# Patient Record
Sex: FEMALE | Race: BLACK OR AFRICAN AMERICAN | NOT HISPANIC OR LATINO | Employment: OTHER | URBAN - METROPOLITAN AREA
[De-identification: names, ages, dates, MRNs, and addresses within clinical notes are randomized per-mention and may not be internally consistent; named-entity substitution may affect disease eponyms.]

---

## 2017-01-12 ENCOUNTER — ALLSCRIPTS OFFICE VISIT (OUTPATIENT)
Dept: OTHER | Facility: OTHER | Age: 67
End: 2017-01-12

## 2017-01-27 ENCOUNTER — GENERIC CONVERSION - ENCOUNTER (OUTPATIENT)
Dept: OTHER | Facility: OTHER | Age: 67
End: 2017-01-27

## 2017-01-31 ENCOUNTER — ALLSCRIPTS OFFICE VISIT (OUTPATIENT)
Dept: RADIOLOGY | Facility: CLINIC | Age: 67
End: 2017-01-31
Payer: MEDICARE

## 2017-02-02 ENCOUNTER — ALLSCRIPTS OFFICE VISIT (OUTPATIENT)
Dept: OTHER | Facility: OTHER | Age: 67
End: 2017-02-02

## 2017-02-02 DIAGNOSIS — E11.9 TYPE 2 DIABETES MELLITUS WITHOUT COMPLICATIONS (HCC): ICD-10-CM

## 2017-02-02 DIAGNOSIS — K74.69 OTHER CIRRHOSIS OF LIVER (HCC): ICD-10-CM

## 2017-02-02 DIAGNOSIS — C22.0 LIVER CELL CARCINOMA (HCC): ICD-10-CM

## 2017-02-02 DIAGNOSIS — B18.2 CHRONIC VIRAL HEPATITIS C (HCC): ICD-10-CM

## 2017-02-07 ENCOUNTER — GENERIC CONVERSION - ENCOUNTER (OUTPATIENT)
Dept: OTHER | Facility: OTHER | Age: 67
End: 2017-02-07

## 2017-02-08 ENCOUNTER — GENERIC CONVERSION - ENCOUNTER (OUTPATIENT)
Dept: OTHER | Facility: OTHER | Age: 67
End: 2017-02-08

## 2017-02-21 ENCOUNTER — APPOINTMENT (OUTPATIENT)
Dept: LAB | Facility: HOSPITAL | Age: 67
End: 2017-02-21
Attending: INTERNAL MEDICINE
Payer: MEDICARE

## 2017-02-21 ENCOUNTER — TRANSCRIBE ORDERS (OUTPATIENT)
Dept: LAB | Facility: HOSPITAL | Age: 67
End: 2017-02-21

## 2017-02-21 DIAGNOSIS — C22.0 LIVER CELL CARCINOMA (HCC): ICD-10-CM

## 2017-02-21 DIAGNOSIS — B18.2 CHRONIC VIRAL HEPATITIS C (HCC): ICD-10-CM

## 2017-02-21 DIAGNOSIS — K74.69 OTHER CIRRHOSIS OF LIVER (HCC): ICD-10-CM

## 2017-02-21 LAB
ALBUMIN SERPL BCP-MCNC: 4.4 G/DL (ref 3.5–5)
ALP SERPL-CCNC: 83 U/L (ref 46–116)
ALT SERPL W P-5'-P-CCNC: 18 U/L (ref 12–78)
ANION GAP SERPL CALCULATED.3IONS-SCNC: 8 MMOL/L (ref 4–13)
AST SERPL W P-5'-P-CCNC: 9 U/L (ref 5–45)
BASOPHILS # BLD AUTO: 0.02 THOUSANDS/ΜL (ref 0–0.1)
BASOPHILS NFR BLD AUTO: 0 % (ref 0–1)
BILIRUB DIRECT SERPL-MCNC: 0.14 MG/DL (ref 0–0.2)
BILIRUB SERPL-MCNC: 0.34 MG/DL (ref 0.2–1)
BUN SERPL-MCNC: 19 MG/DL (ref 5–25)
CALCIUM SERPL-MCNC: 9.2 MG/DL (ref 8.3–10.1)
CHLORIDE SERPL-SCNC: 96 MMOL/L (ref 100–108)
CO2 SERPL-SCNC: 28 MMOL/L (ref 21–32)
CREAT SERPL-MCNC: 1 MG/DL (ref 0.6–1.3)
EOSINOPHIL # BLD AUTO: 0.1 THOUSAND/ΜL (ref 0–0.61)
EOSINOPHIL NFR BLD AUTO: 1 % (ref 0–6)
ERYTHROCYTE [DISTWIDTH] IN BLOOD BY AUTOMATED COUNT: 13.8 % (ref 11.6–15.1)
GFR SERPL CREATININE-BSD FRML MDRD: >60 ML/MIN/1.73SQ M
GLUCOSE SERPL-MCNC: 98 MG/DL (ref 65–140)
HCT VFR BLD AUTO: 34.8 % (ref 34.8–46.1)
HGB BLD-MCNC: 11.8 G/DL (ref 11.5–15.4)
INR PPP: 1 (ref 0.86–1.16)
LYMPHOCYTES # BLD AUTO: 3.18 THOUSANDS/ΜL (ref 0.6–4.47)
LYMPHOCYTES NFR BLD AUTO: 33 % (ref 14–44)
MCH RBC QN AUTO: 28.8 PG (ref 26.8–34.3)
MCHC RBC AUTO-ENTMCNC: 33.9 G/DL (ref 31.4–37.4)
MCV RBC AUTO: 85 FL (ref 82–98)
MONOCYTES # BLD AUTO: 0.78 THOUSAND/ΜL (ref 0.17–1.22)
MONOCYTES NFR BLD AUTO: 8 % (ref 4–12)
NEUTROPHILS # BLD AUTO: 5.43 THOUSANDS/ΜL (ref 1.85–7.62)
NEUTS SEG NFR BLD AUTO: 58 % (ref 43–75)
NRBC BLD AUTO-RTO: 0 /100 WBCS
PLATELET # BLD AUTO: 242 THOUSANDS/UL (ref 149–390)
PMV BLD AUTO: 9.9 FL (ref 8.9–12.7)
POTASSIUM SERPL-SCNC: 4 MMOL/L (ref 3.5–5.3)
PROT SERPL-MCNC: 9.3 G/DL (ref 6.4–8.2)
PROTHROMBIN TIME: 13.3 SECONDS (ref 12–14.3)
RBC # BLD AUTO: 4.1 MILLION/UL (ref 3.81–5.12)
SODIUM SERPL-SCNC: 132 MMOL/L (ref 136–145)
WBC # BLD AUTO: 9.52 THOUSAND/UL (ref 4.31–10.16)

## 2017-02-21 PROCEDURE — 36415 COLL VENOUS BLD VENIPUNCTURE: CPT

## 2017-02-21 PROCEDURE — 82248 BILIRUBIN DIRECT: CPT

## 2017-02-21 PROCEDURE — 85025 COMPLETE CBC W/AUTO DIFF WBC: CPT

## 2017-02-21 PROCEDURE — 85610 PROTHROMBIN TIME: CPT

## 2017-02-21 PROCEDURE — 82105 ALPHA-FETOPROTEIN SERUM: CPT

## 2017-02-21 PROCEDURE — 80053 COMPREHEN METABOLIC PANEL: CPT

## 2017-02-21 PROCEDURE — 87522 HEPATITIS C REVRS TRNSCRPJ: CPT

## 2017-02-22 ENCOUNTER — GENERIC CONVERSION - ENCOUNTER (OUTPATIENT)
Dept: OTHER | Facility: OTHER | Age: 67
End: 2017-02-22

## 2017-02-22 LAB — AFP-TM SERPL-MCNC: 8.5 NG/ML (ref 0–8.3)

## 2017-02-23 ENCOUNTER — ALLSCRIPTS OFFICE VISIT (OUTPATIENT)
Dept: OTHER | Facility: OTHER | Age: 67
End: 2017-02-23

## 2017-02-23 LAB
HCV RNA SERPL NAA+PROBE-ACNC: NORMAL IU/ML
TEST INFORMATION: NORMAL

## 2017-02-27 ENCOUNTER — HOSPITAL ENCOUNTER (OUTPATIENT)
Dept: MRI IMAGING | Facility: HOSPITAL | Age: 67
Discharge: HOME | End: 2017-02-27
Attending: SURGERY
Payer: MEDICARE

## 2017-02-27 DIAGNOSIS — C22.0 LIVER CELL CARCINOMA (HCC): ICD-10-CM

## 2017-02-27 PROCEDURE — A9581 GADOXETATE DISODIUM INJ: HCPCS | Performed by: SURGERY

## 2017-02-27 PROCEDURE — 74183 MRI ABD W/O CNTR FLWD CNTR: CPT

## 2017-02-27 RX ADMIN — GADOXETATE DISODIUM 10 ML: 181.43 INJECTION, SOLUTION INTRAVENOUS at 15:29

## 2017-03-02 ENCOUNTER — GENERIC CONVERSION - ENCOUNTER (OUTPATIENT)
Dept: OTHER | Facility: OTHER | Age: 67
End: 2017-03-02

## 2017-03-07 ENCOUNTER — TRANSCRIBE ORDERS (OUTPATIENT)
Dept: ADMINISTRATIVE | Facility: HOSPITAL | Age: 67
End: 2017-03-07

## 2017-03-07 ENCOUNTER — ALLSCRIPTS OFFICE VISIT (OUTPATIENT)
Dept: OTHER | Facility: OTHER | Age: 67
End: 2017-03-07

## 2017-03-07 DIAGNOSIS — D75.1 ERYTHROCYTOSIS DUE TO HEPATOMA (HCC): Primary | ICD-10-CM

## 2017-03-07 DIAGNOSIS — C22.0 ERYTHROCYTOSIS DUE TO HEPATOMA (HCC): Primary | ICD-10-CM

## 2017-03-09 ENCOUNTER — ALLSCRIPTS OFFICE VISIT (OUTPATIENT)
Dept: OTHER | Facility: OTHER | Age: 67
End: 2017-03-09

## 2017-05-04 ENCOUNTER — ALLSCRIPTS OFFICE VISIT (OUTPATIENT)
Dept: OTHER | Facility: OTHER | Age: 67
End: 2017-05-04

## 2017-06-29 ENCOUNTER — ALLSCRIPTS OFFICE VISIT (OUTPATIENT)
Dept: OTHER | Facility: OTHER | Age: 67
End: 2017-06-29

## 2017-07-20 ENCOUNTER — ALLSCRIPTS OFFICE VISIT (OUTPATIENT)
Dept: OTHER | Facility: OTHER | Age: 67
End: 2017-07-20

## 2017-08-07 ENCOUNTER — ALLSCRIPTS OFFICE VISIT (OUTPATIENT)
Dept: OTHER | Facility: OTHER | Age: 67
End: 2017-08-07

## 2017-08-18 ENCOUNTER — GENERIC CONVERSION - ENCOUNTER (OUTPATIENT)
Dept: OTHER | Facility: OTHER | Age: 67
End: 2017-08-18

## 2017-08-22 ENCOUNTER — GENERIC CONVERSION - ENCOUNTER (OUTPATIENT)
Dept: OTHER | Facility: OTHER | Age: 67
End: 2017-08-22

## 2017-09-07 DIAGNOSIS — C22.0 LIVER CELL CARCINOMA (HCC): ICD-10-CM

## 2017-09-08 ENCOUNTER — APPOINTMENT (OUTPATIENT)
Dept: LAB | Facility: HOSPITAL | Age: 67
End: 2017-09-08
Payer: COMMERCIAL

## 2017-09-08 ENCOUNTER — TRANSCRIBE ORDERS (OUTPATIENT)
Dept: LAB | Facility: HOSPITAL | Age: 67
End: 2017-09-08

## 2017-09-08 DIAGNOSIS — C22.0 LIVER CELL CARCINOMA (HCC): ICD-10-CM

## 2017-09-08 DIAGNOSIS — E11.9 TYPE 2 DIABETES MELLITUS WITHOUT COMPLICATIONS (HCC): ICD-10-CM

## 2017-09-08 LAB
BUN SERPL-MCNC: 16 MG/DL (ref 5–25)
CREAT SERPL-MCNC: 1.27 MG/DL (ref 0.6–1.3)
EST. AVERAGE GLUCOSE BLD GHB EST-MCNC: 120 MG/DL
GFR SERPL CREATININE-BSD FRML MDRD: 51 ML/MIN/1.73SQ M
HBA1C MFR BLD: 5.8 % (ref 4.2–6.3)

## 2017-09-08 PROCEDURE — 83036 HEMOGLOBIN GLYCOSYLATED A1C: CPT

## 2017-09-08 PROCEDURE — 36415 COLL VENOUS BLD VENIPUNCTURE: CPT

## 2017-09-08 PROCEDURE — 82565 ASSAY OF CREATININE: CPT

## 2017-09-08 PROCEDURE — 84520 ASSAY OF UREA NITROGEN: CPT

## 2017-09-08 PROCEDURE — 82105 ALPHA-FETOPROTEIN SERUM: CPT

## 2017-09-09 LAB — AFP-TM SERPL-MCNC: 12.3 NG/ML (ref 0–8.3)

## 2017-09-13 ENCOUNTER — HOSPITAL ENCOUNTER (OUTPATIENT)
Dept: MRI IMAGING | Facility: HOSPITAL | Age: 67
Discharge: HOME | End: 2017-09-13
Payer: COMMERCIAL

## 2017-09-13 DIAGNOSIS — C22.0 LIVER CELL CARCINOMA (HCC): ICD-10-CM

## 2017-09-13 PROCEDURE — A9581 GADOXETATE DISODIUM INJ: HCPCS | Performed by: RADIOLOGY

## 2017-09-13 PROCEDURE — 74183 MRI ABD W/O CNTR FLWD CNTR: CPT

## 2017-09-13 RX ADMIN — GADOXETATE DISODIUM 10 ML: 181.43 INJECTION, SOLUTION INTRAVENOUS at 13:48

## 2017-09-25 ENCOUNTER — GENERIC CONVERSION - ENCOUNTER (OUTPATIENT)
Dept: OTHER | Facility: OTHER | Age: 67
End: 2017-09-25

## 2017-09-25 LAB
LEFT EYE DIABETIC RETINOPATHY: NORMAL
RIGHT EYE DIABETIC RETINOPATHY: NORMAL

## 2017-09-26 ENCOUNTER — TRANSCRIBE ORDERS (OUTPATIENT)
Dept: ADMINISTRATIVE | Facility: HOSPITAL | Age: 67
End: 2017-09-26

## 2017-09-26 ENCOUNTER — GENERIC CONVERSION - ENCOUNTER (OUTPATIENT)
Dept: OTHER | Facility: OTHER | Age: 67
End: 2017-09-26

## 2017-09-26 DIAGNOSIS — C22.0 CARCINOMA OF LIVER (HCC): Primary | ICD-10-CM

## 2017-10-19 ENCOUNTER — ALLSCRIPTS OFFICE VISIT (OUTPATIENT)
Dept: OTHER | Facility: OTHER | Age: 67
End: 2017-10-19

## 2017-10-19 DIAGNOSIS — F11.20 UNCOMPLICATED OPIOID DEPENDENCE (HCC): ICD-10-CM

## 2017-10-19 DIAGNOSIS — G89.4 CHRONIC PAIN SYNDROME: ICD-10-CM

## 2017-10-19 DIAGNOSIS — C22.0 LIVER CELL CARCINOMA (HCC): ICD-10-CM

## 2017-10-19 DIAGNOSIS — Z79.891 LONG TERM CURRENT USE OF OPIATE ANALGESIC: ICD-10-CM

## 2017-10-20 NOTE — PROGRESS NOTES
Assessment  1  Herniated lumbar disc without myelopathy (722 10) (M51 26)  2  Pain syndrome, chronic (338 4) (G89 4)  3  Myofascial pain (729 1) (M79 1)    Plan  Chronic lumbar radiculopathy    · Renew: Gabapentin 300 MG Oral Capsule; Take 3 caps at bedtime  Rx By: Che Pastor; Dispense: 30 Days ; #:90 Capsule; Refill: 1;For: Chronic lumbar   radiculopathy; MARIANO = N; Verified Transmission to [address] [place] [address]; Last Updated By: System, SureScripts; 10/19/2017 1:38:16 PM   · Renew: Oxycodone-Acetaminophen  MG Oral Tablet; Take 1 tablet every 6 hours  as needed for pain  Rx By: Che Pastor; Dispense: 30 Days ; #:120 Tablet; Refill: 0;For: Chronic lumbar   radiculopathy; MARIANO = N; Print Rx  Hepatocellular carcinoma    · *1 - [facility] RADIATION ONC (RADIATION ONCOLOGY ) Co-Management  Consult for  possible Sirspheres or ablation  Status: Active  Requested for: 33GWX0475 01:30PM  Ordered; For: Hepatocellular carcinoma; Ordered By: Shamir Trinh  Performed:   Due:   42EYC6675; Last Updated By: Marvin Darling; 10/16/2017 9:56:17 AM  () Care Summary provided  : Yes  Myofascial pain    · Start: Methocarbamol 500 MG Oral Tablet; TAKE 1 TABLET Bedtime for muscle spasm  Rx By: Che Pastor; Dispense: 30 Days ; #:30 Tablet; Refill: 1;For: Myofascial pain;   MARIANO = N; Verified Transmission to [address] [place] [address]; Last Updated   By: System, SureScriSocialSafe; 10/19/2017 1:43:29 PM  Pain syndrome, chronic    · Follow-up visit in 2 months Evaluation and Treatment  Follow-up  Status: Hold For -  Scheduling  Requested for: 95GTV9493  Ordered; For: Pain syndrome, chronic;  Ordered By: Che Pastor  Performed:   Order   Comments: nm 8 weeks  Due: 45OUW5383  Sacroiliitis    · Renew: Diclofenac Sodium 75 MG Oral Tablet Delayed Release; TAKE 1 TABLET 2  TIMES DAILY AFTER MEALS  Rx By: Che Pastor; Dispense: 30 Days ; #:60 Tablet Delayed Release;  Refill: 5;For:   Sacroiliitis; MARIANO = N; Verified Transmission to [address]; Last   Updated By: System, OQOriHoopla; 10/19/2017 1:38:17 PM  Trochanteric bursitis    · Renew: Flector 1 3 % Transdermal Patch; APPLY 1 PATCH TO LEFT HIP Q 12 HOURS  Rx By: Griffin Willard; Dispense: 30 Days ; #:60 Patch; Refill: 2;For: Trochanteric   bursitis; MARIANO = N; Verified Transmission to [address] [place] [address]; Last   Updated By: System, SureScripts; 10/19/2017 1:38:17 PM    Discussion/Summary    Patient is a 77year old female with a history of lumbar disc herniation and radiculopathy, who presents today for a follow up appointment  The patient continues with low back pain, with occasional right hamstring pain  The hamstring pain has improved since completing a Medrol Dosepak  She will continue on the Percocet as prescribed, and was given a prescription for this month with a do not fill date until October 23, 2017, Gabriel Channel next with the do not filled until November 21, 2017  also has muscle spasms noted, so I will order a muscle relaxant methocarbamol for her to take as needed  She was instructed to take the medication at bedtime as it can cause drowsiness and dizziness  prescription monitoring drug program report was reviewed and was appropriate  urine drug screen was performed in the office today, as part of the medication management protocol  Drug screens are performed to evaluate for the presence or absence of described, non prescribed, and/or illicit substances  The point of care results were appropriate for what is being prescribed  The specimen will be sent to the lab for confirmatory testing  The drug screen is medically necessary, because the patient is dependent upon, or being considered for opiate medication  The patient has the current Goals: Decreased pain and improved quality of life  The patent has the current Barriers: None  Patient is able to Self-Care       There are risks associated with opiod medications, including dependence, addiction and tolerance  The patient understands and agrees to use these medications only as prescribed  Potential side effects of the medications include, but are not limited to, constipation, drowsiness, addiction, impaired judgment and risk of fatal overdose if not taken as prescribed  Sharing medications is a felony  At this point and time, the patient is showing no signs of addiction, abuse, diversion or suicidal ideation  Chief Complaint  1  Back Pain    History of Present Illness  Patient is a 59-year-old female with a history of lumbar disc herniation and radiculopathy  She was last seen in the office on August 7, 2017, in which she was continued on Percocet 10/325 mg  She presents today for a followup appointment  At this time, the patient continues to experience low back pain  She also has been having occasional pain in the right hamstring  She states this occurred 4 months ago, and lasted for 3 days  She denies trauma or precipitating event which caused the pain to occur  She described it as sharp  She contacted the office and was prescribed a Medrol Dosepak which she states has been helpful  She has not felt the pain since  She also continues to take Percocet 10/325 milligrams 4 times a day, Flector patch, gabapentin 300 milligrams 3 caps at nighttime, and diclofenac 50 milligrams twice a day  She states initially that diclofenac caused her to have a stomach ache, but she did not take the medication with food  Overall the medication provided 80 percent pain relief, she denies side effects or bowel or bladder issues  Charles Guajardo presents with complaints of gradual onset of constant episodes of severe bilateral lower back pain, described as dull and aching, radiating to the bilateral buttock and bilateral thigh  On a scale of 1 to 10, the patient rates the pain as 8  Symptoms are unchanged  Review of Systems    Constitutional: no fever,-- no recent weight gain-- and-- no recent weight loss     Eyes: no double vision-- and-- no blurry vision  Cardiovascular: no chest pain,-- no palpitations-- and-- no lower extremity edema  Respiratory: no complaints of shortness of breath-- and-- no wheezing  Musculoskeletal: difficulty walking,-- muscle weakness,-- joint stiffness,-- pain in extremity left thigh-- and-- decreased range of motion, but-- no joint swelling-- and-- no limb swelling  Neurological: no dizziness,-- no difficulty swallowing,-- no memory loss,-- no loss of consciousness-- and-- no seizures  Gastrointestinal: no nausea,-- no vomiting,-- no constipation-- and-- no diarrhea  Genitourinary: no difficulty initiating urine stream,-- no genital pain-- and-- no frequent urination  Integumentary: no complaints of skin rash  Psychiatric: no depression  Endocrine: no excessive thirst,-- no adrenal disease,-- no hypothyroidism-- and-- no hyperthyroidism  Hematologic/Lymphatic: no tendency for easy bruising-- and-- no tendency for easy bleeding  Active Problems  1  Acute upper respiratory infection (465 9) (J06 9)  2  Analgesic use (V58 69) (Z79 899)  3  Anxiety (300 00) (F41 9)  4  Asthenia (780 79) (R53 1)  5  Chronic lumbar radiculopathy (724 4) (M54 16)  6  Claustrophobia (300 29) (F40 240)  7  Constipation (564 00) (K59 00)  8  Diabetes mellitus, type II (250 00) (E11 9)  9  Diabetic neuropathy (250 60,357 2) (E11 40)  10  Encounter for screening mammogram for breast cancer (V76 12) (Z12 31)  11  Hepatitis C, chronic (070 54) (B18 2)  12  Hepatocellular carcinoma (155 0) (C22 0)  13  Herniated lumbar disc without myelopathy (722 10) (M51 26)  14  Hypertension (401 9) (I10)  15  Insomnia (780 52) (G47 00)  16  Left foot pain (729 5) (M79 672)  17  Left-sided chest wall pain (786 52) (R07 89)  18  Malignant neoplasm of liver (155 2) (C22 9)  19  Nicotine dependence (305 1) (F17 200)  20  Opioid dependence (304 00) (F11 20)  21  Osteoporosis (733 00) (M81 0)  22   Other cirrhosis of liver (571  5) (K74 69)  23  Pain syndrome, chronic (338 4) (G89 4)  24  Sacroiliitis (720 2) (M46 1)  25  Screening for breast cancer (V76 10) (Z12 31)  26  Seasonal allergies (477 9) (J30 2)  27  Trichomonal vulvovaginitis (131 01) (A59 01)  28  Trochanteric bursitis (726 5) (M70 60)    Past Medical History  1  History of Age At First Period 15 Years Old (Menarche)  2  History of Age At First Pregnancy 16 Years Old  3  History of Dental abscess (522 5) (K04 7)  4  History of Drug dependence (304 90) (F19 20)  5  History Of 4  Previous Pregnancies (V61 5)  6  History of acute bronchitis (V12 69) (Z87 09)  7  History of alopecia (V13 89) (Z87 898)  8  History of hypertension (V12 59) (Z86 79)  9  History of hypokalemia (V12 29) (Z86 39)  10  History of shortness of breath (V13 89) (Z87 898)  11  History of Iron overload (275 09) (E83 19)  12  History of Iron overload (275 09) (E83 19)  13  History of Limb pain (729 5) (M79 609)  14  History of Menopause (V49 81)  15  History of Need for pneumococcal vaccination (V03 82) (Z23)  16  History of Other screening mammogram (V76 12) (Z12 31)  17  History of Pre-operative cardiovascular examination (V72 81) (Z01 810)  18  History of Previous Pregnancies Resulted In 4  Live Birth(S)  19  History of Tuberculin skin test encounter (V74 1) (Z11 1)  20  History of Vulvovaginitis candida albicans (112 1) (B37 3)    The active problems and past medical history were reviewed and updated today  Surgical History  1  History of Dental Surgery  2  History of Hysterectomy  3  History of Tonsillectomy    The surgical history was reviewed and updated today  Family History  Mother   1  No pertinent family history  Father   2  No pertinent family history  Maternal Aunt   3  Family history of Cancer  Family History   4  Family history of Cancer  5  Family history of Denial Of Any Significant Medical History  6  Family history of Diabetes Mellitus (V18 0)  7   Family history of Father  At Age ___  6  Family history of Hypertension (V17 49)    The family history was reviewed and updated today  Social History   · Being A Social Drinker   · Current Every Day Smoker (305 1)   · Marital History - Single   · Denied: History of No drug use  The social history was reviewed and updated today  The social history was reviewed and is unchanged  Current Meds  1  Kim Contour Test In Vitro Strip; TEST ONCE DAILY; Therapy: 77Lzl9617 to (Evaluate:2017)  Requested for: 85Tgx1719; Last   Rx:41Zpc9095 Ordered  2  Diclofenac Sodium 75 MG Oral Tablet Delayed Release; TAKE 1 TABLET 2 TIMES DAILY   AFTER MEALS; Therapy: 14Nah2098 to (Evaluate:92Rzs0361)  Requested for: 20Fqm8650; Last   Rx:39Npl1718; Status: ACTIVE - Renewal Denied Ordered  3  Flector 1 3 % Transdermal Patch; APPLY 1 PATCH TO LEFT HIP Q 12 HOURS; Therapy: 68MSC9074 to (Evaluate:78Vqi6655)  Requested for: 74JXB4108; Last   Rx:2017 Ordered  4  Furosemide 40 MG Oral Tablet; take 1 tablet by mouth every day; Therapy: 19WKB6207 to (Evaluate:50Drq9152)  Requested for: 53Fam1144; Last   Rx:39Vtm4681 Ordered  5  Gabapentin 300 MG Oral Capsule; Take 3 caps at bedtime; Therapy: 82PCP6209 to (Nicolas Tamayo)  Requested for: 35KWZ4353; Last   Rx:64Gsm0968; Status: ACTIVE - Renewal Denied Ordered  6  HydroCHLOROthiazide 25 MG Oral Tablet; TAKE 1 TABLET DAILY; Therapy: 21KIQ4972 to (Evaluate:29Jew9308)  Requested for: 2017; Last   Rx:2017 Ordered  7  LORazepam 1 MG Oral Tablet; TAKE 1 TABLET 1 HOUR BEFORE MRI  MAY REPEAT   ONCE 15 MINUTES BEFORE MRI; Therapy: 83ROJ3926 to (Evaluate:27Duv8716); Last Rx:61Nan3874 Ordered  8  Losartan Potassium 100 MG Oral Tablet; take one tablet by mouth daily; Therapy: 30BHT3308 to (Evaluate:22Ctd2902)  Requested for: 33Abs9569; Last   Rx:65Uer1750 Ordered  9  MetFORMIN HCl - 500 MG Oral Tablet; take 1 tablet by mouth twice daily;    Therapy: 59AKH9105 to (Evaluate:28Gwd7289) Requested for: 42Jii5398; Last   Rx:17Hul4346 Ordered  10  Oxycodone-Acetaminophen  MG Oral Tablet; Take 1 tablet every 6 hours as    needed for pain; Therapy: 17QPJ3979 to (Evaluate:48Suz9255); Last Rx:88Cxu2531 Ordered  11  Senna Lax 8 6 MG TABS; Take 1-2 tablets every day PRN; Therapy: 03MXK1111 to (21 )  Requested for: 18VFS9906; Last    Rx:58Wzl8379 Ordered  12  Spironolactone 100 MG Oral Tablet; take 1 tablet by mouth daily; Therapy: 51IND5566 to (Evaluate:20Ozm1218)  Requested for: 56RCK4310; Last    Rx:06Wem2377 Ordered    The medication list was reviewed and updated today  Allergies  1  No Known Drug Allergies  2  No Known Environmental Allergies  3  No Known Food Allergies  Denied   4  ACE Inhibitors    Vitals  Vital Signs    Recorded: 92QID0142 01:15PM   Temperature 97 9 F   Heart Rate 76   Respiration 16   Systolic 741   Diastolic 72   Height 5 ft 3 in   Weight 174 lb    BMI Calculated 30 82   BSA Calculated 1 82   O2 Saturation 97   Pain Scale 8     Physical Exam    Constitutional   General appearance: Well developed, well nourished, alert, in no distress, non-toxic and no overt pain behavior  Eyes   Sclera: anicteric   HEENT   Hearing grossly intact  Neck   Neck: Supple, symmetric, trachea midline, no masses  Pulmonary   Respiratory effort: Even and unlabored  Cardiovascular   Examination of extremities: No edema or pitting edema present  Skin   Skin and subcutaneous tissue: Normal without rashes or lesions, well hydrated  Psychiatric   Mood and affect: Mood and affect appropriate  Musculoskeletal   Gait and station: Normal     Lumbar/Sacral Spine examination demonstrates Lumbosacral Spine:   Appearance: Normal  Spinal alignment exhibits normal lordosis  Tenderness: the left sacroiliac joint  No tenderness over right hamstring  Flexion was not restricted-- and-- was painless  Extension was restricted-- and-- was painful     Foot and ankle strength was normal bilaterally  Knee strength was normal bilaterally  Hip strength was normal bilaterally  Results/Data  Results Free Text Form Pain Mng[facility]:   Results    I personally reviewed the films/images in the office today  Radiology:  4 IMAGES  SPINE dated 5/14/14  Back pain  MRI 11/19/2013  4 including flexion extension  No instability is seen on flexion or extension  lumbar vertebrae demonstrate normal height  is no fracture or pathologic bone lesions  L5-S1 degenerative disc disease is present  Moderate facetchanges also present at this level  pedicles are intact  pars defects  No spondylolisthesis  L5-S1 degenerative changes  MRI:  mri -lumbar spine on 11/19/13L1-L2- Minor bilateral facet arthrosisL2-L3- Circumferential bulge, slight reduction disc height, marginalosteophytes, facet arthrosis, no critical stenosis  L3-L4- Minor bilateral facet arthrosis, slight bulgeL4-L5- Moderate facet arthrosis, minor anterolisthesis  L5-S1- Central protrusion has increased in volume since prior study  Disc extends above the disc space in a right paramedian position  Nodefinite root compression  There's also extension of disc into bothforamen, asymmetric to the left, no definite root compression  Moderate bilateral facet arthrosis  Multiple sacral Tarlov cysts  IMPRESSION-1  Progression of degenerative changes particularly at the L5-S1 level  No definite nerve root compression  Marrow signal is diffusely heterogeneous and low on T1 sequences, isthere evidence for lymphoproliferative disease?     Other  pt took oxycodone today 10/19/17  Attending Note  Collaborating Physician:1  I discussed the case with the Advanced Practitioner and reviewed the note1 -- and-- I agree with the Advanced Practitioner note1         1 Amended By: Carroll Kearney; Oct 19 2017 4:40 PM EST    Future Appointments    Date/Time Provider Specialty Site   01/02/2018 01:15 PM ARPITA Morales   [facility]     Signatures   Electronically signed by : Alvarez sAhby, [address] [facility] [address] [facility] [address]; Oct 19 2017  1:50PM EST                       (Author)    Electronically signed by : Ashley Ro MD; Oct 19 2017  1:51PM EST                       (Author)    Electronically signed by : Franny Garcia; Oct 19 2017  4:38PM EST                       (Author)    Electronically signed by : Franny Garcia; Oct 19 2017  4:38PM EST                       (Author)    Electronically signed by : Ashley Ro MD; Oct 19 2017  4:41PM EST                       (Author)

## 2017-11-03 ENCOUNTER — GENERIC CONVERSION - ENCOUNTER (OUTPATIENT)
Dept: OTHER | Facility: OTHER | Age: 67
End: 2017-11-03

## 2017-11-03 ENCOUNTER — APPOINTMENT (OUTPATIENT)
Dept: RADIATION ONCOLOGY | Facility: HOSPITAL | Age: 67
End: 2017-11-03
Attending: RADIOLOGY
Payer: COMMERCIAL

## 2017-11-03 PROCEDURE — 99214 OFFICE O/P EST MOD 30 MIN: CPT | Performed by: RADIOLOGY

## 2017-11-21 ENCOUNTER — OFFICE VISIT (OUTPATIENT)
Dept: URGENT CARE | Age: 67
End: 2017-11-21
Payer: COMMERCIAL

## 2017-11-21 PROCEDURE — 99203 OFFICE O/P NEW LOW 30 MIN: CPT | Performed by: FAMILY MEDICINE

## 2017-11-21 PROCEDURE — S9088 SERVICES PROVIDED IN URGENT: HCPCS | Performed by: FAMILY MEDICINE

## 2017-11-22 NOTE — PROGRESS NOTES
Assessment    1  Acute upper respiratory infection (465 9) (J06 9)    Plan  Acute upper respiratory infection    · Benzonatate 200 MG Oral Capsule; TAKE 1 CAPSULE 2-3 TIMES DAILY (every 8-12hours as needed)   · LevoFLOXacin 500 MG Oral Tablet (Levaquin); TAKE 1 TABLET DAILY UNTILFINISHED    Discussion/Summary  Discussion Summary:   Levaquin once daily until finished ( please take probiotics)  Perles 2 to 3 times a day (every 8-12 hours) as needed for cough  follow-up with family physician  Medication Side Effects Reviewed: Possible side effects of new medications were reviewed with the patient/guardian today  Understands and agrees with treatment plan: The treatment plan was reviewed with the patient/guardian  The patient/guardian understands and agrees with the treatment plan   Counseling Documentation With Imm: The patient was counseled regarding  Chief Complaint    1  Cough  Chief Complaint Free Text Note Form: c/o congested, spasmatic cough for > one month - states sl  improving  Also notes nasal congestion with PND and rhinorrhea and sl  sore throat when cough  Denies fever, Dyspnea or chest tightness  No OTC meds      History of Present Illness  HPI: Congestion, cough   Hospital Based Practices Required Assessment:  Pain Assessment  the patient states they have pain  The pain is located in the cough  (on a scale of 0 to 10, the patient rates the pain at 8 )  Abuse And Domestic Violence Screen   Yes, the patient is safe at home  -- The patient states no one is hurting them  Depression And Suicide Screen  No, the patient has not had thoughts of hurting themself  No, the patient has not felt depressed in the past 7 days  Prefered Language is  [place]  Primary Language is  English  Review of Systems  Focused-Female:  Constitutional: as noted in HPI   ENT: nasal discharge, but-- as noted in HPI    Cardiovascular: no complaints of slow or fast heart rate, no chest pain, no palpitations, no leg claudication or lower extremity edema  Respiratory: cough, but-- as noted in HPI  Breasts: no complaints of breast pain, breast lump or nipple discharge  Gastrointestinal: no complaints of abdominal pain, no constipation, no nausea or diarrhea, no vomiting, no bloody stools  Genitourinary: no complaints of dysuria, no incontinence, no pelvic pain, no dysmenorrhea, no vaginal discharge or abnormal vaginal bleeding  Musculoskeletal: no complaints of arthralgia, no myalgia, no joint swelling or stiffness, no limb pain or swelling  Integumentary: no complaints of skin rash or lesion, no itching or dry skin, no skin wounds  Neurological: no complaints of headache, no confusion, no numbness or tingling, no dizziness or fainting  ROS Reviewed:   ROS reviewed  Active Problems  1  Acute upper respiratory infection (465 9) (J06 9)   2  Analgesic use (V58 69) (Z79 899)   3  Anxiety (300 00) (F41 9)   4  Asthenia (780 79) (R53 1)   5  Chronic lumbar radiculopathy (724 4) (M54 16)   6  Claustrophobia (300 29) (F40 240)   7  Constipation (564 00) (K59 00)   8  Diabetes mellitus, type II (250 00) (E11 9)   9  Diabetic neuropathy (250 60,357 2) (E11 40)   10  Encounter for long-term use of opiate analgesic (V58 69) (Z79 891)   11  Encounter for screening mammogram for breast cancer (V76 12) (Z12 31)   12  Hepatitis C, chronic (070 54) (B18 2)   13  Hepatocellular carcinoma (155 0) (C22 0)   14  Herniated lumbar disc without myelopathy (722 10) (M51 26)   15  Hypertension (401 9) (I10)   16  Insomnia (780 52) (G47 00)   17  Left foot pain (729 5) (M79 672)   18  Left-sided chest wall pain (786 52) (R07 89)   19  Malignant neoplasm of liver (155 2) (C22 9)   20  Myofascial pain (729 1) (M79 1)   21  Nicotine dependence (305 1) (F17 200)   22  Opioid dependence (304 00) (F11 20)   23  Osteoporosis (733 00) (M81 0)   24  Other cirrhosis of liver (571 5) (K74 69)   25  Pain syndrome, chronic (338 4) (G80 8)   26  Sacroiliitis (720 2) (M46 1)   27  Screening for breast cancer (V76 10) (Z12 31)   28  Seasonal allergies (477 9) (J30 2)   29  Trichomonal vulvovaginitis (131 01) (A59 01)   30  Trochanteric bursitis (726 5) (M70 60)    Past Medical History  1  History of Age At First Period 15 Years Old (Menarche)   2  History of Age At First Pregnancy 16 Years Old   3  History of Dental abscess (522 5) (K04 7)   4  History of Drug dependence (304 90) (F19 20)   5  History Of 4  Previous Pregnancies (V61 5)   6  History of acute bronchitis (V12 69) (Z87 09)   7  History of alopecia (V13 89) (Z87 898)   8  History of hypertension (V12 59) (Z86 79)   9  History of hypokalemia (V12 29) (Z86 39)   10  History of shortness of breath (V13 89) (Z87 898)   11  History of Iron overload (275 09) (E83 19)   12  History of Iron overload (275 09) (E83 19)   13  History of Limb pain (729 5) (M79 609)   14  History of Menopause (V49 81)   15  History of Need for pneumococcal vaccination (V03 82) (Z23)   16  History of Other screening mammogram (V76 12) (Z12 31)   17  History of Pre-operative cardiovascular examination (V72 81) (Z01 810)   18  History of Previous Pregnancies Resulted In 4  Live Birth(S)   19  History of Tuberculin skin test encounter (V74 1) (Z11 1)   20  History of Vulvovaginitis candida albicans (112 1) (B37 3)  Active Problems And Past Medical History Reviewed: The active problems and past medical history were reviewed and updated today  Family History  Mother    1  Patient's mother is  (V24 11) (Z80 80)  Father    2  Family history of diabetes mellitus (V18 0) (Z83 3)   3  Family history of malignant neoplasm of prostate (V16 42) (Z80 42)  Maternal Aunt    4  Family history of Cancer  Family History    5  Family history of Cancer   6  Family history of Denial Of Any Significant Medical History   7  Family history of Diabetes Mellitus (V18 0)   8  Family history of Father  At Age ___   5   Family history of Hypertension (V17 49)  Family History Reviewed: The family history was reviewed and updated today  Social History   · Current Every Day Smoker (305 1)   · Four children   · Living alone (V60 3) (Z60 2)   · Marital History - Single   · No alcohol use   · Denied: History of No drug use   · Part-time employment  Social History Reviewed: The social history was reviewed and updated today  The social history was reviewed and is unchanged  Surgical History    1  History of Dental Surgery   2  History of Hysterectomy   3  History of Liver Surgery   4  History of Tonsillectomy  Surgical History Reviewed: The surgical history was reviewed and updated today  Current Meds   1  Kim Contour Test In Vitro Strip; TEST ONCE DAILY; Therapy: 48Znx5281 to (Evaluate:14Ojq0313)  Requested for: 76Stu8462; Last Rx:17Lem0780 Ordered   2  Diclofenac Sodium 75 MG Oral Tablet Delayed Release; TAKE 1 TABLET 2 TIMES DAILY AFTER MEALS; Therapy: 15Oro1641 to (Evaluate:33Xlo2003)  Requested for: 24QRT3482; Last Rx:19Oct2017 Ordered   3  Flector 1 3 % Transdermal Patch; APPLY 1 PATCH TO LEFT HIP Q 12 HOURS; Therapy: 75LEV4321 to (Evaluate:17Jan2018)  Requested for: 86UBS8690; Last Rx:19Oct2017 Ordered   4  Furosemide 40 MG Oral Tablet; take 1 tablet by mouth every day; Therapy: 26ZVJ4884 to (Evaluate:55Ggp2391)  Requested for: 98Tve0842; Last Rx:32Nur9483 Ordered   5  Gabapentin 300 MG Oral Capsule; Take 3 caps at bedtime; Therapy: 50FJZ1722 to (Evaluate:03Ywh2969)  Requested for: 44YQH9501; Last Rx:19Oct2017; Status: ACTIVE - Renewal Denied Ordered   6  HydroCHLOROthiazide 25 MG Oral Tablet; TAKE 1 TABLET DAILY; Therapy: 55VHD0888 to (Evaluate:90Mfz7756)  Requested for: 14GSN2332; Last Rx:04Nov2017 Ordered   7  LORazepam 1 MG Oral Tablet; TAKE 1 TABLET 1 HOUR BEFORE MRI  MAY REPEAT ONCE 15 MINUTES BEFORE MRI; Therapy: 35ZMI4326 to (Evaluate:93Fnr9194); Last Rx:18Oxk7711 Ordered   8   Losartan Potassium 100 MG Oral Tablet; take one tablet by mouth daily; Therapy: 48RQI7694 to (Evaluate:49Lov9373)  Requested for: 98Lyd3560; Last Rx:76Gzm4061 Ordered   9  MetFORMIN HCl - 500 MG Oral Tablet; take 1 tablet by mouth twice daily; Therapy: 42ARR5861 to (Evaluate:68Vbe1203)  Requested for: 85Equ5657; Last Rx:94Hjg2353 Ordered   10  Methocarbamol 500 MG Oral Tablet; TAKE 1 TABLET Bedtime for muscle spasm; Therapy: 91JCC6470 to (Evaluate:07Vnv9742)  Requested for: 11RGH3726; Last  Rx:71Pst9729 Ordered   11  Oxycodone-Acetaminophen  MG Oral Tablet; Take 1 tablet every 6 hours as  needed for pain; Therapy: 39ZBG1871 to (Evaluate:18Nov2017); Last Rx:32Osf4944 Ordered   12  Senna Lax 8 6 MG TABS; Take 1-2 tablets every day PRN; Therapy: 27COL5857 to (03 17 74 30 53)  Requested for: 61ICU3618; Last  Rx:71Zxg7590 Ordered   13  Spironolactone 100 MG Oral Tablet; take 1 tablet by mouth daily; Therapy: 24EUZ0323 to (Evaluate:29Odn0256)  Requested for: 36ZTV0924; Last  Rx:34Ilo0419 Ordered  Medication List Reviewed: The medication list was reviewed and updated today  Allergies  1  ACE Inhibitors    2  No Known Environmental Allergies   3  No Known Food Allergies    Vitals  Signs   Recorded: 21Nov2017 03:53PM   Temperature: 98 5 F, Oral  Heart Rate: 72  Pulse Quality: Regular  Respiration: 20  Systolic: 137, RUE, Sitting  Diastolic: 62, RUE, Sitting  Height: 5 ft 3 in  Weight: 172 lb 9 6 oz  BMI Calculated: 30 57  BSA Calculated: 1 82  O2 Saturation: 95  Pain Scale: 8    Physical Exam   Constitutional  General appearance: No acute distress, well appearing and well nourished  Ears, Nose, Mouth, and Throat  External inspection of ears and nose: Normal    Otoscopic examination: Tympanic membranes translucent with normal light reflex  Canals patent without erythema  -- Nasal congestion  -- injection of the oropharynx  Pulmonary  Respiratory effort: No increased work of breathing or signs of respiratory distress   -- coarse breath sounds with scattered rhonchi  Cardiovascular  Auscultation of heart: Normal rate and rhythm, normal S1 and S2, without murmurs  Lymphatic  Palpation of lymph nodes in neck: No lymphadenopathy  Skin good color and turgor  Neurologic grossly intact, no nuchal rigidity  Psychiatric  Orientation to person, place, and time: Normal    Mood and affect: Normal        Future Appointments    Date/Time Provider Specialty Site   01/02/2018 01:15 PM ARPITA Ya   [facility]   12/18/2017 01:15 PM BRYANNA [facility] [address]       Signatures   Electronically signed by : Manuela Olmstead DO; Nov 21 2017  4:11PM EST                       (Author)

## 2017-12-01 ENCOUNTER — TRANSCRIBE ORDERS (OUTPATIENT)
Dept: LAB | Facility: HOSPITAL | Age: 67
End: 2017-12-01

## 2017-12-01 ENCOUNTER — APPOINTMENT (OUTPATIENT)
Dept: LAB | Facility: HOSPITAL | Age: 67
End: 2017-12-01
Attending: SURGERY
Payer: COMMERCIAL

## 2017-12-01 DIAGNOSIS — C22.0 LIVER CELL CARCINOMA (HCC): ICD-10-CM

## 2017-12-01 LAB
AFP-TM SERPL-MCNC: 29.1 NG/ML (ref 0.5–8)
ALBUMIN SERPL BCP-MCNC: 3.9 G/DL (ref 3.5–5)
ALP SERPL-CCNC: 108 U/L (ref 46–116)
ALT SERPL W P-5'-P-CCNC: 34 U/L (ref 12–78)
ANION GAP SERPL CALCULATED.3IONS-SCNC: 8 MMOL/L (ref 4–13)
APTT PPP: 36 SECONDS (ref 23–35)
AST SERPL W P-5'-P-CCNC: 26 U/L (ref 5–45)
BILIRUB SERPL-MCNC: 0.33 MG/DL (ref 0.2–1)
BUN SERPL-MCNC: 26 MG/DL (ref 5–25)
CALCIUM SERPL-MCNC: 9.4 MG/DL (ref 8.3–10.1)
CHLORIDE SERPL-SCNC: 98 MMOL/L (ref 100–108)
CO2 SERPL-SCNC: 26 MMOL/L (ref 21–32)
CREAT SERPL-MCNC: 1.23 MG/DL (ref 0.6–1.3)
GFR SERPL CREATININE-BSD FRML MDRD: 52 ML/MIN/1.73SQ M
GLUCOSE SERPL-MCNC: 101 MG/DL (ref 65–140)
INR PPP: 1.06 (ref 0.86–1.16)
POTASSIUM SERPL-SCNC: 4.3 MMOL/L (ref 3.5–5.3)
PROT SERPL-MCNC: 8.5 G/DL (ref 6.4–8.2)
PROTHROMBIN TIME: 13.8 SECONDS (ref 12.1–14.4)
SODIUM SERPL-SCNC: 132 MMOL/L (ref 136–145)

## 2017-12-01 PROCEDURE — 85730 THROMBOPLASTIN TIME PARTIAL: CPT

## 2017-12-01 PROCEDURE — 85610 PROTHROMBIN TIME: CPT

## 2017-12-01 PROCEDURE — 80053 COMPREHEN METABOLIC PANEL: CPT

## 2017-12-01 PROCEDURE — 82105 ALPHA-FETOPROTEIN SERUM: CPT

## 2017-12-01 PROCEDURE — 36415 COLL VENOUS BLD VENIPUNCTURE: CPT

## 2017-12-05 ENCOUNTER — TELEPHONE (OUTPATIENT)
Dept: RADIOLOGY | Facility: HOSPITAL | Age: 67
End: 2017-12-05

## 2017-12-05 RX ORDER — SODIUM CHLORIDE 9 MG/ML
75 INJECTION, SOLUTION INTRAVENOUS CONTINUOUS
Status: CANCELLED | OUTPATIENT
Start: 2017-12-05

## 2017-12-07 ENCOUNTER — TELEPHONE (OUTPATIENT)
Dept: INPATIENT UNIT | Facility: HOSPITAL | Age: 67
End: 2017-12-07

## 2017-12-08 ENCOUNTER — GENERIC CONVERSION - ENCOUNTER (OUTPATIENT)
Dept: OTHER | Facility: OTHER | Age: 67
End: 2017-12-08

## 2017-12-08 ENCOUNTER — APPOINTMENT (OUTPATIENT)
Dept: RADIOLOGY | Facility: HOSPITAL | Age: 67
End: 2017-12-08
Attending: RADIOLOGY
Payer: COMMERCIAL

## 2017-12-08 ENCOUNTER — HOSPITAL ENCOUNTER (OUTPATIENT)
Dept: RADIOLOGY | Facility: HOSPITAL | Age: 67
Discharge: HOME | End: 2017-12-08
Attending: RADIOLOGY
Payer: COMMERCIAL

## 2017-12-08 ENCOUNTER — HOSPITAL ENCOUNTER (OUTPATIENT)
Dept: RADIOLOGY | Facility: HOSPITAL | Age: 67
Discharge: HOME | End: 2017-12-08
Attending: RADIOLOGY | Admitting: RADIOLOGY
Payer: COMMERCIAL

## 2017-12-08 VITALS
WEIGHT: 173 LBS | OXYGEN SATURATION: 100 % | TEMPERATURE: 98.1 F | HEIGHT: 63 IN | DIASTOLIC BLOOD PRESSURE: 63 MMHG | SYSTOLIC BLOOD PRESSURE: 120 MMHG | BODY MASS INDEX: 30.65 KG/M2 | RESPIRATION RATE: 16 BRPM | HEART RATE: 46 BPM

## 2017-12-08 DIAGNOSIS — C22.0 HEPATOCELLULAR CARCINOMA (HCC): ICD-10-CM

## 2017-12-08 LAB
ERYTHROCYTE [DISTWIDTH] IN BLOOD BY AUTOMATED COUNT: 15 % (ref 11.6–15.1)
HCT VFR BLD AUTO: 32.4 % (ref 34.8–46.1)
HGB BLD-MCNC: 10.7 G/DL (ref 11.5–15.4)
MCH RBC QN AUTO: 27.4 PG (ref 26.8–34.3)
MCHC RBC AUTO-ENTMCNC: 33 G/DL (ref 31.4–37.4)
MCV RBC AUTO: 83 FL (ref 82–98)
PLATELET # BLD AUTO: 212 THOUSANDS/UL (ref 149–390)
PMV BLD AUTO: 9.6 FL (ref 8.9–12.7)
RBC # BLD AUTO: 3.9 MILLION/UL (ref 3.81–5.12)
WBC # BLD AUTO: 6.95 THOUSAND/UL (ref 4.31–10.16)

## 2017-12-08 PROCEDURE — 85027 COMPLETE CBC AUTOMATED: CPT | Performed by: RADIOLOGY

## 2017-12-08 PROCEDURE — 99153 MOD SED SAME PHYS/QHP EA: CPT

## 2017-12-08 PROCEDURE — C1887 CATHETER, GUIDING: HCPCS

## 2017-12-08 PROCEDURE — 76937 US GUIDE VASCULAR ACCESS: CPT

## 2017-12-08 PROCEDURE — 78201 LIVER IMAGING STATIC ONLY: CPT

## 2017-12-08 PROCEDURE — C1760 CLOSURE DEV, VASC: HCPCS

## 2017-12-08 PROCEDURE — C1769 GUIDE WIRE: HCPCS

## 2017-12-08 PROCEDURE — A9540 TC99M MAA: HCPCS

## 2017-12-08 PROCEDURE — 36247 INS CATH ABD/L-EXT ART 3RD: CPT

## 2017-12-08 PROCEDURE — C1894 INTRO/SHEATH, NON-LASER: HCPCS

## 2017-12-08 PROCEDURE — 74170 CT ABD WO CNTRST FLWD CNTRST: CPT

## 2017-12-08 PROCEDURE — 99152 MOD SED SAME PHYS/QHP 5/>YRS: CPT

## 2017-12-08 PROCEDURE — 75726 ARTERY X-RAYS ABDOMEN: CPT

## 2017-12-08 RX ORDER — DICLOFENAC SODIUM 75 MG/1
50 TABLET, DELAYED RELEASE ORAL 2 TIMES DAILY
COMMUNITY
End: 2018-02-12 | Stop reason: SDUPTHER

## 2017-12-08 RX ORDER — FUROSEMIDE 20 MG/1
40 TABLET ORAL DAILY
COMMUNITY
End: 2018-09-19 | Stop reason: HOSPADM

## 2017-12-08 RX ORDER — MIDAZOLAM HYDROCHLORIDE 1 MG/ML
INJECTION INTRAMUSCULAR; INTRAVENOUS CODE/TRAUMA/SEDATION MEDICATION
Status: COMPLETED | OUTPATIENT
Start: 2017-12-08 | End: 2017-12-08

## 2017-12-08 RX ORDER — FENTANYL CITRATE 50 UG/ML
INJECTION, SOLUTION INTRAMUSCULAR; INTRAVENOUS CODE/TRAUMA/SEDATION MEDICATION
Status: COMPLETED | OUTPATIENT
Start: 2017-12-08 | End: 2017-12-08

## 2017-12-08 RX ORDER — SODIUM CHLORIDE 9 MG/ML
75 INJECTION, SOLUTION INTRAVENOUS CONTINUOUS
Status: DISCONTINUED | OUTPATIENT
Start: 2017-12-08 | End: 2017-12-08 | Stop reason: HOSPADM

## 2017-12-08 RX ORDER — DIPHENHYDRAMINE HYDROCHLORIDE 50 MG/ML
INJECTION INTRAMUSCULAR; INTRAVENOUS CODE/TRAUMA/SEDATION MEDICATION
Status: COMPLETED | OUTPATIENT
Start: 2017-12-08 | End: 2017-12-08

## 2017-12-08 RX ORDER — GABAPENTIN 400 MG/1
100 CAPSULE ORAL 2 TIMES DAILY
COMMUNITY
End: 2018-02-12 | Stop reason: SDUPTHER

## 2017-12-08 RX ORDER — LOSARTAN POTASSIUM 25 MG/1
100 TABLET ORAL DAILY
COMMUNITY
End: 2018-02-22 | Stop reason: SDUPTHER

## 2017-12-08 RX ORDER — HYDROCHLOROTHIAZIDE 25 MG/1
25 TABLET ORAL DAILY
COMMUNITY
End: 2018-02-20 | Stop reason: SDUPTHER

## 2017-12-08 RX ORDER — HYDROCODONE BITARTRATE AND ACETAMINOPHEN 5; 325 MG/1; MG/1
1 TABLET ORAL EVERY 6 HOURS PRN
Status: DISCONTINUED | OUTPATIENT
Start: 2017-12-08 | End: 2017-12-08 | Stop reason: HOSPADM

## 2017-12-08 RX ORDER — ONDANSETRON 2 MG/ML
4 INJECTION INTRAMUSCULAR; INTRAVENOUS EVERY 6 HOURS PRN
Status: DISCONTINUED | OUTPATIENT
Start: 2017-12-08 | End: 2017-12-08 | Stop reason: HOSPADM

## 2017-12-08 RX ADMIN — MIDAZOLAM 1 MG: 1 INJECTION INTRAMUSCULAR; INTRAVENOUS at 10:44

## 2017-12-08 RX ADMIN — IOHEXOL 15 ML: 350 INJECTION, SOLUTION INTRAVENOUS at 12:10

## 2017-12-08 RX ADMIN — FENTANYL CITRATE 50 MCG: 50 INJECTION, SOLUTION INTRAMUSCULAR; INTRAVENOUS at 10:44

## 2017-12-08 RX ADMIN — DIPHENHYDRAMINE HYDROCHLORIDE 50 MG: 50 INJECTION, SOLUTION INTRAMUSCULAR; INTRAVENOUS at 11:01

## 2017-12-08 RX ADMIN — IODIXANOL 25 ML: 320 INJECTION, SOLUTION INTRAVASCULAR at 13:19

## 2017-12-08 RX ADMIN — MIDAZOLAM 1 MG: 1 INJECTION INTRAMUSCULAR; INTRAVENOUS at 11:01

## 2017-12-08 RX ADMIN — FENTANYL CITRATE 50 MCG: 50 INJECTION, SOLUTION INTRAMUSCULAR; INTRAVENOUS at 12:05

## 2017-12-08 RX ADMIN — FENTANYL CITRATE 50 MCG: 50 INJECTION, SOLUTION INTRAMUSCULAR; INTRAVENOUS at 11:01

## 2017-12-08 RX ADMIN — SODIUM CHLORIDE 75 ML/HR: 0.9 INJECTION, SOLUTION INTRAVENOUS at 08:58

## 2017-12-08 RX ADMIN — FENTANYL CITRATE 50 MCG: 50 INJECTION, SOLUTION INTRAMUSCULAR; INTRAVENOUS at 11:50

## 2017-12-08 NOTE — DISCHARGE INSTRUCTIONS
Discharge Instructions for SIRS Mapping Procedure                                    A Sirs mapping procedure is an arteriogram  done to prepare your                                    liver for a SIRS Implantation  During the mapping your doctor will                                      embolize (block) some of your blood vessels to keep the SIRS                                           Spheres from traveling to areas outside your liver        AFTER YOU LEAVE:     Self-care:   · Limit activity: Rest for the remainder of the day of your procedure  Have some one with you until the next morning  Keep your arm or leg straight as much as possible   Rest as much as possible, sitting lying or reclining  Walk only to go to the bathroom, to bed or to eat  If the angiogram catheter was put in your leg, use the stairs as little as possible  No driving  · Keep your wound clean and dry  You may shower 24 hours after your procedure  The bandage you have on should fall off in 2-3 days  If there is any drainage from the puncture site, you should put on a clean bandage  · Watch for bleeding and bruising: It is normal to have a bruise and soreness where the angiogram catheter went in  · Diet:   · You may resume your regular diet  Small sips of flat soda will help with mild nausea  · Drink more liquids than usual for the next 24 hours                      Medications              Resume your normal medications              Start taking Prilosec 20 mg daily for the next 30 days               Please get the Medrol prescription filled prior to your implantation (you                 will start taking it after the implantation)             WHAT [address]    After your second procedure, the SIRS implant  For  72 hours  NO pregnant visitors or family  No physical contact with others for more than two hours  Sleep alone in bed   No children or pets sitting on your lap  Keep a distance of three feet between yourself and others  · IMMEDIATELY Contact Interventional Radiology at [phone] Nadja PATIENTS: Contact Interventional Radiology at [phone]) Angela Perish PATIENTS: Contact Interventional Radiology at [phone]) if any of the following occur:  · If your bruise gets larger or if you notice any active bleeding  APPLY DIRECT PRESSURE TO THE BLEEDING SITE  · If you notice increased swelling or have increased pain at the puncture site   · If you have any numbness or pain in the extremity of the puncture site   · If that extremity seems cold or pale      · You have fever greater than 101  · Persistent nausea or vomiting    Follow up with your primary healthcare provider  as directed: Write down your questions so you remember to ask them during your visits

## 2017-12-08 NOTE — BRIEF OP NOTE (RAD/CATH)
IR SIR SPHERE MAPPING  Procedure Note    PATIENT NAME: July Ortiz  : 1950  MRN: [id number]     Pre-op Diagnosis:   1  Hepatocellular carcinoma (HCC)      Post-op Diagnosis:   1   Hepatocellular carcinoma ([facility] [address] )        Surgeon:   Christopher Childress MD  Assistants:     No qualified resident was available, Resident is only observing    Estimated Blood Loss: minimal     Findings:     Right hepatic artery injected with Tc 99m MAA- will embolize gall bladder branch on day of procedure likely with gel foam       Specimens: none    Complications:  none    Anesthesia: Conscious sedation and Nick Jiménez MD     Date: 2017  Time: 12:16 PM

## 2017-12-18 ENCOUNTER — ALLSCRIPTS OFFICE VISIT (OUTPATIENT)
Dept: OTHER | Facility: OTHER | Age: 67
End: 2017-12-18

## 2017-12-18 ENCOUNTER — TELEPHONE (OUTPATIENT)
Dept: RADIOLOGY | Facility: HOSPITAL | Age: 67
End: 2017-12-18

## 2017-12-18 ENCOUNTER — GENERIC CONVERSION - ENCOUNTER (OUTPATIENT)
Dept: OTHER | Facility: OTHER | Age: 67
End: 2017-12-18

## 2017-12-18 DIAGNOSIS — C22.0 LIVER CELL CARCINOMA (HCC): ICD-10-CM

## 2017-12-18 RX ORDER — SODIUM CHLORIDE 9 MG/ML
50 INJECTION, SOLUTION INTRAVENOUS CONTINUOUS
Status: CANCELLED | OUTPATIENT
Start: 2017-12-18

## 2017-12-19 NOTE — PROGRESS NOTES
Assessment  1  Pain syndrome, chronic (338 4) (G89 4)   2  Herniated lumbar disc without myelopathy (722 10) (M51 26)   3  Chronic lumbar radiculopathy (724 4) (M54 16)    Plan  Chronic lumbar radiculopathy    · Gabapentin 300 MG Oral Capsule; Take 3 caps at bedtime   Rx By: Eduardo Ramos; Dispense: 30 Days ; #:90 Capsule; Refill: 1;For: Chronic lumbar radiculopathy; MARIANO = N; Verified Transmission to [address] [place] [address]; Last Updated By: System, SureScripts; 12/18/2017 1:06:05 PM   · Oxycodone-Acetaminophen  MG Oral Tablet; Take 1 tablet every 6 hoursas needed for pain   Rx By: Eduardo Ramos; Dispense: 30 Days ; #:120 Tablet; Refill: 0;For: Chronic lumbar radiculopathy; MARIANO = N; Print Rx  Myofascial pain    · Methocarbamol 500 MG Oral Tablet; TAKE 1 TABLET Bedtime for muscle spasm   Rx By: Eduardo Ramos; Dispense: 30 Days ; #:30 Tablet; Refill: 1;For: Myofascial pain; MARIANO = N; Verified Transmission to [address] [place] [address]; Last Updated By: System, SureScripts; 12/18/2017 1:06:06 PM  Pain syndrome, chronic    · Follow-up visit in 2 months Evaluation and Treatment  Follow-up  Status: Complete Done: [id number]   Ordered; For: Pain syndrome, chronic; Ordered By: Eduardo Ramos Performed:  Order Comments: with nm 8 weeks Due: 77GLK3739; Last Updated By: Hipolito Ratliff; 12/18/2017 1:18:31 PM  Sacroiliitis    · Diclofenac Sodium 75 MG Oral Tablet Delayed Release; TAKE 1 TABLET 2TIMES DAILY AFTER MEALS   Rx By: Eduardo Ramos; Dispense: 30 Days ; #:60 Tablet Delayed Release; Refill: 5;For: Sacroiliitis; MARIANO = N; Verified Transmission to [address] [place] [address]; Last Updated By: System, SureScripts; 12/18/2017 1:06:05 PM    Discussion/Summary    Patient is a 79year old female with a history of lumbar disc herniation and radiculopathy, who presents today for a follow up appointment  The patient's pain has been well controlled since the last office visit   She continues to take gabapentin, methocarbamol, and Percocet 10/325 mg, which provides 80% pain relief  Therefore, she will be continued on the medication as prescribed  She was given 2 months of prescriptions for Percocet 10/325 mg, with 1 stating do not fill until December 19, 2017, and the other stating do not fill until January 16, 2018 [place] prescription monitoring drug program report was reviewed and was appropriate  a pill count was performed per office policy  The patient's Percocet was filled on November 21, 2017 and had 3 tablets remaining  This is inappropriate, and she assured by 4-5 tablets  This was discussed with the patient, and she denies taking more than prescribed  She also denies anyone in her home that would take the medication from her  She was made aware she is responsible for this medication, and if her next pill count is short, we cannot continue to prescribe  Patient verbalized understanding  Her past urine drug screen was also reviewed with the patient, which is positive for alcohol  She denies alcohol use, but states she did take off medicine for a cold she had then  She was reminded not to use alcohol products with her opioid pain medication, as this can cause respiratory depression and possible death  The patient has the current Goals: Decreased pain and improved quality of life  The patent has the current Barriers: None  Patient is able to Self-Care  There are risks associated with opiod medications, including dependence, addiction and tolerance  The patient understands and agrees to use these medications only as prescribed  Potential side effects of the medications include, but are not limited to, constipation, drowsiness, addiction, impaired judgment and risk of fatal overdose if not taken as prescribed  Sharing medications is a felony  At this point and time, the patient is showing no signs of addiction, abuse, diversion or suicidal ideation  Chief Complaint  1   Pain    History of Present Illness  Patient is a 45-year-old female with a history of lumbar disc herniation and radiculopathy  She was last seen in the office on October 19, 2017, in which she was continued on Percocet 10/325 mg  She presents today for a followup appointment  At this time, the patient continues to experience low back pain  The pain radiates across the low back and down the lateral aspect of the right thigh  It is intermittent and described as dull-aching and pressure-like  She is rating her pain a 7/10 on the numeric rating scale  She continues to take Percocet 10/325 mg 4 times a day, gabapentin 300 mg 3 times at night, diclofenac 75 mg twice a day and methocarbamol 750 mg at bedtime,which is providing 80% pain relief  she denies side effects or bowel or bladder issues  Jet Forrest presents with complaints of intermittent episodes of moderate bilateral lower back, bilateral buttock, right hip and right thigh pain, described as dull and aching, radiating to the lower back, right buttock and right lower extremity  Symptoms are unchanged  Review of Systems   Constitutional: no fever,-- no recent weight gain-- and-- no recent weight loss  Eyes: no double vision-- and-- no blurry vision  Cardiovascular: no chest pain,-- no palpitations-- and-- no lower extremity edema  Respiratory: no complaints of shortness of breath-- and-- no wheezing  Musculoskeletal: difficulty walking,-- muscle weakness,-- joint stiffness-- and-- pain in extremity , but-- no joint swelling,-- no limb swelling-- and-- no decreased range of motion  Neurological: no dizziness,-- no difficulty swallowing,-- no memory loss,-- no loss of consciousness-- and-- no seizures  Gastrointestinal: no nausea,-- no vomiting,-- no constipation-- and-- no diarrhea  Genitourinary: no difficulty initiating urine stream,-- no genital pain-- and-- no frequent urination  Integumentary: no complaints of skin rash  Psychiatric: no depression    Endocrine: no excessive thirst,-- no adrenal disease,-- no hypothyroidism-- and-- no hyperthyroidism  Hematologic/Lymphatic: no tendency for easy bruising-- and-- no tendency for easy bleeding  Active Problems  1  Acute upper respiratory infection (465 9) (J06 9)   2  Analgesic use (V58 69) (Z79 899)   3  Anxiety (300 00) (F41 9)   4  Asthenia (780 79) (R53 1)   5  Chronic lumbar radiculopathy (724 4) (M54 16)   6  Claustrophobia (300 29) (F40 240)   7  Constipation (564 00) (K59 00)   8  Diabetes mellitus, type II (250 00) (E11 9)   9  Diabetic neuropathy (250 60,357 2) (E11 40)   10  Encounter for long-term use of opiate analgesic (V58 69) (Z79 891)   11  Encounter for screening mammogram for breast cancer (V76 12) (Z12 31)   12  Hepatitis C, chronic (070 54) (B18 2)   13  Hepatocellular carcinoma (155 0) (C22 0)   14  Herniated lumbar disc without myelopathy (722 10) (M51 26)   15  Hypertension (401 9) (I10)   16  Insomnia (780 52) (G47 00)   17  Left foot pain (729 5) (M79 672)   18  Left-sided chest wall pain (786 52) (R07 89)   19  Malignant neoplasm of liver (155 2) (C22 9)   20  Myofascial pain (729 1) (M79 1)   21  Nicotine dependence (305 1) (F17 200)   22  Opioid dependence (304 00) (F11 20)   23  Osteoporosis (733 00) (M81 0)   24  Other cirrhosis of liver (571 5) (K74 69)   25  Pain syndrome, chronic (338 4) (G89 4)   26  Sacroiliitis (720 2) (M46 1)   27  Screening for breast cancer (V76 10) (Z12 31)   28  Seasonal allergies (477 9) (J30 2)   29  Trichomonal vulvovaginitis (131 01) (A59 01)   30  Trochanteric bursitis (726 5) (M70 60)    Past Medical History  1  History of Age At First Period 15 Years Old (Menarche)   2  History of Age At First Pregnancy 16 Years Old   3  History of Dental abscess (522 5) (K04 7)   4  History of Drug dependence (304 90) (F19 20)   5  History Of 4  Previous Pregnancies (V61 5)   6  History of acute bronchitis (V12 69) (Z87 09)   7  History of alopecia (V13 89) (Z87 898)   8  History of hypertension (V12 59) (Z86 79)   9  History of hypokalemia (V12 29) (Z86 39)   10  History of shortness of breath (V13 89) (Z87 898)   11  History of Iron overload (275 09) (E83 19)   12  History of Iron overload (275 09) (E83 19)   13  History of Limb pain (729 5) (M79 609)   14  History of Menopause (V49 81)   15  History of Need for pneumococcal vaccination (V03 82) (Z23)   16  History of Other screening mammogram (V76 12) (Z12 31)   17  History of Pre-operative cardiovascular examination (V72 81) (Z01 810)   18  History of Previous Pregnancies Resulted In 4  Live Birth(S)   19  History of Tuberculin skin test encounter (V74 1) (Z11 1)   20  History of Vulvovaginitis candida albicans (112 1) (B37 3)    The active problems and past medical history were reviewed and updated today  Surgical History  1  History of Dental Surgery   2  History of Hysterectomy   3  History of Liver Surgery   4  History of Tonsillectomy    The surgical history was reviewed and updated today  Family History  Mother    1  Patient's mother is  (V24 11) (Z80 80)  Father    2  Family history of diabetes mellitus (V18 0) (Z83 3)   3  Family history of malignant neoplasm of prostate (V16 42) (Z80 42)  Maternal Aunt    4  Family history of Cancer  Family History    5  Family history of Cancer   6  Family history of Denial Of Any Significant Medical History   7  Family history of Diabetes Mellitus (V18 0)   8  Family history of Father  At Age ___   5  Family history of Hypertension (V17 49)    The family history was reviewed and updated today  Social History   · Current Every Day Smoker (305 1)   · Four children   · Living alone (V60 3) (Z60 2)   · Marital History - Single   · No alcohol use   · Denied: History of No drug use   · Part-time employment  The social history was reviewed and updated today  The social history was reviewed and is unchanged  Current Meds   1   [facility]; TEST ONCE DAILY; Therapy: 65Wwn7961 to (Evaluate:16Oct2017)  Requested for: 54Qsh1679; Last Rx:96Tww1599 Ordered   2  Diclofenac Sodium 75 MG Oral Tablet Delayed Release; TAKE 1 TABLET 2 TIMES DAILY AFTER MEALS; Therapy: 00Lbc2437 to (Evaluate:71Xxy1235)  Requested for: 15OAJ8413; Last Rx:19Oct2017 Ordered   3  Flector 1 3 % Transdermal Patch; APPLY 1 PATCH TO LEFT HIP Q 12 HOURS; Therapy: 55XXD5862 to (Evaluate:17Jan2018)  Requested for: 51IDV5070; Last Rx:19Oct2017 Ordered   4  Furosemide 40 MG Oral Tablet; take 1 tablet by mouth every day; Therapy: 85DOQ6117 to (Evaluate:49Vzv9016)  Requested for: 89Iot4512; Last Rx:66Swb2988 Ordered   5  Gabapentin 300 MG Oral Capsule; Take 3 caps at bedtime; Therapy: 56HLJ2139 to (Evaluate:94Trj4458)  Requested for: 97DCP3433; Last Rx:19Oct2017; Status: ACTIVE - Renewal Denied Ordered   6  HydroCHLOROthiazide 25 MG Oral Tablet; TAKE 1 TABLET DAILY; Therapy: 21MZK0524 to (Evaluate:51Ygu3769)  Requested for: 28OHH0926; Last Rx:04Nov2017 Ordered   7  Losartan Potassium 100 MG Oral Tablet; take one tablet by mouth daily; Therapy: 07BBR8176 to (Evaluate:17Ulh7022)  Requested for: 09Kbb9442; Last Rx:92Uux9061 Ordered   8  MetFORMIN HCl - 500 MG Oral Tablet; take 1 tablet by mouth twice daily; Therapy: 48UKC8276 to (Evaluate:58Nkw8794)  Requested for: 83Nst2783; Last Rx:68Hym8549 Ordered   9  Methocarbamol 500 MG Oral Tablet; TAKE 1 TABLET Bedtime for muscle spasm; Therapy: 71KOO9415 to (Evaluate:70Egj4498)  Requested for: 90VLS9990; Last Rx:19Oct2017 Ordered   10  Oxycodone-Acetaminophen  MG Oral Tablet; Take 1 tablet every 6 hours as  needed for pain; Therapy: 26TTK0868 to (Evaluate:18Nov2017); Last Rx:19Oct2017 Ordered   11  Senna Lax 8 6 MG TABS; Take 1-2 tablets every day PRN; Therapy: 88YDK8991 to (21 [phone])  Requested for: 42YLX2743; Last  Rx:29Jun2017 Ordered   12  Spironolactone 100 MG Oral Tablet; take 1 tablet by mouth daily;   Therapy: 10TKS3614 to (Evaluate:85Ogc1145)  Requested for: 21VQV3405; Last  Rx:08Liy8882 Ordered    The medication list was reviewed and updated today  Allergies  1  ACE Inhibitors  2  No Known Environmental Allergies   3  No Known Food Allergies    Vitals  Vital Signs    Recorded: 16CTX8875 12:55PM   Temperature 98 2 F   Heart Rate 68   Systolic 514   Diastolic 68   Height 5 ft 3 in   Weight 174 lb    BMI Calculated 30 82   BSA Calculated 1 82   Pain Scale 7     Physical Exam   Constitutional  General appearance: Well developed, well nourished, alert, in no distress, non-toxic and no overt pain behavior  Eyes  Sclera: anicteric  HEENT  Hearing grossly intact  Neck  Neck: Supple, symmetric, trachea midline, no masses  Pulmonary  Respiratory effort: Even and unlabored  Cardiovascular  Examination of extremities: No edema or pitting edema present  Skin  Skin and subcutaneous tissue: Normal without rashes or lesions, well hydrated  Psychiatric  Mood and affect: Mood and affect appropriate  Musculoskeletal  Gait and station: Normal    Lumbar/Sacral Spine examination demonstrates Lumbosacral Spine:  Appearance: Normal  Spinal alignment exhibits normal lordosis  Tenderness: the left sacroiliac joint  No tenderness over right hamstring  Flexion was not restricted-- and-- was painless  Extension was restricted-- and-- was painful  Foot and ankle strength was normal bilaterally  Knee strength was normal bilaterally  Hip strength was normal bilaterally  Results/Data  Results Free Text Form Pain [facility]:   Results    I personally reviewed the films/images in the office today  Radiology:  [address] [facility],[address] dated 5/14/14INDICATION- Back pain  COMPARISON- MRI 11/19/2013VIEWS- 4 including flexion extension  FINDINGS- No instability is seen on flexion or extension  The lumbar vertebrae demonstrate normal height  There is no fracture or pathologic bone lesions  Moderate L5-S1 degenerative disc disease is present  Moderate facetdegenerative changes also present at this level  The pedicles are intact  No pars defects  No spondylolisthesis  IMPRESSION-Moderate L5-S1 degenerative changes  MRI:  mri -lumbar spine on 11/19/13L1-L2- Minor bilateral facet arthrosisL2-L3- Circumferential bulge, slight reduction disc height, marginalosteophytes, facet arthrosis, no critical stenosis  L3-L4- Minor bilateral facet arthrosis, slight bulgeL4-L5- Moderate facet arthrosis, minor anterolisthesis  L5-S1- Central protrusion has increased in volume since prior study  Disc extends above the disc space in a right paramedian position  Nodefinite root compression  There's also extension of disc into bothforamen, asymmetric to the left, no definite root compression  Moderate bilateral facet arthrosis  Multiple sacral Tarlov cysts  IMPRESSION-1  Progression of degenerative changes particularly at the L5-S1 level  No definite nerve root compression  Marrow signal is diffusely heterogeneous and low on T1 sequences, isthere evidence for lymphoproliferative disease? Therese Sanchez Attending Note  Collaborating Physician: I discussed the case with the Advanced Practitioner and reviewed the note-- and-- I agree with the Advanced Practitioner note  Future Appointments    Date/Time Provider Specialty Site   02/06/2018 01:30 PM ARPITA Kapoor   Surgical Oncology [facility] SURGICAL ONCOLOGY   02/12/2018 03:00 PM [facility] [address]     Signatures   Electronically signed by : Charisma Oleary; Dec 18 2017  2:19PM EST                       (Author)    Electronically signed by : Reji Barraza MD; Dec 18 2017  2:45PM EST                       (Author)

## 2017-12-20 ENCOUNTER — TELEPHONE (OUTPATIENT)
Dept: INPATIENT UNIT | Facility: HOSPITAL | Age: 67
End: 2017-12-20

## 2017-12-21 ENCOUNTER — APPOINTMENT (OUTPATIENT)
Dept: RADIOLOGY | Facility: HOSPITAL | Age: 67
End: 2017-12-21
Attending: RADIOLOGY
Payer: COMMERCIAL

## 2017-12-21 ENCOUNTER — HOSPITAL ENCOUNTER (OUTPATIENT)
Dept: RADIOLOGY | Facility: HOSPITAL | Age: 67
Discharge: HOME | End: 2017-12-21
Attending: RADIOLOGY | Admitting: RADIOLOGY
Payer: COMMERCIAL

## 2017-12-21 ENCOUNTER — HOSPITAL ENCOUNTER (OUTPATIENT)
Dept: RADIOLOGY | Facility: HOSPITAL | Age: 67
Discharge: HOME | End: 2017-12-21
Attending: RADIOLOGY
Payer: COMMERCIAL

## 2017-12-21 ENCOUNTER — GENERIC CONVERSION - ENCOUNTER (OUTPATIENT)
Dept: OTHER | Facility: OTHER | Age: 67
End: 2017-12-21

## 2017-12-21 ENCOUNTER — APPOINTMENT (OUTPATIENT)
Dept: RADIATION ONCOLOGY | Facility: HOSPITAL | Age: 67
End: 2017-12-21
Attending: RADIOLOGY
Payer: COMMERCIAL

## 2017-12-21 VITALS
WEIGHT: 173 LBS | HEIGHT: 63 IN | RESPIRATION RATE: 16 BRPM | HEART RATE: 48 BPM | SYSTOLIC BLOOD PRESSURE: 131 MMHG | DIASTOLIC BLOOD PRESSURE: 78 MMHG | OXYGEN SATURATION: 96 % | BODY MASS INDEX: 30.65 KG/M2 | TEMPERATURE: 97.5 F

## 2017-12-21 VITALS
RESPIRATION RATE: 18 BRPM | DIASTOLIC BLOOD PRESSURE: 66 MMHG | OXYGEN SATURATION: 97 % | SYSTOLIC BLOOD PRESSURE: 113 MMHG | HEART RATE: 50 BPM

## 2017-12-21 DIAGNOSIS — C22.0 HEPATOCELLULAR CARCINOMA (HCC): ICD-10-CM

## 2017-12-21 PROCEDURE — C1769 GUIDE WIRE: HCPCS

## 2017-12-21 PROCEDURE — 37243 VASC EMBOLIZE/OCCLUDE ORGAN: CPT

## 2017-12-21 PROCEDURE — 77300 RADIATION THERAPY DOSE PLAN: CPT | Performed by: RADIOLOGY

## 2017-12-21 PROCEDURE — C1894 INTRO/SHEATH, NON-LASER: HCPCS

## 2017-12-21 PROCEDURE — 77790 RADIATION HANDLING: CPT | Performed by: RADIOLOGY

## 2017-12-21 PROCEDURE — 77470 SPECIAL RADIATION TREATMENT: CPT | Performed by: RADIOLOGY

## 2017-12-21 PROCEDURE — 78205 HB LIVER IMAGING (3D): CPT

## 2017-12-21 PROCEDURE — 99153 MOD SED SAME PHYS/QHP EA: CPT

## 2017-12-21 PROCEDURE — C2616 BRACHYTX, NON-STR,YTTRIUM-90: HCPCS | Performed by: RADIOLOGY

## 2017-12-21 PROCEDURE — 79445 NUCLEAR RX INTRA-ARTERIAL: CPT | Performed by: RADIOLOGY

## 2017-12-21 PROCEDURE — 99152 MOD SED SAME PHYS/QHP 5/>YRS: CPT

## 2017-12-21 PROCEDURE — 77370 RADIATION PHYSICS CONSULT: CPT | Performed by: RADIOLOGY

## 2017-12-21 RX ORDER — FENTANYL CITRATE 50 UG/ML
INJECTION, SOLUTION INTRAMUSCULAR; INTRAVENOUS CODE/TRAUMA/SEDATION MEDICATION
Status: COMPLETED | OUTPATIENT
Start: 2017-12-21 | End: 2017-12-21

## 2017-12-21 RX ORDER — HYDROCODONE BITARTRATE AND ACETAMINOPHEN 5; 325 MG/1; MG/1
1 TABLET ORAL EVERY 6 HOURS PRN
Status: CANCELLED | OUTPATIENT
Start: 2017-12-21

## 2017-12-21 RX ORDER — ONDANSETRON 2 MG/ML
4 INJECTION INTRAMUSCULAR; INTRAVENOUS EVERY 6 HOURS PRN
Status: CANCELLED | OUTPATIENT
Start: 2017-12-21

## 2017-12-21 RX ORDER — MIDAZOLAM HYDROCHLORIDE 1 MG/ML
INJECTION INTRAMUSCULAR; INTRAVENOUS CODE/TRAUMA/SEDATION MEDICATION
Status: COMPLETED | OUTPATIENT
Start: 2017-12-21 | End: 2017-12-21

## 2017-12-21 RX ORDER — SODIUM CHLORIDE 9 MG/ML
50 INJECTION, SOLUTION INTRAVENOUS CONTINUOUS
Status: DISCONTINUED | OUTPATIENT
Start: 2017-12-21 | End: 2017-12-21 | Stop reason: HOSPADM

## 2017-12-21 RX ORDER — HYDROMORPHONE HYDROCHLORIDE 4 MG/ML
INJECTION, SOLUTION INTRAMUSCULAR; INTRAVENOUS; SUBCUTANEOUS CODE/TRAUMA/SEDATION MEDICATION
Status: COMPLETED | OUTPATIENT
Start: 2017-12-21 | End: 2017-12-21

## 2017-12-21 RX ADMIN — HYDROMORPHONE HYDROCHLORIDE 1 MG: 4 INJECTION, SOLUTION INTRAMUSCULAR; INTRAVENOUS; SUBCUTANEOUS at 14:42

## 2017-12-21 RX ADMIN — FENTANYL CITRATE 50 MCG: 50 INJECTION, SOLUTION INTRAMUSCULAR; INTRAVENOUS at 16:16

## 2017-12-21 RX ADMIN — MIDAZOLAM 1 MG: 1 INJECTION INTRAMUSCULAR; INTRAVENOUS at 14:42

## 2017-12-21 RX ADMIN — SODIUM CHLORIDE 50 ML/HR: 0.9 INJECTION, SOLUTION INTRAVENOUS at 10:29

## 2017-12-21 RX ADMIN — MIDAZOLAM 1 MG: 1 INJECTION INTRAMUSCULAR; INTRAVENOUS at 14:47

## 2017-12-21 RX ADMIN — FENTANYL CITRATE 50 MCG: 50 INJECTION, SOLUTION INTRAMUSCULAR; INTRAVENOUS at 15:45

## 2017-12-21 RX ADMIN — IODIXANOL 40 ML: 320 INJECTION, SOLUTION INTRAVASCULAR at 16:53

## 2017-12-21 RX ADMIN — HYDROMORPHONE HYDROCHLORIDE 0.5 MG: 4 INJECTION, SOLUTION INTRAMUSCULAR; INTRAVENOUS; SUBCUTANEOUS at 15:13

## 2017-12-21 RX ADMIN — MIDAZOLAM 0.5 MG: 1 INJECTION INTRAMUSCULAR; INTRAVENOUS at 15:08

## 2017-12-21 RX ADMIN — MIDAZOLAM 0.5 MG: 1 INJECTION INTRAMUSCULAR; INTRAVENOUS at 15:45

## 2017-12-21 RX ADMIN — MIDAZOLAM 0.5 MG: 1 INJECTION INTRAMUSCULAR; INTRAVENOUS at 15:29

## 2017-12-21 NOTE — DISCHARGE INSTRUCTIONS
SIRS IMPLANT DISCHARGE INSTRUCTIONS    WHAT YOU SHOULD KNOW:  For the next 72 hours after your SIRS implant NO pregnant visitors or family  No physical contact with others for more than two hours  Sleep alone in bed  No children or pets sitting on your lap  Keep a distance of three feet between yourself and others  AFTER YOU LEAVE:     Self-care:   · Limit activity: Rest for the remainder of the day of your procedure  Have some one with you until the next morning  Keep your arm or leg straight as much as possible  Rest as much as possible, sitting lying or reclining  Walk only to go to the bathroom, to bed or to eat  If the angiogram catheter was put in your leg, use the stairs as little as possible  No driving  · Keep your wound clean and dry  You may shower 24 hours after your procedure  The bandage you have on should fall off in 2-3 days  If there is any drainage from the puncture site, you should put on a clean bandage  · Watch for bleeding and bruising: It is normal to have a bruise and soreness where the angiogram catheter went in  · Medication Continue  to take Prilosec 20 mg daily for a total of  30 days after the initial mapping procedure  Start taking the  Medrol as directed  · Diet: You may resume your regular diet  Small sips of flat soda will help with mild nausea  Drink more liquids than usual for the next 24 hours      · IMMEDIATELY Contact Interventional Radiology at [phone] Nadja PATIENTS: Contact Interventional Radiology at [phone]) Jena Wilder PATIENTS: Contact Interventional Radiology at [phone]) if any of the following occur:  · If your bruise gets larger or if you notice any active bleeding  APPLY DIRECT PRESSURE TO THE BLEEDING SITE  · If you notice increased swelling or have increased pain at the puncture site   · If you have any numbness or pain in the extremity of the puncture site   · If that extremity seems cold or pale      · You have fever greater than 101  · Persistent nausea or vomiting      Follow up with your primary healthcare provider  as directed: Write down your questions so you remember to ask them during your visits

## 2017-12-21 NOTE — BRIEF OP NOTE (RAD/CATH)
IR SIR SPHERE IMPLANT  Procedure Note    PATIENT NAME: Lc Avelar  : 1950  MRN: [id number]     Pre-op Diagnosis:   1  Hepatocellular carcinoma (HCC)      Post-op Diagnosis:   1  Hepatocellular carcinoma ([facility] [address] )        Surgeon:   Elsa Clemens MD  Assistants:     No qualified resident was available, Resident is only observing    Estimated Blood Loss: minimal      Findings:  0 78 gbq administered to the right hepatic lobe  Surefire catheter was placed, distal to the cystic artery take off  Groin puncture site closure with mynx device         Specimens: none    Complications:  none    Anesthesia: Conscious sedation and Vishnu Medina MD     Date: 2017  Time: 4:37 PM

## 2018-01-11 NOTE — RESULT NOTES
Message   All results normal     Verified Results  (1) HEPATIC FUNCTION PANEL 07Lbp1997 01:09PM Evone Bremerton Order Number: [id number]     Test Name Result Flag Reference   BILI, DIRECT 0 14 mg/dL  0 00-0 20   - Patient Instructions: This is a non fasting blood test  Please drink two glasses of water the morning of test     - Patient Instructions: This is a non fasting blood test  Please drink two glasses of water the morning of test      (1) AFP, SERUM 21Feb2017 01:09PM Colleen Sullivan   TW Order Number: [id number]     Test Name Result Flag Reference   AFP 8 5 ng/mL H 0 0 - 8 3   Normal values apply only to males and to nonpregnant females  These results are not interpretable for pregnant females  Roche ECLIA methodology  Values obtained with different assay methods or kits cannot be  used interchangeably  Results cannot be interpreted as absolute  evidence of the presence or absence of malignant disease  Performed at:  [address] [place] [address]  [id number]  : Dottie Daley MD, Phone:  [phone]     (1) CBC/PLT/DIFF 33FZJ9774 01:09PM Evone Bremerton Order Number: [id number]     Test Name Result Flag Reference   WBC COUNT 9 52 Thousand/uL  4 31-10 16   RBC COUNT 4 10 Million/uL  3 81-5 12   HEMOGLOBIN 11 8 g/dL  11 5-15 4   HEMATOCRIT 34 8 %  34 8-46  1   MCV 85 fL  82-98   MCH 28 8 pg  26 8-34 3   MCHC 33 9 g/dL  31 4-37 4   RDW 13 8 %  11 6-15 1   MPV 9 9 fL  8 9-12 7   PLATELET COUNT 007 Thousands/uL  149-390   nRBC AUTOMATED 0 /100 WBCs     NEUTROPHILS RELATIVE PERCENT 58 %  43-75   LYMPHOCYTES RELATIVE PERCENT 33 %  14-44   MONOCYTES RELATIVE PERCENT 8 %  4-12   EOSINOPHILS RELATIVE PERCENT 1 %  0-6   BASOPHILS RELATIVE PERCENT 0 %  0-1   NEUTROPHILS ABSOLUTE COUNT 5 43 Thousands/? ??L  1 85-7 62   LYMPHOCYTES ABSOLUTE COUNT 3 18 Thousands/? ??L  0 60-4 47   MONOCYTES ABSOLUTE COUNT 0 78 Thousand/? ??L  0 17-1 22   EOSINOPHILS ABSOLUTE COUNT 0 10 Thousand/? ??L  0 00-0 61   BASOPHILS ABSOLUTE COUNT 0 02 Thousands/? ??L  0 00-0 10   - Patient Instructions: This bloodwork is non-fasting  Please drink two glasses of water morning of bloodwork  - Patient Instructions: This bloodwork is non-fasting  Please drink two glasses of water morning of bloodwork  (1) COMPREHENSIVE METABOLIC PANEL 76KRR3448 49:30JB Franck Doshi    Order Number: [id number]     Test Name Result Flag Reference   GLUCOSE,RANDM 98 mg/dL     If the patient is fasting, the ADA then defines impaired fasting glucose as > 100 mg/dL and diabetes as > or equal to 123 mg/dL  SODIUM 132 mmol/L L 136-145   POTASSIUM 4 0 mmol/L  3 5-5 3   CHLORIDE 96 mmol/L L 100-108   CARBON DIOXIDE 28 mmol/L  21-32   ANION GAP (CALC) 8 mmol/L  4-13   BLOOD UREA NITROGEN 19 mg/dL  5-25   CREATININE 1 00 mg/dL  0 60-1 30   Standardized to IDMS reference method   CALCIUM 9 2 mg/dL  8 3-10 1   BILI, TOTAL 0 34 mg/dL  0 20-1 00   ALK PHOSPHATAS 83 U/L     ALT (SGPT) 18 U/L  12-78   AST(SGOT) 9 U/L  5-45   ALBUMIN 4 4 g/dL  3 5-5 0   TOTAL PROTEIN 9 3 g/dL H 6 4-8 2   eGFR Non-African American      >60 0 ml/min/1 73sq m   - Patient Instructions: This is a non fasting blood test  Please drink two glasses of water the morning of test   National Kidney Disease Education Program recommendations are as follows:  GFR calculation is accurate only with a steady state creatinine  Chronic Kidney disease less than 60 ml/min/1 73 sq  meters  Kidney failure less than 15 ml/min/1 73 sq  meters       (1) PT WITH INR 56Cup3821 01:09PM Franck Doshi    Order Number: [id number]     Test Name Result Flag Reference   INR 1 00  0 86-1 16   PT 13 3 seconds  12 0-14 3       Discussion/Summary   All results normal

## 2018-01-11 NOTE — MISCELLANEOUS
Message   Recorded as Task   Date: 08/15/2017 11:22 AM, Created By: Byron Aburto   Task Name: Miscellaneous   Assigned To: Ekaterina Perry clinical,Team   Regarding Patient: Ranjana Cantu, Status: In Progress   Comment:    AlbertnagaMarietta osborn - 15 Aug 2017 11:22 AM     TASK CREATED  Pt called stating the last time she saw Olamide Ann on 8/7, Olamide Ann said if the pain gets too bad she can call in some pain medication for her  Pt uses [facility] (on file in [facility])  Pt can be reached at [phone]  Amrita Melvin - 15 Aug 2017 11:53 AM     TASK IN PROGRESS   Amrita Melvin - 15 Aug 2017 12:07 PM     TASK EDITED  I asked pt what type of medication did NM say she would call in for her? Pt said it was something she would just take for 5 days  I mentioned steroid dose pack and pt said that might be it  Pt said her pain is in her post Rt leg just below the buttocks  I told pt that Olamide Ann is out of office until Thurs but i would d/w FQ  I told pt that if he was going to prescribe it then she will need to stop the diclofenac while on the steroid pills and then restart it once steroid pills completed  Pt said she took diclofenac twice the first day but then stopped it b/c she didn't like the way it made her feel, it upset her stomach  I advised it needs to be taken after a meal, she only took it with a glass of milk the first day  She said she took one this morning after breakfast b/c her leg hurt and it didn't bother her stomach  Pt uses [facility] on file     Rose Ferrera - 15 Aug 2017 2:27 PM     TASK REPLIED TO: Previously Assigned To Rose Ferrera  e-rx sent   [address] - 15 Aug 2017 4:03 PM     TASK IN PROGRESS   Amrita Melvin - 15 Aug 2017 4:07 PM     TASK EDITED  Left detailed vm for pt on number provided that Dr Becka Bobo did send the Rx to her [facility] on [address] [facility] for the 6 day steroid dose pack, take as directed on package insert, stop diclofenac while taking 6 day steroid pack, if diabetic the steroid will increase her blood sugars  The office is now closed if she has any questions regarding these instrs she will need to c/b on Wed after 8am, office hours provided  Maxwell Bishop - 18 Aug 2017 2:24 PM     TASK EDITED  I called and spoke with patient, she picked up her prescription for steroid pack  She was pleasant on the phone  Active Problems    1  Acute upper respiratory infection (465 9) (J06 9)   2  Analgesic use (V58 69) (Z79 899)   3  Anxiety (300 00) (F41 9)   4  Asthenia (780 79) (R53 1)   5  Chronic lumbar radiculopathy (724 4) (M54 16)   6  Claustrophobia (300 29) (F40 240)   7  Constipation (564 00) (K59 00)   8  Diabetes mellitus, type II (250 00) (E11 9)   9  Diabetic neuropathy (250 60,357 2) (E11 40)   10  Encounter for screening mammogram for breast cancer (V76 12) (Z12 31)   11  Hepatitis C, chronic (070 54) (B18 2)   12  Hepatocellular carcinoma (155 0) (C22 0)   13  Herniated lumbar disc without myelopathy (722 10) (M51 26)   14  Hypertension (401 9) (I10)   15  Insomnia (780 52) (G47 00)   16  Left foot pain (729 5) (M79 672)   17  Left-sided chest wall pain (786 52) (R07 89)   18  Malignant neoplasm of liver (155 2) (C22 9)   19  Nicotine dependence (305 1) (F17 200)   20  Opioid dependence (304 00) (F11 20)   21  Osteoporosis (733 00) (M81 0)   22  Other cirrhosis of liver (571 5) (K74 69)   23  Pain syndrome, chronic (338 4) (G89 4)   24  Sacroiliitis (720 2) (M46 1)   25  Screening for breast cancer (V76 10) (Z12 31)   26  Seasonal allergies (477 9) (J30 2)   27  Trichomonal vulvovaginitis (131 01) (A59 01)   28  Trochanteric bursitis (726 5) (M70 60)    Current Meds   1  Kim Contour Test In Vitro Strip; TEST ONCE DAILY; Therapy: 33Ewf0930 to (Evaluate:13Fqd0222)  Requested for: 12Fnq1724; Last   Rx:98Akj4927 Ordered   2  Diclofenac Sodium 75 MG Oral Tablet Delayed Release; TAKE 1 TABLET 2 TIMES DAILY   AFTER MEALS;    Therapy: 26Nrr1105 to (Evaluate:68Xnr9977)  Requested for: 07Aug2017; Last   Rx:07Aug2017; Status: ACTIVE - Renewal Denied Ordered   3  Flector 1 3 % Transdermal Patch; APPLY 1 PATCH TO LEFT HIP Q 12 HOURS; Therapy: 63DJK6924 to (Evaluate:27Sep2017)  Requested for: 39QYU6773; Last   Rx:29Jun2017 Ordered   4  Furosemide 40 MG Oral Tablet; take 1 tablet by mouth every day; Therapy: 55JGR0094 to (Evaluate:04Aug2017)  Requested for: 06Mgi4043; Last   Rx:09Aug2016 Ordered   5  Gabapentin 300 MG Oral Capsule; Take 3 caps at bedtime; Therapy: 70BBQ7927 to (21 )  Requested for: 07Aug2017; Last   Rx:07Aug2017 Ordered   6  HydroCHLOROthiazide 25 MG Oral Tablet; TAKE 1 TABLET DAILY; Therapy: 82GQU0350 to (Evaluate:24Sep2017)  Requested for: 28Mar2017; Last   Rx:28Mar2017 Ordered   7  Losartan Potassium 100 MG Oral Tablet; TAKE 1 TABLET DAILY; Therapy: 49UOZ4871 to (Evaluate:86Rut1773)  Requested for: 61QJD6940; Last   Rx:23Feb2017 Ordered   8  Medrol 4 MG Oral Tablet Therapy Pack (MethylPREDNISolone); TAKE AS PRESRIBED; Therapy: 56DVJ7548 to (Evaluate:21Aug2017)  Requested for: 61VPF2724; Last   Rx:15Aug2017 Ordered   9  MetFORMIN HCl - 500 MG Oral Tablet; TAKE ONE TABLET BY MOUTH TWICE DAILY; Therapy: 86BVE9545 to (Evaluate:24Apr2017)  Requested for: 37NLC3421; Last   Rx:23Feb2017 Ordered   10  Oxycodone-Acetaminophen  MG Oral Tablet; Take 1 tablet every 6 hours as    needed for pain; Therapy: 86NUE8869 to (Evaluate:06Sep2017); Last Rx:07Aug2017 Ordered   11  Senna Lax 8 6 MG Oral Tablet; Take 1-2 tablets every day PRN; Therapy: 56PGN5032 to (21 )  Requested for: 44QDZ4477; Last    Rx:29Jun2017 Ordered   12  Spironolactone 100 MG Oral Tablet; take 1 tablet by mouth daily; Therapy: 67RLF4973 to (Evaluate:27Fpk2577)  Requested for: 20DKM5106; Last    Rx:43Kab4868 Ordered    Allergies    1  No Known Drug Allergies    2  No Known Environmental Allergies   3   No Known Food Allergies  Denied 4  ACE Inhibitors    Signatures   Electronically signed by : Stuart Phelps, ; Aug 18 2017  2:25PM EST                       (Author)

## 2018-01-12 VITALS
WEIGHT: 176 LBS | OXYGEN SATURATION: 96 % | HEIGHT: 63 IN | RESPIRATION RATE: 16 BRPM | HEART RATE: 78 BPM | TEMPERATURE: 98.2 F | DIASTOLIC BLOOD PRESSURE: 74 MMHG | BODY MASS INDEX: 31.18 KG/M2 | SYSTOLIC BLOOD PRESSURE: 126 MMHG

## 2018-01-12 VITALS
SYSTOLIC BLOOD PRESSURE: 118 MMHG | TEMPERATURE: 97.9 F | WEIGHT: 174 LBS | HEART RATE: 76 BPM | RESPIRATION RATE: 16 BRPM | BODY MASS INDEX: 30.83 KG/M2 | HEIGHT: 63 IN | OXYGEN SATURATION: 97 % | DIASTOLIC BLOOD PRESSURE: 72 MMHG

## 2018-01-12 VITALS
TEMPERATURE: 98.2 F | HEIGHT: 63 IN | BODY MASS INDEX: 30.78 KG/M2 | SYSTOLIC BLOOD PRESSURE: 126 MMHG | HEART RATE: 68 BPM | DIASTOLIC BLOOD PRESSURE: 84 MMHG | WEIGHT: 173.72 LBS

## 2018-01-12 VITALS
TEMPERATURE: 98.5 F | BODY MASS INDEX: 30.94 KG/M2 | SYSTOLIC BLOOD PRESSURE: 100 MMHG | HEART RATE: 60 BPM | DIASTOLIC BLOOD PRESSURE: 70 MMHG | HEIGHT: 63 IN | WEIGHT: 174.6 LBS

## 2018-01-12 NOTE — MISCELLANEOUS
Message   Recorded as Task   Date: 02/07/2017 09:09 AM, Created By: Valli Hammans   Task Name: Follow Up   Assigned To: Maria Luz Farfan   Regarding Patient: Fernie Matamoros, Status: Active   CommentEiantwanen Siena - 07 Feb 2017 9:09 AM     TASK CREATED  pt is S/P LEFT TROCH BURSA INJ USGI 01/31/2017 by Dr Julia Rondon   pain diary scanned   f/u 03/09/2017   Valli Hammans - 07 Feb 2017 1:22 PM     TASK EDITED  spoke to pt she got 60% relief from inj her pain is a 2 no redness or swelling   Carloz Cheema - 07 Feb 2017 1:31 PM     TASK REPLIED TO: Previously Assigned To Carloz Cheema md aware   f/u @ [facility] - 07 Feb 2017 3:38 PM     TASK REASSIGNED: Previously Assigned To HERACLIO Love - 07 Feb 2017 3:39 PM     TASK REASSIGNED: Previously Assigned To [facility] clerical,Team        Active Problems    1  Acute upper respiratory infection (465 9) (J06 9)   2  Analgesic use (V58 69) (Z79 899)   3  Anxiety (300 00) (F41 9)   4  Asthenia (780 79) (R53 1)   5  Chronic lumbar radiculopathy (724 4) (M54 16)   6  Constipation (564 00) (K59 00)   7  Diabetes mellitus, type II (250 00) (E11 9)   8  Diabetic neuropathy (250 60,357 2) (E11 40)   9  Encounter for screening mammogram for breast cancer (V76 12) (Z12 31)   10  Hepatitis C, chronic (070 54) (B18 2)   11  Hepatocellular carcinoma (155 0) (C22 0)   12  Herniated lumbar disc without myelopathy (722 10) (M51 26)   13  Hypertension (401 9) (I10)   14  Insomnia (780 52) (G47 00)   15  Left foot pain (729 5) (M79 672)   16  Left-sided chest wall pain (786 52) (R07 89)   17  Malignant neoplasm of liver (155 2) (C22 9)   18  Nicotine dependence (305 1) (F17 200)   19  Opioid dependence (304 00) (F11 20)   20  Osteoporosis (733 00) (M81 0)   21  Other cirrhosis of liver (571 5) (K74 69)   22  Pain syndrome, chronic (338 4) (G89 4)   23  Screening for breast cancer (V76 10) (Z12 39)   24   Seasonal allergies (477 9) (J30 2)   25  Trichomonal vulvovaginitis (131 01) (A59 01)   26  Trochanteric bursitis (726 5) (M65 60)    Current Meds   1  Kim Contour Test In Vitro Strip; TEST ONCE DAILY; Therapy: 39Ohh4557 to (Evaluate:16Oct2017)  Requested for: 48Hcu6164; Last   Rx:18Pcz5452 Ordered   2  Benzonatate 100 MG Oral Capsule; TAKE 1 CAPSULE 2-3 TIMES DAILY AS DIRECTED; Therapy: 59Zos1061 to (Evaluate:91Hok9809)  Requested for: 22Wig8094; Last   Rx:32Xsl7565 Ordered   3  Furosemide 40 MG Oral Tablet; take 1 tablet by mouth every day; Therapy: 64XWF0269 to (Evaluate:04Aug2017)  Requested for: 05Msc3359; Last   Rx:21Bha8124 Ordered   4  Gabapentin 300 MG Oral Capsule; TAKE 3 CAPSULE Bedtime; Therapy: 55FNR4378 to [phone])  Requested for: 03OMQ8487; Last   Rx:12Jan2017; Status: ACTIVE - Renewal Denied Ordered   5  HydroCHLOROthiazide 25 MG Oral Tablet; TAKE 1 TABLET DAILY; Therapy: 91SMW2539 to (Evaluate:18Apr2017)  Requested for: 48DAU1365; Last   Rx:18Jan2017; Status: ACTIVE - Retrospective Authorization Ordered   6  Losartan Potassium 100 MG Oral Tablet; TAKE 1 TABLET DAILY; Therapy: 39NSP6663 to [phone])  Requested for: 62FFZ6259; Last   Rx:18Jan2017; Status: ACTIVE - Retrospective Authorization Ordered   7  MetFORMIN HCl - 500 MG Oral Tablet; TAKE ONE TABLET BY MOUTH TWICE DAILY; Therapy: 19YMA8472 to (Evaluate:31Oct2016)  Requested for: 35Wmf7907; Last   Rx:02Aug2016 Ordered   8  Oxycodone-Acetaminophen  MG Oral Tablet; Take 1 tablet every 6 hours as   needed for pain; Therapy: 29WFW7033 to (Evaluate:74Xcj1825); Last Rx:12Jan2017 Ordered   9  Senna Lax 8 6 MG Oral Tablet; Take 1-2 tablets every day PRN; Therapy: 30KPJ9796 to (Last Rx:02Aug2016)  Requested for: 81Dok7149 Ordered    Allergies    1  No Known Drug Allergies    2  No Known Environmental Allergies   3  No Known Food Allergies  Denied    4   ACE Inhibitors    Signatures   Electronically signed by : Nahun Perrin Sheldon Pac, ; Feb 7 2017  3:39PM EST                       (Author)

## 2018-01-12 NOTE — PROGRESS NOTES
Assessment    1  Pain syndrome, chronic (338 4) (G89 4)   2  Herniated lumbar disc without myelopathy (722 10) (M51 26)   3  Chronic lumbar radiculopathy (724 4) (M54 16)   4  Trochanteric bursitis (726 5) (M70 60)    Plan  Chronic lumbar radiculopathy    · Gabapentin 300 MG Oral Capsule; Take 3 caps at bedtime   Rx By: Brittani Neely; Dispense: 30 Days ; #:90 Capsule; Refill: 1; For: Chronic lumbar radiculopathy; MARIANO = N; Verified Transmission to [address] [place] [address]; Last Updated By: System[employer]; 3/9/2017 1:04:07 PM   · Oxycodone-Acetaminophen  MG Oral Tablet; Take 1 tablet every 6 hours  as needed for pain   Rx By: Brittani Neely; Dispense: 30 Days ; #:120 Tablet; Refill: 0; For: Chronic lumbar radiculopathy; MARIANO = N; Print Rx  Pain syndrome, chronic    · Follow-up visit in 2 months Evaluation and Treatment  Follow-up  Status: Hold For -  Scheduling  Requested for: 85SKK1275   Ordered; For: Pain syndrome, chronic; Ordered By: Brittani Neely Performed:  Order Comments: with nm 8 weeks Due: 06PCS6694    Discussion/Summary    Patient is a 77year old female with a history of lumbar disc herniation and radiculopathy, who presents today for a follow up appointment  Patient is to experience constant low back pain along with bilateral hip pain  Unfortunately, she obtained no relief from the trochanteric bursa injections  To help with a hip pain, I will give her a sample of Flector patch, topical anti-inflammatory patch, which she can apply to the hips and leave on 12 hours today  If the medication is helpful, she will call for a prescription  She will continue on Percocet 10/325 mg as prescribed  She was given a prescription for this month, and one for next month stating do not fill until April 7, 2017  The patient brought her prescription Percocet bottle in today, which was filled on February 11, 2017  Patient did not have any pills in the bottle, but states 3 tablets at home   A she was again instructed to bring the pills along with her prescription, as this as needed  Pounds  [place] prescription monitoring drug program report was reviewed and was appropriate  There are risks associated with opiod medications, including dependence, addiction and tolerance  The patient understands and agrees to use these medications only as prescribed  Potential side effects of the medications include, but are not limited to, constipation, drowsiness, addiction, impaired judgment and risk of fatal overdose if not taken as prescribed  Sharing medications is a felony  At this point and time, the patient is showing no signs of addiction, abuse, diversion or suicidal ideation  Chief Complaint    1  Pain    History of Present Illness  Patient is a 20-year-old female with a history of lumbar disc herniation and radiculopathy  She was last seen in the office on January 12 , 2017, in which she was continued on Percocet 10/325 mg  She presents today for a followup appointment  At this time, the patient continues to experience Cuts and low back pain which radiates into the hips  The pain occurs mostly in the morning and at night  She describes as dull aching  The hip pain occurs mostly when lying on her sides at night  She is currently rating her pain 8/10 on the numeric rating scale  She is taking Percocet 10/325 mg which she takes 4 times a day area the medication provides moderate pain relief, without side effects  She underwent a bilateral trochanteric bursa injection on January 31, which she states provided no pain relief  Artemio Else presents with complaints of gradual onset of constant episodes of moderate bilateral lower back pain, described as dull and aching, non-radiating  On a scale of 1 to 10, the patient rates the pain as 8  Symptoms are made worse by walking  Symptoms are unchanged  Review of Systems    Constitutional: no fever, no recent weight gain and no recent weight loss  Eyes: no double vision and no blurry vision  Cardiovascular: no chest pain, no palpitations and no lower extremity edema  Respiratory: no complaints of shortness of breath and no wheezing  Musculoskeletal: difficulty walking, muscle weakness, joint stiffness and decreased range of motion, but no joint swelling, no limb swelling and no pain in extremity  Neurological: no dizziness, no difficulty swallowing, no memory loss, no loss of consciousness and no seizures  Gastrointestinal: no nausea, no vomiting, no constipation and no diarrhea  Genitourinary: no difficulty initiating urine stream, no genital pain and no frequent urination  Integumentary: no complaints of skin rash  Psychiatric: no depression  Endocrine: no excessive thirst, no adrenal disease, no hypothyroidism and no hyperthyroidism  Hematologic/Lymphatic: no tendency for easy bruising and no tendency for easy bleeding  Active Problems    1  Acute upper respiratory infection (465 9) (J06 9)   2  Analgesic use (V58 69) (Z79 899)   3  Anxiety (300 00) (F41 9)   4  Asthenia (780 79) (R53 1)   5  Chronic lumbar radiculopathy (724 4) (M54 16)   6  Claustrophobia (300 29) (F40 240)   7  Constipation (564 00) (K59 00)   8  Diabetes mellitus, type II (250 00) (E11 9)   9  Diabetic neuropathy (250 60,357 2) (E11 40)   10  Encounter for screening mammogram for breast cancer (V76 12) (Z12 31)   11  Hepatitis C, chronic (070 54) (B18 2)   12  Hepatocellular carcinoma (155 0) (C22 0)   13  Herniated lumbar disc without myelopathy (722 10) (M51 26)   14  Hypertension (401 9) (I10)   15  Insomnia (780 52) (G47 00)   16  Left foot pain (729 5) (M79 672)   17  Left-sided chest wall pain (786 52) (R07 89)   18  Malignant neoplasm of liver (155 2) (C22 9)   19  Nicotine dependence (305 1) (F17 200)   20  Opioid dependence (304 00) (F11 20)   21  Osteoporosis (733 00) (M81 0)   22  Other cirrhosis of liver (571 5) (K74 69)   23   Pain syndrome, chronic (338 4) (G89 4)   24  Screening for breast cancer (V76 10) (Z12 39)   25  Seasonal allergies (477 9) (J30 2)   26  Trichomonal vulvovaginitis (131 01) (A59 01)   27  Trochanteric bursitis (726 5) (M70 60)    Past Medical History    1  History of Age At First Period 15 Years Old (Menarche)   2  History of Age At First Pregnancy 16 Years Old   3  History of Dental abscess (522 5) (K04 7)   4  History of Drug dependence (304 90) (F19 20)   5  History Of 4  Previous Pregnancies (V61 5)   6  History of acute bronchitis (V12 69) (Z87 09)   7  History of alopecia (V13 89) (Z87 898)   8  History of hypertension (V12 59) (Z86 79)   9  History of hypokalemia (V12 29) (Z86 39)   10  History of shortness of breath (V13 89) (Z87 898)   11  History of Iron overload (275 09) (E83 19)   12  History of Iron overload (275 09) (E83 19)   13  History of Limb pain (729 5) (M79 609)   14  History of Menopause (V49 81)   15  History of Need for pneumococcal vaccination (V03 82) (Z23)   16  History of Other screening mammogram (V76 12) (Z12 31)   17  History of Pre-operative cardiovascular examination (V72 81) (Z01 810)   18  History of Previous Pregnancies Resulted In 4  Live Birth(S)   19  History of Tuberculin skin test encounter (V74 1) (Z11 1)   20  History of Vulvovaginitis candida albicans (112 1) (B37 3)    The active problems and past medical history were reviewed and updated today  Surgical History    1  History of Dental Surgery   2  History of Hysterectomy   3  History of Tonsillectomy    The surgical history was reviewed and updated today  Family History  Mother    1  No pertinent family history  Father    2  No pertinent family history  Maternal Aunt    3  Family history of Cancer  Family History    4  Family history of Cancer   5  Family history of Denial Of Any Significant Medical History   6  Family history of Diabetes Mellitus (V18 0)   7  Family history of Father  At Age ___   6   Family history of Hypertension (V17 49)    The family history was reviewed and updated today  Social History    · Being A Social Drinker   · Current Every Day Smoker (305 1)   · Marital History - Single   · Denied: History of No drug use  The social history was reviewed and updated today  The social history was reviewed and is unchanged  Current Meds   1  Kim Contour Test In Vitro Strip; TEST ONCE DAILY; Therapy: 04Sxm4538 to (Evaluate:16Oct2017)  Requested for: 03Gui7481; Last   Rx:58Qpr8834 Ordered   2  Furosemide 40 MG Oral Tablet; take 1 tablet by mouth every day; Therapy: 62WYY4892 to (Evaluate:04Aug2017)  Requested for: 64Ubj3370; Last   Rx:09Aug2016 Ordered   3  Gabapentin 300 MG Oral Capsule; TAKE 3 CAPSULE Bedtime; Therapy: 55PHT7904 to 96 203872)  Requested for: 28UVH3687; Last   Rx:12Jan2017; Status: ACTIVE - Renewal Denied Ordered   4  HydroCHLOROthiazide 25 MG Oral Tablet; TAKE 1 TABLET DAILY; Therapy: 26UZP7552 to (Evaluate:18Apr2017)  Requested for: 46RUB7972; Last   Rx:18Jan2017; Status: ACTIVE - Retrospective Authorization Ordered   5  Losartan Potassium 100 MG Oral Tablet; TAKE 1 TABLET DAILY; Therapy: 39BVM1364 to (Evaluate:22Aug2017)  Requested for: 82LJJ8382; Last   Rx:23Feb2017 Ordered   6  MetFORMIN HCl - 500 MG Oral Tablet; TAKE ONE TABLET BY MOUTH TWICE DAILY; Therapy: 30AHH8292 to (Evaluate:24Apr2017)  Requested for: 38HDY2541; Last   Rx:23Feb2017 Ordered   7  Oxycodone-Acetaminophen  MG Oral Tablet; Take 1 tablet every 6 hours as   needed for pain; Therapy: 64YQZ3591 to (Evaluate:18Fgp1576); Last Rx:12Jan2017 Ordered   8  Senna Lax 8 6 MG Oral Tablet; Take 1-2 tablets every day PRN; Therapy: 50TNG7135 to (Last Rx:68Vaj6513)  Requested for: 56Uit2032 Ordered   9  Spironolactone 100 MG Oral Tablet; take 1 tablet by mouth daily;    Therapy: 64LYR0767 to (Evaluate:30Tiv8515)  Requested for: 74WZV3624; Last   Rx:23Yny7539 Ordered    The medication list was reviewed and updated today  Allergies    1  No Known Drug Allergies    2  No Known Environmental Allergies   3  No Known Food Allergies  Denied    4  ACE Inhibitors    Vitals  Vital Signs    Recorded: 34TKB9474 12:55PM   Temperature 98 5 F   Heart Rate 82   Respiration 16   Systolic 466   Diastolic 78   Height 5 ft 3 in   Weight 178 lb    BMI Calculated 31 53   BSA Calculated 1 84   O2 Saturation 95   Pain Scale 8     Physical Exam    Constitutional   General appearance: Well developed, well nourished, alert, in no distress, non-toxic and no overt pain behavior  Eyes   Sclera: anicteric   HEENT   Hearing grossly intact  Neck   Neck: Supple, symmetric, trachea midline, no masses  Pulmonary   Respiratory effort: Even and unlabored  Cardiovascular   Examination of extremities: No edema or pitting edema present  Skin   Skin and subcutaneous tissue: Normal without rashes or lesions, well hydrated  Psychiatric   Mood and affect: Mood and affect appropriate  Neurologic   Cranial nerves: Cranial nerves II-XII grossly intact  Musculoskeletal   Gait and station: Normal     Lumbar/Sacral Spine examination demonstrates Lumbosacral Spine:   Appearance: Normal  Spinal alignment exhibits normal lordosis  Tenderness: right paraspinal and left paraspinal  Flexion was not restricted and was painless  Extension was restricted and was painful  Foot and ankle strength was normal bilaterally  Knee strength was normal bilaterally  Hip strength was normal bilaterally  Results/Data  Results Free Text Form [facility]:   Results    I personally reviewed the films/images in the office today  MRI:  mri -lumbar spine on 11/19/13 L1-L2- Minor bilateral facet arthrosis  L2-L3- Circumferential bulge, slight reduction disc height, marginal osteophytes, facet arthrosis, no critical stenosis    L3-L4- Minor bilateral facet arthrosis, slight bulge  L4-L5- Moderate facet arthrosis, minor anterolisthesis  L5-S1- Central protrusion has increased in volume since prior study  Disc extends above the disc space in a right paramedian position  No definite root compression  There's also extension of disc into both foramen, asymmetric to the left, no definite root compression  Moderate bilateral facet arthrosis  Multiple sacral Tarlov cysts  IMPRESSION-  1  Progression of degenerative changes particularly at the L5-S1 level  No definite nerve root compression  Marrow signal is diffusely heterogeneous and low on T1 sequences, is there evidence for lymphoproliferative disease?     Other  Oxycodone was taken 03/09/2017  Attending Note  Collaborating Physician: I discussed the case with the Advanced Practitioner and reviewed the note and I agree with the Advanced Practitioner note  Future Appointments    Date/Time Provider Specialty Site   09/19/2017 01:15 PM ARPITA Powell   Surgical Oncology [facility] SURGICAL ONCOLOGY   05/04/2017 01:00 PM BRYANNA Crawford Pain [facility] SPINE   03/21/2017 10:30 AM Flores Alatorre MD Gastroenterology [facility] [place] OUTPATIENT   07/20/2017 01:20 PM Specialty Clinic, Gastroenterology  [address]     Signatures   Electronically signed by : BRAYNNA Ochoa; Mar  9 2017  1:15PM EST                       (Author)    Electronically signed by : Lucy Nicolas MD; Mar  9 2017  1:23PM EST                       (Author)

## 2018-01-13 VITALS
BODY MASS INDEX: 31.54 KG/M2 | TEMPERATURE: 98.5 F | HEART RATE: 82 BPM | OXYGEN SATURATION: 95 % | WEIGHT: 178 LBS | HEIGHT: 63 IN | DIASTOLIC BLOOD PRESSURE: 78 MMHG | SYSTOLIC BLOOD PRESSURE: 132 MMHG | RESPIRATION RATE: 16 BRPM

## 2018-01-13 VITALS
BODY MASS INDEX: 31.71 KG/M2 | DIASTOLIC BLOOD PRESSURE: 70 MMHG | SYSTOLIC BLOOD PRESSURE: 128 MMHG | HEIGHT: 63 IN | WEIGHT: 179 LBS | HEART RATE: 76 BPM

## 2018-01-14 VITALS
DIASTOLIC BLOOD PRESSURE: 72 MMHG | TEMPERATURE: 97.5 F | BODY MASS INDEX: 23.98 KG/M2 | WEIGHT: 177 LBS | RESPIRATION RATE: 16 BRPM | HEART RATE: 64 BPM | HEIGHT: 72 IN | SYSTOLIC BLOOD PRESSURE: 114 MMHG | OXYGEN SATURATION: 97 %

## 2018-01-14 VITALS — DIASTOLIC BLOOD PRESSURE: 66 MMHG | HEART RATE: 68 BPM | TEMPERATURE: 97.9 F | SYSTOLIC BLOOD PRESSURE: 100 MMHG

## 2018-01-14 VITALS
HEIGHT: 63 IN | BODY MASS INDEX: 31.59 KG/M2 | OXYGEN SATURATION: 95 % | TEMPERATURE: 98.8 F | WEIGHT: 178.26 LBS | RESPIRATION RATE: 16 BRPM | DIASTOLIC BLOOD PRESSURE: 68 MMHG | HEART RATE: 80 BPM | SYSTOLIC BLOOD PRESSURE: 106 MMHG

## 2018-01-14 VITALS
WEIGHT: 180 LBS | RESPIRATION RATE: 18 BRPM | HEIGHT: 63 IN | TEMPERATURE: 98.1 F | DIASTOLIC BLOOD PRESSURE: 68 MMHG | HEART RATE: 62 BPM | BODY MASS INDEX: 31.89 KG/M2 | SYSTOLIC BLOOD PRESSURE: 120 MMHG

## 2018-01-14 VITALS
OXYGEN SATURATION: 98 % | RESPIRATION RATE: 16 BRPM | BODY MASS INDEX: 30.83 KG/M2 | DIASTOLIC BLOOD PRESSURE: 78 MMHG | WEIGHT: 174 LBS | SYSTOLIC BLOOD PRESSURE: 118 MMHG | HEART RATE: 64 BPM | TEMPERATURE: 98.1 F | HEIGHT: 63 IN

## 2018-01-15 NOTE — RESULT NOTES
Message   No evidence of hepatitis C     Verified Results  (1) HEP C RNA PCR, QUANTITATIVE 02Esa8142 01:09PM Daniela Singer Order Number: [id number]     Test Name Result Flag Reference   HCV PCR QUANTITATIVE      HCV Not Detected IU/mL   TEST INFORMATION Comment     The quantitative range of this assay is 15 IU/mL to 100 million IU/mL      Performed at:  [facility] [address]  [id number]  : Fareed Doss MD, Phone:  [phone]

## 2018-01-15 NOTE — MISCELLANEOUS
Message   Recorded as Task   Date: 02/07/2017 09:09 AM, Created By: Miguelangel Cooney   Task Name: Follow Up   Assigned To: Maria Luz Farfan   Regarding Patient: Elliot Hutchinson, Status: Active   CommentEwing Maritza - 07 Feb 2017 9:09 AM     TASK CREATED  pt is S/P LEFT TROCH BURSA INJ USGI 01/31/2017 by Dr Anabelle Sy   pain diary scanned   f/u 03/09/2017   Miguelangel Cooney - 07 Feb 2017 1:22 PM     TASK EDITED  spoke to pt she got 60% relief from inj her pain is a 2 no redness or swelling   Villa Cowden - 07 Feb 2017 1:31 PM     TASK REPLIED TO: Previously Assigned To Villa Cowden md aware   f/u @ [facility] - 07 Feb 2017 3:38 PM     TASK REASSIGNED: Previously Assigned To HERACLIO Chávez - 07 Feb 2017 3:39 PM     TASK REASSIGNED: Previously Assigned To [facility] clerical,Team        Active Problems    1  Acute upper respiratory infection (465 9) (J06 9)   2  Analgesic use (V58 69) (Z79 899)   3  Anxiety (300 00) (F41 9)   4  Asthenia (780 79) (R53 1)   5  Chronic lumbar radiculopathy (724 4) (M54 16)   6  Constipation (564 00) (K59 00)   7  Diabetes mellitus, type II (250 00) (E11 9)   8  Diabetic neuropathy (250 60,357 2) (E11 40)   9  Encounter for screening mammogram for breast cancer (V76 12) (Z12 31)   10  Hepatitis C, chronic (070 54) (B18 2)   11  Hepatocellular carcinoma (155 0) (C22 0)   12  Herniated lumbar disc without myelopathy (722 10) (M51 26)   13  Hypertension (401 9) (I10)   14  Insomnia (780 52) (G47 00)   15  Left foot pain (729 5) (M79 672)   16  Left-sided chest wall pain (786 52) (R07 89)   17  Malignant neoplasm of liver (155 2) (C22 9)   18  Nicotine dependence (305 1) (F17 200)   19  Opioid dependence (304 00) (F11 20)   20  Osteoporosis (733 00) (M81 0)   21  Other cirrhosis of liver (571 5) (K74 69)   22  Pain syndrome, chronic (338 4) (G89 4)   23  Screening for breast cancer (V76 10) (Z12 39)   24   Seasonal allergies (477 9) (J30 2)   25  Trichomonal vulvovaginitis (131 01) (A59 01)   26  Trochanteric bursitis (726 5) (M70 60)    Current Meds   1  Kim Contour Test In Vitro Strip; TEST ONCE DAILY; Therapy: 45Mai0818 to (Evaluate:16Oct2017)  Requested for: 20Cty3549; Last   Rx:42Kiq2384 Ordered   2  Benzonatate 100 MG Oral Capsule; TAKE 1 CAPSULE 2-3 TIMES DAILY AS DIRECTED; Therapy: 32Cqc8711 to (Evaluate:27Okz9479)  Requested for: 23Mjn9945; Last   Rx:44Jmb6423 Ordered   3  Furosemide 40 MG Oral Tablet; take 1 tablet by mouth every day; Therapy: 69PRS1859 to (Evaluate:04Aug2017)  Requested for: 49Voa9985; Last   Rx:84Gya6965 Ordered   4  Gabapentin 300 MG Oral Capsule; TAKE 3 CAPSULE Bedtime; Therapy: 04OIE6952 to 66 65 76)  Requested for: 40IUP1555; Last   Rx:12Jan2017; Status: ACTIVE - Renewal Denied Ordered   5  HydroCHLOROthiazide 25 MG Oral Tablet; TAKE 1 TABLET DAILY; Therapy: 69RSM6136 to (Evaluate:18Apr2017)  Requested for: 16WUU4182; Last   Rx:18Jan2017; Status: ACTIVE - Retrospective Authorization Ordered   6  Losartan Potassium 100 MG Oral Tablet; TAKE 1 TABLET DAILY; Therapy: 73MKR1608 to ([phone])  Requested for: 51MIL7453; Last   Rx:18Jan2017; Status: ACTIVE - Retrospective Authorization Ordered   7  MetFORMIN HCl - 500 MG Oral Tablet; TAKE ONE TABLET BY MOUTH TWICE DAILY; Therapy: 01QDP7941 to (Evaluate:31Oct2016)  Requested for: 17Fls5926; Last   Rx:02Aug2016 Ordered   8  Oxycodone-Acetaminophen  MG Oral Tablet; Take 1 tablet every 6 hours as   needed for pain; Therapy: 21PDT4841 to (Evaluate:43Nkx2679); Last Rx:12Jan2017 Ordered   9  Senna Lax 8 6 MG Oral Tablet; Take 1-2 tablets every day PRN; Therapy: 22BTZ5803 to (Last Rx:02Aug2016)  Requested for: 85Oro2289 Ordered    Allergies    1  No Known Drug Allergies    2  No Known Environmental Allergies   3  No Known Food Allergies  Denied    4   ACE Inhibitors    Signatures   Electronically signed by : Phu Tran Adarsh Lugo, ; Feb 8 2017  8:18AM EST                       (Author)

## 2018-01-16 NOTE — MISCELLANEOUS
Message  Message Free Text Note Form: Pt requesting a note stating that she be prohibited from exertional activities, such as shoveling snow, so that she may obtain assistance through the [facility]  A not was written on a prescription and will be scanned stating that she may not participate in physically demanding activities and heavy lifting due to her Lumbar radiculopathy  Plan    1  Zolpidem Tartrate 5 MG Oral Tablet; TAKE 1 TABLET Bedtime PRN    Signatures   Electronically signed by :  Maggie Puckett MD; Jan 21 2016  2:45PM EST                       (Author)    Electronically signed by : Marni Lesch, M D ; Jan 21 2016  3:13PM EST                       (Acknowledgement)

## 2018-01-17 NOTE — MISCELLANEOUS
Message   Recorded as Task   Date: 01/27/2017 11:00 AM, Created By: Ricki Diaz   Task Name: Med Renewal Request   Assigned To: Amrita Melvin   Regarding Patient: Wayne Conley, Status: Active   Comment:    Amrita Melvin - 27 Jan 2017 11:00 AM     TASK CREATED  Caller: Self; Renew Medication; [phone] (Home)  Pt left vm that she needs RF for her gabapentin 300mg, #90  She just noticed her bottle has no refills left  Send to [facility] [email or web address], [facility] as she needs to p/u today b/c she's going out of town tomorrow  I confirmed w/ [facility] they have the order on file from Cm Nazario that was e-rx on 1/12 w/ 1 RF  Pt called and informed of the same  Active Problems    1  Acute upper respiratory infection (465 9) (J06 9)   2  Analgesic use (V58 69) (Z79 899)   3  Anxiety (300 00) (F41 9)   4  Asthenia (780 79) (R53 1)   5  Chronic lumbar radiculopathy (724 4) (M54 16)   6  Diabetes mellitus, type II (250 00) (E11 9)   7  Diabetic neuropathy (250 60,357 2) (E11 40)   8  Encounter for screening mammogram for breast cancer (V76 12) (Z12 31)   9  Hepatitis C, chronic (070 54) (B18 2)   10  Hepatocellular carcinoma (155 0) (C22 0)   11  Herniated lumbar disc without myelopathy (722 10) (M51 26)   12  Hypertension (401 9) (I10)   13  Insomnia (780 52) (G47 00)   14  Left foot pain (729 5) (M79 672)   15  Left-sided chest wall pain (786 52) (R07 89)   16  Malignant neoplasm of liver (155 2) (C22 9)   17  Nicotine dependence (305 1) (F17 200)   18  Opioid dependence (304 00) (F11 20)   19  Osteoporosis (733 00) (M81 0)   20  Other cirrhosis of liver (571 5) (K74 69)   21  Pain syndrome, chronic (338 4) (G89 4)   22  Screening for breast cancer (V76 10) (Z12 39)   23  Seasonal allergies (477 9) (J30 2)   24  Trichomonal vulvovaginitis (131 01) (A59 01)   25  Trochanteric bursitis (726 5) (M43 60)    Current Meds   1  Kim Contour Test In Vitro Strip; TEST ONCE DAILY;    Therapy: 16Kxo8741 to (Evaluate:16Oct2017)  Requested for: 63Ynz0448; Last   Rx:48Wrc7195 Ordered   2  Benzonatate 100 MG Oral Capsule; TAKE 1 CAPSULE 2-3 TIMES DAILY AS DIRECTED; Therapy: 74Hjg9973 to (Evaluate:53Stv3686)  Requested for: 07Umn3337; Last   Rx:51Deu5288 Ordered   3  Furosemide 40 MG Oral Tablet; take 1 tablet by mouth every day; Therapy: 94QHQ3967 to (Evaluate:32Qbn8262)  Requested for: 61Mrc0920; Last   Rx:17Tdz8898 Ordered   4  Gabapentin 300 MG Oral Capsule; TAKE 3 CAPSULE Bedtime; Therapy: 00OUZ3576 to (Evaluate:13Mar2017)  Requested for: 19MBL7889; Last   Rx:12Jan2017 Ordered   5  HydroCHLOROthiazide 25 MG Oral Tablet; TAKE 1 TABLET DAILY; Therapy: 97XYI8089 to (Evaluate:18Apr2017)  Requested for: 92TLM4810; Last   Rx:18Jan2017; Status: ACTIVE - Retrospective Authorization Ordered   6  Losartan Potassium 100 MG Oral Tablet; TAKE 1 TABLET DAILY; Therapy: 51BUF4752 to (Vini Salter)  Requested for: 54WOQ1234; Last   Rx:18Jan2017; Status: ACTIVE - Retrospective Authorization Ordered   7  MetFORMIN HCl - 500 MG Oral Tablet; TAKE ONE TABLET BY MOUTH TWICE DAILY; Therapy: 22XMS0934 to (Evaluate:31Oct2016)  Requested for: 12Jfe1178; Last   Rx:09Hch7762 Ordered   8  Oxycodone-Acetaminophen  MG Oral Tablet; Take 1 tablet every 6 hours as   needed for pain; Therapy: 97SXN8595 to (Evaluate:49Ydb1788); Last Rx:12Jan2017 Ordered   9  Senna Lax 8 6 MG Oral Tablet; Take 1-2 tablets every day PRN; Therapy: 70TJV5694 to (Last Rx:23Ixj4274)  Requested for: 79Ent1310 Ordered    Allergies    1  No Known Drug Allergies    2  No Known Environmental Allergies   3  No Known Food Allergies  Denied    4   ACE Inhibitors    Signatures   Electronically signed by : Chan Malave, ; Jan 27 2017 11:01AM EST                       (Author)

## 2018-01-17 NOTE — PROGRESS NOTES
Patient Health Assessment    Date:            03/17/2016  Blood Pressure:  111/64  Pulse:           62  Age:             72  Weight:          0 lbs  Height/Length:   0' 0"  Body Mass Index: 0 0  Provider:        [email or web address]  Clinic:          [facility]: Diabetes    Tobacco User    Hepatitis  Medications: Amoxicillin 500mg    Tylenol #3    Amoxicillin 500mg    Vicodin 5mg/500mg    Alendronate Sodium    Furosemide    Losartan Potassium    Metformin HCl    Spironlactone    Hydrochlorothiazide    Oxycodone/APAP    Gabapentin  Allergies:  Since Last Visit: Medical Alert: No Change    Medications: No Change    Allergies:        No Change  Pain Scale Type: Numeric Pain ScalePain Level: 0  Description:        Resin #2-MO    Pt presents for restorative #2    [address] [facility],  Patient denies any changes    Administered 1 CARPULE of 2% Lidocaine with 1:100k epi via infiltration  Caries excavated  Etched and rinsed; bond placed and light cured  Restored  with Bulk Alpha II A-2 composite  Occlusion verified, and restoration polished   with finishing burs  Pt left in good health  NV: extractions os day    PADMINI Salinas/DR Sera Solis    ----- Signed on Thursday, March 17, 2016 at 4:16:33 PM  -----  ----- Provider: [email or web address] - Resident Two, Dentist -- Clinic: [facility] -----

## 2018-01-18 NOTE — MISCELLANEOUS
Message   Recorded as Task   Date: 07/28/2016 12:11 PM, Created By: [facility]   Task Name: Follow Up   Assigned To: [facility] arin clinical,Team   Regarding Patient: Mindy Cooks, Status: Active   Comment:    Genesis Shelley - 28 Jul 2016 12:11 PM     TASK CREATED  please contact patient and tell her uds from last office visit was positive for morphine  we are not prescribing  is she taking/took and who is prescribing ? Alana Dean - 28 Jul 2016 2:12 PM     TASK EDITED           Spoke to pt, states that she took a morphine 15mg that she had left over that was ordered by Tamika Villalpando in december 2015  She reports only taking it the one time and has not taken it since  Pt states she took it when the month had 31 days in it and she only had enough medication for 30 days  Genesis Shelley - 01 Aug 2016 7:51 AM     TASK REPLIED TO: Previously Assigned To Genesis Shelley  thanks aware  please advise patient not to take medication that is no longer prescribed, as this can cause resp depression/death   Amrita Melvin - 01 Aug 2016 8:27 AM     TASK EDITED  Pt advised of the same, pt said she wouldn't want to see something like that happen so she won't do that again  Active Problems    1  Analgesic use (V58 69) (Z79 899)   2  Anxiety (300 00) (F41 9)   3  Asthenia (780 79) (R53 1)   4  Chronic lumbar radiculopathy (724 4) (M54 16)   5  Diabetes mellitus, type II (250 00) (E11 9)   6  Diabetic neuropathy (250 60,357 2) (E11 40)   7  Encounter for screening mammogram for breast cancer (V76 12) (Z12 31)   8  Hepatitis C, chronic (070 54) (B18 2)   9  Hepatocellular carcinoma (155 0) (C22 0)   10  Herniated lumbar disc without myelopathy (722 10) (M51 26)   11  Hypertension (401 9) (I10)   12  Insomnia (780 52) (G47 00)   13  Left foot pain (729 5) (M79 672)   14  Malignant neoplasm of liver (155 2) (C22 9)   15  Nicotine dependence (305 1) (F17 200)   16  Opioid dependence (304 00) (F11 20)   17  Osteoporosis (733 00) (M81 0)   18  Other cirrhosis of liver (571 5) (K74 69)   19  Pain syndrome, chronic (338 4) (G89 4)   20  Screening for breast cancer (V76 10) (Z12 39)   21  Seasonal allergies (477 9) (J30 2)   22  Trichomonal vulvovaginitis (131 01) (A59 01)   23  Trochanteric bursitis (726 5) (M70 60)    Current Meds   1  Furosemide 40 MG Oral Tablet; take one tablet by mouth every day; Therapy: 59SEC2626 to (Evaluate:27Jan2017)  Requested for: 16CZD8139; Last   Rx:37Rex5755 Ordered   2  Lidocaine HCl - 2 % External Gel; APPLY TO LEFT FOOT  APPLY NO MORE THAN 3   TIMES PER DAY; Therapy: 82ROF1533 to (Evaluate:33Yki0414)  Requested for: 25RCM2795; Last   Rx:31Mar2016 Ordered   3  Senna Lax 8 6 MG Oral Tablet; Take 1-2 tablets every day PRN; Therapy: 49GGT0884 to (Last UA:91LQZ0120)  Requested for: 50UGW7699 Ordered    Allergies    1  No Known Drug Allergies    2  No Known Environmental Allergies   3  No Known Food Allergies  Denied    4   ACE Inhibitors    Signatures   Electronically signed by : Varghese Us, ; Aug  1 2016  8:28AM EST                       (Author)

## 2018-01-22 VITALS
WEIGHT: 174 LBS | DIASTOLIC BLOOD PRESSURE: 70 MMHG | OXYGEN SATURATION: 98 % | HEART RATE: 74 BPM | SYSTOLIC BLOOD PRESSURE: 112 MMHG | TEMPERATURE: 96.7 F | BODY MASS INDEX: 30.83 KG/M2 | HEIGHT: 63 IN | RESPIRATION RATE: 16 BRPM

## 2018-01-22 VITALS
HEIGHT: 63 IN | WEIGHT: 174 LBS | TEMPERATURE: 98.2 F | HEART RATE: 68 BPM | DIASTOLIC BLOOD PRESSURE: 68 MMHG | SYSTOLIC BLOOD PRESSURE: 110 MMHG | BODY MASS INDEX: 30.83 KG/M2

## 2018-01-23 NOTE — MISCELLANEOUS
Message     Recorded as Task   Date: 12/18/2017 01:15 PM, Created By: Mai Denton   Task Name: Miscellaneous   Assigned To: Spencer Ramires clinical,Team   Regarding Patient: Alethea Claudio, Status: Active   CommentFuentes Charmaine - 18 Dec 3551 5:01 PM     TASK CREATED  caller: Connor Rodriguez  call back number: [phone]    patient is already taking a Medrol dose pack  she can only get a non steroidal anti inflammatory due to this  the script they received is a steroid so they can not fill it   Amrita Melvin - 18 Dec 2017 2:06 PM     TASK EDITED  S/W Riley Brannon at [facility] who stated Phillip Bellamy was prescribed medrol dose pack today that is suppose to be started on 12/21/17 by Dr Manuela Colbert from  # [phone]  He said our office ordered voltaren 75mg and she can only get a NSAID b/c otherwise it could cause stomach bleeding  Pls advise  Genesis Shelley - 18 Dec 2017 4:25 PM     TASK REPLIED TO: Previously Assigned To Genesis Shelley                      aware  patient has been on Diclofenac since august so I was refilling  Patient did not say she was on a medrol dose pack  Can he fill it once the steroid pack is complete   Amrita Melvin - 18 Dec 2017 4:32 PM     TASK EDITED  S/W pharmacist Alexander Lopez at [facility] and made her aware of genesis's reply in her task  Alexander Lopez agreed with plan  Active Problems    1  Acute upper respiratory infection (465 9) (J06 9)   2  Analgesic use (V58 69) (Z79 899)   3  Anxiety (300 00) (F41 9)   4  Asthenia (780 79) (R53 1)   5  Chronic lumbar radiculopathy (724 4) (M54 16)   6  Claustrophobia (300 29) (F40 240)   7  Constipation (564 00) (K59 00)   8  Diabetes mellitus, type II (250 00) (E11 9)   9  Diabetic neuropathy (250 60,357 2) (E11 40)   10  Encounter for long-term use of opiate analgesic (V58 69) (Z79 891)   11  Encounter for screening mammogram for breast cancer (V76 12) (Z12 31)   12  Hepatitis C, chronic (070 54) (B18 2)   13   Hepatocellular carcinoma (155 0) (C22 0)   14  Herniated lumbar disc without myelopathy (722 10) (M51 26)   15  Hypertension (401 9) (I10)   16  Insomnia (780 52) (G47 00)   17  Left foot pain (729 5) (M79 672)   18  Left-sided chest wall pain (786 52) (R07 89)   19  Malignant neoplasm of liver (155 2) (C22 9)   20  Myofascial pain (729 1) (M79 1)   21  Nicotine dependence (305 1) (F17 200)   22  Opioid dependence (304 00) (F11 20)   23  Osteoporosis (733 00) (M81 0)   24  Other cirrhosis of liver (571 5) (K74 69)   25  Pain syndrome, chronic (338 4) (G89 4)   26  Sacroiliitis (720 2) (M46 1)   27  Screening for breast cancer (V76 10) (Z12 31)   28  Seasonal allergies (477 9) (J30 2)   29  Trichomonal vulvovaginitis (131 01) (A59 01)   30  Trochanteric bursitis (726 5) (M70 60)    Current Meds   1  Kim Contour Test In Vitro Strip; TEST ONCE DAILY; Therapy: 40Xix7567 to (Evaluate:16Oct2017)  Requested for: 89Toh0049; Last   Rx:31Siu3507 Ordered   2  Diclofenac Sodium 75 MG Oral Tablet Delayed Release; TAKE 1 TABLET 2 TIMES DAILY   AFTER MEALS; Therapy: 64Zrt8294 to (Evaluate:16Jun2018)  Requested for: 26YCQ4493; Last   Rx:74Pxi0719 Ordered   3  Flector 1 3 % Transdermal Patch; APPLY 1 PATCH TO LEFT HIP Q 12 HOURS; Therapy: 19HWR1032 to (Evaluate:17Jan2018)  Requested for: 80RNX5065; Last   Rx:19Oct2017 Ordered   4  Furosemide 40 MG Oral Tablet; take 1 tablet by mouth every day; Therapy: 55ZTO4898 to (Evaluate:99Bnn3599)  Requested for: 55Wpt6270; Last   Rx:82Cmg6995 Ordered   5  Gabapentin 300 MG Oral Capsule; Take 3 caps at bedtime; Therapy: 47TGT3925 to (Evaluate:75Adq0220)  Requested for: 72JYL2746; Last   Rx:03Pif6201 Ordered   6  HydroCHLOROthiazide 25 MG Oral Tablet; TAKE 1 TABLET DAILY; Therapy: 03OTO7262 to (Evaluate:89Trd6824)  Requested for: 77FIV3608; Last   Rx:04Nov2017 Ordered   7  Losartan Potassium 100 MG Oral Tablet; take one tablet by mouth daily;    Therapy: 86RYF3337 to (Evaluate:08Obp6299)  Requested for: 06ZZN3302; Last   Rx:21Ksl9285 Ordered   8  MetFORMIN HCl - 500 MG Oral Tablet; take 1 tablet by mouth twice daily; Therapy: 57CBJ6728 to (Evaluate:97Slm3609)  Requested for: 58Zcm5252; Last   Rx:56Wfh2469 Ordered   9  Methocarbamol 500 MG Oral Tablet; TAKE 1 TABLET Bedtime for muscle spasm; Therapy: 64BDQ6219 to (Surekha Jorgensen)  Requested for: 82JQE4911; Last   Rx:83Dpw6531 Ordered   10  Oxycodone-Acetaminophen  MG Oral Tablet; Take 1 tablet every 6 hours as    needed for pain; Therapy: 30CSH6398 to (Evaluate:17Jan2018); Last Rx:15Yxn6975 Ordered   11  Senna Lax 8 6 MG TABS; Take 1-2 tablets every day PRN; Therapy: 48QEA0638 to (05 12 73 93 30)  Requested for: 50JVK1270; Last    Rx:05Gos4188 Ordered   12  Spironolactone 100 MG Oral Tablet; take 1 tablet by mouth daily; Therapy: 49MCN6670 to (Evaluate:48Kpj2660)  Requested for: 10WBZ8907; Last    Rx:26Coi4616 Ordered    Allergies    1  ACE Inhibitors    2  No Known Environmental Allergies   3   No Known Food Allergies    Signatures   Electronically signed by : Kaleb Arciniega, ; Dec 18 2017  4:32PM EST                       (Author)

## 2018-01-24 ENCOUNTER — TELEPHONE (OUTPATIENT)
Dept: PAIN MEDICINE | Facility: MEDICAL CENTER | Age: 68
End: 2018-01-24

## 2018-01-24 NOTE — TELEPHONE ENCOUNTER
Left vm for pt that it looks as if her MRI for Monday was ordered by Dr Tamar Talbot so she will need to contact his office with this question  Any further assistance pls call our office  C/B # and OH provided

## 2018-01-24 NOTE — TELEPHONE ENCOUNTER
HAVING MRI DONE ON MONDAY NEED TO KNOW IF BLOOD WORK IS            NEED PLS RETURN THE CALL  [phone]

## 2018-01-26 DIAGNOSIS — C22.0 HEPATOCELLULAR CARCINOMA (HCC): Primary | ICD-10-CM

## 2018-01-27 ENCOUNTER — APPOINTMENT (OUTPATIENT)
Dept: LAB | Facility: HOSPITAL | Age: 68
End: 2018-01-27
Attending: SURGERY
Payer: COMMERCIAL

## 2018-01-27 ENCOUNTER — TRANSCRIBE ORDERS (OUTPATIENT)
Dept: LAB | Facility: HOSPITAL | Age: 68
End: 2018-01-27

## 2018-01-27 DIAGNOSIS — C22.0 HEPATOCELLULAR CARCINOMA (HCC): ICD-10-CM

## 2018-01-27 LAB
AFP-TM SERPL-MCNC: 23.2 NG/ML (ref 0.5–8)
BUN SERPL-MCNC: 27 MG/DL (ref 5–25)
CREAT SERPL-MCNC: 1.38 MG/DL (ref 0.6–1.3)
GFR SERPL CREATININE-BSD FRML MDRD: 46 ML/MIN/1.73SQ M

## 2018-01-27 PROCEDURE — 84520 ASSAY OF UREA NITROGEN: CPT

## 2018-01-27 PROCEDURE — 82565 ASSAY OF CREATININE: CPT

## 2018-01-27 PROCEDURE — 36415 COLL VENOUS BLD VENIPUNCTURE: CPT

## 2018-01-27 PROCEDURE — 82105 ALPHA-FETOPROTEIN SERUM: CPT

## 2018-01-29 ENCOUNTER — HOSPITAL ENCOUNTER (OUTPATIENT)
Dept: MRI IMAGING | Facility: HOSPITAL | Age: 68
Discharge: HOME | End: 2018-01-29
Attending: SURGERY
Payer: COMMERCIAL

## 2018-01-29 DIAGNOSIS — C22.0 LIVER CELL CARCINOMA (HCC): ICD-10-CM

## 2018-01-29 PROCEDURE — 74183 MRI ABD W/O CNTR FLWD CNTR: CPT

## 2018-01-29 PROCEDURE — A9581 GADOXETATE DISODIUM INJ: HCPCS | Performed by: SURGERY

## 2018-01-29 RX ADMIN — GADOXETATE DISODIUM 10 ML: 181.43 INJECTION, SOLUTION INTRAVENOUS at 13:20

## 2018-02-06 ENCOUNTER — TELEPHONE (OUTPATIENT)
Dept: SURGICAL ONCOLOGY | Facility: CLINIC | Age: 68
End: 2018-02-06

## 2018-02-06 PROBLEM — M46.1 SACROILIITIS (HCC): Status: ACTIVE | Noted: 2017-08-07

## 2018-02-06 PROBLEM — M79.18 MYOFASCIAL PAIN: Status: ACTIVE | Noted: 2017-10-19

## 2018-02-12 ENCOUNTER — OFFICE VISIT (OUTPATIENT)
Dept: PAIN MEDICINE | Facility: CLINIC | Age: 68
End: 2018-02-12
Payer: COMMERCIAL

## 2018-02-12 VITALS
WEIGHT: 184 LBS | DIASTOLIC BLOOD PRESSURE: 66 MMHG | BODY MASS INDEX: 32.6 KG/M2 | HEIGHT: 63 IN | HEART RATE: 68 BPM | SYSTOLIC BLOOD PRESSURE: 108 MMHG | RESPIRATION RATE: 14 BRPM

## 2018-02-12 DIAGNOSIS — G89.4 CHRONIC PAIN SYNDROME: Primary | ICD-10-CM

## 2018-02-12 DIAGNOSIS — M51.26 LUMBAR DISC HERNIATION: ICD-10-CM

## 2018-02-12 DIAGNOSIS — M51.16 LUMBAR DISC DISEASE WITH RADICULOPATHY: ICD-10-CM

## 2018-02-12 DIAGNOSIS — M79.18 MYOFASCIAL PAIN SYNDROME: ICD-10-CM

## 2018-02-12 DIAGNOSIS — F11.20 UNCOMPLICATED OPIOID DEPENDENCE (HCC): ICD-10-CM

## 2018-02-12 PROCEDURE — 99214 OFFICE O/P EST MOD 30 MIN: CPT | Performed by: NURSE PRACTITIONER

## 2018-02-12 RX ORDER — GABAPENTIN 400 MG/1
CAPSULE ORAL
Qty: 120 CAPSULE | Refills: 1 | Status: SHIPPED | OUTPATIENT
Start: 2018-02-12 | End: 2018-03-14 | Stop reason: SDUPTHER

## 2018-02-12 RX ORDER — METHOCARBAMOL 500 MG/1
500 TABLET, FILM COATED ORAL 2 TIMES DAILY PRN
Qty: 60 TABLET | Refills: 1 | Status: SHIPPED | OUTPATIENT
Start: 2018-02-12 | End: 2018-04-09

## 2018-02-12 RX ORDER — OXYCODONE AND ACETAMINOPHEN 10; 325 MG/1; MG/1
1 TABLET ORAL EVERY 6 HOURS PRN
Qty: 120 TABLET | Refills: 0 | Status: SHIPPED | OUTPATIENT
Start: 2018-02-12 | End: 2018-04-09 | Stop reason: SDUPTHER

## 2018-02-12 RX ORDER — OXYCODONE AND ACETAMINOPHEN 10; 325 MG/1; MG/1
1 TABLET ORAL EVERY 6 HOURS PRN
Qty: 120 TABLET | Refills: 0 | Status: SHIPPED | OUTPATIENT
Start: 2018-02-12 | End: 2018-02-12 | Stop reason: SDUPTHER

## 2018-02-12 RX ORDER — DICLOFENAC SODIUM 75 MG/1
TABLET, DELAYED RELEASE ORAL
Qty: 180 TABLET | Refills: 1 | Status: SHIPPED | OUTPATIENT
Start: 2018-02-12 | End: 2018-04-09

## 2018-02-12 RX ORDER — DICLOFENAC SODIUM 75 MG/1
75 TABLET, DELAYED RELEASE ORAL 2 TIMES DAILY
Qty: 60 TABLET | Refills: 1 | Status: SHIPPED | OUTPATIENT
Start: 2018-02-12 | End: 2018-02-12 | Stop reason: SDUPTHER

## 2018-02-12 NOTE — PROGRESS NOTES
Assessment:  1  Chronic pain syndrome    2  Lumbar disc herniation    3  Lumbar disc disease with radiculopathy    4  Uncomplicated opioid dependence ([address] )    5  Myofascial pain syndrome        Plan:  The patient is a 79 y o  female last seen on  12/18/17, who presents for a follow up office visit in regards to chronic pain secondary to lumbar herniated disc, with radiculopathy  She continues with ongoing low back pain, with increased right leg pain  MRI of the lumbar spine degenerative disc disease central disc protrusion at L5-S1, which is contributing to her symptoms  She is not interested in epidural steroid injections  To better manage her neuropathic pain, I will increase the gabapentin to 1500 mg daily  She was instructed to take 1 tablet in the morning, and 3 tablets at night  If no improvement in 1-2 weeks, she will contact the office  She will continue on Percocet 10/325 mg as prescribed  She was given a prescription for this month they do not fill date of February 15, 2018, and 1 for the following month they do not fill date of March 13, 2018  Patient regarding prescription pill bottle for a pill count today  She was reminded to bring it to every office visit, as pill counts are required for medication refills, per office policy  [place] Prescription Drug Monitoring Program report was reviewed and was appropriate     A urine drug screen was collected at today's office visit as part of our medication management protocol  The point of care testing results were appropriate for what was being prescribed  The specimen will be sent for confirmatory testing  The drug screen is medically necessary because the patient is either dependent on opioid medication or is being considered for opioid medication therapy and the results could impact ongoing or future treatment   The drug screen is to evaluate for the presences or absence of prescribed, non-prescribed, and/or illicit drugs/substances  There are risks associated with opioid medications, including dependence, addiction and tolerance  The patient understands and agrees to use these medications only as prescribed  Potential side effects of the medications include, but are not limited to, constipation, drowsiness, addiction, impaired judgment and risk of fatal overdose if not taken as prescribed  The patient was warned against driving while taking sedation medications  Sharing medications is a felony  At this point in time, the patient is showing no signs of addiction, abuse, diversion or suicidal ideation  An opioid contract was reviewed with the patient  The patient was made aware they are only to receive opioid medication from our office, and must take the medication as prescribed  If the medication is lost or stolen, it will not be replaced  We also do not condone the use of illegal substances as well as the use of alcohol with opioid medication  Random urine drug screens will also be performed at office visits  Lastly, the patient was informed that office visits are needed for refills  Patient was agreeable and signed the contract  The patient will follow-up in 8 weeks for medication prescription refill and reevaluation  The patient was advised to contact the office should their symptoms worsen in the interim  The patient was agreeable and verbalized an understanding  History of Present Illness: The patient is a 79 y o  female last seen on  12/18/17, who presents for a follow up office visit in regards to chronic pain secondary to lumbar herniated disc, with radiculopathy  The patient currently reports ongoing low back pain which radiates down the posterior aspect of the right leg  The right leg pain has increased since the last office visit  Is now constant occurring mostly in the morning  She describes as dull aching, sharp, and throbbing    She has been using a cane when the pain is severe  She is currently rating her pain a 9/10 on the numeric rating scale    Current pain medications includes:  Percocet 10/325 mg 4 times a day, gabapentin 300 mg 3 caps at bedtime, diclofenac 75 mg twice a day, and methocarbamol 500 mg 1-2 times a day  The patient reports that this regimen is providing 60% pain relief  The patient is reporting no side effects from this pain medication regimen  Pain Contract Signed: 2/12/18  Last Urine Drug Screen: 2/12/18    I have personally reviewed and/or updated the patient's past medical history, past surgical history, family history, social history, current medications, allergies, and vital signs today  Review of Systems:    Review of Systems   Respiratory: Negative for shortness of breath  Cardiovascular: Negative for chest pain  Gastrointestinal: Negative for constipation, diarrhea, nausea and vomiting  Musculoskeletal: Negative for arthralgias, gait problem, joint swelling and myalgias  Skin: Negative for rash  Neurological: Negative for dizziness, seizures and weakness  All other systems reviewed and are negative  Past Medical History:   Diagnosis Date    Drug dependence ([facility] [address] )     Hypertension        Past Surgical History:   Procedure Laterality Date    DENTAL SURGERY      HYSTERECTOMY      LIVER BIOPSY      LIVER SURGERY      Ablation    TONSILLECTOMY         Family History   Problem Relation Age of Onset    Prostate cancer Father        Social History     Occupational History    Not on file       Social History Main Topics    Smoking status: Current Every Day Smoker    Smokeless tobacco: Never Used    Alcohol use No    Drug use: No    Sexual activity: Yes     Partners: Male         Current Outpatient Prescriptions:     diclofenac (VOLTAREN) 75 mg EC tablet, Take 1 tablet (75 mg total) by mouth 2 (two) times a day, Disp: 60 tablet, Rfl: 1    furosemide (LASIX) 20 mg tablet, Take 20 mg by mouth 2 (two) times a day, Disp: , Rfl:     gabapentin (NEURONTIN) 400 mg capsule, Take 1 tab PO in the am and 3 tabs at night, Disp: 120 capsule, Rfl: 1    hydrochlorothiazide (HYDRODIURIL) 25 mg tablet, Take 25 mg by mouth daily, Disp: , Rfl:     losartan (COZAAR) 25 mg tablet, Take 25 mg by mouth daily, Disp: , Rfl:     metFORMIN (GLUCOPHAGE) 500 mg tablet, Take 500 mg by mouth 2 (two) times a day with meals, Disp: , Rfl:     methocarbamol (ROBAXIN) 500 mg tablet, Take 1 tablet (500 mg total) by mouth 2 (two) times a day as needed for muscle spasms, Disp: 60 tablet, Rfl: 1    oxyCODONE-acetaminophen (PERCOCET)  mg per tablet, Take 1 tablet by mouth every 6 (six) hours as needed for moderate pain Max Daily Amount: 4 tablets, Disp: 120 tablet, Rfl: 0    No Known Allergies    Physical Exam:    /66   Pulse 68   Resp 14   Ht 5' 3" (1 6 m)   Wt 83 5 kg (184 lb)   BMI 32 59 kg/m²     Constitutional:normal, well developed, well nourished, alert, in no distress and non-toxic and no overt pain behavior    Eyes:anicteric  HEENT:grossly intact  Neck:supple, symmetric, trachea midline and no masses   Pulmonary:even and unlabored  Cardiovascular:No edema or pitting edema present  Skin:Normal without rashes or lesions and well hydrated  Psychiatric:Mood and affect appropriate  Neurologic:Cranial Nerves II-XII grossly intact  Musculoskeletal:normal     Lumbar Spine Exam    Appearance:  Normal lordosis  Palpation/Tenderness:  left lumbar paraspinal tenderness  right lumbar paraspinal tenderness  Range of Motion:  Flexion:  Minimally limited  without pain  Extension:  Minimally limited  with pain  Motor Strength:  Left hip flexion:  5/5  Right hip flexion:  5/5  Left knee flexion:  5/5  Left knee extension:  5/5  Right knee flexion:  5/5  Right knee extension:  5/5  Left foot dorsiflexion:  5/5  Left foot plantar flexion:  5/5  Right foot dorsiflexion:  5/5  Right foot plantar flexion:  5/5      Imaging    MRI LUMBAR SPINE dated 5/14/14  INDICATION- Back pain  COMPARISON- MRI 11/19/2013VIEWS- 4 including flexion extension  FINDINGS- No instability is seen on flexion or extension  The lumbar vertebrae demonstrate normal height  There is no fracture or pathologic bone lesions  Moderate L5-S1 degenerative disc disease is present  Moderate facetdegenerative changes also present at this level  The pedicles are intact  No pars defects  No spondylolisthesis  IMPRESSION-Moderate L5-S1 degenerative changes  MRI:  mri -lumbar spine on 11/19/13L1-L2- Minor bilateral facet arthrosisL2-L3- Circumferential bulge, slight reduction disc height, marginalosteophytes, facet arthrosis, no critical stenosis  L3-L4- Minor bilateral facet arthrosis, slight bulgeL4-L5- Moderate facet arthrosis, minor anterolisthesis  L5-S1- Central protrusion has increased in volume since prior study  Disc extends above the disc space in a right paramedian position  Nodefinite root compression  There's also extension of disc into bothforamen, asymmetric to the left, no definite root compression  Moderate bilateral facet arthrosis  Multiple sacral Tarlov cysts  IMPRESSION-1  Progression of degenerative changes particularly at the L5-S1 level  No definite nerve root compression  Marrow signal is diffusely heterogeneous and low on T1 sequences, isthere evidence for lymphoproliferative disease? Lars Rodrigues Oxycodone last taken 2/12 AM   New UDS today  No orders to display         No orders of the defined types were placed in this encounter

## 2018-02-13 ENCOUNTER — APPOINTMENT (OUTPATIENT)
Dept: LAB | Facility: HOSPITAL | Age: 68
End: 2018-02-13
Attending: RADIOLOGY
Payer: COMMERCIAL

## 2018-02-13 DIAGNOSIS — C22.0 LIVER CELL CARCINOMA (HCC): ICD-10-CM

## 2018-02-13 LAB
AFP-TM SERPL-MCNC: 20.2 NG/ML (ref 0.5–8)
ALBUMIN SERPL BCP-MCNC: 3.9 G/DL (ref 3.5–5)
ALP SERPL-CCNC: 101 U/L (ref 46–116)
ALT SERPL W P-5'-P-CCNC: 26 U/L (ref 12–78)
ANION GAP SERPL CALCULATED.3IONS-SCNC: 7 MMOL/L (ref 4–13)
AST SERPL W P-5'-P-CCNC: 26 U/L (ref 5–45)
BASOPHILS # BLD AUTO: 0.02 THOUSANDS/ΜL (ref 0–0.1)
BASOPHILS NFR BLD AUTO: 0 % (ref 0–1)
BILIRUB SERPL-MCNC: 0.34 MG/DL (ref 0.2–1)
BUN SERPL-MCNC: 36 MG/DL (ref 5–25)
CALCIUM SERPL-MCNC: 8.8 MG/DL (ref 8.3–10.1)
CHLORIDE SERPL-SCNC: 99 MMOL/L (ref 100–108)
CO2 SERPL-SCNC: 27 MMOL/L (ref 21–32)
CREAT SERPL-MCNC: 1.82 MG/DL (ref 0.6–1.3)
EOSINOPHIL # BLD AUTO: 0.1 THOUSAND/ΜL (ref 0–0.61)
EOSINOPHIL NFR BLD AUTO: 2 % (ref 0–6)
ERYTHROCYTE [DISTWIDTH] IN BLOOD BY AUTOMATED COUNT: 14.8 % (ref 11.6–15.1)
GFR SERPL CREATININE-BSD FRML MDRD: 33 ML/MIN/1.73SQ M
GLUCOSE P FAST SERPL-MCNC: 150 MG/DL (ref 65–99)
HCT VFR BLD AUTO: 34.4 % (ref 34.8–46.1)
HGB BLD-MCNC: 11.4 G/DL (ref 11.5–15.4)
LYMPHOCYTES # BLD AUTO: 0.78 THOUSANDS/ΜL (ref 0.6–4.47)
LYMPHOCYTES NFR BLD AUTO: 15 % (ref 14–44)
MCH RBC QN AUTO: 28.5 PG (ref 26.8–34.3)
MCHC RBC AUTO-ENTMCNC: 33.1 G/DL (ref 31.4–37.4)
MCV RBC AUTO: 86 FL (ref 82–98)
MONOCYTES # BLD AUTO: 0.38 THOUSAND/ΜL (ref 0.17–1.22)
MONOCYTES NFR BLD AUTO: 7 % (ref 4–12)
NEUTROPHILS # BLD AUTO: 3.91 THOUSANDS/ΜL (ref 1.85–7.62)
NEUTS SEG NFR BLD AUTO: 76 % (ref 43–75)
NRBC BLD AUTO-RTO: 0 /100 WBCS
PLATELET # BLD AUTO: 212 THOUSANDS/UL (ref 149–390)
PMV BLD AUTO: 10.6 FL (ref 8.9–12.7)
POTASSIUM SERPL-SCNC: 4.1 MMOL/L (ref 3.5–5.3)
PROT SERPL-MCNC: 8.6 G/DL (ref 6.4–8.2)
RBC # BLD AUTO: 4 MILLION/UL (ref 3.81–5.12)
SODIUM SERPL-SCNC: 133 MMOL/L (ref 136–145)
WBC # BLD AUTO: 5.2 THOUSAND/UL (ref 4.31–10.16)

## 2018-02-13 PROCEDURE — 82105 ALPHA-FETOPROTEIN SERUM: CPT

## 2018-02-13 PROCEDURE — 36415 COLL VENOUS BLD VENIPUNCTURE: CPT

## 2018-02-13 PROCEDURE — 85025 COMPLETE CBC W/AUTO DIFF WBC: CPT

## 2018-02-13 PROCEDURE — 80053 COMPREHEN METABOLIC PANEL: CPT

## 2018-02-20 DIAGNOSIS — I10 HTN (HYPERTENSION): ICD-10-CM

## 2018-02-20 DIAGNOSIS — I10 HTN (HYPERTENSION): Primary | ICD-10-CM

## 2018-02-20 RX ORDER — HYDROCHLOROTHIAZIDE 25 MG/1
25 TABLET ORAL DAILY
Qty: 30 TABLET | Refills: 0 | Status: SHIPPED | OUTPATIENT
Start: 2018-02-20 | End: 2018-03-29 | Stop reason: SDUPTHER

## 2018-02-22 ENCOUNTER — RADIATION ONCOLOGY FOLLOW-UP (OUTPATIENT)
Dept: RADIATION ONCOLOGY | Facility: HOSPITAL | Age: 68
End: 2018-02-22

## 2018-02-22 ENCOUNTER — APPOINTMENT (OUTPATIENT)
Dept: RADIATION ONCOLOGY | Facility: HOSPITAL | Age: 68
End: 2018-02-22
Attending: RADIOLOGY
Payer: COMMERCIAL

## 2018-02-22 VITALS
BODY MASS INDEX: 32.6 KG/M2 | DIASTOLIC BLOOD PRESSURE: 82 MMHG | TEMPERATURE: 98 F | RESPIRATION RATE: 16 BRPM | HEIGHT: 63 IN | SYSTOLIC BLOOD PRESSURE: 140 MMHG | HEART RATE: 63 BPM | OXYGEN SATURATION: 96 % | WEIGHT: 184 LBS

## 2018-02-22 DIAGNOSIS — C22.0 HEPATOCELLULAR CARCINOMA (HCC): Primary | ICD-10-CM

## 2018-02-22 PROCEDURE — 99214 OFFICE O/P EST MOD 30 MIN: CPT | Performed by: RADIOLOGY

## 2018-02-22 RX ORDER — SENNA PLUS 8.6 MG/1
2 TABLET ORAL DAILY
COMMUNITY
End: 2019-07-06 | Stop reason: HOSPADM

## 2018-02-22 RX ORDER — LOSARTAN POTASSIUM 100 MG/1
100 TABLET ORAL DAILY
COMMUNITY
End: 2018-03-14 | Stop reason: SDUPTHER

## 2018-02-22 RX ORDER — OMEPRAZOLE 20 MG/1
20 CAPSULE, DELAYED RELEASE ORAL DAILY
COMMUNITY
End: 2018-11-19

## 2018-02-22 RX ORDER — SPIRONOLACTONE 100 MG/1
100 TABLET, FILM COATED ORAL DAILY
COMMUNITY
End: 2018-03-06 | Stop reason: SDUPTHER

## 2018-02-22 NOTE — PROGRESS NOTES
Follow-Up - Radiation Oncology   Marylene Dole 79 y o  female [id number]      Chief Complaint:  Follow-up status post SirSpheres for hepatocellular carcinoma December 21, 2017  Chief Complaint   Patient presents with    Follow-up       History of Present Illness     HPI: Marylene Dole is a 79y o  year old female who presents with hepatocellular carcinoma with a history of hepatitis-C status post Sir spheres on December 21, 2017  Interval History:  She reports she is feeling well  She denies any abdominal pains, bloating as well as no nausea or vomiting  She is having no diarrhea  She is taking Percocet 10/ran 25 mg tablets 4 times a day  Historical Information   Previous Oncology History:   Oncology History    Patient presents today for first follow-up post sir sphere on 12/21/17 for [facility] [address]  19-year-old female with a history of hepatitis C that has been treated and cirrhosis of the liver with a well-differentiated hepatocellular carcinoma initially diagnosed in 2014  She was previously evaluated in 2014 for transplant and this was recommended but she refused  She has had 2 lesions in the liver treated with microwave ablation with lesions in segment 7/8 and segment 5 in 2015  Her recent MRI shows no evidence of tumor recurrence in the segment 5 lesion  There continues to be involution of segment 7/8 lesions status post ablation  There is a new enhancing tumor thrombus emanating from segment 7/8 that is larger  Her recent AFP of 12 3 has increased slightly but there are no recent liver function studies  We will send patient for repeat blood work  Assuming her liver function studies especially bilirubin are normal, she Is a good candidate for Sir spheres to the right hepatic lobe  We reviewed her imaging studies with Dr Harjit Trent from IR  Depending on the results of her mapping studies, she could have treatment to the entire right hepatic lobe for possibly a segmentectomy to segments 7 and 8   If the Sir spheres are not possible, then she may have to consider TACE followed by potential systemic chemotherapy with Nexavar  We discussed the procedure of Sir spheres including mapping study with the patient  We discussed the acute side effects and the potential chronic complications  She is agreeable to proceeding with the treatment  Sir Sphere to the right hepatic lobe was performed on 12/21/17 1/29/18- MRI abdomen    1  Minimally decreased size of treated lesion in the right lobe with new enhancing tumor thrombus in the adjacent right portal vein extending to the bifurcation      2  No new lesions identified  Labs from 2/13/18:  AST= 26  ALT= 26  Bili= 0 34  ALK Phos= 101  AFP= 20 2    No scheduled appointments with Dr Vin Esquivel  Today feels good              [facility] )    10/22/2015 Initial Diagnosis     Hepatocellular carcinoma ([facility] [address] )         10/22/2015 Surgery     Exploratory laparotomy, IntraOp u/s of liver, biopsy of segment 8 lesion, Microwave ablation of segment 8 and segment 5 lesions, Wedge resection of segment 5 liver         12/21/2017 - 12/21/2017 Radiation     SIRS to right hepatic lobe          Hepatocellular carcinoma ([facility] [address] )    Staging form: Liver (Excluding Intrahepatic Bile Ducts), AJCC 7th Edition    - Pathologic stage from 10/22/2015: No stage assigned - Unsigned     Previous Radiation History: See Above    Past Medical History:   Diagnosis Date    Drug dependence ([facility] [address] )     Hypertension      Past Surgical History:   Procedure Laterality Date    DENTAL SURGERY      HYSTERECTOMY      LIVER BIOPSY      LIVER SURGERY      Ablation    TONSILLECTOMY         Social History   History   Alcohol Use No     History   Drug Use No     History   Smoking Status    Current Every Day Smoker    Packs/day: 1 00   Smokeless Tobacco    Never Used       Meds/Allergies     Current Outpatient Prescriptions:     diclofenac (VOLTAREN) 75 mg EC tablet, TAKE 1 TABLET(75 MG) BY MOUTH TWICE DAILY, Disp: 180 tablet, Rfl: 1    diphenhydrAMINE (NIGHT TIME SLEEP AID) 25 MG tablet, Take 50 mg by mouth daily at bedtime as needed for sleep, Disp: , Rfl:     furosemide (LASIX) 20 mg tablet, Take 40 mg by mouth daily  , Disp: , Rfl:     gabapentin (NEURONTIN) 400 mg capsule, Take 1 tab PO in the am and 3 tabs at night (Patient taking differently: Take 300 mg by mouth 2 (two) times a day Take 1 tab PO in the am and 3 tabs at night ), Disp: 120 capsule, Rfl: 1    hydrochlorothiazide (HYDRODIURIL) 25 mg tablet, Take 1 tablet (25 mg total) by mouth daily, Disp: 30 tablet, Rfl: 0    losartan (COZAAR) 100 MG tablet, Take 100 mg by mouth daily, Disp: , Rfl:     metFORMIN (GLUCOPHAGE) 500 mg tablet, Take 500 mg by mouth 2 (two) times a day with meals, Disp: , Rfl:     methocarbamol (ROBAXIN) 500 mg tablet, Take 1 tablet (500 mg total) by mouth 2 (two) times a day as needed for muscle spasms (Patient taking differently: Take 500 mg by mouth daily  ), Disp: 60 tablet, Rfl: 1    omeprazole (PriLOSEC) 20 mg delayed release capsule, Take 20 mg by mouth daily, Disp: , Rfl:     oxyCODONE-acetaminophen (PERCOCET)  mg per tablet, Take 1 tablet by mouth every 6 (six) hours as needed for moderate pain Max Daily Amount: 4 tablets, Disp: 120 tablet, Rfl: 0    senna (SENOKOT) 8 6 MG tablet, Take 2 tablets by mouth daily, Disp: , Rfl:     spironolactone (ALDACTONE) 100 mg tablet, Take 100 mg by mouth daily, Disp: , Rfl:   No Known Allergies    Review of Systems  Constitutional: Positive for fatigue (5/10) and unexpected weight change (10 lbs gain )  HENT: Negative  Eyes:        Floaters , persisting   Respiratory:        No cough but smokes daily   Cardiovascular: Negative  Gastrointestinal: Positive for constipation (due to Percocet, but is controlled with Sennna)  Endocrine: Positive for polyuria  Genitourinary: Positive for frequency (due to 3 different diuretics she is taking   All of which she was taking in the PM  after supper)  Musculoskeletal: Positive for back pain  Low back pain, radiates to RLE on occasion  Takes muscle relaxers, and percocet for this  Skin: Negative  Allergic/Immunologic: Negative  Neurological: Negative  Hematological: Negative  Psychiatric/Behavioral: Negative      Objective   /82 (BP Location: Right arm)   Pulse 63   Temp 98 °F (36 7 °C)   Resp 16   Ht 5' 3" (1 6 m)   Wt 83 5 kg (184 lb)   SpO2 96%   BMI 32 59 kg/m²   ECO - Asymptomatic    Physical Exam   Constitutional: She is oriented to person, place, and time  She appears well-developed and well-nourished  No distress  HENT:   Head: Normocephalic and atraumatic  Mouth/Throat: No oropharyngeal exudate  Eyes: Conjunctivae and EOM are normal  Pupils are equal, round, and reactive to light  No scleral icterus  Neck: Normal range of motion  Neck supple  No tracheal deviation present  No thyromegaly present  Cardiovascular: Normal rate, regular rhythm and normal heart sounds  Pulmonary/Chest: Effort normal and breath sounds normal  No respiratory distress  She has no wheezes  She has no rales  Abdominal: Soft  Bowel sounds are normal  She exhibits no distension and no mass  There is no tenderness  Musculoskeletal: Normal range of motion  She exhibits no edema or tenderness  Lymphadenopathy:     She has no cervical adenopathy  Neurological: She is alert and oriented to person, place, and time  No cranial nerve deficit  Coordination normal    Skin: Skin is warm and dry  No rash noted  She is not diaphoretic  No erythema  No pallor  Psychiatric: She has a normal mood and affect  Her behavior is normal  Judgment and thought content normal    Nursing note and vitals reviewed      RESULTS  Lab Results:  Recent Results (from the past 2016 hour(s))   AFP tumor marker    Collection Time: 17  3:08 PM   Result Value Ref Range    AFP TUMOR MARKER 29 1 (H) 0 5 - 8 ng/mL APTT    Collection Time: 12/01/17  3:08 PM   Result Value Ref Range    PTT 36 (H) 23 - 35 seconds   Comprehensive metabolic panel    Collection Time: 12/01/17  3:08 PM   Result Value Ref Range    Sodium 132 (L) 136 - 145 mmol/L    Potassium 4 3 3 5 - 5 3 mmol/L    Chloride 98 (L) 100 - 108 mmol/L    CO2 26 21 - 32 mmol/L    Anion Gap 8 4 - 13 mmol/L    BUN 26 (H) 5 - 25 mg/dL    Creatinine 1 23 0 60 - 1 30 mg/dL    Glucose 101 65 - 140 mg/dL    Calcium 9 4 8 3 - 10 1 mg/dL    AST 26 5 - 45 U/L    ALT 34 12 - 78 U/L    Alkaline Phosphatase 108 46 - 116 U/L    Total Protein 8 5 (H) 6 4 - 8 2 g/dL    Albumin 3 9 3 5 - 5 0 g/dL    Total Bilirubin 0 33 0 20 - 1 00 mg/dL    eGFR 52 ml/min/1 73sq m   Protime-INR    Collection Time: 12/01/17  3:08 PM   Result Value Ref Range    Protime 13 8 12 1 - 14 4 seconds    INR 1 06 0 86 - 1 16   CBC    Collection Time: 12/08/17  8:54 AM   Result Value Ref Range    WBC 6 95 4 31 - 10 16 Thousand/uL    RBC 3 90 3 81 - 5 12 Million/uL    Hemoglobin 10 7 (L) 11 5 - 15 4 g/dL    Hematocrit 32 4 (L) 34 8 - 46 1 %    MCV 83 82 - 98 fL    MCH 27 4 26 8 - 34 3 pg    MCHC 33 0 31 4 - 37 4 g/dL    RDW 15 0 11 6 - 15 1 %    Platelets 375 666 - 252 Thousands/uL    MPV 9 6 8 9 - 12 7 fL   BUN    Collection Time: 01/27/18 10:00 AM   Result Value Ref Range    BUN 27 (H) 5 - 25 mg/dL   Creatinine, serum    Collection Time: 01/27/18 10:00 AM   Result Value Ref Range    Creatinine 1 38 (H) 0 60 - 1 30 mg/dL    eGFR 46 ml/min/1 73sq m   AFP tumor marker    Collection Time: 01/27/18 10:00 AM   Result Value Ref Range    AFP TUMOR MARKER 23 2 (H) 0 5 - 8 ng/mL   AFP tumor marker    Collection Time: 02/13/18  2:18 PM   Result Value Ref Range    AFP TUMOR MARKER 20 2 (H) 0 5 - 8 ng/mL   Comprehensive metabolic panel    Collection Time: 02/13/18  2:18 PM   Result Value Ref Range    Sodium 133 (L) 136 - 145 mmol/L    Potassium 4 1 3 5 - 5 3 mmol/L    Chloride 99 (L) 100 - 108 mmol/L    CO2 27 21 - 32 mmol/L Anion Gap 7 4 - 13 mmol/L    BUN 36 (H) 5 - 25 mg/dL    Creatinine 1 82 (H) 0 60 - 1 30 mg/dL    Glucose, Fasting 150 (H) 65 - 99 mg/dL    Calcium 8 8 8 3 - 10 1 mg/dL    AST 26 5 - 45 U/L    ALT 26 12 - 78 U/L    Alkaline Phosphatase 101 46 - 116 U/L    Total Protein 8 6 (H) 6 4 - 8 2 g/dL    Albumin 3 9 3 5 - 5 0 g/dL    Total Bilirubin 0 34 0 20 - 1 00 mg/dL    eGFR 33 ml/min/1 73sq m   CBC and differential    Collection Time: 02/13/18  2:18 PM   Result Value Ref Range    WBC 5 20 4  31 - 10 16 Thousand/uL    RBC 4 00 3 81 - 5 12 Million/uL    Hemoglobin 11 4 (L) 11 5 - 15 4 g/dL    Hematocrit 34 4 (L) 34 8 - 46 1 %    MCV 86 82 - 98 fL    MCH 28 5 26 8 - 34 3 pg    MCHC 33 1 31 4 - 37 4 g/dL    RDW 14 8 11 6 - 15 1 %    MPV 10 6 8 9 - 12 7 fL    Platelets 431 618 - 411 Thousands/uL    nRBC 0 /100 WBCs    Neutrophils Relative 76 (H) 43 - 75 %    Lymphocytes Relative 15 14 - 44 %    Monocytes Relative 7 4 - 12 %    Eosinophils Relative 2 0 - 6 %    Basophils Relative 0 0 - 1 %    Neutrophils Absolute 3 91 1 85 - 7 62 Thousands/µL    Lymphocytes Absolute 0 78 0 60 - 4 47 Thousands/µL    Monocytes Absolute 0 38 0 17 - 1 22 Thousand/µL    Eosinophils Absolute 0 10 0 00 - 0 61 Thousand/µL    Basophils Absolute 0 02 0 00 - 0 10 Thousands/µL       Imaging Studies:  Mri Abdomen W Wo Contrast    Result Date: 1/31/2018  Narrative: MRI OF THE ABDOMEN (LIVER) WITH AND WITHOUT CONTRAST INDICATION:  Follow-up HCC COMPARISON: 2/27/2017 TECHNIQUE: The following pulse sequences were obtained on a 1 5 T scanner:  Axial T1-weighted in-and-out-of phase, pre-contrast axial T1 with fat saturation, post-contrast dynamic axial T1 with fat saturation at 20, 70, and 180 seconds, coronal T1 with fat saturation, coronal and axial T2 with TE of 90 and 180 respectively, 10 minute delayed axial T1 with fat saturation through the liver, axial DWI/ADC, axial T2 with fat saturation, 10, and 20 minute delayed axial T1 with fat saturation through the liver  IV Contrast:  10 mL of gadoxetate disodium SOLN FINDINGS: LIVER:  General:  The liver appears cirrhotic with left lobe hypertrophy  There are postoperative changes on the right  Lesions:  Treated lesion in the right lobe has slightly decreased in size measuring 2 3 x 2 4 cm, previously 2 5 x 2 6 cm  There is no enhancement  No new lesions are identified  There is a subcentimeter lesion adjacent to the falciform ligament on series 14, image 101 though this has demonstrated stability  Vasculature: There is new thrombosis of the right portal vein extending from the bifurcation into the anterior and posterior branches  There is arterial enhancement, particularly seen in the anterior branch, extending to the lesion suspicious for tumor thrombus  BILIARY TREE:  Normal   GALLBLADDER:  Normal  PANCREAS:  Normal  ADRENAL GLANDS:  Normal  SPLEEN: Normal  KIDNEYS:  There is a stable small hemorrhagic cyst in the right upper pole  ABDOMINAL CAVITY:  No lymphadenopathy or mass  No ascites  BOWEL:  Unremarkable MRI appearance  OSSEOUS STRUCTURES:  No osseous destruction  EXTRAHEPATIC VASCULAR STRUCTURES:  Visualized vasculature is normal  ABDOMINAL WALL:  Normal  LOWER CHEST:  Unremarkable MRI appearance  Impression: 1  Minimally decreased size of treated lesion in the right lobe with new enhancing tumor thrombus in the adjacent right portal vein extending to the bifurcation  2   No new lesions identified  I personally discussed this study with Erik Sutton on 1/31/2018 12:16 PM  Workstation performed: [id number]       Assessment/Plan    1  Hepatocellular carcinoma (HCC)         Discussion/Summary:  Mario New is a 79y o  year old female who presents with hepatocellular carcinoma with a history of treated hepatitis-C status post Sir spheres on December 21, 2017    Her MRI of the abdomen from January 29, 2018 reveals slight decrease in size of the treated right hepatic lobe lesion with new enhancing tumor thrombus in the adjacent right portal vein extending to the bifurcation  There were no new liver lesions identified  Her blood work from February 13, 2018 revealed normal liver function studies including bilirubin 0 34 with AFP that is decreased to 20 2 from previous level of 29 1 prior to treatment  She missed her appointment with Dr Steve Aldrich and we asked her to reschedule this appointment  We recommended she return here for follow-up in 3-4 months and instead she would like to return here for follow-up in 6 months  Abdon Sandoval MD  2/22/2018,3:28 PM    Portions of the record may have been created with voice recognition software   Occasional wrong word or "sound a like" substitutions may have occurred due to the inherent limitations of voice recognition software   Read the chart carefully and recognize, using context, where substitutions have occurred

## 2018-02-22 NOTE — PROGRESS NOTES
Lauren Mckeon is a 79 y o  female  Staging form: Liver (Excluding Intrahepatic Bile Ducts), AJCC 7th Edition    - Pathologic stage from 10/22/2015: No stage assigned - Unsigned  Oncology History    Patient presents today for first follow-up post sir sphere on 12/21/17 for [address]  71-year-old female with a history of hepatitis C that has been treated and cirrhosis of the liver with a well-differentiated hepatocellular carcinoma initially diagnosed in 2014  She was previously evaluated in 2014 for transplant and this was recommended but she refused  She has had 2 lesions in the liver treated with microwave ablation with lesions in segment 7/8 and segment 5 in 2015  Her recent MRI shows no evidence of tumor recurrence in the segment 5 lesion  There continues to be involution of segment 7/8 lesions status post ablation  There is a new enhancing tumor thrombus emanating from segment 7/8 that is larger  Her recent AFP of 12 3 has increased slightly but there are no recent liver function studies  We will send patient for repeat blood work  Assuming her liver function studies especially bilirubin are normal, she Is a good candidate for Sir spheres to the right hepatic lobe  We reviewed her imaging studies with Dr Salena Aceves from IR  Depending on the results of her mapping studies, she could have treatment to the entire right hepatic lobe for possibly a segmentectomy to segments 7 and 8  If the Sir spheres are not possible, then she may have to consider TACE followed by potential systemic chemotherapy with Nexavar  We discussed the procedure of Sir spheres including mapping study with the patient  We discussed the acute side effects and the potential chronic complications  She is agreeable to proceeding with the treatment  Sir Sphere to the right hepatic lobe was performed on 12/21/17 1/29/18- MRI abdomen    1    Minimally decreased size of treated lesion in the right lobe with new enhancing tumor thrombus in the adjacent right portal vein extending to the bifurcation      2  No new lesions identified  Labs from 2/13/18:  AST= 26  ALT= 26  Bili= 0 34  ALK Phos= 101  AFP= 20 2    No scheduled appointments with Dr Maureen Temple  Hepatocellular carcinoma ([facility] )    10/22/2015 Initial Diagnosis     Hepatocellular carcinoma ([facility] )         10/22/2015 Surgery     Exploratory laparotomy, IntraOp u/s of liver, biopsy of segment 8 lesion, Microwave ablation of segment 8 and segment 5 lesions, Wedge resection of segment 5 liver         12/21/2017 - 12/21/2017 Radiation     SIRS to right hepatic lobe                GYN History:Post menopausal    Patient Active Problem List   Diagnosis    Anxiety    Asthenia    Chronic lumbar radiculopathy    Claustrophobia    Constipation    Diabetes mellitus, type II ([facility] )    Diabetic neuropathy ([facility] )    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 ([facility] )    Hepatitis C, chronic (HCC)    Hepatocellular carcinoma ([facility] )    Herniated lumbar disc without myelopathy    Hypertension    Insomnia    Left foot pain    Left-sided chest wall pain    Myofascial pain    Nicotine dependence    Opioid dependence ([facility] )    Osteoporosis    Other cirrhosis of liver (HCC)    Pain syndrome, chronic    Sacroiliitis (HCC)    Seasonal allergies    Trichomonal vulvovaginitis    Trochanteric bursitis     Past Medical History:   Diagnosis Date    Drug dependence ([facility] )     Hypertension      Past Surgical History:   Procedure Laterality Date    DENTAL SURGERY      HYSTERECTOMY      LIVER BIOPSY      LIVER SURGERY      Ablation    TONSILLECTOMY       Family History   Problem Relation Age of Onset    Prostate cancer Father      Social History     Social History    Marital status: Single     Spouse name: N/A    Number of children: N/A    Years of education: N/A     Occupational History    Not on file       Social History Main Topics    Smoking status: Current Every Day Smoker     Packs/day: 1 00    Smokeless tobacco: Never Used    Alcohol use No    Drug use: No    Sexual activity: Yes     Partners: Male     Other Topics Concern    Not on file     Social History Narrative    No narrative on file       Current Outpatient Prescriptions:     diclofenac (VOLTAREN) 75 mg EC tablet, TAKE 1 TABLET(75 MG) BY MOUTH TWICE DAILY, Disp: 180 tablet, Rfl: 1    diphenhydrAMINE (NIGHT TIME SLEEP AID) 25 MG tablet, Take 50 mg by mouth daily at bedtime as needed for sleep, Disp: , Rfl:     furosemide (LASIX) 20 mg tablet, Take 40 mg by mouth daily  , Disp: , Rfl:     gabapentin (NEURONTIN) 400 mg capsule, Take 1 tab PO in the am and 3 tabs at night (Patient taking differently: Take 300 mg by mouth 2 (two) times a day Take 1 tab PO in the am and 3 tabs at night ), Disp: 120 capsule, Rfl: 1    hydrochlorothiazide (HYDRODIURIL) 25 mg tablet, Take 1 tablet (25 mg total) by mouth daily, Disp: 30 tablet, Rfl: 0    losartan (COZAAR) 100 MG tablet, Take 100 mg by mouth daily, Disp: , Rfl:     metFORMIN (GLUCOPHAGE) 500 mg tablet, Take 500 mg by mouth 2 (two) times a day with meals, Disp: , Rfl:     methocarbamol (ROBAXIN) 500 mg tablet, Take 1 tablet (500 mg total) by mouth 2 (two) times a day as needed for muscle spasms (Patient taking differently: Take 500 mg by mouth daily  ), Disp: 60 tablet, Rfl: 1    omeprazole (PriLOSEC) 20 mg delayed release capsule, Take 20 mg by mouth daily, Disp: , Rfl:     oxyCODONE-acetaminophen (PERCOCET)  mg per tablet, Take 1 tablet by mouth every 6 (six) hours as needed for moderate pain Max Daily Amount: 4 tablets, Disp: 120 tablet, Rfl: 0    senna (SENOKOT) 8 6 MG tablet, Take 2 tablets by mouth daily, Disp: , Rfl:     spironolactone (ALDACTONE) 100 mg tablet, Take 100 mg by mouth daily, Disp: , Rfl:   No Known Allergies    Review of Systems:  Review of Systems   Constitutional: Positive for fatigue (5/10) and unexpected weight change (10 lbs gain )  HENT: Negative  Eyes:        Floaters , persisting   Respiratory:        No cough but smokes daily   Cardiovascular: Negative  Gastrointestinal: Positive for constipation (due to Percocet, but is controlled with Sennna)  Endocrine: Positive for polyuria  Genitourinary: Positive for frequency (due to 3 different diuretics she is taking  All of which she was taking in the PM  after supper)  Musculoskeletal: Positive for back pain  Low back pain, radiates to RLE on occasion  Takes muscle relaxers, and percocet for this  Skin: Negative  Allergic/Immunologic: Negative  Neurological: Negative  Hematological: Negative  Psychiatric/Behavioral: Negative  Vitals:    02/22/18 1300   BP: 140/82   BP Location: Right arm   Pulse: 63   Resp: 16   Temp: 98 °F (36 7 °C)   SpO2: 96%   Weight: 83 5 kg (184 lb)   Height: 5' 3" (1 6 m)       Imaging:Mri Abdomen W Wo Contrast    Result Date: 1/31/2018  Narrative: MRI OF THE ABDOMEN (LIVER) WITH AND WITHOUT CONTRAST INDICATION:  Follow-up HCC COMPARISON: 2/27/2017 TECHNIQUE: The following pulse sequences were obtained on a 1 5 T scanner:  Axial T1-weighted in-and-out-of phase, pre-contrast axial T1 with fat saturation, post-contrast dynamic axial T1 with fat saturation at 20, 70, and 180 seconds, coronal T1 with fat saturation, coronal and axial T2 with TE of 90 and 180 respectively, 10 minute delayed axial T1 with fat saturation through the liver, axial DWI/ADC, axial T2 with fat saturation, 10, and 20 minute delayed axial T1 with fat saturation through the liver  IV Contrast:  10 mL of gadoxetate disodium SOLN FINDINGS: LIVER:  General:  The liver appears cirrhotic with left lobe hypertrophy  There are postoperative changes on the right  Lesions:  Treated lesion in the right lobe has slightly decreased in size measuring 2 3 x 2 4 cm, previously 2 5 x 2 6 cm  There is no enhancement  No new lesions are identified    There is a subcentimeter lesion adjacent to the falciform ligament on series 14, image 101 though this has demonstrated stability  Vasculature: There is new thrombosis of the right portal vein extending from the bifurcation into the anterior and posterior branches  There is arterial enhancement, particularly seen in the anterior branch, extending to the lesion suspicious for tumor thrombus  BILIARY TREE:  Normal   GALLBLADDER:  Normal  PANCREAS:  Normal  ADRENAL GLANDS:  Normal  SPLEEN: Normal  KIDNEYS:  There is a stable small hemorrhagic cyst in the right upper pole  ABDOMINAL CAVITY:  No lymphadenopathy or mass  No ascites  BOWEL:  Unremarkable MRI appearance  OSSEOUS STRUCTURES:  No osseous destruction  EXTRAHEPATIC VASCULAR STRUCTURES:  Visualized vasculature is normal  ABDOMINAL WALL:  Normal  LOWER CHEST:  Unremarkable MRI appearance  Impression: 1  Minimally decreased size of treated lesion in the right lobe with new enhancing tumor thrombus in the adjacent right portal vein extending to the bifurcation  2   No new lesions identified  I personally discussed this study with Alexi Beavers on 1/31/2018 12:16 PM  Workstation performed: [id number]       Teaching:Advised her to take her meds in the manner they were prescribed  She was taking her meds all at one time in the PM including her 3 diuretics,  and Metformin  I explained she is unable to sleep due to frequent urination hs, because of the water pills hs

## 2018-02-23 RX ORDER — HYDROCHLOROTHIAZIDE 25 MG/1
TABLET ORAL
Qty: 90 TABLET | Refills: 0 | Status: CANCELLED | OUTPATIENT
Start: 2018-02-23

## 2018-03-05 ENCOUNTER — OFFICE VISIT (OUTPATIENT)
Dept: SURGICAL ONCOLOGY | Facility: CLINIC | Age: 68
End: 2018-03-05
Payer: COMMERCIAL

## 2018-03-05 VITALS
WEIGHT: 188 LBS | SYSTOLIC BLOOD PRESSURE: 130 MMHG | HEART RATE: 77 BPM | DIASTOLIC BLOOD PRESSURE: 60 MMHG | HEIGHT: 63 IN | BODY MASS INDEX: 33.31 KG/M2 | RESPIRATION RATE: 16 BRPM | TEMPERATURE: 98 F

## 2018-03-05 DIAGNOSIS — C22.0 HEPATOCELLULAR CARCINOMA (HCC): Primary | ICD-10-CM

## 2018-03-05 PROCEDURE — 99213 OFFICE O/P EST LOW 20 MIN: CPT | Performed by: NURSE PRACTITIONER

## 2018-03-05 NOTE — PROGRESS NOTES
I have reviewed the notes, assessments, and/or procedures performed by BRYANNA Gomez, I concur with her/his documentation of Leanne Tinsley

## 2018-03-06 DIAGNOSIS — K74.60 CIRRHOSIS (HCC): Primary | ICD-10-CM

## 2018-03-06 RX ORDER — SPIRONOLACTONE 100 MG/1
TABLET, FILM COATED ORAL
Qty: 90 TABLET | Refills: 0 | Status: CANCELLED | OUTPATIENT
Start: 2018-03-06

## 2018-03-06 RX ORDER — SPIRONOLACTONE 100 MG/1
100 TABLET, FILM COATED ORAL DAILY
Qty: 30 TABLET | Refills: 5 | Status: SHIPPED | OUTPATIENT
Start: 2018-03-06 | End: 2018-03-14 | Stop reason: SDUPTHER

## 2018-03-14 ENCOUNTER — OFFICE VISIT (OUTPATIENT)
Dept: INTERNAL MEDICINE CLINIC | Facility: CLINIC | Age: 68
End: 2018-03-14
Payer: COMMERCIAL

## 2018-03-14 VITALS
BODY MASS INDEX: 32.42 KG/M2 | DIASTOLIC BLOOD PRESSURE: 76 MMHG | HEART RATE: 68 BPM | HEIGHT: 63 IN | TEMPERATURE: 98.1 F | WEIGHT: 182.98 LBS | SYSTOLIC BLOOD PRESSURE: 112 MMHG

## 2018-03-14 DIAGNOSIS — E11.8 TYPE 2 DIABETES MELLITUS WITH COMPLICATION, UNSPECIFIED LONG TERM INSULIN USE STATUS: ICD-10-CM

## 2018-03-14 DIAGNOSIS — Z23 NEED FOR DIPHTHERIA-TETANUS-PERTUSSIS (TDAP) VACCINE: Primary | ICD-10-CM

## 2018-03-14 DIAGNOSIS — M51.26 LUMBAR DISC HERNIATION: ICD-10-CM

## 2018-03-14 DIAGNOSIS — F41.9 ANXIETY: ICD-10-CM

## 2018-03-14 DIAGNOSIS — Z23 NEED FOR PROPHYLACTIC VACCINATION AND INOCULATION AGAINST INFLUENZA: ICD-10-CM

## 2018-03-14 DIAGNOSIS — I63.9: ICD-10-CM

## 2018-03-14 DIAGNOSIS — R79.89 ELEVATED SERUM CREATININE: ICD-10-CM

## 2018-03-14 DIAGNOSIS — K74.60 CIRRHOSIS (HCC): ICD-10-CM

## 2018-03-14 PROBLEM — Z00.00 HEALTH CARE MAINTENANCE: Status: ACTIVE | Noted: 2018-03-14

## 2018-03-14 LAB — SL AMB POCT HEMOGLOBIN AIC: NORMAL

## 2018-03-14 PROCEDURE — 4010F ACE/ARB THERAPY RXD/TAKEN: CPT | Performed by: INTERNAL MEDICINE

## 2018-03-14 PROCEDURE — 90715 TDAP VACCINE 7 YRS/> IM: CPT | Performed by: INTERNAL MEDICINE

## 2018-03-14 PROCEDURE — 99214 OFFICE O/P EST MOD 30 MIN: CPT | Performed by: INTERNAL MEDICINE

## 2018-03-14 PROCEDURE — 90471 IMMUNIZATION ADMIN: CPT | Performed by: INTERNAL MEDICINE

## 2018-03-14 PROCEDURE — 83036 HEMOGLOBIN GLYCOSYLATED A1C: CPT | Performed by: INTERNAL MEDICINE

## 2018-03-14 RX ORDER — HYDROXYZINE HYDROCHLORIDE 10 MG/1
25 TABLET, FILM COATED ORAL ONCE
Qty: 1 TABLET | Refills: 0 | Status: SHIPPED | OUTPATIENT
Start: 2018-03-14 | End: 2018-03-16 | Stop reason: SDUPTHER

## 2018-03-14 RX ORDER — LOSARTAN POTASSIUM 100 MG/1
100 TABLET ORAL DAILY
Qty: 90 TABLET | Refills: 1 | Status: ON HOLD | OUTPATIENT
Start: 2018-03-14 | End: 2018-09-19

## 2018-03-14 RX ORDER — SPIRONOLACTONE 100 MG/1
100 TABLET, FILM COATED ORAL DAILY
Qty: 90 TABLET | Refills: 1 | Status: ON HOLD | OUTPATIENT
Start: 2018-03-14 | End: 2018-09-19

## 2018-03-14 RX ORDER — GABAPENTIN 300 MG/1
CAPSULE ORAL
Qty: 90 CAPSULE | Refills: 3 | Status: SHIPPED | OUTPATIENT
Start: 2018-03-14 | End: 2018-08-20 | Stop reason: SDUPTHER

## 2018-03-14 NOTE — PROGRESS NOTES
INTERNAL MEDICINE FOLLOW-UP OFFICE VISIT  [facility]  [address] [facility] [place] [address]    NAME: Andreia Martin  AGE: 79 y o  SEX: female    DATE OF ENCOUNTER: 3/14/2018    Assessment and Plan     Diabetes mellitus, type II ([facility] )  A1C 5 8 --> 5 3  decrease Metformin to  500 mg daily  Continue same medication    Assessment:    Diabetes Mellitus type II, under adequate control  Plan:    1  Rx changes: decrease Metformin to  500 mg daily  2  Education: Reviewed ABCs of diabetes management (respective goals in parentheses):  A1C (<7), blood pressure (<130/80), and cholesterol (LDL <100)  3   Compliance at present is estimated to be good  Efforts to improve compliance (if necessary) will be directed at dietary modifications and increased exercise  4  Follow up: 4 months      Hypertension  /76  HCTZ 25 mg daily  Losartan 100 mg Daily  Furosemide 40 mg Daily   Continue same medication    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)  Losartan 100 mg Daily  Furosemide 40 mg Daily  Child A      Hepatocellular carcinoma ([facility] )  Diagnosed in 2015  S/p SIR spheres December/2016 Right hepatic lobe  Refused transplant 2014  F/u with Dr Dexter Bamberger (Oncology) MArch 29/2018    Elevated serum creatinine  Creatinine 1 82  Baseline at 1 2  Plan: Order BMP  Further recommendations based on result    Lumbar disc herniation  MRI: L5/S1 disc protrusion  Gabapentin 400 mg as needed for pain  Not having pain at this time    Health care maintenance  tdap and Flu shot today     Anxiety  Flying to [place]   Requesting single dose of Hydroxyzine for Anxiety      Orders Placed This Encounter   Procedures    TDAP VACCINE GREATER THAN OR EQUAL TO 6YO IM    Basic metabolic panel    POCT hemoglobin A1c       - Counseling Documentation: patient was counseled regarding: diagnostic results, instructions for management, risk factor reductions and prognosis    Chief Complaint     Chief Complaint   Patient presents with    Physical Exam     routine       History of Present Illness       Jeana Pimentel is a 79 y o  female who presents for follow-up of Type 2 diabetes mellitus  Current symptoms/problems include none and have been improving  Symptoms have been present for >5 years  Known diabetic complications: none  Cardiovascular risk factors: advanced age (older than 54 for men, 72 for women), diabetes mellitus and hypertension  Current diabetic medications include oral agent (monotherapy): metformin  Eye exam current (within one year): yes  Weight trend: stable  Prior visit with dietician: no  Current diet: in general, a "healthy" diet    Current exercise: none  Any episodes of hypoglycemia? no  Is She on ACE inhibitor or angiotensin II receptor blocker? Yes               The following portions of the patient's history were reviewed and updated as appropriate: allergies, current medications, past family history, past medical history, past social history, past surgical history and problem list     Review of Systems     Review of Systems   Constitutional: Negative for activity change, appetite change, fatigue, fever and unexpected weight change  HENT: Negative for congestion and rhinorrhea  Eyes: Negative for discharge, itching and visual disturbance  Respiratory: Negative for cough, chest tightness, shortness of breath and wheezing  Cardiovascular: Negative for chest pain, palpitations and leg swelling  Gastrointestinal: Negative for abdominal distention, abdominal pain, constipation, diarrhea, nausea and vomiting  Endocrine: Negative for cold intolerance and heat intolerance  Genitourinary: Negative for difficulty urinating, dysuria, flank pain and frequency  Musculoskeletal: Negative for back pain  Skin: Negative for color change  Allergic/Immunologic: Negative for environmental allergies and food allergies     Neurological: Negative for facial asymmetry, light-headedness and headaches  Psychiatric/Behavioral: Negative for agitation and behavioral problems  Active Problem List     Patient Active Problem List   Diagnosis    Anxiety    Asthenia    Chronic lumbar radiculopathy    Claustrophobia    Constipation    Diabetes mellitus, type II ([facility] )    Diabetic neuropathy ([facility] )    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 ([facility] )    Hepatitis C, chronic (HCC)    Hepatocellular carcinoma (HCC)    Herniated lumbar disc without myelopathy    Hypertension    Insomnia    Left foot pain    Left-sided chest wall pain    Myofascial pain    Nicotine dependence    Opioid dependence ([facility] )    Osteoporosis    Other cirrhosis of liver (HCC)    Pain syndrome, chronic    Sacroiliitis (HCC)    Seasonal allergies    Trichomonal vulvovaginitis    Trochanteric bursitis    Elevated serum creatinine    Lumbar disc herniation    Health care maintenance       Objective     /76   Pulse 68   Temp 98 1 °F (36 7 °C)   Ht 5' 3" (1 6 m)   Wt 83 kg (182 lb 15 7 oz)   BMI 32 41 kg/m²     Physical Exam   Constitutional: She is oriented to person, place, and time  She appears well-developed and well-nourished  No distress  HENT:   Head: Normocephalic and atraumatic  Mouth/Throat: Oropharynx is clear and moist  No oropharyngeal exudate  Eyes: Conjunctivae and EOM are normal  Pupils are equal, round, and reactive to light  Right eye exhibits no discharge  Left eye exhibits no discharge  Neck: Normal range of motion  Neck supple  No JVD present  No tracheal deviation present  No thyromegaly present  Cardiovascular: Normal rate, regular rhythm and normal heart sounds  Exam reveals no friction rub  No murmur heard  Pulmonary/Chest: Effort normal and breath sounds normal  No stridor  No respiratory distress  She exhibits no tenderness  Abdominal: Soft  Bowel sounds are normal  She exhibits no distension  There is no tenderness  Musculoskeletal: Normal range of motion  She exhibits no edema, tenderness or deformity  Neurological: She is alert and oriented to person, place, and time  She has normal reflexes  No cranial nerve deficit  Coordination normal    Skin: Skin is warm and dry  She is not diaphoretic  No erythema  Psychiatric: She has a normal mood and affect  Her behavior is normal    Nursing note and vitals reviewed        Pertinent Laboratory/Diagnostic Studies:  CBC:   Lab Results   Component Value Date/Time    WBC 5 20 02/13/2018 02:18 PM    WBC 10 85 (H) 10/26/2015 05:21 AM    RBC 4 00 02/13/2018 02:18 PM    RBC 3 73 (L) 10/26/2015 05:21 AM    HGB 11 4 (L) 02/13/2018 02:18 PM    HGB 10 5 (L) 10/26/2015 05:21 AM    HCT 34 4 (L) 02/13/2018 02:18 PM    HCT 31 4 (L) 10/26/2015 05:21 AM    MCV 86 02/13/2018 02:18 PM    MCV 84 10/26/2015 05:21 AM    MCH 28 5 02/13/2018 02:18 PM    MCH 28 2 10/26/2015 05:21 AM    MCHC 33 1 02/13/2018 02:18 PM    MCHC 33 4 10/26/2015 05:21 AM    RDW 14 8 02/13/2018 02:18 PM    RDW 13 5 10/26/2015 05:21 AM    MPV 10 6 02/13/2018 02:18 PM    MPV 10 5 10/26/2015 05:21 AM     02/13/2018 02:18 PM     10/26/2015 05:21 AM    NRBC 0 02/13/2018 02:18 PM    NEUTOPHILPCT 76 (H) 02/13/2018 02:18 PM    NEUTOPHILPCT 66 10/26/2015 05:21 AM    LYMPHOPCT 15 02/13/2018 02:18 PM    LYMPHOPCT 23 10/26/2015 05:21 AM    MONOPCT 7 02/13/2018 02:18 PM    MONOPCT 9 10/26/2015 05:21 AM    EOSPCT 2 02/13/2018 02:18 PM    EOSPCT 2 10/26/2015 05:21 AM    BASOPCT 0 02/13/2018 02:18 PM    BASOPCT 1 03/11/2015 11:34 AM    NEUTROABS 3 91 02/13/2018 02:18 PM    NEUTROABS 7 16 10/26/2015 05:21 AM    LYMPHSABS 0 78 02/13/2018 02:18 PM    LYMPHSABS 2 50 10/26/2015 05:21 AM    MONOSABS 0 38 02/13/2018 02:18 PM    MONOSABS 0 98 10/26/2015 05:21 AM    EOSABS 0 10 02/13/2018 02:18 PM    EOSABS 0 22 10/26/2015 05:21 AM     Chemistry Profile:   Lab Results   Component Value Date/Time     (L) 02/13/2018 02:18 PM     (L) 10/25/2015 05:39 AM    K 4 1 02/13/2018 02:18 PM    K 3 9 10/25/2015 05:39 AM    CL 99 (L) 02/13/2018 02:18 PM     10/25/2015 05:39 AM    CO2 27 02/13/2018 02:18 PM    CO2 23 10/25/2015 05:39 AM    ANIONGAP 7 02/13/2018 02:18 PM    ANIONGAP 9 10/25/2015 05:39 AM    BUN 36 (H) 02/13/2018 02:18 PM    BUN 4 (L) 10/25/2015 05:39 AM    CREATININE 1 82 (H) 02/13/2018 02:18 PM    CREATININE 0 54 (L) 10/25/2015 05:39 AM    GLUCOSE 101 12/01/2017 03:08 PM    GLUCOSE 96 10/25/2015 05:39 AM    CALCIUM 8 8 02/13/2018 02:18 PM    CALCIUM 8 8 10/25/2015 05:39 AM    MG 1 7 10/25/2015 05:39 AM    PHOS 2 8 10/23/2015 05:38 AM    AST 26 02/13/2018 02:18 PM    AST 13 10/08/2015 01:06 PM    ALT 26 02/13/2018 02:18 PM    ALT 21 10/08/2015 01:06 PM    ALKPHOS 101 02/13/2018 02:18 PM    ALKPHOS 101 10/08/2015 01:06 PM    PROT 8 6 (H) 02/13/2018 02:18 PM    PROT 8 5 (H) 10/08/2015 01:06 PM    BILITOT 0 34 02/13/2018 02:18 PM    BILITOT 0 33 10/08/2015 01:06 PM    EGFR 33 02/13/2018 02:18 PM     Endocrine Studies:     Results from last 6 Months  Lab Units 03/14/18  1555   SL AMB HEMOGLOBIN AIC  5 3%       Current Medications     Current Outpatient Prescriptions:     diphenhydrAMINE (NIGHT TIME SLEEP AID) 25 MG tablet, Take 50 mg by mouth daily at bedtime as needed for sleep, Disp: , Rfl:     furosemide (LASIX) 20 mg tablet, Take 40 mg by mouth daily  , Disp: , Rfl:     gabapentin (NEURONTIN) 300 mg capsule, 3 tab PO night time, Disp: 90 capsule, Rfl: 3    hydrochlorothiazide (HYDRODIURIL) 25 mg tablet, Take 1 tablet (25 mg total) by mouth daily, Disp: 30 tablet, Rfl: 0    losartan (COZAAR) 100 MG tablet, Take 1 tablet (100 mg total) by mouth daily, Disp: 90 tablet, Rfl: 1    metFORMIN (GLUCOPHAGE) 500 mg tablet, Take 1 tablet (500 mg total) by mouth daily, Disp: 30 tablet, Rfl: 3    oxyCODONE-acetaminophen (PERCOCET)  mg per tablet, Take 1 tablet by mouth every 6 (six) hours as needed for moderate pain Max Daily Amount: 4 tablets, Disp: 120 tablet, Rfl: 0    senna (SENOKOT) 8 6 MG tablet, Take 2 tablets by mouth daily, Disp: , Rfl:     spironolactone (ALDACTONE) 100 mg tablet, Take 1 tablet (100 mg total) by mouth daily, Disp: 90 tablet, Rfl: 1    diclofenac (VOLTAREN) 75 mg EC tablet, TAKE 1 TABLET(75 MG) BY MOUTH TWICE DAILY, Disp: 180 tablet, Rfl: 1    hydrOXYzine HCL (ATARAX) 10 mg tablet, Take 2 5 tablets (25 mg total) by mouth once for 1 dose, Disp: 1 tablet, Rfl: 0    methocarbamol (ROBAXIN) 500 mg tablet, Take 1 tablet (500 mg total) by mouth 2 (two) times a day as needed for muscle spasms (Patient taking differently: Take 500 mg by mouth daily  ), Disp: 60 tablet, Rfl: 1    omeprazole (PriLOSEC) 20 mg delayed release capsule, Take 20 mg by mouth daily, Disp: , Rfl:     Health Maintenance     Health Maintenance   Topic Date Due    COLONOSCOPY  1950    Diabetic Foot Exam  10/20/1960    OPHTHALMOLOGY EXAM  10/20/1960    Depression Screening PHQ-9  10/20/1962    DTaP,Tdap,and Td Vaccines (1 - Tdap) 10/20/1971    URINE MICROALBUMIN  07/23/2015    Fall Risk  10/20/2015    Urinary Incontinence Screening  10/20/2015    PNEUMOCOCCAL POLYSACCHARIDE VACCINE AGE 65 AND OVER  10/20/2015    INFLUENZA VACCINE  09/01/2017    GLAUCOMA SCREENING 67+ YR  10/20/2017    Hepatitis C Screening  Excluded     Immunization History   Administered Date(s) Administered    Pneumococcal Conjugate PCV 7 06/04/2014, 06/04/2014    Tuberculin Skin Test-PPD Intradermal 05/07/2014       Hilario Duverney DO  3/14/2018 4:16 PM

## 2018-03-14 NOTE — ASSESSMENT & PLAN NOTE
Diagnosed in 2015  S/p SIR spheres December/2016 Right hepatic lobe  Refused transplant 2014  F/u with Dr Concepcion Espinoza (Oncology) MArch 29/2018

## 2018-03-14 NOTE — ASSESSMENT & PLAN NOTE
A1C 5 8 --> 5 3  decrease Metformin to  500 mg daily  Continue same medication    Assessment:    Diabetes Mellitus type II, under adequate control  Plan:    1  Rx changes: decrease Metformin to  500 mg daily  2  Education: Reviewed ABCs of diabetes management (respective goals in parentheses):  A1C (<7), blood pressure (<130/80), and cholesterol (LDL <100)  3   Compliance at present is estimated to be good  Efforts to improve compliance (if necessary) will be directed at dietary modifications and increased exercise    4  Follow up: 4 months

## 2018-03-15 ENCOUNTER — TELEPHONE (OUTPATIENT)
Dept: LAB | Facility: CLINIC | Age: 68
End: 2018-03-15

## 2018-03-15 NOTE — TELEPHONE ENCOUNTER
25 mg   1/2 tablet to be taken when going to [place] by plane   The other hlaf to be taken on her way back

## 2018-03-16 DIAGNOSIS — F41.9 ANXIETY: ICD-10-CM

## 2018-03-16 RX ORDER — HYDROXYZINE HYDROCHLORIDE 10 MG/1
10 TABLET, FILM COATED ORAL ONCE
Qty: 2 TABLET | Refills: 0 | Status: SHIPPED | OUTPATIENT
Start: 2018-03-16 | End: 2018-03-29 | Stop reason: SDUPTHER

## 2018-03-16 NOTE — TELEPHONE ENCOUNTER
Pharmacist wants to know if she is taking 25 mg flying there and 25 mg flying back ?  Med is ordered as 2 5 mg

## 2018-03-17 NOTE — TELEPHONE ENCOUNTER
Patient is flying by plane to [place], and requested something for anxiety  Originally I order 25 mg to be split abdirashid in half  Since it causing too much trouble, I ll go ahead to order  Hydroxyzine 10 mg tablets # 2   To be taken 1 tab when going there and on tab when flying back

## 2018-03-19 NOTE — TELEPHONE ENCOUNTER
I spoke with patient and updated her on the change that was made, I also spoke with pharmacist to make them aware

## 2018-03-29 ENCOUNTER — OFFICE VISIT (OUTPATIENT)
Dept: HEMATOLOGY ONCOLOGY | Facility: CLINIC | Age: 68
End: 2018-03-29
Payer: COMMERCIAL

## 2018-03-29 VITALS
DIASTOLIC BLOOD PRESSURE: 68 MMHG | WEIGHT: 183 LBS | SYSTOLIC BLOOD PRESSURE: 122 MMHG | HEIGHT: 63 IN | TEMPERATURE: 97.2 F | HEART RATE: 77 BPM | BODY MASS INDEX: 32.43 KG/M2 | RESPIRATION RATE: 16 BRPM

## 2018-03-29 DIAGNOSIS — F41.9 ANXIETY: ICD-10-CM

## 2018-03-29 DIAGNOSIS — I10 ESSENTIAL HYPERTENSION: Primary | ICD-10-CM

## 2018-03-29 DIAGNOSIS — C22.0 HEPATOCELLULAR CARCINOMA (HCC): Primary | ICD-10-CM

## 2018-03-29 PROCEDURE — 99205 OFFICE O/P NEW HI 60 MIN: CPT | Performed by: INTERNAL MEDICINE

## 2018-03-29 RX ORDER — HYDROCHLOROTHIAZIDE 25 MG/1
25 TABLET ORAL DAILY
Qty: 30 TABLET | Refills: 6 | Status: ON HOLD | OUTPATIENT
Start: 2018-03-29 | End: 2018-09-19

## 2018-03-29 RX ORDER — HYDROXYZINE HYDROCHLORIDE 10 MG/1
10 TABLET, FILM COATED ORAL
Qty: 15 TABLET | Refills: 0 | Status: SHIPPED | OUTPATIENT
Start: 2018-03-29 | End: 2019-03-01

## 2018-03-29 NOTE — PROGRESS NOTES
Oncology Outpatient Consult Note  Lucio Vegas 79 y o  female MRN: @ Encounter: [id number]        Date:  3/29/2018        CC:  [address]  diagnosed in 2015      HPI:  Lucio Vegas is seen for initial consultation 3/29/2018 regarding History of [address]  now with recurrence  Patient is a 79year old, Creasie Dre cirrhoitc female with a history of Hepatitis C Who had [address]  diagnosed in 2015  She was treated with an ablation and a wedge resection by Dr Rey Springer on 10/22/15  She was noted to have what appeared to be a tumor thrombus on MRI with a rising AFP level Recently  She was presented at [facility]- options included SIRS, SBRT, chemo  The patient was reluctant to pursue further treatment but then did consent to SIRS spheres  This was performed on 12/21/17  She had an MRI performed on 1/29 which revealed a minimally decreased size of the treated lesion in the right lobe with 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  Her most recent AFP is 20 2 on 2/13/18, decreased from 23 2 1/27/18  She has no new complaints today  She denies headaches, back pain, bone pain, cough, SOB, abdominal pain, nausea, vomiting, diarrhea, jaundice  Rest of her 14 point review of systems today was negative  Cancer Staging:  Hepatocellular carcinoma ([address] )    Staging form: Liver (Excluding Intrahepatic Bile Ducts), AJCC 7th Edition    - Pathologic stage from 10/22/2015: No stage assigned - Unsigned      Previous Hematologic/ Oncologic History:       Hepatocellular carcinoma ([address] )    10/22/2015 Initial Diagnosis     Hepatocellular carcinoma ([address] )         10/22/2015 Surgery     Exploratory laparotomy, IntraOp u/s of liver, biopsy of segment 8 lesion, Microwave ablation of segment 8 and segment 5 lesions, Wedge resection of segment 5 liver         12/21/2017 - 12/21/2017 Radiation     SIRS to right hepatic lobe                Test Results:    Imaging: No results found      Labs:   Lab Results   Component Value Date    WBC 5 20 02/13/2018 HGB 11 4 (L) 02/13/2018    HCT 34 4 (L) 02/13/2018    MCV 86 02/13/2018     02/13/2018     Lab Results   Component Value Date     (L) 02/13/2018    K 4 1 02/13/2018    CL 99 (L) 02/13/2018    CO2 27 02/13/2018    ANIONGAP 7 02/13/2018    BUN 36 (H) 02/13/2018    CREATININE 1 82 (H) 02/13/2018    GLUCOSE 101 12/01/2017    GLUF 150 (H) 02/13/2018    CALCIUM 8 8 02/13/2018    AST 26 02/13/2018    ALT 26 02/13/2018    ALKPHOS 101 02/13/2018    PROT 8 6 (H) 02/13/2018    BILITOT 0 34 02/13/2018    EGFR 33 02/13/2018             ROS: As stated in history of present illness otherwise her 14 point review of systems today was negative          Active Problems:   Patient Active Problem List   Diagnosis    Anxiety    Asthenia    Chronic lumbar radiculopathy    Claustrophobia    Constipation    Diabetes mellitus, type II ([address] )    Diabetic neuropathy ([address] )    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 ([address] )    Hepatitis C, chronic (HCC)    Hepatocellular carcinoma (HCC)    Herniated lumbar disc without myelopathy    Hypertension    Insomnia    Left foot pain    Left-sided chest wall pain    Myofascial pain    Nicotine dependence    Opioid dependence ([address] )    Osteoporosis    Other cirrhosis of liver (HCC)    Pain syndrome, chronic    Sacroiliitis (HCC)    Seasonal allergies    Trichomonal vulvovaginitis    Trochanteric bursitis    Elevated serum creatinine    Lumbar disc herniation    Health care maintenance       Past Medical History:   Past Medical History:   Diagnosis Date    Drug dependence ([address] )     Hypertension        Surgical History:   Past Surgical History:   Procedure Laterality Date    DENTAL SURGERY      HYSTERECTOMY      LIVER BIOPSY      LIVER SURGERY      Ablation    TONSILLECTOMY         Family History:    Family History   Problem Relation Age of Onset    Prostate cancer Father        Cancer-related family history includes Prostate cancer in her father  Social History:   Social History     Social History    Marital status: Single     Spouse name: N/A    Number of children: N/A    Years of education: N/A     Occupational History    Not on file       Social History Main Topics    Smoking status: Current Every Day Smoker     Packs/day: 1 00    Smokeless tobacco: Never Used    Alcohol use No    Drug use: No    Sexual activity: Yes     Partners: Male     Other Topics Concern    Not on file     Social History Narrative    No narrative on file       Current Medications:   Current Outpatient Prescriptions   Medication Sig Dispense Refill    diclofenac (VOLTAREN) 75 mg EC tablet TAKE 1 TABLET(75 MG) BY MOUTH TWICE DAILY 180 tablet 1    diphenhydrAMINE (NIGHT TIME SLEEP AID) 25 MG tablet Take 50 mg by mouth daily at bedtime as needed for sleep      furosemide (LASIX) 20 mg tablet Take 40 mg by mouth daily        gabapentin (NEURONTIN) 300 mg capsule 3 tab PO night time 90 capsule 3    hydrochlorothiazide (HYDRODIURIL) 25 mg tablet Take 1 tablet (25 mg total) by mouth daily 30 tablet 0    losartan (COZAAR) 100 MG tablet Take 1 tablet (100 mg total) by mouth daily 90 tablet 1    metFORMIN (GLUCOPHAGE) 500 mg tablet Take 1 tablet (500 mg total) by mouth daily with breakfast 90 tablet 0    methocarbamol (ROBAXIN) 500 mg tablet Take 1 tablet (500 mg total) by mouth 2 (two) times a day as needed for muscle spasms (Patient taking differently: Take 500 mg by mouth daily  ) 60 tablet 1    omeprazole (PriLOSEC) 20 mg delayed release capsule Take 20 mg by mouth daily      oxyCODONE-acetaminophen (PERCOCET)  mg per tablet Take 1 tablet by mouth every 6 (six) hours as needed for moderate pain Max Daily Amount: 4 tablets 120 tablet 0    senna (SENOKOT) 8 6 MG tablet Take 2 tablets by mouth daily      spironolactone (ALDACTONE) 100 mg tablet Take 1 tablet (100 mg total) by mouth daily 90 tablet 1    hydrOXYzine HCL (ATARAX) 10 mg tablet Take 1 tablet (10 mg total) by mouth once for 1 dose 2 tablet 0     No current facility-administered medications for this visit  Allergies: No Known Allergies      Physical Exam:    Body surface area is 1 86 meters squared  Wt Readings from Last 3 Encounters:   03/29/18 83 kg (183 lb)   03/14/18 83 kg (182 lb 15 7 oz)   03/05/18 85 3 kg (188 lb)        Temp Readings from Last 3 Encounters:   03/29/18 (!) 97 2 °F (36 2 °C) (Tympanic)   03/14/18 98 1 °F (36 7 °C)   03/05/18 98 °F (36 7 °C)        BP Readings from Last 3 Encounters:   03/29/18 122/68   03/14/18 112/76   03/05/18 130/60         Pulse Readings from Last 3 Encounters:   03/29/18 77   03/14/18 68   03/05/18 77      Physical Exam     Constitutional   General appearance: No acute distress, well appearing and well nourished  Eyes   Conjunctiva and lids: No swelling, erythema or discharge  Pupils and irises: Equal, round and reactive to light  Ears, Nose, Mouth, and Throat   External inspection of ears and nose: Normal     Nasal mucosa, septum, and turbinates: Normal without edema or erythema  Oropharynx: Normal with no erythema, edema, exudate or lesions  Pulmonary   Respiratory effort: No increased work of breathing or signs of respiratory distress  Auscultation of lungs: Clear to auscultation  Cardiovascular   Palpation of heart: Normal PMI, no thrills  Auscultation of heart: Normal rate and rhythm, normal S1 and S2, without murmurs  Examination of extremities for edema and/or varicosities: Normal     Carotid pulses: Normal     Abdomen   Abdomen: Non-tender, no masses  Liver and spleen: No hepatomegaly or splenomegaly  Lymphatic   Palpation of lymph nodes in neck: No lymphadenopathy  Musculoskeletal   Gait and station: Normal     Digits and nails: Normal without clubbing or cyanosis  Inspection/palpation of joints, bones, and muscles: Normal     Skin   Skin and subcutaneous tissue: Normal without rashes or lesions  Neurologic   Cranial nerves: Cranial nerves 2-12 intact  Sensation: No sensory loss  Psychiatric   Orientation to person, place, and time: Normal     Mood and affect: Normal           Assessment/ Plan: This is a pleasant 60-year-old female with a past medical history of a cc since 2015  She had an ablation done at that time and did well for 2 years but then had a rise in her alpha-fetoprotein And new enhancing tumor thrombus around the site of the previous epilation  She ended up getting Y 90 and her alpha-fetoprotein has started coming down and her most recent MRI showed shrinkage in the lesion  There were no new lesions  She has no other metastatic disease  Options at this point include starting Nexavar or adopting a watch and wait approach and if she has progression starting Nexavar at the time of progression  We went over the pros and cons of both approaches  The patient wishes to stay on observation and if she has progression she will consider treatment  I think this is reasonable  I'll see her back in 6 weeks  She will had repeat imaging and blood work before then  Again first-line treatment options include sorafinib vs lenvatinib  Goals and Barriers:  Current Goal:   Prolong Survival from Liver Cancer  Barriers: None  Patient's Capacity to Self Care:  Patient able to self care  Portions of the record may have been created with voice recognition software   Occasional wrong word or "sound a like" substitutions may have occurred due to the inherent limitations of voice recognition software   Read the chart carefully and recognize, using context, where substitutions have occurred

## 2018-04-09 ENCOUNTER — TELEPHONE (OUTPATIENT)
Dept: INTERNAL MEDICINE CLINIC | Facility: CLINIC | Age: 68
End: 2018-04-09

## 2018-04-09 ENCOUNTER — OFFICE VISIT (OUTPATIENT)
Dept: PAIN MEDICINE | Facility: CLINIC | Age: 68
End: 2018-04-09
Payer: COMMERCIAL

## 2018-04-09 VITALS
DIASTOLIC BLOOD PRESSURE: 66 MMHG | WEIGHT: 181 LBS | TEMPERATURE: 98.6 F | HEART RATE: 82 BPM | BODY MASS INDEX: 32.07 KG/M2 | HEIGHT: 63 IN | SYSTOLIC BLOOD PRESSURE: 102 MMHG

## 2018-04-09 DIAGNOSIS — G89.4 CHRONIC PAIN SYNDROME: Primary | ICD-10-CM

## 2018-04-09 DIAGNOSIS — M79.18 MYOFASCIAL PAIN SYNDROME: ICD-10-CM

## 2018-04-09 DIAGNOSIS — M54.16 LUMBAR RADICULOPATHY: ICD-10-CM

## 2018-04-09 DIAGNOSIS — M51.26 LUMBAR DISC HERNIATION: ICD-10-CM

## 2018-04-09 DIAGNOSIS — F41.9 ANXIETY: Primary | ICD-10-CM

## 2018-04-09 PROCEDURE — 99214 OFFICE O/P EST MOD 30 MIN: CPT | Performed by: NURSE PRACTITIONER

## 2018-04-09 RX ORDER — OXYCODONE AND ACETAMINOPHEN 10; 325 MG/1; MG/1
1 TABLET ORAL EVERY 6 HOURS PRN
Qty: 120 TABLET | Refills: 0 | Status: SHIPPED | OUTPATIENT
Start: 2018-04-09 | End: 2018-04-09 | Stop reason: SDUPTHER

## 2018-04-09 RX ORDER — OXYCODONE AND ACETAMINOPHEN 10; 325 MG/1; MG/1
1 TABLET ORAL EVERY 6 HOURS PRN
Qty: 120 TABLET | Refills: 0 | Status: SHIPPED | OUTPATIENT
Start: 2018-04-09 | End: 2018-06-04 | Stop reason: SDUPTHER

## 2018-04-09 NOTE — PROGRESS NOTES
Assessment:  1  Chronic pain syndrome    2  Lumbar disc herniation    3  Lumbar radiculopathy    4  Myofascial pain syndrome        Plan:  The patient is a 79 y o  female last seen on 2/12/18 who presents for a follow up office visit in regards to chronic pain secondary to lumbar disc herniation, lumbar degenerative disc disease, lumbar radiculopathy, and myofascial pain  The patient continues with ongoing low back pain and right leg pain occurring from a disc herniation  She feels her current pain medication regimen of Percocet 10/325 mg and gabapentin 300 mg is providing 70% pain relief without side effects  Therefore, she will be continued on this medication as prescribed  She does not need a refill for gabapentin today  Two prescriptions for Percocet 10/325 mg was sent to her pharmacy, with 1 stating do not fill until April 13, 2018, and the other stating do not fill until May 11, 2018  The patient brought in her prescription pill bottle for Percocet 10/325 mg filled on 3/15/18   a pill count was performed  The patient had 17 tablets remaining, which is appropriate    [place] Prescription Drug Monitoring Program report was reviewed and was appropriate     There are risks associated with opioid medications, including dependence, addiction and tolerance  The patient understands and agrees to use these medications only as prescribed  Potential side effects of the medications include, but are not limited to, constipation, drowsiness, addiction, impaired judgment and risk of fatal overdose if not taken as prescribed  The patient was warned against driving while taking sedation medications  Sharing medications is a felony  At this point in time, the patient is showing no signs of addiction, abuse, diversion or suicidal ideation  The patient will follow-up in 8 weeks for medication prescription refill and reevaluation   The patient was advised to contact the office should their symptoms worsen in the interim  The patient was agreeable and verbalized an understanding  History of Present Illness: The patient is a 79 y o  female last seen on 2/12/18 who presents for a follow up office visit in regards to chronic pain secondary to lumbar disc herniation, lumbar degenerative disc disease, lumbar radiculopathy, and myofascial pain  The patient currently reports ongoing low back pain, which radiates across the back and down the right leg  She feels the right leg pain has improved since last office visit  She is no longer walking with a cane  Her pain continues to be constant occurring mostly in the morning  She describes as dull aching and pressure-like  She is currently rating her pain a 7/10 on the numeric rating scale    Current pain medications includes:  Percocet 10/325 mg 4 times a day and gabapentin 300 mg 3 tablets at bedtime  The patient reports that this regimen is providing 70% pain relief  The patient is reporting no side effects from this pain medication regimen  Pain Contract Signed: 2/12/18  Last Urine Drug Screen: 2/12/18    I have personally reviewed and/or updated the patient's past medical history, past surgical history, family history, social history, current medications, allergies, and vital signs today  Review of Systems:    Review of Systems   Respiratory: Negative for shortness of breath  Cardiovascular: Negative for chest pain  Gastrointestinal: Negative for constipation, diarrhea, nausea and vomiting  Musculoskeletal: Positive for gait problem  Negative for arthralgias, joint swelling and myalgias  Skin: Negative for rash  Neurological: Positive for weakness  Negative for dizziness and seizures  All other systems reviewed and are negative          Past Medical History:   Diagnosis Date    Drug dependence [facility] [address] )     Hypertension        Past Surgical History:   Procedure Laterality Date    DENTAL SURGERY      HYSTERECTOMY      LIVER BIOPSY      LIVER SURGERY      Ablation    TONSILLECTOMY         Family History   Problem Relation Age of Onset    Prostate cancer Father        Social History     Occupational History    Not on file       Social History Main Topics    Smoking status: Current Every Day Smoker     Packs/day: 1 00    Smokeless tobacco: Never Used    Alcohol use No    Drug use: No    Sexual activity: Yes     Partners: Male         Current Outpatient Prescriptions:     diphenhydrAMINE (NIGHT TIME SLEEP AID) 25 MG tablet, Take 50 mg by mouth daily at bedtime as needed for sleep, Disp: , Rfl:     furosemide (LASIX) 20 mg tablet, Take 40 mg by mouth daily  , Disp: , Rfl:     gabapentin (NEURONTIN) 300 mg capsule, 3 tab PO night time, Disp: 90 capsule, Rfl: 3    hydrochlorothiazide (HYDRODIURIL) 25 mg tablet, Take 1 tablet (25 mg total) by mouth daily, Disp: 30 tablet, Rfl: 6    hydrOXYzine HCL (ATARAX) 10 mg tablet, Take 1 tablet (10 mg total) by mouth daily at bedtime, Disp: 15 tablet, Rfl: 0    losartan (COZAAR) 100 MG tablet, Take 1 tablet (100 mg total) by mouth daily, Disp: 90 tablet, Rfl: 1    metFORMIN (GLUCOPHAGE) 500 mg tablet, Take 1 tablet (500 mg total) by mouth daily with breakfast, Disp: 90 tablet, Rfl: 0    omeprazole (PriLOSEC) 20 mg delayed release capsule, Take 20 mg by mouth daily, Disp: , Rfl:     oxyCODONE-acetaminophen (PERCOCET)  mg per tablet, Take 1 tablet by mouth every 6 (six) hours as needed for moderate pain Max Daily Amount: 4 tablets, Disp: 120 tablet, Rfl: 0    senna (SENOKOT) 8 6 MG tablet, Take 2 tablets by mouth daily, Disp: , Rfl:     spironolactone (ALDACTONE) 100 mg tablet, Take 1 tablet (100 mg total) by mouth daily, Disp: 90 tablet, Rfl: 1    No Known Allergies    Physical Exam:    /66   Pulse 82   Temp 98 6 °F (37 °C)   Ht 5' 3" (1 6 m)   Wt 82 1 kg (181 lb)   BMI 32 06 kg/m²     Constitutional:normal, well developed, well nourished, alert, in no distress and non-toxic and no overt pain behavior  Eyes:anicteric  HEENT:grossly intact  Neck:supple, symmetric, trachea midline and no masses   Pulmonary:even and unlabored  Cardiovascular:No edema or pitting edema present  Skin:Normal without rashes or lesions and well hydrated  Psychiatric:Mood and affect appropriate  Neurologic:Cranial Nerves II-XII grossly intact  Musculoskeletal:normal      Imaging    MRI LUMBAR SPINE dated 5/14/14  INDICATION- Back pain  COMPARISON- MRI 11/19/2013VIEWS- 4 including flexion extension  FINDINGS- No instability is seen on flexion or extension  The lumbar vertebrae demonstrate normal height  There is no fracture or pathologic bone lesions  Moderate L5-S1 degenerative disc disease is present  Moderate facetdegenerative changes also present at this level  The pedicles are intact  No pars defects  No spondylolisthesis      IMPRESSION-Moderate L5-S1 degenerative changes       MRI:  mri -lumbar spine on 11/19/13L1-L2- Minor bilateral facet arthrosisL2-L3- Circumferential bulge, slight reduction disc height, marginalosteophytes, facet arthrosis, no critical stenosis  L3-L4- Minor bilateral facet arthrosis, slight bulgeL4-L5- Moderate facet arthrosis, minor anterolisthesis  L5-S1- Central protrusion has increased in volume since prior study  Disc extends above the disc space in a right paramedian position  Nodefinite root compression  There's also extension of disc into bothforamen, asymmetric to the left, no definite root compression  Moderate bilateral facet arthrosis  Multiple sacral Tarlov cysts  IMPRESSION-1  Progression of degenerative changes particularly at the L5-S1 level  No definite nerve root compression  Marrow signal is diffusely heterogeneous and low on T1 sequences, isthere evidence for lymphoproliferative disease?          No orders of the defined types were placed in this encounter

## 2018-04-11 RX ORDER — LORAZEPAM 0.5 MG/1
0.5 TABLET ORAL 2 TIMES DAILY
Qty: 4 TABLET | Refills: 0 | Status: SHIPPED | OUTPATIENT
Start: 2018-04-11 | End: 2018-05-03

## 2018-04-11 NOTE — TELEPHONE ENCOUNTER
Written for 4 pills of 0 5mg ativan as requested  Patient may come to the clinic to  prescription as this cannot be e-prescribed

## 2018-04-11 NOTE — TELEPHONE ENCOUNTER
I SPOKE WITH THE PATIENT AND I EXPLAIN TO HER THAT SHE NEEDS TO COME TO THE CLINIC TO  SCRIPT  PATIENT SAID SHE IS COMING TOMORROW AFTER WORK  SCRIPT WOULD BE ON PIERO'S DESK BECAUSE IS A CONTROLED MEDICATION

## 2018-04-11 NOTE — TELEPHONE ENCOUNTER
Good morning Dr Treasure Woodall, this patient was seen by Dr Stan Lyle and prescribed atarax for anxiety for her upcoming flight  SHe reported having taking a "test dose" at home with no improvement in symptoms and would like an ativan PRN dose for her flight as below  Would you be able to assist in this, she would just need 4 pills    Thank you

## 2018-04-14 ENCOUNTER — APPOINTMENT (OUTPATIENT)
Dept: LAB | Facility: HOSPITAL | Age: 68
End: 2018-04-14
Attending: SURGERY
Payer: COMMERCIAL

## 2018-04-14 DIAGNOSIS — C22.0 HEPATOCELLULAR CARCINOMA (HCC): ICD-10-CM

## 2018-04-14 DIAGNOSIS — R79.89 ELEVATED SERUM CREATININE: ICD-10-CM

## 2018-04-14 DIAGNOSIS — C22.0 LIVER CELL CARCINOMA (HCC): ICD-10-CM

## 2018-04-14 LAB
AFP-TM SERPL-MCNC: 20.7 NG/ML (ref 0.5–8)
ALBUMIN SERPL BCP-MCNC: 4 G/DL (ref 3.5–5)
ALP SERPL-CCNC: 68 U/L (ref 46–116)
ALT SERPL W P-5'-P-CCNC: 16 U/L (ref 12–78)
ANION GAP SERPL CALCULATED.3IONS-SCNC: 6 MMOL/L (ref 4–13)
AST SERPL W P-5'-P-CCNC: 19 U/L (ref 5–45)
BASOPHILS # BLD AUTO: 0.02 THOUSANDS/ΜL (ref 0–0.1)
BASOPHILS NFR BLD AUTO: 0 % (ref 0–1)
BILIRUB SERPL-MCNC: 0.5 MG/DL (ref 0.2–1)
BUN SERPL-MCNC: 24 MG/DL (ref 5–25)
CALCIUM SERPL-MCNC: 9.3 MG/DL
CHLORIDE SERPL-SCNC: 104 MMOL/L (ref 100–108)
CO2 SERPL-SCNC: 25 MMOL/L (ref 21–32)
CREAT SERPL-MCNC: 1.38 MG/DL (ref 0.6–1.3)
EOSINOPHIL # BLD AUTO: 0.15 THOUSAND/ΜL (ref 0–0.61)
EOSINOPHIL NFR BLD AUTO: 3 % (ref 0–6)
ERYTHROCYTE [DISTWIDTH] IN BLOOD BY AUTOMATED COUNT: 13.6 % (ref 11.6–15.1)
GFR SERPL CREATININE-BSD FRML MDRD: 46 ML/MIN/1.73SQ M
GLUCOSE P FAST SERPL-MCNC: 89 MG/DL (ref 65–99)
HCT VFR BLD AUTO: 36.2 % (ref 34.8–46.1)
HGB BLD-MCNC: 12 G/DL (ref 11.5–15.4)
LYMPHOCYTES # BLD AUTO: 0.78 THOUSANDS/ΜL (ref 0.6–4.47)
LYMPHOCYTES NFR BLD AUTO: 16 % (ref 14–44)
MCH RBC QN AUTO: 28.6 PG (ref 26.8–34.3)
MCHC RBC AUTO-ENTMCNC: 33.1 G/DL (ref 31.4–37.4)
MCV RBC AUTO: 86 FL (ref 82–98)
MONOCYTES # BLD AUTO: 0.49 THOUSAND/ΜL (ref 0.17–1.22)
MONOCYTES NFR BLD AUTO: 10 % (ref 4–12)
NEUTROPHILS # BLD AUTO: 3.53 THOUSANDS/ΜL (ref 1.85–7.62)
NEUTS SEG NFR BLD AUTO: 71 % (ref 43–75)
NRBC BLD AUTO-RTO: 0 /100 WBCS
PLATELET # BLD AUTO: 177 THOUSANDS/UL (ref 149–390)
PMV BLD AUTO: 10.9 FL (ref 8.9–12.7)
POTASSIUM SERPL-SCNC: 4.2 MMOL/L (ref 3.5–5.3)
PROT SERPL-MCNC: 8.4 G/DL (ref 6.4–8.2)
RBC # BLD AUTO: 4.2 MILLION/UL (ref 3.81–5.12)
SODIUM SERPL-SCNC: 135 MMOL/L (ref 136–145)
WBC # BLD AUTO: 4.97 THOUSAND/UL (ref 4.31–10.16)

## 2018-04-14 PROCEDURE — 80053 COMPREHEN METABOLIC PANEL: CPT

## 2018-04-14 PROCEDURE — 85025 COMPLETE CBC W/AUTO DIFF WBC: CPT

## 2018-04-14 PROCEDURE — 36415 COLL VENOUS BLD VENIPUNCTURE: CPT

## 2018-04-14 PROCEDURE — 82105 ALPHA-FETOPROTEIN SERUM: CPT

## 2018-04-20 DIAGNOSIS — C22.0 HEPATOCELLULAR CARCINOMA (HCC): Primary | ICD-10-CM

## 2018-04-20 RX ORDER — LORAZEPAM 1 MG/1
TABLET ORAL
Qty: 2 TABLET | Refills: 0 | Status: CANCELLED | OUTPATIENT
Start: 2018-04-20

## 2018-04-20 RX ORDER — LORAZEPAM 1 MG/1
TABLET ORAL
Qty: 2 TABLET | Refills: 0 | OUTPATIENT
Start: 2018-04-20 | End: 2018-08-23

## 2018-04-23 ENCOUNTER — HOSPITAL ENCOUNTER (OUTPATIENT)
Dept: MRI IMAGING | Facility: HOSPITAL | Age: 68
Discharge: HOME | End: 2018-04-23
Payer: COMMERCIAL

## 2018-04-23 DIAGNOSIS — C22.0 HEPATOCELLULAR CARCINOMA (HCC): ICD-10-CM

## 2018-04-23 PROCEDURE — 74183 MRI ABD W/O CNTR FLWD CNTR: CPT

## 2018-04-23 PROCEDURE — A9585 GADOBUTROL INJECTION: HCPCS | Performed by: NURSE PRACTITIONER

## 2018-04-23 RX ADMIN — GADOBUTROL 8 ML: 604.72 INJECTION INTRAVENOUS at 13:24

## 2018-04-27 ENCOUNTER — TELEPHONE (OUTPATIENT)
Dept: SURGICAL ONCOLOGY | Facility: CLINIC | Age: 68
End: 2018-04-27

## 2018-04-27 NOTE — TELEPHONE ENCOUNTER
Case and indications for A1C reviewed with Dr Lorraine King staff  As patient is leaving the area, obtaining Lovenox and providing instructions on correct administration, at this juncture is untenable  Pt is also not candidate for initiation of monotherapy with warfarin as she is likely to be hypercoagulable and testing of therapeutic INR can not be assessed  T/C NOAC (insurance coverage permitting) after pt has been seen/evaluated in medical clinic upon her return

## 2018-04-27 NOTE — TELEPHONE ENCOUNTER
Spoke with patient and reviewed recent MRI findings- increased size of tumor thrombus  Reviewed case with Dr Terri Brittle- the recommendation is that the patient be started on anticoagulation therapy  I will contact patient's PCP, Dr Francisco Hall, to request anticoagulation therapy be initiated  The patient is leaving tomorrow for a week long cruise  She is aware of of her f/u visit with Dr Terri Brittle on 5/8  All of her questions were answered

## 2018-04-27 NOTE — TELEPHONE ENCOUNTER
I SCHEDULED APPT  FOR 5/9/18 WITH DR Lexii Michael TO DISCUSS ANTI-COAG  PT  IS UPSET THAT SHE HAS TO SEE SO MANY DOCTORS NOW AND SHE RECENTLY LOST HER MEDICAID INSURANCE AND ONLY HAS Sara Zelaya NOW  I ADVISED HER THAT THIS IS IMPORTANT TO COME TO THIS APPT   SHE SAID OK

## 2018-05-01 ENCOUNTER — TELEPHONE (OUTPATIENT)
Dept: INTERNAL MEDICINE CLINIC | Facility: CLINIC | Age: 68
End: 2018-05-01

## 2018-05-01 NOTE — TELEPHONE ENCOUNTER
DR GARCIA SPOKE WITH BRYANNA RAMIREZ WITH SURGICAL ONCOLOGY ON 4/27/18 REGARDING THIS  WE DID SPEAK WITH PT  BEFORE SHE LEFT AND MADE HER AWARE THAT SHE NEEDS TO COME IN WHEN SHE RETURNS FROM HER TRIP  APPT   MADE FOR 5/9/18 family/patient

## 2018-05-01 NOTE — TELEPHONE ENCOUNTER
----- Message from Jennifer Quesada DO sent at 4/30/2018  4:19 PM EDT -----  Regarding: RE: Anticoagulation needed  Sorry I was off 4-27 thru today and did not see it until today  I will have the staff contact her when she gets back  Thank you  Dr Nika Mendoza  ----- Message -----  From: Corliss Kawasaki, CRNP  Sent: 4/27/2018   8:21 AM  To: Jennifer Quesada DO  Subject: Anticoagulation needed                           Hi Dr Joni Meadows recently and a repeat MRI performed as a f/u of her [facility] [address]  There has been an increase in the size of the tumor thrombus  I've reviewed the case with Dr Michael Gamble and he is recommending anticoagulation therapy be initiated  I spoke with Dr Kelvin Esqueda, her medical oncologist, and he directed me to you  I have relayed the information to Annelieseabena  She states she is leaving tomorrow for a cruise and will be gone for a week  Can you please contact her and start the anticoagulation therapy?  Thank you    BRYANNA Cummings Ra

## 2018-05-09 ENCOUNTER — OFFICE VISIT (OUTPATIENT)
Dept: INTERNAL MEDICINE CLINIC | Facility: CLINIC | Age: 68
End: 2018-05-09
Payer: COMMERCIAL

## 2018-05-09 VITALS
TEMPERATURE: 98.5 F | WEIGHT: 175.49 LBS | HEIGHT: 63 IN | DIASTOLIC BLOOD PRESSURE: 70 MMHG | BODY MASS INDEX: 31.09 KG/M2 | SYSTOLIC BLOOD PRESSURE: 98 MMHG | HEART RATE: 72 BPM

## 2018-05-09 DIAGNOSIS — C22.0 PORTAL VEIN THROMBOSIS SECONDARY TO HCC INVASION (HCC): ICD-10-CM

## 2018-05-09 DIAGNOSIS — C22.0 HEPATOCELLULAR CARCINOMA (HCC): Primary | ICD-10-CM

## 2018-05-09 DIAGNOSIS — I81 PORTAL VEIN THROMBOSIS SECONDARY TO HCC INVASION (HCC): ICD-10-CM

## 2018-05-09 PROCEDURE — 99213 OFFICE O/P EST LOW 20 MIN: CPT | Performed by: INTERNAL MEDICINE

## 2018-05-09 RX ORDER — METHOCARBAMOL 500 MG/1
500 TABLET, FILM COATED ORAL 4 TIMES DAILY
COMMUNITY
End: 2018-08-21 | Stop reason: SDUPTHER

## 2018-05-09 RX ORDER — DICLOFENAC SODIUM 75 MG/1
50 TABLET, DELAYED RELEASE ORAL 2 TIMES DAILY
COMMUNITY
End: 2018-11-19

## 2018-05-09 NOTE — PROGRESS NOTES
INTERNAL MEDICINE FOLLOW-UP OFFICE VISIT  [facility]  [address] [place] [address]    NAME: Laura Valente  AGE: 79 y o  SEX: female    DATE OF ENCOUNTER: 5/9/2018    Assessment and Plan     Problem List Items Addressed This Visit        Digestive    Hepatocellular carcinoma ([address] ) - Primary     Patient with [address] ([facility])  MRI 1/29 showed minimal decrease in size of treated lesion  R lobes with new tumor thrombus in R portal vein  Will start anticoaugulation  Risks and benefits discussed with patient         Relevant Medications    apixaban (ELIQUIS) 5 mg      Other Visit Diagnoses     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        Relevant Medications    apixaban (ELIQUIS) 5 mg          No orders of the defined types were placed in this encounter       - Counseling Documentation: patient was counseled regarding: diagnostic results, instructions for management, risk factor reductions, prognosis, patient and family education, impressions, risks and benefits of treatment options and importance of compliance with treatment  - Counseling Time: counseling time more than 50% of visit: 15 minutes  - Barriers to treatment: None  - Medication Side Effects: Adverse side effects of medications were reviewed with the patient/guardian today  - Self Referrals: No    Chief Complaint     Chief Complaint   Patient presents with    blood cloth     pt "I need blood thiners"    stomach problems       History of Present Illness     Ms Heron Bermudez is a 79year old female with a past medical history of [address]  ([facility] with history of Hep C diagnosed in 2015  She is s/p treatment with ablation and wedge resection with Dr Rodriguez [facility] surgical oncology 10/22/15  Noted to have tumor thrombus with rising AFP now s/p SIRS spheres therapy 12/21/17  MRI on 1/29 showed minimal decrease in size of treated lesions  R lobes with new tumor thrombus in R portal vein    Patient is here to discuss anticoagulation options at her surgical oncologist's request        The following portions of the patient's history were reviewed and updated as appropriate: allergies, current medications, past family history, past medical history, past social history, past surgical history and problem list     Review of Systems     Review of Systems   Constitutional: Negative for activity change, appetite change, chills, fatigue and fever  HENT: Negative  Eyes: Negative  Respiratory: Negative for shortness of breath and wheezing  Cardiovascular: Negative for chest pain, palpitations and leg swelling  Gastrointestinal: Positive for abdominal distention  Negative for abdominal pain, constipation, diarrhea, nausea and vomiting  Endocrine: Negative  Genitourinary: Negative  Musculoskeletal: Negative for arthralgias, back pain and myalgias  Skin: Negative  Allergic/Immunologic: Negative  Neurological: Negative for dizziness, tremors, weakness, light-headedness and headaches  Hematological: Negative  Psychiatric/Behavioral: Negative          Active Problem List     Patient Active Problem List   Diagnosis    Anxiety    Asthenia    Chronic lumbar radiculopathy    Claustrophobia    Constipation    Diabetes mellitus, [facility]    Diabetic neuropathy [facility]    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 ([facility] )    Hepatitis C, chronic (HCC)    Hepatocellular carcinoma (HCC)    Herniated lumbar disc without myelopathy    Hypertension    Insomnia    Left foot pain    Left-sided chest wall pain    Myofascial pain    Nicotine dependence    Opioid dependence ([facility] )    Osteoporosis    Other cirrhosis of liver (HCC)    Pain syndrome, chronic    Sacroiliitis (HCC)    Seasonal allergies    Trichomonal vulvovaginitis    Trochanteric bursitis    Elevated serum creatinine    Lumbar disc herniation    Health care maintenance       Objective     BP 98/70 (BP Location: Left arm, Patient Position: Sitting, Cuff Size: Adult)   Pulse 72   Temp 98 5 °F (36 9 °C) (Oral)   Ht 5' 2 5" (1 588 m)   Wt 79 6 kg (175 lb 7 8 oz)   BMI 31 59 kg/m²     Physical Exam   Constitutional: She is oriented to person, place, and time  She appears well-developed and well-nourished  HENT:   Head: Normocephalic and atraumatic  Eyes: Pupils are equal, round, and reactive to light  Neck: Normal range of motion  Cardiovascular: Normal rate, regular rhythm and normal heart sounds  Exam reveals no gallop and no friction rub  No murmur heard  Pulmonary/Chest: Effort normal  No respiratory distress  She has no wheezes  She has no rales  Abdominal: Soft  Bowel sounds are normal  She exhibits distension  There is no tenderness  Musculoskeletal: Normal range of motion  She exhibits no edema  Neurological: She is alert and oriented to person, place, and time  Skin: Skin is warm and dry  Vitals reviewed        Pertinent Laboratory/Diagnostic Studies:  CBC:   Lab Results   Component Value Date/Time    WBC 4 97 04/14/2018 08:39 AM    WBC 10 85 (H) 10/26/2015 05:21 AM    RBC 4 20 04/14/2018 08:39 AM    RBC 3 73 (L) 10/26/2015 05:21 AM    HGB 12 0 04/14/2018 08:39 AM    HGB 10 5 (L) 10/26/2015 05:21 AM    HCT 36 2 04/14/2018 08:39 AM    HCT 31 4 (L) 10/26/2015 05:21 AM    MCV 86 04/14/2018 08:39 AM    MCV 84 10/26/2015 05:21 AM    MCH 28 6 04/14/2018 08:39 AM    MCH 28 2 10/26/2015 05:21 AM    MCHC 33 1 04/14/2018 08:39 AM    MCHC 33 4 10/26/2015 05:21 AM    RDW 13 6 04/14/2018 08:39 AM    RDW 13 5 10/26/2015 05:21 AM    MPV 10 9 04/14/2018 08:39 AM    MPV 10 5 10/26/2015 05:21 AM     04/14/2018 08:39 AM     10/26/2015 05:21 AM    NRBC 0 04/14/2018 08:39 AM    NEUTOPHILPCT 71 04/14/2018 08:39 AM    NEUTOPHILPCT 66 10/26/2015 05:21 AM    LYMPHOPCT 16 04/14/2018 08:39 AM    LYMPHOPCT 23 10/26/2015 05:21 AM    MONOPCT 10 04/14/2018 08:39 AM    MONOPCT 9 10/26/2015 05:21 AM    EOSPCT 3 04/14/2018 08:39 AM    EOSPCT 2 10/26/2015 05:21 AM    BASOPCT 0 04/14/2018 08:39 AM    BASOPCT 1 03/11/2015 11:34 AM    NEUTROABS 3 53 04/14/2018 08:39 AM    NEUTROABS 7 16 10/26/2015 05:21 AM    LYMPHSABS 0 78 04/14/2018 08:39 AM    LYMPHSABS 2 50 10/26/2015 05:21 AM    MONOSABS 0 49 04/14/2018 08:39 AM    MONOSABS 0 98 10/26/2015 05:21 AM    EOSABS 0 15 04/14/2018 08:39 AM    EOSABS 0 22 10/26/2015 05:21 AM     Chemistry Profile:   Lab Results   Component Value Date/Time     (L) 04/14/2018 08:39 AM     (L) 10/25/2015 05:39 AM    K 4 2 04/14/2018 08:39 AM    K 3 9 10/25/2015 05:39 AM     04/14/2018 08:39 AM     10/25/2015 05:39 AM    CO2 25 04/14/2018 08:39 AM    CO2 23 10/25/2015 05:39 AM    ANIONGAP 6 04/14/2018 08:39 AM    ANIONGAP 9 10/25/2015 05:39 AM    BUN 24 04/14/2018 08:39 AM    BUN 4 (L) 10/25/2015 05:39 AM    CREATININE 1 38 (H) 04/14/2018 08:39 AM    CREATININE 0 54 (L) 10/25/2015 05:39 AM    GLUCOSE 101 12/01/2017 03:08 PM    GLUCOSE 96 10/25/2015 05:39 AM    CALCIUM 9 3 04/14/2018 08:39 AM    CALCIUM 8 8 10/25/2015 05:39 AM    MG 1 7 10/25/2015 05:39 AM    PHOS 2 8 10/23/2015 05:38 AM    AST 19 04/14/2018 08:39 AM    AST 13 10/08/2015 01:06 PM    ALT 16 04/14/2018 08:39 AM    ALT 21 10/08/2015 01:06 PM    ALKPHOS 68 04/14/2018 08:39 AM    ALKPHOS 101 10/08/2015 01:06 PM    PROT 8 4 (H) 04/14/2018 08:39 AM    PROT 8 5 (H) 10/08/2015 01:06 PM    BILITOT 0 50 04/14/2018 08:39 AM    BILITOT 0 33 10/08/2015 01:06 PM    EGFR 46 04/14/2018 08:39 AM     Coagulation Studies:   Lab Results   Component Value Date/Time    PROTIME 13 8 12/01/2017 03:08 PM    PROTIME 13 9 10/08/2015 01:06 PM    INR 1 06 12/01/2017 03:08 PM    INR 1 09 10/08/2015 01:06 PM    PTT 36 (H) 12/01/2017 03:08 PM    PTT 30 10/08/2015 01:06 PM         Current Medications     Current Outpatient Prescriptions:     diclofenac (VOLTAREN) 75 mg EC tablet, Take 50 mg by mouth 2 (two) times a day, Disp: , Rfl:    furosemide (LASIX) 20 mg tablet, Take 40 mg by mouth daily  , Disp: , Rfl:     gabapentin (NEURONTIN) 300 mg capsule, 3 tab PO night time, Disp: 90 capsule, Rfl: 3    hydrochlorothiazide (HYDRODIURIL) 25 mg tablet, Take 1 tablet (25 mg total) by mouth daily, Disp: 30 tablet, Rfl: 6    losartan (COZAAR) 100 MG tablet, Take 1 tablet (100 mg total) by mouth daily, Disp: 90 tablet, Rfl: 1    metFORMIN (GLUCOPHAGE) 500 mg tablet, Take 1 tablet (500 mg total) by mouth daily with breakfast, Disp: 90 tablet, Rfl: 0    methocarbamol (ROBAXIN) 500 mg tablet, Take 500 mg by mouth 4 (four) times a day, Disp: , Rfl:     oxyCODONE-acetaminophen (PERCOCET)  mg per tablet, Take 1 tablet by mouth every 6 (six) hours as needed for moderate pain Max Daily Amount: 4 tablets, Disp: 120 tablet, Rfl: 0    senna (SENOKOT) 8 6 MG tablet, Take 2 tablets by mouth daily, Disp: , Rfl:     spironolactone (ALDACTONE) 100 mg tablet, Take 1 tablet (100 mg total) by mouth daily, Disp: 90 tablet, Rfl: 1    apixaban (ELIQUIS) 5 mg, Take 1 tablet (5 mg total) by mouth 2 (two) times a day Take 2 tablets two times daily for first 7 days, then take 1 tab twice daily therafter, Disp: 74 tablet, Rfl: 0    diphenhydrAMINE (NIGHT TIME SLEEP AID) 25 MG tablet, Take 50 mg by mouth daily at bedtime as needed for sleep, Disp: , Rfl:     hydrOXYzine HCL (ATARAX) 10 mg tablet, Take 1 tablet (10 mg total) by mouth daily at bedtime, Disp: 15 tablet, Rfl: 0    LORazepam (ATIVAN) 1 mg tablet, Take 1 tablet 1 hour prior to MRI  Repeat x 1 if needed immediately prior to MRI   Do not drive after taking , Disp: 2 tablet, Rfl: 0    omeprazole (PriLOSEC) 20 mg delayed release capsule, Take 20 mg by mouth daily, Disp: , Rfl:     Health Maintenance     Health Maintenance   Topic Date Due    COLONOSCOPY  1950    Diabetic Foot Exam  10/20/1960    URINE MICROALBUMIN  07/23/2015    Fall Risk  10/20/2015    Urinary Incontinence Screening  10/20/2015    PNEUMOCOCCAL POLYSACCHARIDE VACCINE AGE 65 AND OVER  10/20/2015    GLAUCOMA SCREENING 67+ YR  10/20/2017    INFLUENZA VACCINE  09/01/2018    HEMOGLOBIN A1C  09/14/2018    OPHTHALMOLOGY EXAM  09/27/2018    Depression Screening PHQ-9  03/14/2019    DTaP,Tdap,and Td Vaccines (2 - Td) 03/14/2028    Hepatitis C Screening  Excluded     Immunization History   Administered Date(s) Administered    Influenza 08/27/2017    Pneumococcal Conjugate 13-Valent 08/27/2017    Pneumococcal Conjugate PCV 7 06/04/2014, 06/04/2014    Tdap 03/14/2018    Tuberculin Skin Test-PPD Intradermal 05/07/2014       Janina Clore Cardio  5/9/2018 9:08 AM

## 2018-05-09 NOTE — ASSESSMENT & PLAN NOTE
Patient with [address] ([facility])  MRI 1/29 showed minimal decrease in size of treated lesion  R lobes with new tumor thrombus in R portal vein  Will start anticoaugulation    Risks and benefits discussed with patient

## 2018-05-15 ENCOUNTER — TELEPHONE (OUTPATIENT)
Dept: INTERNAL MEDICINE CLINIC | Facility: CLINIC | Age: 68
End: 2018-05-15

## 2018-05-15 DIAGNOSIS — E11.9 TYPE 2 DIABETES MELLITUS (HCC): Primary | ICD-10-CM

## 2018-05-15 NOTE — TELEPHONE ENCOUNTER
[place] [facility] [place] a [place]  They go out yearly  They advised patient she needed to follow up with Podiatry because she is diabetic  Can you please put in order for patient to see Podiatry so we can get her scheduled

## 2018-05-22 ENCOUNTER — OFFICE VISIT (OUTPATIENT)
Dept: MULTI SPECIALTY CLINIC | Facility: CLINIC | Age: 68
End: 2018-05-22
Payer: COMMERCIAL

## 2018-05-22 VITALS
BODY MASS INDEX: 31.17 KG/M2 | DIASTOLIC BLOOD PRESSURE: 70 MMHG | HEART RATE: 72 BPM | HEIGHT: 63 IN | SYSTOLIC BLOOD PRESSURE: 90 MMHG | TEMPERATURE: 98.6 F | WEIGHT: 175.93 LBS

## 2018-05-22 DIAGNOSIS — E11.69 TYPE 2 DIABETES MELLITUS WITH OTHER SPECIFIED COMPLICATION, WITHOUT LONG-TERM CURRENT USE OF INSULIN (HCC): Primary | ICD-10-CM

## 2018-05-22 DIAGNOSIS — E11.9 TYPE 2 DIABETES MELLITUS (HCC): ICD-10-CM

## 2018-05-22 PROCEDURE — 99203 OFFICE O/P NEW LOW 30 MIN: CPT | Performed by: PODIATRIST

## 2018-05-22 NOTE — PROGRESS NOTES
Podiatry Clinic Visit  Celio Ruff 79 y o  female MRN: [id number]  Encounter: [id number]    Assessment/Plan     Assessment:  1  DM, A1c (9/8/17)    Plan:  - Patient was seen/examined   - discussed the importance of glycemic control, shoe gear, and proper diabetic foot care  - dispensed instructions on proper diabetic foot care  - all questions and concerns addressed  - RTC in 1 year as NVS intact      History of Present Illness     HPI:  Celio Ruff is a 79 y o  female who returns to clinic for diabetic risk assessment  Her last visit to her PCP was about 6 months ago  Pt states she recently got on eliquis for her liver blood clot  States she smokes 1ppd x 40 years  She is here because she had a [facility] nurse make a home visit who recommended her see a podiatrist bc she is a diabetic  Pt denies burning, numbness and tingling  Denies any complaints to lower extremities  Pt denies any recent nausea, fever, vomiting, chills, SOB or chest pain  Pt ambulates to clinic in  sneakers    Consults  Review of Systems   Constitutional: Negative  HENT: Negative  Eyes: Negative  Respiratory: Negative  Cardiovascular: Negative  Gastrointestinal: Negative  Musculoskeletal: Negative   Skin: Negative  Neurological: Negative          Historical Information   Past Medical History:   Diagnosis Date    Drug dependence [facility] )     Hypertension      Past Surgical History:   Procedure Laterality Date    DENTAL SURGERY      HYSTERECTOMY      LIVER BIOPSY      LIVER SURGERY      Ablation    TONSILLECTOMY       Social History   History   Alcohol Use No     History   Drug Use No     History   Smoking Status    Current Every Day Smoker    Packs/day: 1 00   Smokeless Tobacco    Never Used     Family History:   Family History   Problem Relation Age of Onset    Prostate cancer Father        Meds/Allergies     (Not in a hospital admission)  No Known Allergies    Objective     Current Vitals:   Blood Pressure: 90/70 (05/22/18 7929)  Pulse: 72 (05/22/18 0954)  Temperature: 98 6 °F (37 °C) (05/22/18 0954)  Temp Source: Oral (05/22/18 0954)  Height: 5' 2 5" (158 8 cm) (05/22/18 0954)  Weight - Scale: 79 8 kg (175 lb 14 8 oz) (05/22/18 0954)        BP 90/70 (BP Location: Left arm, Patient Position: Sitting, Cuff Size: Standard)   Pulse 72   Temp 98 6 °F (37 °C) (Oral)   Ht 5' 2 5" (1 588 m)   Wt 79 8 kg (175 lb 14 8 oz)   BMI 31 66 kg/m²     General Appearance:    Alert, cooperative, no distress           Extremities:   MMT is 5/5 to all compartments of the LE, +0/4 edema B/L, Digital ROM is intact,    Pulses:   R DP is +2/4, R PT is +1/4, L DP is +2/4, L PT is +1/4, CFT< 3sec to all digits   Skin: No open Lesions  Skin of the LE is normal texture, turgor  No edema, no erythema; no clinical signs of infection  No maceration in interspaces  Neurologic:   CNII-XII intact  Normal strength, sensation and reflexes       Throughout  Gross sensation is intact   Protective sensation is Intact

## 2018-06-04 ENCOUNTER — OFFICE VISIT (OUTPATIENT)
Dept: PAIN MEDICINE | Facility: CLINIC | Age: 68
End: 2018-06-04
Payer: COMMERCIAL

## 2018-06-04 VITALS
RESPIRATION RATE: 158 BRPM | SYSTOLIC BLOOD PRESSURE: 100 MMHG | HEART RATE: 66 BPM | HEIGHT: 63 IN | BODY MASS INDEX: 31.54 KG/M2 | DIASTOLIC BLOOD PRESSURE: 64 MMHG | WEIGHT: 178 LBS

## 2018-06-04 DIAGNOSIS — F11.20 UNCOMPLICATED OPIOID DEPENDENCE (HCC): ICD-10-CM

## 2018-06-04 DIAGNOSIS — M51.26 HERNIATED LUMBAR DISC WITHOUT MYELOPATHY: ICD-10-CM

## 2018-06-04 DIAGNOSIS — G89.4 CHRONIC PAIN SYNDROME: Primary | ICD-10-CM

## 2018-06-04 PROCEDURE — 99214 OFFICE O/P EST MOD 30 MIN: CPT | Performed by: NURSE PRACTITIONER

## 2018-06-04 RX ORDER — OXYCODONE AND ACETAMINOPHEN 10; 325 MG/1; MG/1
1 TABLET ORAL EVERY 6 HOURS PRN
Qty: 120 TABLET | Refills: 0 | Status: SHIPPED | OUTPATIENT
Start: 2018-06-04 | End: 2018-06-04 | Stop reason: SDUPTHER

## 2018-06-04 RX ORDER — OXYCODONE AND ACETAMINOPHEN 10; 325 MG/1; MG/1
1 TABLET ORAL EVERY 6 HOURS PRN
Qty: 120 TABLET | Refills: 0 | Status: SHIPPED | OUTPATIENT
Start: 2018-06-04 | End: 2018-08-02 | Stop reason: SDUPTHER

## 2018-06-04 NOTE — PROGRESS NOTES
Assessment:  1  Chronic pain syndrome    2  Herniated lumbar disc without myelopathy    3  Uncomplicated opioid dependence ([address] )        Plan:  Jade Downs is a 79 y o  female who presents for a follow up office visit in regards to Back Pain  She has a history of lumbar disc herniation lumbar degenerative disc disease  The patient continues with ongoing low back pain, which is currently well controlled with Percocet 10/325 mg  Since the medication is providing 80% pain relief without side effects, she will be continued on the medication as prescribed  She was given 2 months of prescriptions, with 1 script stating do not fill until June 7, 2018, and the 2nd script stating do not fill until July 5, 2018  Patient brought her prescription pill bottle in which was filled on May 9, 2018  There were 13 5 tablets remaining, which is appropriate  There are risks associated with opioid medications, including dependence, addiction and tolerance  The patient understands and agrees to use these medications only as prescribed  Potential side effects of the medications include, but are not limited to, constipation, drowsiness, addiction, impaired judgment and risk of fatal overdose if not taken as prescribed  The patient was warned against driving while taking sedation medications  Sharing medications is a felony  At this point in time, the patient is showing no signs of addiction, abuse, diversion or suicidal ideation  [facility] Prescription Drug Monitoring Program report was reviewed and was appropriate   Last refill on of Percocet was 5/11/18, however, the patient prescription pill bottle stating 5/9/18  My impressions and treatment recommendations were discussed in detail with the patient who verbalized understanding and had no further questions  Discharge instructions were provided  I personally saw and examined the patient and I agree with the above discussed plan of care      No orders of the defined types were placed in this encounter  New Medications Ordered This Visit   Medications    oxyCODONE-acetaminophen (PERCOCET)  mg per tablet     Sig: Take 1 tablet by mouth every 6 (six) hours as needed for moderate pain Max Daily Amount: 4 tablets     Dispense:  120 tablet     Refill:  0     Do not fill until 7/5/18       History of Present Illness:  Junito Pastrana is a 79 y o  female who presents for a follow up office visit in regards to Back Pain  She has a history of lumbar disc herniation lumbar degenerative disc disease  The patients current symptoms include ongoing low back pain which remains unchanged since last office visit  She states she is no longer experiencing sharp pains across the back like she did in the past   Her pain continues to be constant occur mostly in the morning  She describes as dull aching  She is currently rating her pain a 8/10 on the numeric rating scale  She continues to take Percocet 10/325 mg 4 times a day, which provided 80% pain relief without side effects  Pain Contract Signed: 2/12/18, Last Urine Drug Screen: 2/12/18    I have personally reviewed and/or updated the patient's past medical history, past surgical history, family history, social history, current medications, allergies, and vital signs today  Review of Systems   Respiratory: Negative for shortness of breath  Cardiovascular: Negative for chest pain  Gastrointestinal: Negative for constipation, diarrhea, nausea and vomiting  Musculoskeletal: Positive for arthralgias, gait problem and myalgias  Negative for joint swelling  Skin: Negative for rash  Neurological: Negative for dizziness, seizures and weakness  All other systems reviewed and are negative        Patient Active Problem List   Diagnosis    Anxiety    Asthenia    Chronic lumbar radiculopathy    Claustrophobia    Constipation    Diabetes mellitus, type II ([facility] )    Diabetic neuropathy [facility] )    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 ([facility] )    Hepatitis C, chronic (HCC)    Hepatocellular carcinoma (HCC)    Herniated lumbar disc without myelopathy    Hypertension    Insomnia    Left foot pain    Left-sided chest wall pain    Myofascial pain    Nicotine dependence    Opioid dependence (HCC)    Osteoporosis    Other cirrhosis of liver (HCC)    Pain syndrome, chronic    Sacroiliitis (HCC)    Seasonal allergies    Trichomonal vulvovaginitis    Trochanteric bursitis    Elevated serum creatinine    Lumbar disc herniation    Health care maintenance       Past Medical History:   Diagnosis Date    Drug dependence ([place] [address] )     Hypertension        Past Surgical History:   Procedure Laterality Date    DENTAL SURGERY      HYSTERECTOMY      LIVER BIOPSY      LIVER SURGERY      Ablation    TONSILLECTOMY         Family History   Problem Relation Age of Onset    Prostate cancer Father        Social History     Occupational History    Not on file       Social History Main Topics    Smoking status: Current Every Day Smoker     Packs/day: 1 00    Smokeless tobacco: Never Used    Alcohol use No    Drug use: No    Sexual activity: No       Current Outpatient Prescriptions on File Prior to Visit   Medication Sig    apixaban (ELIQUIS) 5 mg Take 1 tablet (5 mg total) by mouth 2 (two) times a day Take 2 tablets two times daily for first 7 days, then take 1 tab twice daily therafter    diclofenac (VOLTAREN) 75 mg EC tablet Take 50 mg by mouth 2 (two) times a day    diphenhydrAMINE (NIGHT TIME SLEEP AID) 25 MG tablet Take 50 mg by mouth daily at bedtime as needed for sleep    furosemide (LASIX) 20 mg tablet Take 40 mg by mouth daily      gabapentin (NEURONTIN) 300 mg capsule 3 tab PO night time    hydrochlorothiazide (HYDRODIURIL) 25 mg tablet Take 1 tablet (25 mg total) by mouth daily    hydrOXYzine HCL (ATARAX) 10 mg tablet Take 1 tablet (10 mg total) by mouth daily at bedtime    LORazepam (ATIVAN) 1 mg tablet Take 1 tablet 1 hour prior to MRI  Repeat x 1 if needed immediately prior to MRI  Do not drive after taking   losartan (COZAAR) 100 MG tablet Take 1 tablet (100 mg total) by mouth daily    metFORMIN (GLUCOPHAGE) 500 mg tablet Take 1 tablet (500 mg total) by mouth daily with breakfast    methocarbamol (ROBAXIN) 500 mg tablet Take 500 mg by mouth 4 (four) times a day    omeprazole (PriLOSEC) 20 mg delayed release capsule Take 20 mg by mouth daily    senna (SENOKOT) 8 6 MG tablet Take 2 tablets by mouth daily    spironolactone (ALDACTONE) 100 mg tablet Take 1 tablet (100 mg total) by mouth daily    [DISCONTINUED] oxyCODONE-acetaminophen (PERCOCET)  mg per tablet Take 1 tablet by mouth every 6 (six) hours as needed for moderate pain Max Daily Amount: 4 tablets     No current facility-administered medications on file prior to visit  No Known Allergies    Physical Exam:    /64   Pulse 66   Resp (!) 158   Ht 5' 3" (1 6 m)   Wt 80 7 kg (178 lb)   BMI 31 53 kg/m²     Constitutional:normal, well developed, well nourished, alert, in no distress and non-toxic and no overt pain behavior  Eyes:anicteric  HEENT:grossly intact  Neck:supple, symmetric, trachea midline and no masses   Pulmonary:even and unlabored  Cardiovascular:No edema or pitting edema present  Skin:Normal without rashes or lesions and well hydrated  Psychiatric:Mood and affect appropriate  Neurologic:Cranial Nerves II-XII grossly intact  Musculoskeletal:normal    Imaging  Pt reports last dose of Oxycodone was 6/4/18

## 2018-06-18 ENCOUNTER — TELEPHONE (OUTPATIENT)
Dept: INTERNAL MEDICINE CLINIC | Facility: CLINIC | Age: 68
End: 2018-06-18

## 2018-06-18 DIAGNOSIS — C22.0 PORTAL VEIN THROMBOSIS SECONDARY TO HCC INVASION (HCC): ICD-10-CM

## 2018-06-18 DIAGNOSIS — I81 PORTAL VEIN THROMBOSIS SECONDARY TO HCC INVASION (HCC): ICD-10-CM

## 2018-06-18 DIAGNOSIS — C22.0 HEPATOCELLULAR CARCINOMA (HCC): ICD-10-CM

## 2018-08-02 ENCOUNTER — OFFICE VISIT (OUTPATIENT)
Dept: PAIN MEDICINE | Facility: CLINIC | Age: 68
End: 2018-08-02
Payer: COMMERCIAL

## 2018-08-02 VITALS
BODY MASS INDEX: 31.89 KG/M2 | SYSTOLIC BLOOD PRESSURE: 110 MMHG | DIASTOLIC BLOOD PRESSURE: 74 MMHG | TEMPERATURE: 98.6 F | HEART RATE: 70 BPM | HEIGHT: 63 IN | WEIGHT: 180 LBS

## 2018-08-02 DIAGNOSIS — M79.18 MYOFASCIAL PAIN SYNDROME: ICD-10-CM

## 2018-08-02 DIAGNOSIS — G89.4 CHRONIC PAIN SYNDROME: Primary | ICD-10-CM

## 2018-08-02 DIAGNOSIS — M54.16 LUMBAR RADICULOPATHY: ICD-10-CM

## 2018-08-02 DIAGNOSIS — F11.20 UNCOMPLICATED OPIOID DEPENDENCE (HCC): ICD-10-CM

## 2018-08-02 DIAGNOSIS — M51.26 LUMBAR DISC HERNIATION: ICD-10-CM

## 2018-08-02 PROCEDURE — 99214 OFFICE O/P EST MOD 30 MIN: CPT | Performed by: NURSE PRACTITIONER

## 2018-08-02 PROCEDURE — 80305 DRUG TEST PRSMV DIR OPT OBS: CPT | Performed by: NURSE PRACTITIONER

## 2018-08-02 RX ORDER — OXYCODONE AND ACETAMINOPHEN 10; 325 MG/1; MG/1
1 TABLET ORAL EVERY 6 HOURS PRN
Qty: 120 TABLET | Refills: 0 | Status: SHIPPED | OUTPATIENT
Start: 2018-08-02 | End: 2018-08-02 | Stop reason: SDUPTHER

## 2018-08-02 RX ORDER — OXYCODONE AND ACETAMINOPHEN 10; 325 MG/1; MG/1
1 TABLET ORAL EVERY 6 HOURS PRN
Qty: 120 TABLET | Refills: 0 | Status: SHIPPED | OUTPATIENT
Start: 2018-08-02 | End: 2018-09-20 | Stop reason: SDUPTHER

## 2018-08-02 NOTE — PROGRESS NOTES
Assessment:  1  Chronic pain syndrome    2  Lumbar disc herniation    3  Lumbar radiculopathy    4  Uncomplicated opioid dependence ([address] )    5  Myofascial pain syndrome        Plan:  The patient is a 79 y o  female last seen on 6/4/18 who presents for a follow up office visit in regards to chronic pain secondary to lumbar disc herniation and lumbar radiculopathy  She presents today with ongoing low back pain, and states she has been having pain that is located in the posterior aspect of the right thigh again  In the past methocarbamol was helpful for his pain  The patient has a prescription at home, but has not taken the medication yet because she would like to see if the pain resolves on its own  She was instructed to contact the office, if the pain worsens and the methocarbamol is not helpful  She will continue on Percocet as prescribed  2 prescriptions were printed and given to the patient per her request, because her pharmacy had given her problems in the past with not having the prescriptions  One prescriptions stating do not fill until August 3, 2018, and the 2nd prescription stating do not fill until September 1, 2018      [place] Prescription Drug Monitoring Program report was reviewed and was appropriate     There are risks associated with opioid medications, including dependence, addiction and tolerance  The patient understands and agrees to use these medications only as prescribed  Potential side effects of the medications include, but are not limited to, constipation, drowsiness, addiction, impaired judgment and risk of fatal overdose if not taken as prescribed  The patient was warned against driving while taking sedation medications  Sharing medications is a felony  At this point in time, the patient is showing no signs of addiction, abuse, diversion or suicidal ideation  A urine drug screen was performed in the office today, as part of the medication management protocol    Drug screens are performed to evaluate for the presence or absence of described, non prescribed, and/or illicit substances  The point of care results were appropriate for what is being prescribed  The specimen will be sent to the lab for confirmatory testing  The drug screen is medically necessary, because the patient is dependent upon, or being considered for opiate medication  The patient will follow-up in 8 weeks for medication prescription refill and reevaluation  The patient was advised to contact the office should their symptoms worsen in the interim  The patient was agreeable and verbalized an understanding  History of Present Illness: The patient is a 79 y o  female last seen on 6/4/18 who presents for a follow up office visit in regards to chronic pain secondary to lumbar disc herniation and lumbar radiculopathy  The patient currently reports ongoing low back pain, which continues to be constant  The pain occurs mostly in the morning and is described as dull aching  She states that the right leg pain had returned about 5 days ago  She states this leg pain is intermittent, and she has not had in some time  She denies trauma or precipitating event which caused to return  The pain in the right leg radiates down the posterior aspect of her thigh stopping at the knee  She describes as pressure-like  In the past she took methocarbamol for the leg pain which provided some pain relief  She states she has tablets at home, but did not take them because she is hoping the pain will resolve on its own  She is also taking Percocet 10/325 mg 4 times a day, and gabapentin 300 mg 3 times at bedtime, which is prescribed by her primary care physician    Overall she is receiving 80% pain relief without side effects    Pain Contract Signed: 2/12/18  Last Urine Drug Screen: 8/2/18    I have personally reviewed and/or updated the patient's past medical history, past surgical history, family history, social history, current medications, allergies, and vital signs today  Review of Systems:    Review of Systems   Respiratory: Negative for shortness of breath  Cardiovascular: Negative for chest pain  Gastrointestinal: Negative for constipation, diarrhea, nausea and vomiting  Musculoskeletal: Positive for gait problem and joint swelling  Negative for arthralgias and myalgias  Skin: Negative for rash  Neurological: Positive for weakness  Negative for dizziness and seizures  All other systems reviewed and are negative  Past Medical History:   Diagnosis Date    Drug dependence ([facility] [address] )     Hypertension        Past Surgical History:   Procedure Laterality Date    DENTAL SURGERY      HYSTERECTOMY      LIVER BIOPSY      LIVER SURGERY      Ablation    TONSILLECTOMY         Family History   Problem Relation Age of Onset    Prostate cancer Father        Social History     Occupational History    Not on file       Social History Main Topics    Smoking status: Current Every Day Smoker     Packs/day: 1 00    Smokeless tobacco: Never Used    Alcohol use No    Drug use: No    Sexual activity: No         Current Outpatient Prescriptions:     apixaban (ELIQUIS) 5 mg, Take 1 tablet (5 mg total) by mouth 2 (two) times a day, Disp: 180 tablet, Rfl: 3    diclofenac (VOLTAREN) 75 mg EC tablet, Take 50 mg by mouth 2 (two) times a day, Disp: , Rfl:     diphenhydrAMINE (NIGHT TIME SLEEP AID) 25 MG tablet, Take 50 mg by mouth daily at bedtime as needed for sleep, Disp: , Rfl:     furosemide (LASIX) 20 mg tablet, Take 40 mg by mouth daily  , Disp: , Rfl:     gabapentin (NEURONTIN) 300 mg capsule, 3 tab PO night time, Disp: 90 capsule, Rfl: 3    hydrochlorothiazide (HYDRODIURIL) 25 mg tablet, Take 1 tablet (25 mg total) by mouth daily, Disp: 30 tablet, Rfl: 6    hydrOXYzine HCL (ATARAX) 10 mg tablet, Take 1 tablet (10 mg total) by mouth daily at bedtime, Disp: 15 tablet, Rfl: 0    LORazepam (ATIVAN) 1 mg tablet, Take 1 tablet 1 hour prior to MRI  Repeat x 1 if needed immediately prior to MRI  Do not drive after taking , Disp: 2 tablet, Rfl: 0    losartan (COZAAR) 100 MG tablet, Take 1 tablet (100 mg total) by mouth daily, Disp: 90 tablet, Rfl: 1    metFORMIN (GLUCOPHAGE) 500 mg tablet, Take 1 tablet (500 mg total) by mouth daily with breakfast, Disp: 90 tablet, Rfl: 0    methocarbamol (ROBAXIN) 500 mg tablet, Take 500 mg by mouth 4 (four) times a day, Disp: , Rfl:     omeprazole (PriLOSEC) 20 mg delayed release capsule, Take 20 mg by mouth daily, Disp: , Rfl:     oxyCODONE-acetaminophen (PERCOCET)  mg per tablet, Take 1 tablet by mouth every 6 (six) hours as needed for moderate pain Max Daily Amount: 4 tablets, Disp: 120 tablet, Rfl: 0    senna (SENOKOT) 8 6 MG tablet, Take 2 tablets by mouth daily, Disp: , Rfl:     spironolactone (ALDACTONE) 100 mg tablet, Take 1 tablet (100 mg total) by mouth daily, Disp: 90 tablet, Rfl: 1    No Known Allergies    Physical Exam:    /74   Pulse 70   Temp 98 6 °F (37 °C) (Oral)   Ht 5' 3" (1 6 m)   Wt 81 6 kg (180 lb)   BMI 31 89 kg/m²     Constitutional:normal, well developed, well nourished, alert, in no distress and non-toxic and no overt pain behavior    Eyes:anicteric  HEENT:grossly intact  Neck:supple, symmetric, trachea midline and no masses   Pulmonary:even and unlabored  Cardiovascular:No edema or pitting edema present  Skin:Normal without rashes or lesions and well hydrated  Psychiatric:Mood and affect appropriate  Neurologic:Cranial Nerves II-XII grossly intact  Musculoskeletal:normal    Lumbar Spine Exam    Appearance:  Normal lordosis  Palpation/Tenderness:  no tenderness or spasm    Range of Motion:  Flexion:  Minimally limited  without pain  Extension:  Minimally limited  without pain  Motor Strength:  Left hip flexion:  5/5  Right hip flexion:  5/5  Left knee flexion:  5/5  Left knee extension:  5/5  Right knee flexion:  5/5  Right knee extension:  5/5  Left foot dorsiflexion:  5/5  Left foot plantar flexion:  5/5  Right foot dorsiflexion:  5/5  Right foot plantar flexion:  5/5      Imaging  MRI:  11/19/13 Lumbar MRI: ALIGNMENT- Normal alignment of the lumbar spine  No compression  fracture  No spondylolysis or spondylolisthesis  No scoliosis      MARROW SIGNAL- Heterogeneous marrow signal is noted  Findings may be  related to osteopenia, lymphoproliferative disease not excluded  No  discrete marrow lesion      DISTAL CORD AND CONUS- Normal size and signal within the distal cord  and conus  The conus ends at the L1 level      PARASPINAL SOFT TISSUES- Paraspinal soft tissues are unremarkable      SACRUM- Normal signal within the sacrum  No evidence of insufficiency  or stress fracture      LOWER THORACIC DISC SPACES- Normal disc height and signal  No disc  herniation, canal stenosis or foraminal narrowing      LUMBAR DISC SPACES-      L1-L2- Minor bilateral facet arthrosis     L2-L3- Circumferential bulge, slight reduction disc height, marginal  osteophytes, facet arthrosis, no critical stenosis      L3-L4- Minor bilateral facet arthrosis, slight bulge     L4-L5- Moderate facet arthrosis, minor anterolisthesis      L5-S1- Central protrusion has increased in volume since prior study  Disc extends above the disc space in a right paramedian position  No  definite root compression  There's also extension of disc into both  foramen, asymmetric to the left, no definite root compression  Moderate bilateral facet arthrosis  Multiple sacral Tarlov cysts      IMPRESSION-     1  Progression of degenerative changes particularly at the L5-S1 level  No definite nerve root compression      Marrow signal is diffusely heterogeneous and low on T1 sequences, is  there evidence for lymphoproliferative disease?       Last dose of Percocet was this morning @ 6:00am      No orders of the defined types were placed in this encounter

## 2018-08-16 ENCOUNTER — TELEPHONE (OUTPATIENT)
Dept: PAIN MEDICINE | Facility: MEDICAL CENTER | Age: 68
End: 2018-08-16

## 2018-08-16 NOTE — TELEPHONE ENCOUNTER
Can she increase gabapentin or does it make her too drowsy? I have she is taking 300 mg 3 tabs at night    I would have her continue that but add 1 300 mg tab during the day to help decrease the nerve pain

## 2018-08-16 NOTE — TELEPHONE ENCOUNTER
Pt is calling to speak with a nurse  She is stating she was to call back when her pain got worse  Pt is having pain from her buttock down her rt leg  Pt states it is killing her  Pt states the medication that was prescribed is not working, pain level is a 8 or 9   Pt can be reached at [phone]

## 2018-08-16 NOTE — TELEPHONE ENCOUNTER
S/W pt who said her pain is intermittent but got really bad on Mon/Tues, she had been taking the gabapentin and percocet as prescribed and on Tues started taking methocarbamol 500 mg 1 tab at bedtime and diclofenac 75 mg BID  She has 5 methocarbamol left  Pt was told to call if pain got bad  Pain is coming from her back and is underneath the right buttock and goes down the back of the right leg to the knee, pain 8-9/10  Told pt I would make NM aware and c/b tomorrow with her rec

## 2018-08-17 NOTE — TELEPHONE ENCOUNTER
S/W pt and advised of Genesis's rec to continue her gabapentin 300 mg (3) at HS but add (1) 300 mg tab during the day  Pt said drowsiness isn't an issue but she doesn't think it's going to help but she has taken 4 per day on her own in the past and it didn't help  Pt thinks she needs something stronger, I told her if she's considering opioids then an ov is needed  Pt is willing to add the 1 tab of gabapentin during the day but is also asking for ov with NM to discuss stronger medication  Pt given ov for Mon 8/20 at 10:30 w/ NM

## 2018-08-20 ENCOUNTER — TELEPHONE (OUTPATIENT)
Dept: PAIN MEDICINE | Facility: MEDICAL CENTER | Age: 68
End: 2018-08-20

## 2018-08-20 DIAGNOSIS — M51.26 LUMBAR DISC HERNIATION: ICD-10-CM

## 2018-08-20 RX ORDER — GABAPENTIN 300 MG/1
CAPSULE ORAL
Qty: 120 CAPSULE | Refills: 5 | Status: SHIPPED | OUTPATIENT
Start: 2018-08-20 | End: 2019-03-14 | Stop reason: SDUPTHER

## 2018-08-20 RX ORDER — GABAPENTIN 300 MG/1
CAPSULE ORAL
Qty: 120 CAPSULE | Refills: 1 | Status: SHIPPED | OUTPATIENT
Start: 2018-08-20 | End: 2018-08-20 | Stop reason: SDUPTHER

## 2018-08-20 NOTE — TELEPHONE ENCOUNTER
Patient called and LM on anserve @ 005am  Called patient back for clarification  Patient stated she had to CX her appt today w/ Michelle Smith (no transportation and in too much pain) Patient wants to know if something can be called into her pharmacy and she will have someone pick it up for her  Patient also stated she needs to r/s appt on 10/01/18 because she will be out of meds by then   C/b [phone]

## 2018-08-20 NOTE — TELEPHONE ENCOUNTER
I called pt and made her aware that Ginny Gonzalez sent the gabapentin QID dosing to her pharmacy but she needs to come in if having inc pain and be seen by a CRNP  I offered pt Tues or Wed w/ HA or Thurs w/ NM  Pt asked for Tues late morning  Pt given 11 am ov w/ HA for tomorrow  Pt said she is using a walker and didn't think she could even walk to her car today, pt said she thought she may need to go to ER  I told pt if her pain becomes too severe before being seen in our office tomorrow at 11 am then she will need to go to the ER

## 2018-08-20 NOTE — TELEPHONE ENCOUNTER
Sent gabapentin refill with the QID dosing     She needs to come in for ov if still having increased pain   She can be seen with Emmy Gaston on Tuesday or Wed or me thurs

## 2018-08-20 NOTE — TELEPHONE ENCOUNTER
Patient called back and LM on anserve @ 123pm stating she needs a refill of Gabapentin and pain meds for her legs   C/b [phone]

## 2018-08-21 ENCOUNTER — OFFICE VISIT (OUTPATIENT)
Dept: PAIN MEDICINE | Facility: CLINIC | Age: 68
End: 2018-08-21
Payer: COMMERCIAL

## 2018-08-21 VITALS
TEMPERATURE: 98.2 F | SYSTOLIC BLOOD PRESSURE: 122 MMHG | WEIGHT: 180 LBS | BODY MASS INDEX: 31.89 KG/M2 | DIASTOLIC BLOOD PRESSURE: 76 MMHG | HEART RATE: 74 BPM | HEIGHT: 63 IN

## 2018-08-21 DIAGNOSIS — G89.4 PAIN SYNDROME, CHRONIC: ICD-10-CM

## 2018-08-21 DIAGNOSIS — M51.26 LUMBAR DISC HERNIATION: ICD-10-CM

## 2018-08-21 DIAGNOSIS — M79.18 MYOFASCIAL PAIN SYNDROME: ICD-10-CM

## 2018-08-21 DIAGNOSIS — M54.16 LUMBAR RADICULOPATHY: Primary | ICD-10-CM

## 2018-08-21 PROCEDURE — 99213 OFFICE O/P EST LOW 20 MIN: CPT | Performed by: NURSE PRACTITIONER

## 2018-08-21 RX ORDER — METHOCARBAMOL 500 MG/1
TABLET, FILM COATED ORAL
Qty: 60 TABLET | Refills: 0 | Status: SHIPPED | OUTPATIENT
Start: 2018-08-21 | End: 2018-11-19

## 2018-08-21 NOTE — PROGRESS NOTES
Assessment:  1  Lumbar radiculopathy    2  Lumbar disc herniation    3  Myofascial pain syndrome    4  Pain syndrome, chronic        Plan  The patient is a 79 y o  female with a history of chronic pain syndrome secondary to lumbar disc herniation, lumbar radiculopathy  The patient present with right sided low back pain, which has been occurring intermittently  The patient reports that symptoms are not currently present  She is requesting an updated MRI of her lumbar spine for further evaluation, due to her episodes of her low back pain are occurring more frequently  Therefore, this time I have ordered the patient an MRI of her lumbar spine, for further evaluation  The patient was instructed that our office will call with results of this study once is completed  A large majority of the patient's pain is myofascial in nature  She complains of muscle spasms in the right side of her low back  Therefore, at this time I have increased the patient's methocarbamol to 500 mg half a tablet in the a m , half a tablet in the afternoon, 1 tablet at bedtime  To help with the myofascial component of her pain  A prescription for this medication was sent electronically to the patient's pharmacy on file  The patient was educated on the medications most common side effects which include dizziness and drowsiness  The patient was cautioned on driving or operating heavy machinery while taking this medication  She was instructed to call the office in 1-2 weeks to update office on the effectiveness of this medication, or sooner with adverse medication side effects  The patient brought in her prescription pill bottles for methocarbamol, and diclofenac  She reported that she was taking diclofenac at home 75 mg 1 tablet 3 times daily   I instructed the patient that she should only take medications as prescribed and taking more than prescribed of diclofenac is not recommended and can lead to increased risk for GI irritation and kidney damage  The patient verbalized understanding  She did not bring her prescription pill bottles for Percocet  She was instructed to bring her prescription pill bottles next office visit, per office policy for possible random pill count, and as required for future refills  Patient verbalized understanding  The patient will continue on oxycodone as prescribed, and reports that she does not need a refill of this medication at this time  [place] Prescription Drug Monitoring Program report was reviewed and was appropriate     There are risks associated with opioid medications, including dependence, addiction and tolerance  The patient understands and agrees to use these medications only as prescribed  Potential side effects of the medications include, but are not limited to, constipation, drowsiness, addiction, impaired judgment and risk of fatal overdose if not taken as prescribed  The patient was warned against driving while taking sedation medications  Sharing medications is a felony  At this point in time, the patient is showing no signs of addiction, abuse, diversion or suicidal ideation  The patient has a follow-up appointment scheduled for 9/20/2018 for medication refills  She was encouraged to follow up sooner with the worsening of symptoms  History of Present Illness: The patient is a 79 y o  female with a history of chronic pain syndrome secondary to lumbar disc herniation, lumbar radiculopathy  The patient was last seen office on 8/2/2018 where she was continued on Percocet 10/325 mg 1 tablet 4 times daily  She presents for a follow up office visit in regards to chronic pain secondary to low back pain  The patient currently reports  right-sided low back pain that radiates down the posterior aspect of her right thigh to her knee  She describes her pain as dull aching, sharp, pressure-like pain the patient reports that her pain is constant occurring throughout the day    The patient reports that her pain is symptoms have worsened since last visit  The patient currently rates her pain at 10 out 10 numeric pain scale  Current pain medications includes:  Percocet 10/325 mg 1 tab 4 times daily  The patient reports that this regimen is providing 30-40% pain relief  The patient is reporting no side effects from this pain medication regimen  I have personally reviewed and/or updated the patient's past medical history, past surgical history, family history, social history, current medications, allergies, and vital signs today  Review of Systems:    Review of Systems   Respiratory: Negative for shortness of breath  Cardiovascular: Negative for chest pain  Gastrointestinal: Negative for constipation, diarrhea, nausea and vomiting  Musculoskeletal: Positive for gait problem and joint swelling  Negative for arthralgias and myalgias  Skin: Negative for rash  Neurological: Positive for weakness  Negative for dizziness and seizures  All other systems reviewed and are negative  Past Medical History:   Diagnosis Date    Drug [facility] [address] [address]     Hypertension        Past Surgical History:   Procedure Laterality Date    DENTAL SURGERY      HYSTERECTOMY      LIVER BIOPSY      LIVER SURGERY      Ablation    TONSILLECTOMY         Family History   Problem Relation Age of Onset    Prostate cancer Father        Social History     Occupational History    Not on file       Social History Main Topics    Smoking status: Current Every Day Smoker     Packs/day: 1 00    Smokeless tobacco: Never Used    Alcohol use No    Drug use: No    Sexual activity: No         Current Outpatient Prescriptions:     apixaban (ELIQUIS) 5 mg, Take 1 tablet (5 mg total) by mouth 2 (two) times a day, Disp: 180 tablet, Rfl: 3    diclofenac (VOLTAREN) 75 mg EC tablet, Take 50 mg by mouth 2 (two) times a day, Disp: , Rfl:     diphenhydrAMINE (NIGHT TIME SLEEP AID) 25 MG tablet, Take 50 mg by mouth daily at bedtime as needed for sleep, Disp: , Rfl:     furosemide (LASIX) 20 mg tablet, Take 40 mg by mouth daily  , Disp: , Rfl:     gabapentin (NEURONTIN) 300 mg capsule, 1 tab in the am and 3 tabs PO night time, Disp: 120 capsule, Rfl: 5    hydrochlorothiazide (HYDRODIURIL) 25 mg tablet, Take 1 tablet (25 mg total) by mouth daily, Disp: 30 tablet, Rfl: 6    hydrOXYzine HCL (ATARAX) 10 mg tablet, Take 1 tablet (10 mg total) by mouth daily at bedtime, Disp: 15 tablet, Rfl: 0    LORazepam (ATIVAN) 1 mg tablet, Take 1 tablet 1 hour prior to MRI  Repeat x 1 if needed immediately prior to MRI  Do not drive after taking , Disp: 2 tablet, Rfl: 0    losartan (COZAAR) 100 MG tablet, Take 1 tablet (100 mg total) by mouth daily, Disp: 90 tablet, Rfl: 1    metFORMIN (GLUCOPHAGE) 500 mg tablet, Take 1 tablet (500 mg total) by mouth daily with breakfast, Disp: 90 tablet, Rfl: 0    methocarbamol (ROBAXIN) 500 mg tablet, Take 1/2 tablet in am, 1/2 tablet in afternoon and 1 tablet at bedtime  No driving or operating heavy machinery with this medication  , Disp: 60 tablet, Rfl: 0    omeprazole (PriLOSEC) 20 mg delayed release capsule, Take 20 mg by mouth daily, Disp: , Rfl:     oxyCODONE-acetaminophen (PERCOCET)  mg per tablet, Take 1 tablet by mouth every 6 (six) hours as needed for moderate pain Max Daily Amount: 4 tablets, Disp: 120 tablet, Rfl: 0    senna (SENOKOT) 8 6 MG tablet, Take 2 tablets by mouth daily, Disp: , Rfl:     spironolactone (ALDACTONE) 100 mg tablet, Take 1 tablet (100 mg total) by mouth daily, Disp: 90 tablet, Rfl: 1    No Known Allergies    Physical Exam:    /76   Pulse 74   Temp 98 2 °F (36 8 °C) (Oral)   Ht 5' 3" (1 6 m)   Wt 81 6 kg (180 lb)   BMI 31 89 kg/m²     Constitutional:normal, well developed, well nourished, alert, in no distress and non-toxic and no overt pain behavior   and obese  Eyes:anicteric  HEENT:grossly intact  Neck:supple, symmetric, trachea midline and no masses   Pulmonary:even and unlabored  Cardiovascular:No edema or pitting edema present  Skin:Normal without rashes or lesions and well hydrated  Psychiatric:Mood and affect appropriate  Neurologic:Cranial Nerves II-XII grossly intact  Musculoskeletal:normal    Lumbar Spine Exam    Appearance:  Normal lordosis  Palpation/Tenderness:  no tenderness or spasm  Sensory:  no sensory deficits noted  Range of Motion:  Flexion:  Minimally limited  with pain  Extension:  Minimally limited  with pain  Lateral Flexion - Left:  No limitation  without pain  Lateral Flexion - Right:  No limitation  without pain  Rotation - Left:  No limitation  without pain  Rotation - Right:  No limitation  without pain  Motor Strength:  Left hip flexion:  5/5  Right hip flexion:  5/5  Left knee flexion:  5/5  Right knee flexion:  5/5  Left foot dorsiflexion:  5/5  Left foot plantar flexion:  5/5  Right foot dorsiflexion:  5/5  Right foot plantar flexion:  5/5     Imaging  Last dose of Oxycodone was 10:30am this morning      Orders Placed This Encounter   Procedures    MRI lumbar spine wo contrast

## 2018-08-23 ENCOUNTER — TELEPHONE (OUTPATIENT)
Dept: PAIN MEDICINE | Facility: MEDICAL CENTER | Age: 68
End: 2018-08-23

## 2018-08-23 DIAGNOSIS — F40.240 CLAUSTROPHOBIA: Primary | ICD-10-CM

## 2018-08-23 RX ORDER — LORAZEPAM 0.5 MG/1
TABLET ORAL
Qty: 2 TABLET | Refills: 0 | Status: SHIPPED | OUTPATIENT
Start: 2018-08-23 | End: 2018-09-20

## 2018-08-23 NOTE — TELEPHONE ENCOUNTER
Pt called stating that she scheduled her MRI for 9/11  Pt states that she needs something to relax prior to the test and is asking if HA can call it into [facility] (on file)  Pt can be reached at [phone]

## 2018-08-23 NOTE — TELEPHONE ENCOUNTER
S/W patient, advised prescription has been sent to the pharmacy  Reviewed with the patient the directions for taking the medication  Patient instructed not to drive to or from the MRI appointment  Instructed that she should not take lorazepam with her opioid medication due to increased risk for respiratory depression  Patient verbalized understanding

## 2018-08-23 NOTE — TELEPHONE ENCOUNTER
Prescription for lorazepam 0 5 mg 1 tablet 2 hours prior to the MRI, and may take an additional tablet just prior to the MRI, 2 tablets, no refills  Was sent to the patient's pharmacy on file  Please instruct her that she is not to drive with this medication and she will need a  to and from the MRI  Please also instruct her that she should not take lorazepam with her opioid medications due to increased risk for respiratory depression

## 2018-08-29 ENCOUNTER — HOSPITAL ENCOUNTER (OUTPATIENT)
Dept: RADIOLOGY | Age: 68
Discharge: HOME | End: 2018-08-29
Payer: COMMERCIAL

## 2018-08-29 DIAGNOSIS — M51.26 LUMBAR DISC HERNIATION: ICD-10-CM

## 2018-08-29 DIAGNOSIS — M54.16 LUMBAR RADICULOPATHY: ICD-10-CM

## 2018-08-29 PROCEDURE — 72148 MRI LUMBAR SPINE W/O DYE: CPT

## 2018-09-04 ENCOUNTER — TELEPHONE (OUTPATIENT)
Dept: PAIN MEDICINE | Facility: MEDICAL CENTER | Age: 68
End: 2018-09-04

## 2018-09-04 ENCOUNTER — TELEPHONE (OUTPATIENT)
Dept: PAIN MEDICINE | Facility: CLINIC | Age: 68
End: 2018-09-04

## 2018-09-04 NOTE — TELEPHONE ENCOUNTER
The patient was called back and results were reviewed with the patient  Please see results note for further information

## 2018-09-04 NOTE — TELEPHONE ENCOUNTER
----- Message from Soren Erin Jeromerichard sent at 9/4/2018  9:36 AM EDT -----  I called the patient and left a voice mail on her phone to call the office back for the results of her lumbar MRI

## 2018-09-04 NOTE — TELEPHONE ENCOUNTER
Pt left message in voicemail at 10:19 this morning, stating that she was returning a call to [facility]  Pt left a call back number of [phone]

## 2018-09-05 ENCOUNTER — TELEPHONE (OUTPATIENT)
Dept: RADIOLOGY | Facility: CLINIC | Age: 68
End: 2018-09-05

## 2018-09-05 NOTE — TELEPHONE ENCOUNTER
----- Message from Soren Lizarraga sent at 9/4/2018  9:34 AM EDT -----  Regarding: MRI results  I called the patient and left a message on her voicemail to call the office back for the results of her lumbar MRI    ----- Message -----  From: Interface, Radiology Results In  Sent: 8/29/2018   4:18 PM  To:  Soren Khan Maldemetrius

## 2018-09-16 ENCOUNTER — OFFICE VISIT (OUTPATIENT)
Dept: URGENT CARE | Age: 68
End: 2018-09-16
Payer: COMMERCIAL

## 2018-09-16 ENCOUNTER — HOSPITAL ENCOUNTER (INPATIENT)
Facility: HOSPITAL | Age: 68
LOS: 3 days | Discharge: HOME | DRG: 433 | End: 2018-09-19
Attending: EMERGENCY MEDICINE | Admitting: INTERNAL MEDICINE
Payer: COMMERCIAL

## 2018-09-16 ENCOUNTER — APPOINTMENT (EMERGENCY)
Dept: RADIOLOGY | Facility: HOSPITAL | Age: 68
DRG: 433 | End: 2018-09-16
Payer: COMMERCIAL

## 2018-09-16 VITALS
OXYGEN SATURATION: 93 % | DIASTOLIC BLOOD PRESSURE: 70 MMHG | HEART RATE: 76 BPM | HEIGHT: 63 IN | TEMPERATURE: 97.8 F | WEIGHT: 180 LBS | SYSTOLIC BLOOD PRESSURE: 110 MMHG | BODY MASS INDEX: 31.89 KG/M2

## 2018-09-16 DIAGNOSIS — I81 PORTAL VEIN THROMBOSIS SECONDARY TO HCC INVASION (HCC): ICD-10-CM

## 2018-09-16 DIAGNOSIS — K74.60 CIRRHOSIS (HCC): ICD-10-CM

## 2018-09-16 DIAGNOSIS — R10.9 ABDOMINAL PAIN: ICD-10-CM

## 2018-09-16 DIAGNOSIS — C22.0 PORTAL VEIN THROMBOSIS SECONDARY TO HCC INVASION (HCC): ICD-10-CM

## 2018-09-16 DIAGNOSIS — R10.84 GENERALIZED ABDOMINAL PAIN: Primary | ICD-10-CM

## 2018-09-16 DIAGNOSIS — K74.60 DECOMPENSATED HEPATIC CIRRHOSIS (HCC): ICD-10-CM

## 2018-09-16 DIAGNOSIS — I10 ESSENTIAL HYPERTENSION: ICD-10-CM

## 2018-09-16 DIAGNOSIS — K72.90 DECOMPENSATED HEPATIC CIRRHOSIS (HCC): ICD-10-CM

## 2018-09-16 DIAGNOSIS — E87.1 HYPONATREMIA: Primary | ICD-10-CM

## 2018-09-16 DIAGNOSIS — C22.0 HEPATOCELLULAR CARCINOMA (HCC): ICD-10-CM

## 2018-09-16 DIAGNOSIS — E11.8 TYPE 2 DIABETES MELLITUS WITH COMPLICATION (HCC): ICD-10-CM

## 2018-09-16 DIAGNOSIS — K59.00 CONSTIPATION: ICD-10-CM

## 2018-09-16 LAB
ALBUMIN SERPL BCP-MCNC: 3.7 G/DL (ref 3.5–5)
ALP SERPL-CCNC: 108 U/L (ref 46–116)
ALT SERPL W P-5'-P-CCNC: 22 U/L (ref 12–78)
ANION GAP SERPL CALCULATED.3IONS-SCNC: 9 MMOL/L (ref 4–13)
AST SERPL W P-5'-P-CCNC: 48 U/L (ref 5–45)
BACTERIA UR QL AUTO: ABNORMAL /HPF
BASOPHILS # BLD AUTO: 0.03 THOUSANDS/ΜL (ref 0–0.1)
BASOPHILS NFR BLD AUTO: 0 % (ref 0–1)
BILIRUB SERPL-MCNC: 1.03 MG/DL (ref 0.2–1)
BILIRUB UR QL STRIP: ABNORMAL
BUN SERPL-MCNC: 28 MG/DL (ref 5–25)
CALCIUM SERPL-MCNC: 9.8 MG/DL (ref 8.3–10.1)
CHLORIDE SERPL-SCNC: 89 MMOL/L (ref 100–108)
CLARITY UR: ABNORMAL
CO2 SERPL-SCNC: 23 MMOL/L (ref 21–32)
COLOR UR: ABNORMAL
CREAT SERPL-MCNC: 1.54 MG/DL (ref 0.6–1.3)
EOSINOPHIL # BLD AUTO: 0.04 THOUSAND/ΜL (ref 0–0.61)
EOSINOPHIL NFR BLD AUTO: 0 % (ref 0–6)
ERYTHROCYTE [DISTWIDTH] IN BLOOD BY AUTOMATED COUNT: 14 % (ref 11.6–15.1)
GFR SERPL CREATININE-BSD FRML MDRD: 40 ML/MIN/1.73SQ M
GLUCOSE SERPL-MCNC: 123 MG/DL (ref 65–140)
GLUCOSE SERPL-MCNC: 147 MG/DL (ref 65–140)
GLUCOSE UR STRIP-MCNC: NEGATIVE MG/DL
HCT VFR BLD AUTO: 33.2 % (ref 34.8–46.1)
HGB BLD-MCNC: 11 G/DL (ref 11.5–15.4)
HGB UR QL STRIP.AUTO: NEGATIVE
IMM GRANULOCYTES # BLD AUTO: 0.07 THOUSAND/UL (ref 0–0.2)
IMM GRANULOCYTES NFR BLD AUTO: 1 % (ref 0–2)
KETONES UR STRIP-MCNC: NEGATIVE MG/DL
LACTATE SERPL-SCNC: 1.3 MMOL/L (ref 0.5–2)
LEUKOCYTE ESTERASE UR QL STRIP: ABNORMAL
LIPASE SERPL-CCNC: 170 U/L (ref 73–393)
LYMPHOCYTES # BLD AUTO: 1.15 THOUSANDS/ΜL (ref 0.6–4.47)
LYMPHOCYTES NFR BLD AUTO: 8 % (ref 14–44)
MCH RBC QN AUTO: 28.1 PG (ref 26.8–34.3)
MCHC RBC AUTO-ENTMCNC: 33.1 G/DL (ref 31.4–37.4)
MCV RBC AUTO: 85 FL (ref 82–98)
MONOCYTES # BLD AUTO: 1.27 THOUSAND/ΜL (ref 0.17–1.22)
MONOCYTES NFR BLD AUTO: 9 % (ref 4–12)
NEUTROPHILS # BLD AUTO: 11.67 THOUSANDS/ΜL (ref 1.85–7.62)
NEUTS SEG NFR BLD AUTO: 82 % (ref 43–75)
NITRITE UR QL STRIP: NEGATIVE
NON-SQ EPI CELLS URNS QL MICRO: ABNORMAL /HPF
NRBC BLD AUTO-RTO: 0 /100 WBCS
OSMOLALITY UR: 389 MMOL/KG
PH UR STRIP.AUTO: 5.5 [PH] (ref 4.5–8)
PLATELET # BLD AUTO: 189 THOUSANDS/UL (ref 149–390)
PMV BLD AUTO: 10.5 FL (ref 8.9–12.7)
POTASSIUM SERPL-SCNC: 3.9 MMOL/L (ref 3.5–5.3)
PROT SERPL-MCNC: 9.3 G/DL (ref 6.4–8.2)
PROT UR STRIP-MCNC: ABNORMAL MG/DL
RBC # BLD AUTO: 3.91 MILLION/UL (ref 3.81–5.12)
RBC #/AREA URNS AUTO: ABNORMAL /HPF
SODIUM 24H UR-SCNC: 17 MOL/L
SODIUM SERPL-SCNC: 121 MMOL/L (ref 136–145)
SP GR UR STRIP.AUTO: 1.01 (ref 1–1.03)
UROBILINOGEN UR QL STRIP.AUTO: 1 E.U./DL
WBC # BLD AUTO: 14.23 THOUSAND/UL (ref 4.31–10.16)
WBC #/AREA URNS AUTO: ABNORMAL /HPF

## 2018-09-16 PROCEDURE — 83935 ASSAY OF URINE OSMOLALITY: CPT | Performed by: INTERNAL MEDICINE

## 2018-09-16 PROCEDURE — S9088 SERVICES PROVIDED IN URGENT: HCPCS | Performed by: FAMILY MEDICINE

## 2018-09-16 PROCEDURE — 87086 URINE CULTURE/COLONY COUNT: CPT

## 2018-09-16 PROCEDURE — 96374 THER/PROPH/DIAG INJ IV PUSH: CPT

## 2018-09-16 PROCEDURE — 80053 COMPREHEN METABOLIC PANEL: CPT | Performed by: EMERGENCY MEDICINE

## 2018-09-16 PROCEDURE — 93005 ELECTROCARDIOGRAM TRACING: CPT

## 2018-09-16 PROCEDURE — 74177 CT ABD & PELVIS W/CONTRAST: CPT

## 2018-09-16 PROCEDURE — 99203 OFFICE O/P NEW LOW 30 MIN: CPT | Performed by: FAMILY MEDICINE

## 2018-09-16 PROCEDURE — 96361 HYDRATE IV INFUSION ADD-ON: CPT

## 2018-09-16 PROCEDURE — 99285 EMERGENCY DEPT VISIT HI MDM: CPT

## 2018-09-16 PROCEDURE — 83930 ASSAY OF BLOOD OSMOLALITY: CPT | Performed by: INTERNAL MEDICINE

## 2018-09-16 PROCEDURE — 84300 ASSAY OF URINE SODIUM: CPT | Performed by: INTERNAL MEDICINE

## 2018-09-16 PROCEDURE — 83605 ASSAY OF LACTIC ACID: CPT | Performed by: EMERGENCY MEDICINE

## 2018-09-16 PROCEDURE — 36415 COLL VENOUS BLD VENIPUNCTURE: CPT | Performed by: EMERGENCY MEDICINE

## 2018-09-16 PROCEDURE — 71046 X-RAY EXAM CHEST 2 VIEWS: CPT

## 2018-09-16 PROCEDURE — 83690 ASSAY OF LIPASE: CPT | Performed by: EMERGENCY MEDICINE

## 2018-09-16 PROCEDURE — 81001 URINALYSIS AUTO W/SCOPE: CPT

## 2018-09-16 PROCEDURE — 82948 REAGENT STRIP/BLOOD GLUCOSE: CPT

## 2018-09-16 PROCEDURE — 85025 COMPLETE CBC W/AUTO DIFF WBC: CPT | Performed by: EMERGENCY MEDICINE

## 2018-09-16 RX ORDER — SENNOSIDES 8.6 MG
17 TABLET ORAL DAILY
Status: DISCONTINUED | OUTPATIENT
Start: 2018-09-17 | End: 2018-09-19 | Stop reason: HOSPADM

## 2018-09-16 RX ORDER — NICOTINE 21 MG/24HR
1 PATCH, TRANSDERMAL 24 HOURS TRANSDERMAL DAILY
Status: DISCONTINUED | OUTPATIENT
Start: 2018-09-17 | End: 2018-09-19 | Stop reason: HOSPADM

## 2018-09-16 RX ORDER — POLYETHYLENE GLYCOL 3350 17 G/17G
17 POWDER, FOR SOLUTION ORAL DAILY
Status: DISCONTINUED | OUTPATIENT
Start: 2018-09-16 | End: 2018-09-19 | Stop reason: HOSPADM

## 2018-09-16 RX ORDER — MORPHINE SULFATE 4 MG/ML
4 INJECTION, SOLUTION INTRAMUSCULAR; INTRAVENOUS ONCE
Status: COMPLETED | OUTPATIENT
Start: 2018-09-16 | End: 2018-09-16

## 2018-09-16 RX ORDER — ACETAMINOPHEN 325 MG/1
650 TABLET ORAL EVERY 6 HOURS PRN
Status: DISCONTINUED | OUTPATIENT
Start: 2018-09-16 | End: 2018-09-17

## 2018-09-16 RX ORDER — FUROSEMIDE 40 MG/1
40 TABLET ORAL DAILY
Status: DISCONTINUED | OUTPATIENT
Start: 2018-09-17 | End: 2018-09-19 | Stop reason: HOSPADM

## 2018-09-16 RX ORDER — HYDROCHLOROTHIAZIDE 25 MG/1
25 TABLET ORAL DAILY
Status: DISCONTINUED | OUTPATIENT
Start: 2018-09-17 | End: 2018-09-19 | Stop reason: HOSPADM

## 2018-09-16 RX ORDER — MAGNESIUM HYDROXIDE/ALUMINUM HYDROXICE/SIMETHICONE 120; 1200; 1200 MG/30ML; MG/30ML; MG/30ML
15 SUSPENSION ORAL EVERY 4 HOURS PRN
Status: DISCONTINUED | OUTPATIENT
Start: 2018-09-16 | End: 2018-09-19 | Stop reason: HOSPADM

## 2018-09-16 RX ORDER — OXYCODONE HYDROCHLORIDE AND ACETAMINOPHEN 5; 325 MG/1; MG/1
1 TABLET ORAL EVERY 6 HOURS PRN
Status: DISCONTINUED | OUTPATIENT
Start: 2018-09-16 | End: 2018-09-18

## 2018-09-16 RX ORDER — GABAPENTIN 300 MG/1
900 CAPSULE ORAL
Status: DISCONTINUED | OUTPATIENT
Start: 2018-09-16 | End: 2018-09-19 | Stop reason: HOSPADM

## 2018-09-16 RX ORDER — GABAPENTIN 300 MG/1
300 CAPSULE ORAL DAILY
Status: DISCONTINUED | OUTPATIENT
Start: 2018-09-17 | End: 2018-09-19 | Stop reason: HOSPADM

## 2018-09-16 RX ORDER — LOSARTAN POTASSIUM 50 MG/1
100 TABLET ORAL DAILY
Status: DISCONTINUED | OUTPATIENT
Start: 2018-09-17 | End: 2018-09-19 | Stop reason: HOSPADM

## 2018-09-16 RX ORDER — HYDROXYZINE HYDROCHLORIDE 10 MG/1
10 TABLET, FILM COATED ORAL
Status: DISCONTINUED | OUTPATIENT
Start: 2018-09-16 | End: 2018-09-19 | Stop reason: HOSPADM

## 2018-09-16 RX ORDER — DIPHENHYDRAMINE HCL 25 MG
25 TABLET ORAL
Status: DISCONTINUED | OUTPATIENT
Start: 2018-09-16 | End: 2018-09-19 | Stop reason: HOSPADM

## 2018-09-16 RX ORDER — DOCUSATE SODIUM 100 MG/1
100 CAPSULE, LIQUID FILLED ORAL 2 TIMES DAILY
Status: DISCONTINUED | OUTPATIENT
Start: 2018-09-17 | End: 2018-09-17

## 2018-09-16 RX ORDER — SPIRONOLACTONE 50 MG/1
100 TABLET, FILM COATED ORAL DAILY
Status: DISCONTINUED | OUTPATIENT
Start: 2018-09-17 | End: 2018-09-19 | Stop reason: HOSPADM

## 2018-09-16 RX ADMIN — APIXABAN 5 MG: 5 TABLET, FILM COATED ORAL at 23:49

## 2018-09-16 RX ADMIN — MORPHINE SULFATE 4 MG: 4 INJECTION INTRAVENOUS at 22:15

## 2018-09-16 RX ADMIN — IOHEXOL 100 ML: 350 INJECTION, SOLUTION INTRAVENOUS at 21:09

## 2018-09-16 RX ADMIN — MORPHINE SULFATE 4 MG: 4 INJECTION INTRAVENOUS at 20:38

## 2018-09-16 RX ADMIN — POLYETHYLENE GLYCOL 3350 17 G: 17 POWDER, FOR SOLUTION ORAL at 23:48

## 2018-09-16 RX ADMIN — SODIUM CHLORIDE 1000 ML: 0.9 INJECTION, SOLUTION INTRAVENOUS at 20:35

## 2018-09-16 RX ADMIN — HYDROXYZINE 10 MG: 10 TABLET, FILM COATED ORAL at 23:49

## 2018-09-16 NOTE — PROGRESS NOTES
[address]        NAME: Junito Pastrana is a 79 y o  female  : 1950    MRN: [id number]  DATE: 2018  TIME: 6:29 PM    Assessment and Plan   Generalized abdominal pain [R10 84]  1  Generalized abdominal pain  Transfer to other facility     Severe abdominal pain, hx of abdominal surgeries, cannot r/o SBO or other acute abdominal pathology  Patient going to ED for further evaluation and treatment  Declined any tylenol or motrin offered here in the Urgent care    Patient Instructions       Patient declines ambulance  She will drive herself directly to [address] [place] [address] ED for further evaluation and treatment  Chief Complaint     Chief Complaint   Patient presents with    Abdominal Pain         History of Present Illness       Abdominal Pain   This is a new problem  The current episode started yesterday  The onset quality is sudden  The problem occurs constantly  The problem has been gradually worsening  The pain is located in the generalized abdominal region  The pain is at a severity of 8/10  The pain is severe  The quality of the pain is aching and sharp  The abdominal pain does not radiate  Associated symptoms include constipation (but states this does not feel like her normal constipation, did have a normal BM 2 days ago)  Pertinent negatives include no diarrhea, dysuria, fever, flatus (does not believe she has passed any gas since yesterday), frequency, headaches, hematuria, myalgias, nausea, vomiting or weight loss  Anorexia: was only able to eat a few bites of breakfast today and yesterday  Nothing aggravates the pain  The pain is relieved by nothing (has been taking her stool softeners)  She has tried oral narcotic analgesics for the symptoms  The treatment provided no relief  Her past medical history is significant for abdominal surgery  Also notes history of chronic opioid use for pain but normally the stool softeners work   Hx of HTN and DM but denies epigastirc pain, nausea, CP or SOB  Review of Systems   Review of Systems   Constitutional: Negative for chills, fever and weight loss  Eyes: Negative for visual disturbance  Respiratory: Negative for cough and shortness of breath  Cardiovascular: Negative for chest pain  Gastrointestinal: Positive for abdominal distention, abdominal pain and constipation (but states this does not feel like her normal constipation, did have a normal BM 2 days ago)  Negative for diarrhea, flatus (does not believe she has passed any gas since yesterday), nausea and vomiting  Anorexia: was only able to eat a few bites of breakfast today and yesterday  Genitourinary: Negative for dysuria, frequency and hematuria  Musculoskeletal: Negative for back pain and myalgias  Skin: Negative for rash  Neurological: Negative for dizziness, numbness and headaches  All other systems reviewed and are negative          Current Medications       Current Outpatient Prescriptions:     apixaban (ELIQUIS) 5 mg, Take 1 tablet (5 mg total) by mouth 2 (two) times a day, Disp: 180 tablet, Rfl: 3    diclofenac (VOLTAREN) 75 mg EC tablet, Take 50 mg by mouth 2 (two) times a day, Disp: , Rfl:     diphenhydrAMINE (NIGHT TIME SLEEP AID) 25 MG tablet, Take 50 mg by mouth daily at bedtime as needed for sleep, Disp: , Rfl:     furosemide (LASIX) 20 mg tablet, Take 40 mg by mouth daily  , Disp: , Rfl:     gabapentin (NEURONTIN) 300 mg capsule, 1 tab in the am and 3 tabs PO night time, Disp: 120 capsule, Rfl: 5    hydrochlorothiazide (HYDRODIURIL) 25 mg tablet, Take 1 tablet (25 mg total) by mouth daily, Disp: 30 tablet, Rfl: 6    hydrOXYzine HCL (ATARAX) 10 mg tablet, Take 1 tablet (10 mg total) by mouth daily at bedtime, Disp: 15 tablet, Rfl: 0    losartan (COZAAR) 100 MG tablet, Take 1 tablet (100 mg total) by mouth daily, Disp: 90 tablet, Rfl: 1    metFORMIN (GLUCOPHAGE) 500 mg tablet, Take 1 tablet (500 mg total) by mouth daily with breakfast, Disp: 90 tablet, Rfl: 0    methocarbamol (ROBAXIN) 500 mg tablet, Take 1/2 tablet in am, 1/2 tablet in afternoon and 1 tablet at bedtime  No driving or operating heavy machinery with this medication  , Disp: 60 tablet, Rfl: 0    omeprazole (PriLOSEC) 20 mg delayed release capsule, Take 20 mg by mouth daily, Disp: , Rfl:     oxyCODONE-acetaminophen (PERCOCET)  mg per tablet, Take 1 tablet by mouth every 6 (six) hours as needed for moderate pain Max Daily Amount: 4 tablets, Disp: 120 tablet, Rfl: 0    senna (SENOKOT) 8 6 MG tablet, Take 2 tablets by mouth daily, Disp: , Rfl:     spironolactone (ALDACTONE) 100 mg tablet, Take 1 tablet (100 mg total) by mouth daily, Disp: 90 tablet, Rfl: 1    LORazepam (ATIVAN) 0 5 mg tablet, Take 1 tablet 2 hours prior to MRI, and may take additional tablet just prior to MRI if needed  Must have a  to and from MRI  (Patient not taking: Reported on 9/16/2018 ), Disp: 2 tablet, Rfl: 0    Current Allergies     Allergies as of 09/16/2018    (No Known Allergies)            The following portions of the patient's history were reviewed and updated as appropriate: allergies, current medications, past family history, past medical history, past social history, past surgical history and problem list      Past Medical History:   Diagnosis Date    Cancer [facility]     Diabetes mellitus ([facility] [facility]     Drug dependence ([facility]     Hypertension        Past Surgical History:   Procedure Laterality Date    DENTAL SURGERY      HYSTERECTOMY      LIVER BIOPSY      LIVER SURGERY      Ablation    TONSILLECTOMY         Family History   Problem Relation Age of Onset    Prostate cancer Father          Medications have been verified          Objective   /70 (BP Location: Left arm, Patient Position: Sitting, Cuff Size: Large)   Pulse 76   Temp 97 8 °F (36 6 °C) (Temporal)   Ht 5' 3" (1 6 m)   Wt 81 6 kg (180 lb)   SpO2 93%   BMI 31 89 kg/m²        Physical Exam     Physical Exam   Constitutional: She is oriented to person, place, and time  She appears well-developed and well-nourished  Holding her stomach, mild discomfort but is able to walk to the exam table and lay down    HENT:   Head: Normocephalic and atraumatic  Mouth/Throat: Oropharynx is clear and moist    Eyes: EOM are normal  Pupils are equal, round, and reactive to light  Neck: Normal range of motion  Neck supple  Cardiovascular: Normal rate, regular rhythm and normal heart sounds  Pulmonary/Chest: Effort normal and breath sounds normal  She has no wheezes  Abdominal: Soft  Bowel sounds are normal  There is tenderness (moderate generalized tenderness)  Healed surgical scar visible on abdomen   Musculoskeletal: Normal range of motion  Neurological: She is alert and oriented to person, place, and time  She has normal reflexes  Skin: Skin is warm and dry  Psychiatric: She has a normal mood and affect  Her behavior is normal    Nursing note and vitals reviewed

## 2018-09-16 NOTE — ED PROVIDER NOTES
History  Chief Complaint   Patient presents with    Abdominal Pain     Pt with abd  pain and bloating since 9/13/18  Denies N/V/D  Last BM was 3 days ago  Hx of abd  surgery for her liver (removed part of it in 2014)     Patient is a 66-year-old female with a history of hepatic cancer status post abdominal surgery who presents with abdominal pain  Patient says she has had 3 days of increasing generalized abdominal pain associated with abdominal distention  She describes the pain is throughout her abdomen and cramping in nature  It is nonradiating  She has also had associated lack of bowel movements over the past 2 days  This is atypical for her  She denies recent diarrhea, melena, hematochezia  She denies any urinary complaints including hematuria dysuria  She denies any vaginal complaints including vaginal discharge or vaginal bleeding  She denies nausea or vomiting but does admit to decreased p o  intake due to a lack of appetite  She says that it it took her off morning to drink a soda because she just was not feeling well  He has not had any fevers or chills or other infectious symptoms including cough, rhinorrhea, congestion  Patient was seen previously at urgent care and she was directed to come in because bowel obstruction/abdominal pathology cannot be ruled out  Patient takes Percocet chronically for back pain  She says Percocet of help with the pain though it is still very bad at this time  She has never experienced pain like this in the past             Prior to Admission Medications   Prescriptions Last Dose Informant Patient Reported? Taking? LORazepam (ATIVAN) 0 5 mg tablet Past Month at Unknown time  No Yes   Sig: Take 1 tablet 2 hours prior to MRI, and may take additional tablet just prior to MRI if needed  Must have a  to and from MRI     apixaban (ELIQUIS) 5 mg 9/16/2018 at 0900time  No Yes   Sig: Take 1 tablet (5 mg total) by mouth 2 (two) times a day   diclofenac (VOLTAREN) 75 mg EC tablet Past Week at Unknown time Self Yes Yes   Sig: Take 50 mg by mouth 2 (two) times a day   diphenhydrAMINE (NIGHT TIME SLEEP AID) 25 MG tablet 9/15/2018 at Unknown time Self Yes Yes   Sig: Take 50 mg by mouth daily at bedtime as needed for sleep   furosemide (LASIX) 20 mg tablet Past Week at Unknown time Self Yes Yes   Sig: Take 40 mg by mouth daily     gabapentin (NEURONTIN) 300 mg capsule 2018 at Unknown time  No Yes   Si tab in the am and 3 tabs PO night time   hydrOXYzine HCL (ATARAX) 10 mg tablet Past Week at Unknown time Self No Yes   Sig: Take 1 tablet (10 mg total) by mouth daily at bedtime   hydrochlorothiazide (HYDRODIURIL) 25 mg tablet Past Week at Unknown time Self No Yes   Sig: Take 1 tablet (25 mg total) by mouth daily   losartan (COZAAR) 100 MG tablet Past Week at Unknown time Self No Yes   Sig: Take 1 tablet (100 mg total) by mouth daily   metFORMIN (GLUCOPHAGE) 500 mg tablet Past Week at Unknown time Self No Yes   Sig: Take 1 tablet (500 mg total) by mouth daily with breakfast   methocarbamol (ROBAXIN) 500 mg tablet Past Week at Unknown time  No Yes   Sig: Take 1/2 tablet in am, 1/2 tablet in afternoon and 1 tablet at bedtime  No driving or operating heavy machinery with this medication     omeprazole (PriLOSEC) 20 mg delayed release capsule Past Week at Unknown time Self Yes Yes   Sig: Take 20 mg by mouth daily   oxyCODONE-acetaminophen (PERCOCET)  mg per tablet 2018 at Unknown time  No Yes   Sig: Take 1 tablet by mouth every 6 (six) hours as needed for moderate pain Max Daily Amount: 4 tablets   senna (SENOKOT) 8 6 MG tablet 2018 at Unknown time Self Yes Yes   Sig: Take 2 tablets by mouth daily   spironolactone (ALDACTONE) 100 mg tablet Past Week at Unknown time Self No Yes   Sig: Take 1 tablet (100 mg total) by mouth daily      Facility-Administered Medications: None       Past Medical History:   Diagnosis Date    Cancer ([facility] )     Diabetes mellitus ([facility] )  Drug dependence ([facility] )     Hypertension        Past Surgical History:   Procedure Laterality Date    DENTAL SURGERY      HYSTERECTOMY      LIVER BIOPSY      LIVER SURGERY      Ablation    TONSILLECTOMY         Family History   Problem Relation Age of Onset    Prostate cancer Father      I have reviewed and agree with the history as documented  Social History   Substance Use Topics    Smoking status: Current Every Day Smoker     Packs/day: 1 00    Smokeless tobacco: Never Used    Alcohol use No        Review of Systems   Constitutional: Positive for appetite change  Negative for chills, diaphoresis, fatigue and fever  HENT: Negative for facial swelling, sore throat and trouble swallowing  Respiratory: Negative for cough, chest tightness, shortness of breath and wheezing  Cardiovascular: Negative for chest pain  Gastrointestinal: Positive for abdominal distention, abdominal pain and constipation  Negative for anal bleeding, blood in stool, diarrhea, nausea, rectal pain and vomiting  Genitourinary: Negative for dysuria  Musculoskeletal: Negative for back pain, neck pain and neck stiffness  Skin: Negative for color change, pallor, rash and wound  Neurological: Negative for weakness, light-headedness and numbness  Psychiatric/Behavioral: Negative for agitation  All other systems reviewed and are negative        Physical Exam  ED Triage Vitals [09/16/18 1906]   Temperature Pulse Respirations Blood Pressure SpO2   99 3 °F (37 4 °C) 75 18 113/69 98 %      Temp Source Heart Rate Source Patient Position - Orthostatic VS BP Location FiO2 (%)   Tympanic Monitor Sitting Left arm --      Pain Score       8           Orthostatic Vital Signs  Vitals:    09/16/18 1906 09/16/18 2013 09/16/18 2130 09/16/18 2241   BP: 113/69 108/64 112/57 111/64   Pulse: 75 76 72 71   Patient Position - Orthostatic VS: Sitting Lying Sitting        Physical Exam   Constitutional: She is oriented to person, place, and time  She appears well-developed and well-nourished  No distress  HENT:   Head: Normocephalic  Eyes: Pupils are equal, round, and reactive to light  Neck: Normal range of motion  Neck supple  Cardiovascular: Normal rate, regular rhythm, normal heart sounds and intact distal pulses  Pulmonary/Chest: Effort normal and breath sounds normal    Abdominal: Soft  Bowel sounds are normal  She exhibits distension  There is tenderness  There is no guarding  Abdomen is distended and diffusely tender in the upper quadrant  Patient with voluntary guarding  No rebound tenderness  Musculoskeletal: Normal range of motion  She exhibits no edema, tenderness or deformity  Neurological: She is alert and oriented to person, place, and time  No cranial nerve deficit or sensory deficit  Skin: Skin is warm and dry  Capillary refill takes less than 2 seconds  Psychiatric: She has a normal mood and affect  Her behavior is normal  Judgment and thought content normal    Vitals reviewed        ED Medications  Medications   apixaban (ELIQUIS) tablet 5 mg (5 mg Oral Given 9/16/18 2349)   diphenhydrAMINE (BENADRYL) tablet 25 mg (not administered)   furosemide (LASIX) tablet 40 mg (not administered)   gabapentin (NEURONTIN) capsule 300 mg (not administered)   hydrochlorothiazide (HYDRODIURIL) tablet 25 mg (not administered)   hydrOXYzine HCL (ATARAX) tablet 10 mg (10 mg Oral Given 9/16/18 2349)   losartan (COZAAR) tablet 100 mg (not administered)   oxyCODONE-acetaminophen (PERCOCET) 5-325 mg per tablet 1 tablet (not administered)   senna (SENOKOT) tablet 17 2 mg (not administered)   spironolactone (ALDACTONE) tablet 100 mg (not administered)   nicotine (NICODERM CQ) 21 mg/24 hr TD 24 hr patch 1 patch (not administered)   acetaminophen (TYLENOL) tablet 650 mg (not administered)   docusate sodium (COLACE) capsule 100 mg (not administered)   polyethylene glycol (MIRALAX) packet 17 g (17 g Oral Given 9/16/18 2348)   gabapentin (NEURONTIN) capsule 900 mg (not administered)   aluminum-magnesium hydroxide-simethicone (MYLANTA) 200-200-20 mg/5 mL oral suspension 15 mL (not administered)   omeprazole (PRILOSEC) suspension 2 mg/mL (not administered)   sodium chloride 0 9 % bolus 1,000 mL (0 mL Intravenous Stopped 9/16/18 2135)   morphine (PF) 4 mg/mL injection 4 mg (4 mg Intravenous Given 9/16/18 2038)   iohexol (OMNIPAQUE) 350 MG/ML injection (MULTI-DOSE) 100 mL (100 mL Intravenous Given 9/16/18 2109)   morphine (PF) 4 mg/mL injection 4 mg (4 mg Intravenous Given 9/16/18 2215)       Diagnostic Studies  Results Reviewed     Procedure Component Value Units Date/Time    Osmolality, urine [[id number]]  (Normal) Collected:  09/16/18 2030    Lab Status:  Final result Specimen:  Urine from Urine, Clean Catch Updated:  09/16/18 2358     Osmolality, Ur 389 mmol/KG     Sodium, urine, random [[id number]] Collected:  09/16/18 2030    Lab Status:  Final result Specimen:  Urine from Urine, Clean Catch Updated:  09/16/18 2354     Sodium, Ur 17    Urine Microscopic [[id number]]  (Abnormal) Collected:  09/16/18 2030    Lab Status:  Final result Specimen:  Urine from Urine, Clean Catch Updated:  09/16/18 2108     RBC, UA None Seen /hpf      WBC, UA 10-20 (A) /hpf      Epithelial Cells Moderate (A) /hpf      Bacteria, UA Moderate (A) /hpf     Urine culture [[id number]] Collected:  09/16/18 2030    Lab Status: In process Specimen:  Urine from Urine, Clean Catch Updated:  09/16/18 2107    Lactic acid, plasma [[id number]]  (Normal) Collected:  09/16/18 2023    Lab Status:  Final result Specimen:  Blood from Arm, Right Updated:  09/16/18 2052     LACTIC ACID 1 3 mmol/L     Narrative:         Result may be elevated if tourniquet was used during collection      Comprehensive metabolic panel [[id number]]  (Abnormal) Collected:  09/16/18 2023    Lab Status:  Final result Specimen:  Blood from Arm, Right Updated:  09/16/18 2050     Sodium 121 (L) mmol/L      Potassium 3 9 mmol/L Chloride 89 (L) mmol/L      CO2 23 mmol/L      ANION GAP 9 mmol/L      BUN 28 (H) mg/dL      Creatinine 1 54 (H) mg/dL      Glucose 123 mg/dL      Calcium 9 8 mg/dL      AST 48 (H) U/L      ALT 22 U/L      Alkaline Phosphatase 108 U/L      Total Protein 9 3 (H) g/dL      Albumin 3 7 g/dL      Total Bilirubin 1 03 (H) mg/dL      eGFR 40 ml/min/1 73sq m     Narrative:         National Kidney Disease Education Program recommendations are as follows:  GFR calculation is accurate only with a steady state creatinine  Chronic Kidney disease less than 60 ml/min/1 73 sq  meters  Kidney failure less than 15 ml/min/1 73 sq  meters      Lipase [[id number]]  (Normal) Collected:  09/16/18 2023    Lab Status:  Final result Specimen:  Blood from Arm, Right Updated:  09/16/18 2050     Lipase 170 u/L     CBC and differential [[id number]]  (Abnormal) Collected:  09/16/18 2023    Lab Status:  Final result Specimen:  Blood from Arm, Right Updated:  09/16/18 2034     WBC 14 23 (H) Thousand/uL      RBC 3 91 Million/uL      Hemoglobin 11 0 (L) g/dL      Hematocrit 33 2 (L) %      MCV 85 fL      MCH 28 1 pg      MCHC 33 1 g/dL      RDW 14 0 %      MPV 10 5 fL      Platelets 760 Thousands/uL      nRBC 0 /100 WBCs      Neutrophils Relative 82 (H) %      Immat GRANS % 1 %      Lymphocytes Relative 8 (L) %      Monocytes Relative 9 %      Eosinophils Relative 0 %      Basophils Relative 0 %      Neutrophils Absolute 11 67 (H) Thousands/µL      Immature Grans Absolute 0 07 Thousand/uL      Lymphocytes Absolute 1 15 Thousands/µL      Monocytes Absolute 1 27 (H) Thousand/µL      Eosinophils Absolute 0 04 Thousand/µL      Basophils Absolute 0 03 Thousands/µL     ED Urine Macroscopic [[id number]]  (Abnormal) Collected:  09/16/18 2030    Lab Status:  Final result Specimen:  Urine Updated:  09/16/18 2031     Color, UA      Clarity, UA Cloudy     pH, UA 5 5     Leukocytes, UA Small (A)     Nitrite, UA Negative     Protein, UA 30 (1+) (A) mg/dl Glucose, UA Negative mg/dl      Ketones, UA Negative mg/dl      Urobilinogen, UA 1 0 E U /dl      Bilirubin, UA Interference- unable to analyze (A)     Blood, UA Negative     Specific Gravity, UA 1 015    Narrative:       CLINITEK RESULT                 CT abdomen pelvis with contrast   Final Result by Ryan Hernandez MD (09/16 2140)   1  No evidence of bowel obstruction  2   Hepatomegaly and cirrhosis  Persistent findings of portal venous thrombosis with cavernous transformation  Persistent hypodense lesion at the lateral right dome of the liver with hypodense appearance of the right anterior segment the liver similar    in appearance to prior MRI  3   Colonic diverticulosis without evidence of acute diverticulitis  4   Moderate stool retention throughout the colon  Workstation performed: [id number]         XR chest 2 views   ED Interpretation by Lonnie Gardiner MD (09/16 2116)   ED wet read:  No acute cardiopulmonary process noted            Procedures  Procedures      Phone Consults  ED Phone Contact    ED Course                               MDM  Number of Diagnoses or Management Options  Abdominal pain: new and requires workup  Hyponatremia: new and requires workup  Diagnosis management comments: Initial evaluation:  Patient is a 26-year-old female with a surgical history presents with abdominal distention and abdominal pain  There is concern at this time for possible bowel obstruction versus abdominal pathology  We will get lab work and a CT scan for her  She will get a fluid bolus and morphine for pain control  She will be reassessed after this  Final evaluation:  Patient is 26-year-old female who presents with abdominal distention and abdominal pain  CT abdomen pelvis was negative for any acute findings  However, lab work revealed significant hyponatremia with a sodium of 121 and a leukocytosis of 14,000  The etiology for these abnormalities are not currently known    The patient will be admitted for further workup of abdominal pain, hyponatremia, leukocytosis  Patient's  Pain is well controlled with 2 doses of 4 mg morphine throughout her stay here in the ED  Amount and/or Complexity of Data Reviewed  Clinical lab tests: ordered and reviewed  Tests in the radiology section of CPT®: ordered and reviewed    Risk of Complications, Morbidity, and/or Mortality  Presenting problems: moderate  Diagnostic procedures: low  Management options: low    Patient Progress  Patient progress: stable    CritCare Time    Disposition  Final diagnoses:   Hyponatremia   Abdominal pain     Time reflects when diagnosis was documented in both MDM as applicable and the Disposition within this note     Time User Action Codes Description Comment    9/16/2018 10:04 PM Gailberta Ambrose Add [E87 1] Hyponatremia     9/16/2018 10:04 PM Cameron, [address] [R10 9] Abdominal pain       ED Disposition     ED Disposition Condition Comment    Admit  Case was discussed with SOD and the patient's admission status was agreed to be Admission Status: inpatient status to the service of Dr Thania Clayton   Follow-up Information    None         Current Discharge Medication List      CONTINUE these medications which have NOT CHANGED    Details   apixaban (ELIQUIS) 5 mg Take 1 tablet (5 mg total) by mouth 2 (two) times a day  Qty: 180 tablet, Refills: 3    Associated Diagnoses: Hepatocellular carcinoma ([address] );  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      diclofenac (VOLTAREN) 75 mg EC tablet Take 50 mg by mouth 2 (two) times a day      diphenhydrAMINE (NIGHT TIME SLEEP AID) 25 MG tablet Take 50 mg by mouth daily at bedtime as needed for sleep      furosemide (LASIX) 20 mg tablet Take 40 mg by mouth daily        gabapentin (NEURONTIN) 300 mg capsule 1 tab in the am and 3 tabs PO night time  Qty: 120 capsule, Refills: 5    Associated Diagnoses: Lumbar disc herniation      hydrochlorothiazide (HYDRODIURIL) 25 mg tablet Take 1 tablet (25 mg total) by mouth daily  Qty: 30 tablet, Refills: 6    Associated Diagnoses: Essential hypertension      hydrOXYzine HCL (ATARAX) 10 mg tablet Take 1 tablet (10 mg total) by mouth daily at bedtime  Qty: 15 tablet, Refills: 0    Comments: 1 tab po for anxiety as needed before flying by plane (round trip)  Associated Diagnoses: Anxiety      LORazepam (ATIVAN) 0 5 mg tablet Take 1 tablet 2 hours prior to MRI, and may take additional tablet just prior to MRI if needed  Must have a  to and from MRI  Qty: 2 tablet, Refills: 0    Associated Diagnoses: Claustrophobia      losartan (COZAAR) 100 MG tablet Take 1 tablet (100 mg total) by mouth daily  Qty: 90 tablet, Refills: 1    Associated Diagnoses: Cirrhosis (HCC)      metFORMIN (GLUCOPHAGE) 500 mg tablet Take 1 tablet (500 mg total) by mouth daily with breakfast  Qty: 90 tablet, Refills: 0    Associated Diagnoses: Type 2 diabetes mellitus with complication, unspecified long term insulin use status      methocarbamol (ROBAXIN) 500 mg tablet Take 1/2 tablet in am, 1/2 tablet in afternoon and 1 tablet at bedtime  No driving or operating heavy machinery with this medication  Qty: 60 tablet, Refills: 0    Associated Diagnoses: Myofascial pain syndrome      omeprazole (PriLOSEC) 20 mg delayed release capsule Take 20 mg by mouth daily      oxyCODONE-acetaminophen (PERCOCET)  mg per tablet Take 1 tablet by mouth every 6 (six) hours as needed for moderate pain Max Daily Amount: 4 tablets  Qty: 120 tablet, Refills: 0    Comments: Do not fill until 9/1/18  Associated Diagnoses: Chronic pain syndrome      senna (SENOKOT) 8 6 MG tablet Take 2 tablets by mouth daily      spironolactone (ALDACTONE) 100 mg tablet Take 1 tablet (100 mg total) by mouth daily  Qty: 90 tablet, Refills: 1    Associated Diagnoses: Cirrhosis ([address] )           No discharge procedures on file  ED Provider  Attending physically available and evaluated Brianalela Maggie ANNA managed the patient along with the ED Attending      Electronically Signed by         Elisabeth Renee MD  09/17/18 0005

## 2018-09-16 NOTE — PROGRESS NOTES
Pt had all over pain for 3 days  Was in bed all day  Today, the pain is mostly in her abdomen  States it feels sore  Not eating much  States she hasn't moved her bowels in 2 days  Feeling weak and tired

## 2018-09-16 NOTE — PATIENT INSTRUCTIONS
Patient declines ambulance  She will drive herself directly to [facility] ED for further evaluation and treatment

## 2018-09-16 NOTE — ED ATTENDING ATTESTATION
Nicole Malcolm MD, saw and evaluated the patient  I have discussed the patient with the resident/non-physician practitioner and agree with the resident's/non-physician practitioner's findings, Plan of Care, and MDM as documented in the resident's/non-physician practitioner's note, except where noted  All available labs and Radiology studies were reviewed  At this point I agree with the current assessment done in the Emergency Department  I have conducted an independent evaluation of this patient a history and physical is as follows:     Pt has 3 days of abd pain and distention no bms for past 3 days Pt is sp partial liver removal no urinary symptoms no vomiting  PE: alert heart reg lungs clear abd + distention tender r upper abdomen   Ext nad neruo nonfocal MDM: will check labs ct abd reeval  Critical Care Time  CritCare Time    Procedures

## 2018-09-17 PROBLEM — K72.90 DECOMPENSATED HEPATIC CIRRHOSIS (HCC): Status: ACTIVE | Noted: 2018-09-17

## 2018-09-17 PROBLEM — K74.60 DECOMPENSATED HEPATIC CIRRHOSIS (HCC): Status: ACTIVE | Noted: 2018-09-17

## 2018-09-17 PROBLEM — R18.8 ASCITES: Status: ACTIVE | Noted: 2018-09-17

## 2018-09-17 LAB
ANION GAP SERPL CALCULATED.3IONS-SCNC: 8 MMOL/L (ref 4–13)
ATRIAL RATE: 72 BPM
BUN SERPL-MCNC: 27 MG/DL (ref 5–25)
CALCIUM SERPL-MCNC: 8.9 MG/DL (ref 8.3–10.1)
CHLORIDE SERPL-SCNC: 97 MMOL/L (ref 100–108)
CO2 SERPL-SCNC: 22 MMOL/L (ref 21–32)
CREAT SERPL-MCNC: 1.19 MG/DL (ref 0.6–1.3)
ERYTHROCYTE [DISTWIDTH] IN BLOOD BY AUTOMATED COUNT: 14.2 % (ref 11.6–15.1)
EST. AVERAGE GLUCOSE BLD GHB EST-MCNC: 111 MG/DL
GFR SERPL CREATININE-BSD FRML MDRD: 55 ML/MIN/1.73SQ M
GLUCOSE SERPL-MCNC: 141 MG/DL (ref 65–140)
GLUCOSE SERPL-MCNC: 146 MG/DL (ref 65–140)
GLUCOSE SERPL-MCNC: 164 MG/DL (ref 65–140)
GLUCOSE SERPL-MCNC: 179 MG/DL (ref 65–140)
GLUCOSE SERPL-MCNC: 92 MG/DL (ref 65–140)
HBA1C MFR BLD: 5.5 % (ref 4.2–6.3)
HCT VFR BLD AUTO: 30.5 % (ref 34.8–46.1)
HGB BLD-MCNC: 10.2 G/DL (ref 11.5–15.4)
INR PPP: 1.47 (ref 0.86–1.17)
MAGNESIUM SERPL-MCNC: 2.5 MG/DL (ref 1.6–2.6)
MCH RBC QN AUTO: 28.2 PG (ref 26.8–34.3)
MCHC RBC AUTO-ENTMCNC: 33.4 G/DL (ref 31.4–37.4)
MCV RBC AUTO: 84 FL (ref 82–98)
OSMOLALITY UR/SERPL-RTO: 269 MMOL/KG (ref 282–298)
P AXIS: 45 DEGREES
PHOSPHATE SERPL-MCNC: 3.4 MG/DL (ref 2.3–4.1)
PLATELET # BLD AUTO: 170 THOUSANDS/UL (ref 149–390)
PMV BLD AUTO: 11.3 FL (ref 8.9–12.7)
POTASSIUM SERPL-SCNC: 3.4 MMOL/L (ref 3.5–5.3)
PR INTERVAL: 128 MS
PROTHROMBIN TIME: 17.9 SECONDS (ref 11.8–14.2)
QRS AXIS: -38 DEGREES
QRSD INTERVAL: 84 MS
QT INTERVAL: 372 MS
QTC INTERVAL: 407 MS
RBC # BLD AUTO: 3.62 MILLION/UL (ref 3.81–5.12)
SODIUM SERPL-SCNC: 127 MMOL/L (ref 136–145)
T WAVE AXIS: -70 DEGREES
VENTRICULAR RATE: 72 BPM
WBC # BLD AUTO: 9.77 THOUSAND/UL (ref 4.31–10.16)

## 2018-09-17 PROCEDURE — 82948 REAGENT STRIP/BLOOD GLUCOSE: CPT

## 2018-09-17 PROCEDURE — 85027 COMPLETE CBC AUTOMATED: CPT | Performed by: INTERNAL MEDICINE

## 2018-09-17 PROCEDURE — 83735 ASSAY OF MAGNESIUM: CPT | Performed by: INTERNAL MEDICINE

## 2018-09-17 PROCEDURE — 93010 ELECTROCARDIOGRAM REPORT: CPT | Performed by: INTERNAL MEDICINE

## 2018-09-17 PROCEDURE — 80048 BASIC METABOLIC PNL TOTAL CA: CPT | Performed by: INTERNAL MEDICINE

## 2018-09-17 PROCEDURE — 83036 HEMOGLOBIN GLYCOSYLATED A1C: CPT | Performed by: INTERNAL MEDICINE

## 2018-09-17 PROCEDURE — 84100 ASSAY OF PHOSPHORUS: CPT | Performed by: INTERNAL MEDICINE

## 2018-09-17 PROCEDURE — 85610 PROTHROMBIN TIME: CPT | Performed by: INTERNAL MEDICINE

## 2018-09-17 RX ORDER — ACETAMINOPHEN 325 MG/1
650 TABLET ORAL EVERY 6 HOURS PRN
Status: DISCONTINUED | OUTPATIENT
Start: 2018-09-17 | End: 2018-09-19 | Stop reason: HOSPADM

## 2018-09-17 RX ORDER — FUROSEMIDE 10 MG/ML
40 INJECTION INTRAMUSCULAR; INTRAVENOUS ONCE
Status: COMPLETED | OUTPATIENT
Start: 2018-09-17 | End: 2018-09-17

## 2018-09-17 RX ORDER — POLYETHYLENE GLYCOL 3350 17 G/17G
17 POWDER, FOR SOLUTION ORAL DAILY PRN
Status: DISCONTINUED | OUTPATIENT
Start: 2018-09-17 | End: 2018-09-19 | Stop reason: HOSPADM

## 2018-09-17 RX ORDER — PANTOPRAZOLE SODIUM 40 MG/1
40 TABLET, DELAYED RELEASE ORAL
Status: DISCONTINUED | OUTPATIENT
Start: 2018-09-18 | End: 2018-09-19 | Stop reason: HOSPADM

## 2018-09-17 RX ORDER — LACTULOSE 20 G/30ML
30 SOLUTION ORAL ONCE
Status: COMPLETED | OUTPATIENT
Start: 2018-09-17 | End: 2018-09-17

## 2018-09-17 RX ORDER — AMOXICILLIN 250 MG
1 CAPSULE ORAL 2 TIMES DAILY
Status: DISCONTINUED | OUTPATIENT
Start: 2018-09-17 | End: 2018-09-19 | Stop reason: HOSPADM

## 2018-09-17 RX ORDER — FUROSEMIDE 10 MG/ML
20 INJECTION INTRAMUSCULAR; INTRAVENOUS ONCE
Status: DISCONTINUED | OUTPATIENT
Start: 2018-09-17 | End: 2018-09-17

## 2018-09-17 RX ADMIN — SPIRONOLACTONE 100 MG: 50 TABLET ORAL at 10:11

## 2018-09-17 RX ADMIN — ALUMINUM HYDROXIDE, MAGNESIUM HYDROXIDE, AND SIMETHICONE 15 ML: 200; 200; 20 SUSPENSION ORAL at 00:07

## 2018-09-17 RX ADMIN — OXYCODONE HYDROCHLORIDE AND ACETAMINOPHEN 1 TABLET: 5; 325 TABLET ORAL at 17:15

## 2018-09-17 RX ADMIN — FUROSEMIDE 40 MG: 40 TABLET ORAL at 10:14

## 2018-09-17 RX ADMIN — POLYETHYLENE GLYCOL 3350 17 G: 17 POWDER, FOR SOLUTION ORAL at 10:11

## 2018-09-17 RX ADMIN — APIXABAN 5 MG: 5 TABLET, FILM COATED ORAL at 23:08

## 2018-09-17 RX ADMIN — SENNOSIDES AND DOCUSATE SODIUM 1 TABLET: 8.6; 5 TABLET ORAL at 17:15

## 2018-09-17 RX ADMIN — DOCUSATE SODIUM 100 MG: 100 CAPSULE, LIQUID FILLED ORAL at 10:12

## 2018-09-17 RX ADMIN — HYDROXYZINE 10 MG: 10 TABLET, FILM COATED ORAL at 23:07

## 2018-09-17 RX ADMIN — SENNOSIDES 17.2 MG: 8.6 TABLET, FILM COATED ORAL at 10:14

## 2018-09-17 RX ADMIN — GABAPENTIN 900 MG: 300 CAPSULE ORAL at 00:07

## 2018-09-17 RX ADMIN — NICOTINE 1 PATCH: 21 PATCH, EXTENDED RELEASE TRANSDERMAL at 10:09

## 2018-09-17 RX ADMIN — GABAPENTIN 900 MG: 300 CAPSULE ORAL at 23:07

## 2018-09-17 RX ADMIN — GABAPENTIN 300 MG: 300 CAPSULE ORAL at 10:12

## 2018-09-17 RX ADMIN — OXYCODONE HYDROCHLORIDE AND ACETAMINOPHEN 1 TABLET: 5; 325 TABLET ORAL at 00:07

## 2018-09-17 RX ADMIN — APIXABAN 5 MG: 5 TABLET, FILM COATED ORAL at 13:14

## 2018-09-17 RX ADMIN — LACTULOSE 30 G: 20 SOLUTION ORAL at 10:10

## 2018-09-17 RX ADMIN — FUROSEMIDE 40 MG: 10 INJECTION, SOLUTION INTRAMUSCULAR; INTRAVENOUS at 01:18

## 2018-09-17 RX ADMIN — OXYCODONE HYDROCHLORIDE AND ACETAMINOPHEN 1 TABLET: 5; 325 TABLET ORAL at 11:05

## 2018-09-17 RX ADMIN — OXYCODONE HYDROCHLORIDE AND ACETAMINOPHEN 1 TABLET: 5; 325 TABLET ORAL at 23:08

## 2018-09-17 RX ADMIN — HYDROCHLOROTHIAZIDE 25 MG: 25 TABLET ORAL at 10:15

## 2018-09-17 RX ADMIN — LOSARTAN POTASSIUM 100 MG: 50 TABLET ORAL at 10:14

## 2018-09-17 NOTE — CASE MANAGEMENT
Initial Clinical Review    Admission: Date/Time/Statement: 9/16/18 @ 2205     Orders Placed This Encounter   Procedures    Inpatient Admission (expected length of stay for this patient is greater than two midnights)     Standing Status:   Standing     Number of Occurrences:   1     Order Specific Question:   Admitting Physician     Answer:   Mark Smith     Order Specific Question:   Level of Care     Answer:   Med Surg [16]     Order Specific Question:   Estimated length of stay     Answer:   More than 2 Midnights     Order Specific Question:   Certification     Answer:   I certify that inpatient services are medically necessary for this patient for a duration of greater than two midnights  See H&P and MD Progress Notes for additional information about the patient's course of treatment  ED: Date/Time/Mode of Arrival:   ED Arrival Information     Expected Arrival Acuity Means of Arrival Escorted By Service Admission Type    9/16/2018 18:27 9/16/2018 19:01 Urgent Walk-In Self General Medicine Urgent    Arrival Complaint    abdominal pain          Chief Complaint:   Chief Complaint   Patient presents with    Abdominal Pain     Pt with abd  pain and bloating since 9/13/18  Denies N/V/D  Last BM was 3 days ago  Hx of abd  surgery for her liver (removed part of it in 2014)        Kelly Ordoñez is a 79 y o  female with a history of cirrhosis secondary hepatitis C complicated by hepatic cancer status post abdominal surgery who presents to [facility] with abdominal pain that has been increasing in severity over the past 3 days  She describes her pain is crampy without radiation  She has also had a lack of bowel movements over the past day, she reports a typical for her  She feels that her abdomen is much more distended than normal  She does endorse decreased oral intake due to lack of appetite    Of note the patient does have Percocet for chronic back pain      She states she is a big water drinker and that she has not been taking her Lasix or her spironolactone over the last several days  She reports that she still has these medications but that she was feeling lousy so she decided not to take them  She has no new medication changes  She is also uncertain of all of her current medications or dosing  Those available in the electronic medical record were confirmed with the patient, though she is uncertain of the dosing  She stated that her son will bring these in tomorrow  She states that she used to have low sodium, but this has not happened for at least over a year        She has a history of hepatic cirrhosis secondary to hepatitis C was complicated by hepatocellular carcinoma  She has abstained from IV drug use for over 40 years and was treated successfully with Hildy Gunning  She denies any current drug use or alcohol consumption, however she continues to smoke a pack cigarettes a day  She was noted to have hepatocellular carcinoma which is status post resection    To her knowledge she has no active disease      History of Illness:   ED Vital Signs:   ED Triage Vitals [09/16/18 1906]   Temperature Pulse Respirations Blood Pressure SpO2   99 3 °F (37 4 °C) 75 18 113/69 98 % RA sat      Temp Source Heart Rate Source Patient Position - Orthostatic VS BP Location FiO2 (%)   Tympanic Monitor Sitting Left arm --      Pain Score       8        Wt Readings from Last 1 Encounters:   09/16/18 81 kg (178 lb 9 2 oz)         Abnormal Labs/Diagnostic Test Results:       Ref Range & Units 9/17/18 0000 9/16/18 2023   Sodium 136 - 145 mmol/L 127   121     Potassium 3 5 - 5 3 mmol/L 3 4   3 9    Chloride 100 - 108 mmol/L 97   89     CO2 21 - 32 mmol/L 22  23    ANION GAP 4 - 13 mmol/L 8  9    BUN 5 - 25 mg/dL 27   28     Creatinine 0 60 - 1 30 mg/dL 1 19  1 54CM     Glucose 65 - 140 mg/dL 92  123CM    Calcium 8 3 - 10 1 mg/dL 8 9  9 8    eGFR ml/min/1 73sq m 55  40             Ref Range & Units 9/17/18 9834 9/16/18 2023   WBC 4 31 - 10 16 Thousand/uL 9 77  14 23     RBC 3 81 - 5 12 Million/uL 3 62   3 91    Hemoglobin 11 5 - 15 4 g/dL 10 2   11 0     Hematocrit 34 8 - 46 1 % 30 5   33 2     MCV 82 - 98 fL 84  85    MCH 26 8 - 34 3 pg 28 2  28 1    MCHC 31 4 - 37 4 g/dL 33 4  33 1    RDW 11 6 - 15 1 % 14 2  14 0    Platelets 990 - 826 Thousands/uL 170  189    MPV 8 9 - 12 7 fL 11 3  10 5            Urine - Leukocytes - small - Protein 30- Bacteria - innumerable     CXR - subsegmental atelectasis in the right middle lobe and lingula  CT A & P - 1  No evidence of bowel obstruction  2   Hepatomegaly and cirrhosis  Persistent findings of portal venous thrombosis with cavernous transformation  Persistent hypodense lesion at the lateral right dome of the liver with hypodense appearance of the right anterior segment the liver similar   in appearance to prior MRI  3   Colonic diverticulosis without evidence of acute diverticulitis  4   Moderate stool retention throughout the colon      Paracentesis  On 9/17 - pending    ED Treatment:   Medication Administration from 09/16/2018 1826 to 09/16/2018 2224       Date/Time Order Dose Route Action     09/16/2018 2035 sodium chloride 0 9 % bolus 1,000 mL 1,000 mL Intravenous New Bag     09/16/2018 2038 morphine (PF) 4 mg/mL injection 4 mg 4 mg Intravenous Given     09/16/2018 2109 iohexol (OMNIPAQUE) 350 MG/ML injection (MULTI-DOSE) 100 mL 100 mL Intravenous Given     09/16/2018 2215 morphine (PF) 4 mg/mL injection 4 mg 4 mg Intravenous Given       Past Medical/Surgical History:   Diagnosis    Cancer ([address] )    Diabetes mellitus ([address] )    Drug dependence [facility] [address] [facility]    Hypertension     Past Surgical History:   Procedure Laterality Date    DENTAL SURGERY        HYSTERECTOMY        LIVER BIOPSY        LIVER SURGERY         Ablation    TONSILLECTOMY           Admitting Diagnosis: Hyponatremia [E87 1]  Abdominal pain [R10 9]    Age/Sex: 79 y o  female    Assessment/Plan: Hyponatremia  -home spironolactone, Lasix  -sodium 120, most recent sodium prior to arrival is 135  -avoid overly rapid correction of sodium  -likely secondary to volume overload in the setting of medication noncompliance and polydipsia  -urine sodium, serum osmolaity, urine osmolaity      Constipation  -senna, Colace, MiraLax     Hepatic cirrohsis 2/2 HepC complicated by [address]   -Not in acute failure  -hepatitis-C status post harvoni  -HCC s/p resection, no evidence of active disease per patient  -please clarify robaxin and PPI medication with son in the AM     DMII  -Most recent HgA1c 5 3%  -holding home metformin  -diabetic diet  -no need for sliding scale at this time  -glucose checked 4 times daily     Diabetic neuropathy  -Most recent HgA1c 5 3  -continue home gabapentin     Nicotine dependence  -nicotine patch applied     Portal vein thrombosis  -noted on CT scan at admission  -continue Eliquis     Anxiety  -Atarax p r n      Admission Orders:  Scheduled Meds:   Current Facility-Administered Medications:  acetaminophen 650 mg Oral Q6H PRN    aluminum-magnesium hydroxide-simethicone 15 mL Oral Q4H PRN 9/17 x 1    apixaban 5 mg Oral BID    diphenhydrAMINE 25 mg Oral HS PRN    docusate sodium 100 mg Oral BID    Lasix  40 mg  Intravenous Once  9/17 x 1    furosemide 40 mg Oral Daily    gabapentin 300 mg Oral Daily    gabapentin 900 mg Oral HS    hydrochlorothiazide 25 mg Oral Daily    hydrOXYzine HCL 10 mg Oral HS    losartan 100 mg Oral Daily    nicotine 1 patch Transdermal Daily    omeprazole (PRILOSEC) suspension 2 mg/mL 20 mg Oral Early Morning    oxyCODONE-acetaminophen 1 tablet Oral Q6H PRN 9/17 x 1    polyethylene glycol 17 g Oral Daily    senna 17 2 mg Oral Daily    spironolactone 100 mg Oral Daily      Nursing Orders - Vs q 4 - SCD's to  [facility]- Up & OOB as tolerated - Diet cons carb  - fluid restriction 1500 ml

## 2018-09-17 NOTE — SOCIAL WORK
Cm met w pt today & explained Cm role  Pt  Lives alone in [address]  Was [employer], on [employer] & drives  Has friends & family available to help if needed  No dme  H/o vna w [facility]  No h/o rhb  Denies any MH, D&A tx  Uses [facility] on [address]  PCP Dr Diogenes Bellamy  No POA  Emergency contact, son, Logan Layne [phone]  Informed will take preferences into account if any services are needed  Will have transportation home available  CM reviewed d/c planning process including the following: identifying help at home, patient preference for d/c planning needs, Discharge Lounge, [facility] Meds to Bed program, availability of treatment team to discuss questions or concerns patient and/or family may have regarding understanding medications and recognizing signs and symptoms once discharged  CM also encouraged patient to follow up with all recommended appointments after discharge  Patient advised of importance for patient and family to participate in managing patients medical well being

## 2018-09-17 NOTE — ED NOTES
Patient awaiting admission at this time to hospital  Patient awaiting admission bed and bridging orders at this time          Josette Rodney RN  09/16/18 3014

## 2018-09-17 NOTE — PROGRESS NOTES
Patient refused to have blood drawn  Writer called lab to add serum osmolarity  She agreed to have blood work drawn in the morning  She refused to complete her admission  She stated, "I am too tired " Writer asked if we could try to finish it early am  Patient agreed

## 2018-09-17 NOTE — PROGRESS NOTES
[facility] Senior Admission Note   Unit/Bed # @DBLINK (YVQ,80951)@ Encounter: [id number]  SOD Team Ziggy Kate 79 y o  female [phone]       Patient seen and examined  Reviewed H&P per Dr Kinga Iverson  Agree with the assessment and plan  Patient is 79years old female who was hospitalized for abdominal pain, hyponatremia  Patient says that over the past 2 days she was not taking her medication, as she ran out  Over the past 3 day she has epigastric abdominal pain  Pain is stable throughout the day, does not radiate anywhere  Patient denies any nausea, vomiting, weight loss, fever, chills, heartburn, blood in the urine or stool  She admits to loss of appetite and constipation  Patient smokes 1 pack a day, denies any alcohol or drug use  In the ED, chest x-ray - official reading pending, CT abdomen pelvis with contrast - No evidence of bowel obstruction  Hepatomegaly and cirrhosis  Persistent findings of portal venous thrombosis with cavernous transformation  Persistent hypodense lesion at the lateral right dome of the liver with hypodense appearance of the right anterior segment the liver similar in appearance to prior MRI  Colonic diverticulosis without evidence of acute diverticulitis  Moderate stool retention throughout the colon  Labs - WBC 14 2, hemoglobin 11, sodium 121, chloride 89, BUN 28, creatinine 1 54 otherwise normal   Patient received 1L bolus NS in the ED  Assessment/Plan: Principal Problem:    Hyponatremia  Active Problems:    Anxiety    Constipation    Diabetes mellitus, type II (HCC)    Diabetic neuropathy (HCC)    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 ([facility] [address] )    Hepatocellular carcinoma (HCC)    Hypertension    Myofascial pain    Nicotine dependence    Opioid dependence ([facility] [address] )    Lumbar disc herniation    Abdominal pain     Hyponatremia - most likely 2/2 volume overload, patient was not taking her diuretics over the past 2 days    She also admits to drinking lots of water  - urine osmolality, urine sodium, serum osmolality - pending  - currently sodium 121, baseline sodium 135  - will wait for the return of the blood work and most likely give patient IV diuretics    Epigastric abdominal pain - most likely indigestion vs  constipation vs  GERD  - CT abdomen - no acute findings  - continue prilosec 20 mg daily, mylanta prn    Leukocytosis - WBC 14, unknown origin, patient does not appear to be in acute infection  - most likely reactive, will monitor  - recheck CBC in AM    Constipation per CT - patient was started on bowel regimen Colace, senna, MiraLax    S/p resection of Hepatocellular carcinoma 2/2 Hepatic cirrhosis from hep C  - patient underwent resection of hepatocellular carcinoma in 2014  - patient follows up with Gastroenterology and Oncology  - continue lasix, HCTZ, spironolactone    Diabetes mellitus type 2 - patient takes metformin as an outpatient  - holding metformin while inpatient, glucose checks 4 times daily  - A1c in March 2018 - 5 3  - will recheck A1C  - continue gabapentin    Tobacco abuse - patient admits to smoking 1 pack a day  - started on nicotine patch    Chronic portal vein thrombosis per CT - continue eliquis    Anxiety - continue atarax    Myofascial pain / hx  of lumbar disc herniation - patient is a chronic opioid user - continue percocet prn        Patient was not sure about the dosage and medications that she is currently taking  This would need to be verified in the morning with her family         Disposition: INPATIENT     Expected LOS: Bernard Shahid MD

## 2018-09-17 NOTE — PLAN OF CARE
Problem: DISCHARGE PLANNING - CARE MANAGEMENT  Goal: Discharge to post-acute care or home with appropriate resources  INTERVENTIONS:  - Conduct assessment to determine patient/family and health care team treatment goals, and need for post-acute services based on payer coverage, community resources, and patient preferences, and barriers to discharge  - Address psychosocial, clinical, and financial barriers to discharge as identified in assessment in conjunction with the patient/family and health care team  - Arrange appropriate level of post-acute services according to patient's   needs and preference and payer coverage in collaboration with the physician and health care team  - Communicate with and update the patient/family, physician, and health care team regarding progress on the discharge plan  - Arrange appropriate transportation to post-acute venues  - Home with friends & family support  Outcome: Progressing

## 2018-09-17 NOTE — PROGRESS NOTES
Chart and imaging studies reviewed on this patient  By CT scan performed yesterday, the patient has no ascites  I am not sure why a paracentesis is requested in this instance  I am cancelling this procedure

## 2018-09-17 NOTE — H&P
INTERNAL MEDICINE HISTORY AND PHYSICAL  [facility]     NAME: Horace Melton  AGE: 79 y o  SEX: female  : 1950   MRN: [id number]  ENCOUNTER: [id number]    DATE: 2018  TIME: 10:19 PM    Primary Care Physician: Sanjay Garner DO  Admitting Provider: Jim Lacey MD    Chief complaint:   Generalized abdominal pain    History of Present Illness     Horace Melton is a 79 y o  female with a history of cirrhosis secondary hepatitis C complicated by hepatic cancer status post abdominal surgery who presents to [place] [address] with abdominal pain that has been increasing in severity over the past 3 days  She describes her pain is crampy without radiation  She has also had a lack of bowel movements over the past day, she reports a typical for her  She feels that her abdomen is much more distended than normal  She has no diarrhea, melena, hematochezia  She denies any urinary complaints  She denies nausea or vomiting, but does endorse decreased oral intake due to lack of appetite  She denies any systemic symptoms such as fevers, chills or other symptomatology  Of note the patient does have Percocet for chronic back pain  She states she is a big water drinker and that she has not been taking her Lasix or her spironolactone over the last several days  She reports that she still has these medications but that she was feeling lousy so she decided not to take them  She has no new medication changes  She is also uncertain of all of her current medications or dosing  Those available in the electronic medical record were confirmed with the patient, though she is uncertain of the dosing  She stated that her son will bring these in tomorrow  She states that she used to have low sodium, but this has not happened for at least over a year  She has a history of hepatic cirrhosis secondary to hepatitis C was complicated by hepatocellular carcinoma    She has abstained from IV drug use for over 40 years and was treated successfully with Lexy Solo  She denies any current drug use or alcohol consumption, however she continues to smoke a pack cigarettes a day  She was noted to have hepatocellular carcinoma which is status post resection  To her knowledge she has no active disease  In the emergency department vital signs are stable  She was afebrile  Urine microscopic demonstrated 10-12 WBCs with moderate epithelial and bacteria cells  CBC demonstrated a leukocytosis of 14 23 with a mild anemia of 11 0  Comprehensive metabolic panel demonstrated a sodium of 121 with a creatinine of 1 54 and BUN of 28  Most recent sodium on April 14, 2018 was 135  Lipase and lactic acid were within normal limits  CT abdomen and pelvis demonstrated stable would state hypodensity in the right liver lobe consistent with surgical history as well as portal vein thrombosis and cavernous transformation  There was hepatomegaly with cirrhotic changes, but no ascites  There is also moderate stool retention throughout the colon  Review of Systems   Review of Systems   Constitutional: Negative  Negative for chills, diaphoresis and fever  HENT: Negative  Negative for rhinorrhea and sinus pain  Eyes: Negative  Negative for photophobia and visual disturbance  Respiratory: Negative  Negative for cough and shortness of breath  Cardiovascular: Negative for chest pain, palpitations and leg swelling  Gastrointestinal: Positive for abdominal distention, abdominal pain and constipation  Negative for diarrhea  Endocrine: Positive for polydipsia  Negative for polyuria  Genitourinary: Negative for difficulty urinating and dysuria  Musculoskeletal: Positive for back pain  Negative for gait problem  Skin: Negative for color change and rash  Allergic/Immunologic: Negative  Neurological: Negative for syncope and weakness  Hematological: Negative  Negative for adenopathy  Does not bruise/bleed easily  Psychiatric/Behavioral: Negative for confusion  The patient is not nervous/anxious  Past Medical History     Past Medical History:   Diagnosis Date    [facility]     Diabetes mellitus [facility]     Drug dependence ([facility] )     Hypertension        Past Surgical History     Past Surgical History:   Procedure Laterality Date    DENTAL SURGERY      HYSTERECTOMY      LIVER BIOPSY      LIVER SURGERY      Ablation    TONSILLECTOMY         Social History     History   Alcohol Use No     History   Drug Use No     History   Smoking Status    Current Every Day Smoker    Packs/day: 1 00   Smokeless Tobacco    Never Used       Family History     Family History   Problem Relation Age of Onset    Prostate cancer Father        Medications Prior to Admission     Prior to Admission medications    Medication Sig Start Date End Date Taking? Authorizing Provider   apixaban (ELIQUIS) 5 mg Take 1 tablet (5 mg total) by mouth 2 (two) times a day 6/18/18   Shari Carter MD   diclofenac (VOLTAREN) 75 mg EC tablet Take 50 mg by mouth 2 (two) times a day    Historical Provider, MD   diphenhydrAMINE (NIGHT TIME SLEEP AID) 25 MG tablet Take 50 mg by mouth daily at bedtime as needed for sleep    Historical Provider, MD   furosemide (LASIX) 20 mg tablet Take 40 mg by mouth daily      Historical Provider, MD   gabapentin (NEURONTIN) 300 mg capsule 1 tab in the am and 3 tabs PO night time 8/20/18   BRYANNA Blackwood   hydrochlorothiazide (HYDRODIURIL) 25 mg tablet Take 1 tablet (25 mg total) by mouth daily 3/29/18   Everette Molina,    hydrOXYzine HCL (ATARAX) 10 mg tablet Take 1 tablet (10 mg total) by mouth daily at bedtime 3/29/18   Everette Molina, DO   LORazepam (ATIVAN) 0 5 mg tablet Take 1 tablet 2 hours prior to MRI, and may take additional tablet just prior to MRI if needed  Must have a  to and from MRI    Patient not taking: Reported on 9/16/2018 8/23/18   BRYANNA Pierce   losartan (COZAAR) 100 MG tablet Take 1 tablet (100 mg total) by mouth daily 3/14/18   Everette Molina DO   metFORMIN (GLUCOPHAGE) 500 mg tablet Take 1 tablet (500 mg total) by mouth daily with breakfast 3/14/18   Shari Carter MD   methocarbamol (ROBAXIN) 500 mg tablet Take 1/2 tablet in am, 1/2 tablet in afternoon and 1 tablet at bedtime  No driving or operating heavy machinery with this medication  8/21/18   BRYANNA Pierce   omeprazole (PriLOSEC) 20 mg delayed release capsule Take 20 mg by mouth daily    Historical Provider, MD   oxyCODONE-acetaminophen (PERCOCET)  mg per tablet Take 1 tablet by mouth every 6 (six) hours as needed for moderate pain Max Daily Amount: 4 tablets 8/2/18   Ellwood Merlin, CRNP   senna (SENOKOT) 8 6 MG tablet Take 2 tablets by mouth daily    Historical Provider, MD   spironolactone (ALDACTONE) 100 mg tablet Take 1 tablet (100 mg total) by mouth daily 3/14/18   Everette Molina DO       Allergies   No Known Allergies    Objective     Vitals:    09/16/18 1906 09/16/18 2013 09/16/18 2130   BP: 113/69 108/64 112/57   BP Location: Left arm Left arm Right arm   Pulse: 75 76 72   Resp: 18 17 18   Temp: 99 3 °F (37 4 °C)     TempSrc: Tympanic     SpO2: 98% 93% 96%   Weight: 81 6 kg (179 lb 14 3 oz)     Height: 5' 3" (1 6 m)       Body mass index is 31 87 kg/m²  Intake/Output Summary (Last 24 hours) at 09/16/18 2219  Last data filed at 09/16/18 2135   Gross per 24 hour   Intake             1000 ml   Output                0 ml   Net             1000 ml     Invasive Devices     Peripheral Intravenous Line            Peripheral IV 09/16/18 Left Antecubital less than 1 day                Physical Exam  Physical Exam   Constitutional: She is oriented to person, place, and time  She appears well-developed and well-nourished  No distress  HENT:   Head: Normocephalic and atraumatic  Eyes: Conjunctivae are normal  Right eye exhibits no discharge  Left eye exhibits no discharge     Cardiovascular: Normal rate, regular rhythm and normal heart sounds  Exam reveals no gallop and no friction rub  No murmur heard  Pulmonary/Chest: Effort normal  No respiratory distress  She has wheezes  She has no rales  Abdominal: Soft  She exhibits distension  She exhibits no mass  There is tenderness (Mild generalized)  There is no rebound and no guarding  Surgical scar   Musculoskeletal: Normal range of motion  She exhibits edema (Trace edema bilaterally)  She exhibits no tenderness or deformity  Neurological: She is alert and oriented to person, place, and time  Skin: Skin is warm and dry  She is not diaphoretic  No erythema  Psychiatric: She has a normal mood and affect  Her behavior is normal         Lab Results: I have personally reviewed pertinent reports      CBC:   Results from last 7 days  Lab Units 09/16/18 2023   WBC Thousand/uL 14 23*   RBC Million/uL 3 91   HEMOGLOBIN g/dL 11 0*   HEMATOCRIT % 33 2*   MCV fL 85   MCH pg 28 1   MCHC g/dL 33 1   RDW % 14 0   MPV fL 10 5   PLATELETS Thousands/uL 189   NRBC AUTO /100 WBCs 0   NEUTROS PCT % 82*   LYMPHS PCT % 8*   MONOS PCT % 9   EOS PCT % 0   BASOS PCT % 0   NEUTROS ABS Thousands/µL 11 67*   LYMPHS ABS Thousands/µL 1 15   MONOS ABS Thousand/µL 1 27*   EOS ABS Thousand/µL 0 04   , Chemistry Profile:   Results from last 7 days  Lab Units 09/16/18 2023   SODIUM mmol/L 121*   POTASSIUM mmol/L 3 9   CHLORIDE mmol/L 89*   CO2 mmol/L 23   BUN mg/dL 28*   CREATININE mg/dL 1 54*   CALCIUM mg/dL 9 8   AST U/L 48*   ALT U/L 22   ALK PHOS U/L 108   EGFR ml/min/1 73sq m 40   , Coagulation Studies:   , Cardiac Studies:   , Additional Labs:   Results from last 7 days  Lab Units 09/16/18 2023   LIPASE u/L 170   LACTIC ACID mmol/L 1 3   , iSTAT CHEM 8:   Results from last 7 days  Lab Units 09/16/18 2023   ANION GAP mmol/L 9   EGFR ml/min/1 73sq m 40   HEMOGLOBIN g/dL 11 0*   , ABG:   , Toxicology:   , Last A1C/Lipid Panel/Thyroid Panel:   Lab Results   Component Value Date    HGBA1C 5 3% 03/14/2018    HGBA1C 5 8 09/08/2017    TRIG 74 07/23/2014    CHOL 148 07/23/2014    HDL 44 07/23/2014    LDLCALC 89 07/23/2014       Imaging: I have personally reviewed pertinent films in PACS  Mri Lumbar Spine Wo Contrast    Result Date: 8/29/2018  Narrative: MRI LUMBAR SPINE WITHOUT CONTRAST INDICATION: M54 16: Radiculopathy, lumbar region M51 26: Other intervertebral disc displacement, lumbar region  History of hepatocellular carcinoma and hepatitis C with increasing low back pain  COMPARISON:  MRI from November 19, 2013 TECHNIQUE:  Sagittal T1, sagittal T2, sagittal inversion recovery, axial T1 and axial T2, coronal T2   IMAGE QUALITY:  Diagnostic FINDINGS: ALIGNMENT:  Normal alignment of the lumbar spine  No compression fracture  No spondylolysis or spondylolisthesis  No scoliosis  MARROW SIGNAL:  The marrow appears diffusely heterogeneous once again  There appears to be interval increase in the degree of T1 shortening throughout the visualized lower thoracic, lumbar and sacral spine suggesting osteopenia/osteoporosis  Underlying  metabolic, hematopoietic hepatic or lymphoproliferative disease is difficult to exclude  DISTAL CORD AND CONUS:  Normal size and signal within the distal cord and conus  The conus ends at the level  PARASPINAL SOFT TISSUES:  Paraspinal soft tissues are unremarkable  SACRUM:  Normal signal within the sacrum  No evidence of insufficiency or stress fracture  Tarlov cysts are noted at the S1 level  There is prominent epidural lipomatosis within the sacral canal  LOWER THORACIC DISC SPACES:  There is mild disc space narrowing at T11-T12 with mild degenerative discogenic disease once again indenting the ventral thecal sac without significant spinal stenosis  LUMBAR DISC SPACES:  There is multilevel disc desiccation with disc height loss most notably at L5-S1  The disc height loss has progressed since the prior examination    There are also Modic type II degenerative changes along the L5 and S1 endplates with  minimal vacuum gas in the intervertebral disc space and adjacent Schmorl's node deformity in the inferior L5 endplate  L1-L2:  Mild left greater than right facet arthrosis, unchanged  There is no foraminal stenosis  L2-L3:  Stable mild circumferential disc bulge and facet arthropathy  The disc bulges slightly eccentric to the right  There is stable marginal osteophyte formation  There is no foraminal stenosis  L3-L4:  Mild sequential disc bulge with mild bilateral facet arthropathy resulting in mild spinal stenosis  A tiny central annular fissure is identified without an associated protrusion  There is no foraminal stenosis  L4-L5:  Circumferential disc bulge slightly more pronounced than noted on the prior study with bilateral facet arthropathy and mild buckling of the ligamentum flavum  There is mild spinal canal encroachment as a result of these findings  The neural foramina remain patent  L5-S1:  The previously noted central to right central disc extrusion is not as pronounced  There is a circumferential disc bulge once again noted with bilateral facet arthropathy  The disc bulge combined with the disc height loss results in bilateral foraminal stenosis which is stable on the left and mildly progressed on the right compared to the prior study  Impression: Interval progression of endplate degenerative changes at L5-S1 with greater disc height loss resulting in slightly greater foraminal stenosis  Note is made of interval improvement in the degree of disc herniation at L5-S1 as there appears to be decrease in the central to right central disc extrusion noted at this level in 2013  Mild interval progression of spinal stenosis at the L3-4 and L4-5 levels when compared to the prior study  Interval increase in the degree of osteopenia/osteoporosis   Workstation performed: [id number]     Ct Abdomen Pelvis With Contrast    Result Date: 9/16/2018  Narrative: CT ABDOMEN AND PELVIS WITH IV CONTRAST INDICATION:   abd pain  COMPARISON:  MRI abdomen 4/23/2018 TECHNIQUE:  CT examination of the abdomen and pelvis was performed  Axial, sagittal, and coronal 2D reformatted images were created from the source data and submitted for interpretation  Radiation dose length product (DLP) for this visit:  863 85 mGy-cm   This examination, like all CT scans performed in the [facility], was performed utilizing techniques to minimize radiation dose exposure, including the use of iterative  reconstruction and automated exposure control  IV Contrast:  100 mL of iohexol (OMNIPAQUE) Enteric Contrast:  Enteric contrast was not administered  FINDINGS: ABDOMEN LOWER CHEST:  There is bibasilar dependent atelectasis  LIVER/BILIARY TREE:  Again noted is hepatomegaly and findings suggestive of cirrhosis  Stable wedge-shaped hypodensity through the right lobe the liver similar to prior MRI  Stable hypodense lesion in the right lateral anterior dome measuring 3 3 x 2 7  cm  Persistent findings of portal vein thrombosis and cavernous transformation  GALLBLADDER:  No calcified gallstones  No pericholecystic inflammatory change  SPLEEN:  Unremarkable  PANCREAS:  Unremarkable  ADRENAL GLANDS:  Unremarkable  KIDNEYS/URETERS:  Unremarkable  No hydronephrosis  STOMACH AND BOWEL:  No evidence of bowel obstruction  There is colonic diverticulosis without evidence of acute diverticulitis  APPENDIX:  No findings to suggest appendicitis  ABDOMINOPELVIC CAVITY:  No ascites or free intraperitoneal air  No lymphadenopathy  VESSELS:  Atherosclerotic changes are present  No evidence of aneurysm  PELVIS REPRODUCTIVE ORGANS:  Patient is status post hysterectomy  URINARY BLADDER:  Unremarkable  ABDOMINAL WALL/INGUINAL REGIONS:  Unremarkable  OSSEOUS STRUCTURES:  No acute fracture or destructive osseous lesion  Impression: 1  No evidence of bowel obstruction   2  Hepatomegaly and cirrhosis  Persistent findings of portal venous thrombosis with cavernous transformation  Persistent hypodense lesion at the lateral right dome of the liver with hypodense appearance of the right anterior segment the liver similar in appearance to prior MRI  3   Colonic diverticulosis without evidence of acute diverticulitis  4   Moderate stool retention throughout the colon  Workstation performed: [id number]       Microbiology: cultures obtained in emergency department include none    Urinalysis:   Results from last 7 days  Lab Units 09/16/18 2030   COLOR UA     CLARITY UA  Cloudy   SPEC GRAV UA  1 015   PH UA  5 5   LEUKOCYTES UA  Small*   NITRITE UA  Negative   PROTEIN UA mg/dl 30 (1+)*   GLUCOSE UA mg/dl Negative   KETONES UA mg/dl Negative   BILIRUBIN UA  Interference- unable to analyze*   BLOOD UA  Negative        Urine Micro:   Results from last 7 days  Lab Units 09/16/18 2030   RBC UA /hpf None Seen   WBC UA /hpf 10-20*   EPITHELIAL CELLS WET PREP /hpf Moderate*   BACTERIA UA /hpf Moderate*        EKG, Pathology, and Other Studies: I have personally reviewed pertinent reports        Medications given in Emergency Department     Medication Administration - last 24 hours from 09/15/2018 2219 to 09/16/2018 2219       Date/Time Order Dose Route Action Action by     09/16/2018 2135 sodium chloride 0 9 % bolus 1,000 mL 0 mL Intravenous Stopped Sujey Montano RN     09/16/2018 2035 sodium chloride 0 9 % bolus 1,000 mL 1,000 mL Intravenous New Bag Landon Chavez RN     09/16/2018 2038 morphine (PF) 4 mg/mL injection 4 mg 4 mg Intravenous Given Landon Chavez RN     09/16/2018 2109 iohexol (OMNIPAQUE) 350 MG/ML injection (MULTI-DOSE) 100 mL 100 mL Intravenous Given Sana Reinoso     09/16/2018 2215 morphine (PF) 4 mg/mL injection 4 mg 4 mg Intravenous Given Sujey Montano RN          Assessment and Plan     Problem List      Hyponatremia  -home spironolactone, Lasix  -sodium 120, most recent sodium prior to arrival is 135  -avoid overly rapid correction of sodium  -likely secondary to volume overload in the setting of medication noncompliance and polydipsia  -urine sodium, serum osmolaity, urine osmolaity  Constipation  -senna, Colace, MiraLax    Hepatic cirrohsis 2/2 HepC complicated by [address]   -Not in acute failure  -hepatitis-C status post harvoni  -HCC s/p resection, no evidence of active disease per patient  -please clarify robaxin and PPI medication with son in the AM    DMII  -Most recent HgA1c 5 3%  -holding home metformin  -diabetic diet  -no need for sliding scale at this time  -glucose checked 4 times daily    Diabetic neuropathy  -Most recent HgA1c 5 3  -continue home gabapentin    Nicotine dependence  -nicotine patch applied    Portal vein thrombosis  -noted on CT scan at admission  -continue Eliquis    Anxiety  -Atarax p r n  Code Status: Level 1 - Full Code  VTE Pharmacologic Prophylaxis:   Eliquis  VTE Mechanical Prophylaxis: sequential compression device  Admission Status: INPATIENT     Admission Time  I spent 20 minutes admitting the patient  This involved direct patient contact where I performed a full history and physical, reviewing previous records, and reviewing laboratory and other diagnostic studies      Stephanie Baker MD  Internal Medicine  PGY-1

## 2018-09-17 NOTE — PROGRESS NOTES
Patient does not have her medication list and can't remember what she takes  Her son will bring the list tomorrow

## 2018-09-17 NOTE — PROGRESS NOTES
Pt was refilling her own cup at the sink in her room  Gave pt education about fluid restriction  Pt agreed to drink only what the staff gives her to drink

## 2018-09-17 NOTE — ED NOTES
IV attempted x 1  Pt states I am a hard stick  Pt instructed me to go in the middle of right elbow, because that was the only place to get her  Pt repeatedly moving arm after needle insertion  Pt complaining of pain  IV not successful       Yareli Schwartz RN  09/16/18 2017

## 2018-09-18 PROBLEM — D64.9 ANEMIA: Status: ACTIVE | Noted: 2018-09-18

## 2018-09-18 LAB
AFP-TM SERPL-MCNC: 16.3 NG/ML (ref 0.5–8)
ANION GAP SERPL CALCULATED.3IONS-SCNC: 10 MMOL/L (ref 4–13)
ANION GAP SERPL CALCULATED.3IONS-SCNC: 8 MMOL/L (ref 4–13)
BACTERIA UR CULT: NORMAL
BASOPHILS # BLD AUTO: 0.01 THOUSANDS/ΜL (ref 0–0.1)
BASOPHILS NFR BLD AUTO: 0 % (ref 0–1)
BUN SERPL-MCNC: 33 MG/DL (ref 5–25)
BUN SERPL-MCNC: 33 MG/DL (ref 5–25)
CALCIUM SERPL-MCNC: 8.9 MG/DL (ref 8.3–10.1)
CALCIUM SERPL-MCNC: 9 MG/DL (ref 8.3–10.1)
CHLORIDE SERPL-SCNC: 95 MMOL/L (ref 100–108)
CHLORIDE SERPL-SCNC: 96 MMOL/L (ref 100–108)
CO2 SERPL-SCNC: 22 MMOL/L (ref 21–32)
CO2 SERPL-SCNC: 22 MMOL/L (ref 21–32)
CREAT SERPL-MCNC: 1.59 MG/DL (ref 0.6–1.3)
CREAT SERPL-MCNC: 1.78 MG/DL (ref 0.6–1.3)
EOSINOPHIL # BLD AUTO: 0.09 THOUSAND/ΜL (ref 0–0.61)
EOSINOPHIL NFR BLD AUTO: 1 % (ref 0–6)
ERYTHROCYTE [DISTWIDTH] IN BLOOD BY AUTOMATED COUNT: 13.9 % (ref 11.6–15.1)
FERRITIN SERPL-MCNC: 428 NG/ML (ref 8–388)
GFR SERPL CREATININE-BSD FRML MDRD: 34 ML/MIN/1.73SQ M
GFR SERPL CREATININE-BSD FRML MDRD: 38 ML/MIN/1.73SQ M
GLUCOSE SERPL-MCNC: 106 MG/DL (ref 65–140)
GLUCOSE SERPL-MCNC: 118 MG/DL (ref 65–140)
GLUCOSE SERPL-MCNC: 132 MG/DL (ref 65–140)
GLUCOSE SERPL-MCNC: 153 MG/DL (ref 65–140)
GLUCOSE SERPL-MCNC: 167 MG/DL (ref 65–140)
GLUCOSE SERPL-MCNC: 179 MG/DL (ref 65–140)
HCT VFR BLD AUTO: 28.4 % (ref 34.8–46.1)
HEMOCCULT STL QL: NEGATIVE
HGB BLD-MCNC: 9.4 G/DL (ref 11.5–15.4)
IMM GRANULOCYTES # BLD AUTO: 0.03 THOUSAND/UL (ref 0–0.2)
IMM GRANULOCYTES NFR BLD AUTO: 0 % (ref 0–2)
IRON SATN MFR SERPL: 9 %
IRON SERPL-MCNC: 28 UG/DL (ref 50–170)
LYMPHOCYTES # BLD AUTO: 0.86 THOUSANDS/ΜL (ref 0.6–4.47)
LYMPHOCYTES NFR BLD AUTO: 11 % (ref 14–44)
MCH RBC QN AUTO: 28 PG (ref 26.8–34.3)
MCHC RBC AUTO-ENTMCNC: 33.1 G/DL (ref 31.4–37.4)
MCV RBC AUTO: 85 FL (ref 82–98)
MONOCYTES # BLD AUTO: 1.06 THOUSAND/ΜL (ref 0.17–1.22)
MONOCYTES NFR BLD AUTO: 14 % (ref 4–12)
NEUTROPHILS # BLD AUTO: 5.8 THOUSANDS/ΜL (ref 1.85–7.62)
NEUTS SEG NFR BLD AUTO: 74 % (ref 43–75)
NRBC BLD AUTO-RTO: 0 /100 WBCS
PLATELET # BLD AUTO: 176 THOUSANDS/UL (ref 149–390)
PMV BLD AUTO: 9.9 FL (ref 8.9–12.7)
POTASSIUM SERPL-SCNC: 3.7 MMOL/L (ref 3.5–5.3)
POTASSIUM SERPL-SCNC: 4.1 MMOL/L (ref 3.5–5.3)
RBC # BLD AUTO: 3.36 MILLION/UL (ref 3.81–5.12)
SODIUM SERPL-SCNC: 126 MMOL/L (ref 136–145)
SODIUM SERPL-SCNC: 127 MMOL/L (ref 136–145)
TIBC SERPL-MCNC: 322 UG/DL (ref 250–450)
WBC # BLD AUTO: 7.85 THOUSAND/UL (ref 4.31–10.16)

## 2018-09-18 PROCEDURE — 82105 ALPHA-FETOPROTEIN SERUM: CPT | Performed by: INTERNAL MEDICINE

## 2018-09-18 PROCEDURE — 80048 BASIC METABOLIC PNL TOTAL CA: CPT | Performed by: INTERNAL MEDICINE

## 2018-09-18 PROCEDURE — 83540 ASSAY OF IRON: CPT | Performed by: INTERNAL MEDICINE

## 2018-09-18 PROCEDURE — 85025 COMPLETE CBC W/AUTO DIFF WBC: CPT | Performed by: INTERNAL MEDICINE

## 2018-09-18 PROCEDURE — 82272 OCCULT BLD FECES 1-3 TESTS: CPT | Performed by: INTERNAL MEDICINE

## 2018-09-18 PROCEDURE — 82948 REAGENT STRIP/BLOOD GLUCOSE: CPT

## 2018-09-18 PROCEDURE — 82728 ASSAY OF FERRITIN: CPT | Performed by: INTERNAL MEDICINE

## 2018-09-18 PROCEDURE — 83550 IRON BINDING TEST: CPT | Performed by: INTERNAL MEDICINE

## 2018-09-18 RX ORDER — OXYCODONE HYDROCHLORIDE AND ACETAMINOPHEN 5; 325 MG/1; MG/1
1 TABLET ORAL EVERY 4 HOURS PRN
Status: DISCONTINUED | OUTPATIENT
Start: 2018-09-18 | End: 2018-09-19 | Stop reason: HOSPADM

## 2018-09-18 RX ORDER — LACTULOSE 20 G/30ML
SOLUTION ORAL
Status: DISPENSED
Start: 2018-09-18 | End: 2018-09-18

## 2018-09-18 RX ORDER — LACTULOSE 20 G/30ML
30 SOLUTION ORAL ONCE
Status: COMPLETED | OUTPATIENT
Start: 2018-09-18 | End: 2018-09-18

## 2018-09-18 RX ORDER — LACTULOSE 20 G/30ML
20 SOLUTION ORAL 2 TIMES DAILY
Status: DISCONTINUED | OUTPATIENT
Start: 2018-09-18 | End: 2018-09-19 | Stop reason: HOSPADM

## 2018-09-18 RX ADMIN — NICOTINE 1 PATCH: 21 PATCH, EXTENDED RELEASE TRANSDERMAL at 11:15

## 2018-09-18 RX ADMIN — HYDROXYZINE 10 MG: 10 TABLET, FILM COATED ORAL at 22:11

## 2018-09-18 RX ADMIN — OXYCODONE HYDROCHLORIDE AND ACETAMINOPHEN 1 TABLET: 5; 325 TABLET ORAL at 12:09

## 2018-09-18 RX ADMIN — FUROSEMIDE 40 MG: 40 TABLET ORAL at 11:13

## 2018-09-18 RX ADMIN — SENNOSIDES 17.2 MG: 8.6 TABLET, FILM COATED ORAL at 11:14

## 2018-09-18 RX ADMIN — OXYCODONE HYDROCHLORIDE AND ACETAMINOPHEN 1 TABLET: 5; 325 TABLET ORAL at 22:18

## 2018-09-18 RX ADMIN — GABAPENTIN 900 MG: 300 CAPSULE ORAL at 22:11

## 2018-09-18 RX ADMIN — PANTOPRAZOLE SODIUM 40 MG: 40 TABLET, DELAYED RELEASE ORAL at 06:12

## 2018-09-18 RX ADMIN — SENNOSIDES AND DOCUSATE SODIUM 1 TABLET: 8.6; 5 TABLET ORAL at 17:47

## 2018-09-18 RX ADMIN — SENNOSIDES AND DOCUSATE SODIUM 1 TABLET: 8.6; 5 TABLET ORAL at 11:13

## 2018-09-18 RX ADMIN — APIXABAN 5 MG: 5 TABLET, FILM COATED ORAL at 11:18

## 2018-09-18 RX ADMIN — GABAPENTIN 300 MG: 300 CAPSULE ORAL at 11:13

## 2018-09-18 RX ADMIN — SPIRONOLACTONE 100 MG: 50 TABLET ORAL at 11:13

## 2018-09-18 RX ADMIN — LACTULOSE 30 G: 20 SOLUTION ORAL at 11:12

## 2018-09-18 RX ADMIN — OXYCODONE HYDROCHLORIDE AND ACETAMINOPHEN 1 TABLET: 5; 325 TABLET ORAL at 06:12

## 2018-09-18 RX ADMIN — OXYCODONE HYDROCHLORIDE AND ACETAMINOPHEN 1 TABLET: 5; 325 TABLET ORAL at 17:45

## 2018-09-18 RX ADMIN — POLYETHYLENE GLYCOL 3350 17 G: 17 POWDER, FOR SOLUTION ORAL at 11:12

## 2018-09-18 RX ADMIN — APIXABAN 5 MG: 5 TABLET, FILM COATED ORAL at 22:11

## 2018-09-18 NOTE — PLAN OF CARE
DISCHARGE PLANNING     Discharge to home or other facility with appropriate resources Progressing        DISCHARGE PLANNING - CARE MANAGEMENT     Discharge to post-acute care or home with appropriate resources Progressing        INFECTION - ADULT     Absence or prevention of progression during hospitalization Progressing        PAIN - ADULT     Verbalizes/displays adequate comfort level or baseline comfort level Progressing        SAFETY ADULT     Patient will remain free of falls Progressing     Maintain or return to baseline ADL function Progressing     Maintain or return mobility status to optimal level Progressing

## 2018-09-18 NOTE — PROGRESS NOTES
IM Residency Progress Note   Unit/Bed#: [facility] 629-01 Encounter: [id number]  SOD Team B       Misti Riley 79 y o  female [id number]    Hospital Stay Days: 2      Assessment/Plan:    Principal Problem:    Decompensated hepatic cirrhosis ([place] [address] )  Active Problems:    Anxiety    Constipation    Diabetes mellitus, type II ([place] [address] )    Diabetic neuropathy ([place] [address] )    Hepatocellular carcinoma ([place] [address] )    Hypertension    Myofascial pain    Nicotine dependence    Opioid dependence ([place] [address] )    Lumbar disc herniation    Hyponatremia    Abdominal pain    Decompensated hepatic cirrhosis  - secondary to hepatitis C with HCC and cavernous sinus of the portal vein and noncompliance with diuretics  - Pt afebrile and without ascites, low concern for SBP, IR consult canceled  - restart spironolactone 100 and Lasix 40  - fluid restriction to 1500 mL a day  - MELD score 25    H/o HCC with portal vein thrombosis  - recent imaging showed increased size of tumor thrombus  - is to follow up outpatient with Dr Natanael Enamorado  - continue Eliquis  - AFP 16 3, decreased from 20 7 in April, 2020 9 1 in December 2017    Abdominal pain/constipation - improving  - due to chronic hepatic issues/malignancy in addition to constipation  - treatment as above  - lactulose added to regimen, titrate to 2 - 3 BM per day    Hyponatremia - improving  - Chronic in setting of cirrhosis  - reduce to daily BMPs    Anemia  - likely chronic in the setting of acute disease  - in light of reduction of hemoglobin overnight for 10 2-9 4, will obtain fecal occult blood testing  - if negative patient is medically stable for discharge  - if positive will consult GI for potential scope to address potential bleed, begin Cipro for SBP prophylaxis    HTN - controlled  - continue diuretics, home hydrochlorothiazide and losartan    Disposition: Continue inpatient care       Subjective:   Patient resting comfortably upon entry to the room    States she is doing well, her pain is well controlled, and that abdominal pain improved with defecation yesterday  Has no new complaints and No new or concerning symptoms including fever, chills, CP, SOB, abd pain, N/V/D  Vitals: Temp (24hrs), Av 5 °F (36 9 °C), Min:97 7 °F (36 5 °C), Max:99 5 °F (37 5 °C)  Current: Temperature: 98 24 °F (36 8 °C)  Vitals:    18 1607 18 2245 18 2325 18 0735   BP:   112/74 108/72   BP Location:       Pulse:   60 70   Resp:   20    Temp: 97 7 °F (36 5 °C)  99 5 °F (37 5 °C) 98 24 °F (36 8 °C)   TempSrc: Oral      SpO2:  93% 94% 95%   Weight:       Height:        Body mass index is 31 63 kg/m²  I/O last 24 hours: In: 1520 [P O :1520]  Out: 1250 [Urine:1250]    Physical Exam   Constitutional: She is oriented to person, place, and time  She appears well-developed and well-nourished  No distress  HENT:   Head: Normocephalic and atraumatic  Eyes: Conjunctivae and EOM are normal  Right eye exhibits no discharge  Left eye exhibits no discharge  No scleral icterus  Cardiovascular: Normal rate, regular rhythm and normal heart sounds  Exam reveals no friction rub  No murmur heard  Pulmonary/Chest: Effort normal and breath sounds normal  No respiratory distress  She has no wheezes  She has no rales  Abdominal: Soft  Bowel sounds are normal  She exhibits no distension  There is tenderness (mild RUQ)  There is no rigidity, no rebound and no guarding  Musculoskeletal: Normal range of motion  She exhibits no edema, tenderness or deformity  Neurological: She is alert and oriented to person, place, and time  No cranial nerve deficit  Coordination normal    Skin: Skin is warm and dry  No rash noted  She is not diaphoretic  No erythema  No pallor  Psychiatric: She has a normal mood and affect  Her behavior is normal  Judgment and thought content normal    Nursing note and vitals reviewed          Invasive Devices     Peripheral Intravenous Line            Peripheral IV 18 Left Antecubital 1 day Labs:   Recent Results (from the past 24 hour(s))   Fingerstick Glucose (POCT)    Collection Time: 09/17/18  4:42 PM   Result Value Ref Range    POC Glucose 146 (H) 65 - 140 mg/dl   Fingerstick Glucose (POCT)    Collection Time: 09/17/18  9:24 PM   Result Value Ref Range    POC Glucose 179 (H) 65 - 140 mg/dl   Basic metabolic panel    Collection Time: 09/17/18  9:42 PM   Result Value Ref Range    Sodium 127 (L) 136 - 145 mmol/L    Potassium 4 1 3 5 - 5 3 mmol/L    Chloride 95 (L) 100 - 108 mmol/L    CO2 22 21 - 32 mmol/L    ANION GAP 10 4 - 13 mmol/L    BUN 33 (H) 5 - 25 mg/dL    Creatinine 1 78 (H) 0 60 - 1 30 mg/dL    Glucose 132 65 - 140 mg/dL    Calcium 9 0 8 3 - 10 1 mg/dL    eGFR 34 ml/min/1 73sq m   AFP tumor marker    Collection Time: 09/18/18 12:00 AM   Result Value Ref Range    AFP TUMOR MARKER 16 3 (H) 0 5 - 8 ng/mL   CBC and differential    Collection Time: 09/18/18 12:00 AM   Result Value Ref Range    WBC 7 85 4 31 - 10 16 Thousand/uL    RBC 3 36 (L) 3 81 - 5 12 Million/uL    Hemoglobin 9 4 (L) 11 5 - 15 4 g/dL    Hematocrit 28 4 (L) 34 8 - 46 1 %    MCV 85 82 - 98 fL    MCH 28 0 26 8 - 34 3 pg    MCHC 33 1 31 4 - 37 4 g/dL    RDW 13 9 11 6 - 15 1 %    MPV 9 9 8 9 - 12 7 fL    Platelets 933 136 - 407 Thousands/uL    nRBC 0 /100 WBCs    Neutrophils Relative 74 43 - 75 %    Immat GRANS % 0 0 - 2 %    Lymphocytes Relative 11 (L) 14 - 44 %    Monocytes Relative 14 (H) 4 - 12 %    Eosinophils Relative 1 0 - 6 %    Basophils Relative 0 0 - 1 %    Neutrophils Absolute 5 80 1 85 - 7 62 Thousands/µL    Immature Grans Absolute 0 03 0 00 - 0 20 Thousand/uL    Lymphocytes Absolute 0 86 0 60 - 4 47 Thousands/µL    Monocytes Absolute 1 06 0 17 - 1 22 Thousand/µL    Eosinophils Absolute 0 09 0 00 - 0 61 Thousand/µL    Basophils Absolute 0 01 0 00 - 0 10 Thousands/µL   Basic metabolic panel    Collection Time: 09/18/18  3:22 AM   Result Value Ref Range    Sodium 126 (L) 136 - 145 mmol/L Potassium 3 7 3 5 - 5 3 mmol/L    Chloride 96 (L) 100 - 108 mmol/L    CO2 22 21 - 32 mmol/L    ANION GAP 8 4 - 13 mmol/L    BUN 33 (H) 5 - 25 mg/dL    Creatinine 1 59 (H) 0 60 - 1 30 mg/dL    Glucose 106 65 - 140 mg/dL    Calcium 8 9 8 3 - 10 1 mg/dL    eGFR 38 ml/min/1 73sq m   Fingerstick Glucose (POCT)    Collection Time: 09/18/18  9:47 AM   Result Value Ref Range    POC Glucose 153 (H) 65 - 140 mg/dl   Fingerstick Glucose (POCT)    Collection Time: 09/18/18 12:07 PM   Result Value Ref Range    POC Glucose 167 (H) 65 - 140 mg/dl       Radiology Results: I have personally reviewed pertinent reports  and I have personally reviewed pertinent films in PACS      Other Diagnostic Testing:   I have personally reviewed pertinent reports          Active Meds:   Current Facility-Administered Medications   Medication Dose Route Frequency    acetaminophen (TYLENOL) tablet 650 mg  650 mg Oral Q6H PRN    aluminum-magnesium hydroxide-simethicone (MYLANTA) 200-200-20 mg/5 mL oral suspension 15 mL  15 mL Oral Q4H PRN    apixaban (ELIQUIS) tablet 5 mg  5 mg Oral BID    diphenhydrAMINE (BENADRYL) tablet 25 mg  25 mg Oral HS PRN    furosemide (LASIX) tablet 40 mg  40 mg Oral Daily    gabapentin (NEURONTIN) capsule 300 mg  300 mg Oral Daily    gabapentin (NEURONTIN) capsule 900 mg  900 mg Oral HS    hydrochlorothiazide (HYDRODIURIL) tablet 25 mg  25 mg Oral Daily    hydrOXYzine HCL (ATARAX) tablet 10 mg  10 mg Oral HS    losartan (COZAAR) tablet 100 mg  100 mg Oral Daily    nicotine (NICODERM CQ) 21 mg/24 hr TD 24 hr patch 1 patch  1 patch Transdermal Daily    oxyCODONE-acetaminophen (PERCOCET) 5-325 mg per tablet 1 tablet  1 tablet Oral Q6H PRN    pantoprazole (PROTONIX) EC tablet 40 mg  40 mg Oral Early Morning    polyethylene glycol (MIRALAX) packet 17 g  17 g Oral Daily    polyethylene glycol (MIRALAX) packet 17 g  17 g Oral Daily PRN    senna (SENOKOT) tablet 17 2 mg  17 2 mg Oral Daily    senna-docusate sodium (SENOKOT S) 8 6-50 mg per tablet 1 tablet  1 tablet Oral BID    spironolactone (ALDACTONE) tablet 100 mg  100 mg Oral Daily         VTE Pharmacologic Prophylaxis: Eliquis  VTE Mechanical Prophylaxis: sequential compression device and foot pump applied    Anamaria Noe DO

## 2018-09-19 VITALS
DIASTOLIC BLOOD PRESSURE: 61 MMHG | HEIGHT: 63 IN | TEMPERATURE: 99.7 F | HEART RATE: 60 BPM | WEIGHT: 178.57 LBS | SYSTOLIC BLOOD PRESSURE: 100 MMHG | BODY MASS INDEX: 31.64 KG/M2 | OXYGEN SATURATION: 95 % | RESPIRATION RATE: 16 BRPM

## 2018-09-19 PROBLEM — I81 PORTAL VEIN THROMBOSIS: Status: ACTIVE | Noted: 2018-09-19

## 2018-09-19 LAB
ANION GAP SERPL CALCULATED.3IONS-SCNC: 9 MMOL/L (ref 4–13)
BUN SERPL-MCNC: 27 MG/DL (ref 5–25)
CALCIUM SERPL-MCNC: 8.8 MG/DL (ref 8.3–10.1)
CHLORIDE SERPL-SCNC: 98 MMOL/L (ref 100–108)
CO2 SERPL-SCNC: 20 MMOL/L (ref 21–32)
CREAT SERPL-MCNC: 1.06 MG/DL (ref 0.6–1.3)
ERYTHROCYTE [DISTWIDTH] IN BLOOD BY AUTOMATED COUNT: 14 % (ref 11.6–15.1)
GFR SERPL CREATININE-BSD FRML MDRD: 63 ML/MIN/1.73SQ M
GLUCOSE SERPL-MCNC: 102 MG/DL (ref 65–140)
GLUCOSE SERPL-MCNC: 112 MG/DL (ref 65–140)
GLUCOSE SERPL-MCNC: 137 MG/DL (ref 65–140)
HCT VFR BLD AUTO: 29.4 % (ref 34.8–46.1)
HGB BLD-MCNC: 9.7 G/DL (ref 11.5–15.4)
MCH RBC QN AUTO: 27.9 PG (ref 26.8–34.3)
MCHC RBC AUTO-ENTMCNC: 33 G/DL (ref 31.4–37.4)
MCV RBC AUTO: 85 FL (ref 82–98)
PLATELET # BLD AUTO: 206 THOUSANDS/UL (ref 149–390)
PMV BLD AUTO: 10 FL (ref 8.9–12.7)
POTASSIUM SERPL-SCNC: 3.7 MMOL/L (ref 3.5–5.3)
RBC # BLD AUTO: 3.48 MILLION/UL (ref 3.81–5.12)
SODIUM SERPL-SCNC: 127 MMOL/L (ref 136–145)
WBC # BLD AUTO: 7.57 THOUSAND/UL (ref 4.31–10.16)

## 2018-09-19 PROCEDURE — 82948 REAGENT STRIP/BLOOD GLUCOSE: CPT

## 2018-09-19 PROCEDURE — 4010F ACE/ARB THERAPY RXD/TAKEN: CPT | Performed by: INTERNAL MEDICINE

## 2018-09-19 PROCEDURE — 85027 COMPLETE CBC AUTOMATED: CPT | Performed by: INTERNAL MEDICINE

## 2018-09-19 PROCEDURE — 80048 BASIC METABOLIC PNL TOTAL CA: CPT | Performed by: INTERNAL MEDICINE

## 2018-09-19 RX ORDER — HYDROCHLOROTHIAZIDE 25 MG/1
25 TABLET ORAL DAILY
Qty: 30 TABLET | Refills: 0 | Status: SHIPPED | OUTPATIENT
Start: 2018-09-19 | End: 2018-10-23 | Stop reason: SDUPTHER

## 2018-09-19 RX ORDER — LOSARTAN POTASSIUM 100 MG/1
100 TABLET ORAL DAILY
Qty: 30 TABLET | Refills: 0 | Status: SHIPPED | OUTPATIENT
Start: 2018-09-19 | End: 2019-03-01 | Stop reason: SDUPTHER

## 2018-09-19 RX ORDER — FUROSEMIDE 40 MG/1
40 TABLET ORAL DAILY
Qty: 30 TABLET | Refills: 0 | Status: SHIPPED | OUTPATIENT
Start: 2018-09-20 | End: 2018-10-23 | Stop reason: SDUPTHER

## 2018-09-19 RX ORDER — SPIRONOLACTONE 100 MG/1
100 TABLET, FILM COATED ORAL DAILY
Qty: 90 TABLET | Refills: 0 | Status: SHIPPED | OUTPATIENT
Start: 2018-09-19 | End: 2018-12-18 | Stop reason: SDUPTHER

## 2018-09-19 RX ORDER — LACTULOSE 20 G/30ML
20 SOLUTION ORAL 2 TIMES DAILY
Qty: 1892 ML | Refills: 0 | Status: SHIPPED | OUTPATIENT
Start: 2018-09-19 | End: 2019-03-01

## 2018-09-19 RX ADMIN — GABAPENTIN 300 MG: 300 CAPSULE ORAL at 10:33

## 2018-09-19 RX ADMIN — NICOTINE 1 PATCH: 21 PATCH, EXTENDED RELEASE TRANSDERMAL at 10:37

## 2018-09-19 RX ADMIN — PANTOPRAZOLE SODIUM 40 MG: 40 TABLET, DELAYED RELEASE ORAL at 05:46

## 2018-09-19 RX ADMIN — OXYCODONE HYDROCHLORIDE AND ACETAMINOPHEN 1 TABLET: 5; 325 TABLET ORAL at 05:47

## 2018-09-19 RX ADMIN — SENNOSIDES 17.2 MG: 8.6 TABLET, FILM COATED ORAL at 10:31

## 2018-09-19 RX ADMIN — SENNOSIDES AND DOCUSATE SODIUM 1 TABLET: 8.6; 5 TABLET ORAL at 10:31

## 2018-09-19 RX ADMIN — POLYETHYLENE GLYCOL 3350 17 G: 17 POWDER, FOR SOLUTION ORAL at 10:39

## 2018-09-19 RX ADMIN — OXYCODONE HYDROCHLORIDE AND ACETAMINOPHEN 1 TABLET: 5; 325 TABLET ORAL at 10:30

## 2018-09-19 NOTE — DISCHARGE SUMMARY
[facility] CENTRAL Discharge Summary - Medical Marcela Jalloh 79 y o  female MRN: [id number]    [address] [place] [address] Room / Bed: [facility] 62/[facility] 893-38 Encounter: [id number]    BRIEF OVERVIEW    Admitting Provider: Emile Paulson MD  Discharge Provider: Emile Paulson MD  Primary Care Physician at Discharge: Jonathon Mackey DO    Discharge To: Home    Admission Date: 9/16/2018     Discharge Date: 09/19/18      Primary Discharge Diagnosis  Principal Problem:    Decompensated hepatic cirrhosis (HCC)  Active Problems:    Anxiety    Constipation    Diabetes mellitus, type II (HCC)    Diabetic neuropathy (HCC)    Hepatocellular carcinoma (HCC)    Hypertension    Myofascial pain    Nicotine dependence    Opioid dependence ([facility] [address] )    Lumbar disc herniation    Hyponatremia    Abdominal pain    Anemia  Resolved Problems:    * No resolved hospital problems  *      Other Problems Addressed: None    Consulting Providers   None         Therapeutic Operative Procedures Performed  None    Diagnostic Procedures Performed  labs: CBC, BMP, AFP CT abdomen    Discharge Disposition: Home/Self Care  Discharged With Lines: no    Test Results Pending at Discharge: None    Outpatient Follow-Up  yes      [facility]    Follow up with consulting providers  yes      Physician name: Dr Conchis Gay  Specialty: Surg-onc  Follow up within next: 2 weeks   Active Issues Requiring Follow-up   none    Code Status: Level 1 - Full Code  Advance Directive and Living Will: <no information>  Power of :    POLST:      Medications   See after visit summary for reconciled discharge medications provided to patient and family      Allergies  No Known Allergies  Discharge Diet: watch water intake, otherwise regular diet  Activity restrictions: none    [facility] Course  61-year-old female with past medical history including hepatitis C with hepatocellular carcinoma status post resection presents for abdominal pain  CT of the abdomen was found to be negative for acute process, possible slight in large amount of tumor burden, confirmation of known portal sinus thrombus which is shown to be cavernous, and increased stool burden  Patient admits to being noncompliant with her medications the past several days  Patient was afebrile and without leukocytosis, however IR was consulted to rule out SBP  However due to the fact that patient was not exhibiting ascites the IR consult was canceled  Lipase within normal limits, lactic acid within normal limits, hyponatremic at 121 with serum osmolality 269, hemoglobin A1c 5 5%, INR 1 47, ferritin 428, AFP tumor marker repeat 16 3  Patient was given lactulose due to constipation and symptoms improved  Mild drop in hemoglobin was noted to so fecal occult test was done which was negative  Patient to be discharged with a month's supply of her medications so that she does not run out in addition to new prescription for lactulose  Has felt well and has no new complaints over the past several days  Presenting Problem/History of Present Illness  Principal Problem:    Decompensated hepatic cirrhosis (HCC)  Active Problems:    Anxiety    Constipation    Diabetes mellitus, type II (HCC)    Diabetic neuropathy (HCC)    Hepatocellular carcinoma (HCC)    Hypertension    Myofascial pain    Nicotine dependence    Opioid dependence (HCC)    Lumbar disc herniation    Hyponatremia    Abdominal pain    Anemia    Portal vein thrombosis  Resolved Problems:    * No resolved hospital problems   *    Decompensated hepatic cirrhosis  - secondary to hepatitis C with HCC and cavernous sinus thrombosis of the portal vein and noncompliance with diuretics  - Pt afebrile and without ascites, low concern for SBP, IR consult canceled  - restart spironolactone 100 and Lasix 40  - fluid restriction to 1500 mL a day, pt noncompliant while admitted  - MELD score 25  - discussed prescriptions the patient, advised that I would give a 30 day supply of all medications in order to give her enough to last until she obtained the proper follow-up with her PCP in Surgical Oncology     H/[address]  with portal vein thrombosis  - recent imaging showed increased size of tumor thrombus  - is to follow up outpatient with Dr Rey Spirnger  - continue Eliquis bid  - AFP 16 3, decreased from 20 7 in April, 2020 9 1 in December 2017     Abdominal pain/constipation - improving  - due to chronic hepatic issues/malignancy in addition to constipation  - treatment as above  - lactulose added to regimen, titrate to 2 - 3 BM per day  - prescription provided on discharge     Hyponatremia - improving  - Chronic in setting of cirrhosis  - improved while admitted, however patient was not compliant with fluid restrictions which likely contributed to remaining hyponatremic     Anemia  - of chronic disease  - hemoglobin remained stable  - slight drop prompted fecal occult blood test, which returned negative     HTN - controlled  - continue diuretics, home hydrochlorothiazide and losartan    Physical Exam   Constitutional: She is oriented to person, place, and time  She appears well-developed and well-nourished  No distress  HENT:   Head: Normocephalic and atraumatic  Eyes: Conjunctivae and EOM are normal  Right eye exhibits no discharge  Left eye exhibits no discharge  No scleral icterus  Cardiovascular: Normal rate, regular rhythm and normal heart sounds  Exam reveals no friction rub  No murmur heard  Pulmonary/Chest: Effort normal and breath sounds normal  No respiratory distress  She has no wheezes  She has no rales  Abdominal: Soft  Bowel sounds are normal  She exhibits distension (baseline)  There is no tenderness  There is no rebound and no guarding  Musculoskeletal: Normal range of motion  She exhibits no edema, tenderness or deformity  Neurological: She is alert and oriented to person, place, and time  No cranial nerve deficit  Coordination normal    Skin: Skin is warm and dry  No rash noted  She is not diaphoretic  No erythema  No pallor  Psychiatric: She has a normal mood and affect  Her behavior is normal  Judgment and thought content normal    Nursing note and vitals reviewed  Other Pertinent Test Results  None     Discharge Condition: good      Discharge  Statement   I spent 30 minutes minutes discharging the patient  This time was spent on the day of discharge  I had direct contact with the patient on the day of discharge  Additional documentation is required if more than 30 minutes were spent on discharge

## 2018-09-19 NOTE — PLAN OF CARE
Problem: DISCHARGE PLANNING - CARE MANAGEMENT  Goal: Discharge to post-acute care or home with appropriate resources  INTERVENTIONS:  - Conduct assessment to determine patient/family and health care team treatment goals, and need for post-acute services based on payer coverage, community resources, and patient preferences, and barriers to discharge  - Address psychosocial, clinical, and financial barriers to discharge as identified in assessment in conjunction with the patient/family and health care team  - Arrange appropriate level of post-acute services according to patient's   needs and preference and payer coverage in collaboration with the physician and health care team  - Communicate with and update the patient/family, physician, and health care team regarding progress on the discharge plan  - Arrange appropriate transportation to post-acute venues  - Home with friends & family support   Outcome: Completed Date Met: 09/19/18

## 2018-09-19 NOTE — DISCHARGE INSTRUCTIONS
Ascites    Please take your    WHAT YOU NEED TO KNOW:   Ascites is excess fluid in the lower abdomen  The fluid causes swelling in the abdomen  DISCHARGE INSTRUCTIONS:   Medicines:   · Diuretics: This medicine helps decrease the fluid in your abdomen  You may urinate more often when you take diuretics  You will lose weight as the fluid decreases  · Take your medicine as directed  Contact your healthcare provider if you think your medicine is not helping or if you have side effects  Tell him of her if you are allergic to any medicine  Keep a list of the medicines, vitamins, and herbs you take  Include the amounts, and when and why you take them  Bring the list or the pill bottles to follow-up visits  Carry your medicine list with you in case of an emergency  Follow up with your healthcare provider as directed: Your healthcare provider will want to see you again in 1 to 2 weeks to measure your weight and electrolyte (body chemical) levels  He will check how your body has responded to treatment  Write down your questions so you remember to ask them during your visits  Self-care:   · Do not drink alcohol:  Alcohol worsens the damage to your liver  Your ascites may improve or even go away after you stop drinking  You must also avoid medicines that contain alcohol  Ask your healthcare provider for information if you need help to stop drinking  · Follow your low-sodium diet:  A dietitian can help you create a low-sodium diet  He may suggest lemon juice or herbs to flavor your food  Avoid salted butter or margarine, milk, cheese, and canned or frozen foods that are not made for low-salt diets  Ask your healthcare provider before you use salt substitutes  · Write down your daily weight:  Your healthcare provider will need you to track your weight at home to see if the diet changes and medicines are working  Weigh yourself each day  Ask how much weight you should be losing   He will want to know if you are losing too much or too little weight  · Ask about NSAIDs:  NSAIDs are over-the-counter pain and fever medicines  You may not urinate enough to take NSAIDs safely  Ask your healthcare provider if NSAIDs are safe for you  Follow directions carefully  These medicines can cause stomach bleeding or kidney problems if they are not taken correctly  · Limit activity as directed: Too much physical activity may make your ascites worse  Ask your healthcare provider if you have any limits to your normal daily activities  Rarely, bedrest is needed if other health problems develop with ascites  For more information:   · [employer] [facility]  [address] , [address]  Phone: [phone]  Web Address: [employer]  Contact your healthcare provider if:   · You are losing more or less weight than expected  · You are urinating less than usual      · You feel dizzy or lightheaded  · You develop tiredness, dry mouth, nausea, or vomiting  · You have muscle cramps or twitches  · You have questions or concerns about your condition or care  Return to the emergency department if:   · You have a fever  · You feel pain in your abdomen  · You have trouble breathing  · You feel confused, faint, or lose consciousness  · You vomit blood or see blood in your bowel movement  © 2017 [address] Information is for End User's use only and may not be sold, redistributed or otherwise used for commercial purposes  All illustrations and images included in CareNotes® are the copyrighted property of [employer]  or [employer]  The above information is an  only  It is not intended as medical advice for individual conditions or treatments  Talk to your doctor, nurse or pharmacist before following any medical regimen to see if it is safe and effective for you

## 2018-09-19 NOTE — SOCIAL WORK
Pt reviewed w/MD  Pt medically stable for d/c  Pt advised her son will  @ approximately 1:00pm  No other CM needs noted

## 2018-09-20 ENCOUNTER — OFFICE VISIT (OUTPATIENT)
Dept: PAIN MEDICINE | Facility: CLINIC | Age: 68
End: 2018-09-20
Payer: COMMERCIAL

## 2018-09-20 ENCOUNTER — TRANSITIONAL CARE MANAGEMENT (OUTPATIENT)
Dept: INTERNAL MEDICINE CLINIC | Facility: CLINIC | Age: 68
End: 2018-09-20

## 2018-09-20 VITALS
WEIGHT: 180 LBS | TEMPERATURE: 98.7 F | BODY MASS INDEX: 31.89 KG/M2 | SYSTOLIC BLOOD PRESSURE: 98 MMHG | HEART RATE: 68 BPM | HEIGHT: 63 IN | DIASTOLIC BLOOD PRESSURE: 62 MMHG

## 2018-09-20 DIAGNOSIS — F11.20 UNCOMPLICATED OPIOID DEPENDENCE (HCC): ICD-10-CM

## 2018-09-20 DIAGNOSIS — G89.4 CHRONIC PAIN SYNDROME: Primary | ICD-10-CM

## 2018-09-20 DIAGNOSIS — M51.26 LUMBAR DISC HERNIATION: ICD-10-CM

## 2018-09-20 DIAGNOSIS — M47.816 LUMBAR SPONDYLOSIS: ICD-10-CM

## 2018-09-20 DIAGNOSIS — M54.16 LUMBAR RADICULOPATHY: ICD-10-CM

## 2018-09-20 PROCEDURE — 99214 OFFICE O/P EST MOD 30 MIN: CPT | Performed by: NURSE PRACTITIONER

## 2018-09-20 PROCEDURE — 1111F DSCHRG MED/CURRENT MED MERGE: CPT | Performed by: NURSE PRACTITIONER

## 2018-09-20 RX ORDER — OXYCODONE AND ACETAMINOPHEN 10; 325 MG/1; MG/1
TABLET ORAL
Qty: 120 TABLET | Refills: 0 | Status: SHIPPED | OUTPATIENT
Start: 2018-09-20 | End: 2018-09-20 | Stop reason: SDUPTHER

## 2018-09-20 RX ORDER — OXYCODONE AND ACETAMINOPHEN 10; 325 MG/1; MG/1
TABLET ORAL
Qty: 120 TABLET | Refills: 0 | Status: SHIPPED | OUTPATIENT
Start: 2018-09-20 | End: 2018-11-19 | Stop reason: SDUPTHER

## 2018-09-20 NOTE — PROGRESS NOTES
Assessment:  1  Chronic pain syndrome    2  Lumbar disc herniation    3  Lumbar radiculopathy    4  Lumbar spondylosis    5  Uncomplicated opioid dependence ([facility] [address] )        Plan:  The patient is a 79 y o  female last seen on 8/2/18 who presents for a follow up office visit in regards to chronic pain secondary to lumbar disc herniation, lumbar spondylosis, and lumbar radiculopathy  The patient continues with ongoing low back pain, but it has improved since the last office visit  I reviewed the MRI of the lumbar spine with the patient in depth, and explained that she has a disc bulge at L4-L5 which has slightly progressed since her previous imaging in 2013  If her symptoms would increase prior to her next office visit, she can contact the office, and a right L4-L5 transforaminal epidural steroid injection can be performed    Patient will continue on Percocet 10/325 mg as prescribed  One prescription with a do not fill date of September 29, 2018 was electronically sent to her pharmacy  The patient is requesting a printed script for next month since her pharmacy give her difficulty with not having the 2nd script on file  Therefore a script was printed for next month they do not fill date of October 27, 2018  Patient brought her prescription pill bottle in for Percocet 10/325 mg a pill count was performed  The medication was filled on September 1, 2018, and there were 34 tablets remaining, which is appropriate    Patient is requesting a back brace  I will prescribe her a brace to help with decreased pain by restricting mobility of the trunk  She was informed that she should wear it no more than 4 hours per day, as long-term use of a back brace can weaken core and paraspinal musculature  Patient verbalized understanding    A prescription [facility]    [facility] was reviewed and was appropriate     There are risks associated with opioid medications, including dependence, addiction and tolerance  The patient understands and agrees to use these medications only as prescribed  Potential side effects of the medications include, but are not limited to, constipation, drowsiness, addiction, impaired judgment and risk of fatal overdose if not taken as prescribed  The patient was warned against driving while taking sedation medications  Sharing medications is a felony  At this point in time, the patient is showing no signs of addiction, abuse, diversion or suicidal ideation  The patient will follow-up in 8 weeks for medication prescription refill and reevaluation  The patient was advised to contact the office should their symptoms worsen in the interim  The patient was agreeable and verbalized an understanding  History of Present Illness: The patient is a 79 y o  female last seen on 8/2/18 who presents for a follow up office visit in regards to chronic pain secondary to lumbar disc herniation, lumbar spondylosis, and lumbar radiculopathy  The patient currently reports ongoing low back pain, which has improved since the last office visit  She no longer is feeling radicular pain down the posterior aspect of her right thigh  Her back pain continues to be constant but occurs mostly in the morning  She describes as dull aching  She feels her pain has significantly decreased since the last office visit, where she was in excruciating pain  An MRI of the lumbar spine was prescribed, which does show slight progression of a disc herniation at L4-L5  Pain Contract Signed: 2/12/18  Last Urine Drug Screen: 8/2/18  Last Pill Count: 9/20/18    I have personally reviewed and/or updated the patient's past medical history, past surgical history, family history, social history, current medications, allergies, and vital signs today  Review of Systems:    Review of Systems   Respiratory: Negative for shortness of breath      Cardiovascular: Negative for chest pain    Gastrointestinal: Negative for constipation, diarrhea, nausea and vomiting  Musculoskeletal: Positive for gait problem and joint swelling  Negative for arthralgias and myalgias  Skin: Negative for rash  Neurological: Positive for weakness  Negative for dizziness and seizures  All other systems reviewed and are negative  Past Medical History:   Diagnosis Date    [facility] [address] [facility]     [facility]     Drug dependence ([facility]     Hypertension        Past Surgical History:   Procedure Laterality Date    DENTAL SURGERY      HYSTERECTOMY      LIVER BIOPSY      LIVER SURGERY      Ablation    TONSILLECTOMY         Family History   Problem Relation Age of Onset    Prostate cancer Father        Social History     Occupational History    Not on file       Social History Main Topics    Smoking status: Current Every Day Smoker     Packs/day: 1 00    Smokeless tobacco: Never Used    Alcohol use No    Drug use: No    Sexual activity: No         Current Outpatient Prescriptions:     apixaban (ELIQUIS) 5 mg, Take 1 tablet (5 mg total) by mouth 2 (two) times a day, Disp: 180 tablet, Rfl: 0    diclofenac (VOLTAREN) 75 mg EC tablet, Take 50 mg by mouth 2 (two) times a day, Disp: , Rfl:     diphenhydrAMINE (NIGHT TIME SLEEP AID) 25 MG tablet, Take 50 mg by mouth daily at bedtime as needed for sleep, Disp: , Rfl:     furosemide (LASIX) 40 mg tablet, Take 1 tablet (40 mg total) by mouth daily, Disp: 30 tablet, Rfl: 0    gabapentin (NEURONTIN) 300 mg capsule, 1 tab in the am and 3 tabs PO night time, Disp: 120 capsule, Rfl: 5    hydrochlorothiazide (HYDRODIURIL) 25 mg tablet, Take 1 tablet (25 mg total) by mouth daily, Disp: 30 tablet, Rfl: 0    hydrOXYzine HCL (ATARAX) 10 mg tablet, Take 1 tablet (10 mg total) by mouth daily at bedtime, Disp: 15 tablet, Rfl: 0    lactulose 20 g/30 mL, Take 30 mL (20 g total) by mouth 2 (two) times a day, Disp: 1892 mL, Rfl: 0    losartan (COZAAR) 100 MG tablet, Take 1 tablet (100 mg total) by mouth daily, Disp: 30 tablet, Rfl: 0    metFORMIN (GLUCOPHAGE) 500 mg tablet, Take 1 tablet (500 mg total) by mouth daily with breakfast, Disp: 30 tablet, Rfl: 0    methocarbamol (ROBAXIN) 500 mg tablet, Take 1/2 tablet in am, 1/2 tablet in afternoon and 1 tablet at bedtime  No driving or operating heavy machinery with this medication  , Disp: 60 tablet, Rfl: 0    omeprazole (PriLOSEC) 20 mg delayed release capsule, Take 20 mg by mouth daily, Disp: , Rfl:     oxyCODONE-acetaminophen (PERCOCET)  mg per tablet, Take 1 tab PO Q 6 hours prn pain  Do not fill until 10/27/18, Disp: 120 tablet, Rfl: 0    senna (SENOKOT) 8 6 MG tablet, Take 2 tablets by mouth daily, Disp: , Rfl:     spironolactone (ALDACTONE) 100 mg tablet, Take 1 tablet (100 mg total) by mouth daily, Disp: 90 tablet, Rfl: 0  No current facility-administered medications for this visit  No Known Allergies    Physical Exam:    BP 98/62   Pulse 68   Temp 98 7 °F (37 1 °C) (Oral)   Ht 5' 3" (1 6 m)   Wt 81 6 kg (180 lb)   BMI 31 89 kg/m²     Constitutional:normal, well developed, well nourished, alert, in no distress and non-toxic and no overt pain behavior    Eyes:anicteric  HEENT:grossly intact  Neck:supple, symmetric, trachea midline and no masses   Pulmonary:even and unlabored  Cardiovascular:No edema or pitting edema present  Skin:Normal without rashes or lesions and well hydrated  Psychiatric:Mood and affect appropriate  Neurologic:Cranial Nerves II-XII grossly intact  Musculoskeletal:normal     Lumbar Spine Exam    Appearance:  Normal lordosis  Palpation/Tenderness:  no tenderness or spasm    Range of Motion:  Flexion:  Minimally limited  without pain  Extension:  No limitation  without pain  Motor Strength:  Left hip flexion:  5/5  Right hip flexion:  5/5  Left knee flexion:  5/5  Left knee extension:  5/5  Right knee flexion:  5/5  Right knee extension:  5/5  Left foot dorsiflexion:  5/5  Left foot plantar flexion:  5/5  Right foot dorsiflexion:  5/5  Right foot plantar flexion:  5/5    Imaging    MRI LUMBAR SPINE WITHOUT CONTRAST     INDICATION: M54 16: Radiculopathy, lumbar region  M51 26: Other intervertebral disc displacement, lumbar region  History of hepatocellular carcinoma and hepatitis C with increasing low back pain      COMPARISON:  MRI from November 19, 2013     TECHNIQUE:  Sagittal T1, sagittal T2, sagittal inversion recovery, axial T1 and axial T2, coronal T2        IMAGE QUALITY:  Diagnostic     FINDINGS:     ALIGNMENT:  Normal alignment of the lumbar spine  No compression fracture  No spondylolysis or spondylolisthesis  No scoliosis      MARROW SIGNAL:  The marrow appears diffusely heterogeneous once again  There appears to be interval increase in the degree of T1 shortening throughout the visualized lower thoracic, lumbar and sacral spine suggesting osteopenia/osteoporosis  Underlying   metabolic, hematopoietic hepatic or lymphoproliferative disease is difficult to exclude        DISTAL CORD AND CONUS:  Normal size and signal within the distal cord and conus  The conus ends at the level      PARASPINAL SOFT TISSUES:  Paraspinal soft tissues are unremarkable      SACRUM:  Normal signal within the sacrum  No evidence of insufficiency or stress fracture  Tarlov cysts are noted at the S1 level  There is prominent epidural lipomatosis within the sacral canal      LOWER THORACIC DISC SPACES:  There is mild disc space narrowing at T11-T12 with mild degenerative discogenic disease once again indenting the ventral thecal sac without significant spinal stenosis      LUMBAR DISC SPACES:  There is multilevel disc desiccation with disc height loss most notably at L5-S1  The disc height loss has progressed since the prior examination    There are also Modic type II degenerative changes along the L5 and S1 endplates with   minimal vacuum gas in the intervertebral disc space and adjacent Schmorl's node deformity in the inferior L5 endplate      O0-L3:  Mild left greater than right facet arthrosis, unchanged  There is no foraminal stenosis      L2-L3:  Stable mild circumferential disc bulge and facet arthropathy  The disc bulges slightly eccentric to the right  There is stable marginal osteophyte formation  There is no foraminal stenosis      L3-L4:  Mild sequential disc bulge with mild bilateral facet arthropathy resulting in mild spinal stenosis  A tiny central annular fissure is identified without an associated protrusion  There is no foraminal stenosis      L4-L5:  Circumferential disc bulge slightly more pronounced than noted on the prior study with bilateral facet arthropathy and mild buckling of the ligamentum flavum  There is mild spinal canal encroachment as a result of these findings  The neural   foramina remain patent      L5-S1:  The previously noted central to right central disc extrusion is not as pronounced  There is a circumferential disc bulge once again noted with bilateral facet arthropathy  The disc bulge combined with the disc height loss results in bilateral   foraminal stenosis which is stable on the left and mildly progressed on the right compared to the prior study      IMPRESSION:     Interval progression of endplate degenerative changes at L5-S1 with greater disc height loss resulting in slightly greater foraminal stenosis      Note is made of interval improvement in the degree of disc herniation at L5-S1 as there appears to be decrease in the central to right central disc extrusion noted at this level in 2013      Mild interval progression of spinal stenosis at the L3-4 and L4-5 levels when compared to the prior study      Interval increase in the degree of osteopenia/osteoporosis  Last dose of Oxycodone was this moring @ 6:30am      No orders of the defined types were placed in this encounter

## 2018-09-22 ENCOUNTER — APPOINTMENT (OUTPATIENT)
Dept: LAB | Facility: HOSPITAL | Age: 68
End: 2018-09-22
Payer: COMMERCIAL

## 2018-09-22 DIAGNOSIS — E87.1 HYPONATREMIA: ICD-10-CM

## 2018-09-22 LAB
ALBUMIN SERPL BCP-MCNC: 3.3 G/DL (ref 3.5–5)
ALP SERPL-CCNC: 162 U/L (ref 46–116)
ALT SERPL W P-5'-P-CCNC: 26 U/L (ref 12–78)
ANION GAP SERPL CALCULATED.3IONS-SCNC: 7 MMOL/L (ref 4–13)
AST SERPL W P-5'-P-CCNC: 29 U/L (ref 5–45)
BILIRUB SERPL-MCNC: 0.69 MG/DL (ref 0.2–1)
BUN SERPL-MCNC: 13 MG/DL (ref 5–25)
CALCIUM SERPL-MCNC: 9.8 MG/DL (ref 8.3–10.1)
CHLORIDE SERPL-SCNC: 96 MMOL/L (ref 100–108)
CO2 SERPL-SCNC: 26 MMOL/L (ref 21–32)
CREAT SERPL-MCNC: 1.05 MG/DL (ref 0.6–1.3)
GFR SERPL CREATININE-BSD FRML MDRD: 64 ML/MIN/1.73SQ M
GLUCOSE P FAST SERPL-MCNC: 100 MG/DL (ref 65–99)
POTASSIUM SERPL-SCNC: 4.3 MMOL/L (ref 3.5–5.3)
PROT SERPL-MCNC: 8.8 G/DL (ref 6.4–8.2)
SODIUM SERPL-SCNC: 129 MMOL/L (ref 136–145)

## 2018-09-22 PROCEDURE — 36415 COLL VENOUS BLD VENIPUNCTURE: CPT

## 2018-09-22 PROCEDURE — 80053 COMPREHEN METABOLIC PANEL: CPT

## 2018-09-27 ENCOUNTER — TELEPHONE (OUTPATIENT)
Dept: PAIN MEDICINE | Facility: CLINIC | Age: 68
End: 2018-09-27

## 2018-09-27 NOTE — TELEPHONE ENCOUNTER
Call from patient   Call back # [phone]        Patient states when she was in the office last, her and tammy discussed ordering a back brace  Patient hasn't heard anything since

## 2018-09-27 NOTE — TELEPHONE ENCOUNTER
Per ov note 9/20, prescription for back brace was sent to [facility] on 9/20

## 2018-09-28 ENCOUNTER — PATIENT OUTREACH (OUTPATIENT)
Dept: INTERNAL MEDICINE CLINIC | Facility: CLINIC | Age: 68
End: 2018-09-28

## 2018-09-28 NOTE — PROGRESS NOTES
Outpatient Care Management Note:    Re: [facility] [employer] patient     Called pt no answer  Message left this is Shivam Jane an outpatient nurse care manager with [facility] her PCP office  I stated I was calling to f/u with her and requested a call back at [phone]

## 2018-10-03 ENCOUNTER — OFFICE VISIT (OUTPATIENT)
Dept: INTERNAL MEDICINE CLINIC | Facility: CLINIC | Age: 68
End: 2018-10-03
Payer: COMMERCIAL

## 2018-10-03 VITALS
WEIGHT: 177.69 LBS | HEART RATE: 80 BPM | HEIGHT: 63 IN | SYSTOLIC BLOOD PRESSURE: 90 MMHG | TEMPERATURE: 98.3 F | BODY MASS INDEX: 31.48 KG/M2 | DIASTOLIC BLOOD PRESSURE: 58 MMHG

## 2018-10-03 DIAGNOSIS — E11.8 TYPE 2 DIABETES MELLITUS WITH COMPLICATION, UNSPECIFIED WHETHER LONG TERM INSULIN USE: ICD-10-CM

## 2018-10-03 DIAGNOSIS — K74.60 HEPATIC CIRRHOSIS DUE TO CHRONIC HEPATITIS C INFECTION (HCC): Primary | ICD-10-CM

## 2018-10-03 DIAGNOSIS — Z23 NEED FOR PNEUMOCOCCAL VACCINATION: ICD-10-CM

## 2018-10-03 DIAGNOSIS — D64.9 ANEMIA, UNSPECIFIED TYPE: ICD-10-CM

## 2018-10-03 DIAGNOSIS — Z72.0 TOBACCO ABUSE: ICD-10-CM

## 2018-10-03 DIAGNOSIS — B18.2 HEPATIC CIRRHOSIS DUE TO CHRONIC HEPATITIS C INFECTION (HCC): Primary | ICD-10-CM

## 2018-10-03 DIAGNOSIS — I10 ESSENTIAL HYPERTENSION: ICD-10-CM

## 2018-10-03 DIAGNOSIS — I81 PORTAL VEIN THROMBOSIS: ICD-10-CM

## 2018-10-03 DIAGNOSIS — C22.0 HEPATOCELLULAR CARCINOMA (HCC): ICD-10-CM

## 2018-10-03 DIAGNOSIS — E87.1 HYPONATREMIA: ICD-10-CM

## 2018-10-03 DIAGNOSIS — Z23 NEED FOR INFLUENZA VACCINATION: ICD-10-CM

## 2018-10-03 PROBLEM — F32.A DEPRESSION: Status: ACTIVE | Noted: 2018-10-03

## 2018-10-03 LAB
LEFT EYE DIABETIC RETINOPATHY: NORMAL
LEFT EYE IMAGE QUALITY: NORMAL
RIGHT EYE DIABETIC RETINOPATHY: NORMAL
RIGHT EYE IMAGE QUALITY: NORMAL
SEVERITY (EYE EXAM): NORMAL

## 2018-10-03 PROCEDURE — G0009 ADMIN PNEUMOCOCCAL VACCINE: HCPCS | Performed by: INTERNAL MEDICINE

## 2018-10-03 PROCEDURE — G0008 ADMIN INFLUENZA VIRUS VAC: HCPCS | Performed by: INTERNAL MEDICINE

## 2018-10-03 PROCEDURE — 90732 PPSV23 VACC 2 YRS+ SUBQ/IM: CPT | Performed by: INTERNAL MEDICINE

## 2018-10-03 PROCEDURE — 3725F SCREEN DEPRESSION PERFORMED: CPT | Performed by: INTERNAL MEDICINE

## 2018-10-03 PROCEDURE — 90662 IIV NO PRSV INCREASED AG IM: CPT | Performed by: INTERNAL MEDICINE

## 2018-10-03 PROCEDURE — 99495 TRANSJ CARE MGMT MOD F2F 14D: CPT | Performed by: INTERNAL MEDICINE

## 2018-10-03 PROCEDURE — 92250 FUNDUS PHOTOGRAPHY W/I&R: CPT | Performed by: INTERNAL MEDICINE

## 2018-10-03 PROCEDURE — 3072F LOW RISK FOR RETINOPATHY: CPT | Performed by: INTERNAL MEDICINE

## 2018-10-03 NOTE — PROGRESS NOTES
Assessment/Plan:     No problem-specific Assessment & Plan notes found for this encounter  {Assess/PlanSmartLinks:10402}     Subjective:     Patient ID: Jamilah Sylvester is a 79 y o  female  Patient is a 49-year-old female with a past medical history of type 2 diabetes mellitus, hypertension, lumbar disc herniation at the L4-L5 level, hepatocellular carcinoma diagnosed in 2015 status post ablation and wedge resection, portal vein thrombosis on anticoagulation with Eliquis who presents to the office for a transitional care management visit  Patient was recently hospitalized from September 16, 2018 to September 19, 2018 for abdominal pain  Review of Systems      Objective:     Physical Exam      There were no vitals filed for this visit  Transitional Care Management Review:  Jamilah Sylvester is a 79 y o  female here for TCM follow up  During the TCM phone call patient stated:    Hospital care reviewed:  Records reviewed  Patient was hopsitalized at:  [facility]  Date of admission:  9/16/18  Date of discharge:  9/19/18  Diagnosis:  DECOMPENSATED HEPATIC CIRRHOSIS - K72 90  Disposition:  Home  Should patient be enrolled in anticoag monitoring?:  No (Comment: ON ELIQUIS 5 MG BID FOR THROMBOSIS OF PORTAL VEIN)  Patients specialists:  Other (comment)  Other specialists Name:  DR Adilson Tejada - ONCOLOGY  Other specialists contact #:  [phone]  I have advised the patient to call PCP with any new or worsening symptoms (please type in name along with any credentials):  Edwin Isaac LPN  Comments:  TCM CALL NOT COMPLETE, PT  DID NOT RETURN CALLS             Eric Landry,

## 2018-10-03 NOTE — PATIENT INSTRUCTIONS
DASH Eating Plan   WHAT YOU NEED TO KNOW:   The DASH (Dietary Approaches to Stop Hypertension) Eating Plan is designed to help prevent or lower high blood pressure  It can also help to lower LDL (bad) cholesterol and decrease your risk of heart disease  The plan is low in sodium, sugar, unhealthy fats, and total fat  It is high in potassium, calcium, magnesium, and fiber  These nutrients are added when you eat more fruits, vegetables, and whole grains  DISCHARGE INSTRUCTIONS:   Your sodium limit each day: Your dietitian will tell you how much sodium is safe for you to have each day  People with high blood pressure should have no more than 1,500 to 2,300 mg of sodium in a day  A teaspoon (tsp) of salt has 2,300 mg of sodium  This may seem like a difficult goal, but small changes to the foods you eat can make a big difference  Your healthcare provider or dietitian can help you create a meal plan that follows your sodium limit  How to limit sodium:   · Read food labels  Food labels can help you choose foods that are low in sodium  The amount of sodium is listed in milligrams (mg)  The % Daily Value (DV) column tells you how much of your daily needs are met by 1 serving of the food for each nutrient listed  Choose foods that have less than 5% of the DV of sodium  These foods are considered low in sodium  Foods that have 20% or more of the DV of sodium are considered high in sodium  Avoid foods that have more than 300 mg of sodium in each serving  Choose foods that say low-sodium, reduced-sodium, or no salt added on the food label  · Avoid salt  Do not salt food at the table, and add very little salt to foods during cooking  Use herbs and spices, such as onions, garlic, and salt-free seasonings to add flavor to foods  Try lemon or lime juice or vinegar to give foods a tart flavor  Use hot peppers or a small amount of hot pepper sauce to add a spicy flavor to foods  · Ask about salt substitutes    Ask your healthcare provider if you may use salt substitutes  Some salt substitutes have ingredients that can be harmful if you have certain health conditions  · Choose foods carefully at restaurants  Meals from restaurants, especially fast food restaurants, are often high in sodium  Some restaurants have nutrition information that tells you the amount of sodium in their foods  Ask to have your food prepared with less, or no salt  What you need to know about fats:   · Include healthy fats  Examples are unsaturated fats and omega-3 fatty acids  Unsaturated fats are found in soybean, canola, olive, or sunflower oil, and liquid and soft tub margarines  Omega-3 fatty acids are found in fatty fish, such as salmon, tuna, mackerel, and sardines  It is also found in flaxseed oil and ground flaxseed  · Avoid unhealthy fats  Do not eat unhealthy fats, such as saturated fats and trans fats  Saturated fats are found in foods that contain fat from animals  Examples are fatty meats, whole milk, butter, cream, and other dairy foods  It is also found in shortening, stick margarine, palm oil, and coconut oil  Trans fats are found in fried foods, crackers, chips, and baked goods made with margarine or shortening  Foods to include: With the DASH eating plan, you need to eat a certain number of servings from each food group  This will help you get enough of certain nutrients and limit others  The amount of servings you should eat depends on how many calories you need  Your dietitian can tell you how many calories you need  The number of servings listed next to the food groups below are for people who need about 2,000 calories each day    · Grains:  6 to 8 servings (3 of these servings should be whole-grain foods)    ¨ 1 slice of whole-grain bread     ¨ 1 ounce of dry cereal    ¨ ½ cup of cooked cereal, pasta, or brown rice    · Vegetables and fruits:  4 to 5 servings of fruits and 4 to 5 servings of vegetables    ¨ 1 medium fruit    ¨ ½ cup of frozen, canned (no added salt), or chopped fresh vegetables     ¨ ½ cup of fresh, frozen, dried, or canned fruit (canned in light syrup or fruit juice)    ¨ ½ cup of vegetable or fruit juice    · Dairy:  2 to 3 servings    ¨ 1 cup of nonfat (skim) or 1% milk    ¨ 1½ ounces of fat-free or low-fat cheese    ¨ 6 ounces of nonfat or low-fat yogurt    · Lean meat, poultry, and fish:  6 ounces or less    [employer] (chicken, turkey) with no skin    ¨ Fish (especially fatty fish, such as salmon, fresh tuna, or mackerel)    ¨ Lean beef and pork (loin, round, extra lean hamburger)    ¨ Egg whites and egg substitutes    · Nuts, seeds, and legumes:  4 to 5 servings each week    ¨ ½ cup of cooked beans and peas    ¨ 1½ ounces of unsalted nuts    ¨ 2 tablespoons of peanut butter or seeds    · Sweets and added sugars:  5 or less each week    ¨ 1 tablespoon of sugar, jelly, or jam    ¨ ½ cup of sorbet or gelatin    ¨ 1 cup of lemonade    · Fats:  2 to 3 servings each week    ¨ 1 teaspoon of soft margarine or vegetable oil    ¨ 1 tablespoon of mayonnaise    ¨ 2 tablespoons of salad dressing  Foods to avoid:   · Grains:      [employer], such as doughnuts, pastries, cookies, and biscuits (high in fat and sugar)    ¨ Mixes for cornbread and biscuits, packaged foods, such as bread stuffing, rice and pasta mixes, macaroni and cheese, and instant cereals (high in sodium)    · Fruits and vegetables:      ¨ Regular, canned vegetables (high in sodium)    ¨ Sauerkraut, pickled vegetables, and other foods prepared in brine (high in sodium)    ¨ Fried vegetables or vegetables in butter or high-fat sauces    ¨ Fruit in cream or butter sauce (high in fat)    · Dairy:      ¨ Whole milk, 2% milk, and cream (high in fat)    ¨ Regular cheese and processed cheese (high in fat and sodium)    · Meats and protein foods:      ¨ Smoked or cured meat, such as corned beef, louise, ham, hot dogs, and sausage (high in fat and sodium)    ¨ Canned beans and canned meats or spreads, such as potted meats, sardines, anchovies, and imitation seafood (high in sodium)    ¨ Deli or lunch meats, such as bologna, ham, turkey, and roast beef (high in sodium)    ¨ High-fat meat (T-bone steak, regular hamburger, and ribs)    ¨ Whole eggs and egg yolks (high in fat)    · Other:      ¨ Seasonings made with salt, such as garlic salt, celery salt, onion salt, seasoned salt, meat tenderizers, and monosodium glutamate (MSG)    ¨ Miso soup and canned or dried soup mixes (high in sodium)    ¨ Regular soy sauce, barbecue sauce, teriyaki sauce, steak sauce, Worcestershire sauce, and most flavored vinegars (high in sodium)    ¨ Regular condiments, such as mustard, ketchup, and salad dressings (high in sodium)    ¨ Gravy and sauces, such as [place] or cheese sauces (high in sodium and fat)    ¨ Drinks high in sugar, such as soda or fruit drinks    [employer] foods, such as salted chips, popcorn, pretzels, pork rinds, salted crackers, and salted nuts    ¨ Frozen foods, such as dinners, entrees, vegetables with sauces, and breaded meats (high in sodium)  Other guidelines to follow:   · Maintain a healthy weight  Your risk for heart disease is higher if you are overweight  Your healthcare provider may suggest that you lose weight if you are overweight  You can lose weight by eating fewer calories and foods that have added sugars and fat  The DASH meal plan can help you do this  Decrease calories by eating smaller portions at each meal and fewer snacks  Ask your healthcare provider for more information about how to lose weight  · Exercise regularly  Regular exercise can help you reach or maintain a healthy weight  Regular exercise can also help decrease your blood pressure and improve your cholesterol levels  Get 30 minutes or more of moderate exercise each day of the week  To lose weight, get at least 60 minutes of exercise   Talk to your healthcare provider about the best exercise program for you  · Limit alcohol  Women should limit alcohol to 1 drink a day  Men should limit alcohol to 2 drinks a day  A drink of alcohol is 12 ounces of beer, 5 ounces of wine, or 1½ ounces of liquor  © 2017 [address] [employer] [employer] Information is for End User's use only and may not be sold, redistributed or otherwise used for commercial purposes  All illustrations and images included in CareNotes® are the copyrighted property of A D A M , Inc  or Torrey De Jesus  The above information is an  only  It is not intended as medical advice for individual conditions or treatments  Talk to your doctor, nurse or pharmacist before following any medical regimen to see if it is safe and effective for you

## 2018-10-12 ENCOUNTER — PATIENT OUTREACH (OUTPATIENT)
Dept: INTERNAL MEDICINE CLINIC | Facility: CLINIC | Age: 68
End: 2018-10-12

## 2018-10-12 DIAGNOSIS — I81 PORTAL VEIN THROMBOSIS SECONDARY TO HCC INVASION (HCC): ICD-10-CM

## 2018-10-12 DIAGNOSIS — C22.0 PORTAL VEIN THROMBOSIS SECONDARY TO HCC INVASION (HCC): ICD-10-CM

## 2018-10-12 DIAGNOSIS — C22.0 HEPATOCELLULAR CARCINOMA (HCC): ICD-10-CM

## 2018-10-12 NOTE — Clinical Note
RN Outpatient Care Management Update  The following is the care coordination plan of care for the patient  Please review and sign off within 2 weeks  You can "sign off" on the care plan by just replying to this message with a statement that you agree with the care plan  Or if you want anything else to be added please let me know  Disclaimer: Failure to reply within 2 weeks will be considered an automatic sign off of the plan of care by the primary care provider    Thanks   Marisela Stone

## 2018-10-12 NOTE — PROGRESS NOTES
Outpatient Care Management Note:    Re: Saniya Medicare Case    Called and spoke with pt  I explained my role to pt  No immediate needs, questions, or concerns at this time  Pt reports she is feeling well and managing well at home at this time  Pt requesting refill on Eliquis (request put out of PCP to address) Pharmacy confirmed wit pt  Pt reports she is depressed and that she discussed it with her PCP at last visit on 10/3/18  Pt denies any thoughts of hurting herself and does not have interest in doing things and feels tired all the time  Pt does not want to start on medication at this time does not want to see a therapist/mental health provider at this time  Pt is aware if symptoms would worsen and/or has thoughts of hurting herself to go to ER to be further evaluated  Pt is not involved in any community activities but does state should would like to go to the gym  Pt reports she has alot of family close by and friends for support  Pt is independent with ADL's, drives and has car, and manages her own medication  Meds reviewed with her and no other refills needed  Pt has my contact # and PCP office # if needed  Pt is aware of upcoming appt's

## 2018-10-23 DIAGNOSIS — I10 ESSENTIAL HYPERTENSION: ICD-10-CM

## 2018-10-23 DIAGNOSIS — K74.60 DECOMPENSATED HEPATIC CIRRHOSIS (HCC): ICD-10-CM

## 2018-10-23 DIAGNOSIS — K72.90 DECOMPENSATED HEPATIC CIRRHOSIS (HCC): ICD-10-CM

## 2018-10-23 DIAGNOSIS — E13.9 DIABETES 1.5, MANAGED AS TYPE 2 (HCC): Primary | ICD-10-CM

## 2018-10-23 RX ORDER — HYDROCHLOROTHIAZIDE 25 MG/1
25 TABLET ORAL DAILY
Qty: 30 TABLET | Refills: 0 | Status: SHIPPED | OUTPATIENT
Start: 2018-10-23 | End: 2018-12-18 | Stop reason: SDUPTHER

## 2018-10-23 RX ORDER — FUROSEMIDE 40 MG/1
40 TABLET ORAL DAILY
Qty: 30 TABLET | Refills: 0 | Status: SHIPPED | OUTPATIENT
Start: 2018-10-23 | End: 2018-11-19 | Stop reason: SDUPTHER

## 2018-10-23 NOTE — TELEPHONE ENCOUNTER
I refilled one month supply, I rather have the PCP sees her before ordering 90 days supply or may tasks PCP for 90 days supply in the future  Thanks

## 2018-10-24 DIAGNOSIS — E13.9 DIABETES 1.5, MANAGED AS TYPE 2 (HCC): ICD-10-CM

## 2018-10-24 NOTE — TELEPHONE ENCOUNTER
THE METFORMIN WAS PRINTED NOT SENT ELECTRONICALLY   CAN YOU PLEASE RESEND ELECTRONICALLY? THANK YOU!

## 2018-10-25 NOTE — PROGRESS NOTES
The following is the care coordination plan of care for the patient  Please review and sign off within 2 weeks  Disclaimer: Failure to reply within 2 weeks will be considered an automatic sign off of the plan of care by the primary care provider

## 2018-11-06 ENCOUNTER — PATIENT OUTREACH (OUTPATIENT)
Dept: INTERNAL MEDICINE CLINIC | Facility: CLINIC | Age: 68
End: 2018-11-06

## 2018-11-06 NOTE — PROGRESS NOTES
Outpatient Care Management Note:    [facility] Customer Service # [phone]  and spoke with Hazel Segal  She reports pt does have a fitness benefit and can be reimbursed up to $120 per quarter (every 3 months)  Pt would pay the gym and then get itemized receipt mail copy of it in with name and member ID# on the upper right hand corner to the following address: [address] [place] [address] [facility] and then check will be mailed to her  I will make pt aware of this info  Called to follow up with pt regarding info I received from Cameron regarding fitness benefits  Info above given to pt and she wrote it down and repeated info back to me correctly  Pt denies any thoughts of hurting herself  Pt still does not want to see a mental health provider or take any medication for her depression  Pt reports she has gone to the gym in the past and knows it makes her feel good  Pt states she wants to join gym and see how she feels and if no improvement will call me to further discuss  Pt reminded of [facility] on 11/15 @ 9:15 am  Pt reminded to schedule routine f/u appt in beginning of the new year or sooner if anything changes  I gave pt my contact # [phone] again  Pt has all medications and taking as prescribed no refills needed at this time  Pt does not have any further questions or concerns at this time and was thankful for the information

## 2018-11-12 ENCOUNTER — TELEPHONE (OUTPATIENT)
Dept: OTHER | Facility: HOSPITAL | Age: 68
End: 2018-11-12

## 2018-11-13 NOTE — TELEPHONE ENCOUNTER
Received page from patient that her blood sugar was 212  She says her blood sugar is usually below 140, and she was concerned  Pt takes metformin daily, no insulin  She is asymptomatic  She notices nothing unusual   She did not eat anything unusual from what she normally eats; no carb-heavy meals  I informed her that no need to take additional medicine at this point, especially if asymptomatic  Recommended that she call the clinic for possible medication adjustment if her sugar continues to trend high

## 2018-11-16 ENCOUNTER — TELEPHONE (OUTPATIENT)
Dept: OBGYN CLINIC | Facility: HOSPITAL | Age: 68
End: 2018-11-16

## 2018-11-16 NOTE — TELEPHONE ENCOUNTER
Patient called to reschedule appointment for today  She states she can not get out of her driveway due to weather   She is rescheduled for 11/19

## 2018-11-19 ENCOUNTER — OFFICE VISIT (OUTPATIENT)
Dept: PAIN MEDICINE | Facility: CLINIC | Age: 68
End: 2018-11-19
Payer: COMMERCIAL

## 2018-11-19 VITALS
HEIGHT: 63 IN | DIASTOLIC BLOOD PRESSURE: 70 MMHG | BODY MASS INDEX: 31.01 KG/M2 | HEART RATE: 76 BPM | WEIGHT: 175 LBS | SYSTOLIC BLOOD PRESSURE: 118 MMHG

## 2018-11-19 DIAGNOSIS — K74.60 DECOMPENSATED HEPATIC CIRRHOSIS (HCC): ICD-10-CM

## 2018-11-19 DIAGNOSIS — M51.26 HERNIATED LUMBAR DISC WITHOUT MYELOPATHY: ICD-10-CM

## 2018-11-19 DIAGNOSIS — F11.20 UNCOMPLICATED OPIOID DEPENDENCE (HCC): ICD-10-CM

## 2018-11-19 DIAGNOSIS — M46.1 SACROILIITIS (HCC): ICD-10-CM

## 2018-11-19 DIAGNOSIS — G89.4 CHRONIC PAIN SYNDROME: Primary | ICD-10-CM

## 2018-11-19 DIAGNOSIS — K72.90 DECOMPENSATED HEPATIC CIRRHOSIS (HCC): ICD-10-CM

## 2018-11-19 PROCEDURE — 4040F PNEUMOC VAC/ADMIN/RCVD: CPT | Performed by: NURSE PRACTITIONER

## 2018-11-19 PROCEDURE — 99214 OFFICE O/P EST MOD 30 MIN: CPT | Performed by: NURSE PRACTITIONER

## 2018-11-19 RX ORDER — OXYCODONE AND ACETAMINOPHEN 10; 325 MG/1; MG/1
TABLET ORAL
Qty: 120 TABLET | Refills: 0 | Status: SHIPPED | OUTPATIENT
Start: 2018-11-19 | End: 2018-11-19 | Stop reason: SDUPTHER

## 2018-11-19 RX ORDER — OXYCODONE AND ACETAMINOPHEN 10; 325 MG/1; MG/1
TABLET ORAL
Qty: 120 TABLET | Refills: 0 | Status: SHIPPED | OUTPATIENT
Start: 2018-11-19 | End: 2019-01-14 | Stop reason: SDUPTHER

## 2018-11-19 RX ORDER — ACETAMINOPHEN 325 MG/1
650 TABLET ORAL EVERY 6 HOURS PRN
COMMUNITY
End: 2019-03-11 | Stop reason: HOSPADM

## 2018-11-19 NOTE — PROGRESS NOTES
Assessment:  1  Chronic pain syndrome    2  Herniated lumbar disc without myelopathy    3  Sacroiliitis ([facility] )    4  Uncomplicated opioid dependence ([facility] )        Plan:  Jim Velazquez is a 76 y o  female who presents for a follow up office visit in regards to Back Pain  The patient has a history of chronic pain syndrome secondary to lumbar disc herniation and sacroiliitis  The patient presents with ongoing low back pain which she feels is well controlled with Percocet 10/325 mg  She is obtaining 80% pain relief without side effects  Therefore, she will be continued on the medication as prescribed  A 1 month prescription with a do not fill date of November 25, 2018 was electronically sent to the pharmacy  Per the patient's request the 2nd month was printed and she was given the script with a do not fill date of December 23, 2018  There are risks associated with opioid medications, including dependence, addiction and tolerance  The patient understands and agrees to use these medications only as prescribed  Potential side effects of the medications include, but are not limited to, constipation, drowsiness, addiction, impaired judgment and risk of fatal overdose if not taken as prescribed  The patient was warned against driving while taking sedation medications  Sharing medications is a felony  At this point in time, the patient is showing no signs of addiction, abuse, diversion or suicidal ideation  [place] Prescription Drug Monitoring Program report was reviewed and was appropriate     My impressions and treatment recommendations were discussed in detail with the patient who verbalized understanding and had no further questions  Discharge instructions were provided  I personally saw and examined the patient and I agree with the above discussed plan of care  No orders of the defined types were placed in this encounter      New Medications Ordered This Visit   Medications    acetaminophen (TYLENOL) 325 mg tablet     Sig: Take 650 mg by mouth every 6 (six) hours as needed for mild pain    Diphenhydramine-APAP, sleep, (EXCEDRIN PM PO)     Sig: Take by mouth    oxyCODONE-acetaminophen (PERCOCET)  mg per tablet     Sig: Take 1 tab PO Q 6 hours prn pain  Do not fill until 12/23/18     Dispense:  120 tablet     Refill:  0       History of Present Illness:  Misti Riley is a 76 y o  female who presents for a follow up office visit in regards to Back Pain  The patient has a history of chronic pain syndrome secondary to lumbar disc herniation and sacroiliitis  She was last seen in the office on September 20, 2018, which she was continued on Percocet 10/325 mg  She presents today with ongoing low back pain, which remains unchanged since the last office visit  The pain is constant and occurs mostly in the morning  She describes as dull aching and pressure-like  Majority of her pain is located in the back, with no pain in her legs at this time  She is rating her pain a 7/10 on the numeric rating scale  She continues to take Percocet 10/325 mg 4 times a day which is providing 80% pain relief, without side effects    Pain Contract Signed: 2/12/18, Last Urine Drug Screen: 8/2/18 Last Pill Count: 9/20/18    I have personally reviewed and/or updated the patient's past medical history, past surgical history, family history, social history, current medications, allergies, and vital signs today       Review of Systems    Patient Active Problem List   Diagnosis    Anxiety    Asthenia    Chronic lumbar radiculopathy    Claustrophobia    Constipation    Diabetes mellitus, type II ([facility] )    Diabetic neuropathy ([facility] )    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 ([facility] )    Hepatitis C, chronic (HCC)    Hepatocellular carcinoma (HCC)    Herniated lumbar disc without myelopathy    Hypertension    Insomnia    Left foot pain    Myofascial pain    Nicotine dependence    Opioid dependence ([place] [address] )    Osteoporosis    Other cirrhosis of liver (HCC)    Pain syndrome, chronic    Sacroiliitis (HCC)    Seasonal allergies    Trochanteric bursitis    Elevated serum creatinine    Lumbar disc herniation    Health care maintenance    Hyponatremia    Abdominal pain    Ascites    Decompensated hepatic cirrhosis (HCC)    Anemia    Portal vein thrombosis    Depression    Tobacco abuse       Past Medical History:   Diagnosis Date    Cancer ([place] [address] )     Diabetes mellitus ([place] [address] )     Drug dependence ([place] [address] )     Hypertension        Past Surgical History:   Procedure Laterality Date    DENTAL SURGERY      HYSTERECTOMY      LIVER BIOPSY      LIVER SURGERY      Ablation    TONSILLECTOMY         Family History   Problem Relation Age of Onset    Prostate cancer Father        Social History     Occupational History    Not on file       Social History Main Topics    Smoking status: Current Every Day Smoker     Packs/day: 1 00    Smokeless tobacco: Never Used    Alcohol use No    Drug use: No    Sexual activity: No       Current Outpatient Prescriptions on File Prior to Visit   Medication Sig    apixaban (ELIQUIS) 5 mg Take 1 tablet (5 mg total) by mouth 2 (two) times a day    diphenhydrAMINE (NIGHT TIME SLEEP AID) 25 MG tablet Take 50 mg by mouth daily at bedtime as needed for sleep    furosemide (LASIX) 40 mg tablet Take 1 tablet (40 mg total) by mouth daily    gabapentin (NEURONTIN) 300 mg capsule 1 tab in the am and 3 tabs PO night time    hydrochlorothiazide (HYDRODIURIL) 25 mg tablet Take 1 tablet (25 mg total) by mouth daily    hydrOXYzine HCL (ATARAX) 10 mg tablet Take 1 tablet (10 mg total) by mouth daily at bedtime    lactulose 20 g/30 mL Take 30 mL (20 g total) by mouth 2 (two) times a day    losartan (COZAAR) 100 MG tablet Take 1 tablet (100 mg total) by mouth daily    metFORMIN (GLUCOPHAGE) 500 mg tablet Take 1 tablet (500 mg total) by mouth daily with breakfast    senna (SENOKOT) 8 6 MG tablet Take 2 tablets by mouth daily    spironolactone (ALDACTONE) 100 mg tablet Take 1 tablet (100 mg total) by mouth daily    [DISCONTINUED] diclofenac (VOLTAREN) 75 mg EC tablet Take 50 mg by mouth 2 (two) times a day    [DISCONTINUED] methocarbamol (ROBAXIN) 500 mg tablet Take 1/2 tablet in am, 1/2 tablet in afternoon and 1 tablet at bedtime  No driving or operating heavy machinery with this medication   [DISCONTINUED] omeprazole (PriLOSEC) 20 mg delayed release capsule Take 20 mg by mouth daily    [DISCONTINUED] oxyCODONE-acetaminophen (PERCOCET)  mg per tablet Take 1 tab PO Q 6 hours prn pain  Do not fill until 10/27/18     No current facility-administered medications on file prior to visit  No Known Allergies    Physical Exam:    /70 (BP Location: Right arm, Patient Position: Sitting)   Pulse 76   Ht 5' 3" (1 6 m)   Wt 79 4 kg (175 lb)   BMI 31 00 kg/m²     Constitutional:normal, well developed, well nourished, alert, in no distress and non-toxic and no overt pain behavior  Eyes:anicteric  HEENT:grossly intact  Neck:supple, symmetric, trachea midline and no masses   Pulmonary:even and unlabored  Cardiovascular:No edema or pitting edema present  Skin:Normal without rashes or lesions and well hydrated  Psychiatric:Mood and affect appropriate  Neurologic:Cranial Nerves II-XII grossly intact  Musculoskeletal:normal    Imaging  MRI LUMBAR SPINE WITHOUT CONTRAST     INDICATION: M54 16: Radiculopathy, lumbar region  M51 26:  Other intervertebral disc displacement, lumbar region    History of hepatocellular carcinoma and hepatitis C with increasing low back pain      COMPARISON:  MRI from November 19, 2013     TECHNIQUE:  Sagittal T1, sagittal T2, sagittal inversion recovery, axial T1 and axial T2, coronal T2        IMAGE QUALITY:  Diagnostic     FINDINGS:     ALIGNMENT:  Normal alignment of the lumbar spine   No compression fracture   No spondylolysis or spondylolisthesis   No scoliosis      MARROW SIGNAL:  The marrow appears diffusely heterogeneous once again  Verta Lázaro appears to be interval increase in the degree of T1 shortening throughout the visualized lower thoracic, lumbar and sacral spine suggesting osteopenia/osteoporosis   Underlying   metabolic, hematopoietic hepatic or lymphoproliferative disease is difficult to exclude        DISTAL CORD AND CONUS:  Normal size and signal within the distal cord and conus   The conus ends at the level      PARASPINAL SOFT TISSUES:  Paraspinal soft tissues are unremarkable      SACRUM:  Normal signal within the sacrum   No evidence of insufficiency or stress fracture   Tarlov cysts are noted at the S1 level   There is prominent epidural lipomatosis within the sacral canal      LOWER THORACIC DISC SPACES:  There is mild disc space narrowing at T11-T12 with mild degenerative discogenic disease once again indenting the ventral thecal sac without significant spinal stenosis      LUMBAR DISC SPACES:  There is multilevel disc desiccation with disc height loss most notably at L5-S1   The disc height loss has progressed since the prior examination   There are also Modic type II degenerative changes along the L5 and S1 endplates with   minimal vacuum gas in the intervertebral disc space and adjacent Schmorl's node deformity in the inferior L5 endplate      U5-S1:  Mild left greater than right facet arthrosis, unchanged   There is no foraminal stenosis      L2-L3:  Stable mild circumferential disc bulge and facet arthropathy   The disc bulges slightly eccentric to the right   There is stable marginal osteophyte formation   There is no foraminal stenosis      L3-L4:  Mild sequential disc bulge with mild bilateral facet arthropathy resulting in mild spinal stenosis   A tiny central annular fissure is identified without an associated protrusion   There is no foraminal stenosis      L4-L5:  Circumferential disc bulge slightly more pronounced than noted on the prior study with bilateral facet arthropathy and mild buckling of the ligamentum flavum   There is mild spinal canal encroachment as a result of these findings  The neural   foramina remain patent      L5-S1:  The previously noted central to right central disc extrusion is not as pronounced   There is a circumferential disc bulge once again noted with bilateral facet arthropathy   The disc bulge combined with the disc height loss results in bilateral   foraminal stenosis which is stable on the left and mildly progressed on the right compared to the prior study      IMPRESSION:     Interval progression of endplate degenerative changes at L5-S1 with greater disc height loss resulting in slightly greater foraminal stenosis      Note is made of interval improvement in the degree of disc herniation at L5-S1 as there appears to be decrease in the central to right central disc extrusion noted at this level in 2013      Mild interval progression of spinal stenosis at the L3-4 and L4-5 levels when compared to the prior study      Interval increase in the degree of osteopenia/osteoporosis

## 2018-11-20 ENCOUNTER — TELEPHONE (OUTPATIENT)
Dept: INTERNAL MEDICINE CLINIC | Facility: CLINIC | Age: 68
End: 2018-11-20

## 2018-11-20 NOTE — TELEPHONE ENCOUNTER
Please review (INDEPENDENT ENROLLMENT ) form placed in the (RED) team folder in the provider flow station  ([phone]) when form is complete  If the form requires a signature by a MD or DO, please review it with them and sign the form  Please place the form in the (RED) team folder in the Clinical flow station at the [address] [facility] and reply to this task to the Clinical Pool

## 2018-11-21 DIAGNOSIS — K72.90 DECOMPENSATED HEPATIC CIRRHOSIS (HCC): ICD-10-CM

## 2018-11-21 DIAGNOSIS — K74.60 DECOMPENSATED HEPATIC CIRRHOSIS (HCC): ICD-10-CM

## 2018-11-21 RX ORDER — FUROSEMIDE 40 MG/1
40 TABLET ORAL DAILY
Qty: 30 TABLET | Refills: 0 | Status: SHIPPED | OUTPATIENT
Start: 2018-11-21 | End: 2018-11-21 | Stop reason: SDUPTHER

## 2018-11-21 NOTE — TELEPHONE ENCOUNTER
Form completed by Dr Loren Bhakta) and reviewed by medical assistant/nurse  Form placed in (RED) folder in Clerical flow station

## 2018-11-23 RX ORDER — FUROSEMIDE 40 MG/1
TABLET ORAL
Qty: 90 TABLET | Refills: 0 | Status: SHIPPED | OUTPATIENT
Start: 2018-11-23 | End: 2018-12-18 | Stop reason: SDUPTHER

## 2018-12-06 ENCOUNTER — PATIENT OUTREACH (OUTPATIENT)
Dept: INTERNAL MEDICINE CLINIC | Facility: CLINIC | Age: 68
End: 2018-12-06

## 2018-12-10 ENCOUNTER — TELEPHONE (OUTPATIENT)
Dept: PAIN MEDICINE | Facility: MEDICAL CENTER | Age: 68
End: 2018-12-10

## 2018-12-10 NOTE — TELEPHONE ENCOUNTER
Pt left a voice mail today at 11 24am regarding her prescription for oxycodone  she believes it was misdated and needs a call back to straighten it out   Pt can be reached at [phone]

## 2018-12-10 NOTE — TELEPHONE ENCOUNTER
I s/w pt who said her current bottle says 11/23 so she said her Dec bottle should be able to be filled on 12/21  I told pt her Nov script said DNFB 11/25 and per PDMP was filled 11/25  I told pt I would put her on hold and call her [facility] to clarify  I s/w Ray Todd at [place] and she confirmed that "11/23" is a processing date, and that it was filled and picked up on 11/25 and the next script has DNFB 12/23  I s/w pt and told her the same  I advised pt she could call and s/w [facility] [place] [facility] further

## 2018-12-10 NOTE — TELEPHONE ENCOUNTER
Pt left a VM in [facility] at 12:14 that she missed a call from us  She promised to keep her phone with her - please try again

## 2018-12-10 NOTE — TELEPHONE ENCOUNTER
Left  for pt that I was returning her call  I stated I was heading to lunch so I asked for pt to c/b after 1 pm today  C/B # and OH provided

## 2018-12-18 DIAGNOSIS — I10 ESSENTIAL HYPERTENSION: ICD-10-CM

## 2018-12-18 DIAGNOSIS — K72.90 DECOMPENSATED HEPATIC CIRRHOSIS (HCC): ICD-10-CM

## 2018-12-18 DIAGNOSIS — K74.60 DECOMPENSATED HEPATIC CIRRHOSIS (HCC): ICD-10-CM

## 2018-12-18 DIAGNOSIS — E13.9 DIABETES 1.5, MANAGED AS TYPE 2 (HCC): ICD-10-CM

## 2018-12-20 RX ORDER — SPIRONOLACTONE 100 MG/1
100 TABLET, FILM COATED ORAL DAILY
Qty: 90 TABLET | Refills: 0 | Status: SHIPPED | OUTPATIENT
Start: 2018-12-20 | End: 2018-12-26 | Stop reason: SDUPTHER

## 2018-12-20 RX ORDER — HYDROCHLOROTHIAZIDE 25 MG/1
25 TABLET ORAL DAILY
Qty: 90 TABLET | Refills: 0 | Status: SHIPPED | OUTPATIENT
Start: 2018-12-20 | End: 2019-03-01

## 2018-12-20 RX ORDER — FUROSEMIDE 40 MG/1
40 TABLET ORAL DAILY
Qty: 90 TABLET | Refills: 0 | Status: SHIPPED | OUTPATIENT
Start: 2018-12-20 | End: 2019-03-11 | Stop reason: HOSPADM

## 2018-12-26 DIAGNOSIS — K72.90 DECOMPENSATED HEPATIC CIRRHOSIS (HCC): ICD-10-CM

## 2018-12-26 DIAGNOSIS — I81 PORTAL VEIN THROMBOSIS SECONDARY TO HCC INVASION (HCC): ICD-10-CM

## 2018-12-26 DIAGNOSIS — C22.0 HEPATOCELLULAR CARCINOMA (HCC): ICD-10-CM

## 2018-12-26 DIAGNOSIS — C22.0 PORTAL VEIN THROMBOSIS SECONDARY TO HCC INVASION (HCC): ICD-10-CM

## 2018-12-26 DIAGNOSIS — K74.60 DECOMPENSATED HEPATIC CIRRHOSIS (HCC): ICD-10-CM

## 2018-12-26 DIAGNOSIS — I10 ESSENTIAL HYPERTENSION: ICD-10-CM

## 2018-12-26 RX ORDER — SPIRONOLACTONE 100 MG/1
100 TABLET, FILM COATED ORAL DAILY
Qty: 90 TABLET | Refills: 3 | Status: SHIPPED | OUTPATIENT
Start: 2018-12-26 | End: 2019-03-11 | Stop reason: HOSPADM

## 2019-01-14 ENCOUNTER — OFFICE VISIT (OUTPATIENT)
Dept: PAIN MEDICINE | Facility: CLINIC | Age: 69
End: 2019-01-14
Payer: COMMERCIAL

## 2019-01-14 VITALS
RESPIRATION RATE: 18 BRPM | HEIGHT: 63 IN | BODY MASS INDEX: 31.01 KG/M2 | WEIGHT: 175 LBS | DIASTOLIC BLOOD PRESSURE: 82 MMHG | TEMPERATURE: 98.5 F | SYSTOLIC BLOOD PRESSURE: 126 MMHG

## 2019-01-14 DIAGNOSIS — F11.20 UNCOMPLICATED OPIOID DEPENDENCE (HCC): ICD-10-CM

## 2019-01-14 DIAGNOSIS — G89.4 CHRONIC PAIN SYNDROME: Primary | ICD-10-CM

## 2019-01-14 DIAGNOSIS — M51.26 HERNIATED LUMBAR DISC WITHOUT MYELOPATHY: ICD-10-CM

## 2019-01-14 PROCEDURE — 99214 OFFICE O/P EST MOD 30 MIN: CPT | Performed by: NURSE PRACTITIONER

## 2019-01-14 PROCEDURE — 80305 DRUG TEST PRSMV DIR OPT OBS: CPT | Performed by: NURSE PRACTITIONER

## 2019-01-14 RX ORDER — OXYCODONE AND ACETAMINOPHEN 10; 325 MG/1; MG/1
TABLET ORAL
Qty: 120 TABLET | Refills: 0 | Status: SHIPPED | OUTPATIENT
Start: 2019-01-14 | End: 2019-01-14 | Stop reason: SDUPTHER

## 2019-01-14 RX ORDER — OXYCODONE AND ACETAMINOPHEN 10; 325 MG/1; MG/1
TABLET ORAL
Qty: 120 TABLET | Refills: 0 | Status: SHIPPED | OUTPATIENT
Start: 2019-01-14 | End: 2019-03-14 | Stop reason: SDUPTHER

## 2019-01-14 NOTE — PROGRESS NOTES
Pt c/o back pain that radiates to the hips    Assessment:  1  Chronic pain syndrome    2  Herniated lumbar disc without myelopathy    3  Uncomplicated opioid dependence ([address] )        Plan:  Diana Aldridge is a 76 y o  female who presents for a follow up office visit in regards to Back Pain and Hip Pain  The patient has a history of chronic pain syndrome secondary to lumbar degenerative disc disease and lumbar disc herniation  The patient presents today with ongoing low back pain which remains unchanged since the last office visit  She feels her pain has been stable and that the Percocet 10/325 mg is providing 80% pain relief without side effects  Therefore, she will be continued on the medication as prescribed  Two months of prescriptions were printed for the patient's request because she states she is in the process of changing pharmacies  One prescription until date of January 21, 2019 and the 2nd with a do not fill date of February 19, 2019  The patient had for got her prescription bottle at home today  She was reminded to bring into all of her office visit as random pill counts are performed per opioid policy  The patient verbalized understanding  An opioid contract was reviewed with the patient  The patient was made aware they are only to receive opioid medication from our office, and must take the medication as prescribed  If the medication is lost or stolen, it will not be replaced  We also do not condone the use of illegal substances or alcohol with opioid medication  Random urine drug screens and pill counts will also be performed at office visits  Lastly, the patient was informed that office visits are needed for refills  Patient was agreeable and signed the contract          The patient was also completed a BECKS depression inventory and SOAPP-R  today, as part of our yearly opioid management program        There are risks associated with opioid medications, including dependence, addiction and tolerance  The patient understands and agrees to use these medications only as prescribed  Potential side effects of the medications include, but are not limited to, constipation, drowsiness, addiction, impaired judgment and risk of fatal overdose if not taken as prescribed  The patient was warned against driving while taking sedation medications  Sharing medications is a felony  At this point in time, the patient is showing no signs of addiction, abuse, diversion or suicidal ideation  A urine drug screen was collected at today's office visit as part of our medication management protocol  The point of care testing results were appropriate for what was being prescribed  The specimen will be sent for confirmatory testing  The drug screen is medically necessary because the patient is either dependent on opioid medication or is being considered for opioid medication therapy and the results could impact ongoing or future treatment  The drug screen is to evaluate for the presences or absence of prescribed, non-prescribed, and/or illicit drugs/substances  [place] Prescription Drug Monitoring Program report was reviewed and was appropriate     My impressions and treatment recommendations were discussed in detail with the patient who verbalized understanding and had no further questions  Discharge instructions were provided  I personally saw and examined the patient and I agree with the above discussed plan of care  No orders of the defined types were placed in this encounter  New Medications Ordered This Visit   Medications    oxyCODONE-acetaminophen (PERCOCET)  mg per tablet     Sig: Take 1 tab PO Q 6 hours prn pain  Do not fill until 2/19/19     Dispense:  120 tablet     Refill:  0       History of Present Illness:  Marylene Dole is a 76 y o  female who presents for a follow up office visit in regards to Back Pain and Hip Pain     The patient has a history of chronic pain syndrome secondary to lumbar degenerative disc disease and lumbar disc herniation  She presents today with ongoing low back pain which remains unchanged since the last office visit  The pain is constant occurring mostly in the morning  She describes as dull aching  She is rating her pain a 7/10 on numeric rating scale    She continues to take Percocet 10/325 mg 4 times a day was providing 80% pain the side-effects  Pain Contract Signed: 1/14/19, Last Urine Drug Screen: 1/14/19    I have personally reviewed and/or updated the patient's past medical history, past surgical history, family history, social history, current medications, allergies, and vital signs today  Review of Systems   Respiratory: Negative for shortness of breath  Cardiovascular: Negative for chest pain  Gastrointestinal: Negative for constipation, diarrhea, nausea and vomiting  Musculoskeletal: Positive for gait problem  Negative for arthralgias, joint swelling and myalgias  Decreased ROM  Joint stiffness   Skin: Negative for rash  Neurological: Positive for weakness  Negative for dizziness and seizures  All other systems reviewed and are negative        Patient Active Problem List   Diagnosis    Anxiety    Asthenia    Chronic lumbar radiculopathy    Claustrophobia    Constipation    Diabetes mellitus, type II ([address] )    Di[facility]    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 ([facility]    Hepatitis C, chronic (HCC)    Hepatocellular carcinoma (HCC)    Herniated lumbar disc without myelopathy    Hypertension    Insomnia    Left foot pain    Myofascial pain    Nicotine dependence    Opioid dependence ([address] )    Osteoporosis    Other cirrhosis of liver (HCC)    Pain syndrome, chronic    Sacroiliitis (HCC)    Seasonal allergies    Trochanteric bursitis    Elevated serum creatinine    Lumbar disc herniation    Health care maintenance    Hyponatremia    Abdominal pain    Ascites    Decompensated hepatic cirrhosis (HCC)    Anemia    Portal vein thrombosis    Depression    Tobacco abuse       Past Medical History:   Diagnosis Date    Cancer ([facility] )     Diabetes mellitus ([facility] )     Drug dependence ([facility] )     Hypertension        Past Surgical History:   Procedure Laterality Date    DENTAL SURGERY      HYSTERECTOMY      LIVER BIOPSY      LIVER SURGERY      Ablation    TONSILLECTOMY         Family History   Problem Relation Age of Onset    Prostate cancer Father        Social History     Occupational History    Not on file       Social History Main Topics    Smoking status: Current Every Day Smoker     Packs/day: 1 00    Smokeless tobacco: Never Used    Alcohol use No    Drug use: No    Sexual activity: No       Current Outpatient Prescriptions on File Prior to Visit   Medication Sig    acetaminophen (TYLENOL) 325 mg tablet Take 650 mg by mouth every 6 (six) hours as needed for mild pain    apixaban (ELIQUIS) 5 mg Take 1 tablet (5 mg total) by mouth 2 (two) times a day    diphenhydrAMINE (NIGHT TIME SLEEP AID) 25 MG tablet Take 50 mg by mouth daily at bedtime as needed for sleep    Diphenhydramine-APAP, sleep, (EXCEDRIN PM PO) Take by mouth    furosemide (LASIX) 40 mg tablet Take 1 tablet (40 mg total) by mouth daily    gabapentin (NEURONTIN) 300 mg capsule 1 tab in the am and 3 tabs PO night time    hydrochlorothiazide (HYDRODIURIL) 25 mg tablet Take 1 tablet (25 mg total) by mouth daily    hydrOXYzine HCL (ATARAX) 10 mg tablet Take 1 tablet (10 mg total) by mouth daily at bedtime    lactulose 20 g/30 mL Take 30 mL (20 g total) by mouth 2 (two) times a day    losartan (COZAAR) 100 MG tablet Take 1 tablet (100 mg total) by mouth daily    metFORMIN (GLUCOPHAGE) 500 mg tablet Take 1 tablet (500 mg total) by mouth daily with breakfast    senna (SENOKOT) 8 6 MG tablet Take 2 tablets by mouth daily    spironolactone (ALDACTONE) 100 mg tablet Take 1 tablet (100 mg total) by mouth daily    [DISCONTINUED] oxyCODONE-acetaminophen (PERCOCET)  mg per tablet Take 1 tab PO Q 6 hours prn pain  Do not fill until 12/23/18     No current facility-administered medications on file prior to visit  No Known Allergies    Physical Exam:    /82   Temp 98 5 °F (36 9 °C)   Resp 18   Ht 5' 3" (1 6 m)   Wt 79 4 kg (175 lb)   BMI 31 00 kg/m²     Constitutional:normal, well developed, well nourished, alert, in no distress and non-toxic and no overt pain behavior  Eyes:anicteric  HEENT:grossly intact  Neck:supple, symmetric, trachea midline and no masses   Pulmonary:even and unlabored  Cardiovascular:No edema or pitting edema present  Skin:Normal without rashes or lesions and well hydrated  Psychiatric:Mood and affect appropriate  Neurologic:Cranial Nerves II-XII grossly intact  Musculoskeletal:normal    Imaging    MRI LUMBAR SPINE WITHOUT CONTRAST 8/29/18     INDICATION: M54 16: Radiculopathy, lumbar region  M51 26: Other intervertebral disc displacement, lumbar region  History of hepatocellular carcinoma and hepatitis C with increasing low back pain      COMPARISON:  MRI from November 19, 2013     TECHNIQUE:  Sagittal T1, sagittal T2, sagittal inversion recovery, axial T1 and axial T2, coronal T2        IMAGE QUALITY:  Diagnostic     FINDINGS:     ALIGNMENT:  Normal alignment of the lumbar spine  No compression fracture  No spondylolysis or spondylolisthesis  No scoliosis      MARROW SIGNAL:  The marrow appears diffusely heterogeneous once again  There appears to be interval increase in the degree of T1 shortening throughout the visualized lower thoracic, lumbar and sacral spine suggesting osteopenia/osteoporosis  Underlying   metabolic, hematopoietic hepatic or lymphoproliferative disease is difficult to exclude        DISTAL CORD AND CONUS:  Normal size and signal within the distal cord and conus    The conus ends at the level      PARASPINAL SOFT TISSUES:  Paraspinal soft tissues are unremarkable      SACRUM:  Normal signal within the sacrum  No evidence of insufficiency or stress fracture  Tarlov cysts are noted at the S1 level  There is prominent epidural lipomatosis within the sacral canal      LOWER THORACIC DISC SPACES:  There is mild disc space narrowing at T11-T12 with mild degenerative discogenic disease once again indenting the ventral thecal sac without significant spinal stenosis      LUMBAR DISC SPACES:  There is multilevel disc desiccation with disc height loss most notably at L5-S1  The disc height loss has progressed since the prior examination  There are also Modic type II degenerative changes along the L5 and S1 endplates with   minimal vacuum gas in the intervertebral disc space and adjacent Schmorl's node deformity in the inferior L5 endplate      X2-Y8:  Mild left greater than right facet arthrosis, unchanged  There is no foraminal stenosis      L2-L3:  Stable mild circumferential disc bulge and facet arthropathy  The disc bulges slightly eccentric to the right  There is stable marginal osteophyte formation  There is no foraminal stenosis      L3-L4:  Mild sequential disc bulge with mild bilateral facet arthropathy resulting in mild spinal stenosis  A tiny central annular fissure is identified without an associated protrusion  There is no foraminal stenosis      L4-L5:  Circumferential disc bulge slightly more pronounced than noted on the prior study with bilateral facet arthropathy and mild buckling of the ligamentum flavum  There is mild spinal canal encroachment as a result of these findings  The neural   foramina remain patent      L5-S1:  The previously noted central to right central disc extrusion is not as pronounced  There is a circumferential disc bulge once again noted with bilateral facet arthropathy    The disc bulge combined with the disc height loss results in bilateral   foraminal stenosis which is stable on the left and mildly progressed on the right compared to the prior study      IMPRESSION:     Interval progression of endplate degenerative changes at L5-S1 with greater disc height loss resulting in slightly greater foraminal stenosis      Note is made of interval improvement in the degree of disc herniation at L5-S1 as there appears to be decrease in the central to right central disc extrusion noted at this level in 2013      Mild interval progression of spinal stenosis at the L3-4 and L4-5 levels when compared to the prior study      Interval increase in the degree of osteopenia/osteoporosis

## 2019-02-16 ENCOUNTER — HOSPITAL ENCOUNTER (INPATIENT)
Facility: HOSPITAL | Age: 69
LOS: 5 days | Discharge: HOME | DRG: 432 | End: 2019-02-21
Attending: EMERGENCY MEDICINE | Admitting: INTERNAL MEDICINE
Payer: COMMERCIAL

## 2019-02-16 ENCOUNTER — APPOINTMENT (EMERGENCY)
Dept: RADIOLOGY | Facility: HOSPITAL | Age: 69
DRG: 432 | End: 2019-02-16
Payer: COMMERCIAL

## 2019-02-16 DIAGNOSIS — C22.0 HEPATOCELLULAR CARCINOMA (HCC): ICD-10-CM

## 2019-02-16 DIAGNOSIS — Z01.89 ENCOUNTER FOR COMPETENCY EVALUATION: ICD-10-CM

## 2019-02-16 DIAGNOSIS — G89.4 PAIN SYNDROME, CHRONIC: ICD-10-CM

## 2019-02-16 DIAGNOSIS — M54.16 CHRONIC LUMBAR RADICULOPATHY: ICD-10-CM

## 2019-02-16 DIAGNOSIS — R53.83 FATIGUE: ICD-10-CM

## 2019-02-16 DIAGNOSIS — B18.2 HEPATITIS C, CHRONIC (HCC): ICD-10-CM

## 2019-02-16 DIAGNOSIS — K92.2 GI BLEED: Primary | ICD-10-CM

## 2019-02-16 DIAGNOSIS — D64.9 SYMPTOMATIC ANEMIA: ICD-10-CM

## 2019-02-16 DIAGNOSIS — B18.2 HEPATIC CIRRHOSIS DUE TO CHRONIC HEPATITIS C INFECTION (HCC): ICD-10-CM

## 2019-02-16 DIAGNOSIS — K74.60 HEPATIC CIRRHOSIS DUE TO CHRONIC HEPATITIS C INFECTION (HCC): ICD-10-CM

## 2019-02-16 DIAGNOSIS — I81 PORTAL VEIN THROMBOSIS: ICD-10-CM

## 2019-02-16 PROBLEM — K92.1 MELENA: Status: ACTIVE | Noted: 2019-02-16

## 2019-02-16 PROBLEM — D62 ACUTE ON CHRONIC BLOOD LOSS ANEMIA: Status: ACTIVE | Noted: 2018-09-18

## 2019-02-16 PROBLEM — Z79.01 LONG TERM (CURRENT) USE OF ANTICOAGULANTS: Chronic | Status: ACTIVE | Noted: 2019-02-16

## 2019-02-16 PROBLEM — I95.9 HYPOTENSION: Status: ACTIVE | Noted: 2019-02-16

## 2019-02-16 LAB
ABO GROUP BLD: NORMAL
ALBUMIN SERPL BCP-MCNC: 3.4 G/DL (ref 3.5–5)
ALP SERPL-CCNC: 104 U/L (ref 46–116)
ALT SERPL W P-5'-P-CCNC: 23 U/L (ref 12–78)
ANION GAP SERPL CALCULATED.3IONS-SCNC: 9 MMOL/L (ref 4–13)
AST SERPL W P-5'-P-CCNC: 34 U/L (ref 5–45)
ATRIAL RATE: 70 BPM
BASOPHILS # BLD AUTO: 0.01 THOUSANDS/ΜL (ref 0–0.1)
BASOPHILS NFR BLD AUTO: 0 % (ref 0–1)
BILIRUB SERPL-MCNC: 0.44 MG/DL (ref 0.2–1)
BLD GP AB SCN SERPL QL: NEGATIVE
BUN SERPL-MCNC: 33 MG/DL (ref 5–25)
CALCIUM SERPL-MCNC: 9.3 MG/DL (ref 8.3–10.1)
CHLORIDE SERPL-SCNC: 103 MMOL/L (ref 100–108)
CO2 SERPL-SCNC: 21 MMOL/L (ref 21–32)
CREAT SERPL-MCNC: 1.34 MG/DL (ref 0.6–1.3)
EOSINOPHIL # BLD AUTO: 0.07 THOUSAND/ΜL (ref 0–0.61)
EOSINOPHIL NFR BLD AUTO: 1 % (ref 0–6)
ERYTHROCYTE [DISTWIDTH] IN BLOOD BY AUTOMATED COUNT: 18.8 % (ref 11.6–15.1)
GFR SERPL CREATININE-BSD FRML MDRD: 47 ML/MIN/1.73SQ M
GLUCOSE SERPL-MCNC: 113 MG/DL (ref 65–140)
HCT VFR BLD AUTO: 14.3 % (ref 34.8–46.1)
HGB BLD-MCNC: 4.3 G/DL (ref 11.5–15.4)
HGB RETIC QN AUTO: 24.5 PG (ref 30–38.3)
IMM GRANULOCYTES # BLD AUTO: 0.06 THOUSAND/UL (ref 0–0.2)
IMM GRANULOCYTES NFR BLD AUTO: 1 % (ref 0–2)
IMM RETICS NFR: 31.6 % (ref 0–14)
LIPASE SERPL-CCNC: 624 U/L (ref 73–393)
LYMPHOCYTES # BLD AUTO: 1.39 THOUSANDS/ΜL (ref 0.6–4.47)
LYMPHOCYTES NFR BLD AUTO: 13 % (ref 14–44)
MCH RBC QN AUTO: 25.1 PG (ref 26.8–34.3)
MCHC RBC AUTO-ENTMCNC: 30.1 G/DL (ref 31.4–37.4)
MCV RBC AUTO: 84 FL (ref 82–98)
MONOCYTES # BLD AUTO: 0.73 THOUSAND/ΜL (ref 0.17–1.22)
MONOCYTES NFR BLD AUTO: 7 % (ref 4–12)
NEUTROPHILS # BLD AUTO: 8.74 THOUSANDS/ΜL (ref 1.85–7.62)
NEUTS SEG NFR BLD AUTO: 78 % (ref 43–75)
NRBC BLD AUTO-RTO: 0 /100 WBCS
P AXIS: 26 DEGREES
PLATELET # BLD AUTO: 262 THOUSANDS/UL (ref 149–390)
PMV BLD AUTO: 9.8 FL (ref 8.9–12.7)
POTASSIUM SERPL-SCNC: 3.8 MMOL/L (ref 3.5–5.3)
PR INTERVAL: 134 MS
PROT SERPL-MCNC: 8.3 G/DL (ref 6.4–8.2)
QRS AXIS: 18 DEGREES
QRSD INTERVAL: 80 MS
QT INTERVAL: 392 MS
QTC INTERVAL: 423 MS
RBC # BLD AUTO: 1.71 MILLION/UL (ref 3.81–5.12)
RETICS # AUTO: ABNORMAL 10*3/UL (ref 14097–95744)
RETICS # CALC: 8.99 % (ref 0.37–1.87)
RH BLD: POSITIVE
SODIUM SERPL-SCNC: 133 MMOL/L (ref 136–145)
SPECIMEN EXPIRATION DATE: NORMAL
T WAVE AXIS: -57 DEGREES
TSH SERPL DL<=0.05 MIU/L-ACNC: 2.31 UIU/ML (ref 0.36–3.74)
VENTRICULAR RATE: 70 BPM
WBC # BLD AUTO: 11 THOUSAND/UL (ref 4.31–10.16)

## 2019-02-16 PROCEDURE — 83690 ASSAY OF LIPASE: CPT | Performed by: EMERGENCY MEDICINE

## 2019-02-16 PROCEDURE — P9016 RBC LEUKOCYTES REDUCED: HCPCS

## 2019-02-16 PROCEDURE — 74177 CT ABD & PELVIS W/CONTRAST: CPT

## 2019-02-16 PROCEDURE — 30233N1 TRANSFUSION OF NONAUTOLOGOUS RED BLOOD CELLS INTO PERIPHERAL VEIN, PERCUTANEOUS APPROACH: ICD-10-PCS | Performed by: INTERNAL MEDICINE

## 2019-02-16 PROCEDURE — 80053 COMPREHEN METABOLIC PANEL: CPT | Performed by: EMERGENCY MEDICINE

## 2019-02-16 PROCEDURE — 86901 BLOOD TYPING SEROLOGIC RH(D): CPT | Performed by: EMERGENCY MEDICINE

## 2019-02-16 PROCEDURE — 99291 CRITICAL CARE FIRST HOUR: CPT

## 2019-02-16 PROCEDURE — 86850 RBC ANTIBODY SCREEN: CPT | Performed by: EMERGENCY MEDICINE

## 2019-02-16 PROCEDURE — 93010 ELECTROCARDIOGRAM REPORT: CPT | Performed by: INTERNAL MEDICINE

## 2019-02-16 PROCEDURE — 36430 TRANSFUSION BLD/BLD COMPNT: CPT

## 2019-02-16 PROCEDURE — 85025 COMPLETE CBC W/AUTO DIFF WBC: CPT | Performed by: EMERGENCY MEDICINE

## 2019-02-16 PROCEDURE — 86923 COMPATIBILITY TEST ELECTRIC: CPT

## 2019-02-16 PROCEDURE — 36415 COLL VENOUS BLD VENIPUNCTURE: CPT | Performed by: EMERGENCY MEDICINE

## 2019-02-16 PROCEDURE — 84443 ASSAY THYROID STIM HORMONE: CPT | Performed by: EMERGENCY MEDICINE

## 2019-02-16 PROCEDURE — 85046 RETICYTE/HGB CONCENTRATE: CPT | Performed by: INTERNAL MEDICINE

## 2019-02-16 PROCEDURE — 93005 ELECTROCARDIOGRAM TRACING: CPT

## 2019-02-16 PROCEDURE — 96360 HYDRATION IV INFUSION INIT: CPT

## 2019-02-16 PROCEDURE — 86900 BLOOD TYPING SEROLOGIC ABO: CPT | Performed by: EMERGENCY MEDICINE

## 2019-02-16 PROCEDURE — 82607 VITAMIN B-12: CPT | Performed by: INTERNAL MEDICINE

## 2019-02-16 RX ORDER — FUROSEMIDE 40 MG/1
40 TABLET ORAL DAILY
Status: DISCONTINUED | OUTPATIENT
Start: 2019-02-17 | End: 2019-02-16

## 2019-02-16 RX ORDER — NICOTINE 21 MG/24HR
1 PATCH, TRANSDERMAL 24 HOURS TRANSDERMAL DAILY
Status: DISCONTINUED | OUTPATIENT
Start: 2019-02-17 | End: 2019-02-21 | Stop reason: HOSPADM

## 2019-02-16 RX ORDER — DIPHENHYDRAMINE HCL 25 MG
25 TABLET ORAL
Status: DISCONTINUED | OUTPATIENT
Start: 2019-02-16 | End: 2019-02-16

## 2019-02-16 RX ORDER — SPIRONOLACTONE 100 MG/1
100 TABLET, FILM COATED ORAL DAILY
Status: DISCONTINUED | OUTPATIENT
Start: 2019-02-17 | End: 2019-02-16

## 2019-02-16 RX ORDER — SPIRONOLACTONE 50 MG/1
100 TABLET, FILM COATED ORAL DAILY
Status: DISCONTINUED | OUTPATIENT
Start: 2019-02-17 | End: 2019-02-21 | Stop reason: HOSPADM

## 2019-02-16 RX ORDER — PANTOPRAZOLE SODIUM 40 MG/1
40 INJECTION, POWDER, FOR SOLUTION INTRAVENOUS EVERY 12 HOURS SCHEDULED
Status: DISCONTINUED | OUTPATIENT
Start: 2019-02-16 | End: 2019-02-21 | Stop reason: HOSPADM

## 2019-02-16 RX ORDER — FUROSEMIDE 40 MG/1
40 TABLET ORAL DAILY
Status: DISCONTINUED | OUTPATIENT
Start: 2019-02-17 | End: 2019-02-21 | Stop reason: HOSPADM

## 2019-02-16 RX ADMIN — SODIUM CHLORIDE 1000 ML: 0.9 INJECTION, SOLUTION INTRAVENOUS at 17:48

## 2019-02-16 RX ADMIN — IOHEXOL 75 ML: 350 INJECTION, SOLUTION INTRAVENOUS at 18:36

## 2019-02-16 NOTE — ED ATTENDING ATTESTATION
Alec Evans DO, saw and evaluated the patient  I have discussed the patient with the resident/non-physician practitioner and agree with the resident's/non-physician practitioner's findings, Plan of Care, and MDM as documented in the resident's/non-physician practitioner's note, except where noted  All available labs and Radiology studies were reviewed  At this point I agree with the current assessment done in the Emergency Department  I have conducted an independent evaluation of this patient a history and physical is as follows:    75 yo female presents for evaluation of fatigue, decreased PO intake, bilateral upper abdominal pain  Symptoms somewhat vague, seems to be progressively worse over days to weeks  Denies fever  Appears pale/jaundiced  Difficult stick  20ga angiocath placed in R basilic vein under realtime ultrasound guidance after 1 attempt  Tolerated well  Able to flush and withdraw without difficulty  Secured in place with tegaderm  Standard sterile technique utilized  Imp: fatigue, abd pain, unclear etiology  Maybe viral but pt has many other comorbid medical problems which may be a factor  Plan: abd labs, TSH, IVF, reassess        Critical Care Time  Procedures

## 2019-02-16 NOTE — ED PROVIDER NOTES
History  Chief Complaint   Patient presents with    Fatigue     URI s/s, no appetite, overall weakness  71-year-old female with history of hepatocellular carcinoma surgically removed a year ago as well as chemotherapy from a year ago presenting to the emergency department with 3 day history of fatigue, decreased appetite, decreased thirst   Patient states she has no appetite not that she has had difficulty eating she has not been nauseous she has not had any vomiting she denies any constipation or diarrhea denies any recent fevers chills or illnesses at home  Patient appears pale/jaundiced in the room when asking family if she appears pale they are unable to confirm  Patient denies any abdominal pain or any other complaints at this time  Remaining ROS negative  Patient is moderately tender in her upper quadrants and epigastrium  History provided by:  Patient   used: No    Fatigue   Severity:  Moderate  Onset quality:  Gradual  Progression:  Worsening  Chronicity:  New  Context: dehydration    Context: not increased activity, not recent infection and not urinary tract infection    Relieved by:  None tried  Worsened by:  Nothing  Ineffective treatments:  None tried  Associated symptoms: no abdominal pain, no arthralgias, no chest pain, no diarrhea, no dysuria, no fever, no myalgias, no nausea, no shortness of breath and no vomiting    Risk factors: no anemia        Prior to Admission Medications   Prescriptions Last Dose Informant Patient Reported? Taking?    Diphenhydramine-APAP, sleep, (EXCEDRIN PM PO)   Yes Yes   Sig: Take by mouth   acetaminophen (TYLENOL) 325 mg tablet   Yes Yes   Sig: Take 650 mg by mouth every 6 (six) hours as needed for mild pain   apixaban (ELIQUIS) 5 mg   No Yes   Sig: Take 1 tablet (5 mg total) by mouth 2 (two) times a day   diphenhydrAMINE (NIGHT TIME SLEEP AID) 25 MG tablet  Self Yes Yes   Sig: Take 50 mg by mouth daily at bedtime as needed for sleep furosemide (LASIX) 40 mg tablet   No Yes   Sig: Take 1 tablet (40 mg total) by mouth daily   gabapentin (NEURONTIN) 300 mg capsule  Self No Yes   Si tab in the am and 3 tabs PO night time   hydrOXYzine HCL (ATARAX) 10 mg tablet  Self No Yes   Sig: Take 1 tablet (10 mg total) by mouth daily at bedtime   hydrochlorothiazide (HYDRODIURIL) 25 mg tablet   No Yes   Sig: Take 1 tablet (25 mg total) by mouth daily   lactulose 20 g/30 mL  Self No Yes   Sig: Take 30 mL (20 g total) by mouth 2 (two) times a day   losartan (COZAAR) 100 MG tablet  Self No Yes   Sig: Take 1 tablet (100 mg total) by mouth daily   metFORMIN (GLUCOPHAGE) 500 mg tablet   No Yes   Sig: Take 1 tablet (500 mg total) by mouth daily with breakfast   oxyCODONE-acetaminophen (PERCOCET)  mg per tablet   No Yes   Sig: Take 1 tab PO Q 6 hours prn pain  Do not fill until 19   senna (SENOKOT) 8 6 MG tablet  Self Yes Yes   Sig: Take 2 tablets by mouth daily   spironolactone (ALDACTONE) 100 mg tablet   No Yes   Sig: Take 1 tablet (100 mg total) by mouth daily      Facility-Administered Medications: None       Past Medical History:   Diagnosis Date    Anemia     [place] ([place] [address] )     Cirrhosis ([place] [address] )     Constipation     [facility] )     Drug dependence ([facility] )     Hepatocellular carcinoma ([place] [address] )     Hypertension     Hyponatremia        Past Surgical History:   Procedure Laterality Date    DENTAL SURGERY      HYSTERECTOMY      LIVER BIOPSY      LIVER SURGERY      Ablation    TONSILLECTOMY         Family History   Problem Relation Age of Onset    Prostate cancer Father      I have reviewed and agree with the history as documented  Social History     Tobacco Use    Smoking status: Current Every Day Smoker     Packs/day: 1 00    Smokeless tobacco: Never Used   Substance Use Topics    Alcohol use: No    Drug use: No        Review of Systems   Constitutional: Positive for fatigue  Negative for chills and fever     HENT: Negative for sore throat  Eyes: Negative for visual disturbance  Respiratory: Negative for chest tightness, shortness of breath and wheezing  Cardiovascular: Negative for chest pain  Gastrointestinal: Negative for abdominal pain, constipation, diarrhea, nausea and vomiting  Genitourinary: Negative for dysuria and hematuria  Musculoskeletal: Negative for arthralgias and myalgias  Skin: Negative for color change  Neurological: Negative for light-headedness  Hematological: Negative for adenopathy  Psychiatric/Behavioral: Negative for agitation and behavioral problems  All other systems reviewed and are negative  Physical Exam  ED Triage Vitals   Temperature Pulse Respirations Blood Pressure SpO2   02/16/19 1702 02/16/19 1702 02/16/19 1702 02/16/19 1702 02/16/19 1702   99 3 °F (37 4 °C) 76 18 111/67 100 %      Temp Source Heart Rate Source Patient Position - Orthostatic VS BP Location FiO2 (%)   02/16/19 2057 02/16/19 1957 -- -- --   Oral Monitor         Pain Score       02/16/19 1702       No Pain           Orthostatic Vital Signs  Vitals:    02/16/19 2128 02/16/19 2130 02/16/19 2143 02/16/19 2145   BP: 93/54 97/62 91/57 91/57   Pulse: 73 68 65 66       Physical Exam   Constitutional: She is oriented to person, place, and time  She appears well-developed and well-nourished  No distress  HENT:   Head: Normocephalic and atraumatic  Eyes: Conjunctivae and EOM are normal  No scleral icterus  Neck: Normal range of motion  Neck supple  Cardiovascular: Normal rate, regular rhythm and normal heart sounds  No murmur heard  Pulmonary/Chest: Effort normal and breath sounds normal  No respiratory distress  Abdominal: Soft  Bowel sounds are normal  There is tenderness  Musculoskeletal: Normal range of motion  Neurological: She is alert and oriented to person, place, and time  Skin: Skin is warm and dry  There is pallor  Pale/jaundiced with scleral icterus     Psychiatric: She has a normal mood and affect  Her behavior is normal    Nursing note and vitals reviewed  ED Medications  Medications   sodium chloride 0 9 % bolus 1,000 mL (0 mL Intravenous Stopped 2/16/19 1910)   iohexol (OMNIPAQUE) 350 MG/ML injection (SINGLE-DOSE) 75 mL (75 mL Intravenous Given 2/16/19 1836)       Diagnostic Studies  Results Reviewed     Procedure Component Value Units Date/Time    Vitamin B12 [[id number]]     Lab Status:  No result Specimen:  Blood     Retic Count with Reticulocyte HGB [[id number]]  (Abnormal) Collected:  02/16/19 2104    Lab Status:  Final result Specimen:  Blood Updated:  02/16/19 2108     Immature Retic Fract 31 6 %      Retic Ct Pct 8 99 %      Retic Ct Abs 152,800     RETIC HGB 24 5 pg     TSH [[id number]]  (Normal) Collected:  02/16/19 1748    Lab Status:  Final result Specimen:  Blood from Arm, Right Updated:  02/16/19 1821     TSH 3RD GENERATON 2 310 uIU/mL     Narrative:       Patients undergoing fluorescein dye angiography may retain small amounts of fluorescein in the body for 48-72 hours post procedure  Samples containing fluorescein can produce falsely depressed TSH values  If the patient had this procedure,a specimen should be resubmitted post fluorescein clearance      Comprehensive metabolic panel [[id number]]  (Abnormal) Collected:  02/16/19 1748    Lab Status:  Final result Specimen:  Blood from Arm, Right Updated:  02/16/19 1814     Sodium 133 mmol/L      Potassium 3 8 mmol/L      Chloride 103 mmol/L      CO2 21 mmol/L      ANION GAP 9 mmol/L      BUN 33 mg/dL      Creatinine 1 34 mg/dL      Glucose 113 mg/dL      Calcium 9 3 mg/dL      AST 34 U/L      ALT 23 U/L      Alkaline Phosphatase 104 U/L      Total Protein 8 3 g/dL      Albumin 3 4 g/dL      Total Bilirubin 0 44 mg/dL      eGFR 47 ml/min/1 73sq m     Narrative:       National Kidney Disease Education Program recommendations are as follows:  GFR calculation is accurate only with a steady state creatinine  Chronic Kidney disease less than 60 ml/min/1 73 sq  meters  Kidney failure less than 15 ml/min/1 73 sq  meters  Lipase [[id number]]  (Abnormal) Collected:  02/16/19 1748    Lab Status:  Final result Specimen:  Blood from Arm, Right Updated:  02/16/19 1814     Lipase 624 u/L     CBC and differential [[id number]]  (Abnormal) Collected:  02/16/19 1748    Lab Status:  Final result Specimen:  Blood from Arm, Right Updated:  02/16/19 1805     WBC 11 00 Thousand/uL      RBC 1 71 Million/uL      Hemoglobin 4 3 g/dL      Hematocrit 14 3 %      MCV 84 fL      MCH 25 1 pg      MCHC 30 1 g/dL      RDW 18 8 %      MPV 9 8 fL      Platelets 383 Thousands/uL      nRBC 0 /100 WBCs      Neutrophils Relative 78 %      Immat GRANS % 1 %      Lymphocytes Relative 13 %      Monocytes Relative 7 %      Eosinophils Relative 1 %      Basophils Relative 0 %      Neutrophils Absolute 8 74 Thousands/µL      Immature Grans Absolute 0 06 Thousand/uL      Lymphocytes Absolute 1 39 Thousands/µL      Monocytes Absolute 0 73 Thousand/µL      Eosinophils Absolute 0 07 Thousand/µL      Basophils Absolute 0 01 Thousands/µL     POCT urinalysis dipstick [[id number]]     Lab Status:  No result Specimen:  Urine                  CT abdomen pelvis with contrast   Final Result by Dameon Keene MD (02/16 1902)      Increasing soft tissue density throughout the valeriano hepatis and surrounding the pancreatic head suspicious for advanced adenopathy  This area in conglomerate measures 8 3 x 7 3 cm  Some of this density likely represents chronically thrombosed portal    vein  Follow-up with repeat MRI of the liver  Hepatic cirrhosis relatively unchanged in appearance from the prior studies              Workstation performed: [id number]               Procedures  Procedures      Phone Consults  ED Phone Contact    ED Course  ED Course as of Feb 16 2155   Sat Feb 16, 2019   1831 Blood consent obtained, rectal exam showed melanotic stool, will type and screen and give patient blood transfusion  Hemoglobin(!!): 4 3           Identification of Seniors at Risk      Most Recent Value   (ISAR) Identification of Seniors at Risk   Before the illness or injury that brought you to the Emergency, did you need someone to help you on a regular basis? 0 Filed at: 02/16/2019 1701   In the last 24 hours, have you needed more help than usual?  0 Filed at: 02/16/2019 1701   Have you been hospitalized for one or more nights during the past 6 months? 0 Filed at: 02/16/2019 1701   In general, do you see well?  0 Filed at: 02/16/2019 1701   In general, do you have serious problems with your memory? 0 Filed at: 02/16/2019 1701   Do you take more than three different medications every day?    ISAR Score  0 Filed at: 02/16/2019 1701                          MDM  Number of Diagnoses or Management Options  Fatigue: new and requires workup  GI bleed: new and requires workup  Symptomatic anemia: new and requires workup  Diagnosis management comments: 80-year-old female presenting with increasing weakness over the past few days and decreased p o  Intake, will obtain EKG, abdominal labs as well as CT abdomen pelvis due to patient's abdominal tenderness on exam, patient found to be anemic 4 3, grossly melanotic stools with positive guaiac testing  Consented and ordered blood transfusion for patient, will admit to [facility] due to patient's stable vital signs for further management and investigation of thrombosis of her liver as noted on CT scan  Patient became hypotensive in the emergency department down to the 80 systolic early, [facility] attending manage room placement and discussion with critical care         Amount and/or Complexity of Data Reviewed  Clinical lab tests: ordered and reviewed  Tests in the radiology section of CPT®: reviewed and ordered  Tests in the medicine section of CPT®: ordered and reviewed  Obtain history from someone other than the patient: yes  Review and summarize past medical records: yes  Discuss the patient with other providers: yes  Independent visualization of images, tracings, or specimens: yes        Disposition  Final diagnoses:   Fatigue   GI bleed   Symptomatic anemia     Time reflects when diagnosis was documented in both MDM as applicable and the Disposition within this note     Time User Action Codes Description Comment    2/16/2019  7:05 PM Red Room Add [R53 83] Fatigue     2/16/2019  7:05 PM Red Room Add [K92 2] GI bleed     2/16/2019  7:06 PM Franck Tao [D64 9] Symptomatic anemia     2/16/2019  7:06 PM Red Room Modify [R53 83] Fatigue     2/16/2019  7:06 PM Emily Santiago [K92 2] GI bleed       ED Disposition     ED Disposition Condition Date/Time Comment    Admit Stable Sat Feb 16, 2019  7:30 PM Case was discussed with SOD and the patient's admission status was agreed to be Admission Status: inpatient status to the service of Dr Brandi Encarnacion  Follow-up Information    None         Patient's Medications   Discharge Prescriptions    No medications on file     No discharge procedures on file  ED Provider  Attending physically available and evaluated Jose L Conrad I managed the patient along with the ED Attending      Electronically Signed by         Cy Becerra MD  02/16/19 9956

## 2019-02-17 LAB
ABO GROUP BLD BPU: NORMAL
BPU ID: NORMAL
ERYTHROCYTE [DISTWIDTH] IN BLOOD BY AUTOMATED COUNT: 16.6 % (ref 11.6–15.1)
GLUCOSE SERPL-MCNC: 109 MG/DL (ref 65–140)
GLUCOSE SERPL-MCNC: 124 MG/DL (ref 65–140)
GLUCOSE SERPL-MCNC: 98 MG/DL (ref 65–140)
GLUCOSE SERPL-MCNC: 99 MG/DL (ref 65–140)
HCT VFR BLD AUTO: 18.8 % (ref 34.8–46.1)
HCT VFR BLD AUTO: 20.1 % (ref 34.8–46.1)
HGB BLD-MCNC: 6 G/DL (ref 11.5–15.4)
HGB BLD-MCNC: 6.3 G/DL (ref 11.5–15.4)
MAGNESIUM SERPL-MCNC: 2.4 MG/DL (ref 1.6–2.6)
MCH RBC QN AUTO: 26.6 PG (ref 26.8–34.3)
MCHC RBC AUTO-ENTMCNC: 31.3 G/DL (ref 31.4–37.4)
MCV RBC AUTO: 85 FL (ref 82–98)
PHOSPHATE SERPL-MCNC: 2.1 MG/DL (ref 2.3–4.1)
PLATELET # BLD AUTO: 198 THOUSANDS/UL (ref 149–390)
PMV BLD AUTO: 9.4 FL (ref 8.9–12.7)
RBC # BLD AUTO: 2.37 MILLION/UL (ref 3.81–5.12)
UNIT DISPENSE STATUS: NORMAL
UNIT PRODUCT CODE: NORMAL
UNIT RH: NORMAL
VIT B12 SERPL-MCNC: 498 PG/ML (ref 100–900)
WBC # BLD AUTO: 6.69 THOUSAND/UL (ref 4.31–10.16)

## 2019-02-17 PROCEDURE — 83735 ASSAY OF MAGNESIUM: CPT | Performed by: INTERNAL MEDICINE

## 2019-02-17 PROCEDURE — 85014 HEMATOCRIT: CPT | Performed by: INTERNAL MEDICINE

## 2019-02-17 PROCEDURE — 99222 1ST HOSP IP/OBS MODERATE 55: CPT | Performed by: FAMILY MEDICINE

## 2019-02-17 PROCEDURE — 85027 COMPLETE CBC AUTOMATED: CPT | Performed by: INTERNAL MEDICINE

## 2019-02-17 PROCEDURE — 85018 HEMOGLOBIN: CPT | Performed by: INTERNAL MEDICINE

## 2019-02-17 PROCEDURE — 99232 SBSQ HOSP IP/OBS MODERATE 35: CPT | Performed by: INTERNAL MEDICINE

## 2019-02-17 PROCEDURE — 82747 ASSAY OF FOLIC ACID RBC: CPT | Performed by: INTERNAL MEDICINE

## 2019-02-17 PROCEDURE — 82948 REAGENT STRIP/BLOOD GLUCOSE: CPT

## 2019-02-17 PROCEDURE — 99223 1ST HOSP IP/OBS HIGH 75: CPT | Performed by: INTERNAL MEDICINE

## 2019-02-17 PROCEDURE — 84100 ASSAY OF PHOSPHORUS: CPT | Performed by: INTERNAL MEDICINE

## 2019-02-17 PROCEDURE — 83918 ORGANIC ACIDS TOTAL QUANT: CPT | Performed by: INTERNAL MEDICINE

## 2019-02-17 PROCEDURE — C9113 INJ PANTOPRAZOLE SODIUM, VIA: HCPCS | Performed by: INTERNAL MEDICINE

## 2019-02-17 PROCEDURE — P9016 RBC LEUKOCYTES REDUCED: HCPCS

## 2019-02-17 RX ORDER — ALBUMIN (HUMAN) 12.5 G/50ML
25 SOLUTION INTRAVENOUS ONCE
Status: COMPLETED | OUTPATIENT
Start: 2019-02-17 | End: 2019-02-17

## 2019-02-17 RX ORDER — GABAPENTIN 300 MG/1
300 CAPSULE ORAL DAILY
Status: DISCONTINUED | OUTPATIENT
Start: 2019-02-18 | End: 2019-02-17

## 2019-02-17 RX ORDER — GABAPENTIN 300 MG/1
300 CAPSULE ORAL DAILY
Status: DISCONTINUED | OUTPATIENT
Start: 2019-02-17 | End: 2019-02-21 | Stop reason: HOSPADM

## 2019-02-17 RX ORDER — SODIUM CHLORIDE 9 MG/ML
75 INJECTION, SOLUTION INTRAVENOUS CONTINUOUS
Status: DISCONTINUED | OUTPATIENT
Start: 2019-02-17 | End: 2019-02-20

## 2019-02-17 RX ADMIN — CEFTRIAXONE 1000 MG: 1 INJECTION, POWDER, FOR SOLUTION INTRAMUSCULAR; INTRAVENOUS at 02:41

## 2019-02-17 RX ADMIN — PANTOPRAZOLE SODIUM 40 MG: 40 INJECTION, POWDER, FOR SOLUTION INTRAVENOUS at 01:25

## 2019-02-17 RX ADMIN — FUROSEMIDE 40 MG: 40 TABLET ORAL at 08:04

## 2019-02-17 RX ADMIN — ALBUMIN (HUMAN) 25 G: 0.25 INJECTION, SOLUTION INTRAVENOUS at 05:40

## 2019-02-17 RX ADMIN — NICOTINE 1 PATCH: 14 PATCH, EXTENDED RELEASE TRANSDERMAL at 08:01

## 2019-02-17 RX ADMIN — GABAPENTIN 300 MG: 300 CAPSULE ORAL at 20:24

## 2019-02-17 RX ADMIN — ALBUMIN (HUMAN) 25 G: 0.25 INJECTION, SOLUTION INTRAVENOUS at 01:25

## 2019-02-17 NOTE — CONSULTS
Consultation - Palliative and Supportive Care   Niki Álvarez 76 y o  female [id number]    Assessment:  Goals of care    Plan:  Goals - Couldn't be determined at this time  - Patient does not seem competent on my exam today which was around 12noon and then again at 3pm  She displays lack of understanding of her disease process  She also displays unrealistic expectations of how she is going to get better  She lacks the capacity or willingness to make any medical decisions  - Spoke with a brother and a family friend who were at the hallway  They tell me that the patient is not  currently  She has 4 children but from different fathers  She has no AD on file  Per [place], her health care representatives will be split equally among her 4 adult children  They gave me a number for 1 son, Phil Gaines  They then left as soon as I went to find a phone    - I tried to call Phil Gaines at the number they provided ([phone]) multiple times but no answer  I also called Abad Alva ([phone]) but the number was not working  I was told that Phil Gaines might be coming in today or tomorrow  - Our service will continue to follow the patient as her hospitalization evolves  - Medical decisions and medical cares should be obtained from the patient's adult children should she be found incompetent to participate in her own medical decisions or if she continues to refuse care while incompetent  Competency waxes and wanes and should be re-evaluated every time a medical decision needs to be made  - Spoke with IM resident, Dr Elijah Rivera, about above  He tells me that the level 3 code status that his team obtained earlier on was gathered from when she was competent per their assessment and should therefore stand     - We cannot sign her on hospice at this point in time given her competency  Goals should be addressed with her adult children        Code Status: DNAR/DNI - Level 3   Power of :  presumed to be adult son by [place]   Advance Directive / Living Will: none   POLST:  none         We appreciate the invitation to be involved in this patient's care  We will continue to follow  Please do not hesitate to reach our on call provider through our clinic answering service at  should you have acute symptom control concerns  IDENTIFICATION:       Inpatient consult to Palliative Care     Performed by  Ulysses Rossi MD     Authorized by Vick Gould DO            Physician Requesting Consult: Jorge Del Rio MD  Reason for Consult / Principal Problem: goals of care  Hx and PE limited by: none    HISTORY OF PRESENT ILLNESS:       Tianna Lamar is a 76 y o  female with medical history significant for chronic hepatitis C with cirrhosis, hepatocellular carcinoma s/p resection and ablation, chronic portal vein thrombosis on Eliquis, who was admitted on 2/16 for worsening fatigue and melanotic stool x 5 days  She was found to have acute blood loss anemia with a Hgb of 4 3 and was subsequently transfused with 2u pRBC  Eliquis was held  GI was consulted for possible EGD  It was noted that her veins are very difficult to access  Earlier this morning, she has been refusing cares  We are consulted to clarify goals of care  Went to see patient around 12noon  She was found sitting at the edge of her bed, comfortable  She was conversing animatedly with her aid prior to me walking in  I introduced myself and our service  I asked her what is going on with her  She tells me that she has "let herself go" because she was "being lazy"  She let her health go and just seemed to stop caring for herself  She denies feeling depressed, hopeless, suicidal or sad  She said she is tired  I asked her what brought her in and she said she was feeling tired for a while now and that her son forced her to come to the hospital  She was initially mad at him for making her come in, but now she is glad she went   I told her that she is bleeding that is why she was feeling the way she was feeling  She agreed  I asked her about why she was refusing blood draws and she said it's because she was a hard stick, but she eventually consented  I explained to her that the next plan would be for GI to evaluate her and likely do a scope  She said she does not want that  When asked why, she wouldn't give a clear answer  She keeps saying she will be fixed  When asked how, she said," I just know  It will be fixed, you just wait and see " I told her that if we don't try to find a source of her bleed, she will likely continue to keep bleeding and she can potentially die from it  She remained quiet and started focusing on her mouthwash  I had to redirect her multiple times back to our conversation as she keeps perseverating over her mouthwash and how she should really brush her teeth  She also kept playing and kept getting distracted by the plastic cover of her spoon  Spoke to a gentleman who introduced himself as a friend who lives with her  For the past 1-2 months, he said that he was basically doing everything for her because she refused to get up from her bed  Review of Systems   Cardiovascular: Negative for chest pain  Respiratory: Negative for shortness of breath  Gastrointestinal: Positive for melena  Negative for abdominal pain  Psychiatric/Behavioral: Negative for depression  All other systems reviewed and are negative        Past Medical History:   Diagnosis Date    Anemia     [facility]     [facility]     Constipation     [facility]     [facility] dependence [facility]     [facility]     Hypertension     Hyponatremia      Past Surgical History:   Procedure Laterality Date    DENTAL SURGERY      HYSTERECTOMY      LIVER BIOPSY      LIVER SURGERY      Ablation    TONSILLECTOMY       Social History     Socioeconomic History    Marital status: Single     Spouse name: Not on file    Number of children: Not on file    Years of education: Not on file    Highest education level: Not on file   Occupational History    Not on file   Social Needs    Financial resource strain: Not on file    Food insecurity:     Worry: Not on file     Inability: Not on file    Transportation needs:     Medical: Not on file     Non-medical: Not on file   Tobacco Use    Smoking status: Current Every Day Smoker     Packs/day: 1 00    Smokeless tobacco: Never Used   Substance and Sexual Activity    Alcohol use: Yes     Alcohol/week: 0 6 oz     Types: 1 Glasses of wine per week     Frequency: Monthly or less     Drinks per session: 3 or 4     Binge frequency: Less than monthly     Comment: drinks wine coolers, around monthly   Drug use: No    Sexual activity: Never     Partners: Male   Lifestyle    Physical activity:     Days per week: Not on file     Minutes per session: Not on file    Stress: Not on file   Relationships    Social connections:     Talks on phone: Not on file     Gets together: Not on file     Attends Confucianist service: Not on file     Active member of club or organization: Not on file     Attends meetings of clubs or organizations: Not on file     Relationship status: Not on file    Intimate partner violence:     Fear of current or ex partner: Not on file     Emotionally abused: Not on file     Physically abused: Not on file     Forced sexual activity: Not on file   Other Topics Concern    Not on file   Social History Narrative    Not on file     Family History   Problem Relation Age of Onset    Prostate cancer Father        MEDICATIONS / ALLERGIES:    all current active meds have been reviewed    No Known Allergies    OBJECTIVE:    Physical Exam  Physical Exam   Constitutional: She is oriented to person, place, and time  She appears well-developed and well-nourished  No distress  HENT:   Head: Normocephalic and atraumatic     Right Ear: External ear normal    Left Ear: External ear normal  Eyes: Pupils are equal, round, and reactive to light  Conjunctivae and EOM are normal  Right eye exhibits no discharge  Left eye exhibits no discharge  Cardiovascular: Normal rate and regular rhythm  Pulmonary/Chest: Effort normal and breath sounds normal    Abdominal: Soft  Bowel sounds are normal  She exhibits no distension  There is no tenderness  Musculoskeletal: She exhibits no edema  Neurological: She is alert and oriented to person, place, and time  Skin: Skin is warm and dry  Psychiatric: She has a normal mood and affect  Lab Results: I have personally reviewed pertinent labs  Imaging Studies: I have personally reviewed pertinent reports  EKG, Pathology, and Other Studies: I have personally reviewed pertinent reports  Counseling / Coordination of Care  Total floor / unit time spent today 60 minutes  Greater than 50% of total time was spent with the patient and / or family counseling and / or coordination of care   A description of the counseling / coordination of care: goals of care

## 2019-02-17 NOTE — ED NOTES
Paged SOD, as per Dr Alea Collado pt is ok to go to [facility] M/S as originally requested  States the patients blood pressure runs low at baseline       Camryn Hollingsworth, ANA  02/16/19 5161

## 2019-02-17 NOTE — PLAN OF CARE
Problem: Potential for Falls  Goal: Patient will remain free of falls  Description  INTERVENTIONS:  - Assess patient frequently for physical needs  -  Identify cognitive and physical deficits and behaviors that affect risk of falls    -  [place] fall precautions as indicated by assessment   - Educate patient/family on patient safety including physical limitations  - Instruct patient to call for assistance with activity based on assessment  - Modify environment to reduce risk of injury  - Consider OT/PT consult to assist with strengthening/mobility  Outcome: Progressing     Problem: Prexisting or High Potential for Compromised Skin Integrity  Goal: Skin integrity is maintained or improved  Description  INTERVENTIONS:  - Identify patients at risk for skin breakdown  - Assess and monitor skin integrity  - Assess and monitor nutrition and hydration status  - Monitor labs (i e  albumin)  - Assess for incontinence   - Turn and reposition patient  - Assist with mobility/ambulation  - Relieve pressure over bony prominences  - Avoid friction and shearing  - Provide appropriate hygiene as needed including keeping skin clean and dry  - Evaluate need for skin moisturizer/barrier cream  - Collaborate with interdisciplinary team (i e  Nutrition, Rehabilitation, etc )   - Patient/family teaching  Outcome: Progressing     Problem: INFECTION - ADULT  Goal: Absence or prevention of progression during hospitalization  Description  INTERVENTIONS:  - Assess and monitor for signs and symptoms of infection  - Monitor lab/diagnostic results  - Monitor all insertion sites, i e  indwelling lines, tubes, and drains  - Monitor endotracheal (as able) and nasal secretions for changes in amount and color  - [place] appropriate cooling/warming therapies per order  - Administer medications as ordered  - Instruct and encourage patient and family to use good hand hygiene technique  - Identify and instruct in appropriate isolation precautions for identified infection/condition  Outcome: Progressing     Problem: DISCHARGE PLANNING  Goal: Discharge to home or other facility with appropriate resources  Description  INTERVENTIONS:  - Identify barriers to discharge w/patient and caregiver  - Arrange for needed discharge resources and transportation as appropriate  - Identify discharge learning needs (meds, wound care, etc )  - Arrange for interpretive services to assist at discharge as needed  - Refer to Case Management Department for coordinating discharge planning if the patient needs post-hospital services based on physician/advanced practitioner order or complex needs related to functional status, cognitive ability, or social support system  Outcome: Progressing     Problem: Knowledge Deficit  Goal: Patient/family/caregiver demonstrates understanding of disease process, treatment plan, medications, and discharge instructions  Description  Complete learning assessment and assess knowledge base    Interventions:  - Provide teaching at level of understanding  - Provide teaching via preferred learning methods  Outcome: Progressing

## 2019-02-17 NOTE — PROGRESS NOTES
Pt has refused labs multiple times throughout the night, including follow up hemoglobin after blood transfusions for acute blood loss  She is very difficult to obtain IV access on, which is known  After allowing patient to rest a few hours, we had a bedside discussion about allowing us to attempt one more time to get labs before potentially placing a PICC line so she does not have to endure multiple blood draw attempts  She again stated that she does not want further lab draws, because "we don't draw blood right" and "she doesn't need any more blood " Patient voiced understanding that we are attempting to treat her acute illness and this is in the best interest of her health and could potentially save her life, and patient still refused further blood draws

## 2019-02-17 NOTE — PROGRESS NOTES
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)    Hepatitis C, chronic (HCC)    Hepatocellular carcinoma (HCC)    Nicotine dependence    Opioid dependence (HCC)    Pain syndrome, chronic    Hyponatremia    Portal vein thrombosis    Long term (current) use of anticoagulants    Melena    Hypotension    Acute on chronic blood loss anemia  · Patient presented to emergency department with hemoglobin of 4 3; patient received 2 units of packed red blood cells and after extensive discussion allowed nursing staff to redraw blood with hemoglobin coming back at 6 3  · Likely secondary to GI bleed  · Hold anticoagulation  · 1 g Rocephin x7 days prophylaxis  · Protonix 40 mg IV b i d   · Consider PICC line placement; patient is currently refusing PICC line and blood transfusions  · Patient only has peripheral access this this point and will not allow for any further access  · Clear liquid diet  · Hemoglobin 6 3    · Patient denying any further blood transfusions at this time and is aware of the consequences from this decision such as death  · GI following- patient refusing to be scoped by GI  · Neuro psych consulted- competency evaluation    Hypotension  · Blood pressure ranging  systolic  · Likely secondary to acute blood loss from GI bleed  · If patient's blood pressures continued to drop patient will need IV fluids and blood, which will be made difficult due to 1 peripheral access site, but patient is aware of these consequences  Chronic Hyponatremia  · Likely secondary to poor p o   Intake  · Sodium 133 on admission; CMP pending  · Continue to monitor    Chronic hepatitis C with cirrhosis  · Patient has Lasix and Aldactone ordered with SBP hold parameters in place  · Stable    HCC with portal vein thrombosis  · Status post ablation and wedge resection in 2015, sirs spheres in 2017  · Follows with heme Oncology as outpatient although she has not followed up with them since 2018  · Patient was on Eliquis for this, but this is currently being held due to acute blood loss    Diabetes mellitus type 2  · Sliding scale insulin  · Stable    Opioid dependence  · Patient follows with pain management regularly for chronic pain syndrome and herniated lumbar disc with DJD    Nicotine dependence  · Nicotine patch ordered    Goals of care  · Had extensive discussion with patient in regards to her code status and being full code  Explained to patient what being a level 1 full Code means and she was in agreement that she does not want intubation or compressions to be carried out  As this is the case patient will be placed on level 3 DNR and DNI  Patient is capable of making her own decisions and A&O x3  Dr Thee Mosley was also present during this discussion  · Palliative care consult- patient stated she would still like time to think about if she would be comfort care or hospice  Although patient does not want any blood transfusions and does not want any further blood drawn  Disposition:  Will have goals of care discussion with patient today       Subjective:   Patient resting comfortably in bed and had no current complaints  Patient still states she would not like anyone to poke her or draw blood because she has a "hard stick"  Patient denies fever, chills, nausea, vomiting, abdominal pain, shortness of breath, and chest pain         Vitals: Temp (24hrs), Av °F (37 2 °C), Min:97 9 °F (36 6 °C), Max:99 86 °F (37 7 °C)  Current: Temperature: 99 5 °F (37 5 °C)  Vitals:    19 0834 19 0835 19 0836 19 0837   BP:  100/67     Pulse: 63 61 65 66   Resp: 16     Temp:       TempSrc:       SpO2: 100% 99% 98% 100%   Weight:        Body mass index is 31 01 kg/m²  I/O last 24 hours: In: 1350 [Blood:350; IV Piggyback:1000]  Out: 650 [Urine:650]    Physical Exam   Constitutional: She is oriented to person, place, and time  She appears well-developed and well-nourished  No distress  HENT:   Head: Normocephalic and atraumatic  Eyes: Conjunctivae are normal  No scleral icterus  Cardiovascular: Normal rate, regular rhythm and normal heart sounds  Exam reveals no gallop and no friction rub  No murmur heard  Pulmonary/Chest: Effort normal and breath sounds normal  No respiratory distress  She has no wheezes  She has no rales  Abdominal: Soft  Bowel sounds are normal  She exhibits no distension  There is no tenderness  Musculoskeletal: Normal range of motion  She exhibits no edema  Neurological: She is alert and oriented to person, place, and time  Skin: Skin is warm  Capillary refill takes more than 3 seconds  No rash noted  Pallor noted in fingers  Conjunctival pallor   Nursing note and vitals reviewed           Invasive Devices     Peripheral Intravenous Line            Peripheral IV 02/16/19 Left Hand less than 1 day                        Labs:   Recent Results (from the past 24 hour(s))   ECG 12 lead    Collection Time: 02/16/19  5:44 PM   Result Value Ref Range    Ventricular Rate 70 BPM    Atrial Rate 70 BPM    IA Interval 134 ms    QRSD Interval 80 ms    QT Interval 392 ms    QTC Interval 423 ms    P [place] 26 degrees    QRS Axis 18 degrees    T Wave Axis -57 degrees   CBC and differential    Collection Time: 02/16/19  5:48 PM   Result Value Ref Range    WBC 11 00 (H) 4 31 - 10 16 Thousand/uL    RBC 1 71 (L) 3 81 - 5 12 Million/uL    Hemoglobin 4 3 (LL) 11 5 - 15 4 g/dL    Hematocrit 14 3 (L) 34 8 - 46 1 %    MCV 84 82 - 98 fL    MCH 25 1 (L) 26 8 - 34 3 pg    MCHC 30 1 (L) 31 4 - 37 4 g/dL    RDW 18 8 (H) 11 6 - 15 1 %    MPV 9 8 8 9 - 12 7 fL Platelets 697 795 - 992 Thousands/uL    nRBC 0 /100 WBCs    Neutrophils Relative 78 (H) 43 - 75 %    Immat GRANS % 1 0 - 2 %    Lymphocytes Relative 13 (L) 14 - 44 %    Monocytes Relative 7 4 - 12 %    Eosinophils Relative 1 0 - 6 %    Basophils Relative 0 0 - 1 %    Neutrophils Absolute 8 74 (H) 1 85 - 7 62 Thousands/µL    Immature Grans Absolute 0 06 0 00 - 0 20 Thousand/uL    Lymphocytes Absolute 1 39 0 60 - 4 47 Thousands/µL    Monocytes Absolute 0 73 0 17 - 1 22 Thousand/µL    Eosinophils Absolute 0 07 0 00 - 0 61 Thousand/µL    Basophils Absolute 0 01 0 00 - 0 10 Thousands/µL   Comprehensive metabolic panel    Collection Time: 02/16/19  5:48 PM   Result Value Ref Range    Sodium 133 (L) 136 - 145 mmol/L    Potassium 3 8 3 5 - 5 3 mmol/L    Chloride 103 100 - 108 mmol/L    CO2 21 21 - 32 mmol/L    ANION GAP 9 4 - 13 mmol/L    BUN 33 (H) 5 - 25 mg/dL    Creatinine 1 34 (H) 0 60 - 1 30 mg/dL    Glucose 113 65 - 140 mg/dL    Calcium 9 3 8 3 - 10 1 mg/dL    AST 34 5 - 45 U/L    ALT 23 12 - 78 U/L    Alkaline Phosphatase 104 46 - 116 U/L    Total Protein 8 3 (H) 6 4 - 8 2 g/dL    Albumin 3 4 (L) 3 5 - 5 0 g/dL    Total Bilirubin 0 44 0 20 - 1 00 mg/dL    eGFR 47 ml/min/1 73sq m   Lipase    Collection Time: 02/16/19  5:48 PM   Result Value Ref Range    Lipase 624 (H) 73 - 393 u/L   TSH    Collection Time: 02/16/19  5:48 PM   Result Value Ref Range    TSH 3RD GENERATON 2 310 0 358 - 3 740 uIU/mL   Vitamin B12    Collection Time: 02/16/19  5:48 PM   Result Value Ref Range    Vitamin B-12 498 100 - 900 pg/mL   Type and screen    Collection Time: 02/16/19  6:16 PM   Result Value Ref Range    ABO Grouping B     Rh Factor Positive     Antibody Screen Negative     Specimen Expiration Date [id number]    Retic Count with Reticulocyte HGB    Collection Time: 02/16/19  9:04 PM   Result Value Ref Range    Immature Retic Fract 31 6 (H) 0 0 - 14 0 %    Retic Ct Pct 8 99 (H) 0 37 - 1 87 %    Retic Ct Abs 152,800 (H) 10,065-92,334 RETIC HGB 24 5 (L) 30 0 - 38 3 pg   Prepare RBC:Has consent been obtained? Yes, 3 Units    Collection Time: 02/17/19  6:47 AM   Result Value Ref Range    Unit Product Code L7044W76     Unit Number [id number]     Unit ABO B     Unit [facility] POS     Unit Dispense Status Presumed Trans     Unit Product Code N5949N19     Unit Number [id number]     Unit ABO B     Unit [facility] POS     Unit Dispense Status Presumed Trans     Unit Product Code X5283Y05     Unit Number [id number]     Unit ABO B     Unit [facility] POS     Unit Dispense Status Return to Inv    Fingerstick Glucose (POCT)    Collection Time: 02/17/19  8:26 AM   Result Value Ref Range    POC Glucose 98 65 - 140 mg/dl   Magnesium    Collection Time: 02/17/19  8:45 AM   Result Value Ref Range    Magnesium 2 4 1 6 - 2 6 mg/dL   Phosphorus    Collection Time: 02/17/19  8:45 AM   Result Value Ref Range    Phosphorus 2 1 (L) 2 3 - 4 1 mg/dL   CBC (With Platelets)    Collection Time: 02/17/19  8:47 AM   Result Value Ref Range    WBC 6 69 4 31 - 10 16 Thousand/uL    RBC 2 37 (L) 3 81 - 5 12 Million/uL    Hemoglobin 6 3 (LL) 11 5 - 15 4 g/dL    Hematocrit 20 1 (L) 34 8 - 46 1 %    MCV 85 82 - 98 fL    MCH 26 6 (L) 26 8 - 34 3 pg    MCHC 31 3 (L) 31 4 - 37 4 g/dL    RDW 16 6 (H) 11 6 - 15 1 %    Platelets 243 677 - 146 Thousands/uL    MPV 9 4 8 9 - 12 7 fL       Radiology Results: I have personally reviewed pertinent reports  Other Diagnostic Testing:   I have personally reviewed pertinent reports          Active Meds:   Current Facility-Administered Medications   Medication Dose Route Frequency    ceftriaxone (ROCEPHIN) 1 g/50 mL in dextrose IVPB  1,000 mg Intravenous Q24H    furosemide (LASIX) tablet 40 mg  40 mg Oral Daily    insulin lispro (HumaLOG) 100 units/mL subcutaneous injection 1-6 Units  1-6 Units Subcutaneous TID AC    nicotine (NICODERM CQ) 14 mg/24hr TD 24 hr patch 1 patch  1 patch Transdermal Daily    pantoprazole (PROTONIX) injection 40 mg  40 mg Intravenous Q12H [facility]    spironolactone (ALDACTONE) tablet 100 mg  100 mg Oral Daily         VTE Pharmacologic Prophylaxis: Reason for no pharmacologic prophylaxis GI bleed  VTE Mechanical Prophylaxis: sequential compression device    Kylah Zaidi DO

## 2019-02-17 NOTE — PROGRESS NOTES
Nurse called to inform that patient is refusing all patient care blood draws  SOD went out speak to patient, patient was uncooperative and not responsive to myself  I explained that the patient was nonparticipatory, decision making would fall to next of kin  Patient is currently packing up her things but says she does not know whether she plans on leaving

## 2019-02-17 NOTE — PROGRESS NOTES
Notified again of patient refusing post transfusion hemoglobin and hematocrit labs  I went to speak to patient immediately upon her awakening  Explained the goal of monitoring her hemoglobin which is at a severely low life-threatening level and being that we just gave her packed red blood cell transfusion we would like to know if her blood counts responded appropriately  She again expresses understanding of this and says she does not want blood draws at this time  I explained that her goals here of putting comfort 1st are incongruent with our current treatment plan of all life-saving intervention being indicated  I did offer that the other option here is comfort care or hospice  Explained that this would put symptom management as our primary goal and avoid further blood draws or invasive procedures that may be unwanted and painful  I let her know that I will give her time to consider this and that is completely her choice and based on my evaluation she has medical decision-making capability to refuse care at this time  I believe she understands the risks of refusing care  Will sign out to day team for further goals of care discussion

## 2019-02-17 NOTE — PROGRESS NOTES
Patient friend and brother arrived  Awaiting son to arrive to talk to SOD A  All medication, blood work, and patient care refused all day

## 2019-02-17 NOTE — PROGRESS NOTES
Pt currently refusing blood work to be drawn  SOD resident made aware, she will come up bedside to talk to pt

## 2019-02-17 NOTE — PROGRESS NOTES
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)    Hepatitis C, chronic (HCC)    Hepatocellular carcinoma (HCC)    Nicotine dependence    Opioid dependence (HCC)    Pain syndrome, chronic    Hyponatremia    Portal vein thrombosis    Long term (current) use of anticoagulants    Melena     59-year-old female with history of hepatitis-C with cirrhosis, hepatocellular carcinoma status post resection and ablation, chronic portal vein thrombosis on Eliquis, presents for 4-5 days of worsening generalized fatigue associated with melanotic stool  Vital signs in the emergency department:  Heart rate normal in the 70s, blood pressure is chronically low per outpatient records  systolic and around 89/80 here, respirations mildly increased at a rate of 20, O2 saturation 100% on room air  On exam patient is mentating appropriately, abdominal distention is present  Laboratory studies significant for hemoglobin of 4 which is new compared to her prior records of hemoglobin of 11 several months ago  Patient will be admitted to the medical-surgical floor for acute blood loss anemia in the setting of anticoagulation, receiving 2iu PRBCs in the ED,  GI consult, Protonix 40 mg IV b i d  per record review in 2013  EGD negative for varices at that time and mild gastritis was noted  Will hold Eliquis in setting of GI bleed with patient's thrombosis noted to be chronic    Should patient become tachycardic or hemodynamically unstable will upgrade level of care     Disposition:  INPATIENT     Expected LOS: >2 Midnights          JESUS Martinez O       Internal Medicine   PGY-2  2/16/2019 10:39 PM

## 2019-02-17 NOTE — PROGRESS NOTES
Patient irritable, states she just wants to be left alone and is sick of people asking her questions

## 2019-02-17 NOTE — CONSULTS
Consultation - [address] [facility]  Jose L Conrad 76 y o  female MRN: [id number]  Unit/Bed#: [facility] 911-01 Encounter: [id number]             Inpatient consult to gastroenterology     Performed by  Melinda Lugo MD     Authorized by Oralia Sadler DO              Reason for Consult / Principal Problem:   GI bleeding/Anemia  Cirrhosis      ASSESSMENT AND PLAN:    GI bleeding/Anemia   60-year-old female patient presenting with hemoglobin level of 4 3, in the setting of recent melena and hematemesis 4-5 days ago, patient was transfused 2 units but after transfusion she is refusing blood work, unable to assess if more blood is required  Currently she denies any episodes of hematemesis, stools were melanotic in the ED  As per the patient she had a recent EGD 1 year ago that was negative  She was started on Rocephin on admission  From the GI perspective the patient needs to be scoped, this was discussed with the patient in extent but she refuses any procedures, we are unable to assess if this is variceal bleeding versus peptic ulcer disease, at this moment she is not having hematemesis and the melanotic stools might be residual but again endoscopic evaluation is required from the GI standpoint    Cirrhosis  Patient with cirrhosis secondary to hepatitis-C infection with concomitant hepatocellular carcinoma  Patient is status post resection and chemo embolization  Platelets on admission were 262, INR is pending, hyponatremia with sodium 133, creatinine 1 34, T bili 0 44, normal ALT and AST  Patient is on Lasix and Aldactone as an outpatient for treatment of ascites, in the setting of recent GI bleeding with relatively hypotension will recommend to hold off on any diuretics for now  Patient seems to be awake alert and oriented, does not cooperate with physical exam, unable to assess asterixis      Portal vein thrombosis  Patient with portal vein thrombosis on treatment with Eliquis  Anticoagulation was held on admission due to concern for bleeding  Portal vein thrombosis is chronic and 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  Continue to hold anticoagulation    ______________________________________________________________________    HPI:    27-year-old female patient with past medical history significant for diabetes, anemia, hepatitis-C, HCC status post resection 2015, history of portal vein thrombosis on anticoagulation  Patient presented to the ED after feeling tired for over 4-5 days along with melena and an episode of hematemesis  In the ED workup showed the patient had severe anemia and her stools were melanotic, patient was transfused and was admitted to the floor but at the floor the patient has been refusing blood work  Patient seen and examined at the bed, denies any events overnight, currently she is tolerating the p o  Route, denies nausea or vomiting, is passing gases but denies having bowel movements, denies chest pain or shortness of breath, no abdominal pain, patient is not ambulating, patient request to be left alone    REVIEW OF SYSTEMS:    CONSTITUTIONAL: Denies any fever, chills, rigors, and weight loss  HEENT: No earache or tinnitus  Denies hearing loss or visual disturbances  CARDIOVASCULAR: No chest pain or palpitations  RESPIRATORY: Denies any cough, hemoptysis, shortness of breath or dyspnea on exertion  GASTROINTESTINAL: As noted in the History of Present Illness  GENITOURINARY: No problems with urination  Denies any hematuria or dysuria  NEUROLOGIC: No dizziness or vertigo, denies headaches  MUSCULOSKELETAL: Denies any muscle or joint pain  SKIN: Denies skin rashes or itching  ENDOCRINE: Denies excessive thirst  Denies intolerance to heat or cold  PSYCHOSOCIAL: Denies depression or anxiety  Denies any recent memory loss         Historical Information   Past Medical History:   Diagnosis Date    Anemia     Cancer ([facility] )     Cirrhosis ([facility] )     Constipation     [facility] )     Drug dependence ([place] [address] )     Hepatocellular carcinoma ([place] [address] )     Hypertension     Hyponatremia      Past Surgical History:   Procedure Laterality Date    DENTAL SURGERY      HYSTERECTOMY      LIVER BIOPSY      LIVER SURGERY      Ablation    TONSILLECTOMY       Social History   Social History     Substance and Sexual Activity   Alcohol Use Yes    Alcohol/week: 0 6 oz    Types: 1 Glasses of wine per week    Frequency: Monthly or less    Drinks per session: 3 or 4    Binge frequency: Less than monthly    Comment: drinks wine coolers, around monthly        Social History     Substance and Sexual Activity   Drug Use No     Social History     Tobacco Use   Smoking Status Current Every Day Smoker    Packs/day: 1 00   Smokeless Tobacco Never Used     Family History   Problem Relation Age of Onset    Prostate cancer Father        Meds/Allergies     Medications Prior to Admission   Medication    acetaminophen (TYLENOL) 325 mg tablet    apixaban (ELIQUIS) 5 mg    diphenhydrAMINE (NIGHT TIME SLEEP AID) 25 MG tablet    Diphenhydramine-APAP, sleep, (EXCEDRIN PM PO)    furosemide (LASIX) 40 mg tablet    gabapentin (NEURONTIN) 300 mg capsule    hydrochlorothiazide (HYDRODIURIL) 25 mg tablet    hydrOXYzine HCL (ATARAX) 10 mg tablet    lactulose 20 g/30 mL    losartan (COZAAR) 100 MG tablet    metFORMIN (GLUCOPHAGE) 500 mg tablet    oxyCODONE-acetaminophen (PERCOCET)  mg per tablet    senna (SENOKOT) 8 6 MG tablet    spironolactone (ALDACTONE) 100 mg tablet     Current Facility-Administered Medications   Medication Dose Route Frequency    ceftriaxone (ROCEPHIN) 1 g/50 mL in dextrose IVPB  1,000 mg Intravenous Q24H    furosemide (LASIX) tablet 40 mg  40 mg Oral Daily    insulin lispro (HumaLOG) 100 units/mL subcutaneous injection 1-6 Units  1-6 Units Subcutaneous TID AC    nicotine (NICODERM CQ) 14 mg/24hr TD 24 hr patch 1 patch  1 patch Transdermal Daily  pantoprazole (PROTONIX) injection 40 mg  40 mg Intravenous Q12H [address]    spironolactone (ALDACTONE) tablet 100 mg  100 mg Oral Daily       No Known Allergies        Objective     Blood pressure 100/67, pulse 66, temperature 99 5 °F (37 5 °C), resp  rate 16, weight 79 4 kg (175 lb 0 7 oz), SpO2 100 %, not currently breastfeeding  Body mass index is 31 01 kg/m²  Intake/Output Summary (Last 24 hours) at 2/17/2019 1155  Last data filed at 2/17/2019 1100  Gross per 24 hour   Intake 1470 ml   Output 925 ml   Net 545 ml         PHYSICAL EXAM:      General Appearance:   Alert, non cooperative, no distress   HEENT:   Normocephalic, atraumatic  Neck:  Supple, symmetrical, trachea midline   Lungs:   Clear to auscultation bilaterally; no rales, rhonchi or wheezing; respirations unlabored    Heart[de-identified]   Regular rate and rhythm; no murmur, rub, or gallop     Abdomen:   Soft, non-tender, non-distended; normal bowel sounds; no masses, no organomegaly    Genitalia:   Deferred    Rectal:   Deferred    Extremities:  No cyanosis, clubbing or edema    Skin:  No jaundice, rashes, or lesions              Lab Results:   Admission on 02/16/2019   Component Date Value    WBC 02/16/2019 11 00*    RBC 02/16/2019 1 71*    Hemoglobin 02/16/2019 4 3*    Hematocrit 02/16/2019 14 3*    MCV 02/16/2019 84     MCH 02/16/2019 25 1*    MCHC 02/16/2019 30 1*    RDW 02/16/2019 18 8*    MPV 02/16/2019 9 8     Platelets 80/03/7427 262     nRBC 02/16/2019 0     Neutrophils Relative 02/16/2019 78*    Immat GRANS % 02/16/2019 1     Lymphocytes Relative 02/16/2019 13*    Monocytes Relative 02/16/2019 7     Eosinophils Relative 02/16/2019 1     Basophils Relative 02/16/2019 0     Neutrophils Absolute 02/16/2019 8 74*    Immature Grans Absolute 02/16/2019 0 06     Lymphocytes Absolute 02/16/2019 1 39     Monocytes Absolute 02/16/2019 0 73     Eosinophils Absolute 02/16/2019 0 07     Basophils Absolute 02/16/2019 0 01     Sodium 02/16/2019 133*    Potassium 02/16/2019 3 8     Chloride 02/16/2019 103     CO2 02/16/2019 21     ANION GAP 02/16/2019 9     BUN 02/16/2019 33*    Creatinine 02/16/2019 1 34*    Glucose 02/16/2019 113     Calcium 02/16/2019 9 3     AST 02/16/2019 34     ALT 02/16/2019 23     Alkaline Phosphatase 02/16/2019 104     Total Protein 02/16/2019 8 3*    Albumin 02/16/2019 3 4*    Total Bilirubin 02/16/2019 0 44     eGFR 02/16/2019 47     Lipase 02/16/2019 624*    TSH 3RD GENERATON 02/16/2019 2 310     Ventricular Rate 02/16/2019 70     Atrial Rate 02/16/2019 70     PA Interval 02/16/2019 134     QRSD Interval 02/16/2019 80     QT Interval 02/16/2019 392     QTC Interval 02/16/2019 423     P Axis 02/16/2019 26     QRS Axis 02/16/2019 18     T Wave Axis 02/16/2019 -62     ABO Grouping 02/16/2019 B     Rh Factor 02/16/2019 Positive     Antibody Screen 02/16/2019 Negative     Specimen Expiration Date 02/16/2019 07194322     Unit Product Code 02/17/2019 N9373G37     Unit Number 02/17/2019 [id number]     Unit ABO 02/17/2019 B     Unit RH 02/17/2019 POS     Unit Dispense Status 02/17/2019 Presumed Trans     Unit Product Code 02/17/2019 G0101C40     Unit Number 02/17/2019 [id number]     Unit ABO 02/17/2019 B     Unit [facility] 02/17/2019 POS     Unit Dispense Status 02/17/2019 Presumed Trans     Unit Product Code 02/17/2019 [id number]     Unit Number 02/17/2019 [id number]     Unit ABO 02/17/2019 B     Unit RH 02/17/2019 POS     Unit Dispense Status 02/17/2019 Return to Inv     Immature Retic Fract 02/16/2019 31 6*    Retic Ct Pct 02/16/2019 8 99*    Retic Ct Abs 02/16/2019 152,800*    RETIC HGB 02/16/2019 24 5*    Vitamin B-12 02/16/2019 498     Magnesium 02/17/2019 2 4     Phosphorus 02/17/2019 2 1*    WBC 02/17/2019 6 69     RBC 02/17/2019 2 37*    Hemoglobin 02/17/2019 6 3*    Hematocrit 02/17/2019 20 1*    MCV 02/17/2019 85     MCH 02/17/2019 26 6*    MCHC 02/17/2019 31 3*    RDW 02/17/2019 16 6*    Platelets 23/18/8486 198     MPV 02/17/2019 9 4     POC Glucose 02/17/2019 98        Imaging Studies: I have personally reviewed pertinent imaging studies

## 2019-02-17 NOTE — ED PROCEDURE NOTE
PROCEDURE  CriticalCare Time  Performed by: Brittni Rodriguez DO  Authorized by: Brittni Rodriguez DO     Critical care provider statement:     Critical care time (minutes):  32    Critical care time was exclusive of:  Separately billable procedures and treating other patients    Critical care was necessary to treat or prevent imminent or life-threatening deterioration of the following conditions:  Circulatory failure    Critical care was time spent personally by me on the following activities:  Blood draw for specimens, obtaining history from patient or surrogate, development of treatment plan with patient or surrogate, evaluation of patient's response to treatment, examination of patient, review of old charts, re-evaluation of patient's condition, ordering and review of radiographic studies, ordering and review of laboratory studies and ordering and performing treatments and interventions  Comments:      Transfusion of blood products for severe symptomatic anemia         Brittni Rodriguez DO  02/16/19 0463

## 2019-02-17 NOTE — H&P
INTERNAL MEDICINE HISTORY AND PHYSICAL  [facility] A    NAME: Jim Velazquez  AGE: 76 y o  SEX: female  : 1950   MRN: [id number]  ENCOUNTER: [id number]    DATE: 2019  TIME: 11:35 PM    Primary Care Physician: Nichole Turner DO  Admitting Provider: Abdirashid Nava MD    Chief complaint: Fatigue    History of Present Illness     Jim Velazquez is a 76 y o  female with past medical history of hepatitis C, hepatocellular carcinoma s/p resection in , SIRS spheres/radioembolization procedure, portal venous thrombosis on Eliquis, DM, opioid and nicotine dependence, chronic hyponatremia, and chronic anemia presents following 4-5 days of cold symptoms and feeling tired    She felt as though she had a cold, and only came to the emergency department because her son urged her to  Workup was significant for hemoglobin of 4 3 as well as melanotic stool on rectal exam per ED  When asked about history of bleeding, patient states she has had dark stools also for approximately 4-5 days and has vomited blood once over this same time frame, neither of which she has experienced before but did not seek medical attention because she was shocked    Otherwise she denies fever, chills, chest pain, shortness of breath, abdominal pain, or any other acute or concerning symptoms  She states she last saw a doctor for her liver approximately 1 year ago, who recommended an additional unspecified treatment which patient declined, but was unable to elaborate further  Patient generally a poor historian and unable to confirm her medications and whether she takes them regularly  Patient received 2 units of pRBC's in the ED before being transferred to the floor  Remainder of workup significant for sodium 133, lipase elevated at 624, WBC 11, retic ct % elevated at 8 99%, retic ct absolute elevated at 152,800  Review of Systems   Review of Systems   Constitutional: Positive for fatigue   Negative for activity change, appetite change, chills and fever  HENT: Positive for congestion  Negative for dental problem, ear pain, sore throat and trouble swallowing  Eyes: Negative  Respiratory: Negative for cough, choking, chest tightness and shortness of breath  Cardiovascular: Negative for chest pain, palpitations and leg swelling  Gastrointestinal: Positive for blood in stool (dark stools x4 - 5 days) and vomiting (blood in vomit x1)  Negative for abdominal pain, constipation, diarrhea and nausea  Genitourinary: Negative for difficulty urinating, dysuria, flank pain, frequency and urgency  Musculoskeletal: Negative for arthralgias, back pain, joint swelling and myalgias  Skin: Negative for color change, pallor and rash  Neurological: Positive for weakness  Negative for dizziness, numbness and headaches  Psychiatric/Behavioral: Negative for agitation, behavioral problems and confusion  The patient is not nervous/anxious  Past Medical History     Past Medical History:   Diagnosis Date    Anemia     [place] ([place] [address] )     Cirrhosis ([place] [address] )     Constipation     Diabetes mellitus ([place] [address] )     Drug dependence ([place] [address] )     Hepatocellular carcinoma ([place] [address] )     Hypertension     Hyponatremia        Past Surgical History     Past Surgical History:   Procedure Laterality Date    DENTAL SURGERY      HYSTERECTOMY      LIVER BIOPSY      LIVER SURGERY      Ablation    TONSILLECTOMY         Social History     Social History     Substance and Sexual Activity   Alcohol Use No     Social History     Substance and Sexual Activity   Drug Use No     Social History     Tobacco Use   Smoking Status Current Every Day Smoker    Packs/day: 1 00   Smokeless Tobacco Never Used       Family History     Family History   Problem Relation Age of Onset    Prostate cancer Father        Medications Prior to Admission     Prior to Admission medications    Medication Sig Start Date End Date Taking?  Authorizing Provider   acetaminophen (TYLENOL) 325 mg tablet Take 650 mg by mouth every 6 (six) hours as needed for mild pain   Yes Historical Provider, MD   apixaban (ELIQUIS) 5 mg Take 1 tablet (5 mg total) by mouth 2 (two) times a day 12/26/18  Yes Keny Segura MD   diphenhydrAMINE (NIGHT TIME SLEEP AID) 25 MG tablet Take 50 mg by mouth daily at bedtime as needed for sleep   Yes Historical Provider, MD   Diphenhydramine-APAP, sleep, (EXCEDRIN PM PO) Take by mouth   Yes Historical Provider, MD   furosemide (LASIX) 40 mg tablet Take 1 tablet (40 mg total) by mouth daily 12/20/18  Yes Eric Harris, DO   gabapentin (NEURONTIN) 300 mg capsule 1 tab in the am and 3 tabs PO night time 8/20/18  Yes BRYANNA Manjarrez   hydrochlorothiazide (HYDRODIURIL) 25 mg tablet Take 1 tablet (25 mg total) by mouth daily 12/20/18  Yes Eric Harris, DO   hydrOXYzine HCL (ATARAX) 10 mg tablet Take 1 tablet (10 mg total) by mouth daily at bedtime 3/29/18  Yes Arnold Robles,    lactulose 20 g/30 mL Take 30 mL (20 g total) by mouth 2 (two) times a day 9/19/18  Yes Nataliia Leija DO   losartan (COZAAR) 100 MG tablet Take 1 tablet (100 mg total) by mouth daily 9/19/18  Yes Nataliia Leija DO   metFORMIN (GLUCOPHAGE) 500 mg tablet Take 1 tablet (500 mg total) by mouth daily with breakfast 12/20/18  Yes Eric Harris DO   oxyCODONE-acetaminophen (PERCOCET)  mg per tablet Take 1 tab PO Q 6 hours prn pain   Do not fill until 2/19/19 1/14/19  Yes BRYANNA Gupta   senna (SENOKOT) 8 6 MG tablet Take 2 tablets by mouth daily   Yes Historical Provider, MD   spironolactone (ALDACTONE) 100 mg tablet Take 1 tablet (100 mg total) by mouth daily 12/26/18  Yes Keny Segura MD       Allergies   No Known Allergies    Objective     Vitals:    02/16/19 2145 02/16/19 2200 02/16/19 2215 02/16/19 2227   BP: 91/57 (!) 83/55 93/50 93/50   Pulse: 66 62 62 61   Resp: 14 19 14 18   Temp:    98 9 °F (37 2 °C)   TempSrc:       SpO2: 100% 99% 99% 100%   Weight:         Body mass index is 31 01 kg/m²  Intake/Output Summary (Last 24 hours) at 2/16/2019 2335  Last data filed at 2/16/2019 2057  Gross per 24 hour   Intake 1350 ml   Output    Net 1350 ml     Invasive Devices     Peripheral Intravenous Line            Peripheral IV 02/16/19 Left Hand less than 1 day                Physical Exam   Constitutional: She is oriented to person, place, and time  She appears well-developed and well-nourished  No distress  HENT:   Head: Normocephalic and atraumatic  Eyes: Conjunctivae and EOM are normal  Right eye exhibits no discharge  Left eye exhibits no discharge  No scleral icterus  Cardiovascular: Normal rate, regular rhythm and normal heart sounds  Exam reveals no friction rub  No murmur heard  Pulmonary/Chest: Effort normal and breath sounds normal  No stridor  No respiratory distress  She has no wheezes  Abdominal: Soft  She exhibits no distension and no mass  There is no tenderness  There is no guarding  Musculoskeletal: Normal range of motion  She exhibits edema (+1 nonpitting edema in BLE)  She exhibits no tenderness or deformity  Neurological: She is alert and oriented to person, place, and time  No cranial nerve deficit  Coordination normal    Drowsy but arousable  No focal neurologic deficits   Skin: Skin is warm and dry  No rash noted  She is not diaphoretic  No erythema  Psychiatric: She has a normal mood and affect  Her behavior is normal  Judgment and thought content normal    Answers questions appropriately  Poor historian   Nursing note and vitals reviewed  Lab Results: I have personally reviewed pertinent reports      CBC:   Results from last 7 days   Lab Units 02/16/19  1748   WBC Thousand/uL 11 00*   RBC Million/uL 1 71*   HEMOGLOBIN g/dL 4 3*   HEMATOCRIT % 14 3*   MCV fL 84   MCH pg 25 1*   MCHC g/dL 30 1*   RDW % 18 8*   MPV fL 9 8   PLATELETS Thousands/uL 262   NRBC AUTO /100 WBCs 0   NEUTROS PCT % 78*   LYMPHS PCT % 13*   MONOS PCT % 7   EOS PCT % 1 BASOS PCT % 0   NEUTROS ABS Thousands/µL 8 74*   LYMPHS ABS Thousands/µL 1 39   MONOS ABS Thousand/µL 0 73   EOS ABS Thousand/µL 0 07   , Chemistry Profile:   Results from last 7 days   Lab Units 02/16/19  1748   POTASSIUM mmol/L 3 8   CHLORIDE mmol/L 103   CO2 mmol/L 21   BUN mg/dL 33*   CREATININE mg/dL 1 34*   CALCIUM mg/dL 9 3   AST U/L 34   ALT U/L 23   ALK PHOS U/L 104   EGFR ml/min/1 73sq m 47   , Coagulation Studies:       Imaging: I have personally reviewed pertinent films in PACS  Ct Abdomen Pelvis With Contrast    Result Date: 2/16/2019  Narrative: CT ABDOMEN AND PELVIS WITH IV CONTRAST INDICATION:   abd pain, h/o hepatocellular carcinoma  "fatigue, abd pain, unclear etiology " COMPARISON:  CT abdomen pelvis 9/16/2018  MRI abdomen 4/23/2018 TECHNIQUE:  CT examination of the abdomen and pelvis was performed  Axial, sagittal, and coronal 2D reformatted images were created from the source data and submitted for interpretation  Radiation dose length product (DLP) for this visit:  544 mGy-cm   This examination, like all CT scans performed in the [facility], was performed utilizing techniques to minimize radiation dose exposure, including the use of iterative reconstruction and automated exposure control  IV Contrast:  75 mL of iohexol (OMNIPAQUE) Enteric Contrast:  Enteric contrast was not administered  FINDINGS: ABDOMEN LOWER CHEST:  No clinically significant abnormality identified in the visualized lower chest  LIVER/BILIARY TREE:  Cirrhotic  Chronic extensive portal vein thrombus  Right lateral hepatic 27 mm hypodensity unchanged from the prior study in keeping with a known treated lesion  Monia Jana GALLBLADDER:  No calcified gallstones  No pericholecystic inflammatory change  SPLEEN:  Unremarkable  PANCREAS:  Unremarkable  ADRENAL GLANDS:  Unremarkable  KIDNEYS/URETERS:  Unremarkable  No hydronephrosis  STOMACH AND BOWEL:  Unremarkable  APPENDIX:  No findings to suggest appendicitis  ABDOMINOPELVIC CAVITY:  Increasing soft tissue density throughout the valeriano hepatis and surrounding the pancreatic head suspicious for advanced adenopathy  This area in conglomerate measures 8 3 x 7 3 cm  Some of this represents density likely chronically thrombosed portal vein  No ascites or free air  VESSELS:  Chronic thrombosis of the portal vein with cavernous transformation  PELVIS REPRODUCTIVE ORGANS:  Unremarkable for patient's age  URINARY BLADDER:  Unremarkable  ABDOMINAL WALL/INGUINAL REGIONS:  Unremarkable  OSSEOUS STRUCTURES:  No acute fracture or destructive osseous lesion  Impression: Increasing soft tissue density throughout the valeriano hepatis and surrounding the pancreatic head suspicious for advanced adenopathy  This area in conglomerate measures 8 3 x 7 3 cm  Some of this density likely represents chronically thrombosed portal vein  Follow-up with repeat MRI of the liver  Hepatic cirrhosis relatively unchanged in appearance from the prior studies  Workstation performed: [id number]       Microbiology: cultures obtained in emergency department include none - NA    Urinalysis:       Invalid input(s): URIBILINOGEN     Urine Micro:        EKG, Pathology, and Other Studies: I have personally reviewed pertinent reports        Medications given in Emergency Department     Medication Administration - last 24 hours from 02/15/2019 2335 to 02/16/2019 2335       Date/Time Order Dose Route Action Action by     02/16/2019 1910 sodium chloride 0 9 % bolus 1,000 mL 0 mL Intravenous Stopped Jennifer Boston, ANA     02/16/2019 1748 sodium chloride 0 9 % bolus 1,000 mL 1,000 mL Intravenous [address] Jennifer Boston, RN     02/16/2019 1836 iohexol (OMNIPAQUE) 350 MG/ML injection (SINGLE-DOSE) 75 mL 75 mL Intravenous Given Yunier Nest          Assessment and Plan     Principal Problem:    Acute on chronic blood loss anemia (HCC)  Active Problems:    Fatigue    Diabetes mellitus, type II ([facility] [address] )    Diabetic neuropathy ([facility] )    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 ([facility] )    Hepatitis C, chronic (HCC)    Hepatocellular carcinoma (HCC)    Nicotine dependence    Opioid dependence ([facility] )    Pain syndrome, chronic    Hyponatremia    Portal vein thrombosis    Long term (current) use of anticoagulants    Melena    Hypotension    Acute GI bleed  - times 4-5 days while on anticoagulation per pt history  - patient with reported melena also noted in emergency department as well as patient reported episode of hematemesis  - status post 2 units pRBCs and 1L NS in the emergency department, follow-up post-transfusion H&H  - transfuse as needed to keep hemoglobin > 7 0  - hold anticoagulation  - GI consulted  - 1 g Rocephin x7 days for prophylaxis  - Protonix 40 mg IV b i d   - consider PICC line placement if patient continues to need transfusions as she is difficult to obtain access   - clear liquid diet while awaiting GI workup    Hypotension - improving  - SBP in the 80's in the ED  - secondary to acute blood loss from GI bleed  - no tachycardia, VS improved following administration of fluids and blood products  - continue to monitor  - no maintenance fluids ordered due to patient's tenuous fluid status and cirrhosis  - consider albumin for administration of further volume due to cirrhosis    Chronic hepatitis C with cirrhosis  - patient not currently acutely decompensated  - have reordered Lasix and Aldactone with SBP hold parameters  - caution with IV fluids  - albumin for volume resuscitation  - patient historically noncompliant with diuretics outpatient    [facility]  with portal vein thrombosis  - status post ablation and wedge resection in 2015, SIRS spheres in 2017  - follows with heme-onc, most recently seen in March of 2018, noted no new lesions and slightly shrinking original lesion  - opted to remain in observation rather than starting additional treatment with Nexavar, patient was then contacted per notes that her tumor size appear to have grown and was supposed to follow up but did not appear to  - has been on Eliquis since that time    DM2  - SSI while admitted, on metformin at home    Opioid dependence  - patient follows with pain management regularly for diagnoses of chronic pain syndrome and herniated lumbar disc with DJD  - prescribed Percocet 10/325 mg 4 times a day, was not restarted on admission    Nicotine dependence  - current smoker  - nicotine patch ordered per patient request    Hyponatremia  - chronic, 133 on this admission  - continue to monitor    Code Status: Level 1 - Full Code  VTE Pharmacologic Prophylaxis: Reason for no pharmacologic prophylaxis Acute GIB   VTE Mechanical Prophylaxis: sequential compression device  Admission Status: INPATIENT     Admission Time  I spent 30 minutes admitting the patient  This involved direct patient contact where I performed a full history and physical, reviewing previous records, and reviewing laboratory and other diagnostic studies      Ike Horvath,   Internal Medicine  PGY-1

## 2019-02-17 NOTE — ED NOTES
Pt not send to admission unit  P9 charge RN calling attending to see if pt meets step down criteria       Nancyann Collet, RN  02/16/19 2034

## 2019-02-17 NOTE — PROGRESS NOTES
Pt still refusing all blood work even after I explained the reasoning for the lab draws  SOD resident made aware again

## 2019-02-17 NOTE — UTILIZATION REVIEW
Initial Clinical Review    Admission: Date/Time/Statement: 2/16/19 @ 1931   Orders Placed This Encounter   Procedures    Inpatient Admission     Standing Status:   Standing     Number of Occurrences:   1     Order Specific Question:   Admitting Physician     Answer:   Bari Alba [[id number]]     Order Specific Question:   Level of Care     Answer:   Med Surg [16]     Order Specific Question:   Estimated length of stay     Answer:   More than 2 Midnights     Order Specific Question:   Certification     Answer:   I certify that inpatient services are medically necessary for this patient for a duration of greater than two midnights  See H&P and MD Progress Notes for additional information about the patient's course of treatment  ED: Date/Time/Mode of Arrival:   ED Arrival Information     Expected Arrival Acuity Means of Arrival Escorted By Service Admission Type    - 2/16/2019 16:56 Urgent Ambulance [facility] General Medicine Urgent    Arrival Complaint    dizziness        Chief Complaint:   Chief Complaint   Patient presents with    Fatigue     URI s/s, no appetite, overall weakness  History of Illness: 76 y o  female with past medical history of hepatitis C, hepatocellular carcinoma s/p resection in 2015, SIRS spheres/radioembolization procedure, portal venous thrombosis on Eliquis, DM, opioid and nicotine dependence, chronic hyponatremia, and chronic anemia presents following 4-5 days of cold symptoms and feeling tired    She felt as though she had a cold, and only came to the emergency department because her son urged her to  Workup was significant for hemoglobin of 4 3 as well as melanotic stool on rectal exam per ED  When asked about history of bleeding, patient states she has had dark stools also for approximately 4-5 days and has vomited blood once over this same time frame, neither of which she has experienced before but did not seek medical attention   She states she last saw a doctor for her liver approximately 1 year ago, who recommended an additional unspecified treatment which patient declined, but was unable to elaborate further  Patient generally a poor historian and unable to confirm her medications and whether she takes them regularly  ED Vital Signs:   ED Triage Vitals   Temperature Pulse Respirations Blood Pressure SpO2   02/16/19 1702 02/16/19 1702 02/16/19 1702 02/16/19 1702 02/16/19 1702   99 3 °F (37 4 °C) 76 18 111/67 100 %      Temp Source Heart Rate Source Patient Position - Orthostatic VS BP Location FiO2 (%)   02/16/19 2057 02/16/19 1957 -- -- --   Oral Monitor         Pain Score       02/16/19 1702       No Pain        Wt Readings from Last 1 Encounters:   02/16/19 79 4 kg (175 lb 0 7 oz)     Vital Signs (abnormal): RR 12-38, BP 83//57, SPO2     Pertinent Labs/Diagnostic Test Results:    Units 02/16/19  1748   WBC Thousand/uL 11 00*   RBC Million/uL 1 71*   HEMOGLOBIN g/dL 4 3*   HEMATOCRIT % 14 3*   MCV fL 84   MCH pg 25 1*   MCHC g/dL 30 1*   RDW % 18 8*   MPV fL 9 8   PLATELETS Thousands/uL 262   NRBC AUTO /100 WBCs 0   NEUTROS PCT % 78*   LYMPHS PCT % 13*   MONOS PCT % 7   EOS PCT % 1   BASOS PCT % 0   NEUTROS ABS Thousands/µL 8 74*     BUN mg/dL 33*   CREATININE mg/dL 1 34*      Ref Range & Units 02/16/19 1748   Lipase 73 - 393 u/L 624High        Ref Range & Units 02/16/19 2104   Immature Retic Fract 0 0 - 14 0 % 31 6High     Retic Ct Pct 0 37 - 1 87 % 8  99High     Retic Ct Abs 14,097-95,744 152,800High     RETIC HGB 30 0 - 38 3 pg 24  5Low           CT a/p -- Increasing soft tissue density throughout the valeriano hepatis and surrounding the pancreatic head suspicious for advanced adenopathy  This area in conglomerate measures 8 3 x 7 3 cm  Some of this density likely represents chronically thrombosed portal vein  Follow-up with repeat MRI of the liver  Hepatic cirrhosis relatively unchanged in appearance from the prior studies       ED Treatment: Medication Administration from 02/16/2019 1655 to 02/17/2019 0031       Date/Time Order Dose Route Action     02/16/2019 1748 sodium chloride 0 9 % bolus 1,000 mL 1,000 mL Intravenous New Bag     02/16/2019 1836 iohexol (OMNIPAQUE) 350 MG/ML injection (SINGLE-DOSE) 75 mL 75 mL Intravenous Given     Past Medical/Surgical History:    Active Ambulatory Problems     Diagnosis Date Noted    Anxiety 07/24/2012    Fatigue 09/23/2014    Chronic lumbar radiculopathy 05/08/2014    Claustrophobia 05/08/2013    Constipation 09/23/2014    Diabetes mellitus, type II ([facility] ) 06/26/2012    Di[facility] 06/17/2015    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 ([facility] ) 03/31/2014    Hepatitis C, chronic ([facility] ) 07/22/2014    Hepatocellular carcinoma ([facility] ) 11/12/2015    Herniated lumbar disc without myelopathy 11/05/2014    Hypertension 09/11/2012    Insomnia 10/14/2015    Left foot pain 03/31/2016    Myofascial pain 10/19/2017    Nicotine dependence 07/18/2014    Opioid dependence ([facility] ) 02/02/2016    Osteoporosis 10/24/2013    Other cirrhosis of liver ([facility] ) 01/16/2013    Pain syndrome, chronic 02/03/2015    Sacroiliitis ([facility] ) 08/07/2017    Seasonal allergies 06/17/2015    Trochanteric bursitis 07/16/2015    Elevated serum creatinine 03/14/2018    Lumbar disc herniation 03/14/2018    Health care maintenance 03/14/2018    Hyponatremia 09/16/2018    Abdominal pain 09/16/2018    Ascites 09/17/2018    Decompensated hepatic cirrhosis ([facility] ) 09/17/2018    Acute on chronic blood loss anemia (HCC) 09/18/2018    Portal vein thrombosis 09/19/2018    Depression 10/03/2018    Tobacco abuse 10/03/2018     Past Medical History:   Diagnosis Date    Anemia     Cancer ([facility]     Cirrhosis ([facility] )     Constipation     Diabetes mellitus ([facility]     Drug dependence ([facility] )     Hepatocellular carcinoma ([facility] )     Hypertension     Hyponatremia      Admitting Diagnosis: Portal vein thrombosis [I81]  Hepatocellular carcinoma ([facility] ) [C22 0]  Fatigue [R53 83]  GI bleed [K92 2]  Symptomatic anemia [D64 9]  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 ([facility] ) [B18 2, K74 60]  Age/Sex: 76 y o  female     Assessment/Plan:     Jim Velazquez is a 76 y o  female with medical history significant for chronic hepatitis C with cirrhosis, hepatocellular carcinoma s/p resection and ablation, chronic portal vein thrombosis on Eliquis, who was admitted on 2/16 for worsening fatigue and melanotic stool x 5 days  She was found to have acute blood loss anemia with a Hgb of 4 3 and was subsequently transfused with 2u pRBC  Eliquis was held   GI was consulted for possible EGD    Acute GI bleed  - times 4-5 days while on anticoagulation per pt history  - patient with reported melena also noted in emergency department as well as patient reported episode of hematemesis  - status post 2 units pRBCs and 1L NS in the emergency department, follow-up post-transfusion H&H  - transfuse as needed to keep hemoglobin > 7 0  - hold anticoagulation  - GI consulted  - 1 g Rocephin x7 days for prophylaxis  - Protonix 40 mg IV b i d   - consider PICC line placement if patient continues to need transfusions as she is difficult to obtain access   - clear liquid diet while awaiting GI workup  - SBP in the 80's in the ED  - secondary to acute blood loss from GI bleed      Admission Orders:  Scheduled Meds:   Current Facility-Administered Medications:  cefTRIAXone 1,000 mg Intravenous Q24H   furosemide 40 mg Oral Daily   insulin lispro 1-6 Units Subcutaneous TID AC   nicotine 1 patch Transdermal Daily   pantoprazole 40 mg Intravenous Q12H [facility]   sodium chloride 75 mL/hr Intravenous Continuous   spironolactone 100 mg Oral Daily     Telem   VS q 4h  Transfused 3 units PRBC's  Fingerstick glucose check ac & hs  Clear liquid diet  I/O  Daily ts  SCD's  Consult GI, palliative care, neuropsychology     Ref Range & Units 2/17/19 5739 2/16/19 2957 WBC 4 31 - 10 16 Thousand/uL 6 69  11  00High     RBC 3 81 - 5 12 Million/uL 2 37Low   1 71Low     Hemoglobin 11 5 - 15 4 g/dL 6 3Low Panic   4 3Low Panic  CM   Hematocrit 34 8 - 46 1 % 20 1Low   14 3Low         GI consult ---She is a 55-year-old female presents here with severe anemia with a hemoglobin of 4 5 in setting of anticoagulation  Unfortunately she is refusing to get any blood work done or any evaluation at this time  I tried to assess for competency but she would not be talk to me AND REFUSED PHYSICAL EXAM AS WELL  WILL CONTINUE FOLLOW BUT WE STRONGLY ADVISED HER ENDOSCOPIC EVALUATION  FORTUNATELY HER VITAL SIGNS ARE STABLE  Palliative care consult:   Plan:  Goals - Couldn't be determined at this time  - Patient does not seem competent on my exam today which was around 12noon and then again at 3pm  She displays lack of understanding of her disease process  She also displays unrealistic expectations of how she is going to get better  She lacks the capacity or willingness to make any medical decisions  - Spoke with a brother and a family friend who were at the hallway  They tell me that the patient is not  currently  She has 4 children but from different fathers  She has no AD on file  Per [place] 169, her health care representatives will be split equally among her 4 adult children  They gave me a number for 1 son, Cecilio Cedeño  They then left as soon as I went to find a phone               - I tried to call Thaddeuschristianeloy Alexandriajuanpablo at the number they provided ([phone]) multiple times but no answer  I also called Onur Crocker ([phone]) but the number was not working  I was told that Cecilio Cedeño might be coming in today or tomorrow  - Our service will continue to follow the patient as her hospitalization evolves                 - Medical decisions and medical cares should be obtained from the patient's adult children should she be found incompetent to participate in her own medical decisions or if she continues to refuse care while incompetent  Competency waxes and wanes and should be re-evaluated every time a medical decision needs to be made  - Spoke with IM resident, Dr Justo Yanes, about above  He tells me that the level 3 code status that his team obtained earlier on was gathered from when she was competent per their assessment and should therefore stand                - We cannot sign her on hospice at this point in time given her competency  Goals should be addressed with her adult children  Code Status: DNAR/DNI - Level 3              Power of :  presumed to be adult son by [place] 169              Advance Directive / Living Will: none          Network Utilization Review Department  Phone: [phone]; Fax [phone]  [email or web address]  ATTENTION: Please call with any questions or concerns to [phone]  and carefully listen to the prompts so that you are directed to the right person  Send all requests for admission clinical reviews, approved or denied determinations and any other requests to fax [phone]   All voicemails are confidential

## 2019-02-18 ENCOUNTER — ANESTHESIA (INPATIENT)
Dept: GASTROENTEROLOGY | Facility: HOSPITAL | Age: 69
DRG: 432 | End: 2019-02-18
Payer: COMMERCIAL

## 2019-02-18 ENCOUNTER — ANESTHESIA EVENT (INPATIENT)
Dept: GASTROENTEROLOGY | Facility: HOSPITAL | Age: 69
DRG: 432 | End: 2019-02-18
Payer: COMMERCIAL

## 2019-02-18 PROBLEM — K92.2 GI BLEED: Status: ACTIVE | Noted: 2019-02-16

## 2019-02-18 LAB
ABO GROUP BLD BPU: NORMAL
ABO GROUP BLD BPU: NORMAL
ALBUMIN SERPL BCP-MCNC: 4 G/DL (ref 3.5–5)
ALP SERPL-CCNC: 110 U/L (ref 46–116)
ALT SERPL W P-5'-P-CCNC: 19 U/L (ref 12–78)
ANION GAP SERPL CALCULATED.3IONS-SCNC: 10 MMOL/L (ref 4–13)
AST SERPL W P-5'-P-CCNC: 28 U/L (ref 5–45)
BILIRUB SERPL-MCNC: 1.56 MG/DL (ref 0.2–1)
BPU ID: NORMAL
BPU ID: NORMAL
BUN SERPL-MCNC: 14 MG/DL (ref 5–25)
CALCIUM SERPL-MCNC: 9.4 MG/DL (ref 8.3–10.1)
CHLORIDE SERPL-SCNC: 105 MMOL/L (ref 100–108)
CO2 SERPL-SCNC: 20 MMOL/L (ref 21–32)
CREAT SERPL-MCNC: 1.06 MG/DL (ref 0.6–1.3)
ERYTHROCYTE [DISTWIDTH] IN BLOOD BY AUTOMATED COUNT: 16.3 % (ref 11.6–15.1)
GFR SERPL CREATININE-BSD FRML MDRD: 62 ML/MIN/1.73SQ M
GLUCOSE SERPL-MCNC: 104 MG/DL (ref 65–140)
GLUCOSE SERPL-MCNC: 110 MG/DL (ref 65–140)
GLUCOSE SERPL-MCNC: 112 MG/DL (ref 65–140)
GLUCOSE SERPL-MCNC: 113 MG/DL (ref 65–140)
GLUCOSE SERPL-MCNC: 114 MG/DL (ref 65–140)
HCT VFR BLD AUTO: 29.4 % (ref 34.8–46.1)
HGB BLD-MCNC: 9.4 G/DL (ref 11.5–15.4)
MCH RBC QN AUTO: 27.3 PG (ref 26.8–34.3)
MCHC RBC AUTO-ENTMCNC: 32 G/DL (ref 31.4–37.4)
MCV RBC AUTO: 86 FL (ref 82–98)
PLATELET # BLD AUTO: 183 THOUSANDS/UL (ref 149–390)
PMV BLD AUTO: 9.3 FL (ref 8.9–12.7)
POTASSIUM SERPL-SCNC: 3.3 MMOL/L (ref 3.5–5.3)
PROT SERPL-MCNC: 9.1 G/DL (ref 6.4–8.2)
RBC # BLD AUTO: 3.44 MILLION/UL (ref 3.81–5.12)
SODIUM SERPL-SCNC: 135 MMOL/L (ref 136–145)
UNIT DISPENSE STATUS: NORMAL
UNIT DISPENSE STATUS: NORMAL
UNIT PRODUCT CODE: NORMAL
UNIT PRODUCT CODE: NORMAL
UNIT RH: NORMAL
UNIT RH: NORMAL
WBC # BLD AUTO: 6.05 THOUSAND/UL (ref 4.31–10.16)

## 2019-02-18 PROCEDURE — P9016 RBC LEUKOCYTES REDUCED: HCPCS

## 2019-02-18 PROCEDURE — 80053 COMPREHEN METABOLIC PANEL: CPT | Performed by: INTERNAL MEDICINE

## 2019-02-18 PROCEDURE — 43244 EGD VARICES LIGATION: CPT | Performed by: INTERNAL MEDICINE

## 2019-02-18 PROCEDURE — 06L38CZ OCCLUSION OF ESOPHAGEAL VEIN WITH EXTRALUMINAL DEVICE, VIA NATURAL OR ARTIFICIAL OPENING ENDOSCOPIC: ICD-10-PCS | Performed by: INTERNAL MEDICINE

## 2019-02-18 PROCEDURE — 85027 COMPLETE CBC AUTOMATED: CPT | Performed by: INTERNAL MEDICINE

## 2019-02-18 PROCEDURE — 82948 REAGENT STRIP/BLOOD GLUCOSE: CPT

## 2019-02-18 PROCEDURE — 99233 SBSQ HOSP IP/OBS HIGH 50: CPT | Performed by: INTERNAL MEDICINE

## 2019-02-18 RX ORDER — PROPOFOL 10 MG/ML
INJECTION, EMULSION INTRAVENOUS AS NEEDED
Status: DISCONTINUED | OUTPATIENT
Start: 2019-02-18 | End: 2019-02-18 | Stop reason: SURG

## 2019-02-18 RX ORDER — OXYCODONE HYDROCHLORIDE 10 MG/1
10 TABLET ORAL EVERY 6 HOURS PRN
Status: DISCONTINUED | OUTPATIENT
Start: 2019-02-18 | End: 2019-02-21 | Stop reason: HOSPADM

## 2019-02-18 RX ORDER — POTASSIUM CHLORIDE 20 MEQ/1
40 TABLET, EXTENDED RELEASE ORAL ONCE
Status: COMPLETED | OUTPATIENT
Start: 2019-02-18 | End: 2019-02-18

## 2019-02-18 RX ORDER — PROPOFOL 10 MG/ML
INJECTION, EMULSION INTRAVENOUS CONTINUOUS PRN
Status: DISCONTINUED | OUTPATIENT
Start: 2019-02-18 | End: 2019-02-18 | Stop reason: SURG

## 2019-02-18 RX ORDER — LIDOCAINE HYDROCHLORIDE 10 MG/ML
INJECTION, SOLUTION INFILTRATION; PERINEURAL AS NEEDED
Status: DISCONTINUED | OUTPATIENT
Start: 2019-02-18 | End: 2019-02-18 | Stop reason: SURG

## 2019-02-18 RX ADMIN — OCTREOTIDE ACETATE 50 MCG/HR: 500 INJECTION, SOLUTION INTRAVENOUS; SUBCUTANEOUS at 18:33

## 2019-02-18 RX ADMIN — PROPOFOL 100 MG: 10 INJECTION, EMULSION INTRAVENOUS at 15:07

## 2019-02-18 RX ADMIN — SODIUM CHLORIDE: 0.9 INJECTION, SOLUTION INTRAVENOUS at 14:56

## 2019-02-18 RX ADMIN — POTASSIUM CHLORIDE 40 MEQ: 1500 TABLET, EXTENDED RELEASE ORAL at 11:17

## 2019-02-18 RX ADMIN — OXYCODONE HYDROCHLORIDE 10 MG: 10 TABLET ORAL at 11:17

## 2019-02-18 RX ADMIN — OXYCODONE HYDROCHLORIDE 10 MG: 10 TABLET ORAL at 18:00

## 2019-02-18 RX ADMIN — LIDOCAINE HYDROCHLORIDE 80 MG: 10 INJECTION, SOLUTION INFILTRATION; PERINEURAL at 15:07

## 2019-02-18 RX ADMIN — PROPOFOL 50 MG: 10 INJECTION, EMULSION INTRAVENOUS at 15:09

## 2019-02-18 RX ADMIN — GABAPENTIN 300 MG: 300 CAPSULE ORAL at 10:22

## 2019-02-18 RX ADMIN — PROPOFOL 170 MCG/KG/MIN: 10 INJECTION, EMULSION INTRAVENOUS at 15:08

## 2019-02-18 RX ADMIN — INSULIN LISPRO 1 UNITS: 100 INJECTION, SOLUTION INTRAVENOUS; SUBCUTANEOUS at 18:00

## 2019-02-18 RX ADMIN — NICOTINE 1 PATCH: 14 PATCH, EXTENDED RELEASE TRANSDERMAL at 10:23

## 2019-02-18 RX ADMIN — FUROSEMIDE 40 MG: 40 TABLET ORAL at 11:05

## 2019-02-18 RX ADMIN — SODIUM CHLORIDE 75 ML/HR: 0.9 INJECTION, SOLUTION INTRAVENOUS at 20:31

## 2019-02-18 RX ADMIN — CEFTRIAXONE 1000 MG: 1 INJECTION, POWDER, FOR SOLUTION INTRAMUSCULAR; INTRAVENOUS at 04:13

## 2019-02-18 RX ADMIN — SPIRONOLACTONE 100 MG: 50 TABLET ORAL at 11:04

## 2019-02-18 NOTE — SOCIAL WORK
Met with patient and explained CM role/CM program  Resides in an apartment with YASMIN Yao, [address], bed/bathroom main floor  Independent prior to admission for ADL's and ambulation  PCP Dr Shahram Hernandez  Has prescription plan, uses [facility] [place]  Denies DME/IP Rehab  [facility]: [facility] in past  Denies mental health illness, IP or OP psyche care, drug and/or alcohol abuse  Primary contact is tiffanie Puente, [phone]  I attempted to call this number and not working  Per patient, she has his number on a piece of paper, it is the same number  States tiffanie Puente is POA, no Living Will  Srinivas Puente will provide transport to home    CM reviewed d/c planning process including the following: identifying help at home, patient preference for d/c planning needs, Discharge Lounge, [facility] Meds to Bed program, availability of treatment team to discuss questions or concerns patient and/or family may have regarding understanding medications and recognizing signs and symptoms once discharged  CM also encouraged patient to follow up with all recommended appointments after discharge  Patient advised of importance for patient and family to participate in managing patients medical well being  Patient/caregiver received discharge checklist  Content reviewed  Patient/caregiver encouraged to participate in discharge plan of care prior to discharge home

## 2019-02-18 NOTE — ANESTHESIA PREPROCEDURE EVALUATION
Review of Systems/Medical History  Patient summary reviewed  Chart reviewed  No history of anesthetic complications     Cardiovascular  EKG reviewed, Hypertension controlled,    Pulmonary  Smoker cigarette smoker  , Tobacco cessation counseling given ,        GI/Hepatic    GI bleeding , active, Liver disease (Portal vein thrombosis; h/o hepatocellular ca s/p resection) , cirrhosis and history of liver cancer, Hepatitis (h/o HCV) C,   Comment: Hyperbilirubinemia       Comment: Chronic hyponatremia     Endo/Other  Diabetes well controlled type 2 Oral agent,      GYN       Hematology  Anemia acute blood loss anemia and chronic blood loss anemia,     Musculoskeletal  Back pain ,   Arthritis     Neurology  Negative neurology ROS      Psychology     Chronic opioid dependence            Physical Exam    Airway    Mallampati score: II  TM Distance: >3 FB  Neck ROM: full     Dental   No notable dental hx     Cardiovascular  Rhythm: regular, Rate: normal, Cardiovascular exam normal    Pulmonary  Pulmonary exam normal Breath sounds clear to auscultation,     Other Findings        Anesthesia Plan  ASA Score- 3     Anesthesia Type- IV sedation with anesthesia with ASA Monitors  Additional Monitors:   Airway Plan:         Plan Factors-    Induction- intravenous  Postoperative Plan-     Informed Consent- Anesthetic plan and risks discussed with patient  I personally reviewed this patient with the CRNA  Discussed and agreed on the Anesthesia Plan with the CRNA           NPO verified  NKDA  Patient states she had 1 cup of Jello at ~9am today (2/18/19)  Patient's code status is DNR/DNI however she agrees to revoke DNR/DNI status for procedure and will be full code until she recovers from anesthesia and returns back to her med/surg room  Plan:  IV sedation/GA as backup    Patient does have the capacity to make medical decisions today and is consentable    Risks and benefits discussed with patient including possibility of recall under sedation  Questions answered  Patient consented

## 2019-02-18 NOTE — PROGRESS NOTES
Repeat H&H at 7:27 p m  Was 6 0 from 6 3 at 8:47 a m  This morning  Patient is status post 2 units packed RBCs  H&H was 4 3 prior to transfusion  Per chart review and my assessment of patient, patient is alert and oriented though patient lacks sufficient insight and understanding into her medical co morbidities  Patient was evaluated by palliative care early in the day today and they thought she lacked capacity to verbalize comprehension of her condition or the consequencse to her decisions or lack of decisions in her medical care, therefore patient does not have decision-making capacity to participate in decisions regarding her care at this time  Per [place] Act1 71 her healthcare representatives will be split amongst among her 4 adult children  Patient's son Maicol Watson and [place] signed a PICC consent tonight regarding access issues (on file if needed)  Patient will receive another 2 units packed RBCs tongiht with repeat H&H with a m  Labs  GI intervention on hold at this time as patient is currently refusing to be evaluated by GI  They strongly advised endoscopic evaluation    Will continue to monitor patient

## 2019-02-18 NOTE — PROGRESS NOTES
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)    Hepatitis C, chronic (HCC)    Hepatocellular carcinoma (HCC)    Nicotine dependence    Opioid dependence (HCC)    Pain syndrome, chronic    Hyponatremia    Portal vein thrombosis    Long term (current) use of anticoagulants    Melena    Hypotension    Acute on chronic blood loss anemia  · Patient presented to emergency department with hemoglobin of 4 3; patient received 2 units of packed red blood cells and after extensive discussion allowed nursing staff to redraw blood with hemoglobin coming back at 6 3  · Likely secondary to GI bleed  · Hold anticoagulation  · 1 g Rocephin x7 days prophylaxis; day 2  · Protonix 40 mg IV b i d   · PICC line consent in patient's chart if necessary and patient was in agreement  · Clear liquid diet  · Hemoglobin 9 4;   patient has received a total of 4 units PRBC  · Hemoglobin <7 transfuse blood  · GI following- EGD today    Hypotension  · Blood pressure ranging  systolic  · Likely secondary to acute blood loss from GI bleed  · Continue to monitor    Chronic Hyponatremia  · Likely secondary to poor p o   Intake  · Sodium 135 on admission  · Continue to monitor    Chronic hepatitis C with cirrhosis  · Patient has Lasix and Aldactone ordered with SBP hold parameters in place  · Stable    HCC with portal vein thrombosis  · Status post ablation and wedge resection in 2015, sirs spheres in 2017  · Follows with heme Oncology as outpatient although she has not followed up with them since March of 2018  · Patient was on Eliquis for this, but this is currently being held due to acute blood loss    Diabetes mellitus type 2  · Sliding scale insulin  · Stable    Opioid dependence  · Patient follows with pain management regularly for chronic pain syndrome and herniated lumbar disc with DJD  · Oxy 10 mg q 6 hours p r n  Nicotine dependence  · Nicotine patch ordered    Goals of care  · Patient much more compliant yesterday, stating that she was frustrated and overwhelmed with coming to the hospital   Patient has been much more compliant with blood draws, and sign consent for PICC line today  Patient is also in agreement to undergo EGD later today  · Patient stated she would not like to talk with palliative Care today, but she will after she undergoes her EGD procedure  Disposition:  Will have goals of care discussion with patient today       Subjective:   Patient sitting in chair resting comfortably  Upon talking with the patient today she stated she was frustrated yesterday and wanted to be left alone, but multiple people kept "bugging" her in asking her to "give blood"  Patient stated she would talk with me this morning, but did not Jovany talk with multiple other doctors throughout the day as this agitate her  Patient denied fever, chills, nausea, vomiting, and abdominal pain  Vitals: Temp (24hrs), Av 2 °F (37 3 °C), Min:98 6 °F (37 °C), Max:99 5 °F (37 5 °C)  Current: Temperature: 99 °F (37 2 °C)  Vitals:    19 2220 19 0124 19 0203 19 0400   BP: 104/60 109/64 105/60 105/61   BP Location:       Pulse: 56 60 56 (!) 53   Resp:    Temp: 98 6 °F (37 °C) 99 3 °F (37 4 °C) 99 1 °F (37 3 °C) 99 °F (37 2 °C)   TempSrc:       SpO2: 100%  99%    Weight:        Body mass index is 31 01 kg/m²  I/O last 24 hours: In: 80 [P O :360; Blood:350]  Out: 9745 [Urine:1425]    Physical Exam   Constitutional: She is oriented to person, place, and time  She appears well-developed and well-nourished  No distress  HENT:   Head: Normocephalic and atraumatic  Eyes: No scleral icterus  Cardiovascular: Normal rate, regular rhythm, normal heart sounds and intact distal pulses  Exam reveals no gallop and no friction rub  No murmur heard  Pulmonary/Chest: Effort normal and breath sounds normal  No respiratory distress  She has no wheezes  She has no rales  Abdominal: Soft  Bowel sounds are normal  She exhibits no distension  There is no tenderness  Musculoskeletal: Normal range of motion  She exhibits no edema  Neurological: She is alert and oriented to person, place, and time  Skin: Skin is warm  No erythema  Fingernail pallor noted   Nursing note and vitals reviewed  Invasive Devices     Peripheral Intravenous Line            Peripheral IV 02/16/19 Left Hand 1 day                        Labs:   Recent Results (from the past 24 hour(s))   Fingerstick Glucose (POCT)    Collection Time: 02/17/19 11:43 AM   Result Value Ref Range    POC Glucose 99 65 - 140 mg/dl   Fingerstick Glucose (POCT)    Collection Time: 02/17/19  4:47 PM   Result Value Ref Range    POC Glucose 109 65 - 140 mg/dl   Hemoglobin and hematocrit, blood    Collection Time: 02/17/19  7:27 PM   Result Value Ref Range    Hemoglobin 6 0 (LL) 11 5 - 15 4 g/dL    Hematocrit 18 8 (L) 34 8 - 46 1 %   Fingerstick Glucose (POCT)    Collection Time: 02/17/19  8:46 PM   Result Value Ref Range    POC Glucose 124 65 - 140 mg/dl   Prepare RBC:Has consent been obtained? Yes; Where is the Surgery Scheduled?  [facility], 2 Units    Collection Time: 02/18/19  5:55 AM   Result Value Ref Range    Unit Product Code O6348K65     Unit Number [id number]     Unit ABO B     Unit [facility] POS     Unit Dispense Status Presumed Trans     Unit Product Code [id number]     Unit Number [id number]     Unit ABO B     Unit [facility] POS     Unit Dispense Status Presumed Trans    Comprehensive metabolic panel    Collection Time: 02/18/19  6:18 AM   Result Value Ref Range    Sodium 135 (L) 136 - 145 mmol/L    Potassium 3 3 (L) 3 5 - 5 3 mmol/L    Chloride 105 100 - 108 mmol/L    CO2 20 (L) 21 - 32 mmol/L    ANION GAP 10 4 - 13 mmol/L    BUN 14 5 - 25 mg/dL    Creatinine 1 06 0 60 - 1 30 mg/dL    Glucose 112 65 - 140 mg/dL    Calcium 9 4 8 3 - 10 1 mg/dL    AST 28 5 - 45 U/L    ALT 19 12 - 78 U/L    Alkaline Phosphatase 110 46 - 116 U/L    Total Protein 9 1 (H) 6 4 - 8 2 g/dL    Albumin 4 0 3 5 - 5 0 g/dL    Total Bilirubin 1 56 (H) 0 20 - 1 00 mg/dL    eGFR 62 ml/min/1 73sq m   CBC    Collection Time: 02/18/19  6:18 AM   Result Value Ref Range    WBC 6 05 4 31 - 10 16 Thousand/uL    RBC 3 44 (L) 3 81 - 5 12 Million/uL    Hemoglobin 9 4 (L) 11 5 - 15 4 g/dL    Hematocrit 29 4 (L) 34 8 - 46 1 %    MCV 86 82 - 98 fL    MCH 27 3 26 8 - 34 3 pg    MCHC 32 0 31 4 - 37 4 g/dL    RDW 16 3 (H) 11 6 - 15 1 %    Platelets 269 947 - 497 Thousands/uL    MPV 9 3 8 9 - 12 7 fL   Fingerstick Glucose (POCT)    Collection Time: 02/18/19  7:57 AM   Result Value Ref Range    POC Glucose 110 65 - 140 mg/dl       Radiology Results: I have personally reviewed pertinent reports  Other Diagnostic Testing:   I have personally reviewed pertinent reports          Active Meds:   Current Facility-Administered Medications   Medication Dose Route Frequency    ceftriaxone (ROCEPHIN) 1 g/50 mL in dextrose IVPB  1,000 mg Intravenous Q24H    furosemide (LASIX) tablet 40 mg  40 mg Oral Daily    gabapentin (NEURONTIN) capsule 300 mg  300 mg Oral Daily    insulin lispro (HumaLOG) 100 units/mL subcutaneous injection 1-6 Units  1-6 Units Subcutaneous TID AC    nicotine (NICODERM CQ) 14 mg/24hr TD 24 hr patch 1 patch  1 patch Transdermal Daily    pantoprazole (PROTONIX) injection 40 mg  40 mg Intravenous Q12H [facility]    sodium chloride 0 9 % infusion  75 mL/hr Intravenous Continuous    spironolactone (ALDACTONE) tablet 100 mg  100 mg Oral Daily         VTE Pharmacologic Prophylaxis: Reason for no pharmacologic prophylaxis GI bleed  VTE Mechanical Prophylaxis: sequential compression device    Rosanne Salas DO

## 2019-02-18 NOTE — PROGRESS NOTES
Patient's son and family came to visit, I went to speak with them  Explained that she was refusing all patient care and that there was discrepancy about her competency  The believe that she is not competent  The claimed that she would be agreeable to blood draws, namely a post transfusion H&H  Because she has had difficulty maintaining access, I attempted to get the PICC consent  She refused both blood draws and consenting to PICC  Patient's son and POA signed a consent in case she is deemed incompetent tomorrow  Patient is upset that she is not getting her oxycodone and gabapentin  I explained that oxycodone would lower her blood pressure but that I would reorder her gabapentin

## 2019-02-18 NOTE — ANESTHESIA POSTPROCEDURE EVALUATION
Post-Op Assessment Note    CV Status:  Stable  Pain Score: 0    Pain management: satisfactory to patient     Mental Status:  Awake and somnolent   Hydration Status:  Euvolemic   PONV Controlled:  Controlled   Airway Patency:  Patent   Post Op Vitals Reviewed: Yes      Staff: CRNA           /69 (02/18/19 1537)    Temp      Pulse 65 (02/18/19 1537)   Resp 20 (02/18/19 1537)    SpO2 100 % (02/18/19 1537)

## 2019-02-18 NOTE — PLAN OF CARE
Problem: DISCHARGE PLANNING - CARE MANAGEMENT  Goal: Discharge to post-acute care or home with appropriate resources  Description  INTERVENTIONS:  - Conduct assessment to determine patient/family and health care team treatment goals, and need for post-acute services based on payer coverage, community resources, and patient preferences, and barriers to discharge  - Address psychosocial, clinical, and financial barriers to discharge as identified in assessment in conjunction with the patient/family and health care team  - Arrange appropriate level of post-acute services according to patient's   needs and preference and payer coverage in collaboration with the physician and health care team  - Communicate with and update the patient/family, physician, and health care team regarding progress on the discharge plan  - Arrange appropriate transportation to post-acute venues  - will follow for medically necessary resources   Outcome: Progressing

## 2019-02-18 NOTE — OP NOTE
OPERATIVE REPORT  PATIENT NAME: Gilson De La Cruz    :  1950  MRN: [id number]  Pt Location: [facility] ROOM 03    SURGERY DATE: 2019    Surgeon(s) and Role:     * Giovanna Avendaño MD - Primary     * Ry Jack MD - Fellow    Preop Diagnosis:  GI bleed [K92 2]    Post-Op Diagnosis Codes:     * GI bleed [K92 2]    Procedure(s) (LRB):  ESOPHAGOGASTRODUODENOSCOPY (EGD) (N/A)    Specimen(s):  * No specimens in log *    Estimated Blood Loss:   Minimal    Drains:  * No LDAs found *    Anesthesia Type:   IV Sedation with Anesthesia    Operative Indications:  GI bleed [K92 2]    ESOPHAGOGASTRODUODENOSCOPY    PROCEDURE: EGD    SEDATION: Monitored anesthesia care, check anesthesia records    ASA Class: 3    INDICATIONS:  GI bleeding    CONSENT:  Informed consent was obtained for the procedure, including sedation after explaining the risks and benefits of the procedure  Risks including but not limited to bleeding, perforation, infection, and missed lesion  PREPARATION:   Telemetry, pulse oximetry, blood pressure were monitored throughout the procedure  Patient was identified by myself both verbally and by visual inspection of ID band  DESCRIPTION:   Patient was placed in the left lateral decubitus position and was sedated with the above medication  The gastroscope was introduced in to the oropharynx and the esophagus was intubated under direct visualization  Scope was passed down the esophagus up to 2nd part of the duodenum  A careful inspection was made as the gastroscope was withdrawn, including a retroflexed view of the stomach; findings and interventions are described below  FINDINGS:    #1  Esophagus- upper and midesophagus drug normal, there were distal mid sized varices, one varix had nipple sign and another had red whale sign 4 bands were deployed over 4 columns of varices    #2  Stomach- fundus looked normal, body and antrum had signs of portal hypertensive gastropathy    #3   Duodenum- there was a small polyp in the duodenal bulb, 2nd portion of the duodenum looked normal         IMPRESSIONS:      Esophageal varices with stigmata of recent bleeding banded   Portal hypertensive gastropathy  Small duodenal polyp    RECOMMENDATIONS:     Continue antibiotics for total of 7 days  Continue PPI  Start octreotide for total of 3 days    COMPLICATIONS:  None; patient tolerated the procedure well      SPECIMENS:  * No specimens in log *    ESTIMATED BLOOD LOSS:  Minimal        SIGNATURE: Clovia Cabot, MD  DATE: February 18, 2019  TIME: 3:41 PM

## 2019-02-19 LAB
ALBUMIN SERPL BCP-MCNC: 3.4 G/DL (ref 3.5–5)
ALP SERPL-CCNC: 100 U/L (ref 46–116)
ALT SERPL W P-5'-P-CCNC: 15 U/L (ref 12–78)
ANION GAP SERPL CALCULATED.3IONS-SCNC: 9 MMOL/L (ref 4–13)
AST SERPL W P-5'-P-CCNC: 24 U/L (ref 5–45)
BILIRUB SERPL-MCNC: 1.12 MG/DL (ref 0.2–1)
BUN SERPL-MCNC: 11 MG/DL (ref 5–25)
CALCIUM SERPL-MCNC: 8.7 MG/DL (ref 8.3–10.1)
CHLORIDE SERPL-SCNC: 107 MMOL/L (ref 100–108)
CO2 SERPL-SCNC: 20 MMOL/L (ref 21–32)
CREAT SERPL-MCNC: 1.16 MG/DL (ref 0.6–1.3)
ERYTHROCYTE [DISTWIDTH] IN BLOOD BY AUTOMATED COUNT: 16.8 % (ref 11.6–15.1)
GFR SERPL CREATININE-BSD FRML MDRD: 56 ML/MIN/1.73SQ M
GLUCOSE SERPL-MCNC: 137 MG/DL (ref 65–140)
GLUCOSE SERPL-MCNC: 151 MG/DL (ref 65–140)
GLUCOSE SERPL-MCNC: 170 MG/DL (ref 65–140)
GLUCOSE SERPL-MCNC: 187 MG/DL (ref 65–140)
GLUCOSE SERPL-MCNC: 241 MG/DL (ref 65–140)
HCT VFR BLD AUTO: 28.6 % (ref 34.8–46.1)
HCT VFR BLD AUTO: 29.9 % (ref 34.8–46.1)
HGB BLD-MCNC: 9.1 G/DL (ref 11.5–15.4)
HGB BLD-MCNC: 9.7 G/DL (ref 11.5–15.4)
MCH RBC QN AUTO: 27.7 PG (ref 26.8–34.3)
MCHC RBC AUTO-ENTMCNC: 31.8 G/DL (ref 31.4–37.4)
MCV RBC AUTO: 87 FL (ref 82–98)
PLATELET # BLD AUTO: 172 THOUSANDS/UL (ref 149–390)
PMV BLD AUTO: 10.1 FL (ref 8.9–12.7)
POTASSIUM SERPL-SCNC: 4.1 MMOL/L (ref 3.5–5.3)
PROT SERPL-MCNC: 7.8 G/DL (ref 6.4–8.2)
RBC # BLD AUTO: 3.29 MILLION/UL (ref 3.81–5.12)
SODIUM SERPL-SCNC: 136 MMOL/L (ref 136–145)
WBC # BLD AUTO: 8.36 THOUSAND/UL (ref 4.31–10.16)

## 2019-02-19 PROCEDURE — 85014 HEMATOCRIT: CPT | Performed by: INTERNAL MEDICINE

## 2019-02-19 PROCEDURE — 80053 COMPREHEN METABOLIC PANEL: CPT | Performed by: INTERNAL MEDICINE

## 2019-02-19 PROCEDURE — 82948 REAGENT STRIP/BLOOD GLUCOSE: CPT

## 2019-02-19 PROCEDURE — 99233 SBSQ HOSP IP/OBS HIGH 50: CPT | Performed by: FAMILY MEDICINE

## 2019-02-19 PROCEDURE — 99232 SBSQ HOSP IP/OBS MODERATE 35: CPT | Performed by: INTERNAL MEDICINE

## 2019-02-19 PROCEDURE — C9113 INJ PANTOPRAZOLE SODIUM, VIA: HCPCS | Performed by: INTERNAL MEDICINE

## 2019-02-19 PROCEDURE — 85027 COMPLETE CBC AUTOMATED: CPT | Performed by: INTERNAL MEDICINE

## 2019-02-19 PROCEDURE — 99233 SBSQ HOSP IP/OBS HIGH 50: CPT | Performed by: INTERNAL MEDICINE

## 2019-02-19 PROCEDURE — 85018 HEMOGLOBIN: CPT | Performed by: INTERNAL MEDICINE

## 2019-02-19 RX ORDER — MAGNESIUM HYDROXIDE/ALUMINUM HYDROXICE/SIMETHICONE 120; 1200; 1200 MG/30ML; MG/30ML; MG/30ML
30 SUSPENSION ORAL EVERY 4 HOURS PRN
Status: DISCONTINUED | OUTPATIENT
Start: 2019-02-19 | End: 2019-02-19

## 2019-02-19 RX ORDER — ONDANSETRON 2 MG/ML
4 INJECTION INTRAMUSCULAR; INTRAVENOUS EVERY 6 HOURS PRN
Status: DISCONTINUED | OUTPATIENT
Start: 2019-02-19 | End: 2019-02-21 | Stop reason: HOSPADM

## 2019-02-19 RX ADMIN — OCTREOTIDE ACETATE 50 MCG/HR: 500 INJECTION, SOLUTION INTRAVENOUS; SUBCUTANEOUS at 17:16

## 2019-02-19 RX ADMIN — INSULIN LISPRO 1 UNITS: 100 INJECTION, SOLUTION INTRAVENOUS; SUBCUTANEOUS at 12:30

## 2019-02-19 RX ADMIN — ONDANSETRON 4 MG: 2 INJECTION INTRAMUSCULAR; INTRAVENOUS at 09:01

## 2019-02-19 RX ADMIN — FUROSEMIDE 40 MG: 40 TABLET ORAL at 09:05

## 2019-02-19 RX ADMIN — INSULIN LISPRO 3 UNITS: 100 INJECTION, SOLUTION INTRAVENOUS; SUBCUTANEOUS at 17:15

## 2019-02-19 RX ADMIN — ONDANSETRON 4 MG: 2 INJECTION INTRAMUSCULAR; INTRAVENOUS at 00:17

## 2019-02-19 RX ADMIN — PANTOPRAZOLE SODIUM 40 MG: 40 INJECTION, POWDER, FOR SOLUTION INTRAVENOUS at 13:41

## 2019-02-19 RX ADMIN — SODIUM CHLORIDE 75 ML/HR: 0.9 INJECTION, SOLUTION INTRAVENOUS at 12:33

## 2019-02-19 RX ADMIN — GABAPENTIN 300 MG: 300 CAPSULE ORAL at 09:05

## 2019-02-19 RX ADMIN — OXYCODONE HYDROCHLORIDE 10 MG: 10 TABLET ORAL at 05:53

## 2019-02-19 RX ADMIN — OCTREOTIDE ACETATE 50 MCG/HR: 500 INJECTION, SOLUTION INTRAVENOUS; SUBCUTANEOUS at 05:57

## 2019-02-19 RX ADMIN — NICOTINE 1 PATCH: 14 PATCH, EXTENDED RELEASE TRANSDERMAL at 09:06

## 2019-02-19 RX ADMIN — OXYCODONE HYDROCHLORIDE 10 MG: 10 TABLET ORAL at 20:55

## 2019-02-19 RX ADMIN — PANTOPRAZOLE SODIUM 40 MG: 40 INJECTION, POWDER, FOR SOLUTION INTRAVENOUS at 01:20

## 2019-02-19 RX ADMIN — SPIRONOLACTONE 100 MG: 50 TABLET ORAL at 09:06

## 2019-02-19 RX ADMIN — CEFTRIAXONE 1000 MG: 1 INJECTION, POWDER, FOR SOLUTION INTRAMUSCULAR; INTRAVENOUS at 03:30

## 2019-02-19 RX ADMIN — ALUMINUM HYDROXIDE, MAGNESIUM HYDROXIDE, AND SIMETHICONE 30 ML: 200; 200; 20 SUSPENSION ORAL at 19:08

## 2019-02-19 RX ADMIN — OXYCODONE HYDROCHLORIDE 10 MG: 10 TABLET ORAL at 00:28

## 2019-02-19 NOTE — PROGRESS NOTES
Per nursing patient's IV running Octreotide infiltrated at some point today and it is unclear whether patient has received any of the medication  Asked nursing to have critical care nurse attempt to gain peripheral access until patient gets a PICC line placed tomorrow due to patient being a difficult stick

## 2019-02-19 NOTE — PROGRESS NOTES
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)    Hepatitis C, chronic (HCC)    Hepatocellular carcinoma (HCC)    Nicotine dependence    Opioid dependence (HCC)    Pain syndrome, chronic    Hyponatremia    Portal vein thrombosis    Long term (current) use of anticoagulants    Melena    Hypotension    GI bleed    Acute on chronic blood loss anemia  · Patient presented to emergency department with hemoglobin of 4 3; patient received 2 units of packed red blood cells and after extensive discussion allowed nursing staff to redraw blood with hemoglobin coming back at 6 3  · Likely secondary to GI bleed possibly variceal bleeding from cirrhosis history  · EGD- esophageal varices with stigmata of recent bleeding banded, portal hypertensive gastropathy, small duodenal polyp  · Continue to Hold anticoagulation  · 1 g Rocephin x7 days prophylaxis; day 3  · Octreotide drip for 3 days  · Protonix 40 mg IV b i d   · PICC line consent in patient's chart if necessary and patient was in agreement  · Regular diet  · Hemoglobin 9 1;  patient has received a total of 4 units PRBC  · Hemoglobin <7 transfuse blood  · GI following- continue octreotide drip and IV Protonix    Hypotension  · Blood pressure systolic 689-800  · Likely secondary to acute blood loss from GI bleed  · Continue to monitor    Chronic Hyponatremia  · Likely secondary to poor p o   Intake  · Sodium 136 on admission  · Continue to monitor; stable    Chronic hepatitis C with cirrhosis  · Patient has Lasix and Aldactone ordered with SBP hold parameters in place  · Stable    HCC with portal vein thrombosis  · Status post ablation and wedge resection in , sirs spheres in 2017  · Follows with heme Oncology as outpatient although she has not followed up with them since 2018  · Patient was on Eliquis for this, but this is currently being held due to acute blood loss    Diabetes mellitus type 2  · Sliding scale insulin  · Stable    Opioid dependence  · Patient follows with pain management regularly for chronic pain syndrome and herniated lumbar disc with DJD  · Oxy 10 mg q 6 hours p r n  Nicotine dependence  · Nicotine patch ordered    Goals of care  · Palliative care following    Disposition:  Continue inpatient treatment  Subjective:   Patient sitting in bed resting comfortably  Patient was complaining of nausea that has been present since her procedure yesterday, and states Zofran helped slightly with this  Patient did have 1 episode of emesis yesterday, which was described as her prior meal, and no blood noted  Patient had no other complaints  Patient denies fever, chills, shortness of breath, chest pain, or headache  Vitals: Temp (24hrs), Av 3 °F (36 8 °C), Min:98 1 °F (36 7 °C), Max:98 5 °F (36 9 °C)  Current: Temperature: 98 1 °F (36 7 °C)  Vitals:    19 1552 19 2256 19 0552 19 0730   BP: 145/85 150/80  112/66   BP Location:  Right arm     Pulse: 67 67  58   Resp: 20 20     Temp:    98 1 °F (36 7 °C)   TempSrc:       SpO2: 100% 100%  98%   Weight:   76 1 kg (167 lb 12 3 oz)    Height:        Body mass index is 28 8 kg/m²  I/O last 24 hours: In: 9015 [P O :240; I V :830]  Out: 950 [Urine:950]    Physical Exam   Constitutional: She is oriented to person, place, and time  She appears well-developed and well-nourished  No distress  HENT:   Head: Normocephalic and atraumatic  Eyes: No scleral icterus  Cardiovascular: Normal rate, regular rhythm and normal heart sounds  Exam reveals no gallop and no friction rub  No murmur heard    Pulmonary/Chest: Effort normal and breath sounds normal  No respiratory distress  She has no wheezes  She has no rales  Abdominal: Soft  Bowel sounds are normal  She exhibits no distension  There is no tenderness  Musculoskeletal: Normal range of motion  She exhibits no edema  Neurological: She is alert and oriented to person, place, and time  Skin: Skin is warm  No rash noted  Fingernail pallor   Nursing note and vitals reviewed           Invasive Devices     Peripheral Intravenous Line            Peripheral IV 02/18/19 Right Wrist less than 1 day                        Labs:   Recent Results (from the past 24 hour(s))   Fingerstick Glucose (POCT)    Collection Time: 02/18/19 12:25 PM   Result Value Ref Range    POC Glucose 113 65 - 140 mg/dl   Fingerstick Glucose (POCT)    Collection Time: 02/18/19  4:46 PM   Result Value Ref Range    POC Glucose 104 65 - 140 mg/dl   Fingerstick Glucose (POCT)    Collection Time: 02/18/19  9:15 PM   Result Value Ref Range    POC Glucose 114 65 - 140 mg/dl   Hemoglobin and hematocrit, blood    Collection Time: 02/19/19 12:25 AM   Result Value Ref Range    Hemoglobin 9 7 (L) 11 5 - 15 4 g/dL    Hematocrit 29 9 (L) 34 8 - 46 1 %   CBC    Collection Time: 02/19/19  4:58 AM   Result Value Ref Range    WBC 8 36 4 31 - 10 16 Thousand/uL    RBC 3 29 (L) 3 81 - 5 12 Million/uL    Hemoglobin 9 1 (L) 11 5 - 15 4 g/dL    Hematocrit 28 6 (L) 34 8 - 46 1 %    MCV 87 82 - 98 fL    MCH 27 7 26 8 - 34 3 pg    MCHC 31 8 31 4 - 37 4 g/dL    RDW 16 8 (H) 11 6 - 15 1 %    Platelets 172 880 - 963 Thousands/uL    MPV 10 1 8 9 - 12 7 fL   Comprehensive metabolic panel    Collection Time: 02/19/19  4:58 AM   Result Value Ref Range    Sodium 136 136 - 145 mmol/L    Potassium 4 1 3 5 - 5 3 mmol/L    Chloride 107 100 - 108 mmol/L    CO2 20 (L) 21 - 32 mmol/L    ANION GAP 9 4 - 13 mmol/L    BUN 11 5 - 25 mg/dL    Creatinine 1 16 0 60 - 1 30 mg/dL    Glucose 187 (H) 65 - 140 mg/dL    Calcium 8 7 8 3 - 10 1 mg/dL    AST 24 5 - 45 U/L ALT 15 12 - 78 U/L    Alkaline Phosphatase 100 46 - 116 U/L    Total Protein 7 8 6 4 - 8 2 g/dL    Albumin 3 4 (L) 3 5 - 5 0 g/dL    Total Bilirubin 1 12 (H) 0 20 - 1 00 mg/dL    eGFR 56 ml/min/1 73sq m   Fingerstick Glucose (POCT)    Collection Time: 02/19/19  8:05 AM   Result Value Ref Range    POC Glucose 170 (H) 65 - 140 mg/dl       Radiology Results: I have personally reviewed pertinent reports  Other Diagnostic Testing:   I have personally reviewed pertinent reports          Active Meds:   Current Facility-Administered Medications   Medication Dose Route Frequency    ceftriaxone (ROCEPHIN) 1 g/50 mL in dextrose IVPB  1,000 mg Intravenous Q24H    furosemide (LASIX) tablet 40 mg  40 mg Oral Daily    gabapentin (NEURONTIN) capsule 300 mg  300 mg Oral Daily    insulin lispro (HumaLOG) 100 units/mL subcutaneous injection 1-6 Units  1-6 Units Subcutaneous TID AC    nicotine (NICODERM CQ) 14 mg/24hr TD 24 hr patch 1 patch  1 patch Transdermal Daily    octreotide (SandoSTATIN) 500 mcg in sodium chloride 0 9 % 250 mL infusion  50 mcg/hr Intravenous Continuous    ondansetron (ZOFRAN) injection 4 mg  4 mg Intravenous Q6H PRN    oxyCODONE (ROXICODONE) immediate release tablet 10 mg  10 mg Oral Q6H PRN    pantoprazole (PROTONIX) injection 40 mg  40 mg Intravenous Q12H [facility]    sodium chloride 0 9 % infusion  75 mL/hr Intravenous Continuous    spironolactone (ALDACTONE) tablet 100 mg  100 mg Oral Daily         VTE Pharmacologic Prophylaxis: Reason for no pharmacologic prophylaxis GI bleed  VTE Mechanical Prophylaxis: sequential compression device    Don Call DO

## 2019-02-19 NOTE — PROGRESS NOTES
Progress note - Palliative and Supportive Care   Niki Álvarez 76 y o  female [id number]    Assessment:  Goals of care    Plan:  Goals - Unable to gather, but seems like she is cooperating more now with medical care and even went to get an EGD yesterday    -She is competent on my exam today which was around 11am  She is understanding why she is on a modified diet for now, she knows what her EGD shows, she knows why she is feeling bloated yesterday, she knows why she has chest pains (from dyspepsia)  She is agreeable and accepting of her current plan of care - she is accepting of her modified diet, she is taking her medication for dyspepsia  Unclear if she will be accepting of future plans of care should she continue to bleed  - Damaris Aldana, Palliative care LSW, was with me and as soon as we started talking about her medical issues, she shuts us down and asks us to refer to her medical records  - She is resistant to talk to me because of our conversation on Sunday, even though I did nothing more than ask her about her medical issues and what her goals are    - She refused to talk to us more about herself that are non-medical in nature (Is she ? Children? Who does she want as her medical decision maker?)   - Overall, she just seems like a woman who is tired of going in and out of the hospital for her various medical issues and especially tired of talking to different people about them  However, she also is at a point where she is not ready to make any medical decisions moving forward  - We gave her the option of having us follow her still or calling us when she needs us  She chose the latter  We will provide her our card and she is welcome to call us if she needs us  - We will sign off  Thank you for allowing us the opportunity to participate in her care        Code Status:  DNAR/DNI - Level 3   Power of :  presumed to be adult children by [place] Act [address] Directive / Living Will: none   POLST:  none      Interval history:       No acute events overnight  Rest of conversation as above  MEDICATIONS / ALLERGIES:    all current active meds have been reviewed    No Known Allergies    OBJECTIVE:    Physical Exam  Physical Exam   Constitutional: She is oriented to person, place, and time  She appears well-developed and well-nourished  No distress  HENT:   Head: Normocephalic and atraumatic  Right Ear: External ear normal    Left Ear: External ear normal    Eyes: Pupils are equal, round, and reactive to light  Conjunctivae and EOM are normal  Right eye exhibits no discharge  Left eye exhibits no discharge  Pulmonary/Chest: Effort normal  No respiratory distress  Abdominal: Soft  She exhibits no distension  There is no tenderness  Neurological: She is alert and oriented to person, place, and time  Skin: Skin is warm and dry  Psychiatric: She has a normal mood and affect  Her behavior is normal  Judgment and thought content normal        Lab Results: I have personally reviewed pertinent labs  Imaging Studies: I have personally reviewed pertinent reports  EKG, Pathology, and Other Studies: I have personally reviewed pertinent reports  Counseling / Coordination of Care  Total floor / unit time spent today 45 minutes  Greater than 50% of total time was spent with the patient and / or family counseling and / or coordination of care   A description of the counseling / coordination of care: goals of care, symptoms

## 2019-02-19 NOTE — PROGRESS NOTES
Gastroenterology Progress Note   - Derrick Love 76 y o  female MRN: [id number]    Unit/Bed#: [facility] 911-01 Encounter: [id number]      Assessment and Plan:   GI bleeding/Anemia   22-year-old female patient presenting with hemoglobin level of 4 3, in the setting of recent melena and hematemesis 4-5 days before admission, patient was transfused 2 units, hemoglobin 9 1 today  She was started on Rocephin on admission  EGD was performed yesterday and she was found to have esophageal varices with recent stigmata of bleeding, varices were banded and she was started on octreotide  Continue antibiotics for total of 7 days and octreotide for 3 days     Cirrhosis  Patient with cirrhosis secondary to hepatitis-C infection with concomitant hepatocellular carcinoma  Patient is status post resection and chemo embolization  Patient is on Lasix and Aldactone for treatment of ascites  Needs betablocker for secondary prophylaxis of bleeding esophageal varices, can be started on nadolol 40 daily or propranolol 20 BID on discharge     Portal vein thrombosis  Patient with portal vein thrombosis on treatment with Eliquis  Anticoagulation was held on admission due to concern for bleeding  Portal vein thrombosis is chronic and 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  Continue to hold anticoagulation    From the GI perspective,we will follow as an outpatient in 2-4 weeks, our office will contact the patient to schedule follow up, will sign off, please call with questions        Subjective:   Patient seen and examined at the bed, denies any events overnight, currently is tolerating PO route partially, denies nausea or vomiting, is passing gases and having bowel movements, denies chest pain or shortness of breath, no abdominal pain, patient is ambulating     Objective:     Vitals: Blood pressure 112/66, pulse 58, temperature 98 1 °F (36 7 °C), resp   rate 20, height 5' 4" (1 626 m), weight 76 1 kg (167 lb 12 3 oz), SpO2 98 %, not currently breastfeeding  ,Body mass index is 28 8 kg/m²  Intake/Output Summary (Last 24 hours) at 2/19/2019 0923  Last data filed at 2/19/2019 5499  Gross per 24 hour   Intake 950 ml   Output 750 ml   Net 200 ml       Physical Exam:   Physical Exam   Constitutional: She is oriented to person, place, and time  She appears well-developed  HENT:   Head: Normocephalic and atraumatic  Mouth/Throat: Oropharynx is clear and moist    Eyes: EOM are normal    Neck: Normal range of motion  Neck supple  Cardiovascular: Normal rate  Pulmonary/Chest: Effort normal and breath sounds normal    Abdominal: Soft  Bowel sounds are normal  She exhibits no mass  There is no tenderness  There is no rebound, no guarding and no CVA tenderness  Neurological: She is alert and oriented to person, place, and time  Skin: Skin is warm and dry  Nursing note and vitals reviewed  Invasive Devices     Peripheral Intravenous Line            Peripheral IV 02/18/19 Right Wrist less than 1 day                Lab Results:  Results from last 7 days   Lab Units 02/19/19  0458  02/16/19  1748   WBC Thousand/uL 8 36   < > 11 00*   HEMOGLOBIN g/dL 9 1*   < > 4 3*   HEMATOCRIT % 28 6*   < > 14 3*   PLATELETS Thousands/uL 172   < > 262   NEUTROS PCT %  --   --  78*   LYMPHS PCT %  --   --  13*   MONOS PCT %  --   --  7   EOS PCT %  --   --  1    < > = values in this interval not displayed  Results from last 7 days   Lab Units 02/19/19  0458   POTASSIUM mmol/L 4 1   CHLORIDE mmol/L 107   CO2 mmol/L 20*   BUN mg/dL 11   CREATININE mg/dL 1 16   CALCIUM mg/dL 8 7   ALK PHOS U/L 100   ALT U/L 15   AST U/L 24         Results from last 7 days   Lab Units 02/16/19  1748   LIPASE u/L 624*       Imaging Studies: I have personally reviewed pertinent imaging studies      Ct Abdomen Pelvis With Contrast    Result Date: 2/16/2019  Impression: Increasing soft tissue density throughout the valeriano hepatis and surrounding the pancreatic head suspicious for advanced adenopathy  This area in conglomerate measures 8 3 x 7 3 cm  Some of this density likely represents chronically thrombosed portal vein  Follow-up with repeat MRI of the liver  Hepatic cirrhosis relatively unchanged in appearance from the prior studies   Workstation performed: [id number]

## 2019-02-20 LAB
ERYTHROCYTE [DISTWIDTH] IN BLOOD BY AUTOMATED COUNT: 17 % (ref 11.6–15.1)
FOLATE BLD-MCNC: 410.7 NG/ML
FOLATE RBC-MCNC: 2054 NG/ML
GLUCOSE SERPL-MCNC: 142 MG/DL (ref 65–140)
GLUCOSE SERPL-MCNC: 155 MG/DL (ref 65–140)
GLUCOSE SERPL-MCNC: 161 MG/DL (ref 65–140)
GLUCOSE SERPL-MCNC: 190 MG/DL (ref 65–140)
HCT VFR BLD AUTO: 20 % (ref 34–46.6)
HCT VFR BLD AUTO: 29.1 % (ref 34.8–46.1)
HGB BLD-MCNC: 9.1 G/DL (ref 11.5–15.4)
MCH RBC QN AUTO: 27.4 PG (ref 26.8–34.3)
MCHC RBC AUTO-ENTMCNC: 31.3 G/DL (ref 31.4–37.4)
MCV RBC AUTO: 88 FL (ref 82–98)
PLATELET # BLD AUTO: 188 THOUSANDS/UL (ref 149–390)
PMV BLD AUTO: 9.7 FL (ref 8.9–12.7)
RBC # BLD AUTO: 3.32 MILLION/UL (ref 3.81–5.12)
WBC # BLD AUTO: 7.46 THOUSAND/UL (ref 4.31–10.16)

## 2019-02-20 PROCEDURE — 99232 SBSQ HOSP IP/OBS MODERATE 35: CPT | Performed by: INTERNAL MEDICINE

## 2019-02-20 PROCEDURE — 85027 COMPLETE CBC AUTOMATED: CPT | Performed by: INTERNAL MEDICINE

## 2019-02-20 PROCEDURE — C9113 INJ PANTOPRAZOLE SODIUM, VIA: HCPCS | Performed by: INTERNAL MEDICINE

## 2019-02-20 PROCEDURE — 82948 REAGENT STRIP/BLOOD GLUCOSE: CPT

## 2019-02-20 RX ORDER — SENNOSIDES 8.6 MG
1 TABLET ORAL
Status: DISCONTINUED | OUTPATIENT
Start: 2019-02-20 | End: 2019-02-21 | Stop reason: HOSPADM

## 2019-02-20 RX ADMIN — INSULIN LISPRO 1 UNITS: 100 INJECTION, SOLUTION INTRAVENOUS; SUBCUTANEOUS at 17:55

## 2019-02-20 RX ADMIN — NICOTINE 1 PATCH: 14 PATCH, EXTENDED RELEASE TRANSDERMAL at 09:25

## 2019-02-20 RX ADMIN — OXYCODONE HYDROCHLORIDE 10 MG: 10 TABLET ORAL at 09:27

## 2019-02-20 RX ADMIN — OXYCODONE HYDROCHLORIDE 10 MG: 10 TABLET ORAL at 18:10

## 2019-02-20 RX ADMIN — SPIRONOLACTONE 100 MG: 50 TABLET ORAL at 09:25

## 2019-02-20 RX ADMIN — STANDARDIZED SENNA CONCENTRATE 8.6 MG: 8.6 TABLET ORAL at 21:29

## 2019-02-20 RX ADMIN — FUROSEMIDE 40 MG: 40 TABLET ORAL at 09:24

## 2019-02-20 RX ADMIN — SODIUM CHLORIDE 75 ML/HR: 0.9 INJECTION, SOLUTION INTRAVENOUS at 01:29

## 2019-02-20 RX ADMIN — GABAPENTIN 300 MG: 300 CAPSULE ORAL at 09:24

## 2019-02-20 RX ADMIN — OCTREOTIDE ACETATE 50 MCG/HR: 500 INJECTION, SOLUTION INTRAVENOUS; SUBCUTANEOUS at 02:26

## 2019-02-20 RX ADMIN — PANTOPRAZOLE SODIUM 40 MG: 40 INJECTION, POWDER, FOR SOLUTION INTRAVENOUS at 01:29

## 2019-02-20 RX ADMIN — CEFTRIAXONE 1000 MG: 1 INJECTION, POWDER, FOR SOLUTION INTRAMUSCULAR; INTRAVENOUS at 02:26

## 2019-02-20 RX ADMIN — OCTREOTIDE ACETATE 50 MCG/HR: 500 INJECTION, SOLUTION INTRAVENOUS; SUBCUTANEOUS at 15:20

## 2019-02-20 RX ADMIN — INSULIN LISPRO 2 UNITS: 100 INJECTION, SOLUTION INTRAVENOUS; SUBCUTANEOUS at 12:17

## 2019-02-20 RX ADMIN — PANTOPRAZOLE SODIUM 40 MG: 40 INJECTION, POWDER, FOR SOLUTION INTRAVENOUS at 14:43

## 2019-02-20 NOTE — RESTORATIVE TECHNICIAN NOTE
Restorative Specialist Mobility Note       Activity: Ambulate in becerra, Ambulate in room, Bathroom privileges, Dangle, Stand at bedside(Educated/encouraged pt to ambulate with assistance 3-4 x's/day   Pt callbell, phone/tray within reach )     Assistive Device: None       Noman FLORES, Restorative Technician, [employer] [facility]

## 2019-02-20 NOTE — PROGRESS NOTES
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)    Hepatitis C, chronic (HCC)    Hepatocellular carcinoma (HCC)    Nicotine dependence    Opioid dependence (HCC)    Pain syndrome, chronic    Hyponatremia    Portal vein thrombosis    Long term (current) use of anticoagulants    Melena    Hypotension    GI bleed    Acute on chronic blood loss anemia  · Patient presented to emergency department with hemoglobin of 4 3; patient received 2 units of packed red blood cells and after extensive discussion allowed nursing staff to redraw blood with hemoglobin coming back at 6 3    · Likely secondary to GI bleed possibly variceal bleeding from cirrhosis history  · EGD- esophageal varices with stigmata of recent bleeding banded, portal hypertensive gastropathy, small duodenal polyp  · Continue to Hold anticoagulation  · 1 g Rocephin x7 days prophylaxis; day 4  · Octreotide drip for 3 days; day 2  · Protonix 40 mg IV b i d   · PICC line consent in patient's chart if necessary and patient was in agreement  · Regular diet  · Hemoglobin 9 1;  patient has received a total of 4 units PRBC  · Hemoglobin <7 transfuse blood  · GI following- continue octreotide drip and IV Protonix    Hypotension  · Blood pressure is 050-726 systolic  · Likely secondary to acute blood loss from GI bleed  · Stable    Chronic Hyponatremia  · stable    Chronic hepatitis C with cirrhosis  · Patient has Lasix and Aldactone ordered with SBP hold parameters in place  · Stable    HCC with portal vein thrombosis  · Status post ablation and wedge resection in 2015, sirs spheres in 2017  · Follows with heme Oncology as outpatient although she has not followed up with them since 2018  · Patient was on Eliquis for this, but this is currently being held due to acute blood loss    Diabetes mellitus type 2  · Sliding scale insulin  · Stable    Opioid dependence  · Patient follows with pain management regularly for chronic pain syndrome and herniated lumbar disc with DJD  · Oxy 10 mg q 6 hours p r n  Nicotine dependence  · Nicotine patch ordered    Goals of care  · Patient will not talk with palliative care and they have signed off  · Tried discussing steps going forward with patient today and she would not talk to me and started playing with the remote control for the television  Disposition:  Continue octreotide drip day 2 of 3, possible discharge tomorrow  Subjective:   Patient resting comfortably in bed and had no complaints this morning  Patient did not want to talk with me this morning and was asking when she was going to leave stating she had received her octreotide for 3 days  Tried discussing with patient plans going forward in regards to her health and she just began to play with the television remote and states she did not want to talk about it  Patient denied fever, chills, nausea, vomiting, diarrhea, and shortness of breath  Vitals: Temp (24hrs), Av 2 °F (37 3 °C), Min:99 °F (37 2 °C), Max:99 3 °F (37 4 °C)  Current: Temperature: 99 °F (37 2 °C)  Vitals:    19 0730 19 1520 19 2155 19 0547   BP: 112/66 131/81 122/74    BP Location:       Pulse: 58 62 63    Resp:  18 18    Temp: 98 1 °F (36 7 °C) 99 3 °F (37 4 °C) 99 °F (37 2 °C)    TempSrc:       SpO2: 98% 98% 99%    Weight:    77 1 kg (169 lb 15 6 oz)   Height:        Body mass index is 29 18 kg/m²  I/O last 24 hours: In: 3016 3 [P O :520;  I V :2396 3; IV Piggyback:100]  Out: 900 [Urine:900]    Physical Exam   Patient resting, in no acute distress, AO x3  Patient would not let me do a physical examination on her    Invasive Devices     Peripheral Intravenous Line            Peripheral IV 02/18/19 Right Wrist 1 day                        Labs:   Recent Results (from the past 24 hour(s))   Fingerstick Glucose (POCT)    Collection Time: 02/19/19 11:34 AM   Result Value Ref Range    POC Glucose 151 (H) 65 - 140 mg/dl   Fingerstick Glucose (POCT)    Collection Time: 02/19/19  4:32 PM   Result Value Ref Range    POC Glucose 241 (H) 65 - 140 mg/dl   Fingerstick Glucose (POCT)    Collection Time: 02/19/19  7:50 PM   Result Value Ref Range    POC Glucose 137 65 - 140 mg/dl   CBC    Collection Time: 02/20/19  5:08 AM   Result Value Ref Range    WBC 7 46 4 31 - 10 16 Thousand/uL    RBC 3 32 (L) 3 81 - 5 12 Million/uL    Hemoglobin 9 1 (L) 11 5 - 15 4 g/dL    Hematocrit 29 1 (L) 34 8 - 46 1 %    MCV 88 82 - 98 fL    MCH 27 4 26 8 - 34 3 pg    MCHC 31 3 (L) 31 4 - 37 4 g/dL    RDW 17 0 (H) 11 6 - 15 1 %    Platelets 307 703 - 860 Thousands/uL    MPV 9 7 8 9 - 12 7 fL   Fingerstick Glucose (POCT)    Collection Time: 02/20/19  7:52 AM   Result Value Ref Range    POC Glucose 142 (H) 65 - 140 mg/dl       Radiology Results: I have personally reviewed pertinent reports  Other Diagnostic Testing:   I have personally reviewed pertinent reports          Active Meds:   Current Facility-Administered Medications   Medication Dose Route Frequency    ceftriaxone (ROCEPHIN) 1 g/50 mL in dextrose IVPB  1,000 mg Intravenous Q24H    furosemide (LASIX) tablet 40 mg  40 mg Oral Daily    gabapentin (NEURONTIN) capsule 300 mg  300 mg Oral Daily    insulin lispro (HumaLOG) 100 units/mL subcutaneous injection 1-6 Units  1-6 Units Subcutaneous TID AC    nicotine (NICODERM CQ) 14 mg/24hr TD 24 hr patch 1 patch  1 patch Transdermal Daily    octreotide (SandoSTATIN) 500 mcg in sodium chloride 0 9 % 250 mL infusion  50 mcg/hr Intravenous Continuous    ondansetron (ZOFRAN) injection 4 mg  4 mg Intravenous Q6H PRN    oxyCODONE (ROXICODONE) immediate release tablet 10 mg  10 mg Oral Q6H PRN    pantoprazole (PROTONIX) injection 40 mg  40 mg Intravenous Q12H [address]    sodium chloride 0 9 % infusion  75 mL/hr Intravenous Continuous    spironolactone (ALDACTONE) tablet 100 mg  100 mg Oral Daily         VTE Pharmacologic Prophylaxis: Reason for no pharmacologic prophylaxis GI bleed  VTE Mechanical Prophylaxis: sequential compression device    Channing Chun DO

## 2019-02-20 NOTE — UTILIZATION REVIEW
Continued Stay Review    Date: 2/20/19    Vital Signs: /74   Pulse 63   Temp 99 °F (37 2 °C)   Resp 18   Ht 5' 4" (1 626 m)   Wt 77 1 kg (169 lb 15 6 oz)   SpO2 99% on 3L O2 NC   BMI 29 18 kg/m²      Assessment/Plan: 29-year-old female patient who presented to the ED on 2/16  with hemoglobin level of 4 3, in the setting of recent melena and hematemesis 4-5 days before admission  Patient has received total of 4 units PRBC  EGD performed 2/18 with banding of esophageal varices with stigmata of recent bleeding  Continue Rocephin IV prophylaxis, day 4/7, and Octreotide drip, day 2/3  Current Hgb 9 1, transfuse for <7  Continue to hold anticoagulation  Patient declined to speak with Palliative care team      Medications:   Scheduled Meds:   Current Facility-Administered Medications:  cefTRIAXone 1,000 mg Intravenous Q24H   furosemide 40 mg Oral Daily   gabapentin 300 mg Oral Daily   insulin lispro 1-6 Units Subcutaneous TID AC   nicotine 1 patch Transdermal Daily   ondansetron 4 mg Intravenous Q6H PRN   oxyCODONE 10 mg Oral Q6H PRN   pantoprazole 40 mg Intravenous Q12H [address]   spironolactone 100 mg Oral Daily     Continuous Infusions:   octreotide 50 mcg/hr   sodium chloride 75 mL/hr       Pertinent Labs/Diagnostic Results:     Status:  Final result   Visible to patient:  No (Not Released) Next appt:  03/14/2019 at 11:00 AM in Pain Medicine [facility])    Ref Range & Units 2/20/19 0508 2/19/19 0458 2/19/19 0025 2/18/19 0618 2/17/19 1927   Hemoglobin 11 5 - 15 4 g/dL 9 1  Low   9 1  Low   9 7  Low   9 4  Low  CM 6 0  Low  CM   Hematocrit 34 8 - 46 1 % 29 1  Low   28 6  Low   29 9  Low   29 4  Low   18 8  Low             2/18 EGD Operative Report:    Preop Diagnosis:  GI bleed [K92 2]     Post-Op Diagnosis Codes:     * GI bleed [K92 2]     Procedure: ESOPHAGOGASTRODUODENOSCOPY (EGD)     #1   Esophagus- upper and midesophagus drug normal, there were distal mid sized varices, one varix had nipple sign and another had red whale sign 4 bands were deployed over 4 columns of varices      #2  Stomach- fundus looked normal, body and antrum had signs of portal hypertensive gastropathy      #3  Duodenum- there was a small polyp in the duodenal bulb, 2nd portion of the duodenum looked normal    RECOMMENDATIONS:     Continue antibiotics for total of 7 days, continue PPI, start octreotide for total of 3 days  Age/Sex: 76 y o  female     Discharge Plan: TBD          Network Utilization Review Department  Phone: [phone]; Fax [phone]  [email or web address]  ATTENTION: Please call with any questions or concerns to [phone]  and carefully listen to the prompts so that you are directed to the right person  Send all requests for admission clinical reviews, approved or denied determinations and any other requests to fax [phone]   All voicemails are confidential

## 2019-02-21 VITALS
WEIGHT: 169.97 LBS | OXYGEN SATURATION: 98 % | HEIGHT: 64 IN | TEMPERATURE: 99.7 F | BODY MASS INDEX: 29.02 KG/M2 | HEART RATE: 63 BPM | RESPIRATION RATE: 20 BRPM | DIASTOLIC BLOOD PRESSURE: 72 MMHG | SYSTOLIC BLOOD PRESSURE: 117 MMHG

## 2019-02-21 LAB
ERYTHROCYTE [DISTWIDTH] IN BLOOD BY AUTOMATED COUNT: 17.2 % (ref 11.6–15.1)
GLUCOSE SERPL-MCNC: 147 MG/DL (ref 65–140)
GLUCOSE SERPL-MCNC: 164 MG/DL (ref 65–140)
GLUCOSE SERPL-MCNC: 197 MG/DL (ref 65–140)
HCT VFR BLD AUTO: 27.2 % (ref 34.8–46.1)
HGB BLD-MCNC: 8.7 G/DL (ref 11.5–15.4)
MCH RBC QN AUTO: 28 PG (ref 26.8–34.3)
MCHC RBC AUTO-ENTMCNC: 32 G/DL (ref 31.4–37.4)
MCV RBC AUTO: 88 FL (ref 82–98)
PLATELET # BLD AUTO: 157 THOUSANDS/UL (ref 149–390)
PMV BLD AUTO: 10.2 FL (ref 8.9–12.7)
RBC # BLD AUTO: 3.11 MILLION/UL (ref 3.81–5.12)
WBC # BLD AUTO: 7.76 THOUSAND/UL (ref 4.31–10.16)

## 2019-02-21 PROCEDURE — C9113 INJ PANTOPRAZOLE SODIUM, VIA: HCPCS | Performed by: INTERNAL MEDICINE

## 2019-02-21 PROCEDURE — 99231 SBSQ HOSP IP/OBS SF/LOW 25: CPT | Performed by: INTERNAL MEDICINE

## 2019-02-21 PROCEDURE — 82948 REAGENT STRIP/BLOOD GLUCOSE: CPT

## 2019-02-21 PROCEDURE — 85027 COMPLETE CBC AUTOMATED: CPT | Performed by: INTERNAL MEDICINE

## 2019-02-21 RX ORDER — CIPROFLOXACIN 500 MG/1
500 TABLET, FILM COATED ORAL EVERY 12 HOURS SCHEDULED
Qty: 6 TABLET | Refills: 0 | Status: SHIPPED | OUTPATIENT
Start: 2019-02-21 | End: 2019-02-24

## 2019-02-21 RX ADMIN — INSULIN LISPRO 1 UNITS: 100 INJECTION, SOLUTION INTRAVENOUS; SUBCUTANEOUS at 12:33

## 2019-02-21 RX ADMIN — OCTREOTIDE ACETATE 50 MCG/HR: 500 INJECTION, SOLUTION INTRAVENOUS; SUBCUTANEOUS at 00:21

## 2019-02-21 RX ADMIN — CEFTRIAXONE 1000 MG: 1 INJECTION, POWDER, FOR SOLUTION INTRAMUSCULAR; INTRAVENOUS at 03:02

## 2019-02-21 RX ADMIN — OXYCODONE HYDROCHLORIDE 10 MG: 10 TABLET ORAL at 11:58

## 2019-02-21 RX ADMIN — NICOTINE 1 PATCH: 14 PATCH, EXTENDED RELEASE TRANSDERMAL at 09:32

## 2019-02-21 RX ADMIN — PANTOPRAZOLE SODIUM 40 MG: 40 INJECTION, POWDER, FOR SOLUTION INTRAVENOUS at 00:21

## 2019-02-21 RX ADMIN — GABAPENTIN 300 MG: 300 CAPSULE ORAL at 09:31

## 2019-02-21 RX ADMIN — OXYCODONE HYDROCHLORIDE 10 MG: 10 TABLET ORAL at 01:49

## 2019-02-21 NOTE — PROGRESS NOTES
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)    Hepatitis C, chronic (HCC)    Hepatocellular carcinoma (HCC)    Nicotine dependence    Opioid dependence (HCC)    Pain syndrome, chronic    Hyponatremia    Portal vein thrombosis    Long term (current) use of anticoagulants    Melena    Hypotension    GI bleed    Acute on chronic blood loss anemia  · Patient presented to emergency department with hemoglobin of 4 3; patient received 2 units of packed red blood cells and after extensive discussion allowed nursing staff to redraw blood with hemoglobin coming back at 6 3    · Likely secondary to GI bleed possibly variceal bleeding from cirrhosis history  · EGD- esophageal varices with stigmata of recent bleeding banded, portal hypertensive gastropathy, small duodenal polyp  · Continue to Hold anticoagulation  · 1 g Rocephin x7 days prophylaxis; day 5  · Octreotide drip for 3 days; patient's IV was out this morning  · Protonix 40 mg IV b i d   · PICC line consent in patient's chart if necessary and patient was in agreement  · Regular diet  · Hemoglobin 8 7;  patient has received a total of 4 units PRBC  · Hemoglobin <7 transfuse blood  · GI following- no further interventions at this time  · Upon talking with Gastroenterology the decision was made that the patient does not continue Eliquis upon discharge  · Patient will be sent home with ciprofloxacin     Hypotension  · Blood pressure is 522-994 systolic  · Stable    Chronic Hyponatremia  · stable    Chronic hepatitis C with cirrhosis  · Patient has Lasix and Aldactone ordered with SBP hold parameters in place  · Stable    HCC with portal vein thrombosis  · Status post ablation and wedge resection in 2015, sirs spheres in 2017  · Follows with heme Oncology as outpatient although she has not followed up with them since 2018  · Patient was on Eliquis for this, but this is currently being held due to acute blood loss    Diabetes mellitus type 2  · Sliding scale insulin  · Stable    Opioid dependence  · Patient follows with pain management regularly for chronic pain syndrome and herniated lumbar disc with DJD  · Oxy 10 mg q 6 hours p r n  Nicotine dependence  · Nicotine patch ordered    Goals of care  · Patient stated that she would follow up with surgical Oncology and heme Oncology as outpatient  Disposition:  Continue octreotide drip day 2 of 3, possible discharge tomorrow  Subjective:   Patient resting comfortably in bed and had no complaints this morning  While examining the patient is noted that she had some fluid on her right wrist and upon further examination her IV had come out of her arm  Patient would not allow the nurse to replace IV line stating she has been "poked too many times"  Upon talking with the patient more and explaining the consequences to her of not receiving octreotide such as further bleeding, more procedures, and death  Patient was able to verbalize back to me these consequences and still stated that she did not want an IV line placed  Patient is also asking about discharge and stating "I am leaving today"  Vitals: Temp (24hrs), Av 5 °F (37 5 °C), Min:98 6 °F (37 °C), Max:100 2 °F (37 9 °C)  Current: Temperature: 99 °F (37 2 °C)  Vitals:    19 1754 19 2325 19 0600 19 0724   BP:  102/50  103/54   Pulse: 59 55  (!) 54   Resp:  20  18   Temp: 99 7 °F (37 6 °C) 98 6 °F (37 °C)  99 °F (37 2 °C)   TempSrc:    Oral   SpO2: 98% 98%  98%   Weight:   77 1 kg (169 lb 15 6 oz)    Height:        Body mass index is 29 18 kg/m²        I/O last 24 hours: In: 2725 8 [P O :540; I V :2085 8; IV Piggyback:100]  Out: 675 [Urine:675]    Physical Exam  Patient resting comfortably in bed, no acute distress, AO x3, abdominal distention noted on observation  Patient would not let me do a physical examination on her    Invasive Devices     Peripheral Intravenous Line            Peripheral IV 02/18/19 Right Wrist 2 days                        Labs:   Recent Results (from the past 24 hour(s))   Fingerstick Glucose (POCT)    Collection Time: 02/20/19 11:37 AM   Result Value Ref Range    POC Glucose 190 (H) 65 - 140 mg/dl   Fingerstick Glucose (POCT)    Collection Time: 02/20/19  4:41 PM   Result Value Ref Range    POC Glucose 155 (H) 65 - 140 mg/dl   Fingerstick Glucose (POCT)    Collection Time: 02/20/19  8:32 PM   Result Value Ref Range    POC Glucose 161 (H) 65 - 140 mg/dl   CBC    Collection Time: 02/21/19  4:59 AM   Result Value Ref Range    WBC 7 76 4 31 - 10 16 Thousand/uL    RBC 3 11 (L) 3 81 - 5 12 Million/uL    Hemoglobin 8 7 (L) 11 5 - 15 4 g/dL    Hematocrit 27 2 (L) 34 8 - 46 1 %    MCV 88 82 - 98 fL    MCH 28 0 26 8 - 34 3 pg    MCHC 32 0 31 4 - 37 4 g/dL    RDW 17 2 (H) 11 6 - 15 1 %    Platelets 114 920 - 468 Thousands/uL    MPV 10 2 8 9 - 12 7 fL   Fingerstick Glucose (POCT)    Collection Time: 02/21/19  8:17 AM   Result Value Ref Range    POC Glucose 197 (H) 65 - 140 mg/dl       Radiology Results: I have personally reviewed pertinent reports  Other Diagnostic Testing:   I have personally reviewed pertinent reports          Active Meds:   Current Facility-Administered Medications   Medication Dose Route Frequency    ceftriaxone (ROCEPHIN) 1 g/50 mL in dextrose IVPB  1,000 mg Intravenous Q24H    furosemide (LASIX) tablet 40 mg  40 mg Oral Daily    gabapentin (NEURONTIN) capsule 300 mg  300 mg Oral Daily    insulin lispro (HumaLOG) 100 units/mL subcutaneous injection 1-6 Units  1-6 Units Subcutaneous TID AC    nicotine (NICODERM CQ) 14 mg/24hr TD 24 hr patch 1 patch  1 patch Transdermal Daily    octreotide (SandoSTATIN) 500 mcg in sodium chloride 0 9 % 250 mL infusion  50 mcg/hr Intravenous Continuous    ondansetron (ZOFRAN) injection 4 mg  4 mg Intravenous Q6H PRN    oxyCODONE (ROXICODONE) immediate release tablet 10 mg  10 mg Oral Q6H PRN    pantoprazole (PROTONIX) injection 40 mg  40 mg Intravenous Q12H [address]    senna (SENOKOT) tablet 8 6 mg  1 tablet Oral HS    spironolactone (ALDACTONE) tablet 100 mg  100 mg Oral Daily         VTE Pharmacologic Prophylaxis: Reason for no pharmacologic prophylaxis GI bleed  VTE Mechanical Prophylaxis: sequential compression device    An Keep, DO

## 2019-02-21 NOTE — DISCHARGE INSTRUCTIONS
Stop taking elliquis   Cirrhosis   WHAT YOU NEED TO KNOW:   Cirrhosis is long-term scarring of the liver  The liver makes enzymes and bile that help digest food and gives your body energy  It also removes harmful material from your body, such as alcohol and other chemicals  Cirrhosis is caused by repeated damage to your liver over time  Scar tissue starts to replace healthy liver tissue  The scar tissue prevents the liver from working properly  DISCHARGE INSTRUCTIONS:   Return to the emergency department if:   · You have pain during a bowel movement and it is black or contains blood  · You have a fast heart rate and fast breathing  · You are dizzy or confused  · You have severe pain in your abdomen  · You have trouble breathing  · Your vomit looks like it has coffee grinds or blood in it  Contact your healthcare provider if:   · You have a fever  · You have red or itchy skin  · You are in pain and feel weak  · You have questions or concerns about your condition or care  Medicines: You may need medicine to treat the cause of your cirrhosis  You may also need medicine to treat any health problems caused by cirrhosis  · Antiviral medicine  may be needed if your cirrhosis is caused by hepatitis  Antiviral medicine may prevent or decrease swelling and damage to your liver  · Blood pressure medicine  is used to treat high blood pressure in the portal vein (the vein that goes to your liver)  · Diuretics  decrease extra fluid that collects in a part of your body, such as your legs and abdomen  Diuretics can also decrease your blood pressure  You will urinate more often when you take this medicine  · Antibiotics  help prevent or treat a bacterial infection  · Take your medicine as directed  Contact your healthcare provider if you think your medicine is not helping or if you have side effects  Tell him or her if you are allergic to any medicine   Keep a list of the medicines, vitamins, and herbs you take  Include the amounts, and when and why you take them  Bring the list or the pill bottles to follow-up visits  Carry your medicine list with you in case of an emergency  Do not drink alcohol:  Alcohol will cause more damage to your liver  Manage cirrhosis:   · Do not smoke  Nicotine and other chemicals in cigarettes and cigars can cause blood vessel and lung damage  Ask your healthcare provider for information if you currently smoke and need help to quit  E-cigarettes or smokeless tobacco still contain nicotine  Talk to your healthcare provider before you use these products  · Eat a variety of healthy foods  Healthy foods include fruits, vegetables, whole-grain breads, low-fat dairy products, beans, lean meat, and fish  Ask if you need to be on a special diet  · Reach or maintain a healthy weight  You may develop fatty liver disease if you are overweight  Ask your healthcare provider for a healthy weight for you  He can help you create a safe weight loss plan if you are overweight  · Limit sodium (salt)  You may need to decrease the amount of sodium you eat if you have swelling caused by fluid buildup  Sodium is found in table salt and salty foods such as canned foods, frozen foods, and potato chips  · Drink liquids as directed  Ask how much liquid to drink each day and which liquids are best for you  For most people, good liquids to drink are water, juice, and milk  Liquids can help your liver work better  · Ask about vaccines  You may have a hard time fighting infection because of cirrhosis  Vaccines help protect you against viruses that can cause diseases such as the flu or hepatitis  Viral hepatitis is caused by a virus that leads to inflammation of the liver  You may need a hepatitis A or B vaccine  You may also need a pneumonia vaccine  Always get a flu vaccine each year as soon as it becomes available  · Ask about medicines    Some medicines can harm your liver  Acetaminophen is an example  Talk to your healthcare provider about all your medicines  Do not take any over-the-counter medicine or herbal supplements until your healthcare provider says it is okay  Follow up with your healthcare provider as directed:  Write down your questions so you remember to ask them during your visits  © 2017 [employer] Information is for End User's use only and may not be sold, redistributed or otherwise used for commercial purposes  All illustrations and images included in CareNotes® are the copyrighted property of [employer] , [employer]  or Torrey De Jesus  The above information is an  only  It is not intended as medical advice for individual conditions or treatments  Talk to your doctor, nurse or pharmacist before following any medical regimen to see if it is safe and effective for you

## 2019-02-21 NOTE — DISCHARGE SUMMARY
[address] Discharge Summary - Medical Pascual Patricia 76 y o  female MRN: [id number]    [address] [facility]  Room / Bed: [facility] 911/[facility] 050-48 Encounter: [id number]    BRIEF OVERVIEW    Admitting Provider: Kel Flores MD  Discharge Provider: No att  providers found  Primary Care Physician at Discharge: Kodi Wilder, [address] [place] [address]  Admission Date: 2/16/2019     Discharge Date: 2/21/2019  6:07 PM    Hospital Course  63-year-old female presented to the hospital due to fatigue  Past medical history significant for hepatitis C with cirrhosis, hepatocellular carcinoma status post resection and ablation, chronic portal vein thrombosis on Eliquis, and hypertension  Upon initial examination in the emergency department was noted the patient's hemoglobin was 4 3, and prior records had shown a hemoglobin of 11  Patient also had noted melena in her stool  Patient was then admitted and received 2 units PRBC in the ED, Protonix IV b i d , and GI was consulted  The patient's Eliquis was also held  Once getting up to the floor the patient was refusing all IV lines, as her IV in the ED had become dislodged, because she was tired of being "poked"  Eventually IV access was obtained on the patient and she was started on Rocephin, IV Protonix, and received further blood products  Patient then underwent EGD during her stay and had variceal banding and octreotide drip for 3 days  After her variceal banding patient's hemoglobins continued to stay stable and she was able to tolerate a diet without any nausea or vomiting  On multiple occasions I tried to discuss plans going forward with the patient, but she would not discuss this with me and instead would play with her television remote or just stop talking  When talking with the patient on the last day during her hospital stay she did state that she would follow up with Dr Cohen as an outpatient and follow up with a primary care provider  Patient was stable for discharge and had no complaints  She also understood the importance of outpatient follow-up in regards to her chronic medical conditions  Patient will need follow-up with Gastroenterology as outpatient, primary care provider, and radiation oncology  Presenting Problem/History of Present Illness  Principal Problem:    Acute on chronic blood loss anemia (HCC)  Active Problems:    Fatigue    Diabetes mellitus, type II (HCC)    Diabetic neuropathy (HCC)    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)    Hepatitis C, chronic (HCC)    Hepatocellular carcinoma (HCC)    Nicotine dependence    Opioid dependence (HCC)    Pain syndrome, chronic    Hyponatremia    Portal vein thrombosis    Long term (current) use of anticoagulants    Melena    Hypotension    GI bleed  Resolved Problems:    * No resolved hospital problems  *      Acute on chronic blood loss anemia  · Patient presented to emergency department with hemoglobin of 4 3; patient received 2 units of packed red blood cells and after extensive discussion allowed nursing staff to redraw blood with hemoglobin coming back at 6 3    · Likely secondary to GI bleed possibly variceal bleeding from cirrhosis history  · EGD- esophageal varices with stigmata of recent bleeding banded, portal hypertensive gastropathy, small duodenal polyp  · Continue to Hold anticoagulation  · 1 g Rocephin x7 days prophylaxis; patient received 5 of 7 days  · Octreotide drip for 3 days  · Protonix 40 mg IV b i d   · Patient will need GI follow-up as outpatient  · Upon talking with Gastroenterology the decision was made that the patient does not continue Eliquis upon discharge  · Patient sent home with ciprofloxacin prescription     Hypotension  · Blood pressure is 845-810 systolic  · Stable     Chronic Hyponatremia  · stable     Chronic hepatitis C with cirrhosis  · Patient has Lasix and Aldactone ordered with SBP hold parameters in place  · Stable     [address]  with portal vein thrombosis  · Status post ablation and wedge resection in 2015, sirs spheres in 2017  · Follows with heme Oncology as outpatient although she has not followed up with them since March of 2018  · Patient was on Eliquis for this, but this is currently being held due to acute blood loss     Diabetes mellitus type 2  · Sliding scale insulin  · Stable     Opioid dependence  · Patient follows with pain management regularly for chronic pain syndrome and herniated lumbar disc with DJD  · Oxy 10 mg q 6 hours p r n      Nicotine dependence  · Tobacco cessation discussed     Goals of care  · Patient needs to have discussion outpatient          Diagnostic Procedures Performed  Imaging Studies:  Ct Abdomen Pelvis With Contrast    Result Date: 2/16/2019  Impression: Increasing soft tissue density throughout the valeriano hepatis and surrounding the pancreatic head suspicious for advanced adenopathy  This area in conglomerate measures 8 3 x 7 3 cm  Some of this density likely represents chronically thrombosed portal vein  Follow-up with repeat MRI of the liver  Hepatic cirrhosis relatively unchanged in appearance from the prior studies   Workstation performed: [id number]       Pertinent Labs:    Results Reviewed     Procedure Component Value Units Date/Time    Folate RBC [[id number]]  (Abnormal) Collected:  02/17/19 0846    Lab Status:  Final result Specimen:  Blood from Arm, Right Updated:  02/20/19 0606     RBC Folate 2054 ng/mL      Folate, Hemolysate 410 7 ng/mL      HCT 20 0 %     Narrative:       Performed at:  [address] [place] [address]  [id number]  : Kori Cox MD, Phone:  [phone]    Magnesium [[id number]]  (Normal) Collected:  02/17/19 0845    Lab Status:  Final result Specimen:  Blood from Arm, Right Updated:  02/17/19 0923     Magnesium 2 4 mg/dL     Phosphorus [[id number]]  (Abnormal) Collected:  02/17/19 0845    Lab Status:  Final result Specimen:  Blood from Arm, Right Updated:  02/17/19 0923     Phosphorus 2 1 mg/dL     CBC (With Platelets) [[id number]]  (Abnormal) Collected:  02/17/19 0847    Lab Status:  Final result Specimen:  Blood from Arm, Right Updated:  02/17/19 0901     WBC 6 69 Thousand/uL      RBC 2 37 Million/uL      Hemoglobin 6 3 g/dL      Hematocrit 20 1 %      MCV 85 fL      MCH 26 6 pg      MCHC 31 3 g/dL      RDW 16 6 %      Platelets 477 Thousands/uL      MPV 9 4 fL     Narrative:        No Clots    Hemoglobin and hematocrit, blood [[id number]] Collected:  02/17/19 0845    Lab Status:  No result Specimen:  Blood from Arm, Right     Vitamin B12 [[id number]]  (Normal) Collected:  02/16/19 1748    Lab Status:  Final result Specimen:  Blood from Arm, Right Updated:  02/17/19 0324     Vitamin B-12 498 pg/mL     Retic Count with Reticulocyte HGB [[id number]]  (Abnormal) Collected:  02/16/19 2104    Lab Status:  Final result Specimen:  Blood Updated:  02/16/19 2108     Immature Retic Fract 31 6 %      Retic Ct Pct 8 99 %      Retic Ct Abs 152,800     RETIC HGB 24 5 pg     TSH [[id number]]  (Normal) Collected:  02/16/19 1748    Lab Status:  Final result Specimen:  Blood from Arm, Right Updated:  02/16/19 1821     TSH 3RD GENERATON 2 310 uIU/mL     Narrative:       Patients undergoing fluorescein dye angiography may retain small amounts of fluorescein in the body for 48-72 hours post procedure  Samples containing fluorescein can produce falsely depressed TSH values  If the patient had this procedure,a specimen should be resubmitted post fluorescein clearance      Comprehensive metabolic panel [[id number]]  (Abnormal) Collected:  02/16/19 1748    Lab Status:  Final result Specimen:  Blood from Arm, Right Updated:  02/16/19 1814     Sodium 133 mmol/L      Potassium 3 8 mmol/L      Chloride 103 mmol/L      CO2 21 mmol/L      ANION GAP 9 mmol/L      BUN 33 mg/dL      Creatinine 1 34 mg/dL      Glucose 113 mg/dL      Calcium 9 3 mg/dL      AST 34 U/L      ALT 23 U/L Alkaline Phosphatase 104 U/L      Total Protein 8 3 g/dL      Albumin 3 4 g/dL      Total Bilirubin 0 44 mg/dL      eGFR 47 ml/min/1 73sq m     Narrative:       National Kidney Disease Education Program recommendations are as follows:  GFR calculation is accurate only with a steady state creatinine  Chronic Kidney disease less than 60 ml/min/1 73 sq  meters  Kidney failure less than 15 ml/min/1 73 sq  meters      Lipase [[id number]]  (Abnormal) Collected:  02/16/19 1748    Lab Status:  Final result Specimen:  Blood from Arm, Right Updated:  02/16/19 1814     Lipase 624 u/L     CBC and differential [[id number]]  (Abnormal) Collected:  02/16/19 1748    Lab Status:  Final result Specimen:  Blood from Arm, Right Updated:  02/16/19 1805     WBC 11 00 Thousand/uL      RBC 1 71 Million/uL      Hemoglobin 4 3 g/dL      Hematocrit 14 3 %      MCV 84 fL      MCH 25 1 pg      MCHC 30 1 g/dL      RDW 18 8 %      MPV 9 8 fL      Platelets 374 Thousands/uL      nRBC 0 /100 WBCs      Neutrophils Relative 78 %      Immat GRANS % 1 %      Lymphocytes Relative 13 %      Monocytes Relative 7 %      Eosinophils Relative 1 %      Basophils Relative 0 %      Neutrophils Absolute 8 74 Thousands/µL      Immature Grans Absolute 0 06 Thousand/uL      Lymphocytes Absolute 1 39 Thousands/µL      Monocytes Absolute 0 73 Thousand/µL      Eosinophils Absolute 0 07 Thousand/µL      Basophils Absolute 0 01 Thousands/µL             Therapeutic Procedures Performed  EGD with variceal banding 2/18    Test Results Pending at Discharge: none     Medications     Medication List to be Continued at Discharge  Current Discharge Medication List      CONTINUE these medications which have NOT CHANGED    Details   acetaminophen (TYLENOL) 325 mg tablet Take 650 mg by mouth every 6 (six) hours as needed for mild pain      apixaban (ELIQUIS) 5 mg Take 1 tablet (5 mg total) by mouth 2 (two) times a day  Qty: 180 tablet, Refills: 3    Associated Diagnoses: Hepatocellular carcinoma ([facility] [address] ); Portal vein thrombosis secondary to HCC invasion (HCC)      diphenhydrAMINE (NIGHT TIME SLEEP AID) 25 MG tablet Take 50 mg by mouth daily at bedtime as needed for sleep      Diphenhydramine-APAP, sleep, (EXCEDRIN PM PO) Take by mouth      furosemide (LASIX) 40 mg tablet Take 1 tablet (40 mg total) by mouth daily  Qty: 90 tablet, Refills: 0    Comments: **Patient requests 90 days supply**  Associated Diagnoses: Decompensated hepatic cirrhosis (HCC)      gabapentin (NEURONTIN) 300 mg capsule 1 tab in the am and 3 tabs PO night time  Qty: 120 capsule, Refills: 5    Associated Diagnoses: Lumbar disc herniation      hydrochlorothiazide (HYDRODIURIL) 25 mg tablet Take 1 tablet (25 mg total) by mouth daily  Qty: 90 tablet, Refills: 0    Associated Diagnoses: Essential hypertension      hydrOXYzine HCL (ATARAX) 10 mg tablet Take 1 tablet (10 mg total) by mouth daily at bedtime  Qty: 15 tablet, Refills: 0    Comments: 1 tab po for anxiety as needed before flying by plane (round trip)  Associated Diagnoses: Anxiety      lactulose 20 g/30 mL Take 30 mL (20 g total) by mouth 2 (two) times a day  Qty: 1892 mL, Refills: 0    Associated Diagnoses: Constipation; Decompensated hepatic cirrhosis (HCC)      losartan (COZAAR) 100 MG tablet Take 1 tablet (100 mg total) by mouth daily  Qty: 30 tablet, Refills: 0    Associated Diagnoses: Cirrhosis (HCC)      metFORMIN (GLUCOPHAGE) 500 mg tablet Take 1 tablet (500 mg total) by mouth daily with breakfast  Qty: 90 tablet, Refills: 0    Associated Diagnoses: Diabetes 1 5, managed as type 2 (HCC)      oxyCODONE-acetaminophen (PERCOCET)  mg per tablet Take 1 tab PO Q 6 hours prn pain   Do not fill until 2/19/19  Qty: 120 tablet, Refills: 0    Associated Diagnoses: Chronic pain syndrome      senna (SENOKOT) 8 6 MG tablet Take 2 tablets by mouth daily      spironolactone (ALDACTONE) 100 mg tablet Take 1 tablet (100 mg total) by mouth daily  Qty: 90 tablet, Refills: 3    Associated Diagnoses: Essential hypertension; Decompensated hepatic cirrhosis ([facility] [address] )           Current Discharge Medication List        Current Discharge Medication List          Allergies  No Known Allergies  Discharge Diet: regular diet  Activity restrictions: Activity as tolerated  Discharge Condition: stable  Discharged With Lines: no    Discharge Disposition: Home/Self Care      Outpatient Follow-Up  Follow up with PCP  Follow up with Gastroenterology  Follow-up with Surgical Oncology      Code Status: Prior  Advance Directive and Living Will: <no information>  Power of :    POLST:      Discharge  Statement   I spent 20 minutes minutes discharging the patient  This time was spent on the day of discharge  I had direct contact with the patient on the day of discharge  Additional documentation is required if more than 30 minutes were spent on discharge

## 2019-02-22 ENCOUNTER — TELEPHONE (OUTPATIENT)
Dept: GASTROENTEROLOGY | Facility: CLINIC | Age: 69
End: 2019-02-22

## 2019-02-22 ENCOUNTER — TRANSITIONAL CARE MANAGEMENT (OUTPATIENT)
Dept: INTERNAL MEDICINE CLINIC | Facility: CLINIC | Age: 69
End: 2019-02-22

## 2019-02-22 ENCOUNTER — PATIENT OUTREACH (OUTPATIENT)
Dept: INTERNAL MEDICINE CLINIC | Facility: CLINIC | Age: 69
End: 2019-02-22

## 2019-02-22 LAB
METHYLMALONATE SERPL-SCNC: 165 NMOL/L (ref 0–378)
SL AMB DISCLAIMER: NORMAL

## 2019-02-22 NOTE — PROGRESS NOTES
I called and spoke with patient  I explained who I was and reason for call  Patient was at [facility] from 2/16 - 2/19/19 for anemia and had a hemglobin for 4 3 on admission  Pt could tell me very little info as to why she was in the hospital  I tried reviewing medication with her and she states she has all her meds but unsure if she is taking them  Pt did confirm she has her antibiotic Cipro  I asked patient if she would like me to call her beginning of next week to check on her and to further review meds and patient declined call  Pt made aware she has a hospital follow up appt on Friday 3/1 @ 10 am ( I offered sooner TCM appt and pt declined) and a pain management appt on 3/14 @ 11 am  Pt reports she can drive herself to the appt's  Pt reminded she needs follow up appt with oncology and GI  Patient has phone # for their offices and states she can call and schedule  I did make her aware the GI office called her today and left message to schedule an appt  Pt did not want assistance with scheduling appt's  Pt reports her appetite is good and has help from her  if needed  Pt is independent with ADL's and walks independently  Pt reports she would be agreeable to starting a medication for her depression if her doctor is agreeable  Pt at this time is declining to see a mental health provider  Pt did confirm she has no thoughts or plan to hurt herself  Patient does not have any further questions, concerns, or other needs at this time  Pt has my contact # and PCP office # if needed  Pt was not agreeable for a phone call next week but states she will come to PCP appt and I can further follow up with her then  Pt encouraged to call me if needed

## 2019-02-22 NOTE — PROGRESS NOTES
Outpatient Care Management Note:    Received call from Yadira Zuniga a nurse practitioner with [address] they are contracted through patient's insurance to go out and do a yearly assessment  She did a depression screening and patient scored severe depression  Yadira Zuniga states that patient says she feels down and wants to sleep all the time  Patient did report uncertainty around her cancer diagnosis  Pt did admit she has a problem and is depressed  Pt denied any thoughts or plan to hurt herself  Yadira Zuniga reports she did talk with patient about possibly starting on a medication to help with her depression  Pt is taking hydroxyzine 10 mg at bedtime for anxiety and reports had oxycodone in the home  Yadira Zuniga did report patient's  was with patient and is worried about her and family cannot even get her out of the house to do things  Yadira Zuniga did report patient's BP was 98/60 and pt reports her BP does run on the lower side  Yadira Zuniga made aware I would follow up with patient and send info to her PCP and physician seeing her next week and regarding starting on medication for her depression  Yadira Zuniga reports she can be called with any further questions or to discuss further  Her phone # is [phone]

## 2019-02-25 NOTE — PROGRESS NOTES
Figueroa Upton,    I am certainly open to the pt trying an anti-depressant if she is open to that  Perhaps an SSRI? This can definitely be discussed with the pt during her follow-up appointment on 3/1  Please let me know if you have any other questions  Thanks!

## 2019-02-28 ENCOUNTER — PATIENT OUTREACH (OUTPATIENT)
Dept: INTERNAL MEDICINE CLINIC | Facility: CLINIC | Age: 69
End: 2019-02-28

## 2019-02-28 NOTE — PROGRESS NOTES
Outpatient Care Management Note:    Called and spoke with patient  I reminded her of her hospital follow up appt tomorrow 3/1 @ 10 am  Pt reports she will be coming to appt  I requested she bring all her medication with her to appt  I also made her aware that per Dr Johnathan Pastor (PCP) he is open to her trying an anti-depressant but would like her to come to her appt on 3/1 and have it further discussed with her at her visit  Pt states she would be agreeable to starting a medication but not agreeable to seeing a mental health provider at this time

## 2019-03-01 ENCOUNTER — OFFICE VISIT (OUTPATIENT)
Dept: INTERNAL MEDICINE CLINIC | Facility: CLINIC | Age: 69
End: 2019-03-01

## 2019-03-01 VITALS
HEART RATE: 68 BPM | SYSTOLIC BLOOD PRESSURE: 120 MMHG | WEIGHT: 167.55 LBS | HEIGHT: 64 IN | BODY MASS INDEX: 28.6 KG/M2 | TEMPERATURE: 98 F | DIASTOLIC BLOOD PRESSURE: 68 MMHG

## 2019-03-01 DIAGNOSIS — K74.60 DECOMPENSATED HEPATIC CIRRHOSIS (HCC): ICD-10-CM

## 2019-03-01 DIAGNOSIS — K74.60 CIRRHOSIS (HCC): ICD-10-CM

## 2019-03-01 DIAGNOSIS — C22.0 HEPATOCELLULAR CARCINOMA (HCC): ICD-10-CM

## 2019-03-01 DIAGNOSIS — F32.1 CURRENT MODERATE EPISODE OF MAJOR DEPRESSIVE DISORDER WITHOUT PRIOR EPISODE (HCC): ICD-10-CM

## 2019-03-01 DIAGNOSIS — K74.60 HEPATIC CIRRHOSIS DUE TO CHRONIC HEPATITIS C INFECTION (HCC): ICD-10-CM

## 2019-03-01 DIAGNOSIS — K72.90 DECOMPENSATED HEPATIC CIRRHOSIS (HCC): ICD-10-CM

## 2019-03-01 DIAGNOSIS — E11.9 TYPE 2 DIABETES MELLITUS WITHOUT COMPLICATION, WITHOUT LONG-TERM CURRENT USE OF INSULIN (HCC): Primary | ICD-10-CM

## 2019-03-01 DIAGNOSIS — B18.2 HEPATIC CIRRHOSIS DUE TO CHRONIC HEPATITIS C INFECTION (HCC): ICD-10-CM

## 2019-03-01 LAB — SL AMB POCT HEMOGLOBIN AIC: 5.3 (ref ?–6.5)

## 2019-03-01 PROCEDURE — 83036 HEMOGLOBIN GLYCOSYLATED A1C: CPT | Performed by: HOSPITALIST

## 2019-03-01 PROCEDURE — 99495 TRANSJ CARE MGMT MOD F2F 14D: CPT | Performed by: HOSPITALIST

## 2019-03-01 RX ORDER — NADOLOL 20 MG/1
20 TABLET ORAL DAILY
Qty: 30 TABLET | Refills: 1 | Status: SHIPPED | OUTPATIENT
Start: 2019-03-01 | End: 2019-03-25 | Stop reason: SDUPTHER

## 2019-03-01 RX ORDER — LOSARTAN POTASSIUM 100 MG/1
100 TABLET ORAL DAILY
Qty: 90 TABLET | Refills: 1 | Status: SHIPPED | OUTPATIENT
Start: 2019-03-01 | End: 2019-03-21 | Stop reason: SDUPTHER

## 2019-03-01 RX ORDER — ESCITALOPRAM OXALATE 10 MG/1
10 TABLET ORAL DAILY
Qty: 30 TABLET | Refills: 1 | Status: SHIPPED | OUTPATIENT
Start: 2019-03-01 | End: 2019-05-13

## 2019-03-02 NOTE — PATIENT INSTRUCTIONS
Depression   AMBULATORY CARE:   Depression  is a medical condition that causes feelings of sadness or hopelessness that do not go away  Depression may cause you to lose interest in things you used to enjoy  These feelings may interfere with your daily life  Common symptoms include the following:   · Appetite changes, or weight gain or loss    · Trouble going to sleep or staying asleep, or sleeping too much    · Fatigue or lack of energy    · Feeling restless, irritable, or withdrawn    · Feeling worthless, hopeless, discouraged, or guilty    · Trouble concentrating, remembering things, doing daily tasks, or making decisions    · Thoughts about hurting or killing yourself  Call 911 for any of the following:   · You think about harming yourself or someone else  Contact your healthcare provider if:   · Your symptoms do not improve  · You cannot make it to your next appointment  · You have new symptoms  · You have questions or concerns about your condition or care  Treatment for depression  may include medicine to improve or balance your mood  Therapy may also be used to treat your depression  A therapist will help you learn to cope with your thoughts and feelings  Therapy can be done alone or in a group  It may also be done with family members or a significant other  Self-care:   · Get regular physical activity  Try to exercise for 30 minutes, 3 to 5 days a week  Work with your healthcare provider to develop an exercise plan that you enjoy  Physical activity may improve your symptoms  · Get enough sleep  Create a routine to help you relax before bed  You can listen to music, read, or do yoga  Try to go to bed and wake up at the same time every day  Sleep is important for emotional health  · Eat a variety of healthy foods from all of the food groups  A healthy meal plan is low in fat, salt, and added sugar   Ask your healthcare provider for more information about a meal plan that is right for you      · Do not drink alcohol or use drugs  Alcohol and drugs can make your symptoms worse  Follow up with your healthcare provider as directed: Your healthcare provider will monitor your progress at follow-up visits  He or she will also monitor your medicine if you take antidepressants  Your healthcare provider will ask if the medicine is helping  Tell him or her about any side effects or problems you may have with your medicine  The type or amount of medicine may need to be changed  Write down your questions so you remember to ask them during your visits  © 2017 [employer] Information is for End User's use only and may not be sold, redistributed or otherwise used for commercial purposes  All illustrations and images included in CareNotes® are the copyrighted property of [employer]  or [employer]  The above information is an  only  It is not intended as medical advice for individual conditions or treatments  Talk to your doctor, nurse or pharmacist before following any medical regimen to see if it is safe and effective for you

## 2019-03-06 ENCOUNTER — TELEPHONE (OUTPATIENT)
Dept: INTERNAL MEDICINE CLINIC | Facility: CLINIC | Age: 69
End: 2019-03-06

## 2019-03-07 ENCOUNTER — HOSPITAL ENCOUNTER (INPATIENT)
Facility: HOSPITAL | Age: 69
LOS: 4 days | Discharge: HOME | DRG: 435 | End: 2019-03-11
Attending: EMERGENCY MEDICINE | Admitting: HOSPITALIST
Payer: COMMERCIAL

## 2019-03-07 ENCOUNTER — APPOINTMENT (EMERGENCY)
Dept: CT IMAGING | Facility: HOSPITAL | Age: 69
DRG: 435 | End: 2019-03-07
Payer: COMMERCIAL

## 2019-03-07 ENCOUNTER — PATIENT OUTREACH (OUTPATIENT)
Dept: INTERNAL MEDICINE CLINIC | Facility: CLINIC | Age: 69
End: 2019-03-07

## 2019-03-07 DIAGNOSIS — K74.60 HEPATIC CIRRHOSIS DUE TO CHRONIC HEPATITIS C INFECTION (HCC): ICD-10-CM

## 2019-03-07 DIAGNOSIS — Z72.0 NICOTINE ABUSE: ICD-10-CM

## 2019-03-07 DIAGNOSIS — R18.8 ASCITES: ICD-10-CM

## 2019-03-07 DIAGNOSIS — R10.84 GENERALIZED ABDOMINAL PAIN: Primary | ICD-10-CM

## 2019-03-07 DIAGNOSIS — B37.0 OROPHARYNGEAL CANDIDIASIS: ICD-10-CM

## 2019-03-07 DIAGNOSIS — E11.69 TYPE 2 DIABETES MELLITUS WITH OTHER SPECIFIED COMPLICATION, WITHOUT LONG-TERM CURRENT USE OF INSULIN (HCC): ICD-10-CM

## 2019-03-07 DIAGNOSIS — C22.0 HEPATOCELLULAR CARCINOMA (HCC): ICD-10-CM

## 2019-03-07 DIAGNOSIS — B18.2 HEPATIC CIRRHOSIS DUE TO CHRONIC HEPATITIS C INFECTION (HCC): ICD-10-CM

## 2019-03-07 DIAGNOSIS — I85.10 SECONDARY ESOPHAGEAL VARICES WITHOUT BLEEDING (HCC): ICD-10-CM

## 2019-03-07 DIAGNOSIS — D64.9 ANEMIA: ICD-10-CM

## 2019-03-07 PROBLEM — E87.6 HYPOKALEMIA: Status: ACTIVE | Noted: 2019-03-07

## 2019-03-07 LAB
ALBUMIN SERPL BCP-MCNC: 3.2 G/DL (ref 3.5–5)
ALP SERPL-CCNC: 160 U/L (ref 46–116)
ALT SERPL W P-5'-P-CCNC: 36 U/L (ref 12–78)
ANION GAP SERPL CALCULATED.3IONS-SCNC: 13 MMOL/L (ref 4–13)
APTT PPP: 34 SECONDS (ref 26–38)
AST SERPL W P-5'-P-CCNC: 89 U/L (ref 5–45)
BASOPHILS # BLD AUTO: 0.04 THOUSANDS/ΜL (ref 0–0.1)
BASOPHILS NFR BLD AUTO: 0 % (ref 0–1)
BILIRUB SERPL-MCNC: 1.1 MG/DL (ref 0.2–1)
BUN SERPL-MCNC: 8 MG/DL (ref 5–25)
CALCIUM SERPL-MCNC: 8.9 MG/DL (ref 8.3–10.1)
CHLORIDE SERPL-SCNC: 101 MMOL/L (ref 100–108)
CO2 SERPL-SCNC: 24 MMOL/L (ref 21–32)
CREAT SERPL-MCNC: 0.89 MG/DL (ref 0.6–1.3)
EOSINOPHIL # BLD AUTO: 0.17 THOUSAND/ΜL (ref 0–0.61)
EOSINOPHIL NFR BLD AUTO: 1 % (ref 0–6)
ERYTHROCYTE [DISTWIDTH] IN BLOOD BY AUTOMATED COUNT: 19.6 % (ref 11.6–15.1)
GFR SERPL CREATININE-BSD FRML MDRD: 77 ML/MIN/1.73SQ M
GLUCOSE SERPL-MCNC: 125 MG/DL (ref 65–140)
HCT VFR BLD AUTO: 20.9 % (ref 34.8–46.1)
HGB BLD-MCNC: 6.7 G/DL (ref 11.5–15.4)
IMM GRANULOCYTES # BLD AUTO: 0.06 THOUSAND/UL (ref 0–0.2)
IMM GRANULOCYTES NFR BLD AUTO: 1 % (ref 0–2)
INR PPP: 1.06 (ref 0.86–1.17)
LIPASE SERPL-CCNC: 203 U/L (ref 73–393)
LYMPHOCYTES # BLD AUTO: 1.82 THOUSANDS/ΜL (ref 0.6–4.47)
LYMPHOCYTES NFR BLD AUTO: 14 % (ref 14–44)
MCH RBC QN AUTO: 27.8 PG (ref 26.8–34.3)
MCHC RBC AUTO-ENTMCNC: 32.1 G/DL (ref 31.4–37.4)
MCV RBC AUTO: 87 FL (ref 82–98)
MONOCYTES # BLD AUTO: 0.94 THOUSAND/ΜL (ref 0.17–1.22)
MONOCYTES NFR BLD AUTO: 7 % (ref 4–12)
NEUTROPHILS # BLD AUTO: 9.74 THOUSANDS/ΜL (ref 1.85–7.62)
NEUTS SEG NFR BLD AUTO: 77 % (ref 43–75)
NRBC BLD AUTO-RTO: 0 /100 WBCS
PLATELET # BLD AUTO: 230 THOUSANDS/UL (ref 149–390)
PMV BLD AUTO: 9.4 FL (ref 8.9–12.7)
POTASSIUM SERPL-SCNC: 3.3 MMOL/L (ref 3.5–5.3)
PROT SERPL-MCNC: 7.2 G/DL (ref 6.4–8.2)
PROTHROMBIN TIME: 13.5 SECONDS (ref 11.8–14.2)
RBC # BLD AUTO: 2.41 MILLION/UL (ref 3.81–5.12)
SODIUM SERPL-SCNC: 138 MMOL/L (ref 136–145)
WBC # BLD AUTO: 12.77 THOUSAND/UL (ref 4.31–10.16)

## 2019-03-07 PROCEDURE — 36415 COLL VENOUS BLD VENIPUNCTURE: CPT | Performed by: EMERGENCY MEDICINE

## 2019-03-07 PROCEDURE — 96374 THER/PROPH/DIAG INJ IV PUSH: CPT

## 2019-03-07 PROCEDURE — 85730 THROMBOPLASTIN TIME PARTIAL: CPT | Performed by: EMERGENCY MEDICINE

## 2019-03-07 PROCEDURE — 86901 BLOOD TYPING SEROLOGIC RH(D): CPT | Performed by: EMERGENCY MEDICINE

## 2019-03-07 PROCEDURE — 86850 RBC ANTIBODY SCREEN: CPT | Performed by: EMERGENCY MEDICINE

## 2019-03-07 PROCEDURE — 74177 CT ABD & PELVIS W/CONTRAST: CPT

## 2019-03-07 PROCEDURE — 83690 ASSAY OF LIPASE: CPT | Performed by: EMERGENCY MEDICINE

## 2019-03-07 PROCEDURE — 80053 COMPREHEN METABOLIC PANEL: CPT | Performed by: EMERGENCY MEDICINE

## 2019-03-07 PROCEDURE — 86900 BLOOD TYPING SEROLOGIC ABO: CPT | Performed by: EMERGENCY MEDICINE

## 2019-03-07 PROCEDURE — 96361 HYDRATE IV INFUSION ADD-ON: CPT

## 2019-03-07 PROCEDURE — 85025 COMPLETE CBC W/AUTO DIFF WBC: CPT | Performed by: EMERGENCY MEDICINE

## 2019-03-07 PROCEDURE — 85610 PROTHROMBIN TIME: CPT | Performed by: EMERGENCY MEDICINE

## 2019-03-07 PROCEDURE — 86920 COMPATIBILITY TEST SPIN: CPT

## 2019-03-07 PROCEDURE — 96376 TX/PRO/DX INJ SAME DRUG ADON: CPT

## 2019-03-07 PROCEDURE — 96375 TX/PRO/DX INJ NEW DRUG ADDON: CPT

## 2019-03-07 PROCEDURE — 99285 EMERGENCY DEPT VISIT HI MDM: CPT

## 2019-03-07 RX ORDER — HYDROMORPHONE HCL/PF 1 MG/ML
0.5 SYRINGE (ML) INJECTION ONCE
Status: COMPLETED | OUTPATIENT
Start: 2019-03-07 | End: 2019-03-07

## 2019-03-07 RX ORDER — ONDANSETRON 2 MG/ML
4 INJECTION INTRAMUSCULAR; INTRAVENOUS ONCE
Status: COMPLETED | OUTPATIENT
Start: 2019-03-07 | End: 2019-03-07

## 2019-03-07 RX ADMIN — ONDANSETRON 4 MG: 2 INJECTION INTRAMUSCULAR; INTRAVENOUS at 20:32

## 2019-03-07 RX ADMIN — SODIUM CHLORIDE 1000 ML: 0.9 INJECTION, SOLUTION INTRAVENOUS at 20:28

## 2019-03-07 RX ADMIN — IOHEXOL 100 ML: 350 INJECTION, SOLUTION INTRAVENOUS at 22:29

## 2019-03-07 RX ADMIN — HYDROMORPHONE HYDROCHLORIDE 0.5 MG: 1 INJECTION, SOLUTION INTRAMUSCULAR; INTRAVENOUS; SUBCUTANEOUS at 22:58

## 2019-03-07 RX ADMIN — HYDROMORPHONE HYDROCHLORIDE 0.5 MG: 1 INJECTION, SOLUTION INTRAMUSCULAR; INTRAVENOUS; SUBCUTANEOUS at 22:02

## 2019-03-07 NOTE — TELEPHONE ENCOUNTER
Yes please have her come in, tell her to bring all her medications with her, as she has had some confusion in the past on what medications to take  I went over this with her at my last visit with her, but I just want to reinforce it  Tell her to hold stop taking the escitalopram, as it is the medication most likely causing nausea and stomach upset  She can continue taking nadolol 20mg daily, until she comes in for a medication evaluation

## 2019-03-07 NOTE — TELEPHONE ENCOUNTER
Spoke with patient , informed her to discontinue Escitalopram  Advised to continue taking nadolol    Patient is aware medications are to be brought in during next visit on 4/5/19 @ 8am

## 2019-03-08 ENCOUNTER — APPOINTMENT (INPATIENT)
Dept: RADIOLOGY | Facility: HOSPITAL | Age: 69
DRG: 435 | End: 2019-03-08
Attending: HOSPITALIST
Payer: COMMERCIAL

## 2019-03-08 LAB
ABO GROUP BLD BPU: NORMAL
ABO GROUP BLD BPU: NORMAL
ABO GROUP BLD: NORMAL
ALBUMIN FLD-MCNC: <0.6 G/DL
ANION GAP SERPL CALCULATED.3IONS-SCNC: 9 MMOL/L (ref 4–13)
APPEARANCE FLD: CLEAR
BLD GP AB SCN SERPL QL: NEGATIVE
BPU ID: NORMAL
BPU ID: NORMAL
BUN SERPL-MCNC: 6 MG/DL (ref 5–25)
CALCIUM SERPL-MCNC: 8.7 MG/DL (ref 8.3–10.1)
CHLORIDE SERPL-SCNC: 102 MMOL/L (ref 100–108)
CO2 SERPL-SCNC: 25 MMOL/L (ref 21–32)
COLOR FLD: NORMAL
CREAT SERPL-MCNC: 0.81 MG/DL (ref 0.6–1.3)
CROSSMATCH: NORMAL
CROSSMATCH: NORMAL
GFR SERPL CREATININE-BSD FRML MDRD: 86 ML/MIN/1.73SQ M
GLUCOSE SERPL-MCNC: 100 MG/DL (ref 65–140)
GLUCOSE SERPL-MCNC: 101 MG/DL (ref 65–140)
GLUCOSE SERPL-MCNC: 106 MG/DL (ref 65–140)
GLUCOSE SERPL-MCNC: 123 MG/DL (ref 65–140)
GLUCOSE SERPL-MCNC: 99 MG/DL (ref 65–140)
GLUCOSE SERPL-MCNC: 99 MG/DL (ref 65–140)
HCT VFR BLD AUTO: 37.2 % (ref 34.8–46.1)
HGB BLD-MCNC: 12.2 G/DL (ref 11.5–15.4)
LDH FLD L TO P-CCNC: 75 U/L
LDH SERPL-CCNC: 562 U/L (ref 81–234)
LYMPHOCYTES NFR BLD AUTO: 45 %
MONOCYTES NFR BLD AUTO: 49 %
NEUTS SEG NFR BLD AUTO: 6 %
POTASSIUM SERPL-SCNC: 3.3 MMOL/L (ref 3.5–5.3)
RH BLD: POSITIVE
SITE: NORMAL
SODIUM SERPL-SCNC: 136 MMOL/L (ref 136–145)
SPECIMEN EXPIRATION DATE: NORMAL
TOTAL CELLS COUNTED SPEC: 100
UNIT DISPENSE STATUS: NORMAL
UNIT DISPENSE STATUS: NORMAL
UNIT PRODUCT CODE: NORMAL
UNIT PRODUCT CODE: NORMAL
UNIT RH: NORMAL
UNIT RH: NORMAL
WBC # FLD MANUAL: 355 /UL

## 2019-03-08 PROCEDURE — 85014 HEMATOCRIT: CPT | Performed by: PHYSICIAN ASSISTANT

## 2019-03-08 PROCEDURE — 82042 OTHER SOURCE ALBUMIN QUAN EA: CPT | Performed by: HOSPITALIST

## 2019-03-08 PROCEDURE — 87181 SC STD AGAR DILUTION PER AGT: CPT | Performed by: HOSPITALIST

## 2019-03-08 PROCEDURE — 85018 HEMOGLOBIN: CPT | Performed by: PHYSICIAN ASSISTANT

## 2019-03-08 PROCEDURE — 82948 REAGENT STRIP/BLOOD GLUCOSE: CPT

## 2019-03-08 PROCEDURE — 87077 CULTURE AEROBIC IDENTIFY: CPT | Performed by: HOSPITALIST

## 2019-03-08 PROCEDURE — 49083 ABD PARACENTESIS W/IMAGING: CPT

## 2019-03-08 PROCEDURE — P9016 RBC LEUKOCYTES REDUCED: HCPCS

## 2019-03-08 PROCEDURE — 87070 CULTURE OTHR SPECIMN AEROBIC: CPT | Performed by: HOSPITALIST

## 2019-03-08 PROCEDURE — 80048 BASIC METABOLIC PNL TOTAL CA: CPT | Performed by: PHYSICIAN ASSISTANT

## 2019-03-08 PROCEDURE — 99223 1ST HOSP IP/OBS HIGH 75: CPT | Performed by: HOSPITALIST

## 2019-03-08 PROCEDURE — 83615 LACTATE (LD) (LDH) ENZYME: CPT | Performed by: HOSPITALIST

## 2019-03-08 PROCEDURE — 89051 BODY FLUID CELL COUNT: CPT | Performed by: HOSPITALIST

## 2019-03-08 PROCEDURE — 0W9G3ZZ DRAINAGE OF PERITONEAL CAVITY, PERCUTANEOUS APPROACH: ICD-10-PCS | Performed by: RADIOLOGY

## 2019-03-08 PROCEDURE — 87205 SMEAR GRAM STAIN: CPT | Performed by: HOSPITALIST

## 2019-03-08 PROCEDURE — 30233N1 TRANSFUSION OF NONAUTOLOGOUS RED BLOOD CELLS INTO PERIPHERAL VEIN, PERCUTANEOUS APPROACH: ICD-10-PCS | Performed by: EMERGENCY MEDICINE

## 2019-03-08 PROCEDURE — 49083 ABD PARACENTESIS W/IMAGING: CPT | Performed by: RADIOLOGY

## 2019-03-08 PROCEDURE — C9113 INJ PANTOPRAZOLE SODIUM, VIA: HCPCS | Performed by: PHYSICIAN ASSISTANT

## 2019-03-08 RX ORDER — HYDROMORPHONE HCL/PF 1 MG/ML
1 SYRINGE (ML) INJECTION EVERY 4 HOURS PRN
Status: DISCONTINUED | OUTPATIENT
Start: 2019-03-08 | End: 2019-03-11 | Stop reason: HOSPADM

## 2019-03-08 RX ORDER — LOSARTAN POTASSIUM 50 MG/1
100 TABLET ORAL DAILY
Status: DISCONTINUED | OUTPATIENT
Start: 2019-03-08 | End: 2019-03-11 | Stop reason: HOSPADM

## 2019-03-08 RX ORDER — GABAPENTIN 300 MG/1
300 CAPSULE ORAL DAILY
Status: DISCONTINUED | OUTPATIENT
Start: 2019-03-08 | End: 2019-03-11 | Stop reason: HOSPADM

## 2019-03-08 RX ORDER — OXYCODONE HYDROCHLORIDE 5 MG/1
5 TABLET ORAL EVERY 4 HOURS PRN
Status: DISCONTINUED | OUTPATIENT
Start: 2019-03-08 | End: 2019-03-11 | Stop reason: HOSPADM

## 2019-03-08 RX ORDER — NADOLOL 40 MG/1
20 TABLET ORAL DAILY
Status: DISCONTINUED | OUTPATIENT
Start: 2019-03-08 | End: 2019-03-11 | Stop reason: HOSPADM

## 2019-03-08 RX ORDER — GABAPENTIN 300 MG/1
900 CAPSULE ORAL
Status: DISCONTINUED | OUTPATIENT
Start: 2019-03-08 | End: 2019-03-11 | Stop reason: HOSPADM

## 2019-03-08 RX ORDER — NICOTINE 21 MG/24HR
1 PATCH, TRANSDERMAL 24 HOURS TRANSDERMAL DAILY
Status: DISCONTINUED | OUTPATIENT
Start: 2019-03-08 | End: 2019-03-11 | Stop reason: HOSPADM

## 2019-03-08 RX ORDER — POTASSIUM CHLORIDE 20 MEQ/1
40 TABLET, EXTENDED RELEASE ORAL ONCE
Status: DISCONTINUED | OUTPATIENT
Start: 2019-03-08 | End: 2019-03-11 | Stop reason: HOSPADM

## 2019-03-08 RX ORDER — ONDANSETRON 2 MG/ML
4 INJECTION INTRAMUSCULAR; INTRAVENOUS EVERY 6 HOURS PRN
Status: DISCONTINUED | OUTPATIENT
Start: 2019-03-08 | End: 2019-03-11 | Stop reason: HOSPADM

## 2019-03-08 RX ORDER — SENNOSIDES 8.6 MG
2 TABLET ORAL DAILY
Status: DISCONTINUED | OUTPATIENT
Start: 2019-03-08 | End: 2019-03-11 | Stop reason: HOSPADM

## 2019-03-08 RX ORDER — PANTOPRAZOLE SODIUM 40 MG/1
40 INJECTION, POWDER, FOR SOLUTION INTRAVENOUS EVERY 12 HOURS SCHEDULED
Status: DISCONTINUED | OUTPATIENT
Start: 2019-03-08 | End: 2019-03-10

## 2019-03-08 RX ORDER — OXYCODONE HYDROCHLORIDE AND ACETAMINOPHEN 5; 325 MG/1; MG/1
1 TABLET ORAL EVERY 6 HOURS PRN
Status: DISCONTINUED | OUTPATIENT
Start: 2019-03-08 | End: 2019-03-08

## 2019-03-08 RX ORDER — SPIRONOLACTONE 100 MG/1
100 TABLET, FILM COATED ORAL DAILY
Status: DISCONTINUED | OUTPATIENT
Start: 2019-03-08 | End: 2019-03-10

## 2019-03-08 RX ORDER — OXYCODONE HYDROCHLORIDE AND ACETAMINOPHEN 5; 325 MG/1; MG/1
2 TABLET ORAL EVERY 6 HOURS PRN
Status: DISCONTINUED | OUTPATIENT
Start: 2019-03-08 | End: 2019-03-08

## 2019-03-08 RX ORDER — ESCITALOPRAM OXALATE 10 MG/1
10 TABLET ORAL DAILY
Status: DISCONTINUED | OUTPATIENT
Start: 2019-03-08 | End: 2019-03-11 | Stop reason: HOSPADM

## 2019-03-08 RX ORDER — ACETAMINOPHEN 325 MG/1
650 TABLET ORAL EVERY 6 HOURS PRN
Status: DISCONTINUED | OUTPATIENT
Start: 2019-03-08 | End: 2019-03-08

## 2019-03-08 RX ORDER — GABAPENTIN 300 MG/1
300 CAPSULE ORAL
COMMUNITY
End: 2019-03-14

## 2019-03-08 RX ORDER — OXYCODONE HYDROCHLORIDE 10 MG/1
10 TABLET ORAL EVERY 4 HOURS PRN
Status: DISCONTINUED | OUTPATIENT
Start: 2019-03-08 | End: 2019-03-11 | Stop reason: HOSPADM

## 2019-03-08 RX ORDER — FUROSEMIDE 40 MG/1
40 TABLET ORAL DAILY
Status: DISCONTINUED | OUTPATIENT
Start: 2019-03-08 | End: 2019-03-10

## 2019-03-08 RX ADMIN — PANTOPRAZOLE SODIUM 40 MG: 40 INJECTION, POWDER, FOR SOLUTION INTRAVENOUS at 21:05

## 2019-03-08 RX ADMIN — SPIRONOLACTONE 100 MG: 100 TABLET ORAL at 09:52

## 2019-03-08 RX ADMIN — HYDROMORPHONE HYDROCHLORIDE 1 MG: 1 INJECTION, SOLUTION INTRAMUSCULAR; INTRAVENOUS; SUBCUTANEOUS at 04:00

## 2019-03-08 RX ADMIN — NADOLOL 20 MG: 40 TABLET ORAL at 09:53

## 2019-03-08 RX ADMIN — PANTOPRAZOLE SODIUM 40 MG: 40 INJECTION, POWDER, FOR SOLUTION INTRAVENOUS at 03:59

## 2019-03-08 RX ADMIN — GABAPENTIN 300 MG: 300 CAPSULE ORAL at 09:53

## 2019-03-08 RX ADMIN — HYDROMORPHONE HYDROCHLORIDE 1 MG: 1 INJECTION, SOLUTION INTRAMUSCULAR; INTRAVENOUS; SUBCUTANEOUS at 09:24

## 2019-03-08 RX ADMIN — ONDANSETRON 4 MG: 2 INJECTION INTRAMUSCULAR; INTRAVENOUS at 04:14

## 2019-03-08 RX ADMIN — GABAPENTIN 900 MG: 300 CAPSULE ORAL at 21:05

## 2019-03-08 RX ADMIN — LOSARTAN POTASSIUM 100 MG: 50 TABLET, FILM COATED ORAL at 09:51

## 2019-03-08 RX ADMIN — PANTOPRAZOLE SODIUM 40 MG: 40 INJECTION, POWDER, FOR SOLUTION INTRAVENOUS at 09:51

## 2019-03-08 RX ADMIN — OXYCODONE AND ACETAMINOPHEN 2 TABLET: 5; 325 TABLET ORAL at 13:20

## 2019-03-08 RX ADMIN — OXYCODONE HYDROCHLORIDE 10 MG: 10 TABLET ORAL at 20:15

## 2019-03-08 RX ADMIN — OXYCODONE AND ACETAMINOPHEN 2 TABLET: 5; 325 TABLET ORAL at 00:57

## 2019-03-08 RX ADMIN — STANDARDIZED SENNA CONCENTRATE 17.2 MG: 8.6 TABLET ORAL at 09:53

## 2019-03-08 RX ADMIN — FUROSEMIDE 40 MG: 40 TABLET ORAL at 09:53

## 2019-03-08 RX ADMIN — OXYCODONE AND ACETAMINOPHEN 2 TABLET: 5; 325 TABLET ORAL at 06:39

## 2019-03-08 RX ADMIN — MORPHINE SULFATE 2 MG: 2 INJECTION, SOLUTION INTRAMUSCULAR; INTRAVENOUS at 17:18

## 2019-03-08 NOTE — ASSESSMENT & PLAN NOTE
· Presents with abdominal pain/distention, N/V since noon on Thursday  · CT Ab/Pelvis- "Soft tissue density throughout the valeriano hepatis and surrounding the pancreatic head similar to prior study  Interval development of a moderate amount of free fluid throughout the abdomen and pelvis  Thickening of the gastric vall which may be due to surrounding free fluid vs gastritis  Hepatic cirrhosis similar to prior study "  · Ascites likely secondary to hepatocellular carcinoma  · Antiemetics  · Pain management  · Consult IR for paracentesis

## 2019-03-08 NOTE — ASSESSMENT & PLAN NOTE
· Hgb noted to be 6 7  · Recently admitted at [facility] 2/16-2/21/19 due to acute on chronic blood loss anemia  Hgb initially 4 3- received 2 units PRBCs  · EGD 2/18/19 revealed "esophageal varices with stigmata of recent bleeding banded, portal hypertensive gastropathy, small duodenal polyp"  · Placed on Protonix IV 40mg BID, octreotide drip x3 days, 1g Rocephin daily x5 days (discharged on Cipro)  · Eliquis held during admission and upon discharge  · Hgb 8 7 upon discharge on 2/21/19  · Denies any hematochezia/melena  · Check occult stools  · Blood consent obtained by ED physician- 2 units PRBCs ordered  · Trend H&H   · Continue Protonix BID  · Consider GI consult if occult positive

## 2019-03-08 NOTE — ED PROVIDER NOTES
History  Chief Complaint   Patient presents with    Abdominal Pain     pt comes in via EMS with c/o abdominal pain that started around noon today  Pt  states hx of liver cancer  Abdomen is distended  Pt  states shes had nausea and vomiting with no diarrhea  Patient presents to the emergency department with abdominal pain and distention and vomiting that began today  Patient denies fever chills or rash  Denies trauma fall or injury  Denies rectal bleeding or melena  Denies chest pain sob  Patient does have a history of a recent admission for GI bleeding which she did receive blood products  She also has a history of liver cancer with partial resection  She denies history or need for paracentesis  Prior to Admission Medications   Prescriptions Last Dose Informant Patient Reported? Taking?   acetaminophen (TYLENOL) 325 mg tablet   Yes No   Sig: Take 650 mg by mouth every 6 (six) hours as needed for mild pain   diphenhydrAMINE (NIGHT TIME SLEEP AID) 25 MG tablet  Self Yes No   Sig: Take 50 mg by mouth daily at bedtime as needed for sleep   escitalopram (LEXAPRO) 10 mg tablet   No No   Sig: Take 1 tablet (10 mg total) by mouth daily   furosemide (LASIX) 40 mg tablet   No No   Sig: Take 1 tablet (40 mg total) by mouth daily   gabapentin (NEURONTIN) 300 mg capsule  Self No No   Si tab in the am and 3 tabs PO night time   losartan (COZAAR) 100 MG tablet   No No   Sig: Take 1 tablet (100 mg total) by mouth daily   nadolol (CORGARD) 20 mg tablet   No No   Sig: Take 1 tablet (20 mg total) by mouth daily   oxyCODONE-acetaminophen (PERCOCET)  mg per tablet   No No   Sig: Take 1 tab PO Q 6 hours prn pain   Do not fill until 19   senna (SENOKOT) 8 6 MG tablet  Self Yes No   Sig: Take 2 tablets by mouth daily   spironolactone (ALDACTONE) 100 mg tablet   No No   Sig: Take 1 tablet (100 mg total) by mouth daily      Facility-Administered Medications: None       Past Medical History:   Diagnosis Date  Anemia     Anxiety     Cancer ([facility] )     Chronic pain disorder     herniated disc    Cirrhosis ([facility] )     Constipation     Current use of long term anticoagulation     eliquis    Diabetes mellitus ([facility] )     blood sugar 113 at 1225 2/18/19    Drug dependence ([place] [address] )     GI bleed     Hepatocellular carcinoma (HCC)     Hypertension     Hyponatremia     Liver disease     h/o hepatitis c    Melena     Portal vein thrombosis        Past Surgical History:   Procedure Laterality Date    DENTAL SURGERY      ESOPHAGOGASTRODUODENOSCOPY N/A 2/18/2019    Procedure: ESOPHAGOGASTRODUODENOSCOPY (EGD); Surgeon: Aditi Trinh MD;  Location: [facility]; Service: Gastroenterology    HYSTERECTOMY      LIVER BIOPSY      LIVER SURGERY      Ablation    TONSILLECTOMY         Family History   Problem Relation Age of Onset    Prostate cancer Father      I have reviewed and agree with the history as documented  Social History     Tobacco Use    Smoking status: Current Every Day Smoker     Packs/day: 1 00    Smokeless tobacco: Never Used   Substance Use Topics    Alcohol use: Not Currently    Drug use: No        Review of Systems   Constitutional: Negative  Negative for activity change, appetite change, chills, diaphoresis, fatigue and fever  HENT: Negative  Negative for congestion, drooling, rhinorrhea, sinus pain, trouble swallowing and voice change  Eyes: Negative  Negative for photophobia and visual disturbance  Respiratory: Negative  Negative for cough, choking, chest tightness, shortness of breath, wheezing and stridor  Cardiovascular: Negative  Negative for chest pain, palpitations and leg swelling  Gastrointestinal: Positive for abdominal distention, abdominal pain, nausea and vomiting  Negative for anal bleeding, blood in stool, constipation and diarrhea  Endocrine: Negative  Genitourinary: Negative    Negative for dysuria, flank pain, frequency, hematuria, urgency, vaginal bleeding, vaginal discharge and vaginal pain  Musculoskeletal: Negative  Negative for back pain, neck pain and neck stiffness  Skin: Negative  Negative for rash and wound  Allergic/Immunologic: Negative  Neurological: Negative  Negative for dizziness, tremors, seizures, syncope, facial asymmetry, speech difficulty, weakness, light-headedness, numbness and headaches  Hematological: Negative  Does not bruise/bleed easily  Psychiatric/Behavioral: Negative  Negative for confusion  Physical Exam  Physical Exam   Constitutional: She is oriented to person, place, and time  She appears well-developed and well-nourished  Non-toxic appearance  She does not appear ill  No distress  Nontoxic appearance without respiratory distress  Patient mildly washed out  Does not appear to be in discomfort  HENT:   Head: Normocephalic and atraumatic  Right Ear: External ear normal    Left Ear: External ear normal    Mouth/Throat: Oropharynx is clear and moist    Eyes: Pupils are equal, round, and reactive to light  Conjunctivae and EOM are normal  No scleral icterus  Cardiovascular: Normal rate, regular rhythm, normal heart sounds and intact distal pulses  Pulmonary/Chest: Effort normal and breath sounds normal  No stridor  No respiratory distress  She has no wheezes  She has no rhonchi  She has no rales  She exhibits no tenderness  Abdominal: Soft  Normal appearance and bowel sounds are normal  She exhibits distension and ascites  She exhibits no shifting dullness, no pulsatile liver, no fluid wave, no abdominal bruit, no pulsatile midline mass and no mass  There is no hepatosplenomegaly, splenomegaly or hepatomegaly  There is generalized tenderness  There is no rigidity, no rebound, no guarding, no CVA tenderness, no tenderness at McBurney's point and negative Ruff's sign  No hernia  Musculoskeletal: Normal range of motion  Neurological: She is alert and oriented to person, place, and time   She has normal reflexes  Skin: Skin is warm and dry  No rash noted  She is not diaphoretic  Psychiatric: Her behavior is normal    Nursing note and vitals reviewed        Vital Signs  ED Triage Vitals   Temperature Pulse Respirations Blood Pressure SpO2   03/07/19 2024 03/07/19 2024 03/07/19 2024 03/07/19 2024 03/07/19 2024   98 7 °F (37 1 °C) 60 18 135/64 99 %      Temp Source Heart Rate Source Patient Position - Orthostatic VS BP Location FiO2 (%)   03/07/19 2024 03/07/19 2024 03/07/19 2024 03/07/19 2024 --   Oral Monitor Lying Right arm       Pain Score       03/07/19 2252       6           Vitals:    03/07/19 2024 03/07/19 2215 03/07/19 2245   BP: 135/64 162/71 155/70   Pulse: 60 68 66   Patient Position - Orthostatic VS: Lying Lying Lying       Visual Acuity      ED Medications  Medications    EMS REPLENISHMENT MED ( Does not apply Given to EMS 3/7/19 2040)   sodium chloride 0 9 % bolus 1,000 mL (0 mL Intravenous Stopped 3/7/19 2259)   ondansetron (ZOFRAN) injection 4 mg (4 mg Intravenous Given 3/7/19 2032)   HYDROmorphone (DILAUDID) injection 0 5 mg (0 5 mg Intravenous Given 3/7/19 2202)   iohexol (OMNIPAQUE) 350 MG/ML injection (SINGLE-DOSE) 100 mL (100 mL Intravenous Given 3/7/19 2229)   HYDROmorphone (DILAUDID) injection 0 5 mg (0 5 mg Intravenous Given 3/7/19 2258)       Diagnostic Studies  Results Reviewed     Procedure Component Value Units Date/Time    Comprehensive metabolic panel [[id number]]  (Abnormal) Collected:  03/07/19 2028    Lab Status:  Final result Specimen:  Blood from Hand, Right Updated:  03/07/19 2058     Sodium 138 mmol/L      Potassium 3 3 mmol/L      Chloride 101 mmol/L      CO2 24 mmol/L      ANION GAP 13 mmol/L      BUN 8 mg/dL      Creatinine 0 89 mg/dL      Glucose 125 mg/dL      Calcium 8 9 mg/dL      AST 89 U/L      ALT 36 U/L      Alkaline Phosphatase 160 U/L      Total Protein 7 2 g/dL      Albumin 3 2 g/dL      Total Bilirubin 1 10 mg/dL      eGFR 77 ml/min/1 73sq m     Narrative: National Kidney Disease Education Program recommendations are as follows:  GFR calculation is accurate only with a steady state creatinine  Chronic Kidney disease less than 60 ml/min/1 73 sq  meters  Kidney failure less than 15 ml/min/1 73 sq  meters  Lipase [[id number]]  (Normal) Collected:  03/07/19 2028    Lab Status:  Final result Specimen:  Blood from Hand, Right Updated:  03/07/19 2058     Lipase 203 u/L     Protime-INR [[id number]]  (Normal) Collected:  03/07/19 2028    Lab Status:  Final result Specimen:  Blood from Arm, Right Updated:  03/07/19 2052     Protime 13 5 seconds      INR 1 06    APTT [[id number]]  (Normal) Collected:  03/07/19 2028    Lab Status:  Final result Specimen:  Blood from Arm, Right Updated:  03/07/19 2052     PTT 34 seconds     CBC and differential [[id number]]  (Abnormal) Collected:  03/07/19 2028    Lab Status:  Final result Specimen:  Blood from Hand, Right Updated:  03/07/19 2048     WBC 12 77 Thousand/uL      RBC 2 41 Million/uL      Hemoglobin 6 7 g/dL      Hematocrit 20 9 %      MCV 87 fL      MCH 27 8 pg      MCHC 32 1 g/dL      RDW 19 6 %      MPV 9 4 fL      Platelets 926 Thousands/uL      nRBC 0 /100 WBCs      Neutrophils Relative 77 %      Immat GRANS % 1 %      Lymphocytes Relative 14 %      Monocytes Relative 7 %      Eosinophils Relative 1 %      Basophils Relative 0 %      Neutrophils Absolute 9 74 Thousands/µL      Immature Grans Absolute 0 06 Thousand/uL      Lymphocytes Absolute 1 82 Thousands/µL      Monocytes Absolute 0 94 Thousand/µL      Eosinophils Absolute 0 17 Thousand/µL      Basophils Absolute 0 04 Thousands/µL                  CT abdomen pelvis with contrast   Final Result by Christina Kirk DO (03/07 2302)      Soft tissue density throughout the valeriano hepatis and surrounding the pancreatic head similar to prior study  Interval development of a moderate amount of free fluid throughout the abdomen and pelvis        Thickening of the gastric wall which may be due to surrounding free fluid versus gastritis  Hepatic cirrhosis similar to prior study  Workstation performed: [id number]                    Procedures  Procedures       Phone Contacts  ED Phone Contact    ED Course  ED Course as of Mar 07 2339   [facility] 07, 2019   2328 This case was discussed with the physician assistant for the hospitalist service Arsen Lenz who has accepted this patient for admission  Patient signed consent for transfusion  MDM    Disposition  Final diagnoses:   Generalized abdominal pain   Ascites   Anemia     Time reflects when diagnosis was documented in both MDM as applicable and the Disposition within this note     Time User Action Codes Description Comment    3/7/2019 11:30 PM Winston Stare Add [R10 84] Generalized abdominal pain     3/7/2019 11:30 PM Winston Stare Add [R18 8] Ascites     3/7/2019 11:31 PM Winston Stare Add [D64 9] Anemia       ED Disposition     ED Disposition Condition Date/Time Comment    Admit Stable Thu Mar 7, 2019 11:30 PM Case was discussed with Estephanie and the patient's admission status was agreed to be Admission Status: inpatient status to the service of Dr Rebecca Ballesteros    None         Patient's Medications   Discharge Prescriptions    No medications on file     No discharge procedures on file      ED Provider  Electronically Signed by           Luigi Montague MD  03/07/19 [id number]       Luigi Montague MD  03/07/19 [id number]

## 2019-03-08 NOTE — ASSESSMENT & PLAN NOTE
· PDMP reviewed:  · Continue home oxycodone-acetaminophen  q6h PRN  · Last filled 2/28/19- 120 tablets for 30 day supply  · Follows with pain management as outpatient

## 2019-03-08 NOTE — ASSESSMENT & PLAN NOTE
Lab Results   Component Value Date    HGBA1C 5 3 03/01/2019       No results for input(s): POCGLU in the last 72 hours  Blood Sugar Average: Last 72 hrs:  Glucose 125 on arrival   SSI coverage

## 2019-03-08 NOTE — ASSESSMENT & PLAN NOTE
· LFT's appear stable  · AST 89, ALT 36, Alk Phos 160, Total bilirubin 1 1   · Continue outpatient GI follow up

## 2019-03-08 NOTE — H&P
H&P- Pascual Patricia 1950, 76 y o  female MRN: [id number]    Unit/Bed#: -01 Encounter: [id number]    Primary Care Provider: Pj Isaac DO   Date and time admitted to hospital: 3/7/2019  8:16 PM    * Abdominal pain  Assessment & Plan  · Presents with abdominal pain/distention, N/V since noon on Thursday  · CT Ab/Pelvis- "Soft tissue density throughout the valeriano hepatis and surrounding the pancreatic head similar to prior study  Interval development of a moderate amount of free fluid throughout the abdomen and pelvis  Thickening of the gastric vall which may be due to surrounding free fluid vs gastritis  Hepatic cirrhosis similar to prior study "  · Ascites likely secondary to hepatocellular carcinoma  · Antiemetics  · Pain management  · Consult IR for paracentesis  Acute on chronic blood loss anemia (HCC)  Assessment & Plan  · Hgb noted to be 6 7  · Recently admitted at [facility] 2/16-2/21/19 due to acute on chronic blood loss anemia  Hgb initially 4 3- received 2 units PRBCs  · EGD 2/18/19 revealed "esophageal varices with stigmata of recent bleeding banded, portal hypertensive gastropathy, small duodenal polyp"  · Placed on Protonix IV 40mg BID, octreotide drip x3 days, 1g Rocephin daily x5 days (discharged on Cipro)  · Eliquis held during admission and upon discharge  · Hgb 8 7 upon discharge on 2/21/19  · Denies any hematochezia/melena  · Check occult stools  · Blood consent obtained by ED physician- 2 units PRBCs ordered  · Trend H&H   · Continue Protonix BID  · Consider GI consult if occult positive  Hypokalemia  Assessment & Plan  · K 3 3  · Replete and monitor  Hepatocellular carcinoma ([facility] [address] )  Assessment & Plan  · S/p ablation and wedge resection 2015  S/p SIRS spheres radioembolization December 2017  · Previously followed with Dr Janet Owens as outpatient, but has not followed up since March 2018  · Encourage outpatient follow up      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 ([facility] )  Assessment & Plan  · LFT's appear stable  · AST 89, ALT 36, Alk Phos 160, Total bilirubin 1 1   · Continue outpatient GI follow up  Portal vein thrombosis  Assessment & Plan  · Anticoagulated on Eliquis- on hold secondary to recent variceal bleeding  Opioid dependence ([facility] )  Assessment & Plan  · PDMP reviewed:  · Continue home oxycodone-acetaminophen  q6h PRN  · Last filled 2/28/19- 120 tablets for 30 day supply  · Follows with pain management as outpatient  Hypertension  Assessment & Plan  · /70  · Continue nadolol, losartan, Lasix, spironolactone  Diabetes mellitus, type II [facility])  Assessment & Plan  Lab Results   Component Value Date    HGBA1C 5 3 03/01/2019       No results for input(s): POCGLU in the last 72 hours  Blood Sugar Average: Last 72 hrs:  Glucose 125 on arrival   SSI coverage  Tobacco abuse  Assessment & Plan  · Smokes 1ppd  · Nicotine patch  · Encourage cessation  VTE Prophylaxis: Pharmacologic VTE Prophylaxis contraindicated due to recent esophageal varice bleeding   / sequential compression device   Code Status: Full Code  POLST: POLST form is not discussed and not completed at this time  Discussion with family: Discussed with patient at bedside  Anticipated Length of Stay:  Patient will be admitted on an Inpatient basis with an anticipated length of stay of  Greater than 2 midnights  Justification for Hospital Stay: Ascites requiring paracentesis, anemia requiring blood transfusion  Total Time for Visit, including Counseling / Coordination of Care: 45 minutes  Greater than 50% of this total time spent on direct patient counseling and coordination of care  Chief Complaint:   Abdominal pain      History of Present Illness:    Jamilah Sylvester is a 76 y o  female, PMHx of hepatocellular carcinoma, hepatitis cirrhosis, portal vein thrombosis, DM, HTN, opioid dependence, tobacco abuse, who presents with generalized abdominal pain since noon on Thursday  History is limited at this time as patient is uncooperative during examination as she is in pain  She is continuously moaning throughout examination  She reports that she developed pain all over her abdomen that goes into her back around noon today  She reports nausea and 1 episode of vomiting  She denies any hematemesis, hematochezia, melena  Review of Systems:    Review of Systems   Constitutional: Negative for chills, diaphoresis and fever  Gastrointestinal: Positive for abdominal pain, nausea and vomiting  Negative for blood in stool, constipation and diarrhea  All other systems reviewed and are negative  Past Medical and Surgical History:     Past Medical History:   Diagnosis Date    Anemia     Anxiety     Cancer ([facility] [address] )     Chronic pain disorder     herniated disc    Cir[facility]     Constipation     Current use of long term anticoagulation     eliquis    Diabetes mellitus ([facility] )     blood sugar 113 at 1225 2/18/19    Drug dependence ([facility] )     GI bleed     Hepatocellular carcinoma ([facility] )     Hypertension     Hypokalemia 3/7/2019    Hyponatremia     Liver disease     h/o hepatitis c    Melena     Portal vein thrombosis        Past Surgical History:   Procedure Laterality Date    DENTAL SURGERY      ESOPHAGOGASTRODUODENOSCOPY N/A 2/18/2019    Procedure: ESOPHAGOGASTRODUODENOSCOPY (EGD); Surgeon: Joslyn Reinoso MD;  Location: [facility]; Service: Gastroenterology    HYSTERECTOMY      LIVER BIOPSY      LIVER SURGERY      Ablation    TONSILLECTOMY         Meds/Allergies:    Prior to Admission medications    Medication Sig Start Date End Date Taking?  Authorizing Provider   acetaminophen (TYLENOL) 325 mg tablet Take 650 mg by mouth every 6 (six) hours as needed for mild pain    Historical Provider, MD   diphenhydrAMINE (NIGHT TIME SLEEP AID) 25 MG tablet Take 50 mg by mouth daily at bedtime as needed for sleep    Historical Provider, MD   escitalopram (LEXAPRO) 10 mg tablet Take 1 tablet (10 mg total) by mouth daily 3/1/19   Rob Banai, DO   furosemide (LASIX) 40 mg tablet Take 1 tablet (40 mg total) by mouth daily 12/20/18   Max Denise Reason, DO   gabapentin (NEURONTIN) 300 mg capsule 1 tab in the am and 3 tabs PO night time 8/20/18   Nina BRYANNA Jacobs   losartan (COZAAR) 100 MG tablet Take 1 tablet (100 mg total) by mouth daily 3/1/19   Rob Light, DO   nadolol (CORGARD) 20 mg tablet Take 1 tablet (20 mg total) by mouth daily 3/1/19   Rob Higginbothamai, DO   oxyCODONE-acetaminophen (PERCOCET)  mg per tablet Take 1 tab PO Q 6 hours prn pain  Do not fill until 2/19/19 1/14/19   BRYANNA Salomon   senna (SENOKOT) 8 6 MG tablet Take 2 tablets by mouth daily    Historical Provider, MD   spironolactone (ALDACTONE) 100 mg tablet Take 1 tablet (100 mg total) by mouth daily 12/26/18   Judge Corona MD     I have reviewed home medications with patient personally  Allergies: No Known Allergies    Social History:     Marital Status: Single   Occupation: Unknown  Patient Pre-hospital Living Situation: Home  Patient Pre-hospital Level of Mobility: Independent  Patient Pre-hospital Diet Restrictions: None    Substance Use History:   Social History     Substance and Sexual Activity   Alcohol Use Not Currently     Social History     Tobacco Use   Smoking Status Current Every Day Smoker    Packs/day: 1 00   Smokeless Tobacco Never Used     Social History     Substance and Sexual Activity   Drug Use No       Family History:    Family History   Problem Relation Age of Onset    Prostate cancer Father        Physical Exam:     Vitals:   Blood Pressure: 155/70 (03/07/19 2245)  Pulse: 66 (03/07/19 2245)  Temperature: 98 7 °F (37 1 °C) (03/07/19 2024)  Temp Source: Oral (03/07/19 2024)  Respirations: 16 (03/07/19 2245)  Weight - Scale: 77 2 kg (170 lb 3 1 oz) (03/07/19 2024)  SpO2: 95 % (03/07/19 2245)    Physical Exam   Constitutional: She is oriented to person, place, and time  She appears well-developed and well-nourished  She is uncooperative  She does not appear ill  No distress  HENT:   Head: Normocephalic and atraumatic  Eyes: Pupils are equal, round, and reactive to light  Conjunctivae, EOM and lids are normal    Cardiovascular: Normal rate, regular rhythm, normal heart sounds and normal pulses  Pulmonary/Chest: Effort normal and breath sounds normal    Abdominal: Bowel sounds are normal  She exhibits distension  There is generalized tenderness  There is guarding  There is no rebound  Musculoskeletal: Normal range of motion  Neurological: She is alert and oriented to person, place, and time  She is not disoriented  Skin: Skin is warm, dry and intact  She is not diaphoretic  Psychiatric: She is withdrawn  Cognition and memory are normal    Vitals reviewed  Additional Data:     Lab Results: I have personally reviewed pertinent reports  Results from last 7 days   Lab Units 03/07/19 2028   WBC Thousand/uL 12 77*   HEMOGLOBIN g/dL 6 7*   HEMATOCRIT % 20 9*   PLATELETS Thousands/uL 230   NEUTROS PCT % 77*   LYMPHS PCT % 14   MONOS PCT % 7   EOS PCT % 1     Results from last 7 days   Lab Units 03/07/19 2028   SODIUM mmol/L 138   POTASSIUM mmol/L 3 3*   CHLORIDE mmol/L 101   CO2 mmol/L 24   BUN mg/dL 8   CREATININE mg/dL 0 89   ANION GAP mmol/L 13   CALCIUM mg/dL 8 9   ALBUMIN g/dL 3 2*   TOTAL BILIRUBIN mg/dL 1 10*   ALK PHOS U/L 160*   ALT U/L 36   AST U/L 89*   GLUCOSE RANDOM mg/dL 125     Results from last 7 days   Lab Units 03/07/19 2028   INR  1 06         Results from last 7 days   Lab Units 03/01/19  1032   HEMOGLOBIN A1C  5 3           Imaging: I have personally reviewed pertinent reports  CT abdomen pelvis with contrast   Final Result by Kat Simms DO (03/07 2302)      Soft tissue density throughout the valeriano hepatis and surrounding the pancreatic head similar to prior study        Interval development of a moderate amount of free fluid throughout the abdomen and pelvis  Thickening of the gastric wall which may be due to surrounding free fluid versus gastritis  Hepatic cirrhosis similar to prior study  Workstation performed: [id number]             EKG, Pathology, and Other Studies Reviewed on Admission:   · EKG: Unavailable  Allscripts / Epic Records Reviewed: Yes     ** Please Note: This note has been constructed using a voice recognition system   **

## 2019-03-08 NOTE — UTILIZATION REVIEW
Initial Clinical Review    Admission: Date/Time/Statement: 3/7/19 @ 2331   Orders Placed This Encounter   Procedures    Inpatient Admission     Standing Status:   Standing     Number of Occurrences:   1     Order Specific Question:   Admitting Physician     Answer:   Lewayne Litten     Order Specific Question:   Level of Care     Answer:   Med Surg [16]     Order Specific Question:   Estimated length of stay     Answer:   More than 2 Midnights     Order Specific Question:   Certification     Answer:   I certify that inpatient services are medically necessary for this patient for a duration of greater than two midnights  See H&P and MD Progress Notes for additional information about the patient's course of treatment  ED: Date/Time/Mode of Arrival:   ED Arrival Information     Expected Arrival Acuity Means of Arrival Escorted By Service Admission Type    - 3/7/2019 20:16 Urgent Ambulance [facility] General Medicine Urgent    Arrival Complaint    abdominal pain        Chief Complaint:   Chief Complaint   Patient presents with    Abdominal Pain     pt comes in via EMS with c/o abdominal pain that started around noon today  Pt  states hx of liver cancer  Abdomen is distended  Pt  states shes had nausea and vomiting with no diarrhea  Assessment/Plan: This is a 76year old female from home with past medical history of diabetes, 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 C, hepatocellular carcinoma, hypertension, opoid dependence, portal vein thrombosis who arrived in ED via EMS with abdominal pain  No history of paracentisis  Abdominal pain and vomiting began today  Ct was unchanged except for free fluid throughout the abdomen and pelvis, and thickening to the gastric wall which may be due to fluid or gastritis  Hemoglobin is 6 7, dropped from 8 7 on 2/21/2019  On exam patient had abdominal distention with ascites and tenderness  Patient is admitted inpatient with Ascites/anemia   Ascites likely due to hepatocellular carcinoma vs cirrhosis   Plan includes:  IR paracentesis,  Transfuse PRBC,  Check stools occult blood, trend H&H,  Continue Protonix  Antiemetics as needed and pain control  For diabetes, SSI coverage with accu checks     ED Vital Signs:  ED Triage Vitals   Temperature Pulse Respirations Blood Pressure SpO2   03/07/19 2024 03/07/19 2024 03/07/19 2024 03/07/19 2024 03/07/19 2024   98 7 °F (37 1 °C) 60 18 135/64 99 %      Temp Source Heart Rate Source Patient Position - Orthostatic VS BP Location FiO2 (%)   03/07/19 2024 03/07/19 2024 03/07/19 2024 03/07/19 2024 --   Oral Monitor Lying Right arm       Pain Score       03/07/19 2252       6        Wt Readings from Last 1 Encounters:   03/08/19 74 9 kg (165 lb 2 oz)     Vital Signs (abnormal):   03/08/19 0745  98 5 °F (36 9 °C)  48Abnormal   18  142/72  --  --  --     Exam - abdominal distention  Ascites  Generalized abdominal tenderness    Pertinent Labs/Diagnostic Test Results:   ast 89  Alkaline phosphatase 160  Albumin 3 2  Total bilirubin 1 10  Wbc 12 77, hgb 6 7, hct 20 9    Ct abdomen - Soft tissue density throughout the valeriano hepatis and surrounding the pancreatic head similar to prior study  Interval development of a moderate amount of free fluid throughout the abdomen and pelvis  Thickening of the gastric wall which may be due to surrounding free fluid versus gastritis    Hepatic cirrhosis similar to prior study      ED Treatment:   Medication Administration from 03/07/2019 2016 to 03/08/2019 0006       Date/Time Order Dose Route Action Comments     03/07/2019 2040  EMS REPLENISHMENT MED 0  Does not apply Given to EMS      03/07/2019 2259 sodium chloride 0 9 % bolus 1,000 mL 0 mL Intravenous Stopped      03/07/2019 2028 sodium chloride 0 9 % bolus 1,000 mL 1,000 mL Intravenous New Bag      03/07/2019 2032 ondansetron (ZOFRAN) injection 4 mg 4 mg Intravenous Given      03/07/2019 2202 HYDROmorphone (DILAUDID) injection 0 5 mg 0 5 mg Intravenous Given      03/07/2019 2229 iohexol (OMNIPAQUE) 350 MG/ML injection (SINGLE-DOSE) 100 mL 100 mL Intravenous Given      03/07/2019 2258 HYDROmorphone (DILAUDID) injection 0 5 mg 0 5 mg Intravenous Given         Past Medical/Surgical History: admit 2/16/2019- 2/21/2019 acute on chronic blood loss anemia  Past Medical History:   Diagnosis Date    Anemia     Anxiety     [facility] ([facility] )     Chronic pain disorder     Cirrhosis ([place] [address] )     Constipation     Current use of long term anticoagulation     Diabetes mellitus ([facility] )     Drug dependence ([facility] )     GI bleed     Hepatocellular carcinoma ([place] [address] )     Hypertension     Hypokalemia 3/7/2019    Hyponatremia     Liver disease     Melena     Portal vein thrombosis      Admitting Diagnosis: Abdominal pain [R10 9]  Anemia [D64 9]  Generalized abdominal pain [R10 84]  Ascites [R18 8]  Age/Sex: 76 y o  female     Admission Orders:  3/7/2019  2332 INPATIENT   Scheduled Meds:   Current Facility-Administered Medications:  acetaminophen 650 mg Oral Q6H PRN   escitalopram 10 mg Oral Daily   furosemide 40 mg Oral Daily   gabapentin 300 mg Oral Daily   gabapentin 900 mg Oral HS   HYDROmorphone 1 mg Intravenous Q4H PRN   insulin lispro 1-6 Units Subcutaneous 4x Daily (AC & HS)   losartan 100 mg Oral Daily   nadolol 20 mg Oral Daily   nicotine 1 patch Transdermal Daily   ondansetron 4 mg Intravenous Q6H PRN   oxyCODONE-acetaminophen 1 tablet Oral Q6H PRN   oxyCODONE-acetaminophen 2 tablet Oral Q6H PRN   pantoprazole 40 mg Intravenous Q12H [address]   potassium chloride 40 mEq Oral Once   senna 2 tablet Oral Daily   spironolactone 100 mg Oral Daily     Continuous Infusions:    PRN Meds:     HYDROmorphone  1 mg IV - used x 2 (0400, 0924)    Ondansetron 4 mg IV - used x 1      oxyCODONE-acetaminophen 2 tabs - used x 2       OTHER ORDERS:  Fingerstick glucose qid  scds  hgb and hct q 6h  Stool occult blood  NPO  Transfuse 2 units PRBC    Network Utilization Review Department  Phone: [phone]; Fax [phone]  [email or web address]  ATTENTION: Please call with any questions or concerns to [phone]  and carefully listen to the prompts so that you are directed to the right person  Send all requests for admission clinical reviews, approved or denied determinations and any other requests to fax [phone]   All voicemails are confidential

## 2019-03-09 LAB
ALBUMIN SERPL BCP-MCNC: 2.8 G/DL (ref 3.5–5)
ALP SERPL-CCNC: 142 U/L (ref 46–116)
ALT SERPL W P-5'-P-CCNC: 70 U/L (ref 12–78)
ANION GAP SERPL CALCULATED.3IONS-SCNC: 12 MMOL/L (ref 4–13)
AST SERPL W P-5'-P-CCNC: 181 U/L (ref 5–45)
BILIRUB SERPL-MCNC: 1.7 MG/DL (ref 0.2–1)
BUN SERPL-MCNC: 4 MG/DL (ref 5–25)
CALCIUM SERPL-MCNC: 8.9 MG/DL (ref 8.3–10.1)
CHLORIDE SERPL-SCNC: 101 MMOL/L (ref 100–108)
CO2 SERPL-SCNC: 23 MMOL/L (ref 21–32)
CREAT SERPL-MCNC: 0.77 MG/DL (ref 0.6–1.3)
ERYTHROCYTE [DISTWIDTH] IN BLOOD BY AUTOMATED COUNT: 18.8 % (ref 11.6–15.1)
EST. AVERAGE GLUCOSE BLD GHB EST-MCNC: 108 MG/DL
GFR SERPL CREATININE-BSD FRML MDRD: 92 ML/MIN/1.73SQ M
GLUCOSE SERPL-MCNC: 111 MG/DL (ref 65–140)
GLUCOSE SERPL-MCNC: 111 MG/DL (ref 65–140)
GLUCOSE SERPL-MCNC: 139 MG/DL (ref 65–140)
GLUCOSE SERPL-MCNC: 169 MG/DL (ref 65–140)
GLUCOSE SERPL-MCNC: 188 MG/DL (ref 65–140)
HBA1C MFR BLD: 5.4 % (ref 4.2–6.3)
HCT VFR BLD AUTO: 37.3 % (ref 34.8–46.1)
HGB BLD-MCNC: 12 G/DL (ref 11.5–15.4)
INR PPP: 1.19 (ref 0.86–1.17)
MCH RBC QN AUTO: 27.7 PG (ref 26.8–34.3)
MCHC RBC AUTO-ENTMCNC: 32.2 G/DL (ref 31.4–37.4)
MCV RBC AUTO: 86 FL (ref 82–98)
PLATELET # BLD AUTO: 145 THOUSANDS/UL (ref 149–390)
PMV BLD AUTO: 9.5 FL (ref 8.9–12.7)
POTASSIUM SERPL-SCNC: 3.2 MMOL/L (ref 3.5–5.3)
PROT SERPL-MCNC: 6.9 G/DL (ref 6.4–8.2)
PROTHROMBIN TIME: 14.8 SECONDS (ref 11.8–14.2)
RBC # BLD AUTO: 4.33 MILLION/UL (ref 3.81–5.12)
SODIUM SERPL-SCNC: 136 MMOL/L (ref 136–145)
WBC # BLD AUTO: 12.26 THOUSAND/UL (ref 4.31–10.16)

## 2019-03-09 PROCEDURE — 99232 SBSQ HOSP IP/OBS MODERATE 35: CPT | Performed by: HOSPITALIST

## 2019-03-09 PROCEDURE — 83036 HEMOGLOBIN GLYCOSYLATED A1C: CPT | Performed by: HOSPITALIST

## 2019-03-09 PROCEDURE — C9113 INJ PANTOPRAZOLE SODIUM, VIA: HCPCS | Performed by: PHYSICIAN ASSISTANT

## 2019-03-09 PROCEDURE — 85610 PROTHROMBIN TIME: CPT | Performed by: HOSPITALIST

## 2019-03-09 PROCEDURE — 80053 COMPREHEN METABOLIC PANEL: CPT | Performed by: HOSPITALIST

## 2019-03-09 PROCEDURE — 82948 REAGENT STRIP/BLOOD GLUCOSE: CPT

## 2019-03-09 PROCEDURE — 85027 COMPLETE CBC AUTOMATED: CPT | Performed by: HOSPITALIST

## 2019-03-09 RX ORDER — POTASSIUM CHLORIDE 20 MEQ/1
40 TABLET, EXTENDED RELEASE ORAL ONCE
Status: COMPLETED | OUTPATIENT
Start: 2019-03-09 | End: 2019-03-09

## 2019-03-09 RX ADMIN — MORPHINE SULFATE 2 MG: 2 INJECTION, SOLUTION INTRAMUSCULAR; INTRAVENOUS at 15:54

## 2019-03-09 RX ADMIN — OXYCODONE HYDROCHLORIDE 10 MG: 10 TABLET ORAL at 03:50

## 2019-03-09 RX ADMIN — INSULIN LISPRO 1 UNITS: 100 INJECTION, SOLUTION INTRAVENOUS; SUBCUTANEOUS at 16:37

## 2019-03-09 RX ADMIN — MORPHINE SULFATE 2 MG: 2 INJECTION, SOLUTION INTRAMUSCULAR; INTRAVENOUS at 20:17

## 2019-03-09 RX ADMIN — NADOLOL 20 MG: 40 TABLET ORAL at 09:15

## 2019-03-09 RX ADMIN — CEFTRIAXONE SODIUM 2000 MG: 2 INJECTION, POWDER, FOR SOLUTION INTRAMUSCULAR; INTRAVENOUS at 15:02

## 2019-03-09 RX ADMIN — OXYCODONE HYDROCHLORIDE 10 MG: 10 TABLET ORAL at 08:17

## 2019-03-09 RX ADMIN — GABAPENTIN 900 MG: 300 CAPSULE ORAL at 21:43

## 2019-03-09 RX ADMIN — FUROSEMIDE 40 MG: 40 TABLET ORAL at 08:01

## 2019-03-09 RX ADMIN — NICOTINE 1 PATCH: 21 PATCH, EXTENDED RELEASE TRANSDERMAL at 08:01

## 2019-03-09 RX ADMIN — PANTOPRAZOLE SODIUM 40 MG: 40 INJECTION, POWDER, FOR SOLUTION INTRAVENOUS at 08:00

## 2019-03-09 RX ADMIN — STANDARDIZED SENNA CONCENTRATE 17.2 MG: 8.6 TABLET ORAL at 08:01

## 2019-03-09 RX ADMIN — OXYCODONE HYDROCHLORIDE 10 MG: 10 TABLET ORAL at 19:23

## 2019-03-09 RX ADMIN — HYDROMORPHONE HYDROCHLORIDE 1 MG: 1 INJECTION, SOLUTION INTRAMUSCULAR; INTRAVENOUS; SUBCUTANEOUS at 11:47

## 2019-03-09 RX ADMIN — POTASSIUM CHLORIDE 40 MEQ: 1500 TABLET, EXTENDED RELEASE ORAL at 14:18

## 2019-03-09 RX ADMIN — INSULIN LISPRO 1 UNITS: 100 INJECTION, SOLUTION INTRAVENOUS; SUBCUTANEOUS at 11:47

## 2019-03-09 RX ADMIN — ESCITALOPRAM OXALATE 10 MG: 10 TABLET ORAL at 08:04

## 2019-03-09 RX ADMIN — OXYCODONE HYDROCHLORIDE 10 MG: 10 TABLET ORAL at 14:17

## 2019-03-09 RX ADMIN — PANTOPRAZOLE SODIUM 40 MG: 40 INJECTION, POWDER, FOR SOLUTION INTRAVENOUS at 21:42

## 2019-03-09 RX ADMIN — GABAPENTIN 300 MG: 300 CAPSULE ORAL at 08:01

## 2019-03-09 RX ADMIN — MORPHINE SULFATE 2 MG: 2 INJECTION, SOLUTION INTRAMUSCULAR; INTRAVENOUS at 09:20

## 2019-03-09 RX ADMIN — LOSARTAN POTASSIUM 100 MG: 50 TABLET, FILM COATED ORAL at 08:01

## 2019-03-09 RX ADMIN — SPIRONOLACTONE 100 MG: 100 TABLET ORAL at 08:01

## 2019-03-09 NOTE — PROGRESS NOTES
Attempted to change pts EMS IV line d/t policy  Was unable to establish another IV site  Pt refusing anyone to try again at this time  Let CARLITOSIM HERILNDA Shine aware  Will re-attempt at a later time, if pt cooperative  No issues with current EMS line

## 2019-03-09 NOTE — PLAN OF CARE
Problem: Potential for Falls  Goal: Patient will remain free of falls  Description  INTERVENTIONS:  - Assess patient frequently for physical needs  -  Identify cognitive and physical deficits and behaviors that affect risk of falls    -  [place] fall precautions as indicated by assessment   - Educate patient/family on patient safety including physical limitations  - Instruct patient to call for assistance with activity based on assessment  - Modify environment to reduce risk of injury  - Consider OT/PT consult to assist with strengthening/mobility  Outcome: Progressing     Problem: METABOLIC, FLUID AND ELECTROLYTES - ADULT  Goal: Electrolytes maintained within normal limits  Description  INTERVENTIONS:  - Monitor labs and assess patient for signs and symptoms of electrolyte imbalances  - Administer electrolyte replacement as ordered  - Monitor response to electrolyte replacements, including repeat lab results as appropriate  - Instruct patient on fluid and nutrition as appropriate  Outcome: Progressing  Goal: Fluid balance maintained  Description  INTERVENTIONS:  - Monitor labs and assess for signs and symptoms of volume excess or deficit  - Monitor I/O and WT  - Instruct patient on fluid and nutrition as appropriate  Outcome: Progressing  Goal: Glucose maintained within target range  Description  INTERVENTIONS:  - Monitor Blood Glucose as ordered  - Assess for signs and symptoms of hyperglycemia and hypoglycemia  - Administer ordered medications to maintain glucose within target range  - Assess nutritional intake and initiate nutrition service referral as needed  Outcome: Progressing     Problem: HEMATOLOGIC - ADULT  Goal: Maintains hematologic stability  Description  INTERVENTIONS  - Assess for signs and symptoms of bleeding or hemorrhage  - Monitor labs  - Administer supportive blood products/factors as ordered and appropriate  Outcome: Progressing

## 2019-03-09 NOTE — PLAN OF CARE
Problem: Potential for Falls  Goal: Patient will remain free of falls  Description  INTERVENTIONS:  - Assess patient frequently for physical needs  -  Identify cognitive and physical deficits and behaviors that affect risk of falls    -  [place] fall precautions as indicated by assessment   - Educate patient/family on patient safety including physical limitations  - Instruct patient to call for assistance with activity based on assessment  - Modify environment to reduce risk of injury  - Consider OT/PT consult to assist with strengthening/mobility  Outcome: Progressing     Problem: METABOLIC, FLUID AND ELECTROLYTES - ADULT  Goal: Electrolytes maintained within normal limits  Description  INTERVENTIONS:  - Monitor labs and assess patient for signs and symptoms of electrolyte imbalances  - Administer electrolyte replacement as ordered  - Monitor response to electrolyte replacements, including repeat lab results as appropriate  - Instruct patient on fluid and nutrition as appropriate  Outcome: Progressing  Goal: Fluid balance maintained  Description  INTERVENTIONS:  - Monitor labs and assess for signs and symptoms of volume excess or deficit  - Monitor I/O and WT  - Instruct patient on fluid and nutrition as appropriate  Outcome: Progressing  Goal: Glucose maintained within target range  Description  INTERVENTIONS:  - Monitor Blood Glucose as ordered  - Assess for signs and symptoms of hyperglycemia and hypoglycemia  - Administer ordered medications to maintain glucose within target range  - Assess nutritional intake and initiate nutrition service referral as needed  Outcome: Progressing     Problem: HEMATOLOGIC - ADULT  Goal: Maintains hematologic stability  Description  INTERVENTIONS  - Assess for signs and symptoms of bleeding or hemorrhage  - Monitor labs  - Administer supportive blood products/factors as ordered and appropriate  Outcome: Progressing

## 2019-03-09 NOTE — PROGRESS NOTES
Progress Note - Gilson De La Cruz 1950, 76 y o  female MRN: [id number]    Unit/Bed#: -01 Encounter: [id number]    Primary Care Provider: Beth Marte DO   Date and time admitted to hospital: 3/7/2019  8:16 PM    * Abdominal pain  Assessment & Plan  · Presents with abdominal pain/distention, N/V since noon on Thursday  · CT Ab/Pelvis- "Soft tissue density throughout the valeriano hepatis and surrounding the pancreatic head similar to prior study  Interval development of a moderate amount of free fluid throughout the abdomen and pelvis  Thickening of the gastric vall which may be due to surrounding free fluid vs gastritis  Hepatic cirrhosis similar to prior study "  · Ascites likely secondary to hepatocellular carcinoma  · Antiemetics  · Pain management  · Consult IR for paracentesis  3/9:  S/p paracentesis with 1400 cc of fluid removal  SAAG of 2 2 - Portal HTN is likely the cause of ascites  Given her symptoms of pain, abdominal examination with diffuse tenderness, cannot r/o SBP  Will start Abx with Rocephin 2g IV qd  GI consultation for their expert opinion  Continue with Lasix and Aldactone  Electrolyte correction as necessary    Hypokalemia  Assessment & Plan  · supplementation    Tobacco abuse  Assessment & Plan  · Smokes 1ppd  · Nicotine patch  · Encourage cessation  Portal vein thrombosis  Assessment & Plan  · Anticoagulated on Eliquis- on hold secondary to recent variceal bleeding  Acute on chronic blood loss anemia (HCC)  Assessment & Plan  · Hgb noted to be 6 7  · [facility] admitted [facility] 2/16-2/21/19 due to acute on chronic blood loss anemia  Hgb initially 4 3- received 2 units PRBCs  · EGD 2/18/19 revealed "esophageal varices with stigmata of recent bleeding banded, portal hypertensive gastropathy, small duodenal polyp"  · Placed on Protonix IV 40mg BID, octreotide drip x3 days, 1g Rocephin daily x5 days (discharged on Cipro)    · Eliquis held during admission and upon discharge  · Hgb 8 7 upon discharge on 2/21/19  · Denies any hematochezia/melena  · Check occult stools  · Blood consent obtained by ED physician- 2 units PRBCs ordered  · Trend H&H   · Continue Protonix BID  · Consider GI consult if occult positive  3/9:  H&H stable    Opioid dependence ([facility] [address] )  Assessment & Plan  · PDMP reviewed:  · Continue home oxycodone-acetaminophen  q6h PRN  · Last filled 2/28/19- 120 tablets for 30 day supply  · Follows with pain management as outpatient  Hypertension  Assessment & Plan  · /70  · Continue nadolol, losartan, Lasix, spironolactone  [facility]  Assessment & Plan  · S/p ablation and wedge resection 2015  S/p SIRS spheres radioembolization December 2017  · Previously followed with Dr Tamar Talbot as outpatient, but has not followed up since March 2018  · Encourage outpatient follow up  Diabetes mellitus, type II [facility])  Assessment & Plan  Lab Results   Component Value Date    HGBA1C 5 4 03/09/2019       Recent Labs     03/08/19  1621 03/08/19  2057 03/09/19  0738 03/09/19  1139   POCGLU 101 99 111 169*       Blood Sugar Average: Last 72 hrs:  (P) 115 [id number] 125 on arrival   SSI coverage  VTE Pharmacologic Prophylaxis:   Mechanical VTE Prophylaxis in Place: Yes; scd's    Education and Discussions with Family / Patient: Case discussed with  at bedside  Time Spent for Care: 30 minutes  More than 50% of total time spent on counseling and coordination of care as described above      Current Length of Stay: 2 day(s)    Current Patient Status: Inpatient   Certification Statement: The patient will continue to require additional inpatient hospital stay due to continued symptoms of ascites and abdominal pain    Discharge Plan: home     Code Status: Level 1 - Full Code      Subjective:   Patient seen and examined; mild painful distress from abdominal pain  Abdomen still distended despite 1 4L of ascitic fluid removal  Still reporting abdominal pain  Will consult GI    Objective:     Vitals:   Temp (24hrs), Av 9 °F (37 2 °C), Min:97 6 °F (36 4 °C), Max:99 7 °F (37 6 °C)    Temp:  [97 6 °F (36 4 °C)-99 7 °F (37 6 °C)] 97 6 °F (36 4 °C)  HR:  [50-60] 60  Resp:  [18] 18  BP: (135-163)/(63-80) 137/71  SpO2:  [91 %-96 %] 93 %  Body mass index is 28 34 kg/m²  Input and Output Summary (last 24 hours): Intake/Output Summary (Last 24 hours) at 3/9/2019 1412  Last data filed at 3/8/2019 2146  Gross per 24 hour   Intake --   Output 2400 ml   Net -2400 ml       Physical Exam:     Physical Exam  Gen -Patient with mild painful distress  Neck- Supple  No thyromegaly or lymphadenopathy  Lungs-Clear bilaterally without any wheeze or rales   Heart S1-S2, regular rate and rhythm, no murmurs  Abdomen- abdominal distension with fluid thrill  Diffuse tenderness on palpation  Bowel sounds present  Extremities-no cyanosis, clubbing or edema  Skin- no rash  Neuro-nonfocal     Additional Data:     Labs:    Results from last 7 days   Lab Units 19   WBC Thousand/uL 12 26*  --  12 77*   HEMOGLOBIN g/dL 12 0   < > 6 7*   HEMATOCRIT % 37 3   < > 20 9*   PLATELETS Thousands/uL 145*  --  230   NEUTROS PCT %  --   --  77*   LYMPHS PCT %  --   --  14   MONOS PCT %  --   --  7   EOS PCT %  --   --  1    < > = values in this interval not displayed       Results from last 7 days   Lab Units 19  0455   SODIUM mmol/L 136   POTASSIUM mmol/L 3 2*   CHLORIDE mmol/L 101   CO2 mmol/L 23   BUN mg/dL 4*   CREATININE mg/dL 0 77   ANION GAP mmol/L 12   CALCIUM mg/dL 8 9   ALBUMIN g/dL 2 8*   TOTAL BILIRUBIN mg/dL 1 70*   ALK PHOS U/L 142*   ALT U/L 70   AST U/L 181*   GLUCOSE RANDOM mg/dL 111     Results from last 7 days   Lab Units 19  0455   INR  1 19*     Results from last 7 days   Lab Units 19  1139 19  0738 19  2057 19  1621 19  1129 19  0711 19  0108   POC GLUCOSE mg/dl 169* 111 99 101 99 106 123     Results from last 7 days   Lab Units 03/09/19  0455   HEMOGLOBIN A1C % 5 4               * I Have Reviewed All Lab Data Listed Above  * Additional Pertinent Lab Tests Reviewed: All Labs Within Last 24 Hours Reviewed    Recent Cultures (last 7 days):     Results from last 7 days   Lab Units 03/08/19  1601   GRAM STAIN RESULT  Rare Polys  Rare Mononuclear Cells  No bacteria seen   BODY FLUID CULTURE, STERILE  No growth       Last 24 Hours Medication List:     Current Facility-Administered Medications:  cefTRIAXone 2,000 mg Intravenous Q24H Doe Carter MD   escitalopram 10 mg Oral Daily Leocadia Sharpmónica, ANUSHA   furosemide 40 mg Oral Daily Leocadia Sharpmónica, ANUSHA   gabapentin 300 mg Oral Daily Leocadia Sharpmónica, ANUSHA   gabapentin 900 mg Oral HS Leocadia ANUSHA Ballard   HYDROmorphone 1 mg Intravenous Q4H PRN Leocadia ANUSHA Ballard   insulin lispro 1-6 Units Subcutaneous 4x Daily (AC & HS) Leocadia ANUSHA Ballard   losartan 100 mg Oral Daily Leocadia ANUSHA Ballard   morphine injection 2 mg Intravenous Q4H PRN Doe Carter MD   nadolol 20 mg Oral Daily Leocadia ANUSHA Ballard   nicotine 1 patch Transdermal Daily Leocadia ANUSHA Ballard   ondansetron 4 mg Intravenous Q6H PRN Leocadia Elio, ANUSHA   oxyCODONE 10 mg Oral Q4H PRN Alana Shine PA-C   oxyCODONE 5 mg Oral Q4H PRN Alana Shine PA-C   pantoprazole 40 mg Intravenous Q12H [address] Alana Ge PA-C   potassium chloride 40 mEq Oral Once Alana Ge PA-C   potassium chloride 40 mEq Oral Once Doe Carter MD   senna 2 tablet Oral Daily Leocadia ANUSHA Ballard   spironolactone 100 mg Oral Daily Leocadia ANUSHA Ballard        Today, Patient Was Seen By: Doe Carter MD    ** Please Note: Dictation voice to text software may have been used in the creation of this document   **

## 2019-03-09 NOTE — ASSESSMENT & PLAN NOTE
Lab Results   Component Value Date    HGBA1C 5 4 03/09/2019       Recent Labs     03/08/19  1621 03/08/19  2057 03/09/19  0738 03/09/19  1139   POCGLU 101 99 111 169*       Blood Sugar Average: Last 72 hrs:  (P) 115 [id number] 125 on arrival   SSI coverage

## 2019-03-09 NOTE — ASSESSMENT & PLAN NOTE
· S/p ablation and wedge resection 2015  S/p SIRS spheres radioembolization December 2017  · Previously followed with Dr Pascale Hollins as outpatient, but has not followed up since March 2018  · Encourage outpatient follow up

## 2019-03-09 NOTE — ASSESSMENT & PLAN NOTE
· Presents with abdominal pain/distention, N/V since noon on Thursday  · CT Ab/Pelvis- "Soft tissue density throughout the valeriano hepatis and surrounding the pancreatic head similar to prior study  Interval development of a moderate amount of free fluid throughout the abdomen and pelvis  Thickening of the gastric vall which may be due to surrounding free fluid vs gastritis  Hepatic cirrhosis similar to prior study "  · Ascites likely secondary to hepatocellular carcinoma  · Antiemetics  · Pain management  · Consult IR for paracentesis  3/9:  S/p paracentesis with 1400 cc of fluid removal  SAAG of 2 2 - Portal HTN is likely the cause of ascites     Given her symptoms of pain, abdominal examination with diffuse tenderness, cannot r/o SBP  Will start Abx with Rocephin 2g IV qd  GI consultation for their expert opinion  Continue with Lasix and Aldactone  Electrolyte correction as necessary

## 2019-03-09 NOTE — ASSESSMENT & PLAN NOTE
· Hgb noted to be 6 7  · Recently admitted at [facility] AND [facility] 2/16-2/21/19 due to acute on chronic blood loss anemia  Hgb initially 4 3- received 2 units PRBCs  · EGD 2/18/19 revealed "esophageal varices with stigmata of recent bleeding banded, portal hypertensive gastropathy, small duodenal polyp"  · Placed on Protonix IV 40mg BID, octreotide drip x3 days, 1g Rocephin daily x5 days (discharged on Cipro)  · Eliquis held during admission and upon discharge  · Hgb 8 7 upon discharge on 2/21/19  · Denies any hematochezia/melena  · Check occult stools  · Blood consent obtained by ED physician- 2 units PRBCs ordered  · Trend H&H   · Continue Protonix BID  · Consider GI consult if occult positive      3/9:  H&H stable

## 2019-03-10 ENCOUNTER — APPOINTMENT (INPATIENT)
Dept: MRI IMAGING | Facility: HOSPITAL | Age: 69
DRG: 435 | End: 2019-03-10
Payer: COMMERCIAL

## 2019-03-10 PROBLEM — I85.10 SECONDARY ESOPHAGEAL VARICES WITHOUT BLEEDING (HCC): Status: ACTIVE | Noted: 2019-03-07

## 2019-03-10 PROBLEM — D64.9 ANEMIA: Status: ACTIVE | Noted: 2019-03-07

## 2019-03-10 LAB
ALBUMIN SERPL BCP-MCNC: 2.5 G/DL (ref 3.5–5)
ALP SERPL-CCNC: 117 U/L (ref 46–116)
ALT SERPL W P-5'-P-CCNC: 47 U/L (ref 12–78)
ANION GAP SERPL CALCULATED.3IONS-SCNC: 11 MMOL/L (ref 4–13)
AST SERPL W P-5'-P-CCNC: 78 U/L (ref 5–45)
ATRIAL RATE: 57 BPM
BILIRUB SERPL-MCNC: 1.4 MG/DL (ref 0.2–1)
BUN SERPL-MCNC: 9 MG/DL (ref 5–25)
CALCIUM SERPL-MCNC: 8.6 MG/DL (ref 8.3–10.1)
CHLORIDE SERPL-SCNC: 100 MMOL/L (ref 100–108)
CO2 SERPL-SCNC: 24 MMOL/L (ref 21–32)
CREAT SERPL-MCNC: 0.76 MG/DL (ref 0.6–1.3)
ERYTHROCYTE [DISTWIDTH] IN BLOOD BY AUTOMATED COUNT: 18.5 % (ref 11.6–15.1)
GFR SERPL CREATININE-BSD FRML MDRD: 93 ML/MIN/1.73SQ M
GLUCOSE SERPL-MCNC: 108 MG/DL (ref 65–140)
GLUCOSE SERPL-MCNC: 123 MG/DL (ref 65–140)
GLUCOSE SERPL-MCNC: 138 MG/DL (ref 65–140)
GLUCOSE SERPL-MCNC: 141 MG/DL (ref 65–140)
GLUCOSE SERPL-MCNC: 149 MG/DL (ref 65–140)
HCT VFR BLD AUTO: 34.9 % (ref 34.8–46.1)
HGB BLD-MCNC: 11.3 G/DL (ref 11.5–15.4)
MCH RBC QN AUTO: 28.1 PG (ref 26.8–34.3)
MCHC RBC AUTO-ENTMCNC: 32.4 G/DL (ref 31.4–37.4)
MCV RBC AUTO: 87 FL (ref 82–98)
P AXIS: 44 DEGREES
PLATELET # BLD AUTO: 145 THOUSANDS/UL (ref 149–390)
PMV BLD AUTO: 9.7 FL (ref 8.9–12.7)
POTASSIUM SERPL-SCNC: 3.7 MMOL/L (ref 3.5–5.3)
PR INTERVAL: 138 MS
PROT SERPL-MCNC: 6.5 G/DL (ref 6.4–8.2)
QRS AXIS: -16 DEGREES
QRSD INTERVAL: 80 MS
QT INTERVAL: 458 MS
QTC INTERVAL: 445 MS
RBC # BLD AUTO: 4.02 MILLION/UL (ref 3.81–5.12)
SODIUM SERPL-SCNC: 135 MMOL/L (ref 136–145)
T WAVE AXIS: 26 DEGREES
VENTRICULAR RATE: 57 BPM
WBC # BLD AUTO: 13.43 THOUSAND/UL (ref 4.31–10.16)

## 2019-03-10 PROCEDURE — 82948 REAGENT STRIP/BLOOD GLUCOSE: CPT

## 2019-03-10 PROCEDURE — 80053 COMPREHEN METABOLIC PANEL: CPT | Performed by: HOSPITALIST

## 2019-03-10 PROCEDURE — C9113 INJ PANTOPRAZOLE SODIUM, VIA: HCPCS | Performed by: PHYSICIAN ASSISTANT

## 2019-03-10 PROCEDURE — 99222 1ST HOSP IP/OBS MODERATE 55: CPT | Performed by: INTERNAL MEDICINE

## 2019-03-10 PROCEDURE — 93005 ELECTROCARDIOGRAM TRACING: CPT

## 2019-03-10 PROCEDURE — 93010 ELECTROCARDIOGRAM REPORT: CPT | Performed by: INTERNAL MEDICINE

## 2019-03-10 PROCEDURE — 99232 SBSQ HOSP IP/OBS MODERATE 35: CPT | Performed by: HOSPITALIST

## 2019-03-10 PROCEDURE — 74183 MRI ABD W/O CNTR FLWD CNTR: CPT

## 2019-03-10 PROCEDURE — 85027 COMPLETE CBC AUTOMATED: CPT | Performed by: HOSPITALIST

## 2019-03-10 PROCEDURE — A9585 GADOBUTROL INJECTION: HCPCS | Performed by: PHYSICIAN ASSISTANT

## 2019-03-10 RX ORDER — POLYETHYLENE GLYCOL 3350 17 G/17G
17 POWDER, FOR SOLUTION ORAL DAILY
Status: DISCONTINUED | OUTPATIENT
Start: 2019-03-10 | End: 2019-03-11 | Stop reason: HOSPADM

## 2019-03-10 RX ORDER — FAMOTIDINE 40 MG/5ML
20 POWDER, FOR SUSPENSION ORAL 2 TIMES DAILY
Status: DISCONTINUED | OUTPATIENT
Start: 2019-03-10 | End: 2019-03-11 | Stop reason: HOSPADM

## 2019-03-10 RX ORDER — LORAZEPAM 2 MG/ML
1 INJECTION INTRAMUSCULAR ONCE
Status: COMPLETED | OUTPATIENT
Start: 2019-03-10 | End: 2019-03-10

## 2019-03-10 RX ADMIN — HYDROMORPHONE HYDROCHLORIDE 1 MG: 1 INJECTION, SOLUTION INTRAMUSCULAR; INTRAVENOUS; SUBCUTANEOUS at 22:24

## 2019-03-10 RX ADMIN — FAMOTIDINE 20 MG: 40 POWDER, FOR SUSPENSION ORAL at 22:25

## 2019-03-10 RX ADMIN — OXYCODONE HYDROCHLORIDE 10 MG: 10 TABLET ORAL at 04:32

## 2019-03-10 RX ADMIN — ESCITALOPRAM OXALATE 10 MG: 10 TABLET ORAL at 08:25

## 2019-03-10 RX ADMIN — LORAZEPAM 1 MG: 2 INJECTION INTRAMUSCULAR; INTRAVENOUS at 12:50

## 2019-03-10 RX ADMIN — MORPHINE SULFATE 2 MG: 2 INJECTION, SOLUTION INTRAMUSCULAR; INTRAVENOUS at 14:45

## 2019-03-10 RX ADMIN — GABAPENTIN 300 MG: 300 CAPSULE ORAL at 08:25

## 2019-03-10 RX ADMIN — GABAPENTIN 900 MG: 300 CAPSULE ORAL at 22:24

## 2019-03-10 RX ADMIN — POLYETHYLENE GLYCOL 3350 17 G: 17 POWDER, FOR SOLUTION ORAL at 13:50

## 2019-03-10 RX ADMIN — MORPHINE SULFATE 2 MG: 2 INJECTION, SOLUTION INTRAMUSCULAR; INTRAVENOUS at 07:37

## 2019-03-10 RX ADMIN — CEFTRIAXONE SODIUM 2000 MG: 2 INJECTION, POWDER, FOR SOLUTION INTRAMUSCULAR; INTRAVENOUS at 13:49

## 2019-03-10 RX ADMIN — NICOTINE 1 PATCH: 21 PATCH, EXTENDED RELEASE TRANSDERMAL at 08:27

## 2019-03-10 RX ADMIN — OXYCODONE HYDROCHLORIDE 10 MG: 10 TABLET ORAL at 11:54

## 2019-03-10 RX ADMIN — FAMOTIDINE 20 MG: 40 POWDER, FOR SUSPENSION ORAL at 13:50

## 2019-03-10 RX ADMIN — OXYCODONE HYDROCHLORIDE 10 MG: 10 TABLET ORAL at 19:41

## 2019-03-10 RX ADMIN — PANTOPRAZOLE SODIUM 40 MG: 40 INJECTION, POWDER, FOR SOLUTION INTRAVENOUS at 08:25

## 2019-03-10 RX ADMIN — STANDARDIZED SENNA CONCENTRATE 17.2 MG: 8.6 TABLET ORAL at 08:25

## 2019-03-10 RX ADMIN — GADOBUTROL 7 ML: 604.72 INJECTION INTRAVENOUS at 13:44

## 2019-03-10 NOTE — PROGRESS NOTES
Progress Note - Tianna Lamar 1950, 76 y o  female MRN: [id number]    Unit/Bed#: -01 Encounter: [id number]    Primary Care Provider: Rodney Olmedo DO   Date and time admitted to hospital: 3/7/2019  8:16 PM        * Abdominal pain  Assessment & Plan  · Presents with abdominal pain/distention, N/V since noon on Thursday  · CT Ab/Pelvis- "Soft tissue density throughout the valeriano hepatis and surrounding the pancreatic head similar to prior study  Interval development of a moderate amount of free fluid throughout the abdomen and pelvis  Thickening of the gastric vall which may be due to surrounding free fluid vs gastritis  Hepatic cirrhosis similar to prior study "  · Ascites likely secondary to hepatocellular carcinoma  · Antiemetics  · Pain management  · Consult IR for paracentesis  3/9:  S/p paracentesis with 1400 cc of fluid removal  SAAG of 2 2 - Portal HTN is likely the cause of ascites  Given her symptoms of pain, abdominal examination with diffuse tenderness, cannot r/o SBP  Will start Abx with Rocephin 2g IV qd  GI consultation for their expert opinion  Continue with Lasix and Aldactone  Electrolyte correction as necessary    3/10  Still having some abdominal discomfort, but overall better  Likely has SBP  On empiric abx  GI to eval    Hepatocellular carcinoma ([address] )  Assessment & Plan  · S/p ablation and wedge resection   S/p SIRS spheres radioembolization 2017  · Previously followed with Dr Patel Held as outpatient, but has not followed up since 2018  · Encourage outpatient follow up  Portal vein thrombosis  Assessment & Plan  · Anticoagulated on Eliquis- on hold secondary to recent variceal bleeding  Subjective:   Says abdominal pain is a little better  But abdomen still feels "full"  It has improved since paracentesis  No fever or chill        Objective:     Vitals:   Temp (24hrs), Av 5 °F (36 9 °C), Min:97 8 °F (36 6 °C), Max:98 9 °F (37 2 °C)    Temp:  [97 8 °F (36 6 °C)-98 9 °F (37 2 °C)] 98 7 °F (37 1 °C)  HR:  [57-59] 57  Resp:  [17-18] 18  BP: (109-150)/(62-70) 109/66  SpO2:  [94 %-97 %] 97 %  Body mass index is 28 34 kg/m²  Input and Output Summary (last 24 hours): Intake/Output Summary (Last 24 hours) at 3/10/2019 1103  Last data filed at 3/10/2019 0630  Gross per 24 hour   Intake 240 ml   Output --   Net 240 ml       Physical Exam:     Physical Exam   HENT:   Head: Normocephalic and atraumatic  Eyes: Pupils are equal, round, and reactive to light  EOM are normal    Cardiovascular: Normal rate and regular rhythm  Exam reveals no gallop and no friction rub  No murmur heard  Pulmonary/Chest: Effort normal and breath sounds normal  She has no wheezes  She has no rales  Abdominal: Soft  Bowel sounds are normal  She exhibits distension (moderate distention with ascites)  There is tenderness (only mildly tender all quadrants  )  There is no rebound and no guarding  Musculoskeletal: She exhibits no edema  Nursing note and vitals reviewed  Additional Data:     Labs:    Results from last 7 days   Lab Units 03/10/19  0608  03/07/19 2028   WBC Thousand/uL 13 43*   < > 12 77*   HEMOGLOBIN g/dL 11 3*   < > 6 7*   HEMATOCRIT % 34 9   < > 20 9*   PLATELETS Thousands/uL 145*   < > 230   NEUTROS PCT %  --   --  77*   LYMPHS PCT %  --   --  14   MONOS PCT %  --   --  7   EOS PCT %  --   --  1    < > = values in this interval not displayed       Results from last 7 days   Lab Units 03/10/19  0608   POTASSIUM mmol/L 3 7   CHLORIDE mmol/L 100   CO2 mmol/L 24   BUN mg/dL 9   CREATININE mg/dL 0 76   CALCIUM mg/dL 8 6   ALK PHOS U/L 117*   ALT U/L 47   AST U/L 78*     Results from last 7 days   Lab Units 03/09/19  0455   INR  1 19*     Results from last 7 days   Lab Units 03/10/19  0752 03/09/19  2112 03/09/19  1635 03/09/19  1139 03/09/19  0738 03/08/19  2057 03/08/19  1621 03/08/19  1129 03/08/19  0711 03/08/19  0108   POC GLUCOSE mg/dl 149* 139 188* 169* 111 99 101 99 106 123     Results from last 7 days   Lab Units 03/09/19  0455   HEMOGLOBIN A1C % 5 4           * I Have Reviewed All Lab Data     Recent Cultures (last 7 days):     Results from last 7 days   Lab Units 03/08/19  1601   GRAM STAIN RESULT  Rare Polys  Rare Mononuclear Cells  No bacteria seen   BODY FLUID CULTURE, STERILE  No growth         Last 24 Hours Medication List:     Current Facility-Administered Medications:  cefTRIAXone 2,000 mg Intravenous Q24H Marlene Barrera MD Last Rate: 2,000 mg (03/09/19 1502)   escitalopram 10 mg Oral Daily Mariama Phy, PA-C    furosemide 40 mg Oral Daily Mariama Phy, PA-C    gabapentin 300 mg Oral Daily Mariama Phy, PA-C    gabapentin 900 mg Oral HS Mariama Phy, PA-C    HYDROmorphone 1 mg Intravenous Q4H PRN Mariama Phy, PA-C    insulin lispro 1-6 Units Subcutaneous 4x Daily (AC & HS) Mariama Phy, PA-C    losartan 100 mg Oral Daily Mariama Phy, PA-C    morphine injection 2 mg Intravenous Q4H PRN Marlene Barrera MD    nadolol 20 mg Oral Daily Mariama Phy, PA-C    nicotine 1 patch Transdermal Daily Mariama Phy, PA-C    ondansetron 4 mg Intravenous Q6H PRN Mariama Phy, PA-C    oxyCODONE 10 mg Oral Q4H PRN HERLINDA Bowles-MARIAM    oxyCODONE 5 mg Oral Q4H PRN Alana Shine, PA-C    pantoprazole 40 mg Intravenous Q12H [address] Alana Ge PA-C    potassium chloride 40 mEq Oral Once Mariama Phy, PA-C    senna 2 tablet Oral Daily Mariama Phy, PA-C    spironolactone 100 mg Oral Daily Mariama Phy, PA-C          VTE Pharmacologic Prophylaxis:   Pharmacologic: NONE with variceal bleeding        Current Length of Stay: 3 day(s)    Current Patient Status: Inpatient       Discharge Plan:     Code Status: Level 1 - Full Code           Today, Patient Was Seen By: Josse Zimmerman DO    ** Please Note: Dictation voice to text software may have been used in the creation of this document   **

## 2019-03-10 NOTE — ASSESSMENT & PLAN NOTE
· S/p ablation and wedge resection 2015  S/p SIRS spheres radioembolization December 2017  · Previously followed with Dr Brett Onofre as outpatient, but has not followed up since March 2018  · Encourage outpatient follow up

## 2019-03-10 NOTE — ASSESSMENT & PLAN NOTE
· Presents with abdominal pain/distention, N/V since noon on Thursday  · CT Ab/Pelvis- "Soft tissue density throughout the valeriano hepatis and surrounding the pancreatic head similar to prior study  Interval development of a moderate amount of free fluid throughout the abdomen and pelvis  Thickening of the gastric vall which may be due to surrounding free fluid vs gastritis  Hepatic cirrhosis similar to prior study "  · Ascites likely secondary to hepatocellular carcinoma  · Antiemetics  · Pain management  · Consult IR for paracentesis  3/9:  S/p paracentesis with 1400 cc of fluid removal  SAAG of 2 2 - Portal HTN is likely the cause of ascites     Given her symptoms of pain, abdominal examination with diffuse tenderness, cannot r/o SBP  Will start Abx with Rocephin 2g IV qd  GI consultation for their expert opinion  Continue with Lasix and Aldactone  Electrolyte correction as necessary    3/10  Still having some abdominal discomfort, but overall better  Likely has SBP  On empiric abx  GI to eval

## 2019-03-10 NOTE — CONSULTS
Consultation - [address] [facility]  Dami Toure 76 y o  female MRN: [id number]  Unit/Bed#: -01 Encounter: [id number]        Consults    Reason for Consult / Principal Problem: Abdominal Distention/Pain    HPI: Ms Jared Kent is a 77 yo F with a PMH of compensated Hep C Cirrhosis, [address]  diagnosed in 2015 s/p ablation and wedge resection with Dr Steve Aldrich and then recurrence s/p SIRS spheres in December 2017 with good response but then didn't follow up further, who presented on 3/8 with increasing abdominal distention and pain for a few days as well as 1 episode of nausea and vomiting  She did not have any hematemesis  She underwent paracentesis with IR on Friday which was negative for SBP and she had 1400 mL removed  Despite having this removed she has continued to have abdominal pain and distention  She has been on Lasix 40 mg and Aldactone 100 mg as an outpatient every day and reports she has not needed a paracentesis until now  Her last endoscopy was on 2/19 which showed distal mid size varices with stigmata of recent bleed and she had 4 bands placed  There was also portal hypertensive gastropathy and a small polyp in the duodenal bulb  She was treated with octreotide for 3 days and antibiotic course for 7 days      REVIEW OF SYSTEMS: Negative except for as stated above    Historical Information   Past Medical History:   Diagnosis Date    Anemia     Anxiety     [facility] [address] [facility]     Chronic pain disorder     herniated disc    Cirrhosis ([facility] [address] [facility]     Constipation     Current use of long term anticoagulation     eliquis    Diabetes mellitus ([facility] [address] )     blood sugar 113 at 1225 2/18/19    Drug dependence ([facility] [address] )     GI bleed     Hepatocellular carcinoma ([facility] [address] )     Hypertension     Hypokalemia 3/7/2019    Hyponatremia     Liver disease     h/o hepatitis c    Melena     Portal vein thrombosis      Past Surgical History:   Procedure Laterality Date    DENTAL SURGERY      ESOPHAGOGASTRODUODENOSCOPY N/A 2/18/2019    Procedure: ESOPHAGOGASTRODUODENOSCOPY (EGD); Surgeon: Karis Guerra MD;  Location: [facility];   Service: Gastroenterology    HYSTERECTOMY      IR PARACENTESIS  3/8/2019    LIVER BIOPSY      LIVER SURGERY      Ablation    TONSILLECTOMY       Social History   Social History     Substance and Sexual Activity   Alcohol Use Not Currently     Social History     Substance and Sexual Activity   Drug Use No     Social History     Tobacco Use   Smoking Status Current Every Day Smoker    Packs/day: 1 00   Smokeless Tobacco Never Used     Family History   Problem Relation Age of Onset    Prostate cancer Father        Meds/Allergies     Medications Prior to Admission   Medication    gabapentin (NEURONTIN) 300 mg capsule    acetaminophen (TYLENOL) 325 mg tablet    diphenhydrAMINE (NIGHT TIME SLEEP AID) 25 MG tablet    escitalopram (LEXAPRO) 10 mg tablet    furosemide (LASIX) 40 mg tablet    gabapentin (NEURONTIN) 300 mg capsule    losartan (COZAAR) 100 MG tablet    nadolol (CORGARD) 20 mg tablet    oxyCODONE-acetaminophen (PERCOCET)  mg per tablet    senna (SENOKOT) 8 6 MG tablet    spironolactone (ALDACTONE) 100 mg tablet     Current Facility-Administered Medications   Medication Dose Route Frequency    cefTRIAXone (ROCEPHIN) 2,000 mg in dextrose 5 % 50 mL IVPB  2,000 mg Intravenous Q24H    escitalopram (LEXAPRO) tablet 10 mg  10 mg Oral Daily    famotidine (PEPCID) oral suspension 20 mg  20 mg Oral BID    [START ON 3/11/2019] furosemide (LASIX) tablet 60 mg  60 mg Oral Daily    gabapentin (NEURONTIN) capsule 300 mg  300 mg Oral Daily    gabapentin (NEURONTIN) capsule 900 mg  900 mg Oral HS    HYDROmorphone (DILAUDID) injection 1 mg  1 mg Intravenous Q4H PRN    insulin lispro (HumaLOG) 100 units/mL subcutaneous injection 1-6 Units  1-6 Units Subcutaneous 4x Daily (AC & HS)    losartan (COZAAR) tablet 100 mg  100 mg Oral Daily    morphine injection 2 mg  2 mg Intravenous Q4H PRN  nadolol (CORGARD) tablet 20 mg  20 mg Oral Daily    nicotine (NICODERM CQ) 21 mg/24 hr TD 24 hr patch 1 patch  1 patch Transdermal Daily    ondansetron (ZOFRAN) injection 4 mg  4 mg Intravenous Q6H PRN    oxyCODONE (ROXICODONE) immediate release tablet 10 mg  10 mg Oral Q4H PRN    oxyCODONE (ROXICODONE) IR tablet 5 mg  5 mg Oral Q4H PRN    polyethylene glycol (MIRALAX) packet 17 g  17 g Oral Daily    potassium chloride (K-DUR,KLOR-CON) CR tablet 40 mEq  40 mEq Oral Once    senna (SENOKOT) tablet 17 2 mg  2 tablet Oral Daily    [START ON 3/11/2019] spironolactone (ALDACTONE) tablet 150 mg  150 mg Oral Daily       No Known Allergies        Objective     Blood pressure 109/66, pulse 57, temperature 98 7 °F (37 1 °C), temperature source Oral, resp  rate 18, height 5' 4" (1 626 m), weight 74 9 kg (165 lb 2 oz), SpO2 97 %, not currently breastfeeding  Intake/Output Summary (Last 24 hours) at 3/10/2019 1235  Last data filed at 3/10/2019 0630  Gross per 24 hour   Intake 240 ml   Output --   Net 240 ml         PHYSICAL EXAM:      General Appearance:   Alert, cooperative, no distress, appears stated age    HENT[de-identified]  Eyes:   Normocephalic, atraumatic  Anicteric   Neck:  Supple, symmetrical, trachea midline   Lungs:   Clear to auscultation bilaterally, no respiratory distress   Heart[de-identified]   RRR, no murmur   Abdomen:   (+) Moderate distention, generalized TTP without guarding, bowel sounds active   Rectal:   Deferred    Extremities:  No cyanosis or LE edema    Pulses:  2+ and symmetric all extremities    Skin:  No jaundice or pallor      Lab Results:   Results from last 7 days   Lab Units 03/10/19  0608  03/07/19  2028   WBC Thousand/uL 13 43*   < > 12 77*   HEMOGLOBIN g/dL 11 3*   < > 6 7*   HEMATOCRIT % 34 9   < > 20 9*   PLATELETS Thousands/uL 145*   < > 230   NEUTROS PCT %  --   --  77*   LYMPHS PCT %  --   --  14   MONOS PCT %  --   --  7   EOS PCT %  --   --  1    < > = values in this interval not displayed  Results from last 7 days   Lab Units 03/10/19  0608   POTASSIUM mmol/L 3 7   CHLORIDE mmol/L 100   CO2 mmol/L 24   BUN mg/dL 9   CREATININE mg/dL 0 76   CALCIUM mg/dL 8 6   ALK PHOS U/L 117*   ALT U/L 47   AST U/L 78*     Results from last 7 days   Lab Units 03/09/19  0455   INR  1 19*     Results from last 7 days   Lab Units 03/07/19  2028   LIPASE u/L 203       Imaging Studies: I have personally reviewed pertinent imaging studies  Ir Paracentesis  Result Date: 3/8/2019  Impression: Impression: 1  Successful ultrasound-guided paracentesis yielding 1400 mL of clear yellow fluid  Ct Abdomen Pelvis With Contrast  Result Date: 3/7/2019  Impression: Soft tissue density throughout the valeriano hepatis and surrounding the pancreatic head similar to prior study  Interval development of a moderate amount of free fluid throughout the abdomen and pelvis  Thickening of the gastric wall which may be due to surrounding free fluid versus gastritis  Hepatic cirrhosis similar to prior study        ASSESSMENT and PLAN:    Principal Problem:    Abdominal pain  Active Problems:    Diabetes mellitus, type II (HCC)    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 ([address] )    Hepatocellular carcinoma (HCC)    Hypertension    Opioid dependence ([address] )    Acute on chronic blood loss anemia (HCC)    Portal vein thrombosis    Tobacco abuse    Hypokalemia    Anemia    Secondary esophageal varices without bleeding (HCC)    Abdominal Distention  Ascites  - She has persistent abdominal distention despite paracentesis on 3/8 of 1400 mL and continued pain due to her distention  - Fluid analysis negative for SBP  - No cytology collected and unable to calculate SAAG score as there was no serum albumin drawn on the same day  - High suspicion she has recurrence of her [address]  due to her history causing decompensation with ascites  - She has not followed up with oncology since April 2018 as recommended, MRI abdomen in April 2018 showed interval progression of the size of the portal vein thrombus and enhancement consistent with tumor thrombus  - Repeat MRI abdomen with contrast, check AFP  - Plan for IR paracentesis tomorrow unless MRI shows minimal fluid  - Recommend repeat fluid analysis, cytology, and serum albumin drawn to calculate SAAG score  - No indication for ceftriaxone as her fluid analysis on 3/8 was negative for SBP  - Increase aldactone to 150 mg daily and lasix to 60 mg daily, monitor renal function closely    Constipation  - Pt reports no BM in 4 days, not on lactulose at home due to no history of encephalopathy  - Add miralax daily  - This may be contributing to her abdominal distention/pain    Anemia  - Hb 6 7 on admission on 3/7 s/p 2 U PRBC with inappropriate response to Hb of 12 2 and stable Hb since indicating initial Hb was an error  - No overt blood loss reported and no BM in 4 days  - Last EGD February 19 shows multiple columns of varices with stigmata of bleeding s/p bands x 4  - NPO after midnight, plan for repeat EGD tomorrow for banding purposes    Hep C Cirrhosis  HCC  Chronic Portal Vein Thrombosis  Esophageal Varices  - MELD 10, Child Class B  - Grade 0 hepatic encephalopathy, no history of encephalopathy in the past, not on lactulose or rifaximin  - Appears to have significant ascites on exam, will likely need IR paracentesis tomorrow, increased diuretics as above, low Na diet  - Plan for repeat EGD tomorrow due to anemia on admission which was likely an error but did have stigmata with banding on her last EGD on 2/19, continue nadalol for goal HR of 65  - HCC history- MRI abdomen as above, check AFP  - Chronic portal vein thrombosis, no indication for anticoagulation due to chronic nature and appearance consistent with tumor thrombus      Patient will be seen and examined by Dr Tyson Scheuermann

## 2019-03-11 ENCOUNTER — ANESTHESIA EVENT (INPATIENT)
Dept: GASTROENTEROLOGY | Facility: HOSPITAL | Age: 69
DRG: 435 | End: 2019-03-11
Payer: COMMERCIAL

## 2019-03-11 ENCOUNTER — ANESTHESIA (INPATIENT)
Dept: GASTROENTEROLOGY | Facility: HOSPITAL | Age: 69
DRG: 435 | End: 2019-03-11
Payer: COMMERCIAL

## 2019-03-11 ENCOUNTER — PATIENT OUTREACH (OUTPATIENT)
Dept: INTERNAL MEDICINE CLINIC | Facility: CLINIC | Age: 69
End: 2019-03-11

## 2019-03-11 VITALS
DIASTOLIC BLOOD PRESSURE: 71 MMHG | RESPIRATION RATE: 16 BRPM | HEART RATE: 59 BPM | WEIGHT: 165.12 LBS | TEMPERATURE: 98.5 F | HEIGHT: 64 IN | SYSTOLIC BLOOD PRESSURE: 125 MMHG | BODY MASS INDEX: 28.19 KG/M2 | OXYGEN SATURATION: 96 %

## 2019-03-11 LAB
ABO GROUP BLD: NORMAL
AFP-TM SERPL-MCNC: 113.4 NG/ML (ref 0.5–8)
ALBUMIN SERPL BCP-MCNC: 2.4 G/DL (ref 3.5–5)
ALP SERPL-CCNC: 117 U/L (ref 46–116)
ALT SERPL W P-5'-P-CCNC: 44 U/L (ref 12–78)
ANION GAP SERPL CALCULATED.3IONS-SCNC: 8 MMOL/L (ref 4–13)
AST SERPL W P-5'-P-CCNC: 66 U/L (ref 5–45)
BASOPHILS # BLD AUTO: 0.05 THOUSANDS/ΜL (ref 0–0.1)
BASOPHILS NFR BLD AUTO: 0 % (ref 0–1)
BILIRUB SERPL-MCNC: 1.4 MG/DL (ref 0.2–1)
BLD GP AB SCN SERPL QL: NEGATIVE
BUN SERPL-MCNC: 9 MG/DL (ref 5–25)
CALCIUM SERPL-MCNC: 8.8 MG/DL (ref 8.3–10.1)
CHLORIDE SERPL-SCNC: 98 MMOL/L (ref 100–108)
CO2 SERPL-SCNC: 24 MMOL/L (ref 21–32)
CREAT SERPL-MCNC: 0.72 MG/DL (ref 0.6–1.3)
EOSINOPHIL # BLD AUTO: 0.07 THOUSAND/ΜL (ref 0–0.61)
EOSINOPHIL NFR BLD AUTO: 0 % (ref 0–6)
ERYTHROCYTE [DISTWIDTH] IN BLOOD BY AUTOMATED COUNT: 18.4 % (ref 11.6–15.1)
GFR SERPL CREATININE-BSD FRML MDRD: 100 ML/MIN/1.73SQ M
GLUCOSE SERPL-MCNC: 94 MG/DL (ref 65–140)
GLUCOSE SERPL-MCNC: 96 MG/DL (ref 65–140)
GLUCOSE SERPL-MCNC: 98 MG/DL (ref 65–140)
HCT VFR BLD AUTO: 31.8 % (ref 34.8–46.1)
HGB BLD-MCNC: 10.1 G/DL (ref 11.5–15.4)
IMM GRANULOCYTES # BLD AUTO: 0.09 THOUSAND/UL (ref 0–0.2)
IMM GRANULOCYTES NFR BLD AUTO: 1 % (ref 0–2)
INR PPP: 1.19 (ref 0.86–1.17)
LYMPHOCYTES # BLD AUTO: 1.25 THOUSANDS/ΜL (ref 0.6–4.47)
LYMPHOCYTES NFR BLD AUTO: 8 % (ref 14–44)
MAGNESIUM SERPL-MCNC: 1.9 MG/DL (ref 1.6–2.6)
MCH RBC QN AUTO: 27.4 PG (ref 26.8–34.3)
MCHC RBC AUTO-ENTMCNC: 31.8 G/DL (ref 31.4–37.4)
MCV RBC AUTO: 86 FL (ref 82–98)
MONOCYTES # BLD AUTO: 1.64 THOUSAND/ΜL (ref 0.17–1.22)
MONOCYTES NFR BLD AUTO: 10 % (ref 4–12)
NEUTROPHILS # BLD AUTO: 12.72 THOUSANDS/ΜL (ref 1.85–7.62)
NEUTS SEG NFR BLD AUTO: 81 % (ref 43–75)
NRBC BLD AUTO-RTO: 0 /100 WBCS
PLATELET # BLD AUTO: 185 THOUSANDS/UL (ref 149–390)
PMV BLD AUTO: 10.1 FL (ref 8.9–12.7)
POTASSIUM SERPL-SCNC: 4.6 MMOL/L (ref 3.5–5.3)
PROT SERPL-MCNC: 6.9 G/DL (ref 6.4–8.2)
PROTHROMBIN TIME: 14.8 SECONDS (ref 11.8–14.2)
RBC # BLD AUTO: 3.68 MILLION/UL (ref 3.81–5.12)
RH BLD: POSITIVE
SODIUM SERPL-SCNC: 130 MMOL/L (ref 136–145)
SPECIMEN EXPIRATION DATE: NORMAL
WBC # BLD AUTO: 15.82 THOUSAND/UL (ref 4.31–10.16)

## 2019-03-11 PROCEDURE — 86901 BLOOD TYPING SEROLOGIC RH(D): CPT | Performed by: PHYSICIAN ASSISTANT

## 2019-03-11 PROCEDURE — 43235 EGD DIAGNOSTIC BRUSH WASH: CPT | Performed by: INTERNAL MEDICINE

## 2019-03-11 PROCEDURE — 86850 RBC ANTIBODY SCREEN: CPT | Performed by: PHYSICIAN ASSISTANT

## 2019-03-11 PROCEDURE — 99239 HOSP IP/OBS DSCHRG MGMT >30: CPT | Performed by: HOSPITALIST

## 2019-03-11 PROCEDURE — 85610 PROTHROMBIN TIME: CPT | Performed by: PHYSICIAN ASSISTANT

## 2019-03-11 PROCEDURE — 83735 ASSAY OF MAGNESIUM: CPT | Performed by: HOSPITALIST

## 2019-03-11 PROCEDURE — 82948 REAGENT STRIP/BLOOD GLUCOSE: CPT

## 2019-03-11 PROCEDURE — 80053 COMPREHEN METABOLIC PANEL: CPT | Performed by: HOSPITALIST

## 2019-03-11 PROCEDURE — 82105 ALPHA-FETOPROTEIN SERUM: CPT | Performed by: PHYSICIAN ASSISTANT

## 2019-03-11 PROCEDURE — 85025 COMPLETE CBC W/AUTO DIFF WBC: CPT | Performed by: HOSPITALIST

## 2019-03-11 PROCEDURE — 0DJ08ZZ INSPECTION OF UPPER INTESTINAL TRACT, VIA NATURAL OR ARTIFICIAL OPENING ENDOSCOPIC: ICD-10-PCS | Performed by: INTERNAL MEDICINE

## 2019-03-11 PROCEDURE — 86900 BLOOD TYPING SEROLOGIC ABO: CPT | Performed by: PHYSICIAN ASSISTANT

## 2019-03-11 RX ORDER — PROPOFOL 10 MG/ML
INJECTION, EMULSION INTRAVENOUS AS NEEDED
Status: DISCONTINUED | OUTPATIENT
Start: 2019-03-11 | End: 2019-03-11 | Stop reason: SURG

## 2019-03-11 RX ORDER — SODIUM CHLORIDE 9 MG/ML
INJECTION, SOLUTION INTRAVENOUS CONTINUOUS PRN
Status: DISCONTINUED | OUTPATIENT
Start: 2019-03-11 | End: 2019-03-11 | Stop reason: SURG

## 2019-03-11 RX ORDER — LIDOCAINE HYDROCHLORIDE 10 MG/ML
INJECTION, SOLUTION INFILTRATION; PERINEURAL AS NEEDED
Status: DISCONTINUED | OUTPATIENT
Start: 2019-03-11 | End: 2019-03-11 | Stop reason: SURG

## 2019-03-11 RX ORDER — FUROSEMIDE 20 MG/1
60 TABLET ORAL DAILY
Qty: 90 TABLET | Refills: 0 | Status: SHIPPED | OUTPATIENT
Start: 2019-03-12 | End: 2019-04-17 | Stop reason: SDUPTHER

## 2019-03-11 RX ORDER — PANTOPRAZOLE SODIUM 40 MG/1
40 TABLET, DELAYED RELEASE ORAL
Qty: 30 TABLET | Refills: 0 | Status: SHIPPED | OUTPATIENT
Start: 2019-03-11 | End: 2019-04-17 | Stop reason: SDUPTHER

## 2019-03-11 RX ORDER — FLUCONAZOLE 100 MG/1
200 TABLET ORAL DAILY
Status: DISCONTINUED | OUTPATIENT
Start: 2019-03-12 | End: 2019-03-11 | Stop reason: HOSPADM

## 2019-03-11 RX ORDER — FLUCONAZOLE 200 MG/1
200 TABLET ORAL DAILY
Qty: 21 TABLET | Refills: 0 | Status: SHIPPED | OUTPATIENT
Start: 2019-03-12 | End: 2019-04-02

## 2019-03-11 RX ORDER — FLUCONAZOLE 100 MG/1
400 TABLET ORAL ONCE
Status: COMPLETED | OUTPATIENT
Start: 2019-03-11 | End: 2019-03-11

## 2019-03-11 RX ORDER — MEPERIDINE HYDROCHLORIDE 25 MG/ML
12.5 INJECTION INTRAMUSCULAR; INTRAVENOUS; SUBCUTANEOUS AS NEEDED
Status: DISCONTINUED | OUTPATIENT
Start: 2019-03-11 | End: 2019-03-11 | Stop reason: HOSPADM

## 2019-03-11 RX ORDER — SPIRONOLACTONE 50 MG/1
150 TABLET, FILM COATED ORAL DAILY
Qty: 90 TABLET | Refills: 0 | Status: SHIPPED | OUTPATIENT
Start: 2019-03-12 | End: 2019-04-17 | Stop reason: SDUPTHER

## 2019-03-11 RX ORDER — NICOTINE 21 MG/24HR
1 PATCH, TRANSDERMAL 24 HOURS TRANSDERMAL DAILY
Qty: 28 PATCH | Refills: 0 | Status: SHIPPED | OUTPATIENT
Start: 2019-03-12

## 2019-03-11 RX ORDER — ONDANSETRON 2 MG/ML
4 INJECTION INTRAMUSCULAR; INTRAVENOUS ONCE AS NEEDED
Status: DISCONTINUED | OUTPATIENT
Start: 2019-03-11 | End: 2019-03-11 | Stop reason: HOSPADM

## 2019-03-11 RX ORDER — ALBUTEROL SULFATE 2.5 MG/3ML
2.5 SOLUTION RESPIRATORY (INHALATION) ONCE AS NEEDED
Status: DISCONTINUED | OUTPATIENT
Start: 2019-03-11 | End: 2019-03-11 | Stop reason: HOSPADM

## 2019-03-11 RX ADMIN — PROPOFOL 100 MG: 10 INJECTION, EMULSION INTRAVENOUS at 13:49

## 2019-03-11 RX ADMIN — PROPOFOL 50 MG: 10 INJECTION, EMULSION INTRAVENOUS at 13:51

## 2019-03-11 RX ADMIN — OXYCODONE HYDROCHLORIDE 10 MG: 10 TABLET ORAL at 14:47

## 2019-03-11 RX ADMIN — ESCITALOPRAM OXALATE 10 MG: 10 TABLET ORAL at 08:47

## 2019-03-11 RX ADMIN — SODIUM CHLORIDE: 0.9 INJECTION, SOLUTION INTRAVENOUS at 13:43

## 2019-03-11 RX ADMIN — NICOTINE 1 PATCH: 21 PATCH, EXTENDED RELEASE TRANSDERMAL at 08:55

## 2019-03-11 RX ADMIN — OXYCODONE HYDROCHLORIDE 10 MG: 10 TABLET ORAL at 05:38

## 2019-03-11 RX ADMIN — SPIRONOLACTONE 150 MG: 100 TABLET ORAL at 08:47

## 2019-03-11 RX ADMIN — LOSARTAN POTASSIUM 100 MG: 50 TABLET, FILM COATED ORAL at 08:47

## 2019-03-11 RX ADMIN — FLUCONAZOLE 400 MG: 100 TABLET ORAL at 15:06

## 2019-03-11 RX ADMIN — FUROSEMIDE 60 MG: 20 TABLET ORAL at 08:47

## 2019-03-11 RX ADMIN — GABAPENTIN 300 MG: 300 CAPSULE ORAL at 08:47

## 2019-03-11 RX ADMIN — NADOLOL 20 MG: 40 TABLET ORAL at 08:48

## 2019-03-11 RX ADMIN — STANDARDIZED SENNA CONCENTRATE 17.2 MG: 8.6 TABLET ORAL at 08:48

## 2019-03-11 RX ADMIN — LIDOCAINE HYDROCHLORIDE ANHYDROUS 50 MG: 10 INJECTION, SOLUTION INFILTRATION at 13:49

## 2019-03-11 NOTE — PLAN OF CARE
Problem: DISCHARGE PLANNING - CARE MANAGEMENT  Goal: Discharge to post-acute care or home with appropriate resources  Description  INTERVENTIONS:  - Conduct assessment to determine patient/family and health care team treatment goals, and need for post-acute services based on payer coverage, community resources, and patient preferences, and barriers to discharge  - Address psychosocial, clinical, and financial barriers to discharge as identified in assessment in conjunction with the patient/family and health care team  - Arrange appropriate level of post-acute services according to patient?s   needs and preference and payer coverage in collaboration with the physician and health care team  - Communicate with and update the patient/family, physician, and health care team regarding progress on the discharge plan  - Arrange appropriate transportation to post-acute venues  Outcome: Completed  Note:   CM reviewed with patient at the bedside re:IMM  Patient understands her Medicare rights  Patient signed form  Copy of form provided to patient at the bedside

## 2019-03-11 NOTE — OP NOTE
ESOPHAGOGASTRODUODENOSCOPY    PROCEDURE: EGD    SEDATION: Monitored anesthesia care, check anesthesia records    ASA Class: 3    INDICATIONS:  History of esophageal varices    CONSENT:  Informed consent was obtained for the procedure, including sedation after explaining the risks and benefits of the procedure  Risks including but not limited to bleeding, perforation, infection, and missed lesion  PREPARATION:   Telemetry, pulse oximetry, blood pressure were monitored throughout the procedure  Patient was identified by myself both verbally and by visual inspection of ID band  DESCRIPTION:   Patient was placed in the left lateral decubitus position and was sedated with the above medication  The gastroscope was introduced in to the oropharynx and the esophagus was intubated under direct visualization  Scope was passed down the esophagus up to 2nd part of the duodenum  A careful inspection was made as the gastroscope was withdrawn, including a retroflexed view of the stomach; findings and interventions are described below  FINDINGS:    #1  Esophagus- grade 2 distal varices, mild stigmata there was no significant scar from recent banding  Significant candidal esophagitis also noted  Due to the severity of Candida and scarring from recent banding repeat banding was deferred    #2  Stomach- portal hypertensive gastropathy, worse in the proximal portion of the stomach, antral gastritis, no gastric varices on retroflexion    #3  Duodenum- normal duodenum         IMPRESSIONS:      Normal done  Portal hypertensive gastropathy  Severe candidal esophagitis, grade 2 esophageal varices, no bands placed today due to recent bands and severe scarring    RECOMMENDATIONS:     Return to floor  Ppi therapy  Start fluconazole for Candida  Repeat EGD in 1 month    COMPLICATIONS:  None; patient tolerated the procedure well      SPECIMENS:  * No specimens in log *    ESTIMATED BLOOD LOSS:  Minimal

## 2019-03-11 NOTE — ASSESSMENT & PLAN NOTE
Anticoagulated on Eliquis- on hold secondary to recent variceal bleeding  Will d/c for now given patient's varices

## 2019-03-11 NOTE — PLAN OF CARE
Problem: Potential for Falls  Goal: Patient will remain free of falls  Description  INTERVENTIONS:  - Assess patient frequently for physical needs  -  Identify cognitive and physical deficits and behaviors that affect risk of falls    -  [place] fall precautions as indicated by assessment   - Educate patient/family on patient safety including physical limitations  - Instruct patient to call for assistance with activity based on assessment  - Modify environment to reduce risk of injury  - Consider OT/PT consult to assist with strengthening/mobility  Outcome: Progressing     Problem: METABOLIC, FLUID AND ELECTROLYTES - ADULT  Goal: Electrolytes maintained within normal limits  Description  INTERVENTIONS:  - Monitor labs and assess patient for signs and symptoms of electrolyte imbalances  - Administer electrolyte replacement as ordered  - Monitor response to electrolyte replacements, including repeat lab results as appropriate  - Instruct patient on fluid and nutrition as appropriate  Outcome: Progressing  Goal: Fluid balance maintained  Description  INTERVENTIONS:  - Monitor labs and assess for signs and symptoms of volume excess or deficit  - Monitor I/O and WT  - Instruct patient on fluid and nutrition as appropriate  Outcome: Progressing  Goal: Glucose maintained within target range  Description  INTERVENTIONS:  - Monitor Blood Glucose as ordered  - Assess for signs and symptoms of hyperglycemia and hypoglycemia  - Administer ordered medications to maintain glucose within target range  - Assess nutritional intake and initiate nutrition service referral as needed  Outcome: Progressing     Problem: HEMATOLOGIC - ADULT  Goal: Maintains hematologic stability  Description  INTERVENTIONS  - Assess for signs and symptoms of bleeding or hemorrhage  - Monitor labs  - Administer supportive blood products/factors as ordered and appropriate  Outcome: Progressing

## 2019-03-11 NOTE — ANESTHESIA PREPROCEDURE EVALUATION
Review of Systems/Medical History  Patient summary reviewed  Chart reviewed  No history of anesthetic complications     Cardiovascular  EKG reviewed, Hypertension controlled,    Pulmonary  Smoker cigarette smoker  , Tobacco cessation counseling given ,        GI/Hepatic    GI bleeding , active, Liver disease (Portal vein thrombosis; h/o hepatocellular ca s/p resection) , cirrhosis and history of liver cancer, Hepatitis (h/o HCV) C,   Comment: Hyperbilirubinemia       Comment: Chronic hyponatremia     Endo/Other  Diabetes well controlled type 2 Oral agent,      GYN       Hematology  Anemia acute blood loss anemia and chronic blood loss anemia,     Musculoskeletal  Back pain ,   Arthritis     Neurology  Negative neurology ROS      Psychology     Chronic opioid dependence            Physical Exam    Airway    Mallampati score: II  TM Distance: >3 FB  Neck ROM: full     Dental   No notable dental hx     Cardiovascular  Rhythm: regular, Rate: normal, Cardiovascular exam normal    Pulmonary  Pulmonary exam normal Breath sounds clear to auscultation,     Other Findings        Anesthesia Plan  ASA Score- 3     Anesthesia Type- IV sedation with anesthesia with ASA Monitors  Additional Monitors:   Airway Plan:     Comment: I have seen the patient and reviewed the history  Patient to receive IV sedation with full ASA monitors  Risks discussed with the patient, consent signed        Plan Factors-    Induction- intravenous  Postoperative Plan-     Informed Consent- Anesthetic plan and risks discussed with patient  I personally reviewed this patient with the CRNA  Discussed and agreed on the Anesthesia Plan with the CRNA           NPO verified  NKDA  Patient states she had 1 cup of Jello at ~9am today (2/18/19)  Patient's code status is DNR/DNI however she agrees to revoke DNR/DNI status for procedure and will be full code until she recovers from anesthesia and returns back to her med/surg room        Plan:  IV sedation/GA as backup    Patient does have the capacity to make medical decisions today and is consentable  Risks and benefits discussed with patient including possibility of recall under sedation  Questions answered  Patient consented

## 2019-03-11 NOTE — ANESTHESIA POSTPROCEDURE EVALUATION
Post-Op Assessment Note    CV Status:  Stable    Pain management: adequate     Mental Status:  Alert and awake   Hydration Status:  Euvolemic   PONV Controlled:  Controlled   Airway Patency:  Patent   Post Op Vitals Reviewed: Yes      Staff: CRNA           BP   75/45   Temp      Pulse 57   Resp 18   SpO2   91

## 2019-03-11 NOTE — ASSESSMENT & PLAN NOTE
· Presents with abdominal pain/distention, N/V since noon on Thursday  · CT Ab/Pelvis- "Soft tissue density throughout the valeriano hepatis and surrounding the pancreatic head similar to prior study  Interval development of a moderate amount of free fluid throughout the abdomen and pelvis  Thickening of the gastric vall which may be due to surrounding free fluid vs gastritis  Hepatic cirrhosis similar to prior study "  · Ascites likely secondary to hepatocellular carcinoma  · Antiemetics  · Pain management  · Consult IR for paracentesis  3/9:  S/p paracentesis with 1400 cc of fluid removal  SAAG of 2 2 - Portal HTN is likely the cause of ascites  Given her symptoms of pain, abdominal examination with diffuse tenderness, cannot r/o SBP  Will start Abx with Rocephin 2g IV qd  GI consultation for their expert opinion  Continue with Lasix and Aldactone  Electrolyte correction as necessary    3/10  Still having some abdominal discomfort, but overall better  Likely has SBP  On empiric abx  GI to eval    3/11:  EGD  #1  Esophagus- grade 2 distal varices, mild stigmata there was no significant scar from recent banding  Significant candidal esophagitis also noted  Due to the severity of Candida and scarring from recent banding repeat banding was deferred   #2  Stomach- portal hypertensive gastropathy, worse in the proximal portion of the stomach, antral gastritis, no gastric varices on retroflexion   #3   Duodenum- normal duodenum         IMPRESSIONS:    Portal hypertensive gastropathy  Severe candidal esophagitis, grade 2 esophageal varices, no bands placed today due to recent bands and severe scarring     RECOMMENDATIONS:   Ppi therapy  Start fluconazole for Candida  Repeat EGD in 1 month

## 2019-03-11 NOTE — ASSESSMENT & PLAN NOTE
S/p ablation and wedge resection 2015  S/p SIRS spheres radioembolization December 2017  Previously followed with Dr Mark Pablo as outpatient, but has not followed up since March 2018  Encourage outpatient follow up

## 2019-03-11 NOTE — PLAN OF CARE
Problem: Potential for Falls  Goal: Patient will remain free of falls  Description  INTERVENTIONS:  - Assess patient frequently for physical needs  -  Identify cognitive and physical deficits and behaviors that affect risk of falls    -  [place] fall precautions as indicated by assessment   - Educate patient/family on patient safety including physical limitations  - Instruct patient to call for assistance with activity based on assessment  - Modify environment to reduce risk of injury  - Consider OT/PT consult to assist with strengthening/mobility  3/11/2019 1719 by Henry Harley RN  Outcome: Adequate for Discharge  3/11/2019 1120 by Henry Harley RN  Outcome: Progressing     Problem: METABOLIC, FLUID AND ELECTROLYTES - ADULT  Goal: Electrolytes maintained within normal limits  Description  INTERVENTIONS:  - Monitor labs and assess patient for signs and symptoms of electrolyte imbalances  - Administer electrolyte replacement as ordered  - Monitor response to electrolyte replacements, including repeat lab results as appropriate  - Instruct patient on fluid and nutrition as appropriate  3/11/2019 1719 by Henry Harley RN  Outcome: Adequate for Discharge  3/11/2019 1120 by Henry Harley RN  Outcome: Progressing  Goal: Fluid balance maintained  Description  INTERVENTIONS:  - Monitor labs and assess for signs and symptoms of volume excess or deficit  - Monitor I/O and WT  - Instruct patient on fluid and nutrition as appropriate  3/11/2019 1719 by Henry Harley RN  Outcome: Adequate for Discharge  3/11/2019 1120 by Henry Harley RN  Outcome: Progressing  Goal: Glucose maintained within target range  Description  INTERVENTIONS:  - Monitor Blood Glucose as ordered  - Assess for signs and symptoms of hyperglycemia and hypoglycemia  - Administer ordered medications to maintain glucose within target range  - Assess nutritional intake and initiate nutrition service referral as needed  3/11/2019 1719 by Henry Harley RN  Outcome: Adequate for Discharge  3/11/2019 1120 by Raquel Prakash RN  Outcome: Progressing     Problem: HEMATOLOGIC - ADULT  Goal: Maintains hematologic stability  Description  INTERVENTIONS  - Assess for signs and symptoms of bleeding or hemorrhage  - Monitor labs  - Administer supportive blood products/factors as ordered and appropriate  3/11/2019 1719 by Raquel Prakash RN  Outcome: Adequate for Discharge  3/11/2019 1120 by Raquel Prakash RN  Outcome: Progressing     Problem: Nutrition/Hydration-ADULT  Goal: Nutrient/Hydration intake appropriate for improving, restoring or maintaining nutritional needs  Description  Monitor and assess patient's nutrition/hydration status for malnutrition (ex- brittle hair, bruises, dry skin, pale skin and conjunctiva, muscle wasting, smooth red tongue, and disorientation)  Collaborate with interdisciplinary team and initiate plan and interventions as ordered  Monitor patient's weight and dietary intake as ordered or per policy  Utilize nutrition screening tool and intervene per policy  Determine patient's food preferences and provide high-protein, high-caloric foods as appropriate       INTERVENTIONS:  - Monitor oral intake, urinary output, labs, and treatment plans  - Assess nutrition and hydration status and recommend course of action  - Evaluate amount of meals eaten  - Assist patient with eating if necessary   - Allow adequate time for meals  - Recommend/ encourage appropriate diets, oral nutritional supplements, and vitamin/mineral supplements  - Order, calculate, and assess calorie counts as needed  - Recommend, monitor, and adjust tube feedings and TPN/PPN based on assessed needs  - Assess need for intravenous fluids  - Provide specific nutrition/hydration education as appropriate  - Include patient/family/caregiver in decisions related to nutrition  Outcome: Adequate for Discharge

## 2019-03-11 NOTE — SOCIAL WORK
CM reviewed with patient at the bedside re:IMM  Patient understands her Medicare rights  Patient signed form  Copy of form provided to patient at the bedside

## 2019-03-11 NOTE — DISCHARGE SUMMARY
Discharge- Mario Major 1950, 76 y o  female MRN: [id number]    Unit/Bed#: -01 Encounter: [id number]    Primary Care Provider: Neal Miles DO   Date and time admitted to hospital: 3/7/2019  8:16 PM    * Abdominal pain  Assessment & Plan  · Presents with abdominal pain/distention, N/V since noon on Thursday  · CT Ab/Pelvis- "Soft tissue density throughout the valeriano hepatis and surrounding the pancreatic head similar to prior study  Interval development of a moderate amount of free fluid throughout the abdomen and pelvis  Thickening of the gastric vall which may be due to surrounding free fluid vs gastritis  Hepatic cirrhosis similar to prior study "  · Ascites likely secondary to hepatocellular carcinoma  · Antiemetics  · Pain management  · Consult IR for paracentesis  3/9:  S/p paracentesis with 1400 cc of fluid removal  SAAG of 2 2 - Portal HTN is likely the cause of ascites  Given her symptoms of pain, abdominal examination with diffuse tenderness, cannot r/o SBP  Will start Abx with Rocephin 2g IV qd  GI consultation for their expert opinion  Continue with Lasix and Aldactone  Electrolyte correction as necessary    3/10  Still having some abdominal discomfort, but overall better  Likely has SBP  On empiric abx  GI to eval    3/11:  EGD  #1  Esophagus- grade 2 distal varices, mild stigmata there was no significant scar from recent banding  Significant candidal esophagitis also noted  Due to the severity of Candida and scarring from recent banding repeat banding was deferred   #2  Stomach- portal hypertensive gastropathy, worse in the proximal portion of the stomach, antral gastritis, no gastric varices on retroflexion   #3   Duodenum- normal duodenum         IMPRESSIONS:    Portal hypertensive gastropathy  Severe candidal esophagitis, grade 2 esophageal varices, no bands placed today due to recent bands and severe scarring     RECOMMENDATIONS:   Ppi therapy  Start fluconazole for Candida  Repeat EGD in 1 month        Tobacco abuse  Assessment & Plan  Smokes 1ppd  Nicotine patch  Encourage cessation  Portal vein thrombosis  Assessment & Plan  Anticoagulated on Eliquis- on hold secondary to recent variceal bleeding  Will d/c for now given patient's varices  Hepatocellular carcinoma [facility])  Assessment & Plan  S/p ablation and wedge resection 2015  S/p SIRS spheres radioembolization December 2017  Previously followed with Dr Ivelisse Lozada as outpatient, but has not followed up since March 2018  Encourage outpatient follow up  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 [address] )  Assessment & Plan  Continue outpatient GI follow up  Discharge Summary - [facility]    Patient Information: Jose L Conrad 76 y o  female MRN: [id number]  Unit/Bed#: -01 Encounter: [id number]    Discharging Physician / Practitioner: Hoda Bruner MD  PCP: Raymond Hanley DO  Admission Date: 3/7/2019  Discharge Date: 03/11/19    Disposition:     Home    Discharge Diagnoses:     Principal Problem:    Abdominal pain  Active Problems:    Diabetes mellitus, type II (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 [address] )    Hepatocellular carcinoma ([facility] [address] )    Hypertension    Opioid dependence ([facility] [address] )    Acute on chronic blood loss anemia (HCC)    Portal vein thrombosis    Tobacco abuse    Hypokalemia    Anemia    Secondary esophageal varices without bleeding ([address] )  Resolved Problems:    * No resolved hospital problems   *      Consultations During Hospital Stay:  · GI    Procedures Performed:     · EGD    Significant Findings / Test Results:     · Listed above    Hospital Course:     Ms Brayden Duque is a 77 yo F with a PMH of compensated Hep C Cirrhosis, [facility] [address]  diagnosed in 2015 s/p ablation and wedge resection with Dr Ivelisse Lozdaa and then recurrence s/p SIRS spheres in December 2017 with good response but then didn't follow up further, who presented on 3/8 with increasing abdominal distention and pain for a few days as well as 1 episode of nausea and vomiting  She did not have any hematemesis  She underwent paracentesis with IR on Friday which was negative for SBP and she had 1400 mL removed  Despite having this removed she has continued to have abdominal pain and distention  She has been on Lasix 40 mg and Aldactone 100 mg as an outpatient every day and reports she has not needed a paracentesis until now  Her last endoscopy was on 2/19 which showed distal mid size varices with stigmata of recent bleed and she had 4 bands placed  There was also portal hypertensive gastropathy and a small polyp in the duodenal bulb  She was treated with octreotide for 3 days and antibiotic course for 7 days  EGD performed with findings above  Started on protonix, increased Lasix and Aldactone doses  Will be dischargedon Diflucan x21 days  Outpt f/u with PMD and GI within 1-2 weeks  Patient is ambulating with a strong and steady gait, hemodynamically stable for discharge  Instructed on compliance to her medications  Condition at Discharge: stable     Discharge Day Visit / Exam:     Subjective:  Feels good  Abdominal pain much improved  Vitals: Blood Pressure: 125/71 (03/11/19 1440)  Pulse: 59 (03/11/19 1440)  Temperature: 98 5 °F (36 9 °C) (03/11/19 1440)  Temp Source: Oral (03/11/19 1440)  Respirations: 16 (03/11/19 1440)  Height: 5' 4" (162 6 cm) (03/08/19 0100)  Weight - Scale: 74 9 kg (165 lb 2 oz) (03/08/19 0100)  SpO2: 96 % (03/11/19 1440)  Exam:   Physical Exam  Gen -Patient comfortable   Neck- Supple  No thyromegaly or lymphadenopathy  Lungs-Clear bilaterally without any wheeze or rales   Heart S1-S2, regular rate and rhythm, no murmurs  Abdomen-soft nontender, no organomegaly   Bowel sounds present  Extremities-no cyanosis,  clubbing or edema  Skin- no rash  Neuro-nonfocal     Discharge instructions/Information to patient and family:   See after visit summary for information provided to patient and family  Provisions for Follow-Up Care:  See after visit summary for information related to follow-up care and any pertinent home health orders  Discharge Statement:  I spent 35 minutes discharging the patient  This time was spent on the day of discharge  I had direct contact with the patient on the day of discharge  Greater than 50% of the total time was spent examining patient, answering all patient questions, arranging and discussing plan of care with patient as well as directly providing post-discharge instructions  Additional time then spent on discharge activities  Discharge Medications:  See after visit summary for reconciled discharge medications provided to patient and family        ** Please Note: This note has been constructed using a voice recognition system **

## 2019-03-12 ENCOUNTER — PATIENT OUTREACH (OUTPATIENT)
Dept: INTERNAL MEDICINE CLINIC | Facility: CLINIC | Age: 69
End: 2019-03-12

## 2019-03-12 LAB
BACTERIA SPEC BFLD CULT: ABNORMAL
GRAM STN SPEC: ABNORMAL

## 2019-03-12 NOTE — PROGRESS NOTES
Outpatient Care Management Note:    Patient was recently in the hospital at [address] [place] [address] from 3/7 - 3/11/19 for abdominal pain  Pt did have a paracentesis on 3/8/19 and 1400 ml of fluid was removed and EGD was done and placed on fluconazole for candida  Spironolactone and Lasix were increased  I called and spoke with patient  Pt sounded flat on the phone  Pt reports she is doing ok at this time and denies any abdominal pain but does reports discomfort  Pt could tell me some info about her hospital stay but could not tell me all her medication changes and who she needs to follow up with  I made patient aware she has an appt with PCP office tomorrow  Pt states she is not aware of appt  I made her aware it was scheduled last week when I spoke with her last  Pt states she just got out of the hospital and to "cut her a break" and is requesting her appt be rescheduled because she will not be coming tomorrow  Pt is requesting next Thursday or Friday as she has an eye appt on Monday 3/18  Pt is now scheduled for Thursday 3/21 @ 3pm with Dr Leobardo Almazan  I explained reason and importance of appt  Pt states she will come and is aware of GI appt on 4/22  I offered to call office to see if they could see her sooner and she declined and wants to keep appt for when it is  Explained reason for why she needs to follow up with GI  Pt did reschedule oncology appt for today until 4/12/19 with Dr Natanael Enamorado  Pt states she "has to" go to her pain management appt on 3/14  I asked patient if I could review medication with her and the changes made  Pt was aware of the change to spironolactone to take 150 mg daily  Pt reports does have printed prescriptions at home for protonix, diflucan, nicotine patches, spironolactone 50 mg tablets, and Lasix 20 mg tablets but has not gotten them filled at pharmacy yet  I asked patient to get them filled as soon as possible   I made patient aware Lasix was increased to 60 mg and to take three 20 mg tablets daily  Pt is aware spironolactone once new script is filled will be three 50 mg tablets daily  Currently patient has 100 mg tablets and was going to take three tablets  I explained to patient she can use up what she has but would need to take one and a half pills to get 150 mg  Informed patient her protonix is a new medication being started for her stomach/abdominal pain  I made patient aware the fluconazole was started for candidiasis in her throat and needs to be taken daily for 21 days  Pt then stated " I don't know what you are going over this information I have everything I need " I explained to patient when I spoke with her last week she had some confusion on the medication she should and should not be taking  I made her aware since she was in the hospital and there were changes I wanted to make sure she had and was taking her medication correctly  Pt states I am fine and stated "we can discuss my meds further at my appointment " I made patient aware should she have any difficulty with getting her medication at the pharmacy to call me or if she has further questions about her meds to call me  Pt reports she has my phone # and PCP office #  Pt states she was unsure if she took her spironolactone today  I advised patient that using a pill box that she fills once a week may be something to think about using as she would know if she took her medication or not and would be a good reminder to take her meds  Pt reports she has a med box at home but is not using it  I asked patient if she would like further outreach before her appt  Pt states I could call her next week as she will need to be reminded of her appt at her PCP office  I encouraged patient to call me in the meantime if needed

## 2019-03-13 ENCOUNTER — TRANSITIONAL CARE MANAGEMENT (OUTPATIENT)
Dept: INTERNAL MEDICINE CLINIC | Facility: CLINIC | Age: 69
End: 2019-03-13

## 2019-03-14 ENCOUNTER — OFFICE VISIT (OUTPATIENT)
Dept: PAIN MEDICINE | Facility: CLINIC | Age: 69
End: 2019-03-14
Payer: COMMERCIAL

## 2019-03-14 VITALS
TEMPERATURE: 98.4 F | DIASTOLIC BLOOD PRESSURE: 72 MMHG | WEIGHT: 165 LBS | HEART RATE: 64 BPM | SYSTOLIC BLOOD PRESSURE: 114 MMHG | BODY MASS INDEX: 28.32 KG/M2

## 2019-03-14 DIAGNOSIS — F11.20 UNCOMPLICATED OPIOID DEPENDENCE (HCC): ICD-10-CM

## 2019-03-14 DIAGNOSIS — M51.26 LUMBAR DISC HERNIATION: ICD-10-CM

## 2019-03-14 DIAGNOSIS — M54.16 CHRONIC LUMBAR RADICULOPATHY: ICD-10-CM

## 2019-03-14 DIAGNOSIS — R53.83 OTHER FATIGUE: ICD-10-CM

## 2019-03-14 DIAGNOSIS — G89.4 CHRONIC PAIN SYNDROME: Primary | ICD-10-CM

## 2019-03-14 PROCEDURE — 99214 OFFICE O/P EST MOD 30 MIN: CPT | Performed by: NURSE PRACTITIONER

## 2019-03-14 PROCEDURE — 1111F DSCHRG MED/CURRENT MED MERGE: CPT | Performed by: NURSE PRACTITIONER

## 2019-03-14 RX ORDER — OXYCODONE AND ACETAMINOPHEN 10; 325 MG/1; MG/1
TABLET ORAL
Qty: 120 TABLET | Refills: 0 | Status: SHIPPED | OUTPATIENT
Start: 2019-03-14 | End: 2019-03-14 | Stop reason: SDUPTHER

## 2019-03-14 RX ORDER — OXYCODONE AND ACETAMINOPHEN 10; 325 MG/1; MG/1
TABLET ORAL
Qty: 120 TABLET | Refills: 0 | Status: SHIPPED | OUTPATIENT
Start: 2019-03-14 | End: 2019-03-21 | Stop reason: SDUPTHER

## 2019-03-14 RX ORDER — GABAPENTIN 300 MG/1
CAPSULE ORAL
Qty: 120 CAPSULE | Refills: 5 | Status: SHIPPED | OUTPATIENT
Start: 2019-03-14 | End: 2019-07-06 | Stop reason: HOSPADM

## 2019-03-14 NOTE — PROGRESS NOTES
Assessment:  1  Chronic pain syndrome    2  Lumbar disc herniation    3  Chronic lumbar radiculopathy    4  Other fatigue    5  Uncomplicated [facility] dependence ([facility] )        Plan:  Olivia Delgado is a 76 y o  female who presents for a follow up office visit in regards to Back Pain  The patient has a history of chronic pain syndrome secondary to lumbar disc herniation and radiculopathy  The patient presents today with ongoing low back pain which remains unchanged since the last office visit  Her pain is well controlled with gabapentin 300 mg as well as Percocet 10/325 mg  Therefore, she will be continued on both medications as prescribed  A prescription for Percocet 10/325 mg was electronically sent to the pharmacy with a do not fill date of March 26, 2019  Per the patient's request a 2nd prescription was printed and given to the patient with a do not fill date of April 24, 2018    The patient was also completed a BECKS depression inventory and SOAPP-R  today, as part of our yearly opioid management program      The patient is drowsy at her office visit today  Patient had a CBC on March 11th RBC: 3 68 and hemoglobin: 10 1  If the fatigue continues, I advised her to have a repeat CBC and provided her with a prescription today  She states she has a follow-up appointment with her primary care physician in the near future    There are risks associated with opioid medications, including dependence, addiction and tolerance  The patient understands and agrees to use these medications only as prescribed  Potential side effects of the medications include, but are not limited to, constipation, drowsiness, addiction, impaired judgment and risk of fatal overdose if not taken as prescribed  The patient was warned against driving while taking sedation medications  Sharing medications is a felony  At this point in time, the patient is showing no signs of addiction, abuse, diversion or suicidal ideation      Patient got her prescription pill bottle today  She was reminded that she must bring it every office visit as random pill counts are performed per our opioid office policy  She was given warnings in the past  She as informmed that  If she does not bring it to the next office visit, there is a chance that I may not continue to prescribe her opioids  Patient verbalized understanding    [facility] Prescription Drug Monitoring Program report was reviewed and was appropriate     My impressions and treatment recommendations were discussed in detail with the patient who verbalized understanding and had no further questions  Discharge instructions were provided  I personally saw and examined the patient and I agree with the above discussed plan of care  Orders Placed This Encounter   Procedures    CBC and differential     This is a patient instruction: This test is non-fasting  Please drink two glasses of water morning of bloodwork  Standing Status:   Future     Standing Expiration Date:   3/14/2020     Scheduling Instructions:      Patient recent GI bleed  Presents with ongoing fatigue     New Medications Ordered This Visit   Medications    oxyCODONE-acetaminophen (PERCOCET)  mg per tablet     Sig: Take 1 tab PO Q 6 hours prn pain  2nd month script Do not fill until 19     Dispense:  120 tablet     Refill:  0    gabapentin (NEURONTIN) 300 mg capsule     Si tab in the am and 3 tabs PO night time     Dispense:  120 capsule     Refill:  5       History of Present Illness:  Zack Marie is a 76 y o  female who presents for a follow up office visit in regards to Back Pain  The patient has a history of chronic pain syndrome secondary to lumbar disc herniation and radiculopathy  She presents today with ongoing low back pain which remains unchanged since the last office visit  The pain is constant occurring mostly in the morning  She describes as dull aching    She is rating 8/10 on the numeric rating scale     She continues to take Percocet 10/325 mg Q 6 hours and gabapentin 300 mg 1 tab in the morning and 3 tablets at night  Overall she is receiving 80% pain relief without side effects    Since last office visit, the patient was admitted for variceal bleeding that required banding  She was given 2 units of packed but blood cells  She states she continues with ongoing fatigue since her hospitalization    Pain Contract Signed: 1/14/19, Last Urine Drug Screen: 1/14/19 last Pill Count: 9/20/19    I have personally reviewed and/or updated the patient's past medical history, past surgical history, family history, social history, current medications, allergies, and vital signs today  Review of Systems   Respiratory: Negative for shortness of breath  Cardiovascular: Negative for chest pain  Gastrointestinal: Positive for constipation  Negative for diarrhea, nausea and vomiting  Musculoskeletal: Positive for gait problem and joint swelling  Negative for arthralgias and myalgias  Skin: Negative for rash  Neurological: Positive for weakness  Negative for dizziness and seizures  All other systems reviewed and are negative        Patient Active Problem List   Diagnosis    Anxiety    Fatigue    Chronic lumbar radiculopathy    Claustrophobia    Constipation    Diabetes mellitus[facility] type II [facility]    Diabetic neuropathy [facility]    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 ([facility]    Hepatitis C, chronic (HCC)    Hepatocellular carcinoma (HCC)    Herniated lumbar disc without myelopathy    Hypertension    Insomnia    Left foot pain    Myofascial pain    Nicotine dependence    Opioid dependence ([facility]    Osteoporosis    Other cirrhosis of liver (HCC)    Pain syndrome, chronic    Sacroiliitis (HCC)    Seasonal allergies    Trochanteric bursitis    Elevated serum creatinine    Lumbar disc herniation    Health care maintenance    Hyponatremia    Abdominal pain    Ascites    Decompensated hepatic cirrhosis (HCC)    Acute on chronic blood loss anemia (HCC)    Portal vein thrombosis    Depression    Tobacco abuse    Long term (current) use of anticoagulants    Melena    Hypotension    GI bleed    Hypokalemia    Anemia    Secondary esophageal varices without bleeding (HCC)       Past Medical History:   Diagnosis Date    Anemia     Anxiety     Cancer (HCC)     Chronic pain disorder     herniated disc    Cir[facility]     Constipation     Current use of long term anticoagulation     eliquis    Diabetes mellitus ([facility] )     blood sugar 113 at 1225 2/18/19    Drug dependence ([facility]     GI bleed     Hepatocellular carcinoma ([facility] )     Hypertension     Hypokalemia 3/7/2019    Hyponatremia     Liver disease     h/o hepatitis c    Melena     Portal vein thrombosis        Past Surgical History:   Procedure Laterality Date    DENTAL SURGERY      ESOPHAGOGASTRODUODENOSCOPY N/A 2/18/2019    Procedure: ESOPHAGOGASTRODUODENOSCOPY (EGD); Surgeon: Jimmy Mccray MD;  [facility]: [facility]; Service: Gastroenterology    ESOPHAGOGASTRODUODENOSCOPY N/A 3/11/2019    Procedure: ESOPHAGOGASTRODUODENOSCOPY (EGD); Surgeon: Vignesh Islas MD;  [facility];   Service: Gastroenterology    HYSTERECTOMY      IR PARACENTESIS  3/8/2019    LIVER BIOPSY      LIVER SURGERY      Ablation    TONSILLECTOMY         Family History   Problem Relation Age of Onset    Prostate cancer Father        Social History     Occupational History    Not on file   Tobacco Use    Smoking status: Current Every Day Smoker     Packs/day: 1 00    Smokeless tobacco: Never Used   Substance and Sexual Activity    Alcohol use: Not Currently    Drug use: No    Sexual activity: Never     Partners: Male       Current Outpatient Medications on File Prior to Visit   Medication Sig    diphenhydrAMINE (NIGHT TIME SLEEP AID) 25 MG tablet Take 50 mg by mouth daily at bedtime as needed for sleep    escitalopram (LEXAPRO) 10 mg tablet Take 1 tablet (10 mg total) by mouth daily    fluconazole (DIFLUCAN) 200 mg tablet Take 1 tablet (200 mg total) by mouth daily for 21 days    furosemide (LASIX) 20 mg tablet Take 3 tablets (60 mg total) by mouth daily    losartan (COZAAR) 100 MG tablet Take 1 tablet (100 mg total) by mouth daily    nadolol (CORGARD) 20 mg tablet Take 1 tablet (20 mg total) by mouth daily    nicotine (NICODERM CQ) 21 mg/24 hr TD 24 hr patch Place 1 patch on the skin daily    pantoprazole (PROTONIX) 40 mg tablet Take 1 tablet (40 mg total) by mouth daily with breakfast    senna (SENOKOT) 8 6 MG tablet Take 2 tablets by mouth daily    spironolactone (ALDACTONE) 50 mg tablet Take 3 tablets (150 mg total) by mouth daily    [DISCONTINUED] gabapentin (NEURONTIN) 300 mg capsule 1 tab in the am and 3 tabs PO night time    [DISCONTINUED] gabapentin (NEURONTIN) 300 mg capsule Take 300 mg by mouth daily at bedtime     [DISCONTINUED] oxyCODONE-acetaminophen (PERCOCET)  mg per tablet Take 1 tab PO Q 6 hours prn pain  Do not fill until 2/19/19     No current facility-administered medications on file prior to visit  No Known Allergies    Physical Exam:    /72   Pulse 64   Temp 98 4 °F (36 9 °C)   Wt 74 8 kg (165 lb)   BMI 28 32 kg/m²     Constitutional:normal, well developed, well nourished, alert, in no distress and non-toxic and no overt pain behavior  Eyes:anicteric  HEENT:grossly intact  Neck:supple, symmetric, trachea midline and no masses   Pulmonary:even and unlabored  Cardiovascular:No edema or pitting edema present  Skin:Normal without rashes or lesions and well hydrated  Psychiatric:Mood and affect appropriate  Neurologic:Cranial Nerves II-XII grossly intact  Musculoskeletal:normal    Imaging  MRI LUMBAR SPINE WITHOUT CONTRAST 8/29/18     INDICATION: M54 16: Radiculopathy, lumbar region  M51 26: Other intervertebral disc displacement, lumbar region     History of hepatocellular carcinoma and hepatitis C with increasing low back pain      COMPARISON:  MRI from November 19, 2013     TECHNIQUE:  Sagittal T1, sagittal T2, sagittal inversion recovery, axial T1 and axial T2, coronal T2        IMAGE QUALITY:  Diagnostic     FINDINGS:     ALIGNMENT:  Normal alignment of the lumbar spine  No compression fracture  No spondylolysis or spondylolisthesis  No scoliosis      MARROW SIGNAL:  The marrow appears diffusely heterogeneous once again  There appears to be interval increase in the degree of T1 shortening throughout the visualized lower thoracic, lumbar and sacral spine suggesting osteopenia/osteoporosis  Underlying   metabolic, hematopoietic hepatic or lymphoproliferative disease is difficult to exclude        DISTAL CORD AND CONUS:  Normal size and signal within the distal cord and conus  The conus ends at the level      PARASPINAL SOFT TISSUES:  Paraspinal soft tissues are unremarkable      SACRUM:  Normal signal within the sacrum  No evidence of insufficiency or stress fracture  Tarlov cysts are noted at the S1 level  There is prominent epidural lipomatosis within the sacral canal      LOWER THORACIC DISC SPACES:  There is mild disc space narrowing at T11-T12 with mild degenerative discogenic disease once again indenting the ventral thecal sac without significant spinal stenosis      LUMBAR DISC SPACES:  There is multilevel disc desiccation with disc height loss most notably at L5-S1  The disc height loss has progressed since the prior examination  There are also Modic type II degenerative changes along the L5 and S1 endplates with   minimal vacuum gas in the intervertebral disc space and adjacent Schmorl's node deformity in the inferior L5 endplate      J2-C9:  Mild left greater than right facet arthrosis, unchanged  There is no foraminal stenosis      L2-L3:  Stable mild circumferential disc bulge and facet arthropathy  The disc bulges slightly eccentric to the right    There is stable marginal osteophyte formation  There is no foraminal stenosis      L3-L4:  Mild sequential disc bulge with mild bilateral facet arthropathy resulting in mild spinal stenosis  A tiny central annular fissure is identified without an associated protrusion  There is no foraminal stenosis      L4-L5:  Circumferential disc bulge slightly more pronounced than noted on the prior study with bilateral facet arthropathy and mild buckling of the ligamentum flavum  There is mild spinal canal encroachment as a result of these findings  The neural   foramina remain patent      L5-S1:  The previously noted central to right central disc extrusion is not as pronounced  There is a circumferential disc bulge once again noted with bilateral facet arthropathy  The disc bulge combined with the disc height loss results in bilateral   foraminal stenosis which is stable on the left and mildly progressed on the right compared to the prior study      IMPRESSION:     Interval progression of endplate degenerative changes at L5-S1 with greater disc height loss resulting in slightly greater foraminal stenosis      Note is made of interval improvement in the degree of disc herniation at L5-S1 as there appears to be decrease in the central to right central disc extrusion noted at this level in 2013      Mild interval progression of spinal stenosis at the L3-4 and L4-5 levels when compared to the prior study      Interval increase in the degree of osteopenia/osteoporosis        Last dose of Oxycodone was 8:00am this morning

## 2019-03-18 ENCOUNTER — TELEPHONE (OUTPATIENT)
Dept: OBGYN CLINIC | Facility: HOSPITAL | Age: 69
End: 2019-03-18

## 2019-03-18 LAB
LEFT EYE DIABETIC RETINOPATHY: NORMAL
RIGHT EYE DIABETIC RETINOPATHY: NORMAL

## 2019-03-18 NOTE — TELEPHONE ENCOUNTER
S/W pt, pt said its her fault but she can no longer uses [facility] and would like [facility] [address] [facility]  I told pt I will call [facility] and cx the scripts NM sent on 3/14/19 and ask NM to when she returns on Thurs to [facility]  I reminded pt her next script has a DNFB 3/26/19 date  Pt understood  I s/w Karina Kyle at [place] and she confirmed pt's Ins won't allow use to use [facility] anymore  She will cx the Percocet scripts from 3/14/19 that were sent  NM, please re-send pt's March and April Percocet scripts to [facility] on file

## 2019-03-18 NOTE — TELEPHONE ENCOUNTER
Patient called and said her rx for oxyCODONE-acetaminophen (PERCOCET)  mg per tablet  Was to go to [facility]/pharmacy #[id number], [phone]  [address] [address], [place], [place]  [phone]    She is sorry this is her fault but is asking for it to be sent there   Please call when sent over thank you     # [phone]

## 2019-03-19 ENCOUNTER — PATIENT OUTREACH (OUTPATIENT)
Dept: INTERNAL MEDICINE CLINIC | Facility: CLINIC | Age: 69
End: 2019-03-19

## 2019-03-19 NOTE — PROGRESS NOTES
Outpatient Care Management Note:    Called and spoke with patient  I reminded patient of her hospital follow up appt with PCP office this Thursday 3/21 @ 3pm with Dr Avelino Ndiaye  Pt reports she will be coming to appt and will be driving herself to appt  I requested she bring all medication she has and is taking with her to appt  Pt was confused with what medication she was to be taking during a previous outreach and last outreach she did not want to discuss meds further on phone  Pt is aware I can further review meds with her and help make sure her medication list is up to date  Pt reports she did see Dr Gaetano Buchanan (ophthalmologist)  She reports was told " I have a red blood spot in my one eye" pt could not tell me anything further other than she was asked to follow up in 3 months and scheduled an appt  Pt made aware PCP office did not receive office note yet from Dr Elver Barreto office  Pt denies any difficulty with seeing with that eye and denies any pain  Pt reports she has been tired but denies any other symptoms or complaints to me at this time  Patient does not have any further questions, concerns, or other needs at this time  Pt has my contact # and PCP office # if needed  Pt is agreeable for further outreach

## 2019-03-21 ENCOUNTER — OFFICE VISIT (OUTPATIENT)
Dept: INTERNAL MEDICINE CLINIC | Facility: CLINIC | Age: 69
End: 2019-03-21

## 2019-03-21 ENCOUNTER — PATIENT OUTREACH (OUTPATIENT)
Dept: INTERNAL MEDICINE CLINIC | Facility: CLINIC | Age: 69
End: 2019-03-21

## 2019-03-21 VITALS
SYSTOLIC BLOOD PRESSURE: 80 MMHG | DIASTOLIC BLOOD PRESSURE: 44 MMHG | TEMPERATURE: 97.9 F | WEIGHT: 152.56 LBS | BODY MASS INDEX: 26.05 KG/M2 | HEART RATE: 64 BPM | HEIGHT: 64 IN

## 2019-03-21 DIAGNOSIS — R53.1 GENERALIZED WEAKNESS: Primary | ICD-10-CM

## 2019-03-21 DIAGNOSIS — G89.4 CHRONIC PAIN SYNDROME: ICD-10-CM

## 2019-03-21 DIAGNOSIS — K74.60 CIRRHOSIS (HCC): ICD-10-CM

## 2019-03-21 PROCEDURE — 99495 TRANSJ CARE MGMT MOD F2F 14D: CPT | Performed by: INTERNAL MEDICINE

## 2019-03-21 RX ORDER — POLYETHYLENE GLYCOL 3350 17 G/17G
17 POWDER, FOR SOLUTION ORAL DAILY PRN
Qty: 30 EACH | Refills: 3 | Status: SHIPPED | OUTPATIENT
Start: 2019-03-21

## 2019-03-21 RX ORDER — LOSARTAN POTASSIUM 50 MG/1
50 TABLET ORAL DAILY
Qty: 90 TABLET | Refills: 1 | Status: SHIPPED | OUTPATIENT
Start: 2019-03-21 | End: 2019-04-12

## 2019-03-21 RX ORDER — OXYCODONE AND ACETAMINOPHEN 10; 325 MG/1; MG/1
TABLET ORAL
Qty: 120 TABLET | Refills: 0 | Status: SHIPPED | OUTPATIENT
Start: 2019-03-21 | End: 2019-05-13 | Stop reason: SDUPTHER

## 2019-03-21 NOTE — TELEPHONE ENCOUNTER
Deborah, At office visit Patient has specifically said she wanted Percocet only to [place]  Can you please call [facility] to cancel that script (DNF 3/26/19)    I [facility] [address] today

## 2019-03-21 NOTE — PROGRESS NOTES
TCM Call (since 2/18/2019)     Date and time call was made  3/12/2019  3:00 PM    Hospital care reviewed  Records reviewed    Patient was hospitialized at  [facility]    Date of Admission  03/07/19    Date of discharge  03/11/19    Diagnosis  ABDOMINAL PAIN - R10 9    Disposition  Home LIVES WITH SIGNIFICANT OTHER LILLIE    Were the patients medications reviewed and updated  Yes    Current Symptoms  -- PT  C/O GENERAL DISCOMFORT    Fatigue severity  Severe      TCM Call (since 2/18/2019)     Post hospital issues  None    Should patient be enrolled in anticoag monitoring? No    Scheduled for follow up? Yes 3/21/19 WITH DR Diana Mukherjee    Patients specialists  Other (comment)    Other specialists names  DR Mony Mitchell - [phone]    Other specialists contcat #  Santhosh Macdonald - [facility] - [phone]    Referrals needed  Judith Morel - [phone]    Did you obtain your prescribed medications  Yes PT  [facility] [place]    Do you need help managing your prescriptions or medications  No PT  IS ABLE TO MANAGE HER OWN MEDS    Is transportation to your appointment needed  No PT  OR SIGNIFICANT OTHER [address]ILLIE [address]    I have advised the patient to call PCP with any new or worsening symptoms  Kady Quevedo LPN    Living Arrangements  Spouse or Significiant other    Are you recieving any outpatient services  No    Are you recieving home care services  No    Are you using any community resources  No    Interperter language line needed  No    Counseling  Patient    Counseling topics  instructions for management; Importance of RX compliance    Comments  PT  HAD PARACENTESIS ON 3/9/18 WITH 1400 ML OF FLUID REMOVED  ESTABLISHMENT OF CARE VISIT    ASSESSMENT/PLAN:  Diagnoses and all orders for this visit:    Generalized weakness  · Feels generalized weakness and fatigue     · Decreased endurance   · Will benefit from PT   -     Ambulatory referral to Physical Therapy; Future    Cirrhosis ([facility] [address] )  · Secondary to Hepatitis C   · MELD of 10   · Esophageal varices and port vein thrombosis   · Continue aldactone and lasix   · Continue fluid and sodium restricted diet   · Follow up with GI  · No anticoagulation at this time  -     losartan (COZAAR) 50 mg tablet; Take 1 tablet (50 mg total) by mouth daily  -     polyethylene glycol (MIRALAX) 17 g packet; Take 17 g by mouth daily as needed (Constipation)  -     Ambulatory referral to Physical Therapy; Future     Hepatocellular Carcinoma status post wedge resection and brachytherapy   · Increased AFP at 113  · Follow up with GI and Surgical Oncology     Candidal Esophagitis   · Continue fluconazole  · Repeat EGD 1 month    Hypertension  · Systolic blood pressure in the 80s on multiple checks today  · Denies symptoms of dizziness, chest pain, shortness of breath, or palpitations  · Will decrease losartan to 50 mg daily       HISTORY OF PRESENTING ILLNESS:  Olivia Delgado is a 76 y o  with PMH significant for hepatocellular carcinoma status post ablation and wedge resection in 2015 and status post SIRS spheres radioembolization December 2017, chronic cirrhosis secondary to hepatitis-C, history of portal vein thrombosis not currently on anticoagulation, type 2 diabetes mellitus, hypertension, opioid dependence, nicotine dependence,  Olivia Delgado is in clinic today for transition of care appointment from her recent hospitalization  Patient initially presented for abdominal pain and was noted to have abdominal distension and was thought to be secondary for to ascites  Patient underwent paracentesis of 1400 cc and was thought to be secondary to portal hypertension  Patient was initially started on antibiotics for SBP prophylaxis was discontinued one cytology came back    Patient underwent endoscopy with Gastroenterology and was found to have portal hypertensive gastropathy, severe candidal esophagitis, grade 2 esophageal varices with no bands placed secondary to recent banding and severe scarring  Gastroenterology recommended patient being started on PPI therapy, fluconazole and to have repeat EGD in one month  Patient's diuretics including Aldactone and Lasix dosing was increased  Patient went home and has not been taking fluconazole and pantoprazole  She reports significant improvement in her abdominal pain and nausea and vomiting  She has been having some constipation  She has depressed mood but feels she has been managing okay  Denies suicidal ideation  Patient feels she has generalized weakness since her recent hospitalizations  She also reports poor appetite  She also admits to three days of decreased urination  No sensation of needing to void, no dysuria, no increased frequency  Has been drinking fluids and taking her lasix  Review of Systems   Constitutional: Positive for activity change, appetite change and fatigue  Negative for chills, fever and unexpected weight change  HENT: Negative for congestion and sore throat  Eyes: Negative for visual disturbance  Respiratory: Negative for apnea, choking, chest tightness, shortness of breath and wheezing  Cardiovascular: Negative for chest pain and leg swelling  Gastrointestinal: Positive for abdominal distention and constipation  Negative for diarrhea, nausea and vomiting  Genitourinary: Positive for difficulty urinating  Negative for dysuria and flank pain  Musculoskeletal: Negative for arthralgias, back pain and gait problem  Skin: Negative for color change, pallor and rash  Neurological: Negative for dizziness, seizures, syncope, weakness, light-headedness, numbness and headaches  Hematological: Negative for adenopathy  Does not bruise/bleed easily  Psychiatric/Behavioral: Positive for dysphoric mood  Negative for agitation, behavioral problems, sleep disturbance and suicidal ideas             OBJECTIVE:  Vitals:    03/21/19 1522   BP: (!) 80/44   BP Location: Left arm   Patient Position: Sitting   Cuff Size: Adult   Pulse: 64   Temp: 97 9 °F (36 6 °C)   TempSrc: Oral   Weight: 69 2 kg (152 lb 8 9 oz)   Height: 5' 4" (1 626 m)     Physical Exam   Constitutional: She is oriented to person, place, and time  She appears well-developed and well-nourished  No distress  HENT:   Head: Normocephalic and atraumatic  Mouth/Throat: No oropharyngeal exudate  Eyes: Pupils are equal, round, and reactive to light  Conjunctivae are normal  No scleral icterus  Protruding eyes bilaterally  Neck: Normal range of motion  Cardiovascular: Normal rate, regular rhythm, normal heart sounds and intact distal pulses  Exam reveals no gallop and no friction rub  No murmur heard  Pulmonary/Chest: Effort normal and breath sounds normal  No respiratory distress  She has no wheezes  She exhibits no tenderness  Abdominal: Soft  Bowel sounds are normal  She exhibits distension  She exhibits no mass  There is no tenderness  There is no rebound and no guarding  Old surgical incision   Musculoskeletal: Normal range of motion  She exhibits no edema or tenderness  Lymphadenopathy:     She has no cervical adenopathy  Neurological: She is alert and oriented to person, place, and time  No cranial nerve deficit  Skin: Skin is warm and dry  She is not diaphoretic  No erythema  Psychiatric: Her speech is normal and behavior is normal  She exhibits a depressed mood  Vitals reviewed          Current Outpatient Medications:     fluconazole (DIFLUCAN) 200 mg tablet, Take 1 tablet (200 mg total) by mouth daily for 21 days, Disp: 21 tablet, Rfl: 0    furosemide (LASIX) 20 mg tablet, Take 3 tablets (60 mg total) by mouth daily, Disp: 90 tablet, Rfl: 0    gabapentin (NEURONTIN) 300 mg capsule, 1 tab in the am and 3 tabs PO night time, Disp: 120 capsule, Rfl: 5    losartan (COZAAR) 100 MG tablet, Take 1 tablet (100 mg total) by mouth daily, Disp: 90 tablet, Rfl: 1    nadolol (CORGARD) 20 mg tablet, Take 1 tablet (20 mg total) by mouth daily, Disp: 30 tablet, Rfl: 1    oxyCODONE-acetaminophen (PERCOCET)  mg per tablet, Take 1 tab PO Q 6 hours prn pain  2nd month script Do not fill until 3/26/19, Disp: 120 tablet, Rfl: 0    pantoprazole (PROTONIX) 40 mg tablet, Take 1 tablet (40 mg total) by mouth daily with breakfast, Disp: 30 tablet, Rfl: 0    senna (SENOKOT) 8 6 MG tablet, Take 2 tablets by mouth daily, Disp: , Rfl:     spironolactone (ALDACTONE) 50 mg tablet, Take 3 tablets (150 mg total) by mouth daily, Disp: 90 tablet, Rfl: 0    escitalopram (LEXAPRO) 10 mg tablet, Take 1 tablet (10 mg total) by mouth daily, Disp: 30 tablet, Rfl: 1    nicotine (NICODERM CQ) 21 mg/24 hr TD 24 hr patch, Place 1 patch on the skin daily, Disp: 28 patch, Rfl: 0    Past Medical History:   Diagnosis Date    Anemia     Anxiety     Cancer (HCC)     Chronic pain disorder     herniated disc    Cirrhosis (HCC)     Constipation     Current use of long term anticoagulation     eliquis    Diabetes mellitus ([facility] [address] )     blood sugar 113 at 1225 2/18/19    Drug dependence ([facility] [address] )     GI bleed     Hepatocellular carcinoma ([facility] [address] )     Hypertension     Hypokalemia 3/7/2019    Hyponatremia     Liver disease     h/o hepatitis c    Melena     Portal vein thrombosis      Past Surgical History:   Procedure Laterality Date    DENTAL SURGERY      ESOPHAGOGASTRODUODENOSCOPY N/A 2/18/2019    Procedure: ESOPHAGOGASTRODUODENOSCOPY (EGD); Surgeon: Khris Haley MD;  [facility]; Service: Gastroenterology    ESOPHAGOGASTRODUODENOSCOPY N/A 3/11/2019    Procedure: ESOPHAGOGASTRODUODENOSCOPY (EGD); Surgeon: Jose Rosenthal MD;  [facility];   Service: Gastroenterology    HYSTERECTOMY      IR PARACENTESIS  3/8/2019    LIVER BIOPSY      LIVER SURGERY      Ablation    TONSILLECTOMY       Social History     Socioeconomic History    Marital status: Single     Spouse name: Not on file    Number of children: Not on file    Years of education: Not on file    Highest education level: Not on file   Occupational History    Not on file   Social Needs    Financial resource strain: Not on file    Food insecurity:     Worry: Not on file     Inability: Not on file    Transportation needs:     Medical: Not on file     Non-medical: Not on file   Tobacco Use    Smoking status: Current Every Day Smoker     Packs/day: 1 00     Years: 50 00     Pack years: 50 00     Types: Cigarettes    Smokeless tobacco: Never Used   Substance and Sexual Activity    Alcohol use: Not Currently    Drug use: No    Sexual activity: Not Currently     Partners: Male   Lifestyle    Physical activity:     Days per week: Not on file     Minutes per session: Not on file    Stress: Not on file   Relationships    Social connections:     Talks on phone: Not on file     Gets together: Not on file     Attends Restoration service: Not on file     Active member of club or organization: Not on file     Attends meetings of clubs or organizations: Not on file     Relationship status: Not on file    Intimate partner violence:     Fear of current or ex partner: Not on file     Emotionally abused: Not on file     Physically abused: Not on file     Forced sexual activity: Not on file   Other Topics Concern    Not on file   Social History Narrative    Not on file     Family History   Problem Relation Age of Onset    Prostate cancer Father        ==  Venson Boeck, MD   Internal Medicine PGY-2  3/21/2019 4:12 PM    [facility]  [address] , [address] [address] [facility] [address] [facility]  Office: [phone]  Fax: [phone]

## 2019-03-21 NOTE — TELEPHONE ENCOUNTER
S/w [facility] pharmacy to cancel percocet with fill date of 3/26  Per pharmacist, they do not have that script  Per earlier in task, pharmacy was already notified to cancel script

## 2019-03-22 NOTE — PROGRESS NOTES
Outpatient Care Management Note:    Patient came into PCP office on 3/21/19 for her hospital follow up appt and saw Dr Bernice Almeida  I met with patient and was present for appointment  Pt brought in her medication and further education was done with patient and explained which each medication is for and how she should be taking it  At last hospital stay Lasix and Spironolactone were increased and pt is taking as prescribed  It was discovered that patient had not started taking the fluconazole and protonix and will now start these medications  Miralax was added to help with constipation as needed  Pt's blood pressure was 80/44 in the office and Losartan was decreased from 100 mg daily to 50 mg daily and is aware needs to  new prescription at pharmacy  Pt is using a pill box and setting it up weekly  Pt reports better understanding of her medication after today's visit and reports she was confused and did not know what her meds were all for  Pt reports has senna and percocet at home and did not bring these medications today  Pt reports feeling down and tired a lot  It was discussed by provider with patient about restarting a medication to help with her mood  After discussion patient is declining to start a medication at this time  Pt reports she has her male friend for support at home and drove her here today  Pt reports frustration with her back pain and legs and also recent decreased mobility  Pt is requested to do some type of exercises  It was discussed with patient about doing outpatient physical therapy  Pt is agreeable to this and reports has a friend to drive her and is also able to drive  Pt reports is still currently smoking 1 pack of cigarettes a day  Pt report insurance will not cover her nicotine patches  Pt was given the PA 1-800 -Quit-Now line an encouraged to call and receive over the phone counseling and could receive free smoking cessation       I did call the [facility] GI office to see if there was a sooner appt available  Nothing sooner at their [place] office were patient prefers to go  Pt made aware and will keep and go to 4/22/19 appt  Pt was reminded of her upcoming appt's with Dr Emelyn Ram (PCP) and Dr Matty Rose with oncology  Pt recently saw pain management and next appt is 5/13/19  Patient does not have any further questions, concerns, or other needs at this time  Pt has my contact # and PCP office # if needed  Pt is agreeable for further outreach       (Please see physician note from 3/21/19 for more info)

## 2019-03-25 DIAGNOSIS — K74.60 DECOMPENSATED HEPATIC CIRRHOSIS (HCC): ICD-10-CM

## 2019-03-25 DIAGNOSIS — K72.90 DECOMPENSATED HEPATIC CIRRHOSIS (HCC): ICD-10-CM

## 2019-03-25 RX ORDER — NADOLOL 20 MG/1
20 TABLET ORAL DAILY
Qty: 30 TABLET | Refills: 1 | Status: SHIPPED | OUTPATIENT
Start: 2019-03-25 | End: 2019-04-22 | Stop reason: SDUPTHER

## 2019-03-26 ENCOUNTER — APPOINTMENT (EMERGENCY)
Dept: CT IMAGING | Facility: HOSPITAL | Age: 69
End: 2019-03-26
Payer: COMMERCIAL

## 2019-03-26 ENCOUNTER — TELEPHONE (OUTPATIENT)
Dept: OBGYN CLINIC | Facility: HOSPITAL | Age: 69
End: 2019-03-26

## 2019-03-26 ENCOUNTER — HOSPITAL ENCOUNTER (EMERGENCY)
Facility: HOSPITAL | Age: 69
Discharge: HOME | End: 2019-03-26
Attending: EMERGENCY MEDICINE | Admitting: EMERGENCY MEDICINE
Payer: COMMERCIAL

## 2019-03-26 VITALS
TEMPERATURE: 98.2 F | OXYGEN SATURATION: 96 % | HEART RATE: 59 BPM | RESPIRATION RATE: 16 BRPM | WEIGHT: 164.02 LBS | SYSTOLIC BLOOD PRESSURE: 104 MMHG | DIASTOLIC BLOOD PRESSURE: 56 MMHG | BODY MASS INDEX: 28.15 KG/M2
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
## 2019-03-26 DIAGNOSIS — S80.212A ABRASION OF LEFT KNEE, INITIAL ENCOUNTER: ICD-10-CM

## 2019-03-26 DIAGNOSIS — S00.81XA ABRASION OF FACE, INITIAL ENCOUNTER: ICD-10-CM

## 2019-03-26 PROCEDURE — 99284 EMERGENCY DEPT VISIT MOD MDM: CPT

## 2019-03-26 PROCEDURE — 70450 CT HEAD/BRAIN W/O DYE: CPT

## 2019-03-26 PROCEDURE — 70486 CT MAXILLOFACIAL W/O DYE: CPT

## 2019-03-26 NOTE — TELEPHONE ENCOUNTER
I attempted to reach pt again at 2:12 pm  I left another message for the pt that I s[facility] and [facility] and it can be filled today and he was working on getting it ready for her  I left pt a message about this at 1:30 today that is documented in another task  If pt is still having an issue with [facility] can call back  I will leave a message for the phone room if the pt calls back to get the nurse on the phone  C/B # provided  Pls get nurse on phone if pt calls back, thank you

## 2019-03-26 NOTE — TELEPHONE ENCOUNTER
I s/w Cheryl Staff, pharmacist, at [facility]  And he confirmed he had the script on file with [facility] 3/26  He will s/w his techs that they need to look in the pt's file for scripts  He will get script filled and ready for the pt  I left detailed vm for pt of the same, I said the pt should call [facility] before going to p/u to make sure they got it ready  Pt can contact our office with any further issues  C/B # provided

## 2019-03-26 NOTE — TELEPHONE ENCOUNTER
Dr Glenis Everett    # [phone]    Patient is asking to speak with Rosalinda Martinez   Patient is calling as she went to get her Rx and the pharmacy is telling her it is not time to fill it yet  I did see on the RX that it was not to be filled until 3/26/19  Patient is very upset and frustrated

## 2019-03-26 NOTE — ED PROVIDER NOTES
History  Chief Complaint   Patient presents with    Fall     Pt states that she fell over a curb and states that she struck the right side of her face  24-year-old female presents today after trip and fall  States she lost her footing on uneven ground and fell forward, hitting her chin on the ground  There was no loss of consciousness  Denies other injury  Is not on any blood thinners  History provided by:  Patient  Fall   Mechanism of injury: fall    Injury location:  Face  Facial injury location:  Face  Incident location:  Street  Arrived directly from scene: yes    Fall:     Fall occurred:  Tripped    Impact surface:  [place]    Point of impact:  Face    Entrapped after fall: no    Tetanus status:  Up to date  Prior to arrival data:     Bystander interventions:  None    Patient ambulatory at scene: yes    Associated symptoms: back pain (Chronic)    Associated symptoms: no abdominal pain, no chest pain, no difficulty breathing, no headaches, no loss of consciousness, no neck pain and no seizures    Risk factors: no anticoagulation therapy and no hemophilia        Prior to Admission Medications   Prescriptions Last Dose Informant Patient Reported?  Taking?   escitalopram (LEXAPRO) 10 mg tablet   No No   Sig: Take 1 tablet (10 mg total) by mouth daily   fluconazole (DIFLUCAN) 200 mg tablet   No No   Sig: Take 1 tablet (200 mg total) by mouth daily for 21 days   furosemide (LASIX) 20 mg tablet   No No   Sig: Take 3 tablets (60 mg total) by mouth daily   gabapentin (NEURONTIN) 300 mg capsule   No No   Si tab in the am and 3 tabs PO night time   losartan (COZAAR) 50 mg tablet   No No   Sig: Take 1 tablet (50 mg total) by mouth daily   nadolol (CORGARD) 20 mg tablet   No No   Sig: Take 1 tablet (20 mg total) by mouth daily   nicotine (NICODERM CQ) 21 mg/24 hr TD 24 hr patch   No No   Sig: Place 1 patch on the skin daily   oxyCODONE-acetaminophen (PERCOCET)  mg per tablet   No No   Sig: Take 1 tab PO Q 6 hours prn pain  2nd month script Do not fill until 3/26/19   pantoprazole (PROTONIX) 40 mg tablet   No No   Sig: Take 1 tablet (40 mg total) by mouth daily with breakfast   polyethylene glycol (MIRALAX) 17 g packet   No No   Sig: Take 17 g by mouth daily as needed (Constipation)   senna (SENOKOT) 8 6 MG tablet  Self Yes No   Sig: Take 2 tablets by mouth daily   spironolactone (ALDACTONE) 50 mg tablet   No No   Sig: Take 3 tablets (150 mg total) by mouth daily      Facility-Administered Medications: None       Past Medical History:   Diagnosis Date    Anemia     Anxiety     Cancer ([address] )     Chronic pain disorder     herniated disc    Cirrhosis ([place] [address] )     Constipation     Current use of long term anticoagulation     eliquis    Diabetes mellitus ([address] )     blood sugar 113 at 1225 2/18/19    Drug dependence ([place] [address] )     GI bleed     Hepatocellular carcinoma ([place] [address] )     Hypertension     Hypokalemia 3/7/2019    Hyponatremia     Liver disease     h/o hepatitis c    Melena     Portal vein thrombosis        Past Surgical History:   Procedure Laterality Date    DENTAL SURGERY      ESOPHAGOGASTRODUODENOSCOPY N/A 2/18/2019    Procedure: ESOPHAGOGASTRODUODENOSCOPY (EGD); Surgeon: Bladimir Hunt MD;  [facility]; Service: Gastroenterology    ESOPHAGOGASTRODUODENOSCOPY N/A 3/11/2019    Procedure: ESOPHAGOGASTRODUODENOSCOPY (EGD); Surgeon: Valeria Jacobs MD;  [facility]; Service: Gastroenterology    HYSTERECTOMY      IR PARACENTESIS  3/8/2019    LIVER BIOPSY      LIVER SURGERY      Ablation    TONSILLECTOMY         Family History   Problem Relation Age of Onset    Prostate cancer Father      I have reviewed and agree with the history as documented      Social History     Tobacco Use    Smoking status: Current Every Day Smoker     Packs/day: 1 00     Years: 50 00     Pack years: 50 00     Types: Cigarettes    Smokeless tobacco: Never Used   Substance Use Topics    Alcohol use: Not Currently    Drug use: No        Review of Systems   Constitutional: Negative for diaphoresis and fever  HENT: Negative for congestion, nosebleeds, trouble swallowing and voice change  Eyes: Negative for visual disturbance  Respiratory: Negative for chest tightness  Cardiovascular: Negative for chest pain  Gastrointestinal: Negative for abdominal pain  Musculoskeletal: Positive for back pain (Chronic)  Negative for neck pain  Allergic/Immunologic: Negative for immunocompromised state  Neurological: Negative for dizziness, seizures, loss of consciousness and headaches  Psychiatric/Behavioral: Negative for confusion  Physical Exam  Physical Exam   Constitutional: She is oriented to person, place, and time  She appears well-developed and well-nourished  HENT:   Head: Normocephalic  Right Ear: External ear normal    Left Ear: External ear normal    Mouth/Throat: Oropharynx is clear and moist    Small abrasion just under the jaw line anteriorly   Eyes: Pupils are equal, round, and reactive to light  EOM are normal    Neck: Normal range of motion  Neck supple  C-spine cleared clinically by me   Cardiovascular: Normal rate and regular rhythm  Pulmonary/Chest: Effort normal and breath sounds normal    Abdominal: Soft  Bowel sounds are normal    Musculoskeletal:        Legs:  Neurological: She is alert and oriented to person, place, and time  No focal neurologic deficit noted   Skin: Skin is warm and dry  Capillary refill takes less than 2 seconds  Psychiatric: She has a normal mood and affect         Vital Signs  ED Triage Vitals [03/26/19 0918]   Temperature Pulse Respirations Blood Pressure SpO2   98 2 °F (36 8 °C) 59 16 104/56 96 %      Temp Source Heart Rate Source Patient Position - Orthostatic VS BP Location FiO2 (%)   Oral Monitor Lying Right arm --      Pain Score       7           Vitals:    03/26/19 0918   BP: 104/56   Pulse: 59   Patient Position - Orthostatic VS: Lying Visual Acuity      ED Medications  Medications - No data to display    Diagnostic Studies  Results Reviewed     None                 CT head without contrast   Final Result by Ade Steiner MD (03/26 0529)      No acute intracranial abnormality  Workstation performed: [id number]         CT facial bones without contrast   Final Result by Ade Steiner MD (03/26 1001)      Normal noncontrast CT of the facial bones  Workstation performed: [id number]                    Procedures  Procedures       Phone Contacts  ED Phone Contact    ED Course                               MDM  Number of Diagnoses or Management Options  Abrasion of face, initial encounter: new and requires workup  Abrasion of left knee, initial encounter: new and requires workup  Fall, initial encounter: new and requires workup  Diagnosis management comments: CT head and maxillofacial are negative for acute traumatic injury  Patient stable for discharge  F/u with PCP as outpatient  RTED instructions reviewed  Amount and/or Complexity of Data Reviewed  Tests in the radiology section of CPT®: ordered and reviewed  Independent visualization of images, tracings, or specimens: yes    Risk of Complications, Morbidity, and/or Mortality  Presenting problems: moderate  Diagnostic procedures: moderate  Management options: low    Patient Progress  Patient progress: stable      Disposition  Final diagnoses:   Fall, initial encounter   Abrasion of face, initial encounter   Abrasion of left knee, initial encounter     Time reflects when diagnosis was documented in both MDM as applicable and the Disposition within this note     Time User Action Codes Description Comment    3/26/2019  9:31 AM Jairon Terry Add [T70  EUBC] Fall, initial encounter     3/26/2019  9:31 AM Jairon Terry Add [G14 18FR] Abrasion of face, initial encounter     3/26/2019  9:31 AM Jairon Terry Add [S80 212A] Abrasion of left knee, initial encounter       ED Disposition     ED Disposition Condition Date/Time Comment    Discharge Stable Tue Mar 26, 2019 10:08 AM Chris Sánchez discharge to home/self care  Follow-up Information     Follow up With Specialties Details Why Contact Info    Your PCP              Patient's Medications   Discharge Prescriptions    No medications on file     No discharge procedures on file      ED Provider  Electronically Signed by           Deborah Mitchell DO  03/26/19 1026

## 2019-03-26 NOTE — TELEPHONE ENCOUNTER
Patient is calling back to speak with Tommy Sherman she is not able to get her script  She said that it should be filled today

## 2019-03-26 NOTE — TELEPHONE ENCOUNTER
Patient is calling back to find out what is going on with her medication refill  Patient is stating that she still does not have this med  Patient is getting very upset     Please  [phone]

## 2019-03-28 ENCOUNTER — PATIENT OUTREACH (OUTPATIENT)
Dept: INTERNAL MEDICINE CLINIC | Facility: CLINIC | Age: 69
End: 2019-03-28

## 2019-03-28 NOTE — PROGRESS NOTES
Outpatient Care Management Note:    Called and spoke with patient  Pt reports she is sore from a recent fall that happened on 3/26/19  Pt reports she was trying to step up on a curb and misjudged her footing and did not lift her left leg high enough and tripped and fell  Pt reports she hit her face on the concrete and her knee  Pt also reports she is now feeling rib pain  Pt made aware she may have strained/pulled muscles over her rib area when she fell and will take time to heal  I suggested ice to to the area and then using heat at a later time and can try over the counter pain spray/cream such as icy hot and biofreeze  Pt reports she does have Percocet for her back for pain which will help  Percocet is given by pain management  Pt reports her left leg is weak and does not want to have another fall  This was discussed at her last visit at PCP office and physical therapy was ordered  Pt reminded she scheduled an appt for next week on Wed 4/3 @ 8 am on [address]  Pt reports she will go to PT session and will notify them that she did have a recent fall and that she wants to strengthen her legs  Pt confirms she is now taking all her medication as it was discussed with her last week at her PCP office visit  Pt reports she did  her new dose of Losartan 50 mg and is taking it daily  Pt has not obtained a blood pressure cuff yet to check BP at home  Pt encouraged to keep and come to PCP appt on 4/5 @ 8am  Pt is aware and reminded of upcoming GI appt  Pt is aware of appt with Dr Jareth Harvey (surgical oncology) on 4/12/19  Patient does not have any further questions, concerns, or other needs at this time  Pt has my contact # and PCP office # if needed  Pt is agreeable for further outreach

## 2019-03-28 NOTE — TELEPHONE ENCOUNTER
I left  for pt asking if she was able to get her percocet Rx on Barnetta Fleeting at [facility] told me he would work on it  I asked if pt could c/b and let me know either way  C/B # and OH provided

## 2019-04-01 NOTE — ED NOTES
Accessed pt  Chart for phone call in regards to follow up PT appointment and need for referel  Instructed pt   To call number on AVS to reschedule appointment      Gautam Gibbs RN  04/01/19 4996 verbal instruction/audio/video/written material

## 2019-04-02 ENCOUNTER — TELEPHONE (OUTPATIENT)
Dept: INTERNAL MEDICINE CLINIC | Facility: CLINIC | Age: 69
End: 2019-04-02

## 2019-04-04 ENCOUNTER — TRANSCRIBE ORDERS (OUTPATIENT)
Dept: ADMINISTRATIVE | Age: 69
End: 2019-04-04

## 2019-04-04 DIAGNOSIS — R07.81 RIB PAIN: Primary | ICD-10-CM

## 2019-04-05 ENCOUNTER — APPOINTMENT (OUTPATIENT)
Dept: LAB | Facility: CLINIC | Age: 69
End: 2019-04-05
Payer: COMMERCIAL

## 2019-04-05 ENCOUNTER — OFFICE VISIT (OUTPATIENT)
Dept: INTERNAL MEDICINE CLINIC | Facility: CLINIC | Age: 69
End: 2019-04-05

## 2019-04-05 VITALS
WEIGHT: 147.71 LBS | TEMPERATURE: 97.5 F | BODY MASS INDEX: 25.22 KG/M2 | HEIGHT: 64 IN | SYSTOLIC BLOOD PRESSURE: 80 MMHG | HEART RATE: 64 BPM | DIASTOLIC BLOOD PRESSURE: 60 MMHG

## 2019-04-05 DIAGNOSIS — E11.69 TYPE 2 DIABETES MELLITUS WITH OTHER SPECIFIED COMPLICATION, WITHOUT LONG-TERM CURRENT USE OF INSULIN (HCC): ICD-10-CM

## 2019-04-05 DIAGNOSIS — I81 PORTAL VEIN THROMBOSIS: ICD-10-CM

## 2019-04-05 DIAGNOSIS — D50.9 IRON DEFICIENCY ANEMIA, UNSPECIFIED IRON DEFICIENCY ANEMIA TYPE: ICD-10-CM

## 2019-04-05 DIAGNOSIS — E87.1 HYPONATREMIA: ICD-10-CM

## 2019-04-05 DIAGNOSIS — D64.9 ANEMIA, UNSPECIFIED TYPE: ICD-10-CM

## 2019-04-05 DIAGNOSIS — R53.83 OTHER FATIGUE: ICD-10-CM

## 2019-04-05 DIAGNOSIS — E87.6 HYPOKALEMIA: Primary | ICD-10-CM

## 2019-04-05 DIAGNOSIS — K74.60 HEPATIC CIRRHOSIS DUE TO CHRONIC HEPATITIS C INFECTION (HCC): ICD-10-CM

## 2019-04-05 DIAGNOSIS — D50.9 IRON DEFICIENCY ANEMIA, UNSPECIFIED IRON DEFICIENCY ANEMIA TYPE: Primary | ICD-10-CM

## 2019-04-05 DIAGNOSIS — Z12.39 SCREENING FOR BREAST CANCER: ICD-10-CM

## 2019-04-05 DIAGNOSIS — I95.9 HYPOTENSION, UNSPECIFIED HYPOTENSION TYPE: ICD-10-CM

## 2019-04-05 DIAGNOSIS — B18.2 HEPATIC CIRRHOSIS DUE TO CHRONIC HEPATITIS C INFECTION (HCC): ICD-10-CM

## 2019-04-05 LAB
ANION GAP SERPL CALCULATED.3IONS-SCNC: 9 MMOL/L (ref 4–13)
BASOPHILS # BLD AUTO: 0.03 THOUSANDS/ΜL (ref 0–0.1)
BASOPHILS NFR BLD AUTO: 0 % (ref 0–1)
BUN SERPL-MCNC: 10 MG/DL (ref 5–25)
CALCIUM SERPL-MCNC: 9.1 MG/DL (ref 8.3–10.1)
CHLORIDE SERPL-SCNC: 95 MMOL/L (ref 100–108)
CO2 SERPL-SCNC: 30 MMOL/L (ref 21–32)
CREAT SERPL-MCNC: 1 MG/DL (ref 0.6–1.3)
EOSINOPHIL # BLD AUTO: 0.04 THOUSAND/ΜL (ref 0–0.61)
EOSINOPHIL NFR BLD AUTO: 0 % (ref 0–6)
ERYTHROCYTE [DISTWIDTH] IN BLOOD BY AUTOMATED COUNT: 18.6 % (ref 11.6–15.1)
GFR SERPL CREATININE-BSD FRML MDRD: 67 ML/MIN/1.73SQ M
GLUCOSE SERPL-MCNC: 153 MG/DL (ref 65–140)
HCT VFR BLD AUTO: 35.2 % (ref 34.8–46.1)
HGB BLD-MCNC: 11.2 G/DL (ref 11.5–15.4)
IMM GRANULOCYTES # BLD AUTO: 0.05 THOUSAND/UL (ref 0–0.2)
IMM GRANULOCYTES NFR BLD AUTO: 0 % (ref 0–2)
LYMPHOCYTES # BLD AUTO: 1.04 THOUSANDS/ΜL (ref 0.6–4.47)
LYMPHOCYTES NFR BLD AUTO: 8 % (ref 14–44)
MCH RBC QN AUTO: 27.2 PG (ref 26.8–34.3)
MCHC RBC AUTO-ENTMCNC: 31.8 G/DL (ref 31.4–37.4)
MCV RBC AUTO: 85 FL (ref 82–98)
MONOCYTES # BLD AUTO: 1.03 THOUSAND/ΜL (ref 0.17–1.22)
MONOCYTES NFR BLD AUTO: 8 % (ref 4–12)
NEUTROPHILS # BLD AUTO: 10.33 THOUSANDS/ΜL (ref 1.85–7.62)
NEUTS SEG NFR BLD AUTO: 84 % (ref 43–75)
NRBC BLD AUTO-RTO: 0 /100 WBCS
PLATELET # BLD AUTO: 419 THOUSANDS/UL (ref 149–390)
PMV BLD AUTO: 10 FL (ref 8.9–12.7)
POTASSIUM SERPL-SCNC: 2.6 MMOL/L (ref 3.5–5.3)
RBC # BLD AUTO: 4.12 MILLION/UL (ref 3.81–5.12)
SODIUM SERPL-SCNC: 134 MMOL/L (ref 136–145)
WBC # BLD AUTO: 12.52 THOUSAND/UL (ref 4.31–10.16)

## 2019-04-05 PROCEDURE — 36415 COLL VENOUS BLD VENIPUNCTURE: CPT

## 2019-04-05 PROCEDURE — 80048 BASIC METABOLIC PNL TOTAL CA: CPT

## 2019-04-05 PROCEDURE — 99495 TRANSJ CARE MGMT MOD F2F 14D: CPT | Performed by: HOSPITALIST

## 2019-04-05 PROCEDURE — 85025 COMPLETE CBC W/AUTO DIFF WBC: CPT

## 2019-04-05 RX ORDER — POTASSIUM CHLORIDE 750 MG/1
20 CAPSULE, EXTENDED RELEASE ORAL DAILY
Qty: 14 CAPSULE | Refills: 0 | Status: SHIPPED | OUTPATIENT
Start: 2019-04-05 | End: 2019-05-11 | Stop reason: SDUPTHER

## 2019-04-05 RX ORDER — POTASSIUM CHLORIDE 750 MG/1
20 CAPSULE, EXTENDED RELEASE ORAL 2 TIMES DAILY
Qty: 28 CAPSULE | Refills: 0 | Status: SHIPPED | OUTPATIENT
Start: 2019-04-05 | End: 2019-04-05 | Stop reason: DRUGHIGH

## 2019-04-12 ENCOUNTER — OFFICE VISIT (OUTPATIENT)
Dept: SURGICAL ONCOLOGY | Facility: CLINIC | Age: 69
End: 2019-04-12
Payer: COMMERCIAL

## 2019-04-12 VITALS
HEIGHT: 64 IN | RESPIRATION RATE: 14 BRPM | DIASTOLIC BLOOD PRESSURE: 80 MMHG | SYSTOLIC BLOOD PRESSURE: 118 MMHG | HEART RATE: 80 BPM | TEMPERATURE: 97 F | BODY MASS INDEX: 24.59 KG/M2 | WEIGHT: 144 LBS

## 2019-04-12 DIAGNOSIS — C22.0 HEPATOCELLULAR CARCINOMA (HCC): Primary | ICD-10-CM

## 2019-04-12 PROCEDURE — 99215 OFFICE O/P EST HI 40 MIN: CPT | Performed by: SURGERY

## 2019-04-16 ENCOUNTER — TELEPHONE (OUTPATIENT)
Dept: INTERNAL MEDICINE CLINIC | Facility: CLINIC | Age: 69
End: 2019-04-16

## 2019-04-17 DIAGNOSIS — K74.60 HEPATIC CIRRHOSIS DUE TO CHRONIC HEPATITIS C INFECTION (HCC): ICD-10-CM

## 2019-04-17 DIAGNOSIS — R10.84 GENERALIZED ABDOMINAL PAIN: ICD-10-CM

## 2019-04-17 DIAGNOSIS — B18.2 HEPATIC CIRRHOSIS DUE TO CHRONIC HEPATITIS C INFECTION (HCC): ICD-10-CM

## 2019-04-17 RX ORDER — PANTOPRAZOLE SODIUM 40 MG/1
40 TABLET, DELAYED RELEASE ORAL
Qty: 30 TABLET | Refills: 0 | Status: SHIPPED | OUTPATIENT
Start: 2019-04-17 | End: 2019-04-19 | Stop reason: SDUPTHER

## 2019-04-17 RX ORDER — SPIRONOLACTONE 50 MG/1
150 TABLET, FILM COATED ORAL DAILY
Qty: 90 TABLET | Refills: 0 | Status: SHIPPED | OUTPATIENT
Start: 2019-04-17 | End: 2019-04-19 | Stop reason: SDUPTHER

## 2019-04-17 RX ORDER — FUROSEMIDE 20 MG/1
60 TABLET ORAL DAILY
Qty: 180 TABLET | Refills: 2 | Status: SHIPPED | OUTPATIENT
Start: 2019-04-17 | End: 2019-04-19 | Stop reason: SDUPTHER

## 2019-04-18 ENCOUNTER — TELEPHONE (OUTPATIENT)
Dept: INTERNAL MEDICINE CLINIC | Facility: CLINIC | Age: 69
End: 2019-04-18

## 2019-04-19 DIAGNOSIS — K74.60 HEPATIC CIRRHOSIS DUE TO CHRONIC HEPATITIS C INFECTION (HCC): ICD-10-CM

## 2019-04-19 DIAGNOSIS — R10.84 GENERALIZED ABDOMINAL PAIN: ICD-10-CM

## 2019-04-19 DIAGNOSIS — B18.2 HEPATIC CIRRHOSIS DUE TO CHRONIC HEPATITIS C INFECTION (HCC): ICD-10-CM

## 2019-04-19 DIAGNOSIS — C22.0 HEPATOCELLULAR CARCINOMA (HCC): Primary | ICD-10-CM

## 2019-04-19 RX ORDER — PANTOPRAZOLE SODIUM 40 MG/1
40 TABLET, DELAYED RELEASE ORAL
Qty: 30 TABLET | Refills: 0 | Status: SHIPPED | OUTPATIENT
Start: 2019-04-19 | End: 2019-06-05

## 2019-04-19 RX ORDER — SPIRONOLACTONE 50 MG/1
150 TABLET, FILM COATED ORAL DAILY
Qty: 90 TABLET | Refills: 0 | Status: SHIPPED | OUTPATIENT
Start: 2019-04-19 | End: 2019-04-22 | Stop reason: SDUPTHER

## 2019-04-19 RX ORDER — FUROSEMIDE 20 MG/1
60 TABLET ORAL DAILY
Qty: 180 TABLET | Refills: 2 | Status: SHIPPED | OUTPATIENT
Start: 2019-04-19 | End: 2019-04-22 | Stop reason: SDUPTHER

## 2019-04-22 ENCOUNTER — OFFICE VISIT (OUTPATIENT)
Dept: GASTROENTEROLOGY | Facility: CLINIC | Age: 69
End: 2019-04-22
Payer: COMMERCIAL

## 2019-04-22 VITALS
DIASTOLIC BLOOD PRESSURE: 71 MMHG | SYSTOLIC BLOOD PRESSURE: 102 MMHG | HEIGHT: 64 IN | BODY MASS INDEX: 24.48 KG/M2 | TEMPERATURE: 97.3 F | HEART RATE: 78 BPM | WEIGHT: 143.4 LBS

## 2019-04-22 DIAGNOSIS — I85.10 SECONDARY ESOPHAGEAL VARICES WITHOUT BLEEDING (HCC): ICD-10-CM

## 2019-04-22 DIAGNOSIS — C22.0 HEPATOCELLULAR CARCINOMA (HCC): Primary | ICD-10-CM

## 2019-04-22 DIAGNOSIS — K74.60 DECOMPENSATED HEPATIC CIRRHOSIS (HCC): ICD-10-CM

## 2019-04-22 DIAGNOSIS — K72.90 DECOMPENSATED HEPATIC CIRRHOSIS (HCC): ICD-10-CM

## 2019-04-22 DIAGNOSIS — K74.60 HEPATIC CIRRHOSIS DUE TO CHRONIC HEPATITIS C INFECTION (HCC): ICD-10-CM

## 2019-04-22 DIAGNOSIS — B18.2 HEPATIC CIRRHOSIS DUE TO CHRONIC HEPATITIS C INFECTION (HCC): ICD-10-CM

## 2019-04-22 PROCEDURE — 99214 OFFICE O/P EST MOD 30 MIN: CPT | Performed by: PHYSICIAN ASSISTANT

## 2019-04-22 RX ORDER — SPIRONOLACTONE 50 MG/1
150 TABLET, FILM COATED ORAL DAILY
Qty: 90 TABLET | Refills: 2 | Status: SHIPPED | OUTPATIENT
Start: 2019-04-22 | End: 2019-06-07 | Stop reason: SDUPTHER

## 2019-04-22 RX ORDER — FUROSEMIDE 20 MG/1
60 TABLET ORAL DAILY
Qty: 180 TABLET | Refills: 2 | Status: SHIPPED | OUTPATIENT
Start: 2019-04-22 | End: 2019-06-07 | Stop reason: SDUPTHER

## 2019-04-22 RX ORDER — NADOLOL 20 MG/1
20 TABLET ORAL DAILY
Qty: 30 TABLET | Refills: 2 | Status: SHIPPED | OUTPATIENT
Start: 2019-04-22

## 2019-04-29 ENCOUNTER — PATIENT OUTREACH (OUTPATIENT)
Dept: INTERNAL MEDICINE CLINIC | Facility: CLINIC | Age: 69
End: 2019-04-29

## 2019-05-01 ENCOUNTER — EVALUATION (OUTPATIENT)
Dept: PHYSICAL THERAPY | Facility: CLINIC | Age: 69
End: 2019-05-01
Payer: COMMERCIAL

## 2019-05-01 ENCOUNTER — PATIENT OUTREACH (OUTPATIENT)
Dept: INTERNAL MEDICINE CLINIC | Facility: CLINIC | Age: 69
End: 2019-05-01

## 2019-05-01 DIAGNOSIS — R53.1 GENERALIZED WEAKNESS: Primary | ICD-10-CM

## 2019-05-01 PROBLEM — H61.20 EXCESS EAR WAX: Status: ACTIVE | Noted: 2019-05-01

## 2019-05-01 PROBLEM — H93.8X9 EAR POPPING: Status: ACTIVE | Noted: 2019-05-01

## 2019-05-01 PROCEDURE — 97162 PT EVAL MOD COMPLEX 30 MIN: CPT | Performed by: PHYSICAL THERAPIST

## 2019-05-02 ENCOUNTER — TELEPHONE (OUTPATIENT)
Dept: INTERNAL MEDICINE CLINIC | Facility: CLINIC | Age: 69
End: 2019-05-02

## 2019-05-02 ENCOUNTER — PATIENT OUTREACH (OUTPATIENT)
Dept: INTERNAL MEDICINE CLINIC | Facility: CLINIC | Age: 69
End: 2019-05-02

## 2019-05-02 ENCOUNTER — OFFICE VISIT (OUTPATIENT)
Dept: INTERNAL MEDICINE CLINIC | Facility: CLINIC | Age: 69
End: 2019-05-02

## 2019-05-02 VITALS
HEIGHT: 64 IN | TEMPERATURE: 97.8 F | WEIGHT: 138.89 LBS | SYSTOLIC BLOOD PRESSURE: 108 MMHG | DIASTOLIC BLOOD PRESSURE: 62 MMHG | HEART RATE: 80 BPM | BODY MASS INDEX: 23.71 KG/M2

## 2019-05-02 DIAGNOSIS — H93.8X9 EAR POPPING, UNSPECIFIED LATERALITY: ICD-10-CM

## 2019-05-02 DIAGNOSIS — H61.20 IMPACTED CERUMEN, UNSPECIFIED LATERALITY: Primary | ICD-10-CM

## 2019-05-02 PROCEDURE — 99213 OFFICE O/P EST LOW 20 MIN: CPT | Performed by: INTERNAL MEDICINE

## 2019-05-08 ENCOUNTER — HOSPITAL ENCOUNTER (OUTPATIENT)
Dept: RADIOLOGY | Facility: HOSPITAL | Age: 69
Discharge: HOME | End: 2019-05-08
Payer: COMMERCIAL

## 2019-05-08 ENCOUNTER — APPOINTMENT (OUTPATIENT)
Dept: PHYSICAL THERAPY | Facility: CLINIC | Age: 69
End: 2019-05-08
Payer: COMMERCIAL

## 2019-05-08 ENCOUNTER — TRANSCRIBE ORDERS (OUTPATIENT)
Dept: RADIOLOGY | Facility: HOSPITAL | Age: 69
End: 2019-05-08

## 2019-05-08 ENCOUNTER — TELEPHONE (OUTPATIENT)
Dept: INTERNAL MEDICINE CLINIC | Facility: CLINIC | Age: 69
End: 2019-05-08

## 2019-05-08 DIAGNOSIS — R07.81 RIB PAIN: ICD-10-CM

## 2019-05-08 PROCEDURE — 71111 X-RAY EXAM RIBS/CHEST4/> VWS: CPT

## 2019-05-11 ENCOUNTER — TELEPHONE (OUTPATIENT)
Dept: GASTROENTEROLOGY | Facility: CLINIC | Age: 69
End: 2019-05-11

## 2019-05-11 ENCOUNTER — APPOINTMENT (OUTPATIENT)
Dept: LAB | Facility: HOSPITAL | Age: 69
End: 2019-05-11
Payer: COMMERCIAL

## 2019-05-11 DIAGNOSIS — K74.60 HEPATIC CIRRHOSIS DUE TO CHRONIC HEPATITIS C INFECTION (HCC): ICD-10-CM

## 2019-05-11 DIAGNOSIS — B18.2 HEPATIC CIRRHOSIS DUE TO CHRONIC HEPATITIS C INFECTION (HCC): ICD-10-CM

## 2019-05-11 DIAGNOSIS — E87.6 HYPOKALEMIA: ICD-10-CM

## 2019-05-11 LAB
ALBUMIN SERPL BCP-MCNC: 2.3 G/DL (ref 3.5–5)
ALP SERPL-CCNC: 369 U/L (ref 46–116)
ALT SERPL W P-5'-P-CCNC: 20 U/L (ref 12–78)
ANION GAP SERPL CALCULATED.3IONS-SCNC: 7 MMOL/L (ref 4–13)
AST SERPL W P-5'-P-CCNC: 66 U/L (ref 5–45)
BILIRUB SERPL-MCNC: 0.79 MG/DL (ref 0.2–1)
BUN SERPL-MCNC: 9 MG/DL (ref 5–25)
CALCIUM SERPL-MCNC: 8.5 MG/DL (ref 8.3–10.1)
CHLORIDE SERPL-SCNC: 96 MMOL/L (ref 100–108)
CO2 SERPL-SCNC: 33 MMOL/L (ref 21–32)
CREAT SERPL-MCNC: 0.76 MG/DL (ref 0.6–1.3)
ERYTHROCYTE [DISTWIDTH] IN BLOOD BY AUTOMATED COUNT: 18.8 % (ref 11.6–15.1)
GFR SERPL CREATININE-BSD FRML MDRD: 93 ML/MIN/1.73SQ M
GLUCOSE P FAST SERPL-MCNC: 109 MG/DL (ref 65–99)
HCT VFR BLD AUTO: 33.2 % (ref 34.8–46.1)
HGB BLD-MCNC: 10.5 G/DL (ref 11.5–15.4)
INR PPP: 1.06 (ref 0.86–1.17)
MCH RBC QN AUTO: 27.2 PG (ref 26.8–34.3)
MCHC RBC AUTO-ENTMCNC: 31.6 G/DL (ref 31.4–37.4)
MCV RBC AUTO: 86 FL (ref 82–98)
PLATELET # BLD AUTO: 391 THOUSANDS/UL (ref 149–390)
PMV BLD AUTO: 9.4 FL (ref 8.9–12.7)
POTASSIUM SERPL-SCNC: 2.5 MMOL/L (ref 3.5–5.3)
PROT SERPL-MCNC: 7.8 G/DL (ref 6.4–8.2)
PROTHROMBIN TIME: 13.9 SECONDS (ref 11.8–14.2)
RBC # BLD AUTO: 3.86 MILLION/UL (ref 3.81–5.12)
SODIUM SERPL-SCNC: 136 MMOL/L (ref 136–145)
WBC # BLD AUTO: 9.13 THOUSAND/UL (ref 4.31–10.16)

## 2019-05-11 PROCEDURE — 85027 COMPLETE CBC AUTOMATED: CPT

## 2019-05-11 PROCEDURE — 85610 PROTHROMBIN TIME: CPT

## 2019-05-11 PROCEDURE — 36415 COLL VENOUS BLD VENIPUNCTURE: CPT

## 2019-05-11 PROCEDURE — 80053 COMPREHEN METABOLIC PANEL: CPT

## 2019-05-11 RX ORDER — POTASSIUM CHLORIDE 750 MG/1
20 CAPSULE, EXTENDED RELEASE ORAL DAILY
Qty: 14 CAPSULE | Refills: 0 | Status: SHIPPED | OUTPATIENT
Start: 2019-05-11 | End: 2019-05-13 | Stop reason: SDUPTHER

## 2019-05-13 ENCOUNTER — APPOINTMENT (OUTPATIENT)
Dept: LAB | Facility: CLINIC | Age: 69
End: 2019-05-13
Payer: COMMERCIAL

## 2019-05-13 ENCOUNTER — TELEPHONE (OUTPATIENT)
Dept: INTERNAL MEDICINE CLINIC | Facility: CLINIC | Age: 69
End: 2019-05-13

## 2019-05-13 ENCOUNTER — TELEPHONE (OUTPATIENT)
Dept: GASTROENTEROLOGY | Facility: MEDICAL CENTER | Age: 69
End: 2019-05-13

## 2019-05-13 ENCOUNTER — OFFICE VISIT (OUTPATIENT)
Dept: PAIN MEDICINE | Facility: CLINIC | Age: 69
End: 2019-05-13
Payer: COMMERCIAL

## 2019-05-13 VITALS
TEMPERATURE: 98.4 F | DIASTOLIC BLOOD PRESSURE: 62 MMHG | WEIGHT: 142 LBS | SYSTOLIC BLOOD PRESSURE: 96 MMHG | HEART RATE: 72 BPM | BODY MASS INDEX: 24.24 KG/M2 | HEIGHT: 64 IN

## 2019-05-13 DIAGNOSIS — Z79.891 LONG-TERM CURRENT USE OF OPIATE ANALGESIC: ICD-10-CM

## 2019-05-13 DIAGNOSIS — E87.6 HYPOKALEMIA: ICD-10-CM

## 2019-05-13 DIAGNOSIS — K74.60 HEPATIC CIRRHOSIS DUE TO CHRONIC HEPATITIS C INFECTION (HCC): Primary | ICD-10-CM

## 2019-05-13 DIAGNOSIS — M51.26 HERNIATED LUMBAR DISC WITHOUT MYELOPATHY: ICD-10-CM

## 2019-05-13 DIAGNOSIS — E87.6 HYPOKALEMIA: Primary | ICD-10-CM

## 2019-05-13 DIAGNOSIS — B18.2 HEPATIC CIRRHOSIS DUE TO CHRONIC HEPATITIS C INFECTION (HCC): Primary | ICD-10-CM

## 2019-05-13 DIAGNOSIS — M47.816 LUMBAR SPONDYLOSIS: ICD-10-CM

## 2019-05-13 DIAGNOSIS — G89.4 CHRONIC PAIN SYNDROME: Primary | ICD-10-CM

## 2019-05-13 DIAGNOSIS — M46.1 SACROILIITIS (HCC): ICD-10-CM

## 2019-05-13 DIAGNOSIS — F11.20 UNCOMPLICATED OPIOID DEPENDENCE (HCC): ICD-10-CM

## 2019-05-13 DIAGNOSIS — M48.061 SPINAL STENOSIS OF LUMBAR REGION WITHOUT NEUROGENIC CLAUDICATION: ICD-10-CM

## 2019-05-13 LAB
ANION GAP SERPL CALCULATED.3IONS-SCNC: 8 MMOL/L (ref 4–13)
BUN SERPL-MCNC: 8 MG/DL (ref 5–25)
CALCIUM SERPL-MCNC: 9.2 MG/DL (ref 8.3–10.1)
CHLORIDE SERPL-SCNC: 94 MMOL/L (ref 100–108)
CO2 SERPL-SCNC: 31 MMOL/L (ref 21–32)
CREAT SERPL-MCNC: 1.02 MG/DL (ref 0.6–1.3)
GFR SERPL CREATININE-BSD FRML MDRD: 65 ML/MIN/1.73SQ M
GLUCOSE P FAST SERPL-MCNC: 183 MG/DL (ref 65–99)
POTASSIUM SERPL-SCNC: 2.7 MMOL/L (ref 3.5–5.3)
SODIUM SERPL-SCNC: 133 MMOL/L (ref 136–145)

## 2019-05-13 PROCEDURE — 99214 OFFICE O/P EST MOD 30 MIN: CPT | Performed by: NURSE PRACTITIONER

## 2019-05-13 PROCEDURE — 36415 COLL VENOUS BLD VENIPUNCTURE: CPT

## 2019-05-13 PROCEDURE — 80305 DRUG TEST PRSMV DIR OPT OBS: CPT | Performed by: NURSE PRACTITIONER

## 2019-05-13 PROCEDURE — 80048 BASIC METABOLIC PNL TOTAL CA: CPT

## 2019-05-13 RX ORDER — OXYCODONE AND ACETAMINOPHEN 10; 325 MG/1; MG/1
TABLET ORAL
Qty: 120 TABLET | Refills: 0 | Status: SHIPPED | OUTPATIENT
Start: 2019-05-13 | End: 2019-05-13 | Stop reason: SDUPTHER

## 2019-05-13 RX ORDER — OXYCODONE AND ACETAMINOPHEN 10; 325 MG/1; MG/1
TABLET ORAL
Qty: 120 TABLET | Refills: 0 | Status: SHIPPED | OUTPATIENT
Start: 2019-05-13 | End: 2019-07-06 | Stop reason: HOSPADM

## 2019-05-13 RX ORDER — POTASSIUM CHLORIDE 750 MG/1
20 CAPSULE, EXTENDED RELEASE ORAL 2 TIMES DAILY
Qty: 28 CAPSULE | Refills: 0 | Status: SHIPPED | OUTPATIENT
Start: 2019-05-13 | End: 2019-06-07 | Stop reason: ALTCHOICE

## 2019-05-14 ENCOUNTER — OFFICE VISIT (OUTPATIENT)
Dept: PALLIATIVE MEDICINE | Facility: CLINIC | Age: 69
End: 2019-05-14
Payer: COMMERCIAL

## 2019-05-14 VITALS
BODY MASS INDEX: 24.59 KG/M2 | HEART RATE: 66 BPM | RESPIRATION RATE: 16 BRPM | WEIGHT: 144 LBS | SYSTOLIC BLOOD PRESSURE: 92 MMHG | DIASTOLIC BLOOD PRESSURE: 62 MMHG | OXYGEN SATURATION: 94 % | TEMPERATURE: 97.6 F | HEIGHT: 64 IN

## 2019-05-14 DIAGNOSIS — Z71.89 GOALS OF CARE, COUNSELING/DISCUSSION: ICD-10-CM

## 2019-05-14 DIAGNOSIS — C22.0 HEPATOCELLULAR CARCINOMA (HCC): Primary | ICD-10-CM

## 2019-05-14 DIAGNOSIS — K72.90 DECOMPENSATED HEPATIC CIRRHOSIS (HCC): ICD-10-CM

## 2019-05-14 DIAGNOSIS — K74.60 DECOMPENSATED HEPATIC CIRRHOSIS (HCC): ICD-10-CM

## 2019-05-14 DIAGNOSIS — M79.89 SWELLING OF BOTH LOWER EXTREMITIES: ICD-10-CM

## 2019-05-14 PROCEDURE — 99203 OFFICE O/P NEW LOW 30 MIN: CPT | Performed by: FAMILY MEDICINE

## 2019-05-15 ENCOUNTER — APPOINTMENT (OUTPATIENT)
Dept: PHYSICAL THERAPY | Facility: CLINIC | Age: 69
End: 2019-05-15
Payer: COMMERCIAL

## 2019-05-16 ENCOUNTER — APPOINTMENT (OUTPATIENT)
Dept: LAB | Facility: CLINIC | Age: 69
End: 2019-05-16
Payer: COMMERCIAL

## 2019-05-16 DIAGNOSIS — E87.6 HYPOKALEMIA: ICD-10-CM

## 2019-05-16 LAB
ANION GAP SERPL CALCULATED.3IONS-SCNC: 7 MMOL/L (ref 4–13)
BUN SERPL-MCNC: 9 MG/DL (ref 5–25)
CALCIUM SERPL-MCNC: 9.1 MG/DL (ref 8.3–10.1)
CHLORIDE SERPL-SCNC: 95 MMOL/L (ref 100–108)
CO2 SERPL-SCNC: 31 MMOL/L (ref 21–32)
CREAT SERPL-MCNC: 1.04 MG/DL (ref 0.6–1.3)
GFR SERPL CREATININE-BSD FRML MDRD: 64 ML/MIN/1.73SQ M
GLUCOSE SERPL-MCNC: 123 MG/DL (ref 65–140)
MAGNESIUM SERPL-MCNC: 1.4 MG/DL (ref 1.6–2.6)
POTASSIUM SERPL-SCNC: 3.4 MMOL/L (ref 3.5–5.3)
SODIUM SERPL-SCNC: 133 MMOL/L (ref 136–145)

## 2019-05-16 PROCEDURE — 83735 ASSAY OF MAGNESIUM: CPT

## 2019-05-16 PROCEDURE — 80048 BASIC METABOLIC PNL TOTAL CA: CPT

## 2019-05-16 PROCEDURE — 36415 COLL VENOUS BLD VENIPUNCTURE: CPT

## 2019-05-17 ENCOUNTER — APPOINTMENT (OUTPATIENT)
Dept: PHYSICAL THERAPY | Facility: CLINIC | Age: 69
End: 2019-05-17
Payer: COMMERCIAL

## 2019-05-21 ENCOUNTER — HOSPITAL ENCOUNTER (INPATIENT)
Facility: HOSPITAL | Age: 69
LOS: 1 days | Discharge: HOME | DRG: 391 | End: 2019-05-22
Attending: EMERGENCY MEDICINE | Admitting: INTERNAL MEDICINE
Payer: COMMERCIAL

## 2019-05-21 ENCOUNTER — APPOINTMENT (EMERGENCY)
Dept: RADIOLOGY | Facility: HOSPITAL | Age: 69
DRG: 391 | End: 2019-05-21
Payer: COMMERCIAL

## 2019-05-21 DIAGNOSIS — K74.60 HEPATIC CIRRHOSIS DUE TO CHRONIC HEPATITIS C INFECTION (HCC): ICD-10-CM

## 2019-05-21 DIAGNOSIS — K74.60 CIRRHOSIS (HCC): ICD-10-CM

## 2019-05-21 DIAGNOSIS — B18.2 HEPATIC CIRRHOSIS DUE TO CHRONIC HEPATITIS C INFECTION (HCC): ICD-10-CM

## 2019-05-21 DIAGNOSIS — C22.0 HEPATOCELLULAR CARCINOMA (HCC): ICD-10-CM

## 2019-05-21 DIAGNOSIS — R10.84 GENERALIZED ABDOMINAL PAIN: Primary | ICD-10-CM

## 2019-05-21 LAB
ALBUMIN SERPL BCP-MCNC: 2 G/DL (ref 3.5–5)
ALP SERPL-CCNC: 329 U/L (ref 46–116)
ALT SERPL W P-5'-P-CCNC: 32 U/L (ref 12–78)
AMMONIA PLAS-SCNC: 27 UMOL/L (ref 11–35)
ANION GAP SERPL CALCULATED.3IONS-SCNC: 9 MMOL/L (ref 4–13)
APTT PPP: 36 SECONDS (ref 26–38)
AST SERPL W P-5'-P-CCNC: 280 U/L (ref 5–45)
BASOPHILS # BLD AUTO: 0.01 THOUSANDS/ΜL (ref 0–0.1)
BASOPHILS NFR BLD AUTO: 0 % (ref 0–1)
BILIRUB SERPL-MCNC: 1.68 MG/DL (ref 0.2–1)
BUN SERPL-MCNC: 10 MG/DL (ref 5–25)
CALCIUM SERPL-MCNC: 8.8 MG/DL (ref 8.3–10.1)
CHLORIDE SERPL-SCNC: 95 MMOL/L (ref 100–108)
CO2 SERPL-SCNC: 27 MMOL/L (ref 21–32)
CREAT SERPL-MCNC: 0.8 MG/DL (ref 0.6–1.3)
EOSINOPHIL # BLD AUTO: 0.02 THOUSAND/ΜL (ref 0–0.61)
EOSINOPHIL NFR BLD AUTO: 0 % (ref 0–6)
ERYTHROCYTE [DISTWIDTH] IN BLOOD BY AUTOMATED COUNT: 19.2 % (ref 11.6–15.1)
GFR SERPL CREATININE-BSD FRML MDRD: 88 ML/MIN/1.73SQ M
GLUCOSE SERPL-MCNC: 149 MG/DL (ref 65–140)
HCT VFR BLD AUTO: 29.5 % (ref 34.8–46.1)
HGB BLD-MCNC: 9.6 G/DL (ref 11.5–15.4)
IMM GRANULOCYTES # BLD AUTO: 0.13 THOUSAND/UL (ref 0–0.2)
IMM GRANULOCYTES NFR BLD AUTO: 1 % (ref 0–2)
INR PPP: 1.15 (ref 0.86–1.17)
LIPASE SERPL-CCNC: 154 U/L (ref 73–393)
LYMPHOCYTES # BLD AUTO: 1.14 THOUSANDS/ΜL (ref 0.6–4.47)
LYMPHOCYTES NFR BLD AUTO: 7 % (ref 14–44)
MCH RBC QN AUTO: 28.1 PG (ref 26.8–34.3)
MCHC RBC AUTO-ENTMCNC: 32.5 G/DL (ref 31.4–37.4)
MCV RBC AUTO: 86 FL (ref 82–98)
MONOCYTES # BLD AUTO: 1.07 THOUSAND/ΜL (ref 0.17–1.22)
MONOCYTES NFR BLD AUTO: 7 % (ref 4–12)
NEUTROPHILS # BLD AUTO: 13.1 THOUSANDS/ΜL (ref 1.85–7.62)
NEUTS SEG NFR BLD AUTO: 85 % (ref 43–75)
NRBC BLD AUTO-RTO: 0 /100 WBCS
PLATELET # BLD AUTO: 266 THOUSANDS/UL (ref 149–390)
PMV BLD AUTO: 10 FL (ref 8.9–12.7)
POTASSIUM SERPL-SCNC: 3.5 MMOL/L (ref 3.5–5.3)
PROT SERPL-MCNC: 7.4 G/DL (ref 6.4–8.2)
PROTHROMBIN TIME: 14.8 SECONDS (ref 11.8–14.2)
RBC # BLD AUTO: 3.42 MILLION/UL (ref 3.81–5.12)
SODIUM SERPL-SCNC: 131 MMOL/L (ref 136–145)
WBC # BLD AUTO: 15.47 THOUSAND/UL (ref 4.31–10.16)

## 2019-05-21 PROCEDURE — 82140 ASSAY OF AMMONIA: CPT | Performed by: EMERGENCY MEDICINE

## 2019-05-21 PROCEDURE — 99285 EMERGENCY DEPT VISIT HI MDM: CPT | Performed by: EMERGENCY MEDICINE

## 2019-05-21 PROCEDURE — 85025 COMPLETE CBC W/AUTO DIFF WBC: CPT | Performed by: EMERGENCY MEDICINE

## 2019-05-21 PROCEDURE — 36415 COLL VENOUS BLD VENIPUNCTURE: CPT | Performed by: EMERGENCY MEDICINE

## 2019-05-21 PROCEDURE — 99285 EMERGENCY DEPT VISIT HI MDM: CPT

## 2019-05-21 PROCEDURE — 85730 THROMBOPLASTIN TIME PARTIAL: CPT | Performed by: EMERGENCY MEDICINE

## 2019-05-21 PROCEDURE — 80053 COMPREHEN METABOLIC PANEL: CPT | Performed by: EMERGENCY MEDICINE

## 2019-05-21 PROCEDURE — 85610 PROTHROMBIN TIME: CPT | Performed by: EMERGENCY MEDICINE

## 2019-05-21 PROCEDURE — 83690 ASSAY OF LIPASE: CPT | Performed by: EMERGENCY MEDICINE

## 2019-05-21 PROCEDURE — 96376 TX/PRO/DX INJ SAME DRUG ADON: CPT

## 2019-05-21 PROCEDURE — 96375 TX/PRO/DX INJ NEW DRUG ADDON: CPT

## 2019-05-21 PROCEDURE — 96374 THER/PROPH/DIAG INJ IV PUSH: CPT

## 2019-05-21 PROCEDURE — 74177 CT ABD & PELVIS W/CONTRAST: CPT

## 2019-05-21 RX ORDER — GABAPENTIN 300 MG/1
300 CAPSULE ORAL DAILY
Status: DISCONTINUED | OUTPATIENT
Start: 2019-05-22 | End: 2019-05-22 | Stop reason: HOSPADM

## 2019-05-21 RX ORDER — AMOXICILLIN 250 MG
1 CAPSULE ORAL
Status: DISCONTINUED | OUTPATIENT
Start: 2019-05-21 | End: 2019-05-22 | Stop reason: HOSPADM

## 2019-05-21 RX ORDER — NICOTINE 21 MG/24HR
1 PATCH, TRANSDERMAL 24 HOURS TRANSDERMAL DAILY
Status: DISCONTINUED | OUTPATIENT
Start: 2019-05-22 | End: 2019-05-21

## 2019-05-21 RX ORDER — KETOROLAC TROMETHAMINE 30 MG/ML
30 INJECTION, SOLUTION INTRAMUSCULAR; INTRAVENOUS ONCE
Status: COMPLETED | OUTPATIENT
Start: 2019-05-21 | End: 2019-05-21

## 2019-05-21 RX ORDER — FUROSEMIDE 20 MG/1
20 TABLET ORAL
Status: DISCONTINUED | OUTPATIENT
Start: 2019-05-22 | End: 2019-05-22 | Stop reason: HOSPADM

## 2019-05-21 RX ORDER — PANTOPRAZOLE SODIUM 40 MG/1
40 TABLET, DELAYED RELEASE ORAL
Status: DISCONTINUED | OUTPATIENT
Start: 2019-05-22 | End: 2019-05-22 | Stop reason: HOSPADM

## 2019-05-21 RX ORDER — NICOTINE 21 MG/24HR
21 PATCH, TRANSDERMAL 24 HOURS TRANSDERMAL EVERY 24 HOURS
Status: DISCONTINUED | OUTPATIENT
Start: 2019-05-21 | End: 2019-05-22 | Stop reason: HOSPADM

## 2019-05-21 RX ORDER — OXYCODONE HYDROCHLORIDE AND ACETAMINOPHEN 5; 325 MG/1; MG/1
1 TABLET ORAL EVERY 6 HOURS PRN
Status: DISCONTINUED | OUTPATIENT
Start: 2019-05-21 | End: 2019-05-21

## 2019-05-21 RX ORDER — OXYCODONE HYDROCHLORIDE AND ACETAMINOPHEN 5; 325 MG/1; MG/1
2 TABLET ORAL EVERY 6 HOURS PRN
Status: DISCONTINUED | OUTPATIENT
Start: 2019-05-21 | End: 2019-05-22 | Stop reason: HOSPADM

## 2019-05-21 RX ORDER — MORPHINE SULFATE 4 MG/ML
4 INJECTION, SOLUTION INTRAMUSCULAR; INTRAVENOUS ONCE
Status: COMPLETED | OUTPATIENT
Start: 2019-05-21 | End: 2019-05-21

## 2019-05-21 RX ORDER — ONDANSETRON 2 MG/ML
1 INJECTION INTRAMUSCULAR; INTRAVENOUS ONCE
Status: COMPLETED | OUTPATIENT
Start: 2019-05-21 | End: 2019-05-21

## 2019-05-21 RX ORDER — ONDANSETRON 2 MG/ML
4 INJECTION INTRAMUSCULAR; INTRAVENOUS ONCE
Status: COMPLETED | OUTPATIENT
Start: 2019-05-21 | End: 2019-05-21

## 2019-05-21 RX ORDER — ONDANSETRON 2 MG/ML
4 INJECTION INTRAMUSCULAR; INTRAVENOUS EVERY 6 HOURS PRN
Status: DISCONTINUED | OUTPATIENT
Start: 2019-05-21 | End: 2019-05-22 | Stop reason: HOSPADM

## 2019-05-21 RX ORDER — NADOLOL 20 MG/1
20 TABLET ORAL DAILY
Status: DISCONTINUED | OUTPATIENT
Start: 2019-05-22 | End: 2019-05-22 | Stop reason: HOSPADM

## 2019-05-21 RX ORDER — GABAPENTIN 300 MG/1
900 CAPSULE ORAL
Status: DISCONTINUED | OUTPATIENT
Start: 2019-05-21 | End: 2019-05-22 | Stop reason: HOSPADM

## 2019-05-21 RX ADMIN — OXYCODONE HYDROCHLORIDE AND ACETAMINOPHEN 2 TABLET: 5; 325 TABLET ORAL at 20:36

## 2019-05-21 RX ADMIN — MORPHINE SULFATE 4 MG: 4 INJECTION INTRAVENOUS at 15:13

## 2019-05-21 RX ADMIN — NICOTINE 21 MG: 21 PATCH, EXTENDED RELEASE TRANSDERMAL at 21:46

## 2019-05-21 RX ADMIN — GABAPENTIN 900 MG: 300 CAPSULE ORAL at 21:46

## 2019-05-21 RX ADMIN — IOHEXOL 80 ML: 350 INJECTION, SOLUTION INTRAVENOUS at 16:29

## 2019-05-21 RX ADMIN — ONDANSETRON 4 MG: 2 INJECTION INTRAMUSCULAR; INTRAVENOUS at 15:13

## 2019-05-21 RX ADMIN — MORPHINE SULFATE 2 MG: 2 INJECTION, SOLUTION INTRAMUSCULAR; INTRAVENOUS at 16:56

## 2019-05-21 NOTE — PROGRESS NOTES
Senior Admission Note   Unit/Bed # ED 1 Encounter: [id number]  SOD Team A          Willy Monday 76 y o  female [id number]       Patient seen and examined  Reviewed H&P per Dr Jero Tripathi  Agree with the assessment and plan  Ms Aguilera Monday is a 76 y o  female with PMH significant for decompensated cirrhosis secondary to hepatitis C and known varices and hepatocellular carcinoma and known portal vein thrombosis previously on anticoagulation though discontinued secondary to variceal bleeding earlier in 2019  She presented to the ED on 5/21/2019 for 3 days of abdominal pain noted to be cramping, non-radiating associated with nausea and vomiting and diarrhea  Denied any specific alleviating or exacerbating factors  Denied any recent sick contacts or travel  Assessment/Plan: Principal Problem:    Portal vein thrombosis  Active Problems:    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)    Hepatocellular carcinoma (HCC)    Hypertension    Pain syndrome, chronic    Abdominal pain    Secondary esophageal varices without bleeding (HCC)     Extension of known Portal Vein Thrombosis  Will continue to monitor symptoms and treat pain symptoms as presenting  Abd pain more likely associated gastroenteritis as generalized abdominal pain  High risk for bleeding with anticoagulation given her history of GI bleeding, though will need to discuss with patient regarding risks and benefits of systemic anticoagulation prior to potential initiation      Disposition:  INPATIENT     Expected LOS: Angeline Estes MD

## 2019-05-21 NOTE — ED PROVIDER NOTES
History  Chief Complaint   Patient presents with    Abdominal Pain     Patient presents to the E R  with report of vomiting x 2 days  Patient has history of Liver Cancer  69-year-old woman history of  Hepatic cellular carcinoma status post tumor resection now cirrhosis     patient comes in with abdominal pain and nausea since last night  She reports that she had trouble sleeping through the night and just today she has had 9 episodes of emesis mainly sputum  Nonbilious  Nonbloody  She has had normal bowel movements as recently as yesterday no dark stools no blood in the stool  Her appetite has been poor over the last 2 days she has had decreased urination a couple times each day  She has not had any fevers at home said this happened to her 2 months ago with similar onset of pain at that time she had a paracentesis which she reports showed no signs of infections  She has had up partial hysterectomy still has appendix was gallbladder          Prior to Admission Medications   Prescriptions Last Dose Informant Patient Reported? Taking?   furosemide (LASIX) 20 mg tablet 2019 at Unknown time  No Yes   Sig: Take 3 tablets (60 mg total) by mouth daily   gabapentin (NEURONTIN) 300 mg capsule 2019 at Unknown time Self No Yes   Si tab in the am and 3 tabs PO night time   nadolol (CORGARD) 20 mg tablet 2019 at Unknown time  No Yes   Sig: Take 1 tablet (20 mg total) by mouth daily   nicotine (NICODERM CQ) 21 mg/24 hr TD 24 hr patch  Self No No   Sig: Place 1 patch on the skin daily   Patient not taking: Reported on 2019   oxyCODONE-acetaminophen (PERCOCET)  mg per tablet 2019 at Unknown time  No Yes   Sig: Take 1 tab PO Q 6 hours prn pain   2nd month script Do not fill until 19   pantoprazole (PROTONIX) 40 mg tablet 2019 at Unknown time Self No Yes   Sig: Take 1 tablet (40 mg total) by mouth daily with breakfast   polyethylene glycol (MIRALAX) 17 g packet 2019 at Unknown time Self No Yes   Sig: Take 17 g by mouth daily as needed (Constipation)   potassium chloride (MICRO-K) 10 MEQ CR capsule   No No   Sig: Take 2 capsules (20 mEq total) by mouth 2 (two) times a day for 7 days   senna (SENOKOT) 8 6 MG tablet 5/20/2019 at Unknown time Self Yes Yes   Sig: Take 2 tablets by mouth daily   spironolactone (ALDACTONE) 50 mg tablet 5/20/2019 at Unknown time  No Yes   Sig: Take 3 tablets (150 mg total) by mouth daily      Facility-Administered Medications: None       Past Medical History:   Diagnosis Date    Anemia     Anxiety     [place] ([place] [address] )     Chronic pain disorder     herniated disc    Cirrhosis ([place] [address] )     Constipation     Current use of long term anticoagulation     eliquis    Diabetes mellitus ([place] [address] )     blood sugar 113 at 1225 2/18/19    Drug dependence ([place] [address] )     GI bleed     Hepatitis C, chronic ([place] [address] ) 7/22/2014    Hepatocellular carcinoma ([place] [address] )     Hypertension     Hypokalemia 3/7/2019    Hyponatremia     Liver disease     h/o hepatitis c    Melena     Portal vein thrombosis        Past Surgical History:   Procedure Laterality Date    DENTAL SURGERY      ESOPHAGOGASTRODUODENOSCOPY N/A 2/18/2019    Procedure: ESOPHAGOGASTRODUODENOSCOPY (EGD); Surgeon: Garo Winchester MD;  Location: [facility]; Service: Gastroenterology    ESOPHAGOGASTRODUODENOSCOPY N/A 3/11/2019    Procedure: ESOPHAGOGASTRODUODENOSCOPY (EGD); Surgeon: Denice Good MD;  [facility]; Service: Gastroenterology    HYSTERECTOMY      IR PARACENTESIS  3/8/2019    LIVER BIOPSY      LIVER SURGERY      Ablation    TONSILLECTOMY         Family History   Problem Relation Age of Onset    Prostate cancer Father      I have reviewed and agree with the history as documented      Social History     Tobacco Use    Smoking status: Current Every Day Smoker     Packs/day: 1 00     Years: 50 00     Pack years: 50 00     Types: Cigarettes    Smokeless tobacco: Never Used   Substance Use Topics    Alcohol use: Not Currently    Drug use: No        Review of Systems   Constitutional: Positive for activity change and appetite change  Negative for chills, diaphoresis, fatigue and fever  HENT: Negative for congestion, rhinorrhea, sinus pressure, sinus pain, sneezing, sore throat, trouble swallowing and voice change  Eyes: Negative for visual disturbance  Respiratory: Negative for choking, chest tightness, shortness of breath, wheezing and stridor  Cardiovascular: Negative for chest pain, palpitations and leg swelling  Gastrointestinal: Positive for abdominal distention, abdominal pain, nausea and vomiting  Negative for anal bleeding, blood in stool, constipation and diarrhea  Endocrine: Negative for polyuria  Genitourinary: Positive for difficulty urinating  Negative for dyspareunia, dysuria, enuresis, flank pain, frequency, hematuria and vaginal bleeding  Musculoskeletal: Positive for back pain  Negative for arthralgias, gait problem, neck pain and neck stiffness  Skin: Negative for color change and rash  Allergic/Immunologic: Negative for food allergies  Neurological: Negative for dizziness, tremors, speech difficulty, weakness, light-headedness and headaches  Psychiatric/Behavioral: Negative for confusion  The patient is not nervous/anxious  Physical Exam  ED Triage Vitals [05/21/19 1447]   Temperature Pulse Respirations Blood Pressure SpO2   (!) 97 3 °F (36 3 °C) 66 19 (!) 87/51 98 %      Temp Source Heart Rate Source Patient Position - Orthostatic VS BP Location FiO2 (%)   Oral Monitor Lying Left arm --      Pain Score       9             Orthostatic Vital Signs  Vitals:    05/21/19 1948 05/21/19 2340 05/21/19 2342 05/22/19 0212   BP: 90/52 (!) 74/44 (!) 80/46 97/60   Pulse: 59 56  55   Patient Position - Orthostatic VS:   Lying        Physical Exam   Constitutional: She is oriented to person, place, and time  She appears well-developed and well-nourished  She appears ill   No distress  HENT:   Head: Normocephalic and atraumatic  Right Ear: External ear normal    Left Ear: External ear normal    Nose: Nose normal    Eyes: Pupils are equal, round, and reactive to light  Conjunctivae and EOM are normal  No scleral icterus  Neck: Normal range of motion  Neck supple  Cardiovascular: Normal rate, regular rhythm, normal heart sounds and intact distal pulses  No murmur heard  Pulmonary/Chest: Effort normal  No stridor  No respiratory distress  She has no wheezes  She has rales  Abdominal: Soft  Bowel sounds are normal  She exhibits distension  She exhibits no mass  There is hepatosplenomegaly and hepatomegaly  There is no tenderness  There is no rebound and no guarding  Musculoskeletal: Normal range of motion  She exhibits edema (Bilateral 2+ pitting to the knee)  She exhibits no tenderness or deformity  Lymphadenopathy:     She has no cervical adenopathy  Neurological: She is alert and oriented to person, place, and time  No cranial nerve deficit or sensory deficit  Skin: Skin is warm and dry  Capillary refill takes less than 2 seconds  No rash noted  She is not diaphoretic  Psychiatric: She has a normal mood and affect  Her behavior is normal  Judgment and thought content normal    Nursing note and vitals reviewed        ED Medications  Medications   enoxaparin (LOVENOX) subcutaneous injection 40 mg (has no administration in time range)   furosemide (LASIX) tablet 20 mg (20 mg Oral Not Given 5/22/19 0523)   gabapentin (NEURONTIN) capsule 300 mg (has no administration in time range)   gabapentin (NEURONTIN) capsule 900 mg (900 mg Oral Given 5/21/19 2146)   nadolol (CORGARD) tablet 20 mg (has no administration in time range)   pantoprazole (PROTONIX) EC tablet 40 mg (has no administration in time range)   spironolactone (ALDACTONE) tablet 150 mg (150 mg Oral Not Given 5/21/19 2039)   oxyCODONE-acetaminophen (PERCOCET) 5-325 mg per tablet 2 tablet (2 tablets Oral Given 5/22/19 0612)   senna-docusate sodium (SENOKOT S) 8 6-50 mg per tablet 1 tablet (1 tablet Oral Not Given 5/21/19 2149)   ondansetron [facility]) injection 4 mg (has no administration in time range)   nicotine (NICODERM CQ) 21 mg/24 hr TD 24 hr patch 21 mg (21 mg Transdermal Medication Applied 5/21/19 2146)   ondansetron (FOR EMS ONLY) (ZOFRAN) 4 mg/2 mL injection 4 mg (0 mg Does not apply Given to EMS 5/21/19 1453)   ketorolac (TORADOL) injection 30 mg (0 mg Intravenous Given to EMS 5/21/19 1453)   ondansetron (ZOFRAN) injection 4 mg (4 mg Intravenous Given 5/21/19 1513)   morphine (PF) 4 mg/mL injection 4 mg (4 mg Intravenous Given 5/21/19 1513)   morphine injection 2 mg (2 mg Intravenous Given 5/21/19 1656)   iohexol (OMNIPAQUE) 350 MG/ML injection (MULTI-DOSE) 80 mL (80 mL Intravenous Given 5/21/19 1629)       Diagnostic Studies  Results Reviewed     Procedure Component Value Units Date/Time    Comprehensive metabolic panel [[id number]]  (Abnormal) Collected:  05/22/19 0536    Lab Status:  Final result Specimen:  Blood from Hand, Right Updated:  05/22/19 1382     Sodium 132 mmol/L      Potassium 3 3 mmol/L      Chloride 96 mmol/L      CO2 29 mmol/L      ANION GAP 7 mmol/L      BUN 12 mg/dL      Creatinine 0 81 mg/dL      Glucose 90 mg/dL      Calcium 8 5 mg/dL       U/L      ALT 39 U/L      Alkaline Phosphatase 308 U/L      Total Protein 7 1 g/dL      Albumin 2 0 g/dL      Total Bilirubin 1 16 mg/dL      eGFR 86 ml/min/1 73sq m     Narrative:       [facility] guidelines for Chronic Kidney Disease (CKD):     Stage 1 with normal or high GFR (GFR > 90 mL/min/1 73 square meters)    Stage 2 Mild CKD (GFR = 60-89 mL/min/1 73 square meters)    Stage 3A Moderate CKD (GFR = 45-59 mL/min/1 73 square meters)    Stage 3B Moderate CKD (GFR = 30-44 mL/min/1 73 square meters)    Stage 4 Severe CKD (GFR = 15-29 mL/min/1 73 square meters)    Stage 5 End Stage CKD (GFR <15 mL/min/1 73 square meters)  Note: GFR calculation is accurate only with a steady state creatinine    CBC and differential [[id number]]  (Abnormal) Collected:  05/22/19 0536    Lab Status:  Final result Specimen:  Blood from Hand, Right Updated:  05/22/19 0553     WBC 11 00 Thousand/uL      RBC 3 55 Million/uL      Hemoglobin 9 8 g/dL      Hematocrit 30 6 %      MCV 86 fL      MCH 27 6 pg      MCHC 32 0 g/dL      RDW 18 9 %      MPV 9 4 fL      Platelets 974 Thousands/uL      nRBC 0 /100 WBCs      Neutrophils Relative 77 %      Immat GRANS % 1 %      Lymphocytes Relative 10 %      Monocytes Relative 12 %      Eosinophils Relative 0 %      Basophils Relative 0 %      Neutrophils Absolute 8 43 Thousands/µL      Immature Grans Absolute 0 06 Thousand/uL      Lymphocytes Absolute 1 13 Thousands/µL      Monocytes Absolute 1 33 Thousand/µL      Eosinophils Absolute 0 04 Thousand/µL      Basophils Absolute 0 01 Thousands/µL     Ammonia [[id number]]  (Normal) Collected:  05/21/19 1526    Lab Status:  Final result Specimen:  Blood from Arm, Right Updated:  05/21/19 1603     Ammonia 27 umol/L     Comprehensive metabolic panel [[id number]]  (Abnormal) Collected:  05/21/19 1511    Lab Status:  Final result Specimen:  Blood from Arm, Right Updated:  05/21/19 1551     Sodium 131 mmol/L      Potassium 3 5 mmol/L      Chloride 95 mmol/L      CO2 27 mmol/L      ANION GAP 9 mmol/L      BUN 10 mg/dL      Creatinine 0 80 mg/dL      Glucose 149 mg/dL      Calcium 8 8 mg/dL       U/L      ALT 32 U/L      Alkaline Phosphatase 329 U/L      Total Protein 7 4 g/dL      Albumin 2 0 g/dL      Total Bilirubin 1 68 mg/dL      eGFR 88 ml/min/1 73sq m     Narrative:       [facility] guidelines for Chronic Kid[facility] Disease (CKD):     Stage 1 with normal or high GFR (GFR > 90 mL/min/1 73 square meters)    Stage 2 Mild CKD (GFR = 60-89 mL/min/1 73 square meters)    Stage 3A Moderate CKD (GFR = 45-59 mL/min/1 73 square meters)    Stage 3B Moderate CKD (GFR = 30-44 mL/min/1 73 square meters)    Stage 4 Severe CKD (GFR = 15-29 mL/min/1 73 square meters)    Stage 5 End Stage CKD (GFR <15 mL/min/1 73 square meters)  Note: GFR calculation is accurate only with a steady state creatinine    Lipase [[id number]]  (Normal) Collected:  05/21/19 1511    Lab Status:  Final result Specimen:  Blood from Arm, Right Updated:  05/21/19 1541     Lipase 154 u/L     Protime-INR [[id number]]  (Abnormal) Collected:  05/21/19 1511    Lab Status:  Final result Specimen:  Blood from Arm, Right Updated:  05/21/19 1538     Protime 14 8 seconds      INR 1 15    APTT [[id number]]  (Normal) Collected:  05/21/19 1511    Lab Status:  Final result Specimen:  Blood from Arm, Right Updated:  05/21/19 1538     PTT 36 seconds     Body fluid culture and Gram stain [[id number]]     Lab Status:  No result Specimen: Body Fluid     Body fluid white cell count with differential [[id number]]     Lab Status:  No result Specimen:   Body Fluid     CBC and differential [[id number]]  (Abnormal) Collected:  05/21/19 1511    Lab Status:  Final result Specimen:  Blood from Arm, Right Updated:  05/21/19 1524     WBC 15 47 Thousand/uL      RBC 3 42 Million/uL      Hemoglobin 9 6 g/dL      Hematocrit 29 5 %      MCV 86 fL      MCH 28 1 pg      MCHC 32 5 g/dL      RDW 19 2 %      MPV 10 0 fL      Platelets 206 Thousands/uL      nRBC 0 /100 WBCs      Neutrophils Relative 85 %      Immat GRANS % 1 %      Lymphocytes Relative 7 %      Monocytes Relative 7 %      Eosinophils Relative 0 %      Basophils Relative 0 %      Neutrophils Absolute 13 10 Thousands/µL      Immature Grans Absolute 0 13 Thousand/uL      Lymphocytes Absolute 1 14 Thousands/µL      Monocytes Absolute 1 07 Thousand/µL      Eosinophils Absolute 0 02 Thousand/µL      Basophils Absolute 0 01 Thousands/µL     POCT urinalysis dipstick [[id number]]     Lab Status:  No result Specimen:  Urine                  CT abdomen pelvis with contrast   Final Result by Richelle Christian MD (05/21 1718)         1  Cirrhotic liver with stable treated lesion within right posterior hepatic dome  Chronic portal venous thrombosis with cavernous transformation again seen  There are prominent branching hypoattenuating foci throughout the liver, most likely    representing extension of thrombus into segmental branches  2   Extensive valeriano hepatis and peripancreatic lymphadenopathy with increase in valeriano hepatis lymphadenopathy  3   Small volume ascites  Workstation performed: [id number]               Procedures  Procedures      Phone Consults  ED Phone Contact    ED Course  ED Course as of May 22 0651   Tue May 21, 2019   1648 Bedside ultrasound without drainable ascites for diagnostic or therpeutic paracentesis      1649 Bedside ultrasound with distended gallbladder with significant sludge, no stones, no CBD distension              Identification of Seniors at Risk      Most Recent Value   (ISAR) Identification of Seniors at Risk   Before the illness or injury that brought you to the Emergency, did you need someone to help you on a regular basis? 0 Filed at: 05/21/2019 1452   In the last 24 hours, have you needed more help than usual?  1 Filed at: 05/21/2019 1452   Have you been hospitalized for one or more nights during the past 6 months? 1 Filed at: 05/21/2019 1452   In general, do you see well?  0 Filed at: 05/21/2019 1452   In general, do you have serious problems with your memory? 0 Filed at: 05/21/2019 1452   Do you take more than three different medications every day?   1 Filed at: 05/21/2019 1452   ISAR Score  3 Filed at: 05/21/2019 1452                          MDM  Number of Diagnoses or Management Options  Diagnosis management comments: Bedside ultrasound  - minimal peritoneal fluid    Possible SBP but no fever/sign of infection systemically, will hold abx at this time    CT with expanding portal venous thrombus      Disposition  Final diagnoses:   None     ED Disposition     ED Disposition Condition Date/Time Comment    Admit [place] May 21, 2019  5:51 PM Case was discussed with [facility] and the patient's admission status was agreed to be Admission Status: inpatient status to the service of Dr Pamela Lion   Follow-up Information    None         Current Discharge Medication List      CONTINUE these medications which have NOT CHANGED    Details   furosemide (LASIX) 20 mg tablet Take 3 tablets (60 mg total) by mouth daily  Qty: 180 tablet, Refills: 2    Associated Diagnoses: 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)      gabapentin (NEURONTIN) 300 mg capsule 1 tab in the am and 3 tabs PO night time  Qty: 120 capsule, Refills: 5    Associated Diagnoses: Lumbar disc herniation      nadolol (CORGARD) 20 mg tablet Take 1 tablet (20 mg total) by mouth daily  Qty: 30 tablet, Refills: 2    Associated Diagnoses: Decompensated hepatic cirrhosis (HCC)      oxyCODONE-acetaminophen (PERCOCET)  mg per tablet Take 1 tab PO Q 6 hours prn pain   2nd month script Do not fill until 6/21/19  Qty: 120 tablet, Refills: 0    Associated Diagnoses: Chronic pain syndrome      pantoprazole (PROTONIX) 40 mg tablet Take 1 tablet (40 mg total) by mouth daily with breakfast  Qty: 30 tablet, Refills: 0    Associated Diagnoses: Generalized abdominal pain      polyethylene glycol (MIRALAX) 17 g packet Take 17 g by mouth daily as needed (Constipation)  Qty: 30 each, Refills: 3    Associated Diagnoses: Cirrhosis (HCC)      senna (SENOKOT) 8 6 MG tablet Take 2 tablets by mouth daily      spironolactone (ALDACTONE) 50 mg tablet Take 3 tablets (150 mg total) by mouth daily  Qty: 90 tablet, Refills: 2    Associated Diagnoses: 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

## 2019-05-21 NOTE — H&P
INTERNAL MEDICINE HISTORY AND PHYSICAL  ED 01 SOD Team A    NAME: Ronn Lesches  AGE: 76 y o  SEX: female  : 1950   MRN: [id number]  ENCOUNTER: [id number]    DATE: 2019  TIME: 5:58 PM    Primary Care Physician: Alexis Abbott DO  Admitting Provider:  Dr Deacon Stanley  Chief complaint: Abdominal Pain    History of Present Illness     Kaleigh Tripathi is a 77 y/o female with PMHx of Hepatocellular carcinoma s/p resection and ablation in ,  chronic portal vein thrombosis s/p SIRS sphere radioembolization procedure in 2017 and on eliquis,  Hep C-Cirrhosis , and HTN, presents with diffuse abdominal pain which started yesterday afternoon    Patient states that she tried a new smoothie blend that she believes is upsetting her stomach  She had the smoothie in the morning and then developed abdominal pain a few hours later , follow by six episodes of vomiting, non bloody, non bilious  She also threw up one time this morning  She then presented to the ER for further evaluation  She denies radiation of the abdominal pain, rates it as 6/10, intermittent in nature and described as dull  She reports one episode of diarrhea  She states that she noticed that her stomach has gotten more distended throughout the day  She reports worsening of her appetite  She denies fevers, chills, nightsweats, chest pain, shortness of breath  She denies recent sick contacts  Denies any recent travel out of the [facility] or elsewhere  In the ED the patient had bedside ultrasound which showed a distended gallbladder with significant sludge, without any stones or CBD distension  The ascites was not drainable  Ammonia and lipase levels were both normal   The patient has a history of chronic hyponatremia with current sodium of 131, Cr 0 80  The patient also had elevated liver enzymes slightly above normal range  Total Bilirubin was only mildlyelevated to 1 68   Moderate leukocytosis to 15 47 and hemoglobin of 9 6 within her normal baseline range  Last relevant visit: The patient presented to the hospital on 2/16/19 for evaluation of fatigue  Her hemoglobin on presentation was 4 3, with a last prior known baseline of 11  She was also found to have melanotic stool in the ER  She was given 2 units pRBC in the ER, IV protonix , GI consulted, and her eliquis was held  Patient underwent EGD which visualized esophageal varices with recent stigmata of bleeding and portal hypertensive gastropathy  The GI team performed variceal banding with octreotide drip for 72 hours  After the banding procedure, the patients hemoglobin was stable around 8-9  She was able to tolerate oral intake  Her hemoglobin on discharge was 8 7  MCV normal 85-88 range  Her last iron panel in September 2018 showed low iron of 28, iron sat of 9%, TIBC of 322, ferritin high at 428  Review of Systems   Review of Systems   Constitutional: Positive for appetite change and fatigue  Negative for chills, diaphoresis and fever  Worsening appetite   HENT: Negative for congestion, rhinorrhea, sinus pressure, sinus pain, sore throat and tinnitus  Eyes: Negative for photophobia and visual disturbance  Respiratory: Negative for cough, chest tightness, shortness of breath and wheezing  Cardiovascular: Negative for chest pain, palpitations and leg swelling  Gastrointestinal: Positive for abdominal distention and abdominal pain  Negative for constipation, diarrhea, nausea and vomiting  Endocrine: Negative for polydipsia, polyphagia and polyuria  Musculoskeletal: Positive for back pain  Negative for arthralgias, myalgias, neck pain and neck stiffness  Skin: Negative for color change, pallor and rash  Neurological: Negative for dizziness, tremors, syncope, weakness, light-headedness, numbness and headaches         Past Medical History     Past Medical History:   Diagnosis Date    Anemia     Anxiety     Cancer ([facility] [address] )     Chronic pain disorder     herniated disc    Cir[place]osis ([place] [address] )     Constipation     Current use of long term anticoagulation     eliquis    Diabetes mellitus ([place] [address] )     blood sugar 113 at 1225 2/18/19    Drug dependence ([place] [address] )     GI bleed     Hepatitis C, chronic ([place] [address] ) 7/22/2014    Hepatocellular carcinoma ([place] [address] )     Hypertension     Hypokalemia 3/7/2019    Hyponatremia     Liver disease     h/o hepatitis c    Melena     Portal vein thrombosis        Past Surgical History     Past Surgical History:   Procedure Laterality Date    DENTAL SURGERY      ESOPHAGOGASTRODUODENOSCOPY N/A 2/18/2019    Procedure: ESOPHAGOGASTRODUODENOSCOPY (EGD); Surgeon: Sherrie Higuera MD;  [facility]; Service: Gastroenterology    ESOPHAGOGASTRODUODENOSCOPY N/A 3/11/2019    Procedure: ESOPHAGOGASTRODUODENOSCOPY (EGD); Surgeon: Good Aguilar MD;  [facility]; Service: Gastroenterology    HYSTERECTOMY      IR PARACENTESIS  3/8/2019    LIVER BIOPSY      LIVER SURGERY      Ablation    TONSILLECTOMY         Social History     Social History     Substance and Sexual Activity   Alcohol Use Not Currently     Social History     Substance and Sexual Activity   Drug Use No     Social History     Tobacco Use   Smoking Status Current Every Day Smoker    Packs/day: 1 00    Years: 50 00    Pack years: 50 00    Types: Cigarettes   Smokeless Tobacco Never Used       Family History     Family History   Problem Relation Age of Onset    Prostate cancer Father        Medications Prior to Admission     Prior to Admission medications    Medication Sig Start Date End Date Taking?  Authorizing Provider   furosemide (LASIX) 20 mg tablet Take 3 tablets (60 mg total) by mouth daily 4/22/19   Palmer Wheeler PA-C   gabapentin (NEURONTIN) 300 mg capsule 1 tab in the am and 3 tabs PO night time 3/14/19   BRYANNA Mabry   nadolol (CORGARD) 20 mg tablet Take 1 tablet (20 mg total) by mouth daily 4/22/19   Palmer Wheeler PA-C   nicotine (NICODERM CQ) 21 mg/24 hr TD 24 hr patch Place 1 patch on the skin daily  Patient not taking: Reported on 5/2/2019 3/12/19   Leeroy Avila MD   oxyCODONE-acetaminophen (PERCOCET)  mg per tablet Take 1 tab PO Q 6 hours prn pain  2nd month script Do not fill until 6/21/19 5/13/19   BRYANNA Alvarado   pantoprazole (PROTONIX) 40 mg tablet Take 1 tablet (40 mg total) by mouth daily with breakfast 4/19/19   Rob Light DO   polyethylene glycol (MIRALAX) 17 g packet Take 17 g by mouth daily as needed (Constipation)  Patient not taking: Reported on 5/2/2019 3/21/19   Kaitlyn Dougherty MD   potassium chloride (MICRO-K) 10 MEQ CR capsule Take 2 capsules (20 mEq total) by mouth 2 (two) times a day for 7 days 5/13/19 5/20/19  Sabrina Magdaleno DO   senna (SENOKOT) 8 6 MG tablet Take 2 tablets by mouth daily    Historical Provider, MD   spironolactone (ALDACTONE) 50 mg tablet Take 3 tablets (150 mg total) by mouth daily 4/22/19   Rod Sandoval PA-C       Allergies   No Known Allergies    Objective     Vitals:    05/21/19 1447 05/21/19 1558 05/21/19 1645 05/21/19 1730   BP: (!) 87/51 109/80 98/74 (!) 86/71   BP Location: Left arm Right arm Right arm Right arm   Pulse: 66 62 60 65   Resp: 19 20 18 20   Temp: (!) 97 3 °F (36 3 °C)      TempSrc: Oral      SpO2: 98% 97% 98% 96%   Weight: 65 3 kg (143 lb 15 4 oz)      Height: 5' 4" (1 626 m)        Body mass index is 24 71 kg/m²  No intake or output data in the 24 hours ending 05/21/19 1758  Invasive Devices     None                 Physical Exam  GENERAL: Appears well-developed and well-nourished  Appears in no acute distress   HEENT: Normocephalic and atraumatic  No scleral icterus  PERRLA  EOMI B/L  No oropharyngeal edema  MM moist    NECK: Neck supple with no lymphadenopathy  Trachea midline  No JVD  CARDIOVASCULAR: S1 and S2 are present  Regular rate and rhythm  No murmurs, rubs, or gallops  RESPIRATORY: CTA B/L, no rales, rhonci or wheezes  Normal respiratory expansion  ABDOMINAL: Bowel sounds present in all 4 quadrants, distended but soft and negative fluid wave  EXTREMITIES: 2+ DP and PT pulses bilaterally; no cyanosis, clubbing, +1 pitting edema bilaterally  ROM intact  HINOJOSA x4   MUSCULOSKELETAL: No joint tenderness, deformity or swelling, full range of motion without pain  NEUROLOGIC: Patient is alert and oriented to person, place, and time  No sensory or motor deficits  CN 2-12 intact  Plantars downgoing bilaterally  Speech fluent  SKIN: Skin is warm and dry  No skin lesions are present  No rashes  PSYCHIATRIC: Normal mood and affect     Lab Results: I have personally reviewed pertinent reports      CBC:   Results from last 7 days   Lab Units 05/21/19  1511   WBC Thousand/uL 15 47*   RBC Million/uL 3 42*   HEMOGLOBIN g/dL 9 6*   HEMATOCRIT % 29 5*   MCV fL 86   MCH pg 28 1   MCHC g/dL 32 5   RDW % 19 2*   MPV fL 10 0   PLATELETS Thousands/uL 266   NRBC AUTO /100 WBCs 0   NEUTROS PCT % 85*   LYMPHS PCT % 7*   MONOS PCT % 7   EOS PCT % 0   BASOS PCT % 0   NEUTROS ABS Thousands/µL 13 10*   LYMPHS ABS Thousands/µL 1 14   MONOS ABS Thousand/µL 1 07   EOS ABS Thousand/µL 0 02   , Chemistry Profile:   Results from last 7 days   Lab Units 05/21/19  1511   POTASSIUM mmol/L 3 5   CHLORIDE mmol/L 95*   CO2 mmol/L 27   BUN mg/dL 10   CREATININE mg/dL 0 80   CALCIUM mg/dL 8 8   AST U/L 280*   ALT U/L 32   ALK PHOS U/L 329*   EGFR ml/min/1 73sq m 88   , Coagulation Studies:   Results from last 7 days   Lab Units 05/21/19  1511   PROTIME seconds 14 8*   INR  1 15   PTT seconds 36   , Cardiac Studies:   , Additional Labs:   Results from last 7 days   Lab Units 05/21/19  1526 05/21/19  1511 05/16/19  1054   LIPASE u/L  --  154  --    MAGNESIUM mg/dL  --   --  1 4*   AMMONIA umol/L 27  --   --    , iSTAT CHEM 8:   Results from last 7 days   Lab Units 05/21/19  1511   ANION GAP mmol/L 9   EGFR ml/min/1 73sq m 88   HEMOGLOBIN g/dL 9 6*   , ABG:   , Toxicology:   , Last A1C/Lipid Panel/Thyroid Panel:   Lab Results   Component Value Date    HGBA1C 5 4 03/09/2019    HGBA1C 5 3 03/01/2019    TRIG 74 07/23/2014    CHOL 148 07/23/2014    HDL 44 07/23/2014    LDLCALC 89 07/23/2014    [id number] 2 310 02/16/2019       Imaging: I have personally reviewed pertinent films in PACS  Xr Ribs Bilateral 4+ Vw W Pa Chest    Result Date: 5/10/2019  Narrative: BILATERAL RIBS AND CHEST INDICATION: Bilateral rib pain  COMPARISON: Two-view chest 9/16/2018 VIEWS:  XR RIBS BILATERAL 4+ VW W PA CHEST FINDINGS: The cardiomediastinal silhouette is unremarkable  The lungs are clear  No pleural effusions  There is no pneumothorax  No rib fractures are identified  Impression: No active pulmonary disease  No evidence of rib fractures  Workstation performed: [id number]     Ct Abdomen Pelvis With Contrast    Result Date: 5/21/2019  Narrative: CT ABDOMEN AND PELVIS WITH IV CONTRAST INDICATION:   Abdominal distension Abdominal pain, unspecified  COMPARISON:  3/7/2018 CT, MRI 3/10/2018  TECHNIQUE:  CT examination of the abdomen and pelvis was performed  Axial, sagittal, and coronal 2D reformatted images were created from the source data and submitted for interpretation  Radiation dose length product (DLP) for this visit:  280 mGy-cm   This examination, like all CT scans performed in the [facility], was performed utilizing techniques to minimize radiation dose exposure, including the use of iterative reconstruction and automated exposure control  IV Contrast:  80 mL of iohexol (OMNIPAQUE) Enteric Contrast:  Enteric contrast was not administered  FINDINGS: ABDOMEN LOWER CHEST:  No clinically significant abnormality identified in the visualized lower chest  LIVER/BILIARY TREE:  Cirrhosis is again seen  Hypoattenuating lesion with capsular retraction along the right posterior dome of the liver is unchanged in appearance and corresponds to previously treated lesion    Chronic portal venous thrombosis with associated cavernous transformation is again seen  There are more apparent branching areas of hypoattenuation throughout the liver, which may represent thrombi throughout portal venous parenchymal branches  GALLBLADDER:  The gallbladder is distended with mucosal hyperenhancement, likely reactive changes related to underlying hepatic dysfunction  No calcified stones identified  SPLEEN:  Unremarkable  PANCREAS:  Unremarkable  ADRENAL GLANDS:  Unremarkable  KIDNEYS/URETERS:  Unremarkable  No hydronephrosis  STOMACH AND BOWEL:  Unremarkable  APPENDIX:  No findings to suggest appendicitis  ABDOMINOPELVIC CAVITY:  Marked kalia hepatis /peripancreatic lymphadenopathy is again seen  Kalia hepatis lymph node conglomeration appears slightly increased in size, now measuring 6 2 cm craniocaudal by 6 1 cm transverse while previously measuring 5 1  x 5 3 cm respectively  There also appears to be new area of hypoattenuation, likely representing necrosis  There is small amount of intra-abdominal and pelvic ascites  VESSELS:  Unremarkable for patient's age  PELVIS REPRODUCTIVE ORGANS:  Patient is status post hysterectomy  URINARY BLADDER:  Unremarkable  ABDOMINAL WALL/INGUINAL REGIONS:  Unremarkable  OSSEOUS STRUCTURES:  No acute fracture or destructive osseous lesion  Impression: 1  Cirrhotic liver with stable treated lesion within right posterior hepatic dome  Chronic portal venous thrombosis with cavernous transformation again seen  There are prominent branching hypoattenuating foci throughout the liver, most likely representing extension of thrombus into segmental branches  2   Extensive kalia hepatis and peripancreatic lymphadenopathy with increase in kalia hepatis lymphadenopathy  3   Small volume ascites   Workstation performed: [id number]       Microbiology: cultures obtained in emergency department include     Urinalysis:       Invalid input(s): URIBILINOGEN     Urine Micro:        EKG, Pathology, and Other Studies: I have personally reviewed pertinent reports  Medications given in Emergency Department     Medication Administration - last 24 hours from 05/20/2019 1758 to 05/21/2019 1758       Date/Time Order Dose Route Action Action by     05/21/2019 1453 ondansetron (FOR EMS ONLY) (ZOFRAN) 4 mg/2 mL injection 4 mg 0 mg Does not apply Given to EMS Latisha Ashraf RN     05/21/2019 1453 ketorolac (TORADOL) injection 30 mg 0 mg Intravenous Given to EMS Latisha Ashraf RN     05/21/2019 1513 ondansetron (ZOFRAN) injection 4 mg 4 mg Intravenous Given Min Roberts RN     05/21/2019 1513 morphine (PF) 4 mg/mL injection 4 mg 4 mg Intravenous Given Min Roberts RN     05/21/2019 1656 morphine injection 2 mg 2 mg Intravenous Given Elsa Saenz RN     05/21/2019 1629 iohexol (OMNIPAQUE) 350 MG/ML injection (MULTI-DOSE) 80 mL 80 mL Intravenous Given Eric Perez          Assessment and Plan     Problem List      Gunnar Zaidi is a 77 y/o female with PMHx of Hepatocellular carcinoma s/p resection and ablation in 2015,  chronic portal vein thrombosis s/p SIRS sphere radioembolization procedure in December 2017 and on eliquis,  Hep C-Cirrhosis , and HTN      1) Chronic Portal vein thrombosis with extension of thrombosis  -anticoagulated on eliquis since 2017   -In march of this year the patient found to have esophageal varices with bleeding stigmata after being found to be profoundly anemic with a hemoglobin of 4 3  She was started on nadolol 20mg daily  It was decided not to resume the patient's anticoagulation with eliquis at that time  -patient denies hematemesis, hematochezia, melena  Plan: Will discuss risks and benefits of anticoagulation, may possibly require GI consult  Will likely hold off on anticoagulation         2) Decompensated cirrhosis   -MELD score of 10, with ascites on exam   -no asterixis on exam, alert and oriented x3, ammonia of 27    -albumin 2 0, total bilirubin 1 68,  INR 1 15,  Platelets 266    Plan: continue home aldactone and lasix  3) Abdominal pain associated with nausea and vomiting  -patient reports trying new protein shake yesterday, with subsequent development of abdominal pain several hours later  She also reports more than 6 episodes of NBNB emesis  -one episode of diarrhea this morning    -bedside ultrasound showed distended gallbladder, without stones or CBD dilation  Negative clark sign on physical exam  Likely seconday to chronic portal vein thrombosis causing portal cholangiopathy    -likely gastroenteritis, but will admit and observe in the setting of portal vein thrombosis with extension  4) Hepatocellular carcinoma  -treated by Dr Maxime Nj from surgical oncology  S/p resection and ablation in 2015 for [address]   S/p Sirs sphere radio embolization in December 2017 on eliquis until recent hospitalization for bleeding esophageal varices  5) Iron deficiency anemia and anemia of chronic disease  -normocytic MCV of 86, increased RDW to 19 2  September 2018 iron panel showed : iron 28, TIBC 322, ferritin 428  -patient did not tolerate PO iron therapy in the past       6) Chronic back pain  -Patient follows with pain management  SHe is prescribed Percocet 10/325 mg Q 6 p r n  Plan: continue home dose percocet, bowel prophylaxis              Code Status: Level 1 - Full Code  VTE Pharmacologic Prophylaxis: Enoxaparin (Lovenox)   VTE Mechanical Prophylaxis: sequential compression device  Admission Status: INPATIENT     Admission Time  I spent 30 minutes admitting the patient  This involved direct patient contact where I performed a full history and physical, reviewing previous records, and reviewing laboratory and other diagnostic studies      Yara Mccauley, DO  Internal Medicine  PGY-1

## 2019-05-21 NOTE — ED ATTENDING ATTESTATION
I, Neeta Casas DO, saw and evaluated the patient  I have discussed the patient with the resident/non-physician practitioner and agree with the resident's/non-physician practitioner's findings, Plan of Care, and MDM as documented in the resident's/non-physician practitioner's note, except where noted  All available labs and Radiology studies were reviewed  I was present for key portions of any procedure(s) performed by the resident/non-physician practitioner and I was immediately available to provide assistance  At this point I agree with the current assessment done in the Emergency Department  I have conducted an independent evaluation of this patient a history and physical is as follows:      Critical Care Time  Procedures     76 yr fem with HCCa and cirrhosis to the ED with abd distention and generalized pain since last night  No fever, cough, congestion  + vomiting wo heme  No melena  Exm: diffuse abd tenderness with soft distention  No fever  Not tachy  Recent adm for the same with neg tap and therapeutic paracentesis  Pln: CT abd for mass noted two months ago, tap, and prob adm

## 2019-05-22 ENCOUNTER — APPOINTMENT (OUTPATIENT)
Dept: PHYSICAL THERAPY | Facility: CLINIC | Age: 69
End: 2019-05-22
Payer: COMMERCIAL

## 2019-05-22 VITALS
OXYGEN SATURATION: 97 % | HEIGHT: 64 IN | WEIGHT: 139.33 LBS | DIASTOLIC BLOOD PRESSURE: 61 MMHG | BODY MASS INDEX: 23.79 KG/M2 | TEMPERATURE: 97.7 F | RESPIRATION RATE: 18 BRPM | SYSTOLIC BLOOD PRESSURE: 94 MMHG | HEART RATE: 64 BPM

## 2019-05-22 LAB
ALBUMIN SERPL BCP-MCNC: 2 G/DL (ref 3.5–5)
ALP SERPL-CCNC: 308 U/L (ref 46–116)
ALT SERPL W P-5'-P-CCNC: 39 U/L (ref 12–78)
ANION GAP SERPL CALCULATED.3IONS-SCNC: 7 MMOL/L (ref 4–13)
AST SERPL W P-5'-P-CCNC: 324 U/L (ref 5–45)
BASOPHILS # BLD AUTO: 0.01 THOUSANDS/ΜL (ref 0–0.1)
BASOPHILS NFR BLD AUTO: 0 % (ref 0–1)
BILIRUB SERPL-MCNC: 1.16 MG/DL (ref 0.2–1)
BUN SERPL-MCNC: 12 MG/DL (ref 5–25)
CALCIUM SERPL-MCNC: 8.5 MG/DL (ref 8.3–10.1)
CHLORIDE SERPL-SCNC: 96 MMOL/L (ref 100–108)
CO2 SERPL-SCNC: 29 MMOL/L (ref 21–32)
CREAT SERPL-MCNC: 0.81 MG/DL (ref 0.6–1.3)
EOSINOPHIL # BLD AUTO: 0.04 THOUSAND/ΜL (ref 0–0.61)
EOSINOPHIL NFR BLD AUTO: 0 % (ref 0–6)
ERYTHROCYTE [DISTWIDTH] IN BLOOD BY AUTOMATED COUNT: 18.9 % (ref 11.6–15.1)
GFR SERPL CREATININE-BSD FRML MDRD: 86 ML/MIN/1.73SQ M
GLUCOSE SERPL-MCNC: 90 MG/DL (ref 65–140)
HCT VFR BLD AUTO: 30.6 % (ref 34.8–46.1)
HGB BLD-MCNC: 9.8 G/DL (ref 11.5–15.4)
IMM GRANULOCYTES # BLD AUTO: 0.06 THOUSAND/UL (ref 0–0.2)
IMM GRANULOCYTES NFR BLD AUTO: 1 % (ref 0–2)
LYMPHOCYTES # BLD AUTO: 1.13 THOUSANDS/ΜL (ref 0.6–4.47)
LYMPHOCYTES NFR BLD AUTO: 10 % (ref 14–44)
MCH RBC QN AUTO: 27.6 PG (ref 26.8–34.3)
MCHC RBC AUTO-ENTMCNC: 32 G/DL (ref 31.4–37.4)
MCV RBC AUTO: 86 FL (ref 82–98)
MONOCYTES # BLD AUTO: 1.33 THOUSAND/ΜL (ref 0.17–1.22)
MONOCYTES NFR BLD AUTO: 12 % (ref 4–12)
NEUTROPHILS # BLD AUTO: 8.43 THOUSANDS/ΜL (ref 1.85–7.62)
NEUTS SEG NFR BLD AUTO: 77 % (ref 43–75)
NRBC BLD AUTO-RTO: 0 /100 WBCS
PLATELET # BLD AUTO: 274 THOUSANDS/UL (ref 149–390)
PMV BLD AUTO: 9.4 FL (ref 8.9–12.7)
POTASSIUM SERPL-SCNC: 3.3 MMOL/L (ref 3.5–5.3)
PROT SERPL-MCNC: 7.1 G/DL (ref 6.4–8.2)
RBC # BLD AUTO: 3.55 MILLION/UL (ref 3.81–5.12)
SODIUM SERPL-SCNC: 132 MMOL/L (ref 136–145)
WBC # BLD AUTO: 11 THOUSAND/UL (ref 4.31–10.16)

## 2019-05-22 PROCEDURE — 85025 COMPLETE CBC W/AUTO DIFF WBC: CPT | Performed by: INTERNAL MEDICINE

## 2019-05-22 PROCEDURE — 99223 1ST HOSP IP/OBS HIGH 75: CPT | Performed by: INTERNAL MEDICINE

## 2019-05-22 PROCEDURE — 80053 COMPREHEN METABOLIC PANEL: CPT | Performed by: INTERNAL MEDICINE

## 2019-05-22 RX ORDER — POTASSIUM CHLORIDE 20 MEQ/1
20 TABLET, EXTENDED RELEASE ORAL ONCE
Status: COMPLETED | OUTPATIENT
Start: 2019-05-22 | End: 2019-05-22

## 2019-05-22 RX ORDER — LIDOCAINE 50 MG/G
1 PATCH TOPICAL DAILY
Status: DISCONTINUED | OUTPATIENT
Start: 2019-05-22 | End: 2019-05-22 | Stop reason: HOSPADM

## 2019-05-22 RX ADMIN — OXYCODONE HYDROCHLORIDE AND ACETAMINOPHEN 2 TABLET: 5; 325 TABLET ORAL at 15:51

## 2019-05-22 RX ADMIN — ENOXAPARIN SODIUM 40 MG: 40 INJECTION SUBCUTANEOUS at 08:50

## 2019-05-22 RX ADMIN — LIDOCAINE 1 PATCH: 50 PATCH TOPICAL at 12:29

## 2019-05-22 RX ADMIN — GABAPENTIN 300 MG: 300 CAPSULE ORAL at 08:49

## 2019-05-22 RX ADMIN — POTASSIUM CHLORIDE 20 MEQ: 1500 TABLET, EXTENDED RELEASE ORAL at 08:49

## 2019-05-22 RX ADMIN — PANTOPRAZOLE SODIUM 40 MG: 40 TABLET, DELAYED RELEASE ORAL at 08:49

## 2019-05-22 RX ADMIN — OXYCODONE HYDROCHLORIDE AND ACETAMINOPHEN 2 TABLET: 5; 325 TABLET ORAL at 06:12

## 2019-05-22 NOTE — NURSING NOTE
Patient has verbalized understanding of all discharge instructions  Patient has been discharged home

## 2019-05-22 NOTE — UTILIZATION REVIEW
Initial Clinical Review    Admission: Date/Time/Statement: 5/21/19 @ 1749   Orders Placed This Encounter   Procedures    Inpatient Admission (expected length of stay for this patient Order details is greater than two midnights)     Standing Status:   Standing     Number of Occurrences:   1     Order Specific Question:   Admitting Physician     Answer:   Reji Martines     Order Specific Question:   Level of Care     Answer:   Med Surg [16]     Order Specific Question:   Estimated length of stay     Answer:   More than 2 Midnights     Order Specific Question:   Certification     Answer:   I certify that inpatient services are medically necessary for this patient for a duration of greater than two midnights  See H&P and MD Progress Notes for additional information about the patient's course of treatment  ED: Date/Time/Mode of Arrival:   ED Arrival Information     Expected Arrival Acuity Means of Arrival Escorted By Service Admission Type    - 5/21/2019 14:44 [facility] Emergency    Arrival Complaint    -        Chief Complaint:   Chief Complaint   Patient presents with    Abdominal Pain     Patient presents to the E R  with report of vomiting x 2 days  Patient has history of Liver Cancer  Assessment/Plan:   70y Female to ED presents with diffuse abdominal pain which started yesterday afternoon  Patient states that she tried a new smoothie blend that she believes is upsetting her stomach  She had the smoothie in the morning and then developed abdominal pain a few hours later , follow by six episodes of vomiting, non bloody, non bilious  She also threw up one time this morning  She then presented to the ER for further evaluation  She denies radiation of the abdominal pain, rates it as 6/10, intermittent in nature and described as dull  She reports one episode of diarrhea   She states that she noticed that her stomach has gotten more distended throughout the day  She reports worsening of her appetite  In the ED the patient had bedside ultrasound which showed a distended gallbladder with significant sludge, without any stones or CBD distension  The ascites was not drainable  PMHx of Hepatocellular carcinoma s/p resection and ablation in 2015,  chronic portal vein thrombosis s/p SIRS sphere radioembolization procedure in December 2017 and on eliquis,  Hep C-Cirrhosis , and HTN  In march of this year the patient found to have esophageal varices with bleeding stigmata after being found to be profoundly anemic with a hemoglobin of 4 3  She was started on nadolol 20mg daily  It was decided not to resume the patient's anticoagulation with eliquis at that time  Pt is being admitted with Abdominal pain associated with nausea and vomiting/ Chronic Portal vein thrombosis with extension of thrombosis/ Decompensate cirrhosis  Patient reports trying new protein shake yesterday, with subsequent development of abdominal pain several hours later  She also reports more than 6 episodes of NBNB emesis  One episode of diarrhea this morning  Continue home aldactone and lasix  May require Gastroenterology consult  Hold off on anticoagulation  5/21 Progress notes:  Portal vein thrombosis  Active Problems:    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)    Hepatocellular carcinoma (HCC)    Hypertension    Pain syndrome, chronic    Abdominal pain    Secondary esophageal varices without bleeding (HCC)     Extension of known Portal Vein Thrombosis  Will continue to monitor symptoms and treat pain symptoms as presenting  Abd pain more likely associated gastroenteritis as generalized abdominal pain  High risk for bleeding with anticoagulation given her history of GI bleeding, though will need to discuss with patient regarding risks and benefits of systemic anticoagulation prior to potential initiation      ED Vital Signs:   ED Triage Vitals [05/21/19 1447] Temperature Pulse Respirations Blood Pressure SpO2   (!) 97 3 °F (36 3 °C) 66 19 (!) 87/51 98 %      Temp Source Heart Rate Source Patient Position - Orthostatic VS BP Location FiO2 (%)   Oral Monitor Lying Left arm --      Pain Score       9        Wt Readings from Last 1 Encounters:   05/22/19 63 2 kg (139 lb 5 3 oz)     Vital Signs (abnormal):   Date/Time  Temp  Pulse  Resp  BP  SpO2  O2 Device  Patient Position - Orthostatic VS   05/22/19 0653  97 7 °F (36 5 °C)  64  18  94/61  --  --  --   05/22/19 0212  --  55  --  97/60  97 %  --  --   05/21/19 2342  --  --  --  80/46  Abnormal    --  --  Lying   05/21/19 2340  --  56  16  74/44  Abnormal   98 %  --  --   05/21/19 1948  97 5 °F (36 4 °C)  59  18  90/52  100 %  --  --   05/21/19 1800  --  62  18  93/75  98 %  None (Room air)  Lying   05/21/19 1730  --  65  20  86/71  Abnormal   96 %         Pertinent Labs/Diagnostic Test Results:   CT Abd/ Pelvis - Cirrhotic liver with stable treated lesion within right posterior hepatic dome  Chronic portal venous thrombosis with cavernous transformation again seen  There are prominent branching hypoattenuating foci throughout the liver, most likely representing extension of thrombus into segmental branches  Extensive valeriano hepatis and peripancreatic lymphadenopathy with increase in valeriano hepatis lymphadenopathy  Small volume ascites  Ref   Range 5/21/2019 15:11 5/21/2019 15:26 5/22/2019 05:36   Sodium Latest Ref Range: 136 - 145 mmol/L 131 (L)  132 (L)   Potassium Latest Ref Range: 3 5 - 5 3 mmol/L 3 5  3 3 (L)   Chloride Latest Ref Range: 100 - 108 mmol/L 95 (L)  96 (L)   CO2 Latest Ref Range: 21 - 32 mmol/L 27  29   Anion Gap Latest Ref Range: 4 - 13 mmol/L 9  7   BUN Latest Ref Range: 5 - 25 mg/dL 10  12   Creatinine Latest Ref Range: 0 60 - 1 30 mg/dL 0 80  0 81   Glucose, Random Latest Ref Range: 65 - 140 mg/dL 149 (H)  90   Calcium Latest Ref Range: 8 3 - 10 1 mg/dL 8 8  8 5   AST Latest Ref Range: 5 - 45 U/L 280 (H)  324 (H)   ALT Latest Ref Range: 12 - 78 U/L 32  39   Alkaline Phosphatase Latest Ref Range: 46 - 116 U/L 329 (H)  308 (H)   Total Protein Latest Ref Range: 6 4 - 8 2 g/dL 7 4  7 1   Albumin Latest Ref Range: 3 5 - 5 0 g/dL 2 0 (L)  2 0 (L)   TOTAL BILIRUBIN Latest Ref Range: 0 20 - 1 00 mg/dL 1 68 (H)  1 16 (H)   eGFR Latest Units: ml/min/1 73sq m 88  86   Lipase Latest Ref Range: 73 - 393 u/L 154     Ammonia Latest Ref Range: 11 - 35 umol/L  27    WBC Latest Ref Range: 4 31 - 10 16 Thousand/uL 15 47 (H)  11 00 (H)   Red Blood Cell Count Latest Ref Range: 3 81 - 5 12 Million/uL 3 42 (L)  3 55 (L)   Hemoglobin Latest Ref Range: 11 5 - 15 4 g/dL 9 6 (L)  9 8 (L)   HCT Latest Ref Range: 34 8 - 46 1 % 29 5 (L)  30 6 (L)   Platelet Count Latest Ref Range: 149 - 390 Thousands/uL 266  274   Protime Latest Ref Range: 11 8 - 14 2 seconds 14 8 (H)     INR Latest Ref Range: 0 86 - 1 17  1 15     PTT Latest Ref Range: 26 - 38 seconds 36        Ref  Range 5/21/2019 15:11 5/22/2019 05:36   WBC Latest Ref Range: 4 31 - 10 16 Thousand/uL 15 47 (H) 11 00 (H)   Red Blood Cell Count Latest Ref Range: 3 81 - 5 12 Million/uL 3 42 (L) 3 55 (L)   Hemoglobin Latest Ref Range: 11 5 - 15 4 g/dL 9 6 (L) 9 8 (L)   HCT Latest Ref Range: 34 8 - 46 1 % 29 5 (L) 30 6 (L)   Platelet Count Latest Ref Range: 149 - 390 Thousands/uL 266 274     ED Treatment:   Medication Administration from 05/21/2019 1444 to 05/21/2019 1943       Date/Time Order Dose Route Action     05/21/2019 1453 ketorolac (TORADOL) injection 30 mg 0 mg Intravenous Given to EMS     05/21/2019 1513 ondansetron (ZOFRAN) injection 4 mg 4 mg Intravenous Given     05/21/2019 1513 morphine (PF) 4 mg/mL injection 4 mg 4 mg Intravenous Given     05/21/2019 1656 morphine injection 2 mg 2 mg Intravenous Given     05/21/2019 1629 iohexol (OMNIPAQUE) 350 MG/ML injection (MULTI-DOSE) 80 mL 80 mL Intravenous Given        Past Medical/Surgical History:    Active Ambulatory Problems Diagnosis Date Noted    Anxiety 07/24/2012    Fatigue 09/23/2014    Chronic lumbar radiculopathy 05/08/2014    Claustrophobia 05/08/2013    Constipation 09/23/2014    Diabetes mellitus, type II ([facility] [facility] ) 06/26/2012    Diabetic neuropathy (HCC) 06/17/2015    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 ([facility] [facility] ) 03/31/2014    Hepatocellular carcinoma ([facility] [facility] ) 11/12/2015    Herniated lumbar disc without myelopathy 11/05/2014    Hypertension 09/11/2012    Insomnia 10/14/2015    Left foot pain 03/31/2016    Myofascial pain 10/19/2017    Nicotine dependence 07/18/2014    Opioid dependence ([facility] [facility] ) 02/02/2016    Osteoporosis 10/24/2013    Pain syndrome, chronic 02/03/2015    Sacroiliitis (HCC) 08/07/2017    Seasonal allergies 06/17/2015    Trochanteric bursitis 07/16/2015    Elevated serum creatinine 03/14/2018    Lumbar disc herniation 03/14/2018    Goals of care, counseling/discussion 03/14/2018    Hyponatremia 09/16/2018    Abdominal pain 09/16/2018    Ascites 09/17/2018    Decompensated hepatic cirrhosis ([facility] [facility] ) 09/17/2018    Acute on chronic blood loss anemia 09/18/2018    Portal vein thrombosis 09/19/2018    Depression 10/03/2018    Tobacco abuse 10/03/2018    Long term (current) use of anticoagulants 02/16/2019    Melena 02/16/2019    Hypotension 02/16/2019    GI bleed 02/16/2019    Hypokalemia 03/07/2019    Anemia 03/07/2019    Secondary esophageal varices without bleeding (HCC) 03/07/2019    Excess ear wax 05/01/2019    Ear popping 05/01/2019    Lumbar spondylosis 05/13/2019    Spinal stenosis of lumbar region without neurogenic claudication 05/13/2019    Swelling of both lower extremities 05/14/2019     Past Medical History:   Diagnosis Date    [facility])     Chronic pain disorder     Cirrhosis ([facility] [facility] )     Current use of long term anticoagulation     Diabetes mellitus ([facility] [address] [facility] )     Drug dependence ([facility] [address] )     Liver disease      Admitting Diagnosis: Abdominal pain [R10 9] Age/Sex: 76 y o  female     Admission Orders: Body fluid white cell count with diff  Body fluid culture and Gram stain    Scheduled Meds:   Current Facility-Administered Medications:  enoxaparin 40 mg Subcutaneous Daily   furosemide 20 mg Oral TID (diuretic)   gabapentin 300 mg Oral Daily   gabapentin 900 mg Oral HS   nadolol 20 mg Oral Daily   nicotine 21 mg Transdermal Q24H   pantoprazole 40 mg Oral Daily With Breakfast   potassium chloride 20 mEq Oral Once   senna-docusate sodium 1 tablet Oral HS   spironolactone 150 mg Oral Once     Continuous Infusions:    PRN Meds: ondansetron    oxyCODONE-acetaminophen po x2    Network Utilization Review Department  Phone: [phone]; Fax [phone]  [email or web address]  ATTENTION: Please call with any questions or concerns to [phone]  and carefully listen to the prompts so that you are directed to the right person  Send all requests for admission clinical reviews, approved or denied determinations and any other requests to fax [phone]   All voicemails are confidential

## 2019-05-22 NOTE — PLAN OF CARE
Problem: Potential for Falls  Goal: Patient will remain free of falls  Description  INTERVENTIONS:  - Assess patient frequently for physical needs  -  Identify cognitive and physical deficits and behaviors that affect risk of falls  -  [place] fall precautions as indicated by assessment   - Educate patient/family on patient safety including physical limitations  - Instruct patient to call for assistance with activity based on assessment  - Modify environment to reduce risk of injury  - Consider OT/PT consult to assist with strengthening/mobility  Outcome: Progressing     Problem: Nutrition/Hydration-ADULT  Goal: Nutrient/Hydration intake appropriate for improving, restoring or maintaining nutritional needs  Description  Monitor and assess patient's nutrition/hydration status for malnutrition (ex- brittle hair, bruises, dry skin, pale skin and conjunctiva, muscle wasting, smooth red tongue, and disorientation)  Collaborate with interdisciplinary team and initiate plan and interventions as ordered  Monitor patient's weight and dietary intake as ordered or per policy  Utilize nutrition screening tool and intervene per policy  Determine patient's food preferences and provide high-protein, high-caloric foods as appropriate       INTERVENTIONS:  - Monitor oral intake, urinary output, labs, and treatment plans  - Assess nutrition and hydration status and recommend course of action  - Evaluate amount of meals eaten  - Assist patient with eating if necessary   - Allow adequate time for meals  - Recommend/ encourage appropriate diets, oral nutritional supplements, and vitamin/mineral supplements  - Order, calculate, and assess calorie counts as needed  - Recommend, monitor, and adjust tube feedings and TPN/PPN based on assessed needs  - Assess need for intravenous fluids  - Provide specific nutrition/hydration education as appropriate  - Include patient/family/caregiver in decisions related to nutrition  Outcome: Progressing     Problem: PAIN - ADULT  Goal: Verbalizes/displays adequate comfort level or baseline comfort level  Description  Interventions:  - Encourage patient to monitor pain and request assistance  - Assess pain using appropriate pain scale  - Administer analgesics based on type and severity of pain and evaluate response  - Implement non-pharmacological measures as appropriate and evaluate response  - Consider cultural and social influences on pain and pain management  - Notify physician/advanced practitioner if interventions unsuccessful or patient reports new pain  Outcome: Progressing     Problem: INFECTION - ADULT  Goal: Absence or prevention of progression during hospitalization  Description  INTERVENTIONS:  - Assess and monitor for signs and symptoms of infection  - Monitor lab/diagnostic results  - Monitor all insertion sites, i e  indwelling lines, tubes, and drains  - Monitor endotracheal (as able) and nasal secretions for changes in amount and color  - [place] appropriate cooling/warming therapies per order  - Administer medications as ordered  - Instruct and encourage patient and family to use good hand hygiene technique  - Identify and instruct in appropriate isolation precautions for identified infection/condition  Outcome: Progressing     Problem: SAFETY ADULT  Goal: Maintain or return to baseline ADL function  Description  INTERVENTIONS:  -  Assess patient's ability to carry out ADLs; assess patient's baseline for ADL function and identify physical deficits which impact ability to perform ADLs (bathing, care of mouth/teeth, toileting, grooming, dressing, etc )  - Assess/evaluate cause of self-care deficits   - Assess range of motion  - Assess patient's mobility; develop plan if impaired  - Assess patient's need for assistive devices and provide as appropriate  - Encourage maximum independence but intervene and supervise when necessary  ¯ Involve family in performance of ADLs  ¯ Assess for home care needs following discharge   ¯ Request OT consult to assist with ADL evaluation and planning for discharge  ¯ Provide patient education as appropriate  Outcome: Progressing  Goal: Maintain or return mobility status to optimal level  Description  INTERVENTIONS:  - Assess patient's baseline mobility status (ambulation, transfers, stairs, etc )    - Identify cognitive and physical deficits and behaviors that affect mobility  - Identify mobility aids required to assist with transfers and/or ambulation (gait belt, sit-to-stand, lift, walker, cane, etc )  - [place] fall precautions as indicated by assessment  - Record patient progress and toleration of activity level on Mobility SBAR; progress patient to next Phase/Stage  - Instruct patient to call for assistance with activity based on assessment  - Request Rehabilitation consult to assist with strengthening/weightbearing, etc   Outcome: Progressing     Problem: DISCHARGE PLANNING  Goal: Discharge to home or other facility with appropriate resources  Description  INTERVENTIONS:  - Identify barriers to discharge w/patient and caregiver  - Arrange for needed discharge resources and transportation as appropriate  - Identify discharge learning needs (meds, wound care, etc )  - Arrange for interpretive services to assist at discharge as needed  - Refer to Case Management Department for coordinating discharge planning if the patient needs post-hospital services based on physician/advanced practitioner order or complex needs related to functional status, cognitive ability, or social support system  Outcome: Progressing     Problem: Knowledge Deficit  Goal: Patient/family/caregiver demonstrates understanding of disease process, treatment plan, medications, and discharge instructions  Description  Complete learning assessment and assess knowledge base    Interventions:  - Provide teaching at level of understanding  - Provide teaching via preferred learning methods  Outcome: Progressing

## 2019-05-22 NOTE — DISCHARGE INSTRUCTIONS

## 2019-05-22 NOTE — PROGRESS NOTES
SOD - Internal Medicine Progress Note  Unit/Bed#: CW3 336-01 Encounter: [id number]  SOD Team A      Miguel Schwarz 76 y o  female [id number]  Hospital Stay Days: 1    Assessment and Plan      Current Problem List   Principal Problem:    Portal vein thrombosis  Active Problems:    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 ([facility] [address] )    Hepatocellular carcinoma (HCC)    Hypertension    Pain syndrome, chronic    Abdominal pain    Secondary esophageal varices without bleeding (HCC)    Assessment/Plan:    Corrinne Spikes is a 77 y/o female with PMHx of Hepatocellular carcinoma s/p resection and ablation in 2015,  chronic portal vein thrombosis s/p SIRS sphere radioembolization procedure in December 2017 and on eliquis,  Hep C-Cirrhosis , and HTN        1) Chronic Portal vein thrombosis with extension of thrombosis  -anticoagulated on eliquis since 2017   -In march of this year the patient found to have esophageal varices with bleeding stigmata after being found to be profoundly anemic with a hemoglobin of 4 3  She was started on nadolol 20mg daily  It was decided not to resume the patient's anticoagulation with eliquis at that time  -patient denies hematemesis, hematochezia, melena         Plan: Risk of repeat variceal bleeding is high in this patient with recent life threatening variceal bleeding  Will not start antiocoagulation at this time  Will refer patient to contact her surgical oncologist for followup to review change in CT findings with extension of thrombus,  as well as following up with DARRYN BENZ  [facility] clinic  She already has followup scheduled with Gastroenterology  Discussed case with GI   She is stable and asymptomatic, ok for discharge with close followup           2) Decompensated cirrhosis  -MELD score of 10, with ascites on exam   -no asterixis on exam, alert and oriented x3, ammonia of 27    -albumin 2 0, total bilirubin 1 68,  INR 1 15,  Platelets 011     Plan: continue home aldactone and lasix             3) Abdominal pain associated with nausea and vomiting  -patient reports trying new protein shake yesterday, with subsequent development of abdominal pain several hours later  She also reports more than 6 episodes of NBNB emesis  -one episode of diarrhea this morning    -bedside ultrasound showed distended gallbladder, without stones or CBD dilation  Negative clark sign on physical exam  Likely seconday to chronic portal vein thrombosis causing portal cholangiopathy    -elevated liver enzymes      Plan: likely gastroenteritis, has fully resolved, patient is tolerating oral intake eating all her meals  She denies any nausea or vomiting  She denies any abdominal pain since yesterday  Will discharge and follow up with [facility]          4) Hepatocellular carcinoma  -treated by Dr Jackie Flaherty from surgical oncology  S/p resection and ablation in 2015 for [address]   S/p Sirs sphere radio embolization in December 2017 on eliquis until recent hospitalization for bleeding esophageal varices         5) elevated liver enzymes  Elevated alk phos to 308, , ALT 39  -CT of the abdomen shows cirrhotic liver, hypoattenuating lesion with capsular retraction along the right posterior dome of the liver is unchanged in appearance and corresponds to previously treated [address]  Chronic portal venous thrombosis with associated cavernous transformation, with likely thrombi throughout portal venous parenchymal branches  Plan: Will check cmp prior to [address] [place] [address] followup within 1-2 weeks   Patient informed to followup with Dr Jackie Flaherty her oncologist as well           6) Iron deficiency anemia and anemia of chronic disease  -normocytic MCV of 86, increased RDW to 19 2 September 2018 iron panel showed : iron 28, TIBC 322, ferritin 428  -patient did not tolerate PO iron therapy in the past   -hemoglobin of 9 8 this AM which is within the patients baseline of 9-10          7) Chronic back pain  -Patient follows with pain management  She is prescribed Percocet 10/325 mg Q 6 p r n      Plan: continue home dose percocet        8) Hypotension  -patient asymptomatic with SBP   She has a history of chronically low BP, as expected in a cirrhotic  Plan: will give low volume bolus of albumin if BP drops below 70 systolic and symptomatic         PT/OT recommendation: Not consulted  Disposition: Patient is clear for discharge today, will not start anticoagulation  Patient will follow up with [address] [place] [address] for TCM, she has GI appointment in  and follow up with pain management as well  Subjective     Patient was seen and examined at bedside this morning  She reports resolution of her abdominal pain  She also denies nausea and vomiting  She does have a recent history of poor appetite, but reports a good appetite today, eating breakfast, lunch, and dinner, finishing the majority of each meal  She reports one bowel movement today, soft but formed  She feels well overall and is eager to get home  Objective     Vitals: Temp (24hrs), Av 6 °F (36 4 °C), Min:97 5 °F (36 4 °C), Max:97 7 °F (36 5 °C)  Current: Temperature: 97 7 °F (36 5 °C)  Patient Vitals for the past 24 hrs:   BP Temp Pulse Resp SpO2 Weight   19 0653 94/61 97 7 °F (36 5 °C) 64 18 -- --   19 0537 -- -- -- -- -- 63 2 kg (139 lb 5 3 oz)   19 0212 97/60 -- 55 -- 97 % --   19 2342 (!) 80/46 -- -- -- -- --   19 2340 (!) 74/44 -- 56 16 98 % --   19 1948 90/52 97 5 °F (36 4 °C) 59 18 100 % --    Body mass index is 23 92 kg/m²  Physical Exam:  GENERAL: NAD, alert and oriented  HEENT:  NC/AT, PERRL, EOMI, no scleral icterus  CARDIAC:  RRR, +S1/S2, no S3/S4 heard, no m/g/r  PULMONARY:  CTA B/L, no wheezing/rales/rhonci, non-labored breathing  ABDOMEN:  Distended abdomen, but soft and without fluid wave, no pain to palpation of the abdomen  Positive bowel sounds  Extremities:   Trace edema, cyanosis, or clubbing  NEUROLOGIC: Grossly intact, no asterixis  SKIN:  No rashes or erythema noted on exposed skin  Psych: Normal affect    Invasive Devices     None                     Labs:   Results from last 7 days   Lab Units 05/22/19  0536 05/21/19  1511   WBC Thousand/uL 11 00* 15 47*   HEMOGLOBIN g/dL 9 8* 9 6*   HEMATOCRIT % 30 6* 29 5*   PLATELETS Thousands/uL 274 266   NEUTROS PCT % 77* 85*   MONOS PCT % 12 7    Results from last 7 days   Lab Units 05/22/19  0536 05/21/19  1511 05/16/19  1054   SODIUM mmol/L 132* 131* 133*   POTASSIUM mmol/L 3 3* 3 5 3 4*   CHLORIDE mmol/L 96* 95* 95*   CO2 mmol/L 29 27 31   BUN mg/dL 12 10 9   CREATININE mg/dL 0 81 0 80 1 04   CALCIUM mg/dL 8 5 8 8 9 1   ALK PHOS U/L 308* 329*  --    ALT U/L 39 32  --    AST U/L 324* 280*  --    MAGNESIUM mg/dL  --   --  1 4*   INR   --  1 15  --    PTT seconds  --  36  --    EGFR ml/min/1 73sq m 86 88 64     Results from last 7 days   Lab Units 05/21/19  1511   INR  1 15   PTT seconds 36             No results found for: PHART, SYL7DIP, PO2ART, QQK5GUC, B6CHQBJN, BEART, SOURCE  No components found for: HIV1X2  No results found for: HAV, HEPAIGM, HEPBIGM, HEPBCAB, HBEAG, HEPCAB  No results found for: SPEP, UPEP Lab Results   Component Value Date    HGBA1C 5 4 03/09/2019    HGBA1C 5 3 03/01/2019    HGBA1C 5 5 09/17/2018     Lab Results   Component Value Date    CHOL 148 07/23/2014    Lab Results   Component Value Date    HDL 44 07/23/2014    Lab Results   Component Value Date    LDLCALC 89 07/23/2014    Lab Results   Component Value Date    TRIG 74 07/23/2014     No components found for: PROCAL      Micro:      Urinalysis:  No results found for: AMPHETUR, BDZUR, COCAINEUR, OPIATEUR, PCPUR, THCUR, ETOH, ACTMNPHEN, SALICYLATE       Invalid input(s): URIBILINOGEN        Intake and Outputs:  I/O       05/21 0701 - 05/22 0700 05/22 0701 - 05/23 0700    P  O  240 420    Total Intake(mL/kg) 240 (3 8) 420 (6 6)    Net +240 +420          Unmeasured Urine Occurrence 1 x 1 x Nutrition:  Diet Cardiovascular; Sodium 2 GM; Fluid Restriction 2000 ML, Sodium 2 GM  Radiology Results:   CT abdomen pelvis with contrast   Final Result by Maria Alejandra Petersen MD (05/21 1718)         1  Cirrhotic liver with stable treated lesion within right posterior hepatic dome  Chronic portal venous thrombosis with cavernous transformation again seen  There are prominent branching hypoattenuating foci throughout the liver, most likely    representing extension of thrombus into segmental branches  2   Extensive valeriano hepatis and peripancreatic lymphadenopathy with increase in valeriano hepatis lymphadenopathy  3   Small volume ascites              Workstation performed: [id number]           Scheduled Medications:    enoxaparin 40 mg Daily   furosemide 20 mg TID (diuretic)   gabapentin 300 mg Daily   gabapentin 900 mg HS   lidocaine 1 patch Daily   nadolol 20 mg Daily   nicotine 21 mg Q24H   pantoprazole 40 mg Daily With Breakfast   senna-docusate sodium 1 tablet HS   spironolactone 150 mg Once     PRN MEDS:    ondansetron 4 mg Q6H PRN   oxyCODONE-acetaminophen 2 tablet Q6H PRN     Last 24 Hour Meds: :   Medication Administration - last 24 hours from 05/21/2019 1918 to 05/22/2019 1918       Date/Time Order Dose Route Action Action by     05/22/2019 0850 enoxaparin (LOVENOX) subcutaneous injection 40 mg 40 mg Subcutaneous Given Tiffany Childs RN     05/22/2019 1234 furosemide (LASIX) tablet 20 mg 20 mg Oral Not Given Tiffany Childs RN     05/22/2019 0523 furosemide (LASIX) tablet 20 mg 20 mg Oral Not Given Ashanti Gandara RN     05/22/2019 0849 gabapentin (NEURONTIN) capsule 300 mg 300 mg Oral Given Tiffany Childs RN     05/21/2019 2146 gabapentin (NEURONTIN) capsule 900 mg 900 mg Oral Given Ashanti Gandara RN     05/22/2019 0850 nadolol (CORGARD) tablet 20 mg 20 mg Oral Not Given Tiffany Childs RN     05/22/2019 0849 pantoprazole (PROTONIX) EC tablet 40 mg 40 mg Oral Given Tiffany Childs RN     05/21/2019 2039 spironolactone (ALDACTONE) tablet 150 mg 150 mg Oral Not Given Gustavo Wilkinson, ANA     05/22/2019 1551 oxyCODONE-acetaminophen (PERCOCET) 5-325 mg per tablet 2 tablet 2 tablet Oral Given Shasta [facility] RN     05/22/2019 0612 oxyCODONE-acetaminophen (PERCOCET) 5-325 mg per tablet 2 tablet 2 tablet Oral Given Gustavo Wilkinson, ANA     05/21/2019 2036 oxyCODONE-acetaminophen (PERCOCET) 5-325 mg per tablet 2 tablet 2 tablet Oral Given [facility] Jaja, RN     05/21/2019 2149 senna-docusate sodium (SENOKOT S) 8 6-50 mg per tablet 1 tablet 1 tablet Oral Not Given Gustavo Wilkinson, RN     05/21/2019 2146 nicotine (NICODERM CQ) 21 mg/24 hr TD 24 hr patch 21 mg 21 mg Transdermal Medication Applied Gustavo Wilkinson, ANA     05/22/2019 0849 potassium chloride (K-DUR,KLOR-CON) CR tablet 20 mEq 20 mEq Oral Given Shasta [facility] RN     05/22/2019 1229 lidocaine (LIDODERM) 5 % patch 1 patch 1 patch Topical Medication Applied Shasta [facility] RN        VTE Pharmacologic Prophylaxis: Enoxaparin (Lovenox)  VTE Mechanical Prophylaxis: sequential compression device  Rob Light DO    PLEASE NOTE:  This encounter was completed utilizing the [employer] Direct Speech Voice Recognition Software  Grammatical errors, random word insertions, pronoun errors and incomplete sentences are occasional consequences of the system due to software limitations, ambient noise and hardware issues  These may be missed by proof reading prior to affixing electronic signature  Any questions or concerns about the content, text or information contained within the body of this dictation should be directly addressed to the physician for clarification  Please do not hesitate to call me directly if you have any any questions or concerns

## 2019-05-23 ENCOUNTER — HOSPITAL ENCOUNTER (INPATIENT)
Facility: HOSPITAL | Age: 69
LOS: 6 days | Discharge: HOME | DRG: 391 | End: 2019-05-31
Attending: EMERGENCY MEDICINE | Admitting: INTERNAL MEDICINE
Payer: COMMERCIAL

## 2019-05-23 ENCOUNTER — TRANSITIONAL CARE MANAGEMENT (OUTPATIENT)
Dept: INTERNAL MEDICINE CLINIC | Facility: CLINIC | Age: 69
End: 2019-05-23

## 2019-05-23 DIAGNOSIS — K81.9 ACALCULOUS CHOLECYSTITIS: ICD-10-CM

## 2019-05-23 DIAGNOSIS — Z87.19 HISTORY OF CIRRHOSIS: ICD-10-CM

## 2019-05-23 DIAGNOSIS — I81 PORTAL VEIN THROMBOSIS: ICD-10-CM

## 2019-05-23 DIAGNOSIS — R10.9 ABDOMINAL PAIN: Primary | ICD-10-CM

## 2019-05-23 DIAGNOSIS — C22.0 HEPATOCELLULAR CARCINOMA (HCC): ICD-10-CM

## 2019-05-23 DIAGNOSIS — K74.60 HEPATIC CIRRHOSIS DUE TO CHRONIC HEPATITIS C INFECTION (HCC): ICD-10-CM

## 2019-05-23 DIAGNOSIS — B18.2 HEPATIC CIRRHOSIS DUE TO CHRONIC HEPATITIS C INFECTION (HCC): ICD-10-CM

## 2019-05-23 LAB
ALBUMIN SERPL BCP-MCNC: 2 G/DL (ref 3.5–5)
ALP SERPL-CCNC: 331 U/L (ref 46–116)
ALT SERPL W P-5'-P-CCNC: 26 U/L (ref 12–78)
AMMONIA PLAS-SCNC: 28 UMOL/L (ref 11–35)
ANION GAP SERPL CALCULATED.3IONS-SCNC: 7 MMOL/L (ref 4–13)
AST SERPL W P-5'-P-CCNC: 182 U/L (ref 5–45)
BASOPHILS # BLD AUTO: 0.03 THOUSANDS/ΜL (ref 0–0.1)
BASOPHILS NFR BLD AUTO: 0 % (ref 0–1)
BILIRUB SERPL-MCNC: 1.69 MG/DL (ref 0.2–1)
BUN SERPL-MCNC: 9 MG/DL (ref 5–25)
CALCIUM SERPL-MCNC: 8.4 MG/DL (ref 8.3–10.1)
CHLORIDE SERPL-SCNC: 96 MMOL/L (ref 100–108)
CO2 SERPL-SCNC: 26 MMOL/L (ref 21–32)
CREAT SERPL-MCNC: 0.84 MG/DL (ref 0.6–1.3)
EOSINOPHIL # BLD AUTO: 0 THOUSAND/ΜL (ref 0–0.61)
EOSINOPHIL NFR BLD AUTO: 0 % (ref 0–6)
ERYTHROCYTE [DISTWIDTH] IN BLOOD BY AUTOMATED COUNT: 18.6 % (ref 11.6–15.1)
GFR SERPL CREATININE-BSD FRML MDRD: 83 ML/MIN/1.73SQ M
GLUCOSE SERPL-MCNC: 143 MG/DL (ref 65–140)
HCT VFR BLD AUTO: 30.2 % (ref 34.8–46.1)
HGB BLD-MCNC: 9.8 G/DL (ref 11.5–15.4)
IMM GRANULOCYTES # BLD AUTO: 0.2 THOUSAND/UL (ref 0–0.2)
IMM GRANULOCYTES NFR BLD AUTO: 1 % (ref 0–2)
LIPASE SERPL-CCNC: 112 U/L (ref 73–393)
LYMPHOCYTES # BLD AUTO: 1.27 THOUSANDS/ΜL (ref 0.6–4.47)
LYMPHOCYTES NFR BLD AUTO: 7 % (ref 14–44)
MCH RBC QN AUTO: 28.1 PG (ref 26.8–34.3)
MCHC RBC AUTO-ENTMCNC: 32.5 G/DL (ref 31.4–37.4)
MCV RBC AUTO: 87 FL (ref 82–98)
MONOCYTES # BLD AUTO: 1.54 THOUSAND/ΜL (ref 0.17–1.22)
MONOCYTES NFR BLD AUTO: 9 % (ref 4–12)
NEUTROPHILS # BLD AUTO: 14.96 THOUSANDS/ΜL (ref 1.85–7.62)
NEUTS SEG NFR BLD AUTO: 83 % (ref 43–75)
NRBC BLD AUTO-RTO: 0 /100 WBCS
PLATELET # BLD AUTO: 340 THOUSANDS/UL (ref 149–390)
PMV BLD AUTO: 9.6 FL (ref 8.9–12.7)
POTASSIUM SERPL-SCNC: 3.6 MMOL/L (ref 3.5–5.3)
PROT SERPL-MCNC: 7.3 G/DL (ref 6.4–8.2)
RBC # BLD AUTO: 3.49 MILLION/UL (ref 3.81–5.12)
SODIUM SERPL-SCNC: 129 MMOL/L (ref 136–145)
WBC # BLD AUTO: 18 THOUSAND/UL (ref 4.31–10.16)

## 2019-05-23 PROCEDURE — 96361 HYDRATE IV INFUSION ADD-ON: CPT

## 2019-05-23 PROCEDURE — 96375 TX/PRO/DX INJ NEW DRUG ADDON: CPT

## 2019-05-23 PROCEDURE — 80053 COMPREHEN METABOLIC PANEL: CPT | Performed by: EMERGENCY MEDICINE

## 2019-05-23 PROCEDURE — 99285 EMERGENCY DEPT VISIT HI MDM: CPT

## 2019-05-23 PROCEDURE — 83690 ASSAY OF LIPASE: CPT | Performed by: EMERGENCY MEDICINE

## 2019-05-23 PROCEDURE — 99285 EMERGENCY DEPT VISIT HI MDM: CPT | Performed by: EMERGENCY MEDICINE

## 2019-05-23 PROCEDURE — 82140 ASSAY OF AMMONIA: CPT | Performed by: EMERGENCY MEDICINE

## 2019-05-23 PROCEDURE — 85025 COMPLETE CBC W/AUTO DIFF WBC: CPT | Performed by: EMERGENCY MEDICINE

## 2019-05-23 PROCEDURE — 36415 COLL VENOUS BLD VENIPUNCTURE: CPT | Performed by: EMERGENCY MEDICINE

## 2019-05-23 PROCEDURE — 96374 THER/PROPH/DIAG INJ IV PUSH: CPT

## 2019-05-23 RX ORDER — GABAPENTIN 300 MG/1
300 CAPSULE ORAL EVERY MORNING
Status: DISCONTINUED | OUTPATIENT
Start: 2019-05-24 | End: 2019-05-31 | Stop reason: HOSPADM

## 2019-05-23 RX ORDER — ONDANSETRON 2 MG/ML
4 INJECTION INTRAMUSCULAR; INTRAVENOUS ONCE
Status: COMPLETED | OUTPATIENT
Start: 2019-05-23 | End: 2019-05-23

## 2019-05-23 RX ORDER — NICOTINE 21 MG/24HR
1 PATCH, TRANSDERMAL 24 HOURS TRANSDERMAL EVERY 24 HOURS
Status: DISCONTINUED | OUTPATIENT
Start: 2019-05-23 | End: 2019-05-31 | Stop reason: HOSPADM

## 2019-05-23 RX ORDER — POTASSIUM CHLORIDE 20MEQ/15ML
20 LIQUID (ML) ORAL 2 TIMES DAILY
Status: DISCONTINUED | OUTPATIENT
Start: 2019-05-23 | End: 2019-05-23

## 2019-05-23 RX ORDER — POLYETHYLENE GLYCOL 3350 17 G/17G
17 POWDER, FOR SOLUTION ORAL DAILY PRN
Status: DISCONTINUED | OUTPATIENT
Start: 2019-05-23 | End: 2019-05-31 | Stop reason: HOSPADM

## 2019-05-23 RX ORDER — ACETAMINOPHEN 325 MG/1
650 TABLET ORAL EVERY 6 HOURS PRN
Status: DISCONTINUED | OUTPATIENT
Start: 2019-05-23 | End: 2019-05-25

## 2019-05-23 RX ORDER — SENNOSIDES 8.6 MG
2 TABLET ORAL DAILY
Status: DISCONTINUED | OUTPATIENT
Start: 2019-05-24 | End: 2019-05-26

## 2019-05-23 RX ORDER — NICOTINE 21 MG/24HR
1 PATCH, TRANSDERMAL 24 HOURS TRANSDERMAL DAILY
Status: DISCONTINUED | OUTPATIENT
Start: 2019-05-24 | End: 2019-05-23

## 2019-05-23 RX ORDER — HYDROMORPHONE HCL/PF 1 MG/ML
0.5 SYRINGE (ML) INJECTION ONCE
Status: COMPLETED | OUTPATIENT
Start: 2019-05-23 | End: 2019-05-23

## 2019-05-23 RX ORDER — NADOLOL 20 MG/1
20 TABLET ORAL DAILY
Status: DISCONTINUED | OUTPATIENT
Start: 2019-05-24 | End: 2019-05-24

## 2019-05-23 RX ORDER — POTASSIUM CHLORIDE 20 MEQ/1
20 TABLET, EXTENDED RELEASE ORAL 2 TIMES DAILY
Status: DISCONTINUED | OUTPATIENT
Start: 2019-05-23 | End: 2019-05-31 | Stop reason: HOSPADM

## 2019-05-23 RX ORDER — ONDANSETRON 2 MG/ML
4 INJECTION INTRAMUSCULAR; INTRAVENOUS EVERY 6 HOURS PRN
Status: DISCONTINUED | OUTPATIENT
Start: 2019-05-23 | End: 2019-05-31 | Stop reason: HOSPADM

## 2019-05-23 RX ORDER — SIMETHICONE 80 MG
80 TABLET,CHEWABLE ORAL 4 TIMES DAILY PRN
Status: DISCONTINUED | OUTPATIENT
Start: 2019-05-23 | End: 2019-05-27

## 2019-05-23 RX ORDER — GABAPENTIN 300 MG/1
900 CAPSULE ORAL
Status: DISCONTINUED | OUTPATIENT
Start: 2019-05-23 | End: 2019-05-31 | Stop reason: HOSPADM

## 2019-05-23 RX ORDER — OXYCODONE HYDROCHLORIDE AND ACETAMINOPHEN 5; 325 MG/1; MG/1
2 TABLET ORAL EVERY 6 HOURS PRN
Status: DISCONTINUED | OUTPATIENT
Start: 2019-05-23 | End: 2019-05-25

## 2019-05-23 RX ORDER — PANTOPRAZOLE SODIUM 40 MG/1
40 TABLET, DELAYED RELEASE ORAL
Status: DISCONTINUED | OUTPATIENT
Start: 2019-05-24 | End: 2019-05-25

## 2019-05-23 RX ADMIN — ONDANSETRON 4 MG: 2 INJECTION INTRAMUSCULAR; INTRAVENOUS at 17:04

## 2019-05-23 RX ADMIN — GABAPENTIN 900 MG: 300 CAPSULE ORAL at 21:59

## 2019-05-23 RX ADMIN — SODIUM CHLORIDE 1000 ML: 0.9 INJECTION, SOLUTION INTRAVENOUS at 17:03

## 2019-05-23 RX ADMIN — HYDROMORPHONE HYDROCHLORIDE 0.5 MG: 1 INJECTION, SOLUTION INTRAMUSCULAR; INTRAVENOUS; SUBCUTANEOUS at 17:03

## 2019-05-23 RX ADMIN — OXYCODONE HYDROCHLORIDE AND ACETAMINOPHEN 2 TABLET: 5; 325 TABLET ORAL at 20:45

## 2019-05-23 RX ADMIN — NICOTINE 1 PATCH: 21 PATCH, EXTENDED RELEASE TRANSDERMAL at 20:44

## 2019-05-23 RX ADMIN — POTASSIUM CHLORIDE 20 MEQ: 1500 TABLET, EXTENDED RELEASE ORAL at 21:58

## 2019-05-23 NOTE — ED PROVIDER NOTES
History  Chief Complaint   Patient presents with    Abdominal Pain     patient reports abd pain  States she was discharged yesterday from this hospital  States "i just can't eat"     HPI  76 y o  Female with PMH DM, HCV with cirrhosis, [address] , and recent admission from - presents to the ED with abdominal pain  Pt states she has been having intermittent abdominal pain, nausea, and decreased appetite for several weeks  She was admitted two days ago for expanding portal venous thrombus, but her abdominal pain was attributed to gastroenteritis and when this improved yesterday she was discharged  Pt reports she did fine overnight, but this morning the pain returned and she was not able to eat or drink anything all day  She denies fever, chills, vomiting, diarrhea, chest pain, shortness of breath  Pt did not try any of her pain medications at home  She reports she called her PCP's office to schedule a follow up appointment today, and when she told them she was still having pain they recommended she return to the ED  Prior to Admission Medications   Prescriptions Last Dose Informant Patient Reported? Taking?   furosemide (LASIX) 20 mg tablet Unknown at Unknown time  No No   Sig: Take 3 tablets (60 mg total) by mouth daily   gabapentin (NEURONTIN) 300 mg capsule Unknown at Unknown time Self No No   Si tab in the am and 3 tabs PO night time   nadolol (CORGARD) 20 mg tablet Unknown at Unknown time  No No   Sig: Take 1 tablet (20 mg total) by mouth daily   nicotine (NICODERM CQ) 21 mg/24 hr TD 24 hr patch Unknown at Unknown time Self No No   Sig: Place 1 patch on the skin daily   Patient not taking: Reported on 2019   oxyCODONE-acetaminophen (PERCOCET)  mg per tablet Unknown at Unknown time  No No   Sig: Take 1 tab PO Q 6 hours prn pain   2nd month script Do not fill until 19   pantoprazole (PROTONIX) 40 mg tablet Unknown at Unknown time Self No No   Sig: Take 1 tablet (40 mg total) by mouth daily with breakfast   polyethylene glycol (MIRALAX) 17 g packet Unknown at Unknown time Self No No   Sig: Take 17 g by mouth daily as needed (Constipation)   potassium chloride (MICRO-K) 10 MEQ CR capsule   No No   Sig: Take 2 capsules (20 mEq total) by mouth 2 (two) times a day for 7 days   senna (SENOKOT) 8 6 MG tablet Unknown at Unknown time Self Yes No   Sig: Take 2 tablets by mouth daily   spironolactone (ALDACTONE) 50 mg tablet Unknown at Unknown time  No No   Sig: Take 3 tablets (150 mg total) by mouth daily      Facility-Administered Medications: None       Past Medical History:   Diagnosis Date    Anemia     Anxiety     Cancer (HCC)     Chronic pain disorder     herniated disc    [facility]     Constipation     Current use of long term anticoagulation     eliquis    Diabetes mellitus ([facility] )     blood sugar 113 at 1225 2/18/19    Drug dependence ([facility] )     GI bleed     [facility], [facility] 7/22/2014    Hepatocellular carcinoma ([facility] )     Hypertension     Hypokalemia 3/7/2019    Hyponatremia     Liver disease     h/o hepatitis c    Melena     Portal vein thrombosis        Past Surgical History:   Procedure Laterality Date    DENTAL SURGERY      ESOPHAGOGASTRODUODENOSCOPY N/A 2/18/2019    Procedure: ESOPHAGOGASTRODUODENOSCOPY (EGD); Surgeon: Natasha Olvera MD;  Location: [facility]; Service: Gastroenterology    ESOPHAGOGASTRODUODENOSCOPY N/A 3/11/2019    Procedure: ESOPHAGOGASTRODUODENOSCOPY (EGD); Surgeon: Nicki Braga MD;  [facility]; Service: Gastroenterology    HYSTERECTOMY      IR PARACENTESIS  3/8/2019    LIVER BIOPSY      LIVER SURGERY      Ablation    TONSILLECTOMY         Family History   Problem Relation Age of Onset    Prostate cancer Father      I have reviewed and agree with the history as documented      Social History     Tobacco Use    Smoking status: Current Every Day Smoker     Packs/day: 1 00     Years: 50 00     Pack years: 50 00     Types: Cigarettes    Smokeless tobacco: Never Used   Substance Use Topics    Alcohol use: Not Currently    Drug use: No        Review of Systems   Constitutional: Positive for appetite change  Negative for chills and fever  HENT: Negative for congestion, rhinorrhea and sore throat  Respiratory: Negative for chest tightness and shortness of breath  Cardiovascular: Negative for chest pain  Gastrointestinal: Positive for abdominal pain and nausea  Negative for diarrhea and vomiting  Genitourinary: Negative for dysuria and hematuria  Musculoskeletal: Negative for arthralgias and myalgias  Skin: Negative for rash  Neurological: Negative for dizziness, syncope, weakness, light-headedness, numbness and headaches  All other systems reviewed and are negative  Physical Exam  ED Triage Vitals [05/23/19 1556]   Temperature Pulse Respirations Blood Pressure SpO2   98 5 °F (36 9 °C) 78 18 93/54 96 %      Temp Source Heart Rate Source Patient Position - Orthostatic VS BP Location FiO2 (%)   Oral Monitor Sitting Right arm --      Pain Score       6             Orthostatic Vital Signs  Vitals:    05/23/19 1556 05/23/19 1910 05/23/19 2316   BP: 93/54 104/58 107/54   Pulse: 78 68 70   Patient Position - Orthostatic VS: Sitting Sitting Lying       Physical Exam   Constitutional: She is oriented to person, place, and time  She appears well-developed and well-nourished  No distress  HENT:   Head: Normocephalic and atraumatic  Right Ear: External ear normal    Left Ear: External ear normal    Nose: Nose normal    Eyes: Pupils are equal, round, and reactive to light  Conjunctivae and EOM are normal    Neck: Normal range of motion  Neck supple  Cardiovascular: Normal rate, regular rhythm, normal heart sounds and intact distal pulses  Exam reveals no gallop and no friction rub  No murmur heard  Pulmonary/Chest: Effort normal and breath sounds normal  No respiratory distress  She has no wheezes   She has no rhonchi  She has no rales  Abdominal: Soft  Bowel sounds are normal  She exhibits no distension and no mass  There is generalized tenderness and tenderness in the right upper quadrant and epigastric area  There is no rebound and no guarding  Musculoskeletal: Normal range of motion  Lymphadenopathy:     She has no cervical adenopathy  Neurological: She is alert and oriented to person, place, and time  She has normal strength  No cranial nerve deficit or sensory deficit  Skin: Skin is warm and dry  She is not diaphoretic  Psychiatric: She has a normal mood and affect  Nursing note and vitals reviewed        ED Medications  Medications   furosemide (LASIX) tablet 60 mg (has no administration in time range)   nadolol (CORGARD) tablet 20 mg (has no administration in time range)   pantoprazole (PROTONIX) EC tablet 40 mg (has no administration in time range)   senna (SENOKOT) tablet 17 2 mg (has no administration in time range)   spironolactone (ALDACTONE) tablet 150 mg (has no administration in time range)   polyethylene glycol (MIRALAX) packet 17 g (has no administration in time range)   gabapentin (NEURONTIN) capsule 300 mg (has no administration in time range)   oxyCODONE-acetaminophen (PERCOCET) 5-325 mg per tablet 2 tablet (2 tablets Oral Given 5/23/19 2045)   ondansetron (ZOFRAN) injection 4 mg (has no administration in time range)   simethicone (MYLICON) chewable tablet 80 mg (has no administration in time range)   acetaminophen (TYLENOL) tablet 650 mg (has no administration in time range)   gabapentin (NEURONTIN) capsule 900 mg (900 mg Oral Given 5/23/19 2159)   nicotine (NICODERM CQ) 21 mg/24 hr TD 24 hr patch 1 patch (1 patch Transdermal Medication Applied 5/23/19 2044)   potassium chloride (K-DUR,KLOR-CON) CR tablet 20 mEq (20 mEq Oral Given 5/23/19 2158)   sodium chloride 0 9 % bolus 1,000 mL (0 mL Intravenous Stopped 5/23/19 1746)   ondansetron (ZOFRAN) injection 4 mg (4 mg Intravenous Given 5/23/19 1704)   HYDROmorphone (DILAUDID) injection 0 5 mg (0 5 mg Intravenous Given 5/23/19 1703)       Diagnostic Studies  Results Reviewed     Procedure Component Value Units Date/Time    Comprehensive metabolic panel [[id number]]  (Abnormal) Collected:  05/23/19 1654    Lab Status:  Final result Specimen:  Blood from Arm, Right Updated:  05/23/19 1732     Sodium 129 mmol/L      Potassium 3 6 mmol/L      Chloride 96 mmol/L      CO2 26 mmol/L      ANION GAP 7 mmol/L      BUN 9 mg/dL      Creatinine 0 84 mg/dL      Glucose 143 mg/dL      Calcium 8 4 mg/dL       U/L      ALT 26 U/L      Alkaline Phosphatase 331 U/L      Total Protein 7 3 g/dL      Albumin 2 0 g/dL      Total Bilirubin 1 69 mg/dL      eGFR 83 ml/min/1 73sq m     Narrative:       [place] guidelines for Chronic Kidney Disease (CKD):     Stage 1 with normal or high GFR (GFR > 90 mL/min/1 73 square meters)    Stage 2 Mild CKD (GFR = 60-89 mL/min/1 73 square meters)    Stage 3A Moderate CKD (GFR = 45-59 mL/min/1 73 square meters)    Stage 3B Moderate CKD (GFR = 30-44 mL/min/1 73 square meters)    Stage 4 Severe CKD (GFR = 15-29 mL/min/1 73 square meters)    Stage 5 End Stage CKD (GFR <15 mL/min/1 73 square meters)  Note: GFR calculation is accurate only with a steady state creatinine    Lipase [[id number]]  (Normal) Collected:  05/23/19 1654    Lab Status:  Final result Specimen:  Blood from Arm, Right Updated:  05/23/19 1727     Lipase 112 u/L     Ammonia [[id number]]  (Normal) Collected:  05/23/19 1654    Lab Status:  Final result Specimen:  Blood from Arm, Right Updated:  05/23/19 1727     Ammonia 28 umol/L     CBC and differential [[id number]]  (Abnormal) Collected:  05/23/19 1654    Lab Status:  Final result Specimen:  Blood from Arm, Right Updated:  05/23/19 1707     WBC 18 00 Thousand/uL      RBC 3 49 Million/uL      Hemoglobin 9 8 g/dL      Hematocrit 30 2 %      MCV 87 fL      MCH 28 1 pg      MCHC 32 5 g/dL RDW 18 6 %      MPV 9 6 fL      Platelets 785 Thousands/uL      nRBC 0 /100 WBCs      Neutrophils Relative 83 %      Immat GRANS % 1 %      Lymphocytes Relative 7 %      Monocytes Relative 9 %      Eosinophils Relative 0 %      Basophils Relative 0 %      Neutrophils Absolute 14 96 Thousands/µL      Immature Grans Absolute 0 20 Thousand/uL      Lymphocytes Absolute 1 27 Thousands/µL      Monocytes Absolute 1 54 Thousand/µL      Eosinophils Absolute 0 00 Thousand/µL      Basophils Absolute 0 03 Thousands/µL     POCT urinalysis dipstick [[id number]]     Lab Status:  No result                  No orders to display         Procedures  Procedures      Phone Consults  ED Phone Contact    ED Course  ED Course as of May 24 0043   Thu May 23, 2019   79[address] [facility] Concern for SBP or other infection  Pt is not febrile and abdomen is soft at this time, emergency paracentesis not indicated  Will admit for close monitoring and further work up  WBC(!): 18 00   1806 Admitted to SOD              Identification of Seniors at Risk      Most Recent Value   (ISAR) Identification of Seniors at Risk   Before the illness or injury that brought you to the Emergency, did you need someone to help you on a regular basis? 0 Filed at: 05/23/2019 1558   In the last 24 hours, have you needed more help than usual?  0 Filed at: 05/23/2019 1558   Have you been hospitalized for one or more nights during the past 6 months? 0 Filed at: 05/23/2019 1558   In general, do you see well?  0 Filed at: 05/23/2019 1558   In general, do you have serious problems with your memory? 0 Filed at: 05/23/2019 1558   Do you take more than three different medications every day?  0 Filed at: 05/23/2019 1558   ISAR Score  0 Filed at: 05/23/2019 1558                          MDM  Number of Diagnoses or Management Options  Abdominal pain:   History of cirrhosis:   Diagnosis management comments: 76 y o   Female with PMH DM, HCV with cirrhosis, [address] , and recent admission from 5/21-5/22 presents to the ED with abdominal pain  Will recheck CBC, CMP, Lipase, Ammonia, UA  If work up is negative will discharge to follow up with her PCP as planned  Disposition  Final diagnoses:   Abdominal pain   History of cirrhosis     Time reflects when diagnosis was documented in both MDM as applicable and the Disposition within this note     Time User Action Codes Description Comment    5/24/2019 12:36 AM Kizzy Justice Add [R10 84] Generalized abdominal pain     5/24/2019 12:36 AM Kizzy Justice Remove [R10 84] Generalized abdominal pain     5/24/2019 12:36 AM Kizzy Justice Add [R10 9] Abdominal pain     5/24/2019 12:37 AM Kizzy Justice Add [Z87 19] History of cirrhosis       ED Disposition     ED Disposition Condition Date/Time Comment    Admit Stable Thu May 23, 2019  6:07 PM Case was discussed with Dr Valeria Ash and the patient's admission status was agreed to be Admission Status: observation status to the service of Dr Yamila Chan    None         Current Discharge Medication List      CONTINUE these medications which have NOT CHANGED    Details   furosemide (LASIX) 20 mg tablet Take 3 tablets (60 mg total) by mouth daily  Qty: 180 tablet, Refills: 2    Associated Diagnoses: 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)      gabapentin (NEURONTIN) 300 mg capsule 1 tab in the am and 3 tabs PO night time  Qty: 120 capsule, Refills: 5    Associated Diagnoses: Lumbar disc herniation      nadolol (CORGARD) 20 mg tablet Take 1 tablet (20 mg total) by mouth daily  Qty: 30 tablet, Refills: 2    Associated Diagnoses: Decompensated hepatic cirrhosis (HCC)      nicotine (NICODERM CQ) 21 mg/24 hr TD 24 hr patch Place 1 patch on the skin daily  Qty: 28 patch, Refills: 0    Associated Diagnoses: Nicotine abuse      oxyCODONE-acetaminophen (PERCOCET)  mg per tablet Take 1 tab PO Q 6 hours prn pain   2nd month script Do not fill until 6/21/19  Qty: 120 tablet, Refills: 0    Associated Diagnoses: Chronic pain syndrome      pantoprazole (PROTONIX) 40 mg tablet Take 1 tablet (40 mg total) by mouth daily with breakfast  Qty: 30 tablet, Refills: 0    Associated Diagnoses: Generalized abdominal pain      polyethylene glycol (MIRALAX) 17 g packet Take 17 g by mouth daily as needed (Constipation)  Qty: 30 each, Refills: 3    Associated Diagnoses: Cirrhosis (HCC)      potassium chloride (MICRO-K) 10 MEQ CR capsule Take 2 capsules (20 mEq total) by mouth 2 (two) times a day for 7 days  Qty: 28 capsule, Refills: 0    Associated Diagnoses: Hypokalemia      senna (SENOKOT) 8 6 MG tablet Take 2 tablets by mouth daily      spironolactone (ALDACTONE) 50 mg tablet Take 3 tablets (150 mg total) by mouth daily  Qty: 90 tablet, Refills: 2    Associated Diagnoses: 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

## 2019-05-23 NOTE — ASSESSMENT & PLAN NOTE
Minimal to mild ascites on recent imaging  No indication for paracentesis at this time  Does have varices with history of varices bleed this year contraindicating anticoagulation    Started on lactulose and rifaximin due to mild confusion and concern for hepatic encephalopathy during this admission

## 2019-05-23 NOTE — ASSESSMENT & PLAN NOTE
Minimal to mild ascites on recent imaging  Following with GI  Outpatient EGD for monitoring of varices and esophageal candidiasis

## 2019-05-23 NOTE — H&P
INTERNAL MEDICINE HISTORY AND PHYSICAL  -01 SOD Team A    NAME: Jaskaran Lubin  AGE: 76 y o  SEX: female  : 1950   MRN: [id number]  ENCOUNTER: [id number]    DATE: 2019  TIME: 7:29 PM    Primary Care Physician: Yo Perez DO  Admitting Provider: Carla Schaeffer DO    Chief complaint:  Abdominal pain    History of Present Illness     Jaskaran Lubin is a 76 y o  female with a past medical history of hepatitis-C with cirrhosis, hepatocellular carcinoma status post SIRS treatment, portal vein thrombosis not on anticoagulation secondary to recent variceal bleed, presenting with recurrent diffuse crampy abdominal pain waxing and waning in nature  Patient was recently seen for similar symptoms after multiple episodes of vomiting, vomiting resolved and symptoms were attributed to gastroenteritis and she was feeling better however today pain and mild nausea recurred and has been on and off since  She is feeling better now status post Dilaudid given in the ED  She denies any recent bleeding of any nature, denies vomiting, denies diarrhea or constipation  Last bowel movement was today  She does endorse chronic anorexia but has been drinking fluids without issue  She reports medication compliance  She denies any other issues such as chest pain, shortness of breath, fever or chills, new weakness or fatigue  She is accompanied by her son at bedside who does inquire about the prognosis of the patient's disease  History was obtained from both son and patient      Review of Systems     Constitutional: No fever or chills  Eyes: No changes in vision   ENT: No rhinorrhea  Respiratory: No difficulty breathing, no recent cough  Cardiovascular: no chest pain, no edema  Abdomen: no vomiting, no diarrhea, no constipation  MSK: no trauma, no deformity   : no urinary complaints  Neuro: no altered mentation  Skin: no new skin changes   A complete 12-system review was performed and is negative except as otherwise mentioned in the HPI  Past Medical History     Past Medical History:   Diagnosis Date    Anemia     Anxiety     Cancer ([facility] )     Chronic pain disorder     herniated disc    Cirrhosis ([facility] )     Constipation     Current use of long term anticoagulation     eliquis    Diabetes mellitus ([place] [address] )     blood sugar 113 at 1225 2/18/19    Drug dependence ([place] [address] )     GI bleed     Hepatitis C, chronic ([place] [address] ) 7/22/2014    Hepatocellular carcinoma ([place] [address] )     Hypertension     Hypokalemia 3/7/2019    Hyponatremia     Liver disease     h/o hepatitis c    Melena     Portal vein thrombosis        Past Surgical History     Past Surgical History:   Procedure Laterality Date    DENTAL SURGERY      ESOPHAGOGASTRODUODENOSCOPY N/A 2/18/2019    Procedure: ESOPHAGOGASTRODUODENOSCOPY (EGD); Surgeon: Nkechi Galo MD;  [facility]; Service: Gastroenterology    ESOPHAGOGASTRODUODENOSCOPY N/A 3/11/2019    Procedure: ESOPHAGOGASTRODUODENOSCOPY (EGD); Surgeon: Criss Godoy MD;  [facility]; Service: Gastroenterology    HYSTERECTOMY      IR PARACENTESIS  3/8/2019    LIVER BIOPSY      LIVER SURGERY      Ablation    TONSILLECTOMY         Social History     Social History     Substance and Sexual Activity   Alcohol Use Not Currently     Social History     Substance and Sexual Activity   Drug Use No     Social History     Tobacco Use   Smoking Status Current Every Day Smoker    Packs/day: 1 00    Years: 50 00    Pack years: 50 00    Types: Cigarettes   Smokeless Tobacco Never Used       Family History     Family History   Problem Relation Age of Onset    Prostate cancer Father        Medications Prior to Admission     Prior to Admission medications    Medication Sig Start Date End Date Taking?  Authorizing Provider   furosemide (LASIX) 20 mg tablet Take 3 tablets (60 mg total) by mouth daily 4/22/19   Rohit Farrar PA-C   gabapentin (NEURONTIN) 300 mg capsule 1 tab in the am and 3 tabs PO night time 3/14/19   BRYANNA García   nadolol (CORGARD) 20 mg tablet Take 1 tablet (20 mg total) by mouth daily 4/22/19   Kathi Kam PA-C   nicotine (NICODERM CQ) 21 mg/24 hr TD 24 hr patch Place 1 patch on the skin daily  Patient not taking: Reported on 5/2/2019 3/12/19   Luanne Sy MD   oxyCODONE-acetaminophen (PERCOCET)  mg per tablet Take 1 tab PO Q 6 hours prn pain  2nd month script Do not fill until 6/21/19 5/13/19   BRYANNA García   pantoprazole (PROTONIX) 40 mg tablet Take 1 tablet (40 mg total) by mouth daily with breakfast 4/19/19   Rob Light,    polyethylene glycol (MIRALAX) 17 g packet Take 17 g by mouth daily as needed (Constipation) 3/21/19   Jyoti Little MD   potassium chloride (MICRO-K) 10 MEQ CR capsule Take 2 capsules (20 mEq total) by mouth 2 (two) times a day for 7 days 5/13/19 5/20/19  Hermon , DO   senna (SENOKOT) 8 6 MG tablet Take 2 tablets by mouth daily    Historical Provider, MD   spironolactone (ALDACTONE) 50 mg tablet Take 3 tablets (150 mg total) by mouth daily 4/22/19   Kathi Kam PA-C       Allergies   No Known Allergies    Objective     Vitals:    05/23/19 1556 05/23/19 1910   BP: 93/54 104/58   BP Location: Right arm Left arm   Pulse: 78 68   Resp: 18 18   Temp: 98 5 °F (36 9 °C) (!) 97 4 °F (36 3 °C)   TempSrc: Oral Axillary   SpO2: 96% 97%   Weight: 66 7 kg (147 lb) 66 7 kg (147 lb)   Height:  5' 3" (1 6 m)     Body mass index is 26 04 kg/m²  Intake/Output Summary (Last 24 hours) at 5/23/2019 1929  Last data filed at 5/23/2019 1746  Gross per 24 hour   Intake 1000 ml   Output    Net 1000 ml     Invasive Devices     Peripheral Intravenous Line            Peripheral IV 05/23/19 Right Antecubital less than 1 day                Physical Exam  GENERAL: Chronically ill-appearing middle-aged female who is comfortable, relaxed and conversant at the time of my exam  No acute distress  She is nontoxic appearing     HEENT: Normocephalic and atraumatic  Normal conjunctivae  No overt scleral icterus  Mucous membranes are moist   NECK: Neck supple  Trachea midline  No JVD  CARDIOVASCULAR: S1 and S2 are present  Regular rate and rhythm  No murmurs, rubs, or gallops  RESPIRATORY: CTA B/L, no crackles or wheezing  Normal respiratory effort  BREAST: Deferred  ABDOMINAL: Bowel sounds present and hypoactive, there is mild tenderness mid epigastrium to right upper quadrant with voluntary guarding however no peritoneal signs  The liver edge is palpable several cm below the costal margin  , otherwise the abdomen is soft, non-distended  EXTREMITIES:  No cyanosis, clubbing, +1 pitting edema bilateral lower extremities    /GYN: Deferred  RECTAL: Deferred  BACK:  No gross deformities  NEUROLOGIC: Patient is alert and oriented to person, place, and time  No sensory or motor deficits  Speech is clear but slow   SKIN: Skin is warm and dry  No petechiae or purpura  PSYCHIATRIC: Normal mood and relaxed demeanor     Lab Results: I have personally reviewed pertinent reports      CBC:   Results from last 7 days   Lab Units 05/23/19  1654   WBC Thousand/uL 18 00*   RBC Million/uL 3 49*   HEMOGLOBIN g/dL 9 8*   HEMATOCRIT % 30 2*   MCV fL 87   MCH pg 28 1   MCHC g/dL 32 5   RDW % 18 6*   MPV fL 9 6   PLATELETS Thousands/uL 340   NRBC AUTO /100 WBCs 0   NEUTROS PCT % 83*   LYMPHS PCT % 7*   MONOS PCT % 9   EOS PCT % 0   BASOS PCT % 0   NEUTROS ABS Thousands/µL 14 96*   LYMPHS ABS Thousands/µL 1 27   MONOS ABS Thousand/µL 1 54*   EOS ABS Thousand/µL 0 00   , Chemistry Profile:   Results from last 7 days   Lab Units 05/23/19  1654   POTASSIUM mmol/L 3 6   CHLORIDE mmol/L 96*   CO2 mmol/L 26   BUN mg/dL 9   CREATININE mg/dL 0 84   CALCIUM mg/dL 8 4   AST U/L 182*   ALT U/L 26   ALK PHOS U/L 331*   EGFR ml/min/1 73sq m 83   , Coagulation Studies:   Results from last 7 days   Lab Units 05/21/19  1511   PROTIME seconds 14 8*   INR  1 15   PTT seconds 36   , Additional Labs:   Results from last 7 days   Lab Units 05/23/19  1654   LIPASE u/L 112   AMMONIA umol/L 28   , Last A1C/Lipid Panel/Thyroid Panel:   Lab Results   Component Value Date    HGBA1C 5 4 03/09/2019    HGBA1C 5 3 03/01/2019    TRIG 74 07/23/2014    CHOL 148 07/23/2014    HDL 44 07/23/2014    LDLCALC 89 07/23/2014    SYR3OWVWRPCP 2 310 02/16/2019       Imaging: I have personally reviewed pertinent films in PACS  Xr Ribs Bilateral 4+ Vw W Pa Chest    Result Date: 5/10/2019  Narrative: BILATERAL RIBS AND CHEST INDICATION: Bilateral rib pain  COMPARISON: Two-view chest 9/16/2018 VIEWS:  XR RIBS BILATERAL 4+ VW W PA CHEST FINDINGS: The cardiomediastinal silhouette is unremarkable  The lungs are clear  No pleural effusions  There is no pneumothorax  No rib fractures are identified  Impression: No active pulmonary disease  No evidence of rib fractures  Workstation performed: [id number]     Ct Abdomen Pelvis With Contrast    Result Date: 5/21/2019  Narrative: CT ABDOMEN AND PELVIS WITH IV CONTRAST INDICATION:   Abdominal distension Abdominal pain, unspecified  COMPARISON:  3/7/2018 CT, MRI 3/10/2018  TECHNIQUE:  CT examination of the abdomen and pelvis was performed  Axial, sagittal, and coronal 2D reformatted images were created from the source data and submitted for interpretation  Radiation dose length product (DLP) for this visit:  280 mGy-cm   This examination, like all CT scans performed in the [facility], was performed utilizing techniques to minimize radiation dose exposure, including the use of iterative reconstruction and automated exposure control  IV Contrast:  80 mL of iohexol (OMNIPAQUE) Enteric Contrast:  Enteric contrast was not administered  FINDINGS: ABDOMEN LOWER CHEST:  No clinically significant abnormality identified in the visualized lower chest  LIVER/BILIARY TREE:  Cirrhosis is again seen    Hypoattenuating lesion with capsular retraction along the right posterior dome of the liver is unchanged in appearance and corresponds to previously treated lesion  Chronic portal venous thrombosis with associated cavernous transformation is again seen  There are more apparent branching areas of hypoattenuation throughout the liver, which may represent thrombi throughout portal venous parenchymal branches  GALLBLADDER:  The gallbladder is distended with mucosal hyperenhancement, likely reactive changes related to underlying hepatic dysfunction  No calcified stones identified  SPLEEN:  Unremarkable  PANCREAS:  Unremarkable  ADRENAL GLANDS:  Unremarkable  KIDNEYS/URETERS:  Unremarkable  No hydronephrosis  STOMACH AND BOWEL:  Unremarkable  APPENDIX:  No findings to suggest appendicitis  ABDOMINOPELVIC CAVITY:  Marked kalia hepatis /peripancreatic lymphadenopathy is again seen  Kalia hepatis lymph node conglomeration appears slightly increased in size, now measuring 6 2 cm craniocaudal by 6 1 cm transverse while previously measuring 5 1  x 5 3 cm respectively  There also appears to be new area of hypoattenuation, likely representing necrosis  There is small amount of intra-abdominal and pelvic ascites  VESSELS:  Unremarkable for patient's age  PELVIS REPRODUCTIVE ORGANS:  Patient is status post hysterectomy  URINARY BLADDER:  Unremarkable  ABDOMINAL WALL/INGUINAL REGIONS:  Unremarkable  OSSEOUS STRUCTURES:  No acute fracture or destructive osseous lesion  Impression: 1  Cirrhotic liver with stable treated lesion within right posterior hepatic dome  Chronic portal venous thrombosis with cavernous transformation again seen  There are prominent branching hypoattenuating foci throughout the liver, most likely representing extension of thrombus into segmental branches  2   Extensive kalia hepatis and peripancreatic lymphadenopathy with increase in kalia hepatis lymphadenopathy  3   Small volume ascites   Workstation performed: [id number]       EKG, Pathology, and Other Studies: I have personally reviewed pertinent reports  Medications given in Emergency Department     Medication Administration - last 24 hours from 05/22/2019 1929 to 05/23/2019 1929       Date/Time Order Dose Route Action Action by     05/23/2019 1746 sodium chloride 0 9 % bolus 1,000 mL 0 mL Intravenous Stopped Panfilo Mcghee RN     05/23/2019 1703 sodium chloride 0 9 % bolus 1,000 mL 1,000 mL Intravenous [address] Panfilo Mcghee RN     05/23/2019 1704 ondansetron (ZOFRAN) injection 4 mg 4 mg Intravenous Given Panfilo Mcghee RN     05/23/2019 1703 HYDROmorphone (DILAUDID) injection 0 5 mg 0 5 mg Intravenous Given Panfilo Mcghee RN          Assessment and Plan   Principal Problem:    Abdominal pain  Active Problems:    Chronic lumbar radiculopathy    Diabetes mellitus, type II ([facility] )    Diabetic neuropathy ([facility] )    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 ([facility] )    Hepatocellular carcinoma (HCC)    Nicotine dependence    Opioid dependence ([facility] )    Pain syndrome, chronic    Hyponatremia    Decompensated hepatic cirrhosis (HCC)    Portal vein thrombosis      * Abdominal pain  Assessment & Plan  Recurrence, pain is waxing and waning quality  There are multiple etiologies that could explain this pain such as gallbladder distention and cholestasis seen on her more recent imaging of the abdomen  Additionally could be stretching of Cristi's capsule of the liver given her cirrhosis and hepatic inflammation versus extensive and increased portal venous thrombus/thrombi  Patient feeling better status post Dilaudid IV given in the ED  Continue current pain regimen of oxycodone 10 mg q 6 hours p r n  Continue simethicone  Continue Zofran 4 mg IV p r n   Nausea    Portal vein thrombosis  Assessment & Plan  Previously on anticoagulation however patient developed serious life-threatening varices bleed with a hemoglobin down to 4 0 therefore anticoagulation at this point was contraindicated based on her clear bleeding risk  Will manage conservatively  Recent imaging does suggest some extension of portal vein thrombi, questionable whether this is related to her abdominal pain  Decompensated hepatic cirrhosis ([facility] )  Assessment & Plan  Minimal to mild ascites on recent imaging  No indication for paracentesis at this time  Does have varices with history of varices bleed this year contraindicating anticoagulation  Hyponatremia  Assessment & Plan  Sodium of 129 on admission  Previously was 132 1-2 days ago  Poor p o  Intake noted with anorexia  Possible beer potomania  Recheck BMP in a m  And will continue diuretics of Lasix 60 mg and spironolactone 150 mg daily for now  Pain syndrome, chronic  Assessment & Plan  Stable in patient with terminal condition  Continue outpatient oxycodone  Opioid dependence ([facility] )  Assessment & Plan  On chronic opiate therapy with oxycodone 10 mg q 6 hours p r n  And will continue this while in hospital   Follows with pain management and palliative care  Nicotine dependence  Assessment & Plan  Nicotine patch daily p r n  Hepatocellular carcinoma [facility])  Assessment & Plan  Outpatient follow-up with Dr Howard Carter of Surgical Oncology  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 ([facility] )  Assessment & Plan  Minimal to mild ascites on recent imaging  Following with GI  Outpatient EGD for monitoring of varices and esophageal candidiasis  Diabetic neuropathy [facility])  Assessment & Plan  Lab Results   Component Value Date    HGBA1C 5 4 03/09/2019     Continue gabapentin 300 mg q a m , 900 mg total q h s         Diabetes mellitus, type II [facility])  Assessment & Plan  Lab Results   Component Value Date    HGBA1C 5 4 03/09/2019       Hyperglycemic with a blood glucose in the 140s on admission  No indication for insulin therapy while in hospital here at this time  Monitor glucose on BMP in the a m  Sarah Michelle       [facility] lumbar radiculopathy  Assessment & Plan  Follows with pain management outpatient  On stable regimen of oxycodone and gabapentin which will be continued  Code Status: Level 1 - Full Code  VTE Pharmacologic Prophylaxis: Sequential compression device (Venodyne)    VTE Mechanical Prophylaxis: sequential compression device  Admission Status: OBSERVATION    Admission Time  I spent 45 minutes admitting the patient  This involved direct patient contact where I performed a full history and physical, reviewing previous records, and reviewing laboratory and other diagnostic studies  JESUS Ewing       Internal Medicine   PGY-2  5/23/2019 7:29 PM

## 2019-05-23 NOTE — ASSESSMENT & PLAN NOTE
-pain improved this morning  Multiple etiologies that could explain this pain such as gallbladder distention and cholestasis seen on her more recent imaging of the abdomen  Additionally could be stretching of Cristi's capsule of the liver given her cirrhosis and hepatic inflammation versus extensive and increased portal venous thrombus/thrombi  There has been concern for possible SBP versus acalculous cholecystitis given prior imaging findings  -continue with antibiotics for 10-14 day treatment course  Continue cipro/flagyl  Antibiotics day 7 today  Will discuss transitioning from ciprofloxacin to cefdinir  -continue oxycodone p r n  for pain control    -Zofran p r n   For nausea

## 2019-05-23 NOTE — ASSESSMENT & PLAN NOTE
Follows with pain management outpatient  On stable regimen of oxycodone and gabapentin which will be continued   The patient takes percocet as outpatient, wary of tylenol secondary to elevated liver enzymes, will administer oxycodone

## 2019-05-23 NOTE — ASSESSMENT & PLAN NOTE
Previously on anticoagulation however patient developed serious life-threatening varices bleed with a hemoglobin down to 4 0 therefore anticoagulation at this point was contraindicated based on her clear bleeding risk  Will manage conservatively  Recent imaging does suggest some extension of portal vein thrombi, questionable whether this is related to her abdominal pain

## 2019-05-23 NOTE — ED ATTENDING ATTESTATION
Keny Phillips MD, saw and evaluated the patient  I have discussed the patient with the resident/non-physician practitioner and agree with the resident's/non-physician practitioner's findings, Plan of Care, and MDM as documented in the resident's/non-physician practitioner's note, except where noted  All available labs and Radiology studies were reviewed  I was present for key portions of any procedure(s) performed by the resident/non-physician practitioner and I was immediately available to provide assistance  At this point I agree with the current assessment done in the Emergency Department  I have conducted an independent evaluation of this patient a history and physical is as follows:      Critical Care Time  Procedures     75 yo female with hx of dm, hep c, liver ca, cirrhosis, htn, recent admission and discharge yesterday for abdominal pain and nausea  Pt on ct show increased portal vein thrombus but thought symptoms due to gastritis  Pt today pain is worse all day and worse in upper abdomen  Pt with nausea, no vomiting  Pt with loose stools  Pt with no appetite and decreased po intake  Vss, afebrile, lungs cta, rrr, abdomen soft tender epigastric and ruq, distended  Labs, ivf, zofran, pain meds

## 2019-05-23 NOTE — ASSESSMENT & PLAN NOTE
Lab Results   Component Value Date    HGBA1C 5 4 03/09/2019       Hyperglycemic with a blood glucose in the 140s on admission    Continue to monitor

## 2019-05-23 NOTE — ASSESSMENT & PLAN NOTE
Lab Results   Component Value Date    HGBA1C 5 4 03/09/2019     Continue gabapentin 300 mg q a m , 900 mg total q h s  Robbie Saenz

## 2019-05-24 ENCOUNTER — APPOINTMENT (OUTPATIENT)
Dept: PHYSICAL THERAPY | Facility: CLINIC | Age: 69
End: 2019-05-24
Payer: COMMERCIAL

## 2019-05-24 LAB
ALBUMIN SERPL BCP-MCNC: 2 G/DL (ref 3.5–5)
ALP SERPL-CCNC: 319 U/L (ref 46–116)
ALT SERPL W P-5'-P-CCNC: 25 U/L (ref 12–78)
ANION GAP SERPL CALCULATED.3IONS-SCNC: 6 MMOL/L (ref 4–13)
AST SERPL W P-5'-P-CCNC: 149 U/L (ref 5–45)
BASOPHILS # BLD AUTO: 0.03 THOUSANDS/ΜL (ref 0–0.1)
BASOPHILS NFR BLD AUTO: 0 % (ref 0–1)
BILIRUB SERPL-MCNC: 1.16 MG/DL (ref 0.2–1)
BILIRUB UR QL STRIP: ABNORMAL
BUN SERPL-MCNC: 10 MG/DL (ref 5–25)
CALCIUM SERPL-MCNC: 8.7 MG/DL (ref 8.3–10.1)
CHLORIDE SERPL-SCNC: 93 MMOL/L (ref 100–108)
CLARITY UR: ABNORMAL
CO2 SERPL-SCNC: 27 MMOL/L (ref 21–32)
COLOR UR: ABNORMAL
CREAT SERPL-MCNC: 0.81 MG/DL (ref 0.6–1.3)
EOSINOPHIL # BLD AUTO: 0.04 THOUSAND/ΜL (ref 0–0.61)
EOSINOPHIL NFR BLD AUTO: 0 % (ref 0–6)
ERYTHROCYTE [DISTWIDTH] IN BLOOD BY AUTOMATED COUNT: 18.5 % (ref 11.6–15.1)
GFR SERPL CREATININE-BSD FRML MDRD: 86 ML/MIN/1.73SQ M
GLUCOSE SERPL-MCNC: 107 MG/DL (ref 65–140)
GLUCOSE UR STRIP-MCNC: NEGATIVE MG/DL
HCT VFR BLD AUTO: 28.7 % (ref 34.8–46.1)
HGB BLD-MCNC: 9.1 G/DL (ref 11.5–15.4)
HGB UR QL STRIP.AUTO: NEGATIVE
IMM GRANULOCYTES # BLD AUTO: 0.11 THOUSAND/UL (ref 0–0.2)
IMM GRANULOCYTES NFR BLD AUTO: 1 % (ref 0–2)
KETONES UR STRIP-MCNC: NEGATIVE MG/DL
LEUKOCYTE ESTERASE UR QL STRIP: NEGATIVE
LYMPHOCYTES # BLD AUTO: 1.13 THOUSANDS/ΜL (ref 0.6–4.47)
LYMPHOCYTES NFR BLD AUTO: 8 % (ref 14–44)
MAGNESIUM SERPL-MCNC: 2 MG/DL (ref 1.6–2.6)
MCH RBC QN AUTO: 27.5 PG (ref 26.8–34.3)
MCHC RBC AUTO-ENTMCNC: 31.7 G/DL (ref 31.4–37.4)
MCV RBC AUTO: 87 FL (ref 82–98)
MONOCYTES # BLD AUTO: 1.58 THOUSAND/ΜL (ref 0.17–1.22)
MONOCYTES NFR BLD AUTO: 11 % (ref 4–12)
NEUTROPHILS # BLD AUTO: 11.59 THOUSANDS/ΜL (ref 1.85–7.62)
NEUTS SEG NFR BLD AUTO: 80 % (ref 43–75)
NITRITE UR QL STRIP: NEGATIVE
NRBC BLD AUTO-RTO: 0 /100 WBCS
PH UR STRIP.AUTO: 6 [PH]
PLATELET # BLD AUTO: 320 THOUSANDS/UL (ref 149–390)
PMV BLD AUTO: 9.5 FL (ref 8.9–12.7)
POTASSIUM SERPL-SCNC: 3.7 MMOL/L (ref 3.5–5.3)
PROT SERPL-MCNC: 7.4 G/DL (ref 6.4–8.2)
PROT UR STRIP-MCNC: NEGATIVE MG/DL
RBC # BLD AUTO: 3.31 MILLION/UL (ref 3.81–5.12)
SODIUM SERPL-SCNC: 126 MMOL/L (ref 136–145)
SP GR UR STRIP.AUTO: 1.01 (ref 1–1.03)
UROBILINOGEN UR QL STRIP.AUTO: 1 E.U./DL
WBC # BLD AUTO: 14.48 THOUSAND/UL (ref 4.31–10.16)

## 2019-05-24 PROCEDURE — 99204 OFFICE O/P NEW MOD 45 MIN: CPT | Performed by: INTERNAL MEDICINE

## 2019-05-24 PROCEDURE — 83735 ASSAY OF MAGNESIUM: CPT | Performed by: INTERNAL MEDICINE

## 2019-05-24 PROCEDURE — 99220 PR INITIAL OBSERVATION CARE/DAY 70 MINUTES: CPT | Performed by: INTERNAL MEDICINE

## 2019-05-24 PROCEDURE — 80053 COMPREHEN METABOLIC PANEL: CPT | Performed by: INTERNAL MEDICINE

## 2019-05-24 PROCEDURE — 81003 URINALYSIS AUTO W/O SCOPE: CPT | Performed by: INTERNAL MEDICINE

## 2019-05-24 PROCEDURE — 85025 COMPLETE CBC W/AUTO DIFF WBC: CPT | Performed by: INTERNAL MEDICINE

## 2019-05-24 RX ORDER — DICYCLOMINE HCL 20 MG
20 TABLET ORAL
Status: DISCONTINUED | OUTPATIENT
Start: 2019-05-24 | End: 2019-05-27

## 2019-05-24 RX ORDER — FUROSEMIDE 20 MG/1
20 TABLET ORAL 3 TIMES DAILY
Status: DISCONTINUED | OUTPATIENT
Start: 2019-05-24 | End: 2019-05-31 | Stop reason: HOSPADM

## 2019-05-24 RX ORDER — SPIRONOLACTONE 50 MG/1
50 TABLET, FILM COATED ORAL 3 TIMES DAILY
Status: DISCONTINUED | OUTPATIENT
Start: 2019-05-24 | End: 2019-05-29

## 2019-05-24 RX ORDER — NADOLOL 20 MG/1
20 TABLET ORAL DAILY
Status: DISCONTINUED | OUTPATIENT
Start: 2019-05-25 | End: 2019-05-31 | Stop reason: HOSPADM

## 2019-05-24 RX ORDER — FUROSEMIDE 20 MG/1
20 TABLET ORAL 3 TIMES DAILY
Status: DISCONTINUED | OUTPATIENT
Start: 2019-05-24 | End: 2019-05-24

## 2019-05-24 RX ORDER — LACTULOSE 20 G/30ML
30 SOLUTION ORAL 3 TIMES DAILY
Status: DISCONTINUED | OUTPATIENT
Start: 2019-05-24 | End: 2019-05-26

## 2019-05-24 RX ADMIN — GABAPENTIN 300 MG: 300 CAPSULE ORAL at 09:12

## 2019-05-24 RX ADMIN — FUROSEMIDE 20 MG: 20 TABLET ORAL at 16:48

## 2019-05-24 RX ADMIN — FUROSEMIDE 20 MG: 20 TABLET ORAL at 21:29

## 2019-05-24 RX ADMIN — GABAPENTIN 900 MG: 300 CAPSULE ORAL at 21:29

## 2019-05-24 RX ADMIN — DICYCLOMINE HYDROCHLORIDE 20 MG: 20 TABLET ORAL at 11:36

## 2019-05-24 RX ADMIN — POTASSIUM CHLORIDE 20 MEQ: 1500 TABLET, EXTENDED RELEASE ORAL at 18:29

## 2019-05-24 RX ADMIN — POTASSIUM CHLORIDE 20 MEQ: 1500 TABLET, EXTENDED RELEASE ORAL at 09:12

## 2019-05-24 RX ADMIN — DICYCLOMINE HYDROCHLORIDE 20 MG: 20 TABLET ORAL at 21:29

## 2019-05-24 RX ADMIN — NICOTINE 1 PATCH: 21 PATCH, EXTENDED RELEASE TRANSDERMAL at 21:30

## 2019-05-24 RX ADMIN — SPIRONOLACTONE 50 MG: 50 TABLET ORAL at 21:29

## 2019-05-24 RX ADMIN — SENNOSIDES 17.2 MG: 8.6 TABLET, FILM COATED ORAL at 09:13

## 2019-05-24 RX ADMIN — SPIRONOLACTONE 50 MG: 50 TABLET ORAL at 16:49

## 2019-05-24 RX ADMIN — OXYCODONE HYDROCHLORIDE AND ACETAMINOPHEN 2 TABLET: 5; 325 TABLET ORAL at 05:49

## 2019-05-24 RX ADMIN — DICYCLOMINE HYDROCHLORIDE 20 MG: 20 TABLET ORAL at 16:48

## 2019-05-24 RX ADMIN — PANTOPRAZOLE SODIUM 40 MG: 40 TABLET, DELAYED RELEASE ORAL at 09:12

## 2019-05-24 RX ADMIN — OXYCODONE HYDROCHLORIDE AND ACETAMINOPHEN 2 TABLET: 5; 325 TABLET ORAL at 16:48

## 2019-05-24 NOTE — PLAN OF CARE
Problem: Potential for Falls  Goal: Patient will remain free of falls  Description  INTERVENTIONS:  - Assess patient frequently for physical needs  -  Identify cognitive and physical deficits and behaviors that affect risk of falls  -  [place] fall precautions as indicated by assessment   - Educate patient/family on patient safety including physical limitations  - Instruct patient to call for assistance with activity based on assessment  - Modify environment to reduce risk of injury  - Consider OT/PT consult to assist with strengthening/mobility  Outcome: Progressing     Problem: DISCHARGE PLANNING  Goal: Discharge to home or other facility with appropriate resources  Description  INTERVENTIONS:  - Identify barriers to discharge w/patient and caregiver  - Arrange for needed discharge resources and transportation as appropriate  - Identify discharge learning needs (meds, wound care, etc )  - Arrange for interpretive services to assist at discharge as needed  - Refer to Case Management Department for coordinating discharge planning if the patient needs post-hospital services based on physician/advanced practitioner order or complex needs related to functional status, cognitive ability, or social support system  Outcome: Progressing     Problem: Knowledge Deficit  Goal: Patient/family/caregiver demonstrates understanding of disease process, treatment plan, medications, and discharge instructions  Description  Complete learning assessment and assess knowledge base    Interventions:  - Provide teaching at level of understanding  - Provide teaching via preferred learning methods  Outcome: Progressing     Problem: Nutrition/Hydration-ADULT  Goal: Nutrient/Hydration intake appropriate for improving, restoring or maintaining nutritional needs  Description  Monitor and assess patient's nutrition/hydration status for malnutrition (ex- brittle hair, bruises, dry skin, pale skin and conjunctiva, muscle wasting, smooth red tongue, and disorientation)  Collaborate with interdisciplinary team and initiate plan and interventions as ordered  Monitor patient's weight and dietary intake as ordered or per policy  Utilize nutrition screening tool and intervene per policy  Determine patient's food preferences and provide high-protein, high-caloric foods as appropriate       INTERVENTIONS:  - Monitor oral intake, urinary output, labs, and treatment plans  - Assess nutrition and hydration status and recommend course of action  - Evaluate amount of meals eaten  - Assist patient with eating if necessary   - Allow adequate time for meals  - Recommend/ encourage appropriate diets, oral nutritional supplements, and vitamin/mineral supplements  - Order, calculate, and assess calorie counts as needed  - Recommend, monitor, and adjust tube feedings and TPN/PPN based on assessed needs  - Assess need for intravenous fluids  - Provide specific nutrition/hydration education as appropriate  - Include patient/family/caregiver in decisions related to nutrition  Outcome: Progressing     Problem: Prexisting or High Potential for Compromised Skin Integrity  Goal: Skin integrity is maintained or improved  Description  INTERVENTIONS:  - Identify patients at risk for skin breakdown  - Assess and monitor skin integrity  - Assess and monitor nutrition and hydration status  - Monitor labs (i e  albumin)  - Assess for incontinence   - Turn and reposition patient  - Assist with mobility/ambulation  - Relieve pressure over bony prominences  - Avoid friction and shearing  - Provide appropriate hygiene as needed including keeping skin clean and dry  - Evaluate need for skin moisturizer/barrier cream  - Collaborate with interdisciplinary team (i e  Nutrition, Rehabilitation, etc )   - Patient/family teaching  Outcome: Progressing

## 2019-05-24 NOTE — CONSULTS
[address] [place] [address]  Gastroenterology   Jaydon Hough 76 y o  female MRN: [id number]  Unit/Bed#: -Aj Encounter: [id number]    Code Status: Level 1 - Full Code  POA:      Reason for Admission / Principal Problem: Abdominal pain  Reason for Consult: Cirrhosis    Impression:  Patient Active Problem List   Diagnosis    Anxiety    Fatigue    Chronic lumbar radiculopathy    Claustrophobia    Constipation    Diabetes mellitus, type II ([facility]    Diabetic neuropathy ([facility] )    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 ([facility] )    Hepatocellular carcinoma ([facility] )    Herniated lumbar disc without myelopathy    Hypertension    Insomnia    Left foot pain    Myofascial pain    Nicotine dependence    [facility]    Osteoporosis    Pain syndrome, chronic    Sacroiliitis (HCC)    Seasonal allergies    Trochanteric bursitis    Elevated serum creatinine    Lumbar disc herniation    Goals of care, counseling/discussion    Hyponatremia    Abdominal pain    Ascites    Decompensated hepatic cirrhosis ([facility] )    Acute on chronic blood loss anemia    Portal vein thrombosis    Depression    Tobacco abuse    Long term (current) use of anticoagulants    Melena    Hypotension    GI bleed    Hypokalemia    Anemia    Secondary esophageal varices without bleeding (HCC)    Excess ear wax    Ear popping    Lumbar spondylosis    Spinal stenosis of lumbar region without neurogenic claudication    Swelling of both lower extremities       A/P:   76year old female with PMH of Hep C cirrhosis, h/o HCC s/p ablation, wedge resection, SIRS spheres in Dec 2017, small ascites, grade 2 esophageal varices, portal vein thrombosis not on AC due to varices presenting with diffuse abdominal pain and loss of appetite     Cirrhosis: Pt is being evaluated for a history of cirrhosis    Etiology: Hepatitis C (HCV) s/p treatment with harvoni  Stage: 3 (+/-) non-bleeding varices, (+ascites)   Compensated:No; DECOMPENSATED due to  Ascites and Hepatocellular Carcinoma  [place] Vito Score: Class B  MELD: 17- 4% 90 day mortality  Complicated by [address]  s/p ablation and wedge resection and recurrence s/p SIRS spheres in December 2017  Variceal screening up to date: yes  Vaccinations up to date- HAV  HBV, pneumococcal pneumonia, influenza:yes  Varices intervention:EGD done in 03/11/19 by Dr Arora Him showed portal hypertensive gastropathy, severe candidal esophagitis, grade 2 esophageal varices, no bands placed due to recent bands in 02/19 and severe scarring, patient is due for repeat EGD as an outpatient  Ascites intervention: Small ascites, no fluid thrill or shifting dullness, continue lasix 60mg and aldactone 150mg  Not enough fluid for paracentesis  -Elevated WBC count doesn't seem to be from SBP, check for other sources of infection- urinalysis    H/o Hepatocellular carcinoma  -S/p ablation and wedge resection and recurrence s/p SIRS spheres in December 2017  -Had recent CT scan which demonstrated cirrhosis, chronic portal vein thrombosis, extensive valeriano hepatis and peripancreatic lymphadenopathy  Portal vein thrombosis  -Not on anticoagulation due to bleeding varices in the past  -Avoid anticoagulation, has not shown survival benefit in PVT        HPI: Laura Valente is a 76 y o  female with a PMH of hep C cirrhosis status post treatment with harvoni, hepatocellular carcinoma status post ablation and which dissection under current status post SIRS spheres in December 2017, esophageal varices, portal vein thrombosis not on anticoagulation who presents with diffuse crampy abdominal pain and loss of appetite  Patient says she has lost her appetite since the last 2 months  She does not experience any nausea, vomiting but says she experiences episodic diffuse crampy abdominal pain    She had a bowel movement yesterday, no blood in the stools  History obtained from patient     PMH:  Past Medical History:   Diagnosis Date    Anemia     Anxiety     Cancer ([facility] )     Chronic pain disorder     herniated disc    Cirrhosis ([facility] )     Constipation     Current use of long term anticoagulation     eliquis    Diabetes mellitus ([facility] )     blood sugar 113 at 1225 2/18/19    Drug dependence ([facility] )     GI bleed     Hepatitis C, chronic ([place] [address] ) 7/22/2014    Hepatocellular carcinoma ([place] [address] )     Hypertension     Hypokalemia 3/7/2019    Hyponatremia     Liver disease     h/o hepatitis c    Melena     Portal vein thrombosis         PSH:  Past Surgical History:   Procedure Laterality Date    DENTAL SURGERY      ESOPHAGOGASTRODUODENOSCOPY N/A 2/18/2019    Procedure: ESOPHAGOGASTRODUODENOSCOPY (EGD); Surgeon: Kirstin Cavanaugh MD;  [facility]; Service: Gastroenterology    ESOPHAGOGASTRODUODENOSCOPY N/A 3/11/2019    Procedure: ESOPHAGOGASTRODUODENOSCOPY (EGD); Surgeon: Lexa Bauer MD;  [facility]; Service: Gastroenterology    HYSTERECTOMY      IR PARACENTESIS  3/8/2019    LIVER BIOPSY      LIVER SURGERY      Ablation    TONSILLECTOMY         Allergies: No Known Allergies    Family History:   Family History   Problem Relation Age of Onset    Prostate cancer Father        Social History:   Social History     Tobacco Use   Smoking Status Current Every Day Smoker    Packs/day: 1 00    Years: 50 00    Pack years: 50 00    Types: Cigarettes   Smokeless Tobacco Never Used      Social History     Substance and Sexual Activity   Alcohol Use Not Currently      Social History     Substance and Sexual Activity   Drug Use No        Home Medications:   Prior to Admission medications    Medication Sig Start Date End Date Taking?  Authorizing Provider   furosemide (LASIX) 20 mg tablet Take 3 tablets (60 mg total) by mouth daily 4/22/19   Stu Graves PA-C   gabapentin (NEURONTIN) 300 mg capsule 1 tab in the am and 3 tabs PO night time 3/14/19   BRYANNA Tristan   losartan (COZAAR) 100 MG tablet Take 100 mg by mouth daily 4/19/19   Historical Provider, MD   nadolol (CORGARD) 20 mg tablet Take 1 tablet (20 mg total) by mouth daily 4/22/19   Lupillo Hess PA-C   nicotine (NICODERM CQ) 21 mg/24 hr TD 24 hr patch Place 1 patch on the skin daily  Patient not taking: Reported on 5/2/2019 3/12/19   Luis Lockwood MD   oxyCODONE-acetaminophen (PERCOCET)  mg per tablet Take 1 tab PO Q 6 hours prn pain  2nd month script Do not fill until 6/21/19 5/13/19   BRYANNA Tristan   pantoprazole (PROTONIX) 40 mg tablet Take 1 tablet (40 mg total) by mouth daily with breakfast 4/19/19   Rob Light DO   polyethylene glycol (MIRALAX) 17 g packet Take 17 g by mouth daily as needed (Constipation) 3/21/19   Migdalia Vee MD   potassium chloride (MICRO-K) 10 MEQ CR capsule Take 2 capsules (20 mEq total) by mouth 2 (two) times a day for 7 days 5/13/19 5/20/19  Sherrill Howard DO   senna (SENOKOT) 8 6 MG tablet Take 2 tablets by mouth daily    Historical Provider, MD   spironolactone (ALDACTONE) 50 mg tablet Take 3 tablets (150 mg total) by mouth daily 4/22/19   Lupillo Hess PA-C       ROS:   Review of Systems   Constitutional: Positive for appetite change  Gastrointestinal: Positive for abdominal pain  Negative for blood in stool, constipation, diarrhea, nausea and vomiting  All other systems reviewed and are negative  Vitals:   Vitals:    05/23/19 2316 05/24/19 0723 05/24/19 0848 05/24/19 1104   BP: 107/54 91/58 (!) 89/58 (!) 88/56   BP Location: Left arm Left arm Left arm Left arm   Pulse: 70 75 74    Resp: 18 18 18    Temp: 98 6 °F (37 °C) 99 3 °F (37 4 °C)     TempSrc: Oral Oral     SpO2: 98% 97% 97%    Weight:       Height:         Temp  Min: 97 4 °F (36 3 °C)  Max: 99 3 °F (37 4 °C)  IBW: 52 4 kg  Body mass index is 26 04 kg/m²      PHYSICAL EXAM:  Physical Exam   Constitutional: She is oriented to person, place, and time  She appears well-developed and well-nourished  HENT:   Head: Normocephalic  Eyes: Pupils are equal, round, and reactive to light  Cardiovascular: Normal rate, regular rhythm and normal heart sounds  Pulmonary/Chest: Effort normal and breath sounds normal    Abdominal: Soft  Bowel sounds are normal    Neurological: She is alert and oriented to person, place, and time  Skin: Skin is warm  Labs:   Results from last 7 days   Lab Units 05/23/19  1654 05/22/19  0536 05/21/19  1511   WBC Thousand/uL 18 00* 11 00* 15 47*   HEMOGLOBIN g/dL 9 8* 9 8* 9 6*   HEMATOCRIT % 30 2* 30 6* 29 5*   PLATELETS Thousands/uL 340 274 266   NEUTROS PCT % 83* 77* 85*   MONOS PCT % 9 12 7     Results from last 7 days   Lab Units 05/23/19  1654 05/22/19  0536 05/21/19  1511   POTASSIUM mmol/L 3 6 3 3* 3 5   CHLORIDE mmol/L 96* 96* 95*   CO2 mmol/L 26 29 27   BUN mg/dL 9 12 10   CREATININE mg/dL 0 84 0 81 0 80   CALCIUM mg/dL 8 4 8 5 8 8   ALK PHOS U/L 331* 308* 329*   ALT U/L 26 39 32   AST U/L 182* 324* 280*         Lab Results   Component Value Date    PHOS 2 1 (L) 02/17/2019    PHOS 3 4 09/17/2018    PHOS 2 8 10/23/2015      Results from last 7 days   Lab Units 05/21/19  1511   INR  1 15   PTT seconds 36     No results found for: TROPONINT  ABG:No results found for: PHART, CRM5OWF, PO2ART, KHM5COM, A1TYQSST, BEART, SOURCE    Imaging: I have personally reviewed pertinent reports  EKG: This was personally reviewed by myself     Micro:  Blood Culture: No results found for: BLOODCX  Urine Culture:   Lab Results   Component Value Date    URINECX 60,000-69,000 cfu/ml  09/16/2018     Sputum Culture: No components found for: SPUTUMCX  Wound Culure: No results found for: Marybeth Monroy MD  5/24/2019 11:22 AM

## 2019-05-24 NOTE — PLAN OF CARE
Problem: Potential for Falls  Goal: Patient will remain free of falls  Description  INTERVENTIONS:  - Assess patient frequently for physical needs  -  Identify cognitive and physical deficits and behaviors that affect risk of falls  -  [place] fall precautions as indicated by assessment   - Educate patient/family on patient safety including physical limitations  - Instruct patient to call for assistance with activity based on assessment  - Modify environment to reduce risk of injury  - Consider OT/PT consult to assist with strengthening/mobility  5/24/2019 0943 by Marc Linda RN  Outcome: Progressing  5/24/2019 0942 by Marc Linda RN  Outcome: Progressing     Problem: DISCHARGE PLANNING  Goal: Discharge to home or other facility with appropriate resources  Description  INTERVENTIONS:  - Identify barriers to discharge w/patient and caregiver  - Arrange for needed discharge resources and transportation as appropriate  - Identify discharge learning needs (meds, wound care, etc )  - Arrange for interpretive services to assist at discharge as needed  - Refer to Case Management Department for coordinating discharge planning if the patient needs post-hospital services based on physician/advanced practitioner order or complex needs related to functional status, cognitive ability, or social support system  5/24/2019 0943 by Marc Linda RN  Outcome: Progressing  5/24/2019 0942 by Marc Linda RN  Outcome: Progressing     Problem: Knowledge Deficit  Goal: Patient/family/caregiver demonstrates understanding of disease process, treatment plan, medications, and discharge instructions  Description  Complete learning assessment and assess knowledge base    Interventions:  - Provide teaching at level of understanding  - Provide teaching via preferred learning methods  5/24/2019 0943 by Marc Linda RN  Outcome: Progressing  5/24/2019 0942 by Marc Linda RN  Outcome: Progressing Problem: Nutrition/Hydration-ADULT  Goal: Nutrient/Hydration intake appropriate for improving, restoring or maintaining nutritional needs  Description  Monitor and assess patient's nutrition/hydration status for malnutrition (ex- brittle hair, bruises, dry skin, pale skin and conjunctiva, muscle wasting, smooth red tongue, and disorientation)  Collaborate with interdisciplinary team and initiate plan and interventions as ordered  Monitor patient's weight and dietary intake as ordered or per policy  Utilize nutrition screening tool and intervene per policy  Determine patient's food preferences and provide high-protein, high-caloric foods as appropriate       INTERVENTIONS:  - Monitor oral intake, urinary output, labs, and treatment plans  - Assess nutrition and hydration status and recommend course of action  - Evaluate amount of meals eaten  - Assist patient with eating if necessary   - Allow adequate time for meals  - Recommend/ encourage appropriate diets, oral nutritional supplements, and vitamin/mineral supplements  - Order, calculate, and assess calorie counts as needed  - Recommend, monitor, and adjust tube feedings and TPN/PPN based on assessed needs  - Assess need for intravenous fluids  - Provide specific nutrition/hydration education as appropriate  - Include patient/family/caregiver in decisions related to nutrition  5/24/2019 0943 by Vernie Gowers, RN  Outcome: Progressing  5/24/2019 0942 by Vernie Gowers, RN  Outcome: Progressing     Problem: Prexisting or High Potential for Compromised Skin Integrity  Goal: Skin integrity is maintained or improved  Description  INTERVENTIONS:  - Identify patients at risk for skin breakdown  - Assess and monitor skin integrity  - Assess and monitor nutrition and hydration status  - Monitor labs (i e  albumin)  - Assess for incontinence   - Turn and reposition patient  - Assist with mobility/ambulation  - Relieve pressure over bony prominences  - Avoid friction and shearing  - Provide appropriate hygiene as needed including keeping skin clean and dry  - Evaluate need for skin moisturizer/barrier cream  - Collaborate with interdisciplinary team (i e  Nutrition, Rehabilitation, etc )   - Patient/family teaching  5/24/2019 8194 by Alysha Herron RN  Outcome: Progressing  5/24/2019 0942 by Alysha Herron RN  Outcome: Progressing

## 2019-05-24 NOTE — PROGRESS NOTES
INTERNAL MEDICINE RESIDENCY PROGRESS NOTE     Name: Laura Valente   Age & Sex: 76 y o  female   MRN: [id number]  Unit/Bed#: -01   Encounter: [id number]  Team: SOD Team A    PATIENT INFORMATION     Name: Laura Valente   Age & Sex: 76 y o  female   MRN: [id number]  Hospital Stay Days: 0    ASSESSMENT/PLAN     Principal Problem:    Abdominal pain  Active Problems:    Chronic lumbar radiculopathy    Diabetes mellitus, type II ([facility] )    Diabetic neuropathy ([facility] )    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 ([facility] )    Hepatocellular carcinoma (HCC)    Nicotine dependence    Opioid dependence ([facility] )    Pain syndrome, chronic    Hyponatremia    Decompensated hepatic cirrhosis ([facility] )    Portal vein thrombosis      Portal vein thrombosis  Assessment & Plan  Previously on anticoagulation however patient developed serious life-threatening varices bleed with a hemoglobin down to 4 0 therefore anticoagulation at this point was contraindicated based on her clear bleeding risk  Will manage conservatively  Recent imaging does suggest some extension of portal vein thrombi, questionable whether this is related to her abdominal pain  Decompensated hepatic cirrhosis ([facility] )  Assessment & Plan  Minimal to mild ascites on recent imaging  No indication for paracentesis at this time  Does have varices with history of varices bleed this year contraindicating anticoagulation   -Does not have a history of SBP, although patient does have abdominal pain and a white count, she has minimal ascites, low risk of SBP  Hyponatremia  Assessment & Plan  Sodium of 129 on admission  Previously was 132 1-2 days ago  Poor p o  Intake noted with anorexia, and patient is a cirrhotic  Recheck BMP in a m  And will continue diuretics of Lasix 60 mg and spironolactone 150 mg daily for now   Will decrease hold parameters to SBP below 90      Pain syndrome, chronic  Assessment & Plan  Stable in patient with terminal condition  Continue outpatient oxycodone  No more than 2 grams tylenol daily      Opioid dependence ([facility] )  Assessment & Plan  On chronic opiate therapy with percocet  mg q 6 hours p r n  And will continue this while in hospital   Follows with pain management and palliative care  Hepatocellular carcinoma [facility])  Assessment & Plan  Outpatient follow-up with Dr Tony Trejo of Surgical Oncology  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 ([facility]  Assessment & Plan  Minimal to mild ascites on recent imaging  Following with GI  Outpatient EGD for monitoring of varices and esophageal candidiasis  * Abdominal pain  Assessment & Plan  Recurrence, pain is waxing and waning quality  There are multiple etiologies that could explain this pain such as gallbladder distention and cholestasis seen on her more recent imaging of the abdomen  Additionally could be stretching of Cristi's capsule of the liver given her cirrhosis and hepatic inflammation versus extensive and increased portal venous thrombus/thrombi  Patient feeling better status post Dilaudid IV given in the ED  Continue current pain regimen of oxycodone 10 mg q 6 hours p r n  Continue simethicone  Continue Zofran 4 mg IV p r n  Nausea    -Will obtain GI consult, will try bentyl for symptom relief  Consider repeat abdominal imaging if pain continues, Also consider SBP  Will obtain U/A as well  Consider psychosocial/psychiatric causes as well, along with opioid induced  Disposition: Pending evaluation by GI  SUBJECTIVE     Patient seen and examined  No acute events overnight  Patient reports 8/10 diffuse abdominal pain this morning, cramping in nature, does not radiate into her back, the pain waxes and wanes  She states that she does not eat well at home, although she does finish most of her meals while in the hospital  She denies any recurrence of nausea or vomiting since discharge two days ago   She denies diarrhea, reports having one regular formed bowel movement yesterday  OBJECTIVE     Vitals:    19 2316 19 0723 19 0848 19 1104   BP: 107/54 91/58 (!) 89/58 (!) 88/56   BP Location: Left arm Left arm Left arm Left arm   Pulse: 70 75 74    Resp: 18 18 18    Temp: 98 6 °F (37 °C) 99 3 °F (37 4 °C)     TempSrc: Oral Oral     SpO2: 98% 97% 97%    Weight:       Height:          Temperature:   Temp (24hrs), Av 5 °F (36 9 °C), Min:97 4 °F (36 3 °C), Max:99 3 °F (37 4 °C)    Temperature: 99 3 °F (37 4 °C)  Intake & Output:  I/O        07 -  0700  07 -  0700  07 -  0700    P  O    240    IV Piggyback  1000     Total Intake(mL/kg)  1000 (15) 240 (3 6)    Net  +1000 +240           Unmeasured Urine Occurrence  1 x         Weights:   IBW: 52 4 kg    Body mass index is 26 04 kg/m²  Weight (last 2 days)     Date/Time   Weight    19 1910   66 7 (147)    19 1556   66 7 (147)            Physical Exam   Constitutional: She is oriented to person, place, and time  No distress  HENT:   Head: Normocephalic and atraumatic  Eyes: Pupils are equal, round, and reactive to light  Neck: Neck supple  No JVD present  No tracheal deviation present  No thyromegaly present  Cardiovascular: Normal rate, regular rhythm, normal heart sounds and intact distal pulses  Exam reveals no gallop and no friction rub  No murmur heard  Pulmonary/Chest: Effort normal and breath sounds normal  No stridor  No respiratory distress  She has no wheezes  She has no rales  She exhibits no tenderness  Abdominal: Soft  Bowel sounds are normal  She exhibits distension  She exhibits no mass  There is tenderness  There is no rebound  No hernia  Tender to palpation in epigastric and RUQ  Musculoskeletal:   Trace edema in bilateral extremities   Neurological: She is alert and oriented to person, place, and time  Skin: Skin is warm and dry  No rash noted  She is not diaphoretic  No erythema  No pallor  Psychiatric: She has a normal mood and affect  Her behavior is normal  Judgment and thought content normal    Nursing note and vitals reviewed  Physical Exam   Constitutional: She is oriented to person, place, and time  No distress  HENT:   Head: Normocephalic and atraumatic  Eyes: Pupils are equal, round, and reactive to light  Neck: Neck supple  No JVD present  No tracheal deviation present  No thyromegaly present  Cardiovascular: Normal rate, regular rhythm, normal heart sounds and intact distal pulses  Exam reveals no gallop and no friction rub  No murmur heard  Pulmonary/Chest: Effort normal and breath sounds normal  No stridor  No respiratory distress  She has no wheezes  She has no rales  She exhibits no tenderness  Abdominal: Soft  Bowel sounds are normal  She exhibits distension  She exhibits no mass  There is tenderness  There is no rebound  No hernia  Tender to palpation in epigastric and RUQ  Musculoskeletal:   Trace edema in bilateral extremities   Neurological: She is alert and oriented to person, place, and time  Skin: Skin is warm and dry  No rash noted  She is not diaphoretic  No erythema  No pallor  Psychiatric: She has a normal mood and affect  Her behavior is normal  Judgment and thought content normal    Nursing note and vitals reviewed  LABORATORY DATA     Labs: I have personally reviewed pertinent reports    Results from last 7 days   Lab Units 05/24/19  1135 05/23/19  1654 05/22/19  0536   WBC Thousand/uL 14 48* 18 00* 11 00*   HEMOGLOBIN g/dL 9 1* 9 8* 9 8*   HEMATOCRIT % 28 7* 30 2* 30 6*   PLATELETS Thousands/uL 320 340 274   NEUTROS PCT % 80* 83* 77*   MONOS PCT % 11 9 12      Results from last 7 days   Lab Units 05/24/19  1135 05/23/19  1654 05/22/19  0536   POTASSIUM mmol/L 3 7 3 6 3 3*   CHLORIDE mmol/L 93* 96* 96*   CO2 mmol/L 27 26 29   BUN mg/dL 10 9 12   CREATININE mg/dL 0 81 0 84 0 81   CALCIUM mg/dL 8 7 8 4 8 5   ALK PHOS U/L 319* 331* 308* ALT U/L 25 26 39   AST U/L 149* 182* 324*     Results from last 7 days   Lab Units 05/24/19  1135   MAGNESIUM mg/dL 2 0          Results from last 7 days   Lab Units 05/21/19  1511   INR  1 15   PTT seconds 36               IMAGING & DIAGNOSTIC TESTING     Radiology Results: I have personally reviewed pertinent reports  No results found  Other Diagnostic Testing: I have personally reviewed pertinent reports  ACTIVE MEDICATIONS     Current Facility-Administered Medications   Medication Dose Route Frequency    acetaminophen (TYLENOL) tablet 650 mg  650 mg Oral Q6H PRN    dicyclomine (BENTYL) tablet 20 mg  20 mg Oral 4x Daily (AC & HS)    furosemide (LASIX) tablet 20 mg  20 mg Oral TID    gabapentin (NEURONTIN) capsule 300 mg  300 mg Oral QAM    gabapentin (NEURONTIN) capsule 900 mg  900 mg Oral HS    [START ON 5/25/2019] nadolol (CORGARD) tablet 20 mg  20 mg Oral Daily    nicotine (NICODERM CQ) 21 mg/24 hr TD 24 hr patch 1 patch  1 patch Transdermal Q24H    ondansetron (ZOFRAN) injection 4 mg  4 mg Intravenous Q6H PRN    oxyCODONE-acetaminophen (PERCOCET) 5-325 mg per tablet 2 tablet  2 tablet Oral Q6H PRN    pantoprazole (PROTONIX) EC tablet 40 mg  40 mg Oral Daily With Breakfast    polyethylene glycol (MIRALAX) packet 17 g  17 g Oral Daily PRN    potassium chloride (K-DUR,KLOR-CON) CR tablet 20 mEq  20 mEq Oral BID    senna (SENOKOT) tablet 17 2 mg  2 tablet Oral Daily    simethicone (MYLICON) chewable tablet 80 mg  80 mg Oral 4x Daily PRN    spironolactone (ALDACTONE) tablet 50 mg  50 mg Oral TID       VTE Pharmacologic Prophylaxis: Sequential compression device (Venodyne)   VTE Mechanical Prophylaxis: sequential compression device    Portions of the record may have been created with voice recognition software  Occasional wrong word or "sound a like" substitutions may have occurred due to the inherent limitations of voice recognition software    Read the chart carefully and recognize, using context, where substitutions have occurred   ==  Iwona Mcneill, [address] [facility]  Internal Medicine Residency PGY-1

## 2019-05-24 NOTE — SOCIAL WORK
CM met pt at bedside, introduce self and made aware of CM role at dc  Pt denies having a LW and POA  Primary contact is her son Alonza Riedel- [phone]  Pt reported that she lives alone in a [address] fl[address] apt with 3 STR  Pt was IPTA with all ADl's, drive and retired  Pt does not use any DME but has a Rollator at home if needed  PCP is Dr Jero Tripathi  Pharmacy is [facility], [facility], [place]  Pt denies hx with STR, alc, druga dn IP psych tx  Pt [facility]  Pt has transportation when dc  CM reviewed d/c planning process including the following: identifying help at home, patient preference for d/c planning needs, Discharge Lounge, [facility] Meds to Bed program, availability of treatment team to discuss questions or concerns patient and/or family may have regarding understanding medications and recognizing signs and symptoms once discharged  CM also encouraged patient to follow up with all recommended appointments after discharge  Patient advised of importance for patient and family to participate in managing patients medical well being

## 2019-05-24 NOTE — PLAN OF CARE
Problem: Nutrition/Hydration-ADULT  Goal: Nutrient/Hydration intake appropriate for improving, restoring or maintaining nutritional needs  Description  Monitor and assess patient's nutrition/hydration status for malnutrition (ex- brittle hair, bruises, dry skin, pale skin and conjunctiva, muscle wasting, smooth red tongue, and disorientation)  Collaborate with interdisciplinary team and initiate plan and interventions as ordered  Monitor patient's weight and dietary intake as ordered or per policy  Utilize nutrition screening tool and intervene per policy  Determine patient's food preferences and provide high-protein, high-caloric foods as appropriate       INTERVENTIONS:  - Monitor oral intake, urinary output, labs, and treatment plans  - Assess nutrition and hydration status and recommend course of action  - Evaluate amount of meals eaten  - Assist patient with eating if necessary   - Allow adequate time for meals  - Recommend/ encourage appropriate diets, oral nutritional supplements, and vitamin/mineral supplements  - Order, calculate, and assess calorie counts as needed  - Recommend, monitor, and adjust tube feedings and TPN/PPN based on assessed needs  - Assess need for intravenous fluids  - Provide specific nutrition/hydration education as appropriate  - Include patient/family/caregiver in decisions related to nutrition  5/24/2019 1409 by Bryan Rosado  Outcome: Progressing  5/24/2019 1339 by Bryan Rosado  Outcome: Progressing

## 2019-05-24 NOTE — PROGRESS NOTES
Meds from home found in room  Sent to pharmacy per hospital policy  Unable to obtain labs this am, pt unwilling to let me try more than once    States she will let us try again after beakfast

## 2019-05-24 NOTE — PLAN OF CARE
Problem: Potential for Falls  Goal: Patient will remain free of falls  Description  INTERVENTIONS:  - Assess patient frequently for physical needs  -  Identify cognitive and physical deficits and behaviors that affect risk of falls  -  [place] fall precautions as indicated by assessment   - Educate patient/family on patient safety including physical limitations  - Instruct patient to call for assistance with activity based on assessment  - Modify environment to reduce risk of injury  - Consider OT/PT consult to assist with strengthening/mobility  Outcome: Progressing     Problem: DISCHARGE PLANNING  Goal: Discharge to home or other facility with appropriate resources  Description  INTERVENTIONS:  - Identify barriers to discharge w/patient and caregiver  - Arrange for needed discharge resources and transportation as appropriate  - Identify discharge learning needs (meds, wound care, etc )  - Arrange for interpretive services to assist at discharge as needed  - Refer to Case Management Department for coordinating discharge planning if the patient needs post-hospital services based on physician/advanced practitioner order or complex needs related to functional status, cognitive ability, or social support system  Outcome: Progressing     Problem: Knowledge Deficit  Goal: Patient/family/caregiver demonstrates understanding of disease process, treatment plan, medications, and discharge instructions  Description  Complete learning assessment and assess knowledge base    Interventions:  - Provide teaching at level of understanding  - Provide teaching via preferred learning methods  Outcome: Progressing

## 2019-05-24 NOTE — MALNUTRITION/BMI
This medical record reflects one or more clinical indicators suggestive of malnutrition     Malnutrition Findings:   Malnutrition type: Chronic illness(chronic anorexia, abdominal pain, valeriano hepatis and peripancreatic lymphadenopathy )  Degree of Malnutrition: Other severe protein calorie malnutrition(evidencedy by 16% wt loss x 6 mo/office visit hx (11/19/18 wt 79 4kg), poor po intake, <75% energy intake compared to estimated energy need for  >1 mo/chronic anorexia, treated with diet, note pt is currently refusing supplements)  Malnutrition Characteristics: Inadequate energy, Weight loss    BMI Findings: Body mass index is 26 04 kg/m²  See Nutrition note dated 05/24/2019for additional details  Completed nutrition assessment is viewable in the nutrition documentation

## 2019-05-24 NOTE — NUTRITION
05/24/19 1308   Assessment   Timepoint Initial  (nursing screen risk for wt loss prior to admit)   Labs   List Completed Labs   (reviewed meds, labs: 5/24 Na 126, , Alk phosphatase 319, h/h 9 1/28  7)   Feeding Route   PO Independent   Adequacy of Intake   Nutrition Modality PO  (low fat, non ulcerogenic)   Intake Meals %  (pt states good appetite while being in the hospital, poor po intake at home)   Estimated Calorie Intake 50-75%   Estimated Protein Intake  50-75%   Estimated Fluid Intake 50-75%   Nutrition Prognosis   Potential Fair   Nutrition Concerns   (ability to take care of self)   Comorbid Concerns   (PMH DM, HCV with cirrhosis, HCC)   Nutrition Precautions None   Nutrition Considerations   (pt did not have questions)   PES Statement   Problem Clinical   Weight (3) Unintended weight loss NC-3 2   Related to Inadequate Intake;Decreased Appetite   As evidenced by: Weight Loss  (office visit wt hx 11/19/18, indicates 16% wt loss x 6 mo)   Patient Nutrition Goals   Goal meet PO needs   Goal Status initiated   Timeframe to complete goal by next f/u   Recommendations/Interventions   Summary   (pt states she is eating better now, does not eat well at home, when asked why, she could not give a reason, refusing supplements, during interview pt falling asleep)   Malnutrition/BMI Present Yes   Malnutrition type Chronic illness  (chronic anorexia, abdominal pain, valeriano hepatis and peripancreatic lymphadenopathy )   Degree of Malnutrition Other severe protein calorie malnutrition  (evidencedy by 16% wt loss x 6 mo/office visit hx (11/19/18 wt 79 4kg), poor po intake, <75% energy intake compared to estimated energy need for  >1 mo/chronic anorexia, treated with diet, note pt is currently refusing supplements)   Malnutrition Characteristics Inadequate energy;Weight loss   Interventions Diet: continued as ordered   Nutrition Recommendations No new recommendations   Nutrition Complexity Risk   Nutrition complexity level Moderate risk   Follow up date 05/31/19

## 2019-05-24 NOTE — UTILIZATION REVIEW
Initial Clinical Review    Admission: Date/Time/Statement: 05/23/2019 @ 1805  Orders Placed This Encounter   Procedures    Place in Observation (expected length of stay for this patient is less than two midnights)     Standing Status:   Standing     Number of Occurrences:   1     Order Specific Question:   Admitting Physician     Answer:   Reji Martines     Order Specific Question:   Level of Care     Answer:   Med Surg [16]     ED: Date/Time/Mode of Arrival:   ED Arrival Information     Expected Arrival Acuity Means of Arrival Escorted By Service Admission Type    - 5/23/2019 15:40 Urgent Wheelchair Self General Medicine Urgent    Arrival Complaint    -Abd pain        Chief Complaint:   Chief Complaint   Patient presents with    Abdominal Pain     patient reports abd pain  States she was discharged yesterday from this hospital  States "i just can't eat"   Assessment/Plan: Kelly Mcclellan,  75 yo, Female, Presented to the ED from home, via private care, with c/o abd pain, unable to eat  In ED given dilaudid  She does endorse chronic anorexia but has been drinking fluids without issue  Recurrence, pain is waxing and waning quality  There are multiple etiologies that could explain this pain such as gallbladder distention and cholestasis seen on her more recent imaging of the abdomen  Additionally could be stretching of Cristi's capsule of the liver given her cirrhosis and hepatic inflammation versus extensive and increased portal venous thrombus/thrombi  Patient feeling better status post Dilaudid IV given in the ED  Continue current pain regimen of oxycodone 10 mg q 6 hours p r n  Continue simethicone  Continue Zofran 4 mg IV p r n  Nausea  Previously on anticoagulation however patient developed serious life-threatening varices bleed with a hemoglobin down to 4 0 therefore anticoagulation at this point was contraindicated based on her clear bleeding risk  Will manage conservatively    Recent imaging does suggest some extension of portal vein thrombi, questionable whether this is related to her abdominal pain  ED Vital Signs:   ED Triage Vitals [05/23/19 1556]   Temperature Pulse Respirations Blood Pressure SpO2   98 5 °F (36 9 °C) 78 18 93/54 96 %      Temp Source Heart Rate Source Patient Position - Orthostatic VS BP Location FiO2 (%)   Oral Monitor Sitting Right arm --      Pain Score       6        Wt Readings from Last 1 Encounters:   05/23/19 66 7 kg (147 lb)     Pertinent Labs/Diagnostic Test Results:   CT abd/pel:    1   Cirrhotic liver with stable treated lesion within right posterior hepatic dome   Chronic portal venous thrombosis with cavernous transformation again seen  Rhodia Resides are prominent branching hypoattenuating foci throughout the liver, most likely representing extension of thrombus into segmental branches  2   Extensive valeriano hepatis and peripancreatic lymphadenopathy with increase in valeriano hepatis lymphadenopathy  3   Small volume ascites  Lab Status: Final result Specimen: Blood from Arm, Right Updated: 05/23/19 1727    Ammonia 28 11 - 35 umol/L      Lab Status: Final result Specimen: Blood from Arm, Right Updated: 05/23/19 1732    Sodium 129Low  136 - 145 mmol/L     Potassium 3 6 3 5 - 5 3 mmol/L     Chloride 96Low  100 - 108 mmol/L     CO2 26 21 - 32 mmol/L     ANION GAP 7 4 - 13 mmol/L     BUN 9 5 - 25 mg/dL     Creatinine 0 84 0 60 - 1 30 mg/dL     Comment: Standardized to IDMS reference method       Glucose 143High  65 - 140 mg/dL     Comment:   If the patient is fasting, the ADA then defines impaired fasting glucose as > 100 mg/dL and diabetes as > or equal to 123 mg/dL  Specimen collection should occur prior to Sulfasalazine administration due to the potential for falsely depressed results  Specimen collection should occur prior to Sulfapyridine administration due to the potential for falsely elevated results         Calcium 8 4 8 3 - 10 1 mg/dL     AST 182High  5 - 45 U/L Comment:   Specimen collection should occur prior to Sulfasalazine administration due to the potential for falsely depressed results  ALT 26 12 - 78 U/L     Comment:   Specimen collection should occur prior to Sulfasalazine and/or Sulfapyridine administration due to the potential for falsely depressed results  Alkaline Phosphatase 331High  46 - 116 U/L     Total Protein 7 3 6 4 - 8 2 g/dL     Albumin 2 0Low  3 5 - 5 0 g/dL     Total Bilirubin 1  69High  0 20 - 1 00 mg/dL     eGFR 83 ml/min/1 73sq m      Lab Status: Final result Specimen: Blood from Arm, Right Updated: 05/23/19 1707    WBC 18  00High  4 31 - 10 16 Thousand/uL     RBC 3 49Low  3 81 - 5 12 Million/uL     Hemoglobin 9 8Low  11 5 - 15 4 g/dL     Hematocrit 30 2Low  34 8 - 46 1 %     Platelets 549 009 - 849 Thousands/uL      ED Treatment:   Medication Administration from 05/23/2019 1540 to 05/23/2019 1849       Date/Time Order Dose Route Action     05/23/2019 1703 sodium chloride 0 9 % bolus 1,000 mL 1,000 mL Intravenous New Bag     05/23/2019 1704 ondansetron (ZOFRAN) injection 4 mg 4 mg Intravenous Given     05/23/2019 1703 HYDROmorphone (DILAUDID) injection 0 5 mg 0 5 mg Intravenous Given        Past Medical/Surgical History:    Active Ambulatory Problems     Diagnosis Date Noted    Anxiety 07/24/2012    Fatigue 09/23/2014    Chronic lumbar radiculopathy 05/08/2014    Claustrophobia 05/08/2013    Constipation 09/23/2014    Diabetes mellitus, type II ([facility] ) 06/26/2012    Diabetic neuropathy ([facility] ) 06/17/2015    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 ([facility] ) 03/31/2014    Hepatocellular carcinoma ([facility] ) 11/12/2015    Herniated lumbar disc without myelopathy 11/05/2014    Hypertension 09/11/2012    Insomnia 10/14/2015    Left foot pain 03/31/2016    Myofascial pain 10/19/2017    Nicotine dependence 07/18/2014    Opioid dependence ([facility] ) 02/02/2016    Osteoporosis 10/24/2013    Pain syndrome, chronic 02/03/2015    [facility] ) 08/07/2017    Seasonal allergies 06/17/2015    Trochanteric bursitis 07/16/2015    Elevated serum creatinine 03/14/2018    Lumbar disc herniation 03/14/2018    Goals of care, counseling/discussion 03/14/2018    Hyponatremia 09/16/2018    Abdominal pain 09/16/2018    Ascites 09/17/2018    Decompensated hepatic cirrhosis ([facility] ) 09/17/2018    Acute on chronic blood loss anemia 09/18/2018    Portal vein thrombosis 09/19/2018    Depression 10/03/2018    Tobacco abuse 10/03/2018    Long term (current) use of anticoagulants 02/16/2019    Melena 02/16/2019    Hypotension 02/16/2019    GI bleed 02/16/2019    Hypokalemia 03/07/2019    Anemia 03/07/2019    Secondary esophageal varices without bleeding (HCC) 03/07/2019    Excess ear wax 05/01/2019    Ear popping 05/01/2019    Lumbar spondylosis 05/13/2019    Spinal stenosis of lumbar region without neurogenic claudication 05/13/2019    Swelling of both lower extremities 05/14/2019     Past Medical History:   Diagnosis Date    Cancer ([facility] )     Chronic pain disorder     Cirrhosis ([facility] )     Current use of long term anticoagulation     Diabetes mellitus ([place] [address] )     Drug dependence ([place] [address] )     Liver disease      Admitting Diagnosis: Abdominal pain [R10 9]  Age/Sex: 76 y o  female  Admission Orders:  Scheduled Meds:   Current Facility-Administered Medications:  acetaminophen 650 mg Oral Q6H PRN   furosemide 20 mg Oral TID   gabapentin 300 mg Oral QAM   gabapentin 900 mg Oral HS   [START ON 5/25/2019] nadolol 20 mg Oral Daily   nicotine 1 patch Transdermal Q24H   ondansetron 4 mg Intravenous Q6H PRN   oxyCODONE-acetaminophen 2 tablet Oral Q6H PRN   pantoprazole 40 mg Oral Daily With Breakfast   polyethylene glycol 17 g Oral Daily PRN   potassium chloride 20 mEq Oral BID   senna 2 tablet Oral Daily   simethicone 80 mg Oral 4x Daily PRN   spironolactone 50 mg Oral TID     Regular diet  Up with assistance / ambulate TID  Consult GI:  abd pain  Boo SCDs      Network Utilization Review Department  Phone: [phone]; Fax [phone]  [email or web address]  [email or web address]  ATTENTION: Please call with any questions or concerns to [phone]  and carefully listen to the prompts so that you are directed to the right person  Send all requests for admission clinical reviews, approved or denied determinations and any other requests to fax [phone]  All voicemails are confidential     ==================================================================================    Continued Stay Review - OBSERVATION  Date: 05/24/2019  Vital Signs: BP (!) 88/56 (BP Location: Left arm)   Pulse 74   Temp 99 3 °F (37 4 °C) (Oral)   Resp 18   Ht 5' 3" (1 6 m)   Wt 66 7 kg (147 lb)   SpO2 97%   BMI 26 04 kg/m²    Assessment/Plan: Recurrence, pain is waxing and waning quality  There are multiple etiologies that could explain this pain such as gallbladder distention and cholestasis seen on her more recent imaging of the abdomen  Additionally could be stretching of Cristi's capsule of the liver given her cirrhosis and hepatic inflammation versus extensive and increased portal venous thrombus/thrombi  Patient feeling better status post Dilaudid IV given in the ED  Continue current pain regimen of oxycodone 10 mg q 6 hours p r n  Continue simethicone  Continue Zofran 4 mg IV p r n  Nausea  Will obtain GI consult, will try bentyl for symptom relief  Consider repeat abdominal imaging if pain continues, Also consider SBP  Will obtain U/A as well  Consider psychosocial/psychiatric causes as well, along with opioid induced    VTE Pharmacologic Prophylaxis: Sequential compression device (Venodyne)   VTE Mechanical Prophylaxis: sequential compression device  Medications:   Scheduled Meds:   Current Facility-Administered Medications:  acetaminophen 650 mg Oral Q6H PRN   dicyclomine 20 mg Oral 4x Daily (AC & HS)   furosemide 20 mg Oral TID   gabapentin 300 mg Oral QAM   gabapentin 900 mg Oral HS   [START ON 5/25/2019] nadolol 20 mg Oral Daily   nicotine 1 patch Transdermal Q24H   ondansetron 4 mg Intravenous Q6H PRN   oxyCODONE-acetaminophen 2 tablet Oral Q6H PRN   pantoprazole 40 mg Oral Daily With Breakfast   polyethylene glycol 17 g Oral Daily PRN   potassium chloride 20 mEq Oral BID   senna 2 tablet Oral Daily   simethicone 80 mg Oral 4x Daily PRN   spironolactone 50 mg Oral TID   Pertinent Labs/Diagnostic Results:   Age/Sex: 76 y o  female   Discharge Plan: TBD      Network Utilization Review Department  Phone: [phone]; Fax [phone]  [email or web address]  ATTENTION: Please call with any questions or concerns to [phone]  and carefully listen to the prompts so that you are directed to the right person  Send all requests for admission clinical reviews, approved or denied determinations and any other requests to fax [phone]   All voicemails are confidential

## 2019-05-24 NOTE — DISCHARGE SUMMARY
[address] Discharge Summary - Medical Bethanie Odom 76 y o  female MRN: [id number]    [place] [address] Room / Bed: [facility] 336/[facility] 789-94 Encounter: [id number]    BRIEF OVERVIEW    Admitting Provider: Luanne Christie DO  Discharge Provider: Luanne Christie DO  Primary Care Physician at Discharge: Brianna Knight DO    Discharge To: Home/ Self  Facility / Family Member Name:   Phone Number:     Admission Date: 5/21/2019     Discharge Date: 5/22/2019  7:30 PM    Primary Discharge Diagnosis  Principal Problem:    Portal vein thrombosis  Active Problems:    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)    Hepatocellular carcinoma (HCC)    Hypertension    Pain syndrome, chronic    Abdominal pain    Secondary esophageal varices without bleeding ([facility] [address] )  Resolved Problems:    * No resolved hospital problems  *      Diagnostic Procedures Performed  CT abdomen pelvis with contrast   Final Result by Ada Lou MD (05/21 8838)         1  Cirrhotic liver with stable treated lesion within right posterior hepatic dome  Chronic portal venous thrombosis with cavernous transformation again seen  There are prominent branching hypoattenuating foci throughout the liver, most likely    representing extension of thrombus into segmental branches  2   Extensive valeriano hepatis and peripancreatic lymphadenopathy with increase in valeriano hepatis lymphadenopathy  3   Small volume ascites              Workstation performed: [id number]           Discharge Disposition: Home/Self Care  Discharged With Lines: no    Test Results Pending at Discharge: None        Outpatient [facility] for [facility]  Gastroenterology  -Dr Halle Wisdom oncologist  -Dr Lonnie Rawls Pain management          Code Status: Level 1 - Full Code  Advance Directive and Living Will: <no information>  Power of :    POLST:      Medications :  CONTINUE these medications which have NOT CHANGED Details   furosemide (LASIX) 20 mg tablet Take 3 tablets (60 mg total) by mouth daily, Starting Mon 4/22/2019, Normal      gabapentin (NEURONTIN) 300 mg capsule 1 tab in the am and 3 tabs PO night time, Normal      nadolol (CORGARD) 20 mg tablet Take 1 tablet (20 mg total) by mouth daily, Starting Mon 4/22/2019, Normal      nicotine (NICODERM CQ) 21 mg/24 hr TD 24 hr patch Place 1 patch on the skin daily, Starting Tue 3/12/2019, Print      oxyCODONE-acetaminophen (PERCOCET)  mg per tablet Take 1 tab PO Q 6 hours prn pain  2nd month script Do not fill until 6/21/19, Print      pantoprazole (PROTONIX) 40 mg tablet Take 1 tablet (40 mg total) by mouth daily with breakfast, Starting Fri 4/19/2019, Normal      polyethylene glycol (MIRALAX) 17 g packet Take 17 g by mouth daily as needed (Constipation), Starting Thu 3/21/2019, Normal      potassium chloride (MICRO-K) 10 MEQ CR capsule Take 2 capsules (20 mEq total) by mouth 2 (two) times a day for 7 days, Starting Mon 5/13/2019, Until Mon 5/20/2019, Normal      senna (SENOKOT) 8 6 MG tablet Take 2 tablets by mouth daily, Historical Med      spironolactone (ALDACTONE) 50 mg tablet Take 3 tablets (150 mg total) by mouth daily, Starting Mon 4/22/2019, Normal             Allergies  No Known Allergies  Discharge Diet: 2gram sodium diet, 2 liter fluid restriction,   Activity restrictions: none    [address]  is a 75 y/o female with PMHx of Hepatocellular carcinoma s/p resection and ablation in 2015,  chronic portal vein thrombosis s/p SIRS sphere radioembolization procedure in December 2017 and on eliquis,  Hep C-Cirrhosis , and HTN, presented with diffuse abdominal pain which started yesterday afternoon      The patient developed abdominal on Wednesday afternoon, follow by six episodes of vomiting, non bloody, non bilious  She also vomited once this morning  She then presented to the ER for further evaluation   She denies radiation of the abdominal pain, rates it as 6/10, intermittent in nature and described as dull  She reports one episode of diarrhea, without any blood or melanotic appearing stool  She states that she noticed that her stomach has gotten more distended throughout the day  She reports worsening of her appetite  She denies fevers, chills, nightsweats, chest pain, shortness of breath  She denies recent sick contacts  Denies any recent travel out of the [facility] or elsewhere          In the ED the patient had bedside ultrasound which showed a distended gallbladder with significant sludge, without any stones or CBD distension  The ascites was not drainable  Ammonia and lipase levels were both normal   The patient has a history of chronic hyponatremia with current sodium of 131, Cr 0 80  The patient also had elevated liver enzymes slightly above normal range  Total Bilirubin was only mildlyelevated to 1 68  Moderate leukocytosis to 15 47 and hemoglobin of 9 6 within her normal baseline range  CT of the abdomen showed extension of her chronic portal venous thrombosis  The patient's abdominal pain resolved shortly after her admission  She was able eat 50 to 75% of her breakfast, lunch, and dinner  She denied any nausea, vomiting, abdominal pain, diarrhea  After receiving one dose of zofran in the ER, she did not require any during her inpatient admission  The decision was made to refrain from starting anticoagulation, due to the patient having recent life threatening variceal bleeding  Patient has good established follow up with DARRYN BENZ  [facility] clinic, will schedule for TCM  She has followup with pain management and Gastroenterology  Recommend followup with Dr Kady Griffiths surgical oncologist as well           Presenting Problem/History of Present Illness  Principal Problem:    Portal vein thrombosis  Active Problems:    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 ([facility] [address] )    Hepatocellular carcinoma (HCC) Hypertension    Pain syndrome, chronic    Abdominal pain    Secondary esophageal varices without bleeding (HCC)  Resolved Problems:    * No resolved hospital problems  *           Tyler Hassan is a 75 y/o female with PMHx of Hepatocellular carcinoma s/p resection and ablation in 2015,  chronic portal vein thrombosis s/p SIRS sphere radioembolization procedure in December 2017 and on eliquis,  Hep C-Cirrhosis , and HTN        1) Chronic Portal vein thrombosis with extension of thrombosis  -anticoagulated on eliquis since 2017   -In march of this year the patient found to have esophageal varices with bleeding stigmata after being found to be profoundly anemic with a hemoglobin of 4 3  She was started on nadolol 20mg daily  It was decided not to resume the patient's anticoagulation with eliquis at that time    -patient denies hematemesis, hematochezia, melena         Plan: Risk of repeat variceal bleeding is high in this patient with recent life threatening variceal bleeding  Will not start antiocoagulation at this time  Will refer patient to contact her surgical oncologist for followup to review change in CT findings with extension of thrombus,  as well as following up with DARRYN BENZ  [facility] clinic  She already has followup scheduled with Gastroenterology  Discussed case with GI  She is stable and asymptomatic, ok for discharge with close followup            2) Decompensated cirrhosis  -MELD score of 10, with ascites on exam   -no asterixis on exam, alert and oriented x3, ammonia of 27    -albumin 2 0, total bilirubin 1 68,  INR 1 15,  Platelets 812     Plan: continue home aldactone and lasix           3) Abdominal pain associated with nausea and vomiting  -patient reports trying new protein shake yesterday, with subsequent development of abdominal pain several hours later  She also reports more than 6 episodes of NBNB emesis    -one episode of diarrhea this morning    -bedside ultrasound showed distended gallbladder, without stones or CBD dilation  Negative clark sign on physical exam  Likely seconday to chronic portal vein thrombosis causing portal cholangiopathy    -elevated liver enzymes        Plan: likely gastroenteritis, has fully resolved, patient is tolerating oral intake eating all her meals  She denies any nausea or vomiting  She denies any abdominal pain since yesterday  Will discharge and follow up with [facility]          4) Hepatocellular carcinoma  -treated by Dr Lois Powell from surgical oncology  S/p resection and ablation in 2015 for [address]   S/p Sirs sphere radio embolization in December 2017 on eliquis until recent hospitalization for bleeding esophageal varices            5) elevated liver enzymes  Elevated alk phos to 308, , ALT 39  -CT of the abdomen shows cirrhotic liver, hypoattenuating lesion with capsular retraction along the right posterior dome of the liver is unchanged in appearance and corresponds to previously treated [address]  Chronic portal venous thrombosis with associated cavernous transformation, with likely thrombi throughout portal venous parenchymal branches         Plan: Will check cmp prior to [address] [place] [address] followup within 1-2 weeks  Patient informed to followup with Dr Lois Powell her oncologist as well             6) Iron deficiency anemia and anemia of chronic disease  -normocytic MCV of 86, increased RDW to 19 2  September 2018 iron panel showed : iron 28, TIBC 322, ferritin 428  -patient did not tolerate PO iron therapy in the past   -hemoglobin of 9 8 this AM which is within the patients baseline of 9-10          7) Chronic back pain  -Patient follows with pain management   She is prescribed Percocet 10/325 mg Q 6 p r n      Plan: continue home dose percocet        8) Hypotension  -patient asymptomatic with SBP   She has a history of chronically low BP, as expected in a cirrhotic       Plan: will give low volume bolus of albumin if BP drops below 70 systolic and symptomatic        PT/OT recommendation: Not consulted  Disposition: Patient is clear for discharge today, will not start anticoagulation  Patient will follow up with [address] [place] [address] for TCM, she has GI appointment in  and follow up with pain management as well  Subjective      Patient was seen and examined at bedside this morning  She reports resolution of her abdominal pain  She also denies nausea and vomiting  She does have a recent history of poor appetite, but reports a good appetite today, eating breakfast, lunch, and dinner, finishing the majority of each meal  She reports one bowel movement today, soft but formed  She feels well overall and is eager to get home       Objective      Vitals: Temp (24hrs), Av 6 °F (36 4 °C), Min:97 5 °F (36 4 °C), Max:97 7 °F (36 5 °C)  Current: Temperature: 97 7 °F (36 5 °C)  Patient Vitals for the past 24 hrs:    BP Temp Pulse Resp SpO2 Weight   19 0653 94/61 97 7 °F (36 5 °C) 64 18 -- --   19 0537 -- -- -- -- -- 63 2 kg (139 lb 5 3 oz)   19 0212 97/60 -- 55 -- 97 % --   19 2342 (!) 80/46 -- -- -- -- --   19 2340 (!) 74/44 -- 56 16 98 % --   19 1948 90/52 97 5 °F (36 4 °C) 59 18 100 % --    Body mass index is 23 92 kg/m²  Physical Exam:  GENERAL: NAD, alert and oriented  HEENT:  NC/AT, PERRL, EOMI, no scleral icterus  CARDIAC:  RRR, +S1/S2, no S3/S4 heard, no m/g/r  PULMONARY:  CTA B/L, no wheezing/rales/rhonci, non-labored breathing  ABDOMEN:  Distended abdomen, but soft and without fluid wave, no pain to palpation of the abdomen  Positive bowel sounds  Extremities:  Trace edema, cyanosis, or clubbing  NEUROLOGIC: Grossly intact, no asterixis  SKIN:  No rashes or erythema noted on exposed skin  Psych: Normal affect             Discharge Condition: good      Discharge  Statement   I spent 30 minutes minutes discharging the patient  This time was spent on the day of discharge   I had direct contact with the patient on the day of discharge  Additional documentation is required if more than 30 minutes were spent on discharge

## 2019-05-25 ENCOUNTER — APPOINTMENT (OUTPATIENT)
Dept: RADIOLOGY | Facility: HOSPITAL | Age: 69
DRG: 391 | End: 2019-05-25
Payer: COMMERCIAL

## 2019-05-25 LAB
ALBUMIN SERPL BCP-MCNC: 2.1 G/DL (ref 3.5–5)
ALP SERPL-CCNC: 365 U/L (ref 46–116)
ALT SERPL W P-5'-P-CCNC: 34 U/L (ref 12–78)
AMMONIA PLAS-SCNC: 25 UMOL/L (ref 11–35)
ANION GAP SERPL CALCULATED.3IONS-SCNC: 7 MMOL/L (ref 4–13)
AST SERPL W P-5'-P-CCNC: 254 U/L (ref 5–45)
BASOPHILS # BLD AUTO: 0.02 THOUSANDS/ΜL (ref 0–0.1)
BASOPHILS NFR BLD AUTO: 0 % (ref 0–1)
BILIRUB SERPL-MCNC: 1.49 MG/DL (ref 0.2–1)
BUN SERPL-MCNC: 8 MG/DL (ref 5–25)
CALCIUM SERPL-MCNC: 9.1 MG/DL (ref 8.3–10.1)
CHLORIDE SERPL-SCNC: 94 MMOL/L (ref 100–108)
CO2 SERPL-SCNC: 27 MMOL/L (ref 21–32)
CREAT SERPL-MCNC: 0.81 MG/DL (ref 0.6–1.3)
EOSINOPHIL # BLD AUTO: 0.03 THOUSAND/ΜL (ref 0–0.61)
EOSINOPHIL NFR BLD AUTO: 0 % (ref 0–6)
ERYTHROCYTE [DISTWIDTH] IN BLOOD BY AUTOMATED COUNT: 18.3 % (ref 11.6–15.1)
GFR SERPL CREATININE-BSD FRML MDRD: 86 ML/MIN/1.73SQ M
GLUCOSE P FAST SERPL-MCNC: 80 MG/DL (ref 65–99)
GLUCOSE SERPL-MCNC: 80 MG/DL (ref 65–140)
HCT VFR BLD AUTO: 31.3 % (ref 34.8–46.1)
HGB BLD-MCNC: 10.2 G/DL (ref 11.5–15.4)
IMM GRANULOCYTES # BLD AUTO: 0.1 THOUSAND/UL (ref 0–0.2)
IMM GRANULOCYTES NFR BLD AUTO: 1 % (ref 0–2)
LYMPHOCYTES # BLD AUTO: 1.23 THOUSANDS/ΜL (ref 0.6–4.47)
LYMPHOCYTES NFR BLD AUTO: 8 % (ref 14–44)
MCH RBC QN AUTO: 28.2 PG (ref 26.8–34.3)
MCHC RBC AUTO-ENTMCNC: 32.6 G/DL (ref 31.4–37.4)
MCV RBC AUTO: 87 FL (ref 82–98)
MONOCYTES # BLD AUTO: 1.5 THOUSAND/ΜL (ref 0.17–1.22)
MONOCYTES NFR BLD AUTO: 10 % (ref 4–12)
NEUTROPHILS # BLD AUTO: 12 THOUSANDS/ΜL (ref 1.85–7.62)
NEUTS SEG NFR BLD AUTO: 81 % (ref 43–75)
NRBC BLD AUTO-RTO: 0 /100 WBCS
PLATELET # BLD AUTO: 365 THOUSANDS/UL (ref 149–390)
PMV BLD AUTO: 9.7 FL (ref 8.9–12.7)
POTASSIUM SERPL-SCNC: 3.9 MMOL/L (ref 3.5–5.3)
PROT SERPL-MCNC: 8.1 G/DL (ref 6.4–8.2)
RBC # BLD AUTO: 3.62 MILLION/UL (ref 3.81–5.12)
SODIUM SERPL-SCNC: 128 MMOL/L (ref 136–145)
WBC # BLD AUTO: 14.88 THOUSAND/UL (ref 4.31–10.16)

## 2019-05-25 PROCEDURE — 76705 ECHO EXAM OF ABDOMEN: CPT

## 2019-05-25 PROCEDURE — 85025 COMPLETE CBC W/AUTO DIFF WBC: CPT | Performed by: INTERNAL MEDICINE

## 2019-05-25 PROCEDURE — 80053 COMPREHEN METABOLIC PANEL: CPT | Performed by: INTERNAL MEDICINE

## 2019-05-25 PROCEDURE — 99232 SBSQ HOSP IP/OBS MODERATE 35: CPT | Performed by: INTERNAL MEDICINE

## 2019-05-25 PROCEDURE — 87040 BLOOD CULTURE FOR BACTERIA: CPT | Performed by: INTERNAL MEDICINE

## 2019-05-25 PROCEDURE — 82140 ASSAY OF AMMONIA: CPT | Performed by: INTERNAL MEDICINE

## 2019-05-25 RX ORDER — BISACODYL 10 MG
10 SUPPOSITORY, RECTAL RECTAL ONCE
Status: DISCONTINUED | OUTPATIENT
Start: 2019-05-25 | End: 2019-05-26

## 2019-05-25 RX ORDER — SUCRALFATE ORAL 1 G/10ML
1000 SUSPENSION ORAL ONCE
Status: COMPLETED | OUTPATIENT
Start: 2019-05-25 | End: 2019-05-25

## 2019-05-25 RX ORDER — PANTOPRAZOLE SODIUM 40 MG/1
40 TABLET, DELAYED RELEASE ORAL
Status: DISCONTINUED | OUTPATIENT
Start: 2019-05-25 | End: 2019-05-31

## 2019-05-25 RX ORDER — SODIUM CHLORIDE 9 MG/ML
75 INJECTION, SOLUTION INTRAVENOUS CONTINUOUS
Status: DISCONTINUED | OUTPATIENT
Start: 2019-05-25 | End: 2019-05-25

## 2019-05-25 RX ORDER — OXYCODONE HYDROCHLORIDE AND ACETAMINOPHEN 5; 325 MG/1; MG/1
1 TABLET ORAL ONCE
Status: COMPLETED | OUTPATIENT
Start: 2019-05-25 | End: 2019-05-25

## 2019-05-25 RX ADMIN — NICOTINE 1 PATCH: 21 PATCH, EXTENDED RELEASE TRANSDERMAL at 21:01

## 2019-05-25 RX ADMIN — SPIRONOLACTONE 50 MG: 50 TABLET ORAL at 09:24

## 2019-05-25 RX ADMIN — SPIRONOLACTONE 50 MG: 50 TABLET ORAL at 17:38

## 2019-05-25 RX ADMIN — SODIUM CHLORIDE 75 ML/HR: 0.9 INJECTION, SOLUTION INTRAVENOUS at 10:18

## 2019-05-25 RX ADMIN — DICYCLOMINE HYDROCHLORIDE 20 MG: 20 TABLET ORAL at 20:26

## 2019-05-25 RX ADMIN — FUROSEMIDE 20 MG: 20 TABLET ORAL at 20:26

## 2019-05-25 RX ADMIN — FUROSEMIDE 20 MG: 20 TABLET ORAL at 17:37

## 2019-05-25 RX ADMIN — POTASSIUM CHLORIDE 20 MEQ: 1500 TABLET, EXTENDED RELEASE ORAL at 09:24

## 2019-05-25 RX ADMIN — OXYCODONE HYDROCHLORIDE AND ACETAMINOPHEN 1 TABLET: 5; 325 TABLET ORAL at 20:57

## 2019-05-25 RX ADMIN — POTASSIUM CHLORIDE 20 MEQ: 1500 TABLET, EXTENDED RELEASE ORAL at 17:37

## 2019-05-25 RX ADMIN — RIFAXIMIN 550 MG: 550 TABLET ORAL at 20:27

## 2019-05-25 RX ADMIN — DICYCLOMINE HYDROCHLORIDE 20 MG: 20 TABLET ORAL at 17:38

## 2019-05-25 RX ADMIN — GABAPENTIN 300 MG: 300 CAPSULE ORAL at 09:24

## 2019-05-25 RX ADMIN — DICYCLOMINE HYDROCHLORIDE 20 MG: 20 TABLET ORAL at 12:02

## 2019-05-25 RX ADMIN — GABAPENTIN 900 MG: 300 CAPSULE ORAL at 20:26

## 2019-05-25 RX ADMIN — SUCRALFATE 1000 MG: 1 SUSPENSION ORAL at 12:03

## 2019-05-25 RX ADMIN — SPIRONOLACTONE 50 MG: 50 TABLET ORAL at 20:27

## 2019-05-25 RX ADMIN — LACTULOSE 30 G: 10 SOLUTION ORAL at 11:08

## 2019-05-25 RX ADMIN — PANTOPRAZOLE SODIUM 40 MG: 40 TABLET, DELAYED RELEASE ORAL at 17:37

## 2019-05-25 RX ADMIN — NADOLOL 20 MG: 20 TABLET ORAL at 12:03

## 2019-05-25 RX ADMIN — SENNOSIDES 17.2 MG: 8.6 TABLET, FILM COATED ORAL at 09:24

## 2019-05-25 RX ADMIN — PANTOPRAZOLE SODIUM 40 MG: 40 TABLET, DELAYED RELEASE ORAL at 09:24

## 2019-05-25 RX ADMIN — CEFTRIAXONE SODIUM 2000 MG: 10 INJECTION, POWDER, FOR SOLUTION INTRAVENOUS at 11:39

## 2019-05-25 RX ADMIN — FUROSEMIDE 20 MG: 20 TABLET ORAL at 09:24

## 2019-05-25 RX ADMIN — DICYCLOMINE HYDROCHLORIDE 20 MG: 20 TABLET ORAL at 09:24

## 2019-05-25 NOTE — PROGRESS NOTES
INTERNAL MEDICINE RESIDENCY PROGRESS NOTE     Name: Rose Mary Lacey   Age & Sex: 76 y o  female   MRN: [id number]  Unit/Bed#: [facility] 708-01   Encounter: [id number]  Team: SOD Team A    PATIENT INFORMATION     Name: Rose Mary Lacey   Age & Sex: 76 y o  female   MRN: [id number]  Hospital Stay Days: 0    ASSESSMENT/PLAN     Principal Problem:    Abdominal pain  Active Problems:    Chronic lumbar radiculopathy    Diabetes mellitus, type II ([facility] )    Diabetic neuropathy ([facility] )    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 ([facility] )    Hepatocellular carcinoma (HCC)    Nicotine dependence    Opioid dependence ([place] [address] )    Pain syndrome, chronic    Hyponatremia    Decompensated hepatic cirrhosis ([place] [address] )    Portal vein thrombosis      Portal vein thrombosis  Assessment & Plan  Previously on anticoagulation however patient developed serious life-threatening varices bleed with a hemoglobin down to 4 0 therefore anticoagulation at this point was contraindicated based on her clear bleeding risk  Will manage conservatively  Recent imaging does suggest some extension of portal vein thrombi, questionable whether this is related to her abdominal pain  Plan: Consulted and discussed with GI, will not anticoagulate due to high risk of recurrent esophageal variceal bleeding  Decompensated hepatic cirrhosis ([facility] )  Assessment & Plan  Minimal to mild ascites on recent imaging  No indication for paracentesis at this time  Does have varices with history of varices bleed this year contraindicating anticoagulation  Plan: diagnostic paracentesis per IR, patient is stable currently will wait until paracentesis to start antibiotics  Hyponatremia  Assessment & Plan  Sodium of 129 on admission  Previously was 132 1-2 days ago  Poor p o  Intake noted with anorexia  Possible beer potomania  Recheck BMP in a m  And will continue diuretics of Lasix 60 mg and spironolactone 150 mg daily for now        Pain syndrome, chronic  Assessment & Plan  Stable in patient with terminal condition  Continue outpatient oxycodone  Opioid dependence ([facility] [address] )  Assessment & Plan  On chronic opiate therapy with oxycodone 10 mg q 6 hours p r n  And will continue this while in hospital   Follows with pain management and palliative care  Nicotine dependence  Assessment & Plan  Nicotine patch daily p r n  Hepatocellular carcinoma [facility])  Assessment & Plan  Outpatient follow-up with Dr Mitzi Merritt of Surgical Oncology  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 ([facility] [address] )  Assessment & Plan  Minimal to mild ascites on recent imaging  Following with GI  Outpatient EGD for monitoring of varices and esophageal candidiasis  Diabetic neuropathy [facility])  Assessment & Plan  Lab Results   Component Value Date    HGBA1C 5 4 03/09/2019     Continue gabapentin 300 mg q a m , 900 mg total q h s         Diabetes mellitus, type II [facility])  Assessment & Plan  Lab Results   Component Value Date    HGBA1C 5 4 03/09/2019       Hyperglycemic with a blood glucose in the 140s on admission  No indication for insulin therapy while in hospital here at this time  Monitor glucose on BMP in the a m  Kristen Andrade Chronic lumbar radiculopathy  Assessment & Plan  Follows with pain management outpatient  On stable regimen of oxycodone and gabapentin which will be continued  * Abdominal pain  Assessment & Plan  Recurrence, pain is waxing and waning quality  There are multiple etiologies that could explain this pain such as gallbladder distention and cholestasis seen on her more recent imaging of the abdomen  Additionally could be stretching of Cristi's capsule of the liver given her cirrhosis and hepatic inflammation versus extensive and increased portal venous thrombus/thrombi  Patient feeling better status post Dilaudid IV given in the ED  Continue current pain regimen of oxycodone 10 mg q 6 hours p r n    Continue simethicone, carafate, PPI BID  Continue Zofran 4 mg IV p r n  Nausea      Plan: Patient noted to be febrile yesterday to 101 5F at 16:28, will draw 2 sets of blood cultures  Urinalysis negative  Will consult IR for paracentesis in this decompensated cirrhosis with fever, concern for SBP, will require diagnostic paracentesis  Patient is NPO since midnight for RUQ ultrasound this AM, will change to US complete abdomen to assess for ascites, since prior CT scan showed minimal ascites  Patient is noted to be significantly lethargic today, alert to person and place, without asterixis, but with abdominal pain and tenderness to palpation in epigastric and ruq  Will start patient on ceftriaxone 2 grams daily due to concern for SBP, will obtain blood cultures prior to abx  She has not had a bowel movement since , she refused morning lactulose but was able to be convinced to take it this afternoon  Will also give dulcolax suppository, continue lactulose titrate to 3 bm's daily  Disposition: Requires further inpatient management concern for SBP and other infectious etiology  SUBJECTIVE     Patient seen and examined  No acute events overnight  She does seem slightly more lethargic than yesterday  She denies any abdominal pain currently, denies nausea, vomiting, diarrhea  She denies having any bowel movements yesterday  She slept well throughout the night, denying fever, chills, nightsweats  She is hungry this morning for breakfast, informed she is NPO for a procedure       OBJECTIVE     Vitals:    19 1628 19 1704 19 2240 19 0719   BP: 108/70  109/71 100/63   BP Location:       Pulse: 76  74 71   Resp:       Temp: (!) 101 5 °F (38 6 °C) 100 2 °F (37 9 °C) 98 2 °F (36 8 °C) 98 6 °F (37 °C)   TempSrc:       SpO2: 96%  96% 96%   Weight:       Height:          Temperature:   Temp (24hrs), Av °F (37 8 °C), Min:98 2 °F (36 8 °C), Max:101 5 °F (38 6 °C)    Temperature: 98 6 °F (37 °C)  Intake & Output:  I/O        07 -  0700 05/24 0701 - 05/25 0700 05/25 0701 - 05/26 0700    P  O   240 0    IV Piggyback 1000      Total Intake(mL/kg) 1000 (15) 240 (3 6) 0 (0)    Urine (mL/kg/hr)  280 (0 2)     Total Output  280     Net +1000 -40 0           Unmeasured Urine Occurrence 1 x 1 x         Weights:   IBW: 52 4 kg    Body mass index is 26 04 kg/m²  Weight (last 2 days)     Date/Time   Weight    05/23/19 1910   66 7 (147)    05/23/19 1556   66 7 (147)            Physical Exam   Constitutional: She is oriented to person, place, and time  No distress  HENT:   Head: Normocephalic and atraumatic  Mouth/Throat: No oropharyngeal exudate  Eyes: No scleral icterus  Neck: Neck supple  No JVD present  No tracheal deviation present  No thyromegaly present  Cardiovascular: Normal rate, regular rhythm, normal heart sounds and intact distal pulses  Exam reveals no gallop and no friction rub  No murmur heard  Pulmonary/Chest: Effort normal and breath sounds normal  No stridor  No respiratory distress  She has no wheezes  She has no rales  She exhibits no tenderness  Abdominal: Soft  Bowel sounds are normal  She exhibits distension  There is no tenderness  There is no rebound and no guarding  Tender to palpation in RUQ and epigastric area   Musculoskeletal: She exhibits edema  She exhibits no tenderness  Trace edema bilateral lower extremity   Lymphadenopathy:     She has no cervical adenopathy  Neurological: She is alert and oriented to person, place, and time  Skin: Skin is warm and dry  She is not diaphoretic  No erythema  No pallor  Nursing note and vitals reviewed  Physical Exam   Constitutional: She is oriented to person, place, and time  No distress  HENT:   Head: Normocephalic and atraumatic  Mouth/Throat: No oropharyngeal exudate  Eyes: No scleral icterus  Neck: Neck supple  No JVD present  No tracheal deviation present  No thyromegaly present     Cardiovascular: Normal rate, regular rhythm, normal heart sounds and intact distal pulses  Exam reveals no gallop and no friction rub  No murmur heard  Pulmonary/Chest: Effort normal and breath sounds normal  No stridor  No respiratory distress  She has no wheezes  She has no rales  She exhibits no tenderness  Abdominal: Soft  Bowel sounds are normal  She exhibits distension  There is no tenderness  There is no rebound and no guarding  Tender to palpation in RUQ and epigastric area   Musculoskeletal: She exhibits edema  She exhibits no tenderness  Trace edema bilateral lower extremity   Lymphadenopathy:     She has no cervical adenopathy  Neurological: She is alert and oriented to person, place, and time  Skin: Skin is warm and dry  She is not diaphoretic  No erythema  No pallor  Nursing note and vitals reviewed  LABORATORY DATA     Labs: I have personally reviewed pertinent reports  Results from last 7 days   Lab Units 05/24/19  1135 05/23/19  1654 05/22/19  0536   WBC Thousand/uL 14 48* 18 00* 11 00*   HEMOGLOBIN g/dL 9 1* 9 8* 9 8*   HEMATOCRIT % 28 7* 30 2* 30 6*   PLATELETS Thousands/uL 320 340 274   NEUTROS PCT % 80* 83* 77*   MONOS PCT % 11 9 12      Results from last 7 days   Lab Units 05/24/19  1135 05/23/19  1654 05/22/19  0536   POTASSIUM mmol/L 3 7 3 6 3 3*   CHLORIDE mmol/L 93* 96* 96*   CO2 mmol/L 27 26 29   BUN mg/dL 10 9 12   CREATININE mg/dL 0 81 0 84 0 81   CALCIUM mg/dL 8 7 8 4 8 5   ALK PHOS U/L 319* 331* 308*   ALT U/L 25 26 39   AST U/L 149* 182* 324*     Results from last 7 days   Lab Units 05/24/19  1135   MAGNESIUM mg/dL 2 0          Results from last 7 days   Lab Units 05/21/19  1511   INR  1 15   PTT seconds 36               IMAGING & DIAGNOSTIC TESTING     Radiology Results: I have personally reviewed pertinent reports  No results found  Other Diagnostic Testing: I have personally reviewed pertinent reports      ACTIVE MEDICATIONS     Current Facility-Administered Medications   Medication Dose Route Frequency    acetaminophen (TYLENOL) tablet 650 mg  650 mg Oral Q6H PRN    dicyclomine (BENTYL) tablet 20 mg  20 mg Oral 4x Daily (AC & HS)    furosemide (LASIX) tablet 20 mg  20 mg Oral TID    gabapentin (NEURONTIN) capsule 300 mg  300 mg Oral QAM    gabapentin (NEURONTIN) capsule 900 mg  900 mg Oral HS    lactulose 20 g/30 mL oral solution 30 g  30 g Oral TID    nadolol (CORGARD) tablet 20 mg  20 mg Oral Daily    nicotine (NICODERM CQ) 21 mg/24 hr TD 24 hr patch 1 patch  1 patch Transdermal Q24H    ondansetron (ZOFRAN) injection 4 mg  4 mg Intravenous Q6H PRN    oxyCODONE-acetaminophen (PERCOCET) 5-325 mg per tablet 2 tablet  2 tablet Oral Q6H PRN    pantoprazole (PROTONIX) EC tablet 40 mg  40 mg Oral Daily With Breakfast    polyethylene glycol (MIRALAX) packet 17 g  17 g Oral Daily PRN    potassium chloride (K-DUR,KLOR-CON) CR tablet 20 mEq  20 mEq Oral BID    senna (SENOKOT) tablet 17 2 mg  2 tablet Oral Daily    simethicone (MYLICON) chewable tablet 80 mg  80 mg Oral 4x Daily PRN    spironolactone (ALDACTONE) tablet 50 mg  50 mg Oral TID       VTE Pharmacologic Prophylaxis: Enoxaparin (Lovenox)  VTE Mechanical Prophylaxis: sequential compression device    Portions of the record may have been created with voice recognition software  Occasional wrong word or "sound a like" substitutions may have occurred due to the inherent limitations of voice recognition software    Read the chart carefully and recognize, using context, where substitutions have occurred   ==  Ira Farley, [facility]  Internal Medicine Residency PGY-1

## 2019-05-25 NOTE — UTILIZATION REVIEW
Continued Stay Review    Date: 5-25-19  12:14    Observation  5-23 -19 1805  Changed to inpatient 5-25-19  1418  For continued treatment of abdominal pain and possible  spontaneous bacterial infection     Inpatient Admission Once     Transfer Service: General Medicine       Question Answer   Admitting Physician EMERALD PELAEZ   Level of Care Med Surg   Estimated length of stay More than 2 Midnights   Certification I certify that inpatient services are medically necessary for this patient for a duration of greater than two midnights  See H&P and MD Progress Notes for additional information about the patient's course of             Vital Signs: /63   Pulse 71   Temp 98 6 °F (37 °C)   Resp 18   Ht 5' 3" (1 6 m)   Wt 66 7 kg (147 lb)   SpO2 96%   BMI 26 04 kg/m²      Assessment/Plan    76year old male  Admitted with abdominal pain  And a history of decompensated cirrhosis  Concerning for underlying infection / spontaneous bacterial peritonitis  He is increasing encephalopathic  And without asterixis  And ammonia is normal    Gastroenterology is checking ultrasound and considering a HIDA   Hb stable at  10  He has a portal venous thrombosis that will not be anticoagulated due to high risk for gi bleed  Temp max  5-24  101 5   And hypotensive  88/56  Today  98  6  IR consulted for paracentesis  US today  Blood cultures in process  Iv antibiotics  Continued  Continue lactulose  And titrate to 3 bm daily        Medications:     bisacodyl 10 mg Rectal Once   cefTRIAXone 2,000 mg Intravenous Q24H   dicyclomine 20 mg Oral 4x Daily (AC & HS)   furosemide 20 mg Oral TID   gabapentin 300 mg Oral QAM   gabapentin 900 mg Oral HS   lactulose 30 g Oral TID   nadolol 20 mg Oral Daily   nicotine 1 patch Transdermal Q24H   ondansetron 4 mg Intravenous Q6H PRN   oxyCODONE-acetaminophen 2 tablet Oral Q6H PRN   pantoprazole 40 mg Oral BID AC   polyethylene glycol 17 g Oral Daily PRN   potassium chloride 20 mEq Oral BID   senna 2 tablet Oral Daily   simethicone 80 mg Oral 4x Daily PRN   sodium chloride 75 mL/hr Intravenous Continuous   spironolactone 50 mg Oral TID              Network Utilization Review Department  Phone: [phone]; Fax [phone]  [email or web address]  ATTENTION: Please call with any questions or concerns to [phone]  and carefully listen to the prompts so that you are directed to the right person  Send all requests for admission clinical reviews, approved or denied determinations and any other requests to fax [phone]   All voicemails are confidential

## 2019-05-25 NOTE — PROGRESS NOTES
[facility]  Progress Note - Jade Downs 76 y o  female MRN: [id number]    Unit/Bed#: [facility] 708-01 Encounter: [id number]    Assessment/Plan:  Decompensated cirrhosis  Ascites  History of bleeding varices  HCC  Portal vein thrombosis  Abdominal pain  - concern for underlying infection as patient is more encephalopathic although patient without asterixis in the setting of normal ammonia; SBP is certainly in consideration with fever, leukocytosis and worsening of abdominal pain  - patient with minimal ascites on CT imaging-treat empirically with ceftriaxone 2 g daily  - rest of infectious workup as per primary team  - constipation may also be playing a role would encourage home dose lactulose plus or minus other aggressive bowel regimen  - with regards to her [address]  status post ablation and wedge resection and recurrence status post SIRS fears in 12/2017- follows with Dr Maxime Nj  - not a candidate for anticoagulation due to bleeding varices in the past  - liver enzymes slightly elevated from baseline  - CT imaging with gallbladder distention extensive valeriano hepatis and peripancreatic lymphadenopathy with increasing valeriano hepatis lymphadenopathy- this likely represents progression of her underlying HCC  - check ultrasound  - consider HIDA  - no signs of overt GI bleeding, hemoglobin stable at 10  -  Will continue to follow      Subjective:    complaining of increased abdominal pain and constipation feels more lethargic and confused    Objective:     Vitals: Blood pressure 100/63, pulse 71, temperature 98 6 °F (37 °C), resp  rate 18, height 5' 3" (1 6 m), weight 66 7 kg (147 lb), SpO2 96 %, not currently breastfeeding  ,Body mass index is 26 04 kg/m²        Intake/Output Summary (Last 24 hours) at 5/25/2019 1122  Last data filed at 5/25/2019 0719  Gross per 24 hour   Intake 0 ml   Output 280 ml   Net -280 ml       Review of Systems: as per HPI  Review of Systems    Physical Exam:     Physical Exam Constitutional: No distress  HENT:   Head: Atraumatic  Eyes: No scleral icterus  Neck: Neck supple  Cardiovascular: Normal rate  Pulmonary/Chest: Effort normal and breath sounds normal    Abdominal: Soft  She exhibits distension  There is tenderness  Musculoskeletal: She exhibits no edema  No asterixis   Neurological: She is alert  Skin: Skin is warm and dry     Psychiatric:   Slow in response but answers appropriately         Invasive Devices     Peripheral Intravenous Line            Peripheral IV 05/23/19 Right Antecubital 1 day                        CBC:   Lab Results   Component Value Date    WBC 14 88 (H) 05/25/2019    HGB 10 2 (L) 05/25/2019    HCT 31 3 (L) 05/25/2019    MCV 87 05/25/2019     05/25/2019    MCH 28 2 05/25/2019    MCHC 32 6 05/25/2019    RDW 18 3 (H) 05/25/2019    MPV 9 7 05/25/2019    NRBC 0 05/25/2019   ,   CMP:   Lab Results   Component Value Date    K 3 9 05/25/2019    CL 94 (L) 05/25/2019    CO2 27 05/25/2019    BUN 8 05/25/2019    CREATININE 0 81 05/25/2019    CALCIUM 9 1 05/25/2019     (H) 05/25/2019    ALT 34 05/25/2019    ALKPHOS 365 (H) 05/25/2019    EGFR 86 05/25/2019

## 2019-05-25 NOTE — PHYSICIAN ADVISOR
Current patient class: Observation  The patient is currently on Hospital Day: 3 at [facility]      This patient was originally admitted to the hospital under observation status  After admission the patient was reevaluated and determined to require further hospitalization  After review of the relevant documentation, labs, vital signs and test results, the patient is appropriate for INPATIENT ADMISSION  Rationale is as follows: The patient is a 24-year-old female who was first hospitalized from May 21st until May 22, 2019  She has hepatocellular carcinoma with chronic portal vein thrombosis  She presented at that time with abdominal pain and vomiting  CT of the abdomen showed extension of her chronic portal vein thrombosis  Her symptoms improved and the decision was made to refrain from starting anticoagulation due to recent life-threatening variceal bleeding  The patient returned to the hospital one day later and was readmitted under observation status  The crampy abdominal pain returned and was waxing and waning  The pain was unlikely due to SBP  Because of recent CT imaging and slightly increased bilirubin, ultrasound was planned  She had fever yesterday 101 5°  Blood pressure is low but this seems typical for her  Today May 25th, the patient has increased abdominal pain and was described as more lethargic and confused  Per the gastroenterology note, there is concern for underlying infection given increased encephalopathy  Leukocytosis is present  She will undergo empiric treatment with ceftriaxone  The patient is now on intravenous fluids  Her symptoms are being managed supportively  Blood cultures are pending  The patient is not stable for discharge  She has now been hospitalized for nearly 48 hours as observation status  The patient requires ongoing hospitalization and at this point I would change her to inpatient level care        The patients vitals on arrival were ED Triage Vitals [05/23/19 1556]   Temperature Pulse Respirations Blood Pressure SpO2   98 5 °F (36 9 °C) 78 18 93/54 96 %      Temp Source Heart Rate Source Patient Position - Orthostatic VS BP Location FiO2 (%)   Oral Monitor Sitting Right arm --      Pain Score       6           Past Medical History:   Diagnosis Date    Anemia     Anxiety     Cancer (HCC)     Chronic pain disorder     herniated disc    Cirrhosis (HCC)     Constipation     Current use of long term anticoagulation     eliquis    Diabetes mellitus (HCC)     blood sugar 113 at 1225 2/18/19    Drug dependence (HCC)     GI bleed     Hepatitis C, chronic (HCC) 7/22/2014    Hepatocellular carcinoma (HCC)     Hypertension     Hypokalemia 3/7/2019    Hyponatremia     Liver disease     h/o hepatitis c    Melena     Portal vein thrombosis      Past Surgical History:   Procedure Laterality Date    DENTAL SURGERY      ESOPHAGOGASTRODUODENOSCOPY N/A 2/18/2019    Procedure: ESOPHAGOGASTRODUODENOSCOPY (EGD); Surgeon: Sherrie Higuera MD;  [facility]; Service: Gastroenterology    ESOPHAGOGASTRODUODENOSCOPY N/A 3/11/2019    Procedure: ESOPHAGOGASTRODUODENOSCOPY (EGD); Surgeon: Good Aguilar MD;  [facility];   Service: Gastroenterology    HYSTERECTOMY      IR PARACENTESIS  3/8/2019    LIVER BIOPSY      LIVER SURGERY      Ablation    TONSILLECTOMY         The patient was admitted to the hospital at N/A on N/A for the following diagnosis:  Abdominal pain [R10 9]    Consults have been placed to:   IP CONSULT TO CASE MANAGEMENT  IP CONSULT TO GASTROENTEROLOGY    Vitals:    05/24/19 1628 05/24/19 1704 05/24/19 2240 05/25/19 0719   BP: 108/70  109/71 100/63   BP Location:       Pulse: 76  74 71   Resp:       Temp: (!) 101 5 °F (38 6 °C) 100 2 °F (37 9 °C) 98 2 °F (36 8 °C) 98 6 °F (37 °C)   TempSrc:       SpO2: 96%  96% 96%   Weight:       Height:           Most recent labs:    Recent Labs 05/23/19  1654  05/25/19  0830   WBC 18 00*   < > 14 88*   HGB 9 8*   < > 10 2*   HCT 30 2*   < > 31 3*      < > 365   K 3 6   < > 3 9   CALCIUM 8 4   < > 9 1   BUN 9   < > 8   CREATININE 0 84   < > 0 81   LIPASE 112  --   --    *   < > 254*   ALT 26   < > 34   ALKPHOS 331*   < > 365*    < > = values in this interval not displayed         Scheduled Meds:  Current Facility-Administered Medications:  bisacodyl 10 mg Rectal Once Rob Banai, DO    cefTRIAXone 2,000 mg Intravenous Q24H Rob Banai, DO Last Rate: Stopped (05/25/19 1215)   dicyclomine 20 mg Oral 4x Daily (AC & HS) Rob Banai, DO    furosemide 20 mg Oral TID Rob Banai, DO    gabapentin 300 mg Oral QAM Alean Fetch, DO    gabapentin 900 mg Oral HS Alean Fetch, DO    lactulose 30 g Oral TID Max Ruffin PA-C    nadolol 20 mg Oral Daily Rob Banai, DO    nicotine 1 patch Transdermal Q24H Max Widawski, DO    ondansetron 4 mg Intravenous Q6H PRN Alean Fetch, DO    pantoprazole 40 mg Oral BID AC Rob Banai, DO    polyethylene glycol 17 g Oral Daily PRN Alean Fetch, DO    potassium chloride 20 mEq Oral BID Max Widawski, DO    senna 2 tablet Oral Daily Alean Fetch, DO    simethicone 80 mg Oral 4x Daily PRN Alean Fetch, DO    sodium chloride 75 mL/hr Intravenous Continuous Rob Banai, DO Last Rate: 75 mL/hr (05/25/19 1018)   spironolactone 50 mg Oral TID Rob Banai, DO      Continuous Infusions:  sodium chloride 75 mL/hr Last Rate: 75 mL/hr (05/25/19 1018)     PRN Meds: ondansetron    polyethylene glycol    simethicone    Surgical procedures (if appropriate):

## 2019-05-25 NOTE — PLAN OF CARE
Problem: Potential for Falls  Goal: Patient will remain free of falls  Description  INTERVENTIONS:  - Assess patient frequently for physical needs  -  Identify cognitive and physical deficits and behaviors that affect risk of falls  -  [place] fall precautions as indicated by assessment   - Educate patient/family on patient safety including physical limitations  - Instruct patient to call for assistance with activity based on assessment  - Modify environment to reduce risk of injury  - Consider OT/PT consult to assist with strengthening/mobility  Outcome: Progressing     Problem: DISCHARGE PLANNING  Goal: Discharge to home or other facility with appropriate resources  Description  INTERVENTIONS:  - Identify barriers to discharge w/patient and caregiver  - Arrange for needed discharge resources and transportation as appropriate  - Identify discharge learning needs (meds, wound care, etc )  - Refer to Case Management Department for coordinating discharge planning if the patient needs post-hospital services based on physician/advanced practitioner order or complex needs related to functional status, cognitive ability, or social support system   Outcome: Progressing     Problem: Knowledge Deficit  Goal: Patient/family/caregiver demonstrates understanding of disease process, treatment plan, medications, and discharge instructions  Description  Complete learning assessment and assess knowledge base  Interventions:  - Provide teaching at level of understanding  - Provide teaching via preferred learning methods  Outcome: Progressing     Problem: Nutrition/Hydration-ADULT  Goal: Nutrient/Hydration intake appropriate for improving, restoring or maintaining nutritional needs  Description  Monitor and assess patient's nutrition/hydration status for malnutrition (ex- brittle hair, bruises, dry skin, pale skin and conjunctiva, muscle wasting, smooth red tongue, and disorientation)   Collaborate with interdisciplinary team and initiate plan and interventions as ordered  Monitor patient's weight and dietary intake as ordered or per policy  Utilize nutrition screening tool and intervene per policy  Determine patient's food preferences and provide high-protein, high-caloric foods as appropriate       INTERVENTIONS:  - Monitor oral intake, urinary output, labs, and treatment plans  - Assess nutrition and hydration status and recommend course of action  - Evaluate amount of meals eaten  - Assist patient with eating if necessary   - Allow adequate time for meals  - Recommend/ encourage appropriate diets, oral nutritional supplements, and vitamin/mineral supplements  - Order, calculate, and assess calorie counts as needed  - Assess need for intravenous fluids  - Provide specific nutrition/hydration education as appropriate  - Include patient/family/caregiver in decisions related to nutrition   Outcome: Progressing     Problem: Prexisting or High Potential for Compromised Skin Integrity  Goal: Skin integrity is maintained or improved  Description  INTERVENTIONS:  - Identify patients at risk for skin breakdown  - Assess and monitor skin integrity  - Assess and monitor nutrition and hydration status  - Monitor labs (i e  albumin)  - Assess for incontinence   - Turn and reposition patient  - Assist with mobility/ambulation  - Relieve pressure over bony prominences  - Avoid friction and shearing  - Provide appropriate hygiene as needed including keeping skin clean and dry  - Evaluate need for skin moisturizer/barrier cream  - Collaborate with interdisciplinary team (i e  Nutrition, Rehabilitation, etc )   - Patient/family teaching  Outcome: Progressing     Problem: GASTROINTESTINAL - ADULT  Goal: Maintains or returns to baseline bowel function  Description  INTERVENTIONS:  - Assess bowel function  - Encourage oral fluids to ensure adequate hydration  - Administer IV fluids as ordered to ensure adequate hydration  - Administer ordered medications as needed  - Encourage mobilization and activity  - Nutrition services referral to assist patient with appropriate food choices  Outcome: Progressing  Goal: Maintains adequate nutritional intake  Description  INTERVENTIONS:  - Monitor percentage of each meal consumed  - Identify factors contributing to decreased intake, treat as appropriate  - Assist with meals as needed  - Monitor I&O, WT and lab values  - Obtain nutrition services referral as needed  Outcome: Progressing     Problem: HEMATOLOGIC - ADULT  Goal: Maintains hematologic stability  Description  INTERVENTIONS  - Assess for signs and symptoms of bleeding or hemorrhage  - Monitor labs  - Administer supportive blood products/factors as ordered and appropriate  Outcome: Progressing

## 2019-05-26 ENCOUNTER — APPOINTMENT (INPATIENT)
Dept: RADIOLOGY | Facility: HOSPITAL | Age: 69
DRG: 391 | End: 2019-05-26
Payer: COMMERCIAL

## 2019-05-26 LAB
ALBUMIN SERPL BCP-MCNC: 1.8 G/DL (ref 3.5–5)
ALP SERPL-CCNC: 313 U/L (ref 46–116)
ALT SERPL W P-5'-P-CCNC: 21 U/L (ref 12–78)
ANION GAP SERPL CALCULATED.3IONS-SCNC: 6 MMOL/L (ref 4–13)
AST SERPL W P-5'-P-CCNC: 103 U/L (ref 5–45)
BASOPHILS # BLD AUTO: 0.02 THOUSANDS/ΜL (ref 0–0.1)
BASOPHILS NFR BLD AUTO: 0 % (ref 0–1)
BILIRUB SERPL-MCNC: 0.99 MG/DL (ref 0.2–1)
BUN SERPL-MCNC: 11 MG/DL (ref 5–25)
CALCIUM SERPL-MCNC: 8.7 MG/DL (ref 8.3–10.1)
CHLORIDE SERPL-SCNC: 96 MMOL/L (ref 100–108)
CO2 SERPL-SCNC: 25 MMOL/L (ref 21–32)
CREAT SERPL-MCNC: 0.84 MG/DL (ref 0.6–1.3)
EOSINOPHIL # BLD AUTO: 0.03 THOUSAND/ΜL (ref 0–0.61)
EOSINOPHIL NFR BLD AUTO: 0 % (ref 0–6)
ERYTHROCYTE [DISTWIDTH] IN BLOOD BY AUTOMATED COUNT: 18.3 % (ref 11.6–15.1)
GFR SERPL CREATININE-BSD FRML MDRD: 83 ML/MIN/1.73SQ M
GLUCOSE SERPL-MCNC: 103 MG/DL (ref 65–140)
HCT VFR BLD AUTO: 29.2 % (ref 34.8–46.1)
HGB BLD-MCNC: 9.3 G/DL (ref 11.5–15.4)
IMM GRANULOCYTES # BLD AUTO: 0.19 THOUSAND/UL (ref 0–0.2)
IMM GRANULOCYTES NFR BLD AUTO: 1 % (ref 0–2)
LYMPHOCYTES # BLD AUTO: 1.15 THOUSANDS/ΜL (ref 0.6–4.47)
LYMPHOCYTES NFR BLD AUTO: 7 % (ref 14–44)
MCH RBC QN AUTO: 27.6 PG (ref 26.8–34.3)
MCHC RBC AUTO-ENTMCNC: 31.8 G/DL (ref 31.4–37.4)
MCV RBC AUTO: 87 FL (ref 82–98)
MONOCYTES # BLD AUTO: 1.54 THOUSAND/ΜL (ref 0.17–1.22)
MONOCYTES NFR BLD AUTO: 10 % (ref 4–12)
NEUTROPHILS # BLD AUTO: 12.71 THOUSANDS/ΜL (ref 1.85–7.62)
NEUTS SEG NFR BLD AUTO: 82 % (ref 43–75)
NRBC BLD AUTO-RTO: 0 /100 WBCS
PLATELET # BLD AUTO: 374 THOUSANDS/UL (ref 149–390)
PMV BLD AUTO: 9.5 FL (ref 8.9–12.7)
POTASSIUM SERPL-SCNC: 4.5 MMOL/L (ref 3.5–5.3)
PROT SERPL-MCNC: 7.2 G/DL (ref 6.4–8.2)
RBC # BLD AUTO: 3.37 MILLION/UL (ref 3.81–5.12)
SODIUM SERPL-SCNC: 127 MMOL/L (ref 136–145)
WBC # BLD AUTO: 15.64 THOUSAND/UL (ref 4.31–10.16)

## 2019-05-26 PROCEDURE — 85025 COMPLETE CBC W/AUTO DIFF WBC: CPT | Performed by: INTERNAL MEDICINE

## 2019-05-26 PROCEDURE — 99232 SBSQ HOSP IP/OBS MODERATE 35: CPT | Performed by: INTERNAL MEDICINE

## 2019-05-26 PROCEDURE — 76705 ECHO EXAM OF ABDOMEN: CPT

## 2019-05-26 PROCEDURE — 80053 COMPREHEN METABOLIC PANEL: CPT | Performed by: INTERNAL MEDICINE

## 2019-05-26 RX ORDER — OXYCODONE HYDROCHLORIDE 5 MG/1
5 TABLET ORAL EVERY 6 HOURS PRN
Status: DISCONTINUED | OUTPATIENT
Start: 2019-05-26 | End: 2019-05-31 | Stop reason: HOSPADM

## 2019-05-26 RX ORDER — LACTULOSE 20 G/30ML
10 SOLUTION ORAL 2 TIMES DAILY
Status: DISCONTINUED | OUTPATIENT
Start: 2019-05-26 | End: 2019-05-31 | Stop reason: HOSPADM

## 2019-05-26 RX ORDER — CIPROFLOXACIN 500 MG/1
500 TABLET, FILM COATED ORAL EVERY 12 HOURS SCHEDULED
Status: DISCONTINUED | OUTPATIENT
Start: 2019-05-26 | End: 2019-05-27

## 2019-05-26 RX ADMIN — DICYCLOMINE HYDROCHLORIDE 20 MG: 20 TABLET ORAL at 12:22

## 2019-05-26 RX ADMIN — DICYCLOMINE HYDROCHLORIDE 20 MG: 20 TABLET ORAL at 17:00

## 2019-05-26 RX ADMIN — OXYCODONE HYDROCHLORIDE 5 MG: 5 TABLET ORAL at 12:34

## 2019-05-26 RX ADMIN — POTASSIUM CHLORIDE 20 MEQ: 1500 TABLET, EXTENDED RELEASE ORAL at 17:00

## 2019-05-26 RX ADMIN — FUROSEMIDE 20 MG: 20 TABLET ORAL at 21:06

## 2019-05-26 RX ADMIN — RIFAXIMIN 550 MG: 550 TABLET ORAL at 08:48

## 2019-05-26 RX ADMIN — POTASSIUM CHLORIDE 20 MEQ: 1500 TABLET, EXTENDED RELEASE ORAL at 08:48

## 2019-05-26 RX ADMIN — SPIRONOLACTONE 50 MG: 50 TABLET ORAL at 21:05

## 2019-05-26 RX ADMIN — GABAPENTIN 900 MG: 300 CAPSULE ORAL at 21:06

## 2019-05-26 RX ADMIN — RIFAXIMIN 550 MG: 550 TABLET ORAL at 21:06

## 2019-05-26 RX ADMIN — DICYCLOMINE HYDROCHLORIDE 20 MG: 20 TABLET ORAL at 21:05

## 2019-05-26 RX ADMIN — DICYCLOMINE HYDROCHLORIDE 20 MG: 20 TABLET ORAL at 08:47

## 2019-05-26 RX ADMIN — CIPROFLOXACIN HYDROCHLORIDE 500 MG: 500 TABLET, FILM COATED ORAL at 21:06

## 2019-05-26 RX ADMIN — GABAPENTIN 300 MG: 300 CAPSULE ORAL at 08:47

## 2019-05-26 RX ADMIN — FUROSEMIDE 20 MG: 20 TABLET ORAL at 17:00

## 2019-05-26 RX ADMIN — PANTOPRAZOLE SODIUM 40 MG: 40 TABLET, DELAYED RELEASE ORAL at 08:47

## 2019-05-26 RX ADMIN — CIPROFLOXACIN HYDROCHLORIDE 500 MG: 500 TABLET, FILM COATED ORAL at 12:22

## 2019-05-26 RX ADMIN — PANTOPRAZOLE SODIUM 40 MG: 40 TABLET, DELAYED RELEASE ORAL at 17:00

## 2019-05-26 RX ADMIN — SPIRONOLACTONE 50 MG: 50 TABLET ORAL at 17:00

## 2019-05-26 RX ADMIN — NICOTINE 1 PATCH: 21 PATCH, EXTENDED RELEASE TRANSDERMAL at 21:09

## 2019-05-26 NOTE — PROGRESS NOTES
[facility] - Internal Medicine Progress Note      PATIENT INFORMATION      Patient: Naila Aguilera 76 y o  female   MRN: [id number]  PCP: Ira Farley DO  Unit/Bed#: [facility] 708-01 Encounter: [id number]  Date Of Visit: 05/26/19  Current Length of Stay: 1 day(s)     ASSESSMENTS & PLAN    Principal Problem:    Abdominal pain  Active Problems:    Decompensated hepatic cirrhosis ([facility] )    Portal vein thrombosis    Chronic lumbar radiculopathy    Diabetes mellitus, type II ([facility] )    Diabetic neuropathy ([facility] )    Hepatocellular carcinoma (HCC)    Nicotine dependence    Opioid dependence ([facility] )    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 ([place] [address] )    Pain syndrome, chronic    Hyponatremia      * Abdominal pain  Assessment & Plan  Recurrence, pain is waxing and waning quality  There are multiple etiologies that could explain this pain such as gallbladder distention and cholestasis seen on her more recent imaging of the abdomen  Additionally could be stretching of Cristi's capsule of the liver given her cirrhosis and hepatic inflammation versus extensive and increased portal venous thrombus/thrombi  Patient feeling better status post Dilaudid IV given in the ED  Continue current pain regimen of oxycodone 10 mg q 6 hours p r n  Continue simethicone  Continue Zofran 4 mg IV p r n  Nausea    febrile to 101 5 on 5/25 and will draw blood cultures x2   consult IR for diagnostic paracentesis,   patient NPO last midnight for RUQ ultrasound   Started on Rocephin      Portal vein thrombosis  Assessment & Plan  Previously on anticoagulation however patient developed serious life-threatening varices bleed with a hemoglobin down to 4 0 therefore anticoagulation at this point was contraindicated based on her clear bleeding risk  Will manage conservatively  Recent imaging does suggest some extension of portal vein thrombi, questionable whether this is related to her abdominal pain      Decompensated hepatic cirrhosis [facility])  Assessment & Plan  Minimal to mild ascites on recent imaging  No indication for paracentesis at this time  Does have varices with history of varices bleed this year contraindicating anticoagulation  GI consulted  Start lactulose and rifaximin   Pending IR paracentesis given fever of 101  Started on IV abx for SBP    Opioid dependence ([facility] )  Assessment & Plan  On chronic opiate therapy with oxycodone 10 mg q 6 hours p r n  And will continue this while in hospital   Follows with pain management and palliative care  Nicotine dependence  Assessment & Plan  Nicotine patch daily p r n  Hepatocellular carcinoma [facility])  Assessment & Plan  Outpatient follow-up with Dr Pilar Cardoza of Surgical Oncology  Diabetic neuropathy [facility])  Assessment & Plan  Lab Results   Component Value Date    HGBA1C 5 4 03/09/2019     Continue gabapentin 300 mg q a m , 900 mg total q h s         Diabetes mellitus, type II [facility])  Assessment & Plan  Lab Results   Component Value Date    HGBA1C 5 4 03/09/2019       Hyperglycemic with a blood glucose in the 140s on admission  No indication for insulin therapy while in hospital here at this time  Monitor glucose on BMP in the a m  Rocio Sole Chronic lumbar radiculopathy  Assessment & Plan  Follows with pain management outpatient  On stable regimen of oxycodone and gabapentin which will be continued  Hyponatremia  Assessment & Plan  Sodium of 129 on admission  Previously was 132 1-2 days ago  Poor p o  Intake noted with anorexia  Possible beer potomania  Recheck BMP in a m  And will continue diuretics of Lasix 60 mg and spironolactone 150 mg daily for now  Pain syndrome, chronic  Assessment & Plan  Stable in patient with terminal condition  Continue outpatient oxycodone  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 ([facility] )  Assessment & Plan  Minimal to mild ascites on recent imaging  Following with GI    Outpatient EGD for monitoring of varices and esophageal candidiasis  VTE Pharmacologic Prophylaxis: Sequential compression device (Venodyne)    VTE Mechanical Prophylaxis: SCD's    Disposition:  Discharged home once medically stable      SUBJECTIVE     NPO currently, no acute events overnight  Does complain of mild pain in the abdomen and back  Denies worsening abdominal fullness  No fever at this time  Upset regarding multiple attempts at IV blood draw  Denies bleeding including hematochezia or hematemesis/vomiting  OBJECTIVE     Vitals:   Temp (24hrs), Av 3 °F (36 8 °C), Min:97 7 °F (36 5 °C), Max:99 1 °F (37 3 °C)    Temp:  [97 7 °F (36 5 °C)-99 1 °F (37 3 °C)] 97 7 °F (36 5 °C)  HR:  [63-70] 64  BP: ()/(58-71) 85/58  SpO2:  [94 %-100 %] 97 %  Body mass index is 26 04 kg/m²  Input and Output Summary (last 24 hours): Intake/Output Summary (Last 24 hours) at 2019 0725  Last data filed at 2019 1730  Gross per 24 hour   Intake 50 ml   Output 100 ml   Net -50 ml       Physical Exam:   Vital signs reviewed  General:  Cachectic appearing middle-aged female, Awake sitting upright in bed comfortably, no acute distress  Head: normocephalic  Eyes: normal conjunctivae, sclera anicteric   Neck: supple  Lungs:  Clear bilaterally, no labored breathing/accessory muscle use  Heart: regular rate and rhythm without murmur  Abdomen: no obvious distension, scant tenderness to palpation, abdomen is soft with active bowel sounds  Extremities: no cyanosis, no deformity  Neuro:  Awake, alert, conversant, grossly intact with no focal deficits    No obtundation   Skin: warm, dry           ADDITIONAL DATA     Labs & Recent Cultures:     Results from last 7 days   Lab Units 19  0830   WBC Thousand/uL 14 88*   HEMOGLOBIN g/dL 10 2*   HEMATOCRIT % 31 3*   PLATELETS Thousands/uL 365   NEUTROS PCT % 81*   LYMPHS PCT % 8*   MONOS PCT % 10   EOS PCT % 0     Results from last 7 days   Lab Units 19  0830   POTASSIUM mmol/L 3 9   CHLORIDE mmol/L 94*   CO2 mmol/L 27   BUN mg/dL 8   CREATININE mg/dL 0 81   CALCIUM mg/dL 9 1   ALK PHOS U/L 365*   ALT U/L 34   AST U/L 254*     Results from last 7 days   Lab Units 05/21/19  1511   INR  1 15                 Last 24 Hours Medication List:     Current Facility-Administered Medications:  bisacodyl 10 mg Rectal Once Rob Banashish, DO    cefTRIAXone 2,000 mg Intravenous Q24H Rob Light, DO Last Rate: Stopped (05/25/19 1215)   dicyclomine 20 mg Oral 4x Daily (AC & HS) Rob Light, DO    furosemide 20 mg Oral TID Rob Nadege, DO    gabapentin 300 mg Oral QAM Junella Boxer, DO    gabapentin 900 mg Oral HS Junella Boxer, DO    lactulose 30 g Oral TID Holly Chaves PA-C    nadolol 20 mg Oral Daily Rob Nadege, DO    nicotine 1 patch Transdermal Q24H Max Meir, DO    ondansetron 4 mg Intravenous Q6H PRN Junella Boxer, DO    pantoprazole 40 mg Oral BID AC Rob Nadege, DO    polyethylene glycol 17 g Oral Daily PRN Junella Boxer, DO    potassium chloride 20 mEq Oral BID Max Meir, DO    rifaximin 550 mg Oral Q12H [address] Rob Nadege, DO    senna 2 tablet Oral Daily Junella Boxer, DO    simethicone 80 mg Oral 4x Daily PRN Junella Boxer, DO    spironolactone 50 mg Oral TID Rob Light, DO               Current Length of Stay: 1 day(s)      Code Status: Level 1 - Full Code          Junella Boxer, D O  Internal Medicine   PGY-2  5/26/2019 7:25 AM          ** Please Note: This note is constructed using a voice recognition dictation

## 2019-05-26 NOTE — PLAN OF CARE
Problem: Potential for Falls  Goal: Patient will remain free of falls  Description  INTERVENTIONS:  - Assess patient frequently for physical needs  -  Identify cognitive and physical deficits and behaviors that affect risk of falls  -  [place] fall precautions as indicated by assessment   - Educate patient/family on patient safety including physical limitations  - Instruct patient to call for assistance with activity based on assessment  - Modify environment to reduce risk of injury  - Consider OT/PT consult to assist with strengthening/mobility  Outcome: Progressing     Problem: DISCHARGE PLANNING  Goal: Discharge to home or other facility with appropriate resources  Description  INTERVENTIONS:  - Identify barriers to discharge w/patient and caregiver  - Arrange for needed discharge resources and transportation as appropriate  - Identify discharge learning needs (meds, wound care, etc )  - Refer to Case Management Department for coordinating discharge planning if the patient needs post-hospital services based on physician/advanced practitioner order or complex needs related to functional status, cognitive ability, or social support system   Outcome: Progressing     Problem: Knowledge Deficit  Goal: Patient/family/caregiver demonstrates understanding of disease process, treatment plan, medications, and discharge instructions  Description  Complete learning assessment and assess knowledge base  Interventions:  - Provide teaching at level of understanding  - Provide teaching via preferred learning methods  Outcome: Progressing     Problem: Nutrition/Hydration-ADULT  Goal: Nutrient/Hydration intake appropriate for improving, restoring or maintaining nutritional needs  Description  Monitor and assess patient's nutrition/hydration status for malnutrition (ex- brittle hair, bruises, dry skin, pale skin and conjunctiva, muscle wasting, smooth red tongue, and disorientation)   Collaborate with interdisciplinary team and initiate plan and interventions as ordered  Monitor patient's weight and dietary intake as ordered or per policy  Utilize nutrition screening tool and intervene per policy  Determine patient's food preferences and provide high-protein, high-caloric foods as appropriate       INTERVENTIONS:  - Monitor oral intake, urinary output, labs, and treatment plans  - Assess nutrition and hydration status and recommend course of action  - Evaluate amount of meals eaten  - Assist patient with eating if necessary   - Allow adequate time for meals  - Recommend/ encourage appropriate diets, oral nutritional supplements, and vitamin/mineral supplements  - Order, calculate, and assess calorie counts as needed  - Assess need for intravenous fluids  - Provide specific nutrition/hydration education as appropriate  - Include patient/family/caregiver in decisions related to nutrition   Outcome: Progressing     Problem: Prexisting or High Potential for Compromised Skin Integrity  Goal: Skin integrity is maintained or improved  Description  INTERVENTIONS:  - Identify patients at risk for skin breakdown  - Assess and monitor skin integrity  - Assess and monitor nutrition and hydration status  - Monitor labs (i e  albumin)  - Assess for incontinence   - Turn and reposition patient  - Assist with mobility/ambulation  - Relieve pressure over bony prominences  - Avoid friction and shearing  - Provide appropriate hygiene as needed including keeping skin clean and dry  - Evaluate need for skin moisturizer/barrier cream  - Collaborate with interdisciplinary team (i e  Nutrition, Rehabilitation, etc )   - Patient/family teaching  Outcome: Progressing     Problem: GASTROINTESTINAL - ADULT  Goal: Maintains or returns to baseline bowel function  Description  INTERVENTIONS:  - Assess bowel function  - Encourage oral fluids to ensure adequate hydration  - Administer IV fluids as ordered to ensure adequate hydration  - Administer ordered medications as needed  - Encourage mobilization and activity  - Nutrition services referral to assist patient with appropriate food choices  Outcome: Progressing  Goal: Maintains adequate nutritional intake  Description  INTERVENTIONS:  - Monitor percentage of each meal consumed  - Identify factors contributing to decreased intake, treat as appropriate  - Assist with meals as needed  - Monitor I&O, WT and lab values  - Obtain nutrition services referral as needed  Outcome: Progressing     Problem: HEMATOLOGIC - ADULT  Goal: Maintains hematologic stability  Description  INTERVENTIONS  - Assess for signs and symptoms of bleeding or hemorrhage  - Monitor labs  - Administer supportive blood products/factors as ordered and appropriate  Outcome: Progressing

## 2019-05-26 NOTE — PROGRESS NOTES
[facility]  Progress Note - Lydia Thibodeaux 76 y o  female MRN: [id number]    Unit/Bed#: [facility] 708-01 Encounter: [id number]    Assessment/Plan:  Decompensated cirrhosis  Ascites  History of bleeding varices  HCC  Portal vein thrombosis  Abdominal pain  -improved on exam more alert, afebrile, lost IV access, lab work not drawn yet, UA not concerning for UTI, blood culture results pending  -ultrasound with sludge and thickened gallbladder with trace pericholecystic fluid but no identified gallstones-this could certainly be related to her underlying cirrhosis, cannot entirely rule out her fever and abdominal pain to cholecystitis if still concerned consider HIDA scan; mild ascites-if possible IR drainage for diagnosis  -currently being empirically treated for SBP however has no IV access, change to ciprofloxacin 500 mg twice daily x5 days  -given encephalopathy, cirrhosis and history of constipation-prefer monotherapy with lactulose as patient is very sensitive-we can reduce/dc her doses of other bowel regimen to an as needed basis  - with regards to her [address]  status post ablation and wedge resection and recurrence status post SIRS fears in 12/2017- follows with Dr Julissa Tejeda  - not a candidate for anticoagulation due to bleeding varices in the past  - liver enzymes slightly elevated from baseline  - CT imaging with gallbladder distention extensive valeriano hepatis and peripancreatic lymphadenopathy with increasing valeriano hepatis lymphadenopathy- this likely represents progression of her underlying HCC  - no signs of overt GI bleeding, hemoglobin stable at 10  -  Will continue to follow       Subjective:   More alert  Feels better  Decreased abdominal pain but still in the right upper quadrant/epigastric area  Afebrile for 24 hours  Objective:     Vitals: Blood pressure (!) 85/58, pulse 64, temperature 97 7 °F (36 5 °C), resp   rate 18, height 5' 3" (1 6 m), weight 66 7 kg (147 lb), SpO2 97 %, not currently breastfeeding  ,Body mass index is 26 04 kg/m²  Intake/Output Summary (Last 24 hours) at 5/26/2019 1133  Last data filed at 5/25/2019 1730  Gross per 24 hour   Intake 50 ml   Output 100 ml   Net -50 ml       Review of Systems: as per HPI  Review of Systems    Physical Exam:     Physical Exam   Constitutional: She is oriented to person, place, and time  No distress  Eyes: No scleral icterus  Cardiovascular: Normal rate  Pulmonary/Chest: Effort normal    Abdominal: Bowel sounds are normal  There is tenderness in the right upper quadrant and epigastric area  Neurological: She is alert and oriented to person, place, and time  Skin: Skin is warm and dry  Psychiatric: She has a normal mood and affect           Invasive Devices     None

## 2019-05-27 PROBLEM — R14.3 FLATULENCE: Status: ACTIVE | Noted: 2019-05-27

## 2019-05-27 LAB
ALBUMIN SERPL BCP-MCNC: 1.9 G/DL (ref 3.5–5)
ALP SERPL-CCNC: 337 U/L (ref 46–116)
ALT SERPL W P-5'-P-CCNC: 19 U/L (ref 12–78)
ANION GAP SERPL CALCULATED.3IONS-SCNC: 6 MMOL/L (ref 4–13)
AST SERPL W P-5'-P-CCNC: 80 U/L (ref 5–45)
BASOPHILS # BLD AUTO: 0.01 THOUSANDS/ΜL (ref 0–0.1)
BASOPHILS NFR BLD AUTO: 0 % (ref 0–1)
BILIRUB SERPL-MCNC: 0.99 MG/DL (ref 0.2–1)
BUN SERPL-MCNC: 9 MG/DL (ref 5–25)
CALCIUM SERPL-MCNC: 9.1 MG/DL (ref 8.3–10.1)
CHLORIDE SERPL-SCNC: 94 MMOL/L (ref 100–108)
CO2 SERPL-SCNC: 25 MMOL/L (ref 21–32)
CREAT SERPL-MCNC: 0.86 MG/DL (ref 0.6–1.3)
EOSINOPHIL # BLD AUTO: 0.04 THOUSAND/ΜL (ref 0–0.61)
EOSINOPHIL NFR BLD AUTO: 0 % (ref 0–6)
ERYTHROCYTE [DISTWIDTH] IN BLOOD BY AUTOMATED COUNT: 18 % (ref 11.6–15.1)
GFR SERPL CREATININE-BSD FRML MDRD: 80 ML/MIN/1.73SQ M
GLUCOSE SERPL-MCNC: 81 MG/DL (ref 65–140)
HCT VFR BLD AUTO: 29.4 % (ref 34.8–46.1)
HGB BLD-MCNC: 9.5 G/DL (ref 11.5–15.4)
IMM GRANULOCYTES # BLD AUTO: 0.15 THOUSAND/UL (ref 0–0.2)
IMM GRANULOCYTES NFR BLD AUTO: 1 % (ref 0–2)
LYMPHOCYTES # BLD AUTO: 1.15 THOUSANDS/ΜL (ref 0.6–4.47)
LYMPHOCYTES NFR BLD AUTO: 9 % (ref 14–44)
MCH RBC QN AUTO: 28 PG (ref 26.8–34.3)
MCHC RBC AUTO-ENTMCNC: 32.3 G/DL (ref 31.4–37.4)
MCV RBC AUTO: 87 FL (ref 82–98)
MONOCYTES # BLD AUTO: 1.54 THOUSAND/ΜL (ref 0.17–1.22)
MONOCYTES NFR BLD AUTO: 12 % (ref 4–12)
NEUTROPHILS # BLD AUTO: 10.17 THOUSANDS/ΜL (ref 1.85–7.62)
NEUTS SEG NFR BLD AUTO: 78 % (ref 43–75)
NRBC BLD AUTO-RTO: 0 /100 WBCS
PLATELET # BLD AUTO: 386 THOUSANDS/UL (ref 149–390)
PMV BLD AUTO: 9.6 FL (ref 8.9–12.7)
POTASSIUM SERPL-SCNC: 4.4 MMOL/L (ref 3.5–5.3)
PROT SERPL-MCNC: 7.6 G/DL (ref 6.4–8.2)
RBC # BLD AUTO: 3.39 MILLION/UL (ref 3.81–5.12)
SODIUM SERPL-SCNC: 125 MMOL/L (ref 136–145)
WBC # BLD AUTO: 13.06 THOUSAND/UL (ref 4.31–10.16)

## 2019-05-27 PROCEDURE — 99233 SBSQ HOSP IP/OBS HIGH 50: CPT | Performed by: INTERNAL MEDICINE

## 2019-05-27 PROCEDURE — 80053 COMPREHEN METABOLIC PANEL: CPT | Performed by: INTERNAL MEDICINE

## 2019-05-27 PROCEDURE — 85025 COMPLETE CBC W/AUTO DIFF WBC: CPT | Performed by: INTERNAL MEDICINE

## 2019-05-27 RX ORDER — METRONIDAZOLE 500 MG/1
500 TABLET ORAL EVERY 8 HOURS SCHEDULED
Status: DISCONTINUED | OUTPATIENT
Start: 2019-05-27 | End: 2019-05-31 | Stop reason: HOSPADM

## 2019-05-27 RX ORDER — SIMETHICONE 80 MG
80 TABLET,CHEWABLE ORAL
Status: DISCONTINUED | OUTPATIENT
Start: 2019-05-27 | End: 2019-05-31 | Stop reason: HOSPADM

## 2019-05-27 RX ADMIN — SPIRONOLACTONE 50 MG: 50 TABLET ORAL at 21:46

## 2019-05-27 RX ADMIN — DICYCLOMINE HYDROCHLORIDE 20 MG: 20 TABLET ORAL at 11:42

## 2019-05-27 RX ADMIN — DICYCLOMINE HYDROCHLORIDE 20 MG: 20 TABLET ORAL at 07:53

## 2019-05-27 RX ADMIN — SIMETHICONE CHEW TAB 80 MG 80 MG: 80 TABLET ORAL at 16:52

## 2019-05-27 RX ADMIN — FUROSEMIDE 20 MG: 20 TABLET ORAL at 21:46

## 2019-05-27 RX ADMIN — RIFAXIMIN 550 MG: 550 TABLET ORAL at 21:41

## 2019-05-27 RX ADMIN — ENOXAPARIN SODIUM 40 MG: 40 INJECTION SUBCUTANEOUS at 16:59

## 2019-05-27 RX ADMIN — GABAPENTIN 900 MG: 300 CAPSULE ORAL at 21:41

## 2019-05-27 RX ADMIN — METRONIDAZOLE 500 MG: 500 TABLET, FILM COATED ORAL at 10:39

## 2019-05-27 RX ADMIN — LACTULOSE 10 G: 10 SOLUTION ORAL at 16:53

## 2019-05-27 RX ADMIN — NADOLOL 20 MG: 20 TABLET ORAL at 08:00

## 2019-05-27 RX ADMIN — FUROSEMIDE 20 MG: 20 TABLET ORAL at 07:52

## 2019-05-27 RX ADMIN — CIPROFLOXACIN HYDROCHLORIDE 500 MG: 500 TABLET, FILM COATED ORAL at 07:52

## 2019-05-27 RX ADMIN — CEFTRIAXONE 2000 MG: 2 INJECTION, POWDER, FOR SOLUTION INTRAMUSCULAR; INTRAVENOUS at 14:07

## 2019-05-27 RX ADMIN — LACTULOSE 10 G: 10 SOLUTION ORAL at 07:53

## 2019-05-27 RX ADMIN — SIMETHICONE CHEW TAB 80 MG 80 MG: 80 TABLET ORAL at 11:42

## 2019-05-27 RX ADMIN — OXYCODONE HYDROCHLORIDE 5 MG: 5 TABLET ORAL at 07:52

## 2019-05-27 RX ADMIN — GABAPENTIN 300 MG: 300 CAPSULE ORAL at 07:52

## 2019-05-27 RX ADMIN — RIFAXIMIN 550 MG: 550 TABLET ORAL at 07:51

## 2019-05-27 RX ADMIN — SPIRONOLACTONE 50 MG: 50 TABLET ORAL at 07:52

## 2019-05-27 RX ADMIN — DICYCLOMINE HYDROCHLORIDE 20 MG: 20 TABLET ORAL at 21:41

## 2019-05-27 RX ADMIN — NICOTINE 1 PATCH: 21 PATCH, EXTENDED RELEASE TRANSDERMAL at 21:41

## 2019-05-27 RX ADMIN — METRONIDAZOLE 500 MG: 500 TABLET, FILM COATED ORAL at 21:41

## 2019-05-27 RX ADMIN — PANTOPRAZOLE SODIUM 40 MG: 40 TABLET, DELAYED RELEASE ORAL at 16:52

## 2019-05-27 RX ADMIN — PANTOPRAZOLE SODIUM 40 MG: 40 TABLET, DELAYED RELEASE ORAL at 07:52

## 2019-05-27 RX ADMIN — SIMETHICONE CHEW TAB 80 MG 80 MG: 80 TABLET ORAL at 21:42

## 2019-05-27 RX ADMIN — POTASSIUM CHLORIDE 20 MEQ: 1500 TABLET, EXTENDED RELEASE ORAL at 16:54

## 2019-05-27 RX ADMIN — DICYCLOMINE HYDROCHLORIDE 20 MG: 20 TABLET ORAL at 16:52

## 2019-05-27 RX ADMIN — SIMETHICONE CHEW TAB 80 MG 80 MG: 80 TABLET ORAL at 08:04

## 2019-05-27 NOTE — PLAN OF CARE
Problem: Potential for Falls  Goal: Patient will remain free of falls  Description  INTERVENTIONS:  - Assess patient frequently for physical needs  -  Identify cognitive and physical deficits and behaviors that affect risk of falls  -  [place] fall precautions as indicated by assessment   - Educate patient/family on patient safety including physical limitations  - Instruct patient to call for assistance with activity based on assessment  - Modify environment to reduce risk of injury  - Consider OT/PT consult to assist with strengthening/mobility  Outcome: Progressing     Problem: DISCHARGE PLANNING  Goal: Discharge to home or other facility with appropriate resources  Description  INTERVENTIONS:  - Identify barriers to discharge w/patient and caregiver  - Arrange for needed discharge resources and transportation as appropriate  - Identify discharge learning needs (meds, wound care, etc )  - Refer to Case Management Department for coordinating discharge planning if the patient needs post-hospital services based on physician/advanced practitioner order or complex needs related to functional status, cognitive ability, or social support system   Outcome: Progressing     Problem: Knowledge Deficit  Goal: Patient/family/caregiver demonstrates understanding of disease process, treatment plan, medications, and discharge instructions  Description  Complete learning assessment and assess knowledge base  Interventions:  - Provide teaching at level of understanding  - Provide teaching via preferred learning methods  Outcome: Progressing     Problem: Nutrition/Hydration-ADULT  Goal: Nutrient/Hydration intake appropriate for improving, restoring or maintaining nutritional needs  Description  Monitor and assess patient's nutrition/hydration status for malnutrition (ex- brittle hair, bruises, dry skin, pale skin and conjunctiva, muscle wasting, smooth red tongue, and disorientation)   Collaborate with interdisciplinary team and initiate plan and interventions as ordered  Monitor patient's weight and dietary intake as ordered or per policy  Utilize nutrition screening tool and intervene per policy  Determine patient's food preferences and provide high-protein, high-caloric foods as appropriate       INTERVENTIONS:  - Monitor oral intake, urinary output, labs, and treatment plans  - Assess nutrition and hydration status and recommend course of action  - Evaluate amount of meals eaten  - Assist patient with eating if necessary   - Allow adequate time for meals  - Recommend/ encourage appropriate diets, oral nutritional supplements, and vitamin/mineral supplements  - Order, calculate, and assess calorie counts as needed  - Assess need for intravenous fluids  - Provide specific nutrition/hydration education as appropriate  - Include patient/family/caregiver in decisions related to nutrition   Outcome: Progressing     Problem: Prexisting or High Potential for Compromised Skin Integrity  Goal: Skin integrity is maintained or improved  Description  INTERVENTIONS:  - Identify patients at risk for skin breakdown  - Assess and monitor skin integrity  - Assess and monitor nutrition and hydration status  - Monitor labs (i e  albumin)  - Assess for incontinence   - Turn and reposition patient  - Assist with mobility/ambulation  - Relieve pressure over bony prominences  - Avoid friction and shearing  - Provide appropriate hygiene as needed including keeping skin clean and dry  - Evaluate need for skin moisturizer/barrier cream  - Collaborate with interdisciplinary team (i e  Nutrition, Rehabilitation, etc )   - Patient/family teaching  Outcome: Progressing     Problem: GASTROINTESTINAL - ADULT  Goal: Maintains or returns to baseline bowel function  Description  INTERVENTIONS:  - Assess bowel function  - Encourage oral fluids to ensure adequate hydration  - Administer IV fluids as ordered to ensure adequate hydration  - Administer ordered medications as needed  - Encourage mobilization and activity  - Nutrition services referral to assist patient with appropriate food choices  Outcome: Progressing  Goal: Maintains adequate nutritional intake  Description  INTERVENTIONS:  - Monitor percentage of each meal consumed  - Identify factors contributing to decreased intake, treat as appropriate  - Assist with meals as needed  - Monitor I&O, WT and lab values  - Obtain nutrition services referral as needed  Outcome: Progressing     Problem: HEMATOLOGIC - ADULT  Goal: Maintains hematologic stability  Description  INTERVENTIONS  - Assess for signs and symptoms of bleeding or hemorrhage  - Monitor labs  - Administer supportive blood products/factors as ordered and appropriate  Outcome: Progressing

## 2019-05-28 ENCOUNTER — PATIENT OUTREACH (OUTPATIENT)
Dept: INTERNAL MEDICINE CLINIC | Facility: CLINIC | Age: 69
End: 2019-05-28

## 2019-05-28 LAB
ALBUMIN SERPL BCP-MCNC: 1.8 G/DL (ref 3.5–5)
ALP SERPL-CCNC: 336 U/L (ref 46–116)
ALT SERPL W P-5'-P-CCNC: 14 U/L (ref 12–78)
ANION GAP SERPL CALCULATED.3IONS-SCNC: 8 MMOL/L (ref 4–13)
AST SERPL W P-5'-P-CCNC: 54 U/L (ref 5–45)
BASOPHILS # BLD AUTO: 0.03 THOUSANDS/ΜL (ref 0–0.1)
BASOPHILS NFR BLD AUTO: 0 % (ref 0–1)
BILIRUB SERPL-MCNC: 0.84 MG/DL (ref 0.2–1)
BUN SERPL-MCNC: 9 MG/DL (ref 5–25)
CALCIUM SERPL-MCNC: 8.3 MG/DL (ref 8.3–10.1)
CHLORIDE SERPL-SCNC: 98 MMOL/L (ref 100–108)
CO2 SERPL-SCNC: 25 MMOL/L (ref 21–32)
CREAT SERPL-MCNC: 0.8 MG/DL (ref 0.6–1.3)
EOSINOPHIL # BLD AUTO: 0.05 THOUSAND/ΜL (ref 0–0.61)
EOSINOPHIL NFR BLD AUTO: 0 % (ref 0–6)
ERYTHROCYTE [DISTWIDTH] IN BLOOD BY AUTOMATED COUNT: 18 % (ref 11.6–15.1)
GFR SERPL CREATININE-BSD FRML MDRD: 88 ML/MIN/1.73SQ M
GLUCOSE SERPL-MCNC: 101 MG/DL (ref 65–140)
HCT VFR BLD AUTO: 26.9 % (ref 34.8–46.1)
HGB BLD-MCNC: 8.6 G/DL (ref 11.5–15.4)
IMM GRANULOCYTES # BLD AUTO: 0.12 THOUSAND/UL (ref 0–0.2)
IMM GRANULOCYTES NFR BLD AUTO: 1 % (ref 0–2)
LYMPHOCYTES # BLD AUTO: 1.04 THOUSANDS/ΜL (ref 0.6–4.47)
LYMPHOCYTES NFR BLD AUTO: 9 % (ref 14–44)
MCH RBC QN AUTO: 27.6 PG (ref 26.8–34.3)
MCHC RBC AUTO-ENTMCNC: 32 G/DL (ref 31.4–37.4)
MCV RBC AUTO: 86 FL (ref 82–98)
MONOCYTES # BLD AUTO: 1.43 THOUSAND/ΜL (ref 0.17–1.22)
MONOCYTES NFR BLD AUTO: 13 % (ref 4–12)
NEUTROPHILS # BLD AUTO: 8.78 THOUSANDS/ΜL (ref 1.85–7.62)
NEUTS SEG NFR BLD AUTO: 77 % (ref 43–75)
NRBC BLD AUTO-RTO: 0 /100 WBCS
PLATELET # BLD AUTO: 368 THOUSANDS/UL (ref 149–390)
PMV BLD AUTO: 9.7 FL (ref 8.9–12.7)
POTASSIUM SERPL-SCNC: 4.2 MMOL/L (ref 3.5–5.3)
PROT SERPL-MCNC: 7 G/DL (ref 6.4–8.2)
RBC # BLD AUTO: 3.12 MILLION/UL (ref 3.81–5.12)
SODIUM SERPL-SCNC: 131 MMOL/L (ref 136–145)
WBC # BLD AUTO: 11.45 THOUSAND/UL (ref 4.31–10.16)

## 2019-05-28 PROCEDURE — 99232 SBSQ HOSP IP/OBS MODERATE 35: CPT | Performed by: INTERNAL MEDICINE

## 2019-05-28 PROCEDURE — 80053 COMPREHEN METABOLIC PANEL: CPT | Performed by: INTERNAL MEDICINE

## 2019-05-28 PROCEDURE — 85025 COMPLETE CBC W/AUTO DIFF WBC: CPT | Performed by: INTERNAL MEDICINE

## 2019-05-28 RX ADMIN — METRONIDAZOLE 500 MG: 500 TABLET, FILM COATED ORAL at 13:32

## 2019-05-28 RX ADMIN — LACTULOSE 10 G: 10 SOLUTION ORAL at 16:30

## 2019-05-28 RX ADMIN — METRONIDAZOLE 500 MG: 500 TABLET, FILM COATED ORAL at 21:33

## 2019-05-28 RX ADMIN — NADOLOL 20 MG: 20 TABLET ORAL at 07:44

## 2019-05-28 RX ADMIN — PANTOPRAZOLE SODIUM 40 MG: 40 TABLET, DELAYED RELEASE ORAL at 16:30

## 2019-05-28 RX ADMIN — FUROSEMIDE 20 MG: 20 TABLET ORAL at 07:43

## 2019-05-28 RX ADMIN — CEFTRIAXONE 2000 MG: 2 INJECTION, POWDER, FOR SOLUTION INTRAMUSCULAR; INTRAVENOUS at 13:35

## 2019-05-28 RX ADMIN — FUROSEMIDE 20 MG: 20 TABLET ORAL at 16:30

## 2019-05-28 RX ADMIN — SIMETHICONE CHEW TAB 80 MG 80 MG: 80 TABLET ORAL at 11:11

## 2019-05-28 RX ADMIN — ENOXAPARIN SODIUM 40 MG: 40 INJECTION SUBCUTANEOUS at 07:45

## 2019-05-28 RX ADMIN — SIMETHICONE CHEW TAB 80 MG 80 MG: 80 TABLET ORAL at 16:31

## 2019-05-28 RX ADMIN — SIMETHICONE CHEW TAB 80 MG 80 MG: 80 TABLET ORAL at 21:33

## 2019-05-28 RX ADMIN — SPIRONOLACTONE 50 MG: 50 TABLET ORAL at 16:31

## 2019-05-28 RX ADMIN — OXYCODONE HYDROCHLORIDE 5 MG: 5 TABLET ORAL at 07:39

## 2019-05-28 RX ADMIN — SIMETHICONE CHEW TAB 80 MG 80 MG: 80 TABLET ORAL at 07:40

## 2019-05-28 RX ADMIN — SPIRONOLACTONE 50 MG: 50 TABLET ORAL at 07:44

## 2019-05-28 RX ADMIN — POTASSIUM CHLORIDE 20 MEQ: 1500 TABLET, EXTENDED RELEASE ORAL at 07:43

## 2019-05-28 RX ADMIN — PANTOPRAZOLE SODIUM 40 MG: 40 TABLET, DELAYED RELEASE ORAL at 06:03

## 2019-05-28 RX ADMIN — LACTULOSE 10 G: 10 SOLUTION ORAL at 07:41

## 2019-05-28 RX ADMIN — METRONIDAZOLE 500 MG: 500 TABLET, FILM COATED ORAL at 05:59

## 2019-05-28 RX ADMIN — GABAPENTIN 900 MG: 300 CAPSULE ORAL at 21:33

## 2019-05-28 RX ADMIN — RIFAXIMIN 550 MG: 550 TABLET ORAL at 07:42

## 2019-05-28 RX ADMIN — POTASSIUM CHLORIDE 20 MEQ: 1500 TABLET, EXTENDED RELEASE ORAL at 16:31

## 2019-05-28 RX ADMIN — NICOTINE 1 PATCH: 21 PATCH, EXTENDED RELEASE TRANSDERMAL at 21:35

## 2019-05-28 RX ADMIN — GABAPENTIN 300 MG: 300 CAPSULE ORAL at 07:42

## 2019-05-28 RX ADMIN — RIFAXIMIN 550 MG: 550 TABLET ORAL at 21:33

## 2019-05-28 RX ADMIN — OXYCODONE HYDROCHLORIDE 5 MG: 5 TABLET ORAL at 16:29

## 2019-05-28 NOTE — PROGRESS NOTES
INTERNAL MEDICINE RESIDENCY PROGRESS NOTE     Name: Basia Portillo   Age & Sex: 76 y o  female   MRN: [id number]  Unit/Bed#: [facility] 708-01   Encounter: [id number]  Team: SOD Team A    PATIENT INFORMATION     Name: Basia Portillo   Age & Sex: 76 y o  female   MRN: [id number]  Hospital Stay Days: 3    ASSESSMENT/PLAN     Principal Problem:    Abdominal pain  Active Problems:    Chronic lumbar radiculopathy    Diabetes mellitus, type II ([facility] )    Diabetic neuropathy ([facility] )    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 ([facility] )    Hepatocellular carcinoma (HCC)    Nicotine dependence    Opioid dependence ([facility] )    Pain syndrome, chronic    Hyponatremia    Decompensated hepatic cirrhosis ([facility] )    Portal vein thrombosis    Flatulence      Flatulence  Assessment & Plan  Patient with distended abdomen, significant belching and flatulence  Plan: scheduled simethicone with meals  Portal vein thrombosis  Assessment & Plan  Previously on anticoagulation however patient developed serious life-threatening varices bleed with a hemoglobin down to 4 0 therefore anticoagulation at this point was contraindicated based on her clear bleeding risk  Will manage conservatively  Recent imaging does suggest some extension of portal vein thrombi, questionable whether this is related to her abdominal pain  Decompensated hepatic cirrhosis ([facility] )  Assessment & Plan  Minimal to mild ascites on recent imaging  No indication for paracentesis at this time  Does have varices with history of varices bleed this year contraindicating anticoagulation  GI consulted  Start lactulose and rifaximin   Pending IR paracentesis given fever of 101  Started on IV abx for SBP      Hyponatremia  Assessment & Plan  Sodium of 129 on admission  Poor p o  Intake noted with anorexia  Possible beer potomania  Recheck BMP in a m  And will continue diuretics of Lasix 60 mg and spironolactone 150 mg daily for now      Plan: Patient with chronic hyponatremia, however sodium did decrease from 129 on admission to 125, without any acute mental changes, fluid restriction of 2 liters was implemented with increase in sodium up to 131  Pain syndrome, chronic  Assessment & Plan  Stable in patient with terminal condition  Continue outpatient oxycodone  Opioid dependence ([facility] )  Assessment & Plan  On chronic opiate therapy with oxycodone 10 mg q 6 hours p r n  And will continue this while in hospital   Follows with pain management and palliative care  Nicotine dependence  Assessment & Plan  Nicotine patch daily p r n  Hepatocellular carcinoma [facility])  Assessment & Plan  Outpatient follow-up with Dr Smith [facility] of Surgical On[facility]  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 ([facility] )  Assessment & Plan  Minimal to mild ascites on recent imaging  Following with GI  Outpatient EGD for monitoring of varices and esophageal candidiasis  Diabetic neuropathy [facility])  Assessment & Plan  Lab Results   Component Value Date    HGBA1C 5 4 03/09/2019     Continue gabapentin 300 mg q a m , 900 mg total q h s       Diabetes mellitus, type II [facility])  Assessment & Plan  Lab Results   Component Value Date    HGBA1C 5 4 03/09/2019       Hyperglycemic with a blood glucose in the 140s on admission  No indication for insulin therapy while in hospital here at this time  Monitor glucose on BMP in the a m  Kiley Sinks Chronic lumbar radiculopathy  Assessment & Plan  Follows with pain management outpatient  On stable regimen of oxycodone and gabapentin which will be continued  * Abdominal pain  Assessment & Plan  Recurrence, pain is waxing and waning quality  There are multiple etiologies that could explain this pain such as gallbladder distention and cholestasis seen on her more recent imaging of the abdomen    Additionally could be stretching of Cristi's capsule of the liver given her cirrhosis and hepatic inflammation versus extensive and increased portal venous thrombus/thrombi  Patient was febrile to 101 5 on 5/25 with only one blood culture drawn negative at 48 hours  Consulted IR for diagnostic paracentesis-however not enough ascitic fluid for paracentesis  The patient has used oxycodone only once daily over the past two days  Etiology of abdominal pain includes acalculous cholecystitis vs  SBP, patient is being covered for both with antibiotic therapy  Plan: nursing was able to regain IV access yesterday, patient was restarted on ceftriaxone from oral cipro  She received one day of ceftriaxone, followed by 2 days of cipro, and is currently on second day of ceftriaxone after re-initiation, for  total antibiotics day 5 today  As she is not a candidate for surgery, will treat for cholecystitis for a prolonged course 10-14 days  Continue current pain regimen of oxycodone 10 mg q 6 hours p r n  The patient takes percocet at home, however in consideration of elevated liver enzymes, will continue on oxycodone only  Continue simethicone with every meal, patient continues to have significant   Continue Zofran 4 mg IV p r n  Nausea        Disposition: Patient is improving on antibiotic therapy, will continue to monitor for improvement, may be stable for discharge later this week  SUBJECTIVE     Patient seen and examined  No acute events overnight  Patient states she was able to eat most of her breakfast this morning, however she did develop abdominal pain described as cramping in the RUQ  She denies any nausea or vomiting  She had two loose bowel movements yesterday, and had one bowel movement this morning  She denies feeling fevers, chills, nightsweats  Overall she feels better since her admission, with abdominal pain improving       OBJECTIVE     Vitals:    05/27/19 1918 05/27/19 2144 05/27/19 2208 05/28/19 0736   BP: (!) 88/58 92/60 92/61 91/61   BP Location:    Right arm   Pulse: 77 74 61 65   Resp:       Temp:    99 9 °F (37 7 °C) TempSrc:       SpO2: 97% 93% 98% 97%   Weight:       Height:          Temperature:   Temp (24hrs), Av °F (37 2 °C), Min:98 1 °F (36 7 °C), Max:99 9 °F (37 7 °C)    Temperature: 99 9 °F (37 7 °C)  Intake & Output:  I/O        07 -  0700  07 -  0700  07 -  0700    P  O  958 921 360    IV Piggyback       Total Intake(mL/kg) 958 (14 4) 921 (13 8) 360 (5 4)    Urine (mL/kg/hr)  200 (0 1)     Total Output  200     Net +958 +721 +360           Unmeasured Urine Occurrence  3 x 1 x    Unmeasured Stool Occurrence   1 x        Weights:   IBW: 52 4 kg    Body mass index is 26 04 kg/m²  Weight (last 2 days)     None        Physical Exam   Constitutional: She is oriented to person, place, and time  No distress  HENT:   Head: Normocephalic and atraumatic  Mouth/Throat: No oropharyngeal exudate  Eyes: Right eye exhibits no discharge  Left eye exhibits no discharge  No scleral icterus  Neck: Neck supple  No tracheal deviation present  No thyromegaly present  Cardiovascular: Normal rate, regular rhythm, normal heart sounds and intact distal pulses  Exam reveals no gallop and no friction rub  No murmur heard  Pulmonary/Chest: Effort normal and breath sounds normal  No stridor  No respiratory distress  She has no wheezes  She has no rales  She exhibits no tenderness  Abdominal: Soft  Bowel sounds are normal  She exhibits distension  She exhibits no mass  There is no tenderness  There is no rebound and no guarding  No hernia  Musculoskeletal: Normal range of motion  She exhibits no edema, tenderness or deformity  Neurological: She is alert and oriented to person, place, and time  No asterixis one exam, alert and oriented x3  Skin: Skin is warm and dry  No rash noted  She is not diaphoretic  No erythema  No pallor  Nursing note and vitals reviewed  LABORATORY DATA     Labs: I have personally reviewed pertinent reports    Results from last 7 days   Lab Units 05/28/19  0516 05/27/19  0756 05/26/19  1113   WBC Thousand/uL 11 45* 13 06* 15 64*   HEMOGLOBIN g/dL 8 6* 9 5* 9 3*   HEMATOCRIT % 26 9* 29 4* 29 2*   PLATELETS Thousands/uL 368 386 374   NEUTROS PCT % 77* 78* 82*   MONOS PCT % 13* 12 10      Results from last 7 days   Lab Units 05/28/19  0516 05/27/19  0756 05/26/19  1113   POTASSIUM mmol/L 4 2 4 4 4 5   CHLORIDE mmol/L 98* 94* 96*   CO2 mmol/L 25 25 25   BUN mg/dL 9 9 11   CREATININE mg/dL 0 80 0 86 0 84   CALCIUM mg/dL 8 3 9 1 8 7   ALK PHOS U/L 336* 337* 313*   ALT U/L 14 19 21   AST U/L 54* 80* 103*     Results from last 7 days   Lab Units 05/24/19  1135   MAGNESIUM mg/dL 2 0          Results from last 7 days   Lab Units 05/21/19  1511   INR  1 15   PTT seconds 36               IMAGING & DIAGNOSTIC TESTING     Radiology Results: I have personally reviewed pertinent reports  Us Abdomen Limited    Result Date: 5/25/2019  Impression: Mild ascites as above  Workstation performed: [id number]     Us Right Upper Quadrant    Result Date: 5/26/2019  Impression: Gallbladder sludge, with trace pericholecystic fluid and positive sonographic Ruff's sign suggestive of acute cholecystitis  Nonvisualized common bile duct  Cirrhotic liver again seen, as well as thrombosed main portal vein  Workstation performed: [id number]     Other Diagnostic Testing: I have personally reviewed pertinent reports      ACTIVE MEDICATIONS     Current Facility-Administered Medications   Medication Dose Route Frequency    cefTRIAXone (ROCEPHIN) 2,000 mg in dextrose 5 % 50 mL IVPB  2,000 mg Intravenous Q24H    enoxaparin (LOVENOX) subcutaneous injection 40 mg  40 mg Subcutaneous Q24H [place]    furosemide (LASIX) tablet 20 mg  20 mg Oral TID    gabapentin (NEURONTIN) capsule 300 mg  300 mg Oral QAM    gabapentin (NEURONTIN) capsule 900 mg  900 mg Oral HS    lactulose 20 g/30 mL oral solution 10 g  10 g Oral BID    metroNIDAZOLE (FLAGYL) tablet 500 mg  500 mg Oral Q8H [address]    nadolol (CORGARD) tablet 20 mg  20 mg Oral Daily    nicotine (NICODERM CQ) 21 mg/24 hr TD 24 hr patch 1 patch  1 patch Transdermal Q24H    ondansetron (ZOFRAN) injection 4 mg  4 mg Intravenous Q6H PRN    oxyCODONE (ROXICODONE) IR tablet 5 mg  5 mg Oral Q6H PRN    pantoprazole (PROTONIX) EC tablet 40 mg  40 mg Oral BID AC    polyethylene glycol (MIRALAX) packet 17 g  17 g Oral Daily PRN    potassium chloride (K-DUR,KLOR-CON) CR tablet 20 mEq  20 mEq Oral BID    rifaximin (XIFAXAN) tablet 550 mg  550 mg Oral Q12H [facility]    simethicone (MYLICON) chewable tablet 80 mg  80 mg Oral 4x Daily (with meals and at bedtime)    spironolactone (ALDACTONE) tablet 50 mg  50 mg Oral TID       VTE Pharmacologic Prophylaxis: Enoxaparin (Lovenox)  VTE Mechanical Prophylaxis: sequential compression device    Portions of the record may have been created with voice recognition software  Occasional wrong word or "sound a like" substitutions may have occurred due to the inherent limitations of voice recognition software    Read the chart carefully and recognize, using context, where substitutions have occurred   ==  David Kinsey, [address] [facility]  Internal Medicine Residency PGY-1

## 2019-05-28 NOTE — PROGRESS NOTES
[facility]  Progress Note - Yoni Doshi 76 y o  female MRN: [id number]    Unit/Bed#: [facility] 708-01 Encounter: [id number]    Assessment/Plan:  Decompensated cirrhosis  Ascites  History of bleeding varices  Encephalopathy  -encephalopathy likely in the setting of intra-abdominal infection:  SBP vs acalculous cholecystitis-improving, no asterixis  -continue with Xifaxan and low-dose lactulose to titrate to 3 bowel movements per day  -on nonselective beta-blocker for variceal bleeding prophylaxis  -minimal ascites continue with current diuretic regimen    Abdominal pain  -although no conclusive evidenc,e likely secondary to intra-abdominal infection SBP vs acalculous cholecystitis most likely  -WBC trending down, afebrile improved, pain improved and continues to be more alert  -ultrasound with sludge and thickened gallbladder with trace pericholecystic fluid but no identified gallstones-patient would likely be high risk for cholecystectomy  -patient currently being treated empirically for acalculous cholecystitis and SBP with antibiotics as per primary team  -continue treatment for SBP for 5 days total  -consider treatment of acalculous cholecystitis for 7-10 days (Cipro and Flagyl should suffice); consider ID evaluation for further guidance on antibiotic management   -since no definitive evidence of SBP at this point would not recommend SBP prophylaxis upon discharge   -if no improvement consider HIDA scan patient may be a candidate for percutaneous drainage by [facility] [address]   Portal vein thrombosis  -status post ablation and wedge resection and recurrence status post SIRS spheres in 12/2017- follows with Dr Viraj Fuller  -not a candidate for anticoagulation for her progressive portal vein thrombosis due to bleeding varices in the past  -CT imaging with gallbladder distention extensive valeriano hepatis and peripancreatic lymphadenopathy with increasing valeriano hepatis lymphadenopathy- this likely represents known progression of her underlying [address]   -recommend outpatient follow-up with Medical Oncology Dr Lesly Bernard for possibility of initiating chemotherapy  GI to sign off  Please contact with any future questions or concerns  Will arrange for hospital follow-up in about 1 month  Subjective:   Patient continues to feel better  Tolerating diet  More alert and less confused  Abdominal pain present but improved    Objective:     Vitals: Blood pressure 91/61, pulse 65, temperature 99 9 °F (37 7 °C), resp  rate 18, height 5' 3" (1 6 m), weight 66 7 kg (147 lb), SpO2 97 %, not currently breastfeeding  ,Body mass index is 26 04 kg/m²  Intake/Output Summary (Last 24 hours) at 5/28/2019 0914  Last data filed at 5/28/2019 8112  Gross per 24 hour   Intake 1161 ml   Output 200 ml   Net 961 ml       Review of Systems: as per HPI  Review of Systems    Physical Exam:     Physical Exam   Constitutional: She is oriented to person, place, and time  No distress  HENT:   Head: Atraumatic  Eyes: No scleral icterus  Cardiovascular: Normal rate  Pulmonary/Chest: Effort normal and breath sounds normal    Abdominal: Bowel sounds are normal  She exhibits distension  There is tenderness (Minimal generalized tenderness)  Neurological: She is alert and oriented to person, place, and time  No asterixis   Skin: Skin is warm and dry  Psychiatric: She has a normal mood and affect           Invasive Devices     Peripheral Intravenous Line            Peripheral IV 05/27/19 Right Arm less than 1 day                        CBC:   Lab Results   Component Value Date    WBC 11 45 (H) 05/28/2019    HGB 8 6 (L) 05/28/2019    HCT 26 9 (L) 05/28/2019    MCV 86 05/28/2019     05/28/2019    MCH 27 6 05/28/2019    MCHC 32 0 05/28/2019    RDW 18 0 (H) 05/28/2019    MPV 9 7 05/28/2019    NRBC 0 05/28/2019   ,   CMP:   Lab Results   Component Value Date    K 4 2 05/28/2019    CL 98 (L) 05/28/2019    CO2 25 05/28/2019    BUN 9 05/28/2019 CREATININE 0 80 05/28/2019    CALCIUM 8 3 05/28/2019    AST 54 (H) 05/28/2019    ALT 14 05/28/2019    ALKPHOS 336 (H) 05/28/2019    EGFR 88 05/28/2019

## 2019-05-28 NOTE — PLAN OF CARE
Problem: Potential for Falls  Goal: Patient will remain free of falls  Description  INTERVENTIONS:  - Assess patient frequently for physical needs  -  Identify cognitive and physical deficits and behaviors that affect risk of falls  -  [place] fall precautions as indicated by assessment   - Educate patient/family on patient safety including physical limitations  - Instruct patient to call for assistance with activity based on assessment  - Modify environment to reduce risk of injury  - Consider OT/PT consult to assist with strengthening/mobility  Outcome: Progressing     Problem: DISCHARGE PLANNING  Goal: Discharge to home or other facility with appropriate resources  Description  INTERVENTIONS:  - Identify barriers to discharge w/patient and caregiver  - Arrange for needed discharge resources and transportation as appropriate  - Identify discharge learning needs (meds, wound care, etc )  - Refer to Case Management Department for coordinating discharge planning if the patient needs post-hospital services based on physician/advanced practitioner order or complex needs related to functional status, cognitive ability, or social support system   Outcome: Progressing     Problem: Knowledge Deficit  Goal: Patient/family/caregiver demonstrates understanding of disease process, treatment plan, medications, and discharge instructions  Description  Complete learning assessment and assess knowledge base  Interventions:  - Provide teaching at level of understanding  - Provide teaching via preferred learning methods  Outcome: Progressing     Problem: Nutrition/Hydration-ADULT  Goal: Nutrient/Hydration intake appropriate for improving, restoring or maintaining nutritional needs  Description  Monitor and assess patient's nutrition/hydration status for malnutrition (ex- brittle hair, bruises, dry skin, pale skin and conjunctiva, muscle wasting, smooth red tongue, and disorientation)   Collaborate with interdisciplinary team and initiate plan and interventions as ordered  Monitor patient's weight and dietary intake as ordered or per policy  Utilize nutrition screening tool and intervene per policy  Determine patient's food preferences and provide high-protein, high-caloric foods as appropriate       INTERVENTIONS:  - Monitor oral intake, urinary output, labs, and treatment plans  - Assess nutrition and hydration status and recommend course of action  - Evaluate amount of meals eaten  - Assist patient with eating if necessary   - Allow adequate time for meals  - Recommend/ encourage appropriate diets, oral nutritional supplements, and vitamin/mineral supplements  - Order, calculate, and assess calorie counts as needed  - Assess need for intravenous fluids  - Provide specific nutrition/hydration education as appropriate  - Include patient/family/caregiver in decisions related to nutrition   Outcome: Progressing     Problem: Prexisting or High Potential for Compromised Skin Integrity  Goal: Skin integrity is maintained or improved  Description  INTERVENTIONS:  - Identify patients at risk for skin breakdown  - Assess and monitor skin integrity  - Assess and monitor nutrition and hydration status  - Monitor labs (i e  albumin)  - Assess for incontinence   - Turn and reposition patient  - Assist with mobility/ambulation  - Relieve pressure over bony prominences  - Avoid friction and shearing  - Provide appropriate hygiene as needed including keeping skin clean and dry  - Evaluate need for skin moisturizer/barrier cream  - Collaborate with interdisciplinary team (i e  Nutrition, Rehabilitation, etc )   - Patient/family teaching  Outcome: Progressing     Problem: GASTROINTESTINAL - ADULT  Goal: Maintains or returns to baseline bowel function  Description  INTERVENTIONS:  - Assess bowel function  - Encourage oral fluids to ensure adequate hydration  - Administer IV fluids as ordered to ensure adequate hydration  - Administer ordered medications as needed  - Encourage mobilization and activity  - Nutrition services referral to assist patient with appropriate food choices  Outcome: Progressing  Goal: Maintains adequate nutritional intake  Description  INTERVENTIONS:  - Monitor percentage of each meal consumed  - Identify factors contributing to decreased intake, treat as appropriate  - Assist with meals as needed  - Monitor I&O, WT and lab values  - Obtain nutrition services referral as needed  Outcome: Progressing     Problem: HEMATOLOGIC - ADULT  Goal: Maintains hematologic stability  Description  INTERVENTIONS  - Assess for signs and symptoms of bleeding or hemorrhage  - Monitor labs  - Administer supportive blood products/factors as ordered and appropriate  Outcome: Progressing

## 2019-05-29 ENCOUNTER — APPOINTMENT (OUTPATIENT)
Dept: PHYSICAL THERAPY | Facility: CLINIC | Age: 69
End: 2019-05-29
Payer: COMMERCIAL

## 2019-05-29 LAB
ALBUMIN SERPL BCP-MCNC: 1.8 G/DL (ref 3.5–5)
ALP SERPL-CCNC: 306 U/L (ref 46–116)
ALT SERPL W P-5'-P-CCNC: 12 U/L (ref 12–78)
ANION GAP SERPL CALCULATED.3IONS-SCNC: 11 MMOL/L (ref 4–13)
AST SERPL W P-5'-P-CCNC: 43 U/L (ref 5–45)
BASOPHILS # BLD AUTO: 0.04 THOUSANDS/ΜL (ref 0–0.1)
BASOPHILS NFR BLD AUTO: 0 % (ref 0–1)
BILIRUB SERPL-MCNC: 0.59 MG/DL (ref 0.2–1)
BUN SERPL-MCNC: 8 MG/DL (ref 5–25)
CALCIUM SERPL-MCNC: 8.3 MG/DL (ref 8.3–10.1)
CHLORIDE SERPL-SCNC: 97 MMOL/L (ref 100–108)
CO2 SERPL-SCNC: 24 MMOL/L (ref 21–32)
CREAT SERPL-MCNC: 0.81 MG/DL (ref 0.6–1.3)
EOSINOPHIL # BLD AUTO: 0.06 THOUSAND/ΜL (ref 0–0.61)
EOSINOPHIL NFR BLD AUTO: 1 % (ref 0–6)
ERYTHROCYTE [DISTWIDTH] IN BLOOD BY AUTOMATED COUNT: 18.1 % (ref 11.6–15.1)
GFR SERPL CREATININE-BSD FRML MDRD: 86 ML/MIN/1.73SQ M
GLUCOSE SERPL-MCNC: 136 MG/DL (ref 65–140)
HCT VFR BLD AUTO: 26.6 % (ref 34.8–46.1)
HGB BLD-MCNC: 8.4 G/DL (ref 11.5–15.4)
IMM GRANULOCYTES # BLD AUTO: 0.14 THOUSAND/UL (ref 0–0.2)
IMM GRANULOCYTES NFR BLD AUTO: 1 % (ref 0–2)
LYMPHOCYTES # BLD AUTO: 1.17 THOUSANDS/ΜL (ref 0.6–4.47)
LYMPHOCYTES NFR BLD AUTO: 11 % (ref 14–44)
MCH RBC QN AUTO: 27.9 PG (ref 26.8–34.3)
MCHC RBC AUTO-ENTMCNC: 31.6 G/DL (ref 31.4–37.4)
MCV RBC AUTO: 88 FL (ref 82–98)
MONOCYTES # BLD AUTO: 1.33 THOUSAND/ΜL (ref 0.17–1.22)
MONOCYTES NFR BLD AUTO: 13 % (ref 4–12)
NEUTROPHILS # BLD AUTO: 7.78 THOUSANDS/ΜL (ref 1.85–7.62)
NEUTS SEG NFR BLD AUTO: 74 % (ref 43–75)
NRBC BLD AUTO-RTO: 0 /100 WBCS
PLATELET # BLD AUTO: 357 THOUSANDS/UL (ref 149–390)
PMV BLD AUTO: 9.9 FL (ref 8.9–12.7)
POTASSIUM SERPL-SCNC: 3.8 MMOL/L (ref 3.5–5.3)
PROT SERPL-MCNC: 6.7 G/DL (ref 6.4–8.2)
RBC # BLD AUTO: 3.01 MILLION/UL (ref 3.81–5.12)
SODIUM SERPL-SCNC: 132 MMOL/L (ref 136–145)
WBC # BLD AUTO: 10.52 THOUSAND/UL (ref 4.31–10.16)

## 2019-05-29 PROCEDURE — 85025 COMPLETE CBC W/AUTO DIFF WBC: CPT | Performed by: INTERNAL MEDICINE

## 2019-05-29 PROCEDURE — 80053 COMPREHEN METABOLIC PANEL: CPT | Performed by: INTERNAL MEDICINE

## 2019-05-29 PROCEDURE — 99232 SBSQ HOSP IP/OBS MODERATE 35: CPT | Performed by: INTERNAL MEDICINE

## 2019-05-29 RX ORDER — CIPROFLOXACIN 500 MG/1
500 TABLET, FILM COATED ORAL EVERY 12 HOURS SCHEDULED
Status: DISCONTINUED | OUTPATIENT
Start: 2019-05-30 | End: 2019-05-31 | Stop reason: HOSPADM

## 2019-05-29 RX ADMIN — PANTOPRAZOLE SODIUM 40 MG: 40 TABLET, DELAYED RELEASE ORAL at 05:25

## 2019-05-29 RX ADMIN — CEFTRIAXONE 2000 MG: 2 INJECTION, POWDER, FOR SOLUTION INTRAMUSCULAR; INTRAVENOUS at 13:41

## 2019-05-29 RX ADMIN — NADOLOL 20 MG: 20 TABLET ORAL at 09:53

## 2019-05-29 RX ADMIN — LACTULOSE 10 G: 10 SOLUTION ORAL at 09:52

## 2019-05-29 RX ADMIN — RIFAXIMIN 550 MG: 550 TABLET ORAL at 20:19

## 2019-05-29 RX ADMIN — METRONIDAZOLE 500 MG: 500 TABLET, FILM COATED ORAL at 13:41

## 2019-05-29 RX ADMIN — GABAPENTIN 300 MG: 300 CAPSULE ORAL at 09:51

## 2019-05-29 RX ADMIN — FUROSEMIDE 20 MG: 20 TABLET ORAL at 17:03

## 2019-05-29 RX ADMIN — SPIRONOLACTONE 75 MG: 50 TABLET ORAL at 17:03

## 2019-05-29 RX ADMIN — FUROSEMIDE 20 MG: 20 TABLET ORAL at 20:20

## 2019-05-29 RX ADMIN — SPIRONOLACTONE 75 MG: 50 TABLET ORAL at 09:51

## 2019-05-29 RX ADMIN — ENOXAPARIN SODIUM 40 MG: 40 INJECTION SUBCUTANEOUS at 09:52

## 2019-05-29 RX ADMIN — FUROSEMIDE 20 MG: 20 TABLET ORAL at 09:52

## 2019-05-29 RX ADMIN — POTASSIUM CHLORIDE 20 MEQ: 1500 TABLET, EXTENDED RELEASE ORAL at 09:51

## 2019-05-29 RX ADMIN — SIMETHICONE CHEW TAB 80 MG 80 MG: 80 TABLET ORAL at 17:03

## 2019-05-29 RX ADMIN — METRONIDAZOLE 500 MG: 500 TABLET, FILM COATED ORAL at 20:21

## 2019-05-29 RX ADMIN — POTASSIUM CHLORIDE 20 MEQ: 1500 TABLET, EXTENDED RELEASE ORAL at 17:03

## 2019-05-29 RX ADMIN — SIMETHICONE CHEW TAB 80 MG 80 MG: 80 TABLET ORAL at 21:00

## 2019-05-29 RX ADMIN — NICOTINE 1 PATCH: 21 PATCH, EXTENDED RELEASE TRANSDERMAL at 20:21

## 2019-05-29 RX ADMIN — METRONIDAZOLE 500 MG: 500 TABLET, FILM COATED ORAL at 05:24

## 2019-05-29 RX ADMIN — LACTULOSE 10 G: 10 SOLUTION ORAL at 17:03

## 2019-05-29 RX ADMIN — OXYCODONE HYDROCHLORIDE 5 MG: 5 TABLET ORAL at 09:52

## 2019-05-29 RX ADMIN — SIMETHICONE CHEW TAB 80 MG 80 MG: 80 TABLET ORAL at 09:51

## 2019-05-29 RX ADMIN — RIFAXIMIN 550 MG: 550 TABLET ORAL at 09:52

## 2019-05-29 RX ADMIN — PANTOPRAZOLE SODIUM 40 MG: 40 TABLET, DELAYED RELEASE ORAL at 17:03

## 2019-05-29 RX ADMIN — GABAPENTIN 900 MG: 300 CAPSULE ORAL at 20:21

## 2019-05-29 NOTE — PROGRESS NOTES
INTERNAL MEDICINE RESIDENCY PROGRESS NOTE     Name: Lc Avelar   Age & Sex: 76 y o  female   MRN: [id number]  Unit/Bed#: [facility] 708-01   Encounter: [id number]  Team: SOD Team A    PATIENT INFORMATION     Name: Lc Avelar   Age & Sex: 76 y o  female   MRN: [id number]  Hospital Stay Days: 4    ASSESSMENT/PLAN     Principal Problem:    Abdominal pain  Active Problems:    Chronic lumbar radiculopathy    Diabetes mellitus, type II ([place] [address] )    Diabetic neuropathy ([place] [address] )    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 ([place] [address] )    Hepatocellular carcinoma (HCC)    Nicotine dependence    Opioid dependence ([place] [address] )    Pain syndrome, chronic    Hyponatremia    Decompensated hepatic cirrhosis (HCC)    Portal vein thrombosis    Flatulence      * Abdominal pain  Assessment & Plan  Recurrent, pain is waxing and waning quality  There are multiple etiologies that could explain this pain such as gallbladder distention and cholestasis seen on her more recent imaging of the abdomen  Additionally could be stretching of Cristi's capsule of the liver given her cirrhosis and hepatic inflammation versus extensive and increased portal venous thrombus/thrombi  There has been concern for possible SBP versus acalculous cholecystitis given prior imaging findings  -continue with antibiotics for 10-14 day treatment course  Will transition to p  O  Ciprofloxacin and continue Flagyl  Antibiotics day 6 today  -continue oxycodone p r n  for pain control    -Zofran p r n  For nausea  -will order HIDA scan per GI recommendations to determine if percutaneous drainage might be needed for the patient's acalculous cholecystitis            Flatulence  Assessment & Plan  Patient with distended abdomen, significant belching and flatulence  Plan: scheduled simethicone with meals       Portal vein thrombosis  Assessment & Plan  Previously on anticoagulation however patient developed serious life-threatening varices bleed with a hemoglobin down to 4 0 therefore anticoagulation at this point was contraindicated based on her clear bleeding risk  Will manage conservatively  Recent imaging does suggest some extension of portal vein thrombi, questionable whether this is related to her abdominal pain  Decompensated hepatic cirrhosis ([facility] )  Assessment & Plan  Minimal to mild ascites on recent imaging  No indication for paracentesis at this time  Does have varices with history of varices bleed this year contraindicating anticoagulation  Started on lactulose and rifaximin due to mild confusion and concern for hepatic encephalopathy during this admission         Hyponatremia  Assessment & Plan  Chronic, Sodium of 129 on admission  Poor p o  Intake noted with anorexia  Recheck BMP in a m  And will continue diuretics of Lasix 60 mg and spironolactone 150 mg daily for now  Pain syndrome, chronic  Assessment & Plan  Stable in patient with terminal condition  Continue outpatient oxycodone  Opioid dependence ([facility] )  Assessment & Plan  On chronic opiate therapy with oxycodone 10 mg q 6 hours p r n  And will continue this while in hospital   Follows with pain management and palliative care  Nicotine dependence  Assessment & Plan  Nicotine patch daily p r n  Hepatocellular carcinoma [facility])  Assessment & Plan  Outpatient follow-up with Dr Shanna Herrera of Surgical Oncology  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 ([facility] )  Assessment & Plan  Minimal to mild ascites on recent imaging  Following with GI  Outpatient EGD for monitoring of varices and esophageal candidiasis  Diabetic neuropathy [facility])  Assessment & Plan  Lab Results   Component Value Date    HGBA1C 5 4 03/09/2019     Continue gabapentin 300 mg q a m , 900 mg total q h s         Diabetes mellitus, type II [facility])  Assessment & Plan  Lab Results   Component Value Date    HGBA1C 5 4 03/09/2019       Hyperglycemic with a blood glucose in the 140s on admission    No indication for insulin therapy while in hospital here at this time  Monitor glucose on BMP in the eloy Clemens Chronic lumbar radiculopathy  Assessment & Plan  Follows with pain management outpatient  On stable regimen of oxycodone and gabapentin which will be continued  The patient takes percocet as outpatient, wary of tylenol secondary to elevated liver enzymes, will administer oxycodone       Disposition:  Continue inpatient management  Continue IV antibiotic therapy for SBP versus acalculous cholecystitis  Will order HIDA scan    SUBJECTIVE     Patient seen and examined  No acute events overnight  Endorses 9/10 epigastric and right upper quadrant abdominal pain this morning which is worse than yesterday  Sources loss of appetite but denies nausea or vomiting  States that she is continuing to have bowel movements on lactulose  No other acute complaints  OBJECTIVE     Vitals:    19 1530 19 1627 19 2130 19 0700   BP: (!) 89/60 91/60 (!) 85/51 95/61   BP Location:  Left arm     Pulse: 65 69 68 64   Resp:  16     Temp:   98 4 °F (36 9 °C) 97 5 °F (36 4 °C)   TempSrc:       SpO2: 97% 98% 98% 98%   Weight:       Height:          Temperature:   Temp (24hrs), Av °F (36 7 °C), Min:97 5 °F (36 4 °C), Max:98 4 °F (36 9 °C)    Temperature: 97 5 °F (36 4 °C)  Intake & Output:  I/O        07 -  0700  07 -  0700  07 -  0700    P  O  921 720     Total Intake(mL/kg) 921 (13 8) 720 (10 8)     Urine (mL/kg/hr) 200 (0 1)      Total Output 200      Net +721 +720            Unmeasured Urine Occurrence 3 x 2 x     Unmeasured Stool Occurrence  1 x         Weights:   IBW: 52 4 kg    Body mass index is 26 04 kg/m²  Weight (last 2 days)     None        Physical Exam   Constitutional: She is oriented to person, place, and time  She appears well-developed and well-nourished  No distress  HENT:   Head: Normocephalic and atraumatic  Mouth/Throat: No oropharyngeal exudate     Eyes: Right eye exhibits no discharge  Left eye exhibits no discharge  No scleral icterus  Cardiovascular: Normal rate, regular rhythm and normal heart sounds  Exam reveals no friction rub  No murmur heard  Pulmonary/Chest: Effort normal and breath sounds normal  She has no wheezes  She has no rales  Abdominal: Soft  Bowel sounds are normal    Epigastric tenderness to palpation  Right upper quadrant tender to palpation   Musculoskeletal: She exhibits no edema or tenderness  Neurological: She is alert and oriented to person, place, and time  No sensory deficit  Skin: Skin is warm and dry  No rash noted  No erythema  Psychiatric: She has a normal mood and affect  Her behavior is normal      LABORATORY DATA     Labs: I have personally reviewed pertinent reports  Results from last 7 days   Lab Units 05/29/19  0454 05/28/19  0516 05/27/19  0756   WBC Thousand/uL 10 52* 11 45* 13 06*   HEMOGLOBIN g/dL 8 4* 8 6* 9 5*   HEMATOCRIT % 26 6* 26 9* 29 4*   PLATELETS Thousands/uL 357 368 386   NEUTROS PCT % 74 77* 78*   MONOS PCT % 13* 13* 12      Results from last 7 days   Lab Units 05/29/19  0751 05/28/19  0516 05/27/19  0756   POTASSIUM mmol/L 3 8 4 2 4 4   CHLORIDE mmol/L 97* 98* 94*   CO2 mmol/L 24 25 25   BUN mg/dL 8 9 9   CREATININE mg/dL 0 81 0 80 0 86   CALCIUM mg/dL 8 3 8 3 9 1   ALK PHOS U/L 306* 336* 337*   ALT U/L 12 14 19   AST U/L 43 54* 80*     Results from last 7 days   Lab Units 05/24/19  1135   MAGNESIUM mg/dL 2 0                        IMAGING & DIAGNOSTIC TESTING     Radiology Results: I have personally reviewed pertinent reports  Us Abdomen Limited    Result Date: 5/25/2019  Impression: Mild ascites as above  Workstation performed: [id number]     Us Right Upper Quadrant    Result Date: 5/26/2019  Impression: Gallbladder sludge, with trace pericholecystic fluid and positive sonographic Ruff's sign suggestive of acute cholecystitis  Nonvisualized common bile duct   Cirrhotic liver again seen, as well as thrombosed main portal vein  Workstation performed: [id number]     Other Diagnostic Testing: I have personally reviewed pertinent reports  ACTIVE MEDICATIONS     Current Facility-Administered Medications   Medication Dose Route Frequency    [START ON 5/30/2019] ciprofloxacin (CIPRO) tablet 500 mg  500 mg Oral Q12H [address]    enoxaparin (LOVENOX) subcutaneous injection 40 mg  40 mg Subcutaneous Q24H [place]    furosemide (LASIX) tablet 20 mg  20 mg Oral TID    gabapentin (NEURONTIN) capsule 300 mg  300 mg Oral QAM    gabapentin (NEURONTIN) capsule 900 mg  900 mg Oral HS    lactulose 20 g/30 mL oral solution 10 g  10 g Oral BID    metroNIDAZOLE (FLAGYL) tablet 500 mg  500 mg Oral Q8H [address]    nadolol (CORGARD) tablet 20 mg  20 mg Oral Daily    nicotine (NICODERM CQ) 21 mg/24 hr TD 24 hr patch 1 patch  1 patch Transdermal Q24H    ondansetron (ZOFRAN) injection 4 mg  4 mg Intravenous Q6H PRN    oxyCODONE (ROXICODONE) IR tablet 5 mg  5 mg Oral Q6H PRN    pantoprazole (PROTONIX) EC tablet 40 mg  40 mg Oral BID AC    polyethylene glycol (MIRALAX) packet 17 g  17 g Oral Daily PRN    potassium chloride (K-DUR,KLOR-CON) CR tablet 20 mEq  20 mEq Oral BID    rifaximin (XIFAXAN) tablet 550 mg  550 mg Oral Q12H [place]LEY    simethicone (MYLICON) chewable tablet 80 mg  80 mg Oral 4x Daily (with meals and at bedtime)    spironolactone (ALDACTONE) tablet 75 mg  75 mg Oral BID       VTE Pharmacologic Prophylaxis: Enoxaparin (Lovenox)  VTE Mechanical Prophylaxis: sequential compression device    Portions of the record may have been created with voice recognition software  Occasional wrong word or "sound a like" substitutions may have occurred due to the inherent limitations of voice recognition software    Read the chart carefully and recognize, using context, where substitutions have occurred   ==  Eric Hinojosa, [address] [place] [address]  Internal Medicine Residency PGY-1

## 2019-05-29 NOTE — RESTORATIVE TECHNICIAN NOTE
Restorative Specialist Mobility Note       Activity: Ambulate in room, Bathroom privileges, Dangle, Stand at bedside(Educated/encouraged pt to ambulate with assistance 3-4 x's/day  Bed alarm on   Pt callbell, phone/tray within reach )     Assistive Device: None          Rolando FLORES, Restorative Technician, [employer] [facility]

## 2019-05-29 NOTE — UTILIZATION REVIEW
Continued Stay Review    Date: 5-29-19      Vital Signs: BP 95/61   Pulse 64   Temp 97 5 °F (36 4 °C)   Resp 16   Ht 5' 3" (1 6 m)   Wt 66 7 kg (147 lb)   SpO2 98%   BMI 26 04 kg/m²      Assessment/Plan:    76year old female admitted for abdominal pain which continues at  9/10  In right upper quadrant which is worse than yesterday with loss of appetite  Right upper quadrant tender to palpation  Plan to start po cipro  5-20  And continue po flagyl  Taking oxycodone 5 mg for pain  Last dose  0952 today       Lab Units 05/29/19  0454 05/28/19  0516 05/27/19  0756   WBC Thousand/uL 10 52* 11 45* 13 06*   HEMOGLOBIN g/dL 8 4* 8 6* 9 5*   HEMATOCRIT % 26 6* 26 9* 29 4*   PLATELETS Thousands/uL 357 368 386   NEUTROS PCT % 74 77* 78*   MONOS PCT % 13* 13* 12             Results from last 7 days   Lab Units 05/29/19  0751 05/28/19  0516 05/27/19  0756   POTASSIUM mmol/L 3 8 4 2 4 4   CHLORIDE mmol/L 97* 98* 94*   CO2 mmol/L 24 25 25   BUN mg/dL 8 9 9   CREATININE mg/dL 0 81 0 80 0 86   CALCIUM mg/dL 8 3 8 3 9 1   ALK PHOS U/L 306* 336* 337*       Medications:     [START ON 5/30/2019] ciprofloxacin 500 mg Oral Q12H [place]   enoxaparin 40 mg Subcutaneous Q24H K[facility]LEY   furosemide 20 mg Oral TID   gabapentin 300 mg Oral QAM   gabapentin 900 mg Oral HS   lactulose 10 g Oral BID   metroNIDAZOLE 500 mg Oral Q8H K[place]LEY   nadolol 20 mg Oral Daily   nicotine 1 patch Transdermal Q24H   ondansetron 4 mg Intravenous Q6H PRN   oxyCODONE 5 mg Oral Q6H PRN   pantoprazole 40 mg Oral BID AC   polyethylene glycol 17 g Oral Daily PRN   potassium chloride 20 mEq Oral BID   rifaximin 550 mg Oral Q12H [facility]   simethicone 80 mg Oral 4x Daily (with meals and at bedtime)   spironolactone 75 mg Oral BID       Discharge Plan: to be determined          Network Utilization Review Department  Phone: [phone]; Fax [phone]  [email or web address]  ATTENTION: Please call with any questions or concerns to [phone]  and carefully listen to the prompts so that you are directed to the right person  Send all requests for admission clinical reviews, approved or denied determinations and any other requests to fax [phone]   All voicemails are confidential

## 2019-05-30 ENCOUNTER — APPOINTMENT (INPATIENT)
Dept: RADIOLOGY | Facility: HOSPITAL | Age: 69
DRG: 391 | End: 2019-05-30
Payer: COMMERCIAL

## 2019-05-30 LAB
ANION GAP SERPL CALCULATED.3IONS-SCNC: 6 MMOL/L (ref 4–13)
BACTERIA BLD CULT: NORMAL
BASOPHILS # BLD AUTO: 0.05 THOUSANDS/ΜL (ref 0–0.1)
BASOPHILS NFR BLD AUTO: 0 % (ref 0–1)
BUN SERPL-MCNC: 7 MG/DL (ref 5–25)
CALCIUM SERPL-MCNC: 8.4 MG/DL (ref 8.3–10.1)
CHLORIDE SERPL-SCNC: 96 MMOL/L (ref 100–108)
CO2 SERPL-SCNC: 26 MMOL/L (ref 21–32)
CREAT SERPL-MCNC: 0.87 MG/DL (ref 0.6–1.3)
EOSINOPHIL # BLD AUTO: 0.08 THOUSAND/ΜL (ref 0–0.61)
EOSINOPHIL NFR BLD AUTO: 1 % (ref 0–6)
ERYTHROCYTE [DISTWIDTH] IN BLOOD BY AUTOMATED COUNT: 17.9 % (ref 11.6–15.1)
GFR SERPL CREATININE-BSD FRML MDRD: 79 ML/MIN/1.73SQ M
GLUCOSE SERPL-MCNC: 89 MG/DL (ref 65–140)
HCT VFR BLD AUTO: 26.8 % (ref 34.8–46.1)
HGB BLD-MCNC: 8.6 G/DL (ref 11.5–15.4)
IMM GRANULOCYTES # BLD AUTO: 0.18 THOUSAND/UL (ref 0–0.2)
IMM GRANULOCYTES NFR BLD AUTO: 2 % (ref 0–2)
LYMPHOCYTES # BLD AUTO: 1.23 THOUSANDS/ΜL (ref 0.6–4.47)
LYMPHOCYTES NFR BLD AUTO: 10 % (ref 14–44)
MCH RBC QN AUTO: 27.8 PG (ref 26.8–34.3)
MCHC RBC AUTO-ENTMCNC: 32.1 G/DL (ref 31.4–37.4)
MCV RBC AUTO: 87 FL (ref 82–98)
MONOCYTES # BLD AUTO: 1.29 THOUSAND/ΜL (ref 0.17–1.22)
MONOCYTES NFR BLD AUTO: 10 % (ref 4–12)
NEUTROPHILS # BLD AUTO: 9.54 THOUSANDS/ΜL (ref 1.85–7.62)
NEUTS SEG NFR BLD AUTO: 77 % (ref 43–75)
NRBC BLD AUTO-RTO: 0 /100 WBCS
PLATELET # BLD AUTO: 406 THOUSANDS/UL (ref 149–390)
PMV BLD AUTO: 9.6 FL (ref 8.9–12.7)
POTASSIUM SERPL-SCNC: 4.2 MMOL/L (ref 3.5–5.3)
RBC # BLD AUTO: 3.09 MILLION/UL (ref 3.81–5.12)
SODIUM SERPL-SCNC: 128 MMOL/L (ref 136–145)
WBC # BLD AUTO: 12.37 THOUSAND/UL (ref 4.31–10.16)

## 2019-05-30 PROCEDURE — 78227 HEPATOBIL SYST IMAGE W/DRUG: CPT

## 2019-05-30 PROCEDURE — A9537 TC99M MEBROFENIN: HCPCS

## 2019-05-30 PROCEDURE — 85025 COMPLETE CBC W/AUTO DIFF WBC: CPT | Performed by: INTERNAL MEDICINE

## 2019-05-30 PROCEDURE — 99232 SBSQ HOSP IP/OBS MODERATE 35: CPT | Performed by: INTERNAL MEDICINE

## 2019-05-30 PROCEDURE — 80048 BASIC METABOLIC PNL TOTAL CA: CPT | Performed by: INTERNAL MEDICINE

## 2019-05-30 RX ORDER — MORPHINE SULFATE 4 MG/ML
3 INJECTION, SOLUTION INTRAMUSCULAR; INTRAVENOUS
Status: DISCONTINUED | OUTPATIENT
Start: 2019-05-30 | End: 2019-05-30

## 2019-05-30 RX ADMIN — METRONIDAZOLE 500 MG: 500 TABLET, FILM COATED ORAL at 05:40

## 2019-05-30 RX ADMIN — NICOTINE 1 PATCH: 21 PATCH, EXTENDED RELEASE TRANSDERMAL at 21:36

## 2019-05-30 RX ADMIN — SIMETHICONE CHEW TAB 80 MG 80 MG: 80 TABLET ORAL at 17:07

## 2019-05-30 RX ADMIN — POTASSIUM CHLORIDE 20 MEQ: 1500 TABLET, EXTENDED RELEASE ORAL at 13:56

## 2019-05-30 RX ADMIN — RIFAXIMIN 550 MG: 550 TABLET ORAL at 13:56

## 2019-05-30 RX ADMIN — SPIRONOLACTONE 75 MG: 50 TABLET ORAL at 13:56

## 2019-05-30 RX ADMIN — SPIRONOLACTONE 75 MG: 50 TABLET ORAL at 17:06

## 2019-05-30 RX ADMIN — GABAPENTIN 300 MG: 300 CAPSULE ORAL at 13:56

## 2019-05-30 RX ADMIN — GABAPENTIN 900 MG: 300 CAPSULE ORAL at 21:36

## 2019-05-30 RX ADMIN — PANTOPRAZOLE SODIUM 40 MG: 40 TABLET, DELAYED RELEASE ORAL at 17:07

## 2019-05-30 RX ADMIN — ENOXAPARIN SODIUM 40 MG: 40 INJECTION SUBCUTANEOUS at 13:53

## 2019-05-30 RX ADMIN — WATER 1.3 ML: 1 INJECTION INTRAMUSCULAR; INTRAVENOUS; SUBCUTANEOUS at 12:32

## 2019-05-30 RX ADMIN — PANTOPRAZOLE SODIUM 40 MG: 40 TABLET, DELAYED RELEASE ORAL at 05:40

## 2019-05-30 RX ADMIN — POTASSIUM CHLORIDE 20 MEQ: 1500 TABLET, EXTENDED RELEASE ORAL at 17:07

## 2019-05-30 RX ADMIN — SIMETHICONE CHEW TAB 80 MG 80 MG: 80 TABLET ORAL at 13:55

## 2019-05-30 RX ADMIN — OXYCODONE HYDROCHLORIDE 5 MG: 5 TABLET ORAL at 20:40

## 2019-05-30 RX ADMIN — CIPROFLOXACIN HYDROCHLORIDE 500 MG: 500 TABLET, FILM COATED ORAL at 13:56

## 2019-05-30 RX ADMIN — CIPROFLOXACIN HYDROCHLORIDE 500 MG: 500 TABLET, FILM COATED ORAL at 21:37

## 2019-05-30 RX ADMIN — FUROSEMIDE 20 MG: 20 TABLET ORAL at 17:06

## 2019-05-30 RX ADMIN — RIFAXIMIN 550 MG: 550 TABLET ORAL at 21:37

## 2019-05-30 RX ADMIN — FUROSEMIDE 20 MG: 20 TABLET ORAL at 21:37

## 2019-05-30 RX ADMIN — METRONIDAZOLE 500 MG: 500 TABLET, FILM COATED ORAL at 14:06

## 2019-05-30 RX ADMIN — SIMETHICONE CHEW TAB 80 MG 80 MG: 80 TABLET ORAL at 21:37

## 2019-05-30 RX ADMIN — METRONIDAZOLE 500 MG: 500 TABLET, FILM COATED ORAL at 21:36

## 2019-05-30 RX ADMIN — OXYCODONE HYDROCHLORIDE 5 MG: 5 TABLET ORAL at 13:48

## 2019-05-30 RX ADMIN — PANTOPRAZOLE SODIUM 40 MG: 40 TABLET, DELAYED RELEASE ORAL at 13:56

## 2019-05-30 RX ADMIN — SINCALIDE 1.3 MCG: 5 INJECTION, POWDER, LYOPHILIZED, FOR SOLUTION INTRAVENOUS at 12:32

## 2019-05-30 NOTE — PLAN OF CARE
Problem: Nutrition/Hydration-ADULT  Goal: Nutrient/Hydration intake appropriate for improving, restoring or maintaining nutritional needs  Description  Monitor and assess patient's nutrition/hydration status for malnutrition (ex- brittle hair, bruises, dry skin, pale skin and conjunctiva, muscle wasting, smooth red tongue, and disorientation)  Collaborate with interdisciplinary team and initiate plan and interventions as ordered  Monitor patient's weight and dietary intake as ordered or per policy  Utilize nutrition screening tool and intervene per policy  Determine patient's food preferences and provide high-protein, high-caloric foods as appropriate       INTERVENTIONS:  - Monitor oral intake, urinary output, labs, and treatment plans  - Assess nutrition and hydration status and recommend course of action  - Evaluate amount of meals eaten  - Assist patient with eating if necessary   - Allow adequate time for meals  - Recommend/ encourage appropriate diets, oral nutritional supplements, and vitamin/mineral supplements  - Order, calculate, and assess calorie counts as needed  - Assess need for intravenous fluids  - Provide specific nutrition/hydration education as appropriate  - Include patient/family/caregiver in decisions related to nutrition   Outcome: Progressing

## 2019-05-30 NOTE — PROGRESS NOTES
INTERNAL MEDICINE RESIDENCY PROGRESS NOTE     Name: Lydia Thibodeaux   Age & Sex: 76 y o  female   MRN: [id number]  Unit/Bed#: -01   Encounter: [id number]  Team: [facility] Team A    PATIENT INFORMATION     Name: Lydia Thibodeaux   Age & Sex: 76 y o  female   MRN: [id number]  Hospital Stay Days: 5    ASSESSMENT/PLAN     Principal Problem:    Abdominal pain  Active Problems:    Chronic lumbar radiculopathy    Diabetes mellitus, type II ([place] [address] )    Diabetic neuropathy ([place] [address] )    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 ([place] [address] )    Hepatocellular carcinoma (HCC)    Nicotine dependence    Opioid dependence ([place] [address] )    Pain syndrome, chronic    Hyponatremia    Decompensated hepatic cirrhosis (HCC)    Portal vein thrombosis    Flatulence      * Abdominal pain  Assessment & Plan  Recurrent, pain is waxing and waning quality  There are multiple etiologies that could explain this pain such as gallbladder distention and cholestasis seen on her more recent imaging of the abdomen  Additionally could be stretching of Cristi's capsule of the liver given her cirrhosis and hepatic inflammation versus extensive and increased portal venous thrombus/thrombi  There has been concern for possible SBP versus acalculous cholecystitis given prior imaging findings  -continue with antibiotics for 10-14 day treatment course  Continue cipro/flagyl  Antibiotics day 6 today  -continue oxycodone p r n  for pain control    -Zofran p r n  For nausea  -HIDA scan to be done today            Flatulence  Assessment & Plan  Patient with distended abdomen, significant belching and flatulence  Plan: scheduled simethicone with meals  Portal vein thrombosis  Assessment & Plan  Previously on anticoagulation however patient developed serious life-threatening varices bleed with a hemoglobin down to 4 0 therefore anticoagulation at this point was contraindicated based on her clear bleeding risk  Will manage conservatively    Recent imaging does suggest some extension of portal vein thrombi, questionable whether this is related to her abdominal pain  Decompensated hepatic cirrhosis ([facility] [address] )  Assessment & Plan  Minimal to mild ascites on recent imaging  No indication for paracentesis at this time  Does have varices with history of varices bleed this year contraindicating anticoagulation  Started on lactulose and rifaximin due to mild confusion and concern for hepatic encephalopathy during this admission         Hyponatremia  Assessment & Plan  Chronic, Sodium of 129 on admission  Poor p o  Intake noted with anorexia  Recheck BMP in a m  And will continue diuretics of Lasix 60 mg and spironolactone 150 mg daily for now  Pain syndrome, chronic  Assessment & Plan  Stable in patient with terminal condition  Continue outpatient oxycodone  Opioid dependence ([facility] )  Assessment & Plan  On chronic opiate therapy with oxycodone 10 mg q 6 hours p r n  And will continue this while in hospital   Follows with pain management and palliative care  Nicotine dependence  Assessment & Plan  Nicotine patch daily p r n  Hepatocellular carcinoma [facility])  Assessment & Plan  Outpatient follow-up with Dr Yemi Hansen of Surgical Oncology  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 ([facility] )  Assessment & Plan  Minimal to mild ascites on recent imaging  Following with GI  Outpatient EGD for monitoring of varices and esophageal candidiasis  Diabetic neuropathy [facility])  Assessment & Plan  Lab Results   Component Value Date    HGBA1C 5 4 03/09/2019     Continue gabapentin 300 mg q a m , 900 mg total q h s         Diabetes mellitus, type II [facility])  Assessment & Plan  Lab Results   Component Value Date    HGBA1C 5 4 03/09/2019       Hyperglycemic with a blood glucose in the 140s on admission  No indication for insulin therapy while in hospital here at this time  Monitor glucose on BMP in the a m  [place]       Chronic lumbar radiculopathy  Assessment & Plan  Follows with pain management outpatient  On stable regimen of oxycodone and gabapentin which will be continued  The patient takes percocet as outpatient, wary of tylenol secondary to elevated liver enzymes, will administer oxycodone       Disposition: Continue inpt management  HIDA scan to be done today     SUBJECTIVE     Patient seen and examined  No acute events overnight  States that her abdominal pain is unchanged from yesterday  Continues to have diarrhea on lactulose, approximately 5 BM yesterday  No other acute complaints    OBJECTIVE     Vitals:    19 1644 19 1659 19 2253 19 0701   BP: 92/60  93/59 92/61   Pulse: 60  60 64   Resp: 18  21 18   Temp: (!) 90 3 °F (32 4 °C) 97 8 °F (36 6 °C) 97 7 °F (36 5 °C) 97 5 °F (36 4 °C)   TempSrc:  Oral     SpO2: 97%  99% 98%   Weight:       Height:          Temperature:   Temp (24hrs), Av 8 °F (35 4 °C), Min:90 3 °F (32 4 °C), Max:97 8 °F (36 6 °C)    Temperature: 97 5 °F (36 4 °C)  Intake & Output:  I/O        07 -  0700  07 -  0700 701 -  0700    P  O  720 458     IV Piggyback  50     Total Intake(mL/kg) 720 (10 8) 508 (7 6)     Urine (mL/kg/hr)       Total Output       Net +720 +508            Unmeasured Urine Occurrence 2 x      Unmeasured Stool Occurrence 1 x          Weights:   IBW: 52 4 kg    Body mass index is 26 04 kg/m²  Weight (last 2 days)     None        Physical Exam   Constitutional: She is oriented to person, place, and time  She appears well-developed and well-nourished  No distress  HENT:   Head: Normocephalic and atraumatic  Mouth/Throat: No oropharyngeal exudate  Eyes: Right eye exhibits no discharge  Left eye exhibits no discharge  No scleral icterus  Cardiovascular: Normal rate, regular rhythm and normal heart sounds  Exam reveals no friction rub  No murmur heard  Pulmonary/Chest: Effort normal and breath sounds normal  She has no wheezes  She has no rales  Abdominal: Soft   Bowel sounds are normal  She exhibits no distension  There is tenderness  Musculoskeletal: She exhibits no edema or tenderness  Neurological: She is alert and oriented to person, place, and time  No sensory deficit  Skin: Skin is warm and dry  No rash noted  No erythema  Psychiatric: She has a normal mood and affect  Her behavior is normal      LABORATORY DATA     Labs: I have personally reviewed pertinent reports  Results from last 7 days   Lab Units 05/30/19  0637 05/29/19  0454 05/28/19  0516   WBC Thousand/uL 12 37* 10 52* 11 45*   HEMOGLOBIN g/dL 8 6* 8 4* 8 6*   HEMATOCRIT % 26 8* 26 6* 26 9*   PLATELETS Thousands/uL 406* 357 368   NEUTROS PCT % 77* 74 77*   MONOS PCT % 10 13* 13*      Results from last 7 days   Lab Units 05/30/19  0637 05/29/19  0751 05/28/19  0516 05/27/19  0756   POTASSIUM mmol/L 4 2 3 8 4 2 4 4   CHLORIDE mmol/L 96* 97* 98* 94*   CO2 mmol/L 26 24 25 25   BUN mg/dL 7 8 9 9   CREATININE mg/dL 0 87 0 81 0 80 0 86   CALCIUM mg/dL 8 4 8 3 8 3 9 1   ALK PHOS U/L  --  306* 336* 337*   ALT U/L  --  12 14 19   AST U/L  --  43 54* 80*     Results from last 7 days   Lab Units 05/24/19  1135   MAGNESIUM mg/dL 2 0                        IMAGING & DIAGNOSTIC TESTING     Radiology Results: I have personally reviewed pertinent reports  Us Abdomen Limited    Result Date: 5/25/2019  Impression: Mild ascites as above  Workstation performed: [id number]     Us Right Upper Quadrant    Result Date: 5/26/2019  Impression: Gallbladder sludge, with trace pericholecystic fluid and positive sonographic Ruff's sign suggestive of acute cholecystitis  Nonvisualized common bile duct  Cirrhotic liver again seen, as well as thrombosed main portal vein  Workstation performed: [id number]     Other Diagnostic Testing: I have personally reviewed pertinent reports      ACTIVE MEDICATIONS     Current Facility-Administered Medications   Medication Dose Route Frequency    ciprofloxacin (CIPRO) tablet 500 mg  500 mg Oral Q12H [address]    enoxaparin (LOVENOX) subcutaneous injection 40 mg  40 mg Subcutaneous Q24H [place]    furosemide (LASIX) tablet 20 mg  20 mg Oral TID    gabapentin (NEURONTIN) capsule 300 mg  300 mg Oral QAM    gabapentin (NEURONTIN) capsule 900 mg  900 mg Oral HS    lactulose 20 g/30 mL oral solution 10 g  10 g Oral BID    metroNIDAZOLE (FLAGYL) tablet 500 mg  500 mg Oral Q8H [address]    nadolol (CORGARD) tablet 20 mg  20 mg Oral Daily    nicotine (NICODERM CQ) 21 mg/24 hr TD 24 hr patch 1 patch  1 patch Transdermal Q24H    ondansetron (ZOFRAN) injection 4 mg  4 mg Intravenous Q6H PRN    oxyCODONE (ROXICODONE) IR tablet 5 mg  5 mg Oral Q6H PRN    pantoprazole (PROTONIX) EC tablet 40 mg  40 mg Oral BID AC    polyethylene glycol (MIRALAX) packet 17 g  17 g Oral Daily PRN    potassium chloride (K-DUR,KLOR-CON) CR tablet 20 mEq  20 mEq Oral BID    rifaximin (XIFAXAN) tablet 550 mg  550 mg Oral Q12H [place]    simethicone (MYLICON) chewable tablet 80 mg  80 mg Oral 4x Daily (with meals and at bedtime)    sincalide (KINEVAC) 5 mcg injection **ADS Override Pull**        spironolactone (ALDACTONE) tablet 75 mg  75 mg Oral BID    sterile water injection **ADS Override Pull**           VTE Pharmacologic Prophylaxis: Enoxaparin (Lovenox)  VTE Mechanical Prophylaxis: sequential compression device    Portions of the record may have been created with voice recognition software  Occasional wrong word or "sound a like" substitutions may have occurred due to the inherent limitations of voice recognition software    Read the chart carefully and recognize, using context, where substitutions have occurred   ==  Eric Ramirez, [address] [facility]  Internal Medicine Residency PGY-1

## 2019-05-30 NOTE — PLAN OF CARE
Problem: Potential for Falls  Goal: Patient will remain free of falls  Description  INTERVENTIONS:  - Assess patient frequently for physical needs  -  Identify cognitive and physical deficits and behaviors that affect risk of falls  -  [place] fall precautions as indicated by assessment   - Educate patient/family on patient safety including physical limitations  - Instruct patient to call for assistance with activity based on assessment  - Modify environment to reduce risk of injury  - Consider OT/PT consult to assist with strengthening/mobility  Outcome: Progressing     Problem: DISCHARGE PLANNING  Goal: Discharge to home or other facility with appropriate resources  Description  INTERVENTIONS:  - Identify barriers to discharge w/patient and caregiver  - Arrange for needed discharge resources and transportation as appropriate  - Identify discharge learning needs (meds, wound care, etc )  - Refer to Case Management Department for coordinating discharge planning if the patient needs post-hospital services based on physician/advanced practitioner order or complex needs related to functional status, cognitive ability, or social support system   Outcome: Progressing     Problem: Knowledge Deficit  Goal: Patient/family/caregiver demonstrates understanding of disease process, treatment plan, medications, and discharge instructions  Description  Complete learning assessment and assess knowledge base  Interventions:  - Provide teaching at level of understanding  - Provide teaching via preferred learning methods  Outcome: Progressing     Problem: Nutrition/Hydration-ADULT  Goal: Nutrient/Hydration intake appropriate for improving, restoring or maintaining nutritional needs  Description  Monitor and assess patient's nutrition/hydration status for malnutrition (ex- brittle hair, bruises, dry skin, pale skin and conjunctiva, muscle wasting, smooth red tongue, and disorientation)   Collaborate with interdisciplinary team and initiate plan and interventions as ordered  Monitor patient's weight and dietary intake as ordered or per policy  Utilize nutrition screening tool and intervene per policy  Determine patient's food preferences and provide high-protein, high-caloric foods as appropriate       INTERVENTIONS:  - Monitor oral intake, urinary output, labs, and treatment plans  - Assess nutrition and hydration status and recommend course of action  - Evaluate amount of meals eaten  - Assist patient with eating if necessary   - Allow adequate time for meals  - Recommend/ encourage appropriate diets, oral nutritional supplements, and vitamin/mineral supplements  - Order, calculate, and assess calorie counts as needed  - Assess need for intravenous fluids  - Provide specific nutrition/hydration education as appropriate  - Include patient/family/caregiver in decisions related to nutrition   Outcome: Progressing     Problem: Prexisting or High Potential for Compromised Skin Integrity  Goal: Skin integrity is maintained or improved  Description  INTERVENTIONS:  - Identify patients at risk for skin breakdown  - Assess and monitor skin integrity  - Assess and monitor nutrition and hydration status  - Monitor labs (i e  albumin)  - Assess for incontinence   - Turn and reposition patient  - Assist with mobility/ambulation  - Relieve pressure over bony prominences  - Avoid friction and shearing  - Provide appropriate hygiene as needed including keeping skin clean and dry  - Evaluate need for skin moisturizer/barrier cream  - Collaborate with interdisciplinary team (i e  Nutrition, Rehabilitation, etc )   - Patient/family teaching  Outcome: Progressing     Problem: GASTROINTESTINAL - ADULT  Goal: Maintains or returns to baseline bowel function  Description  INTERVENTIONS:  - Assess bowel function  - Encourage oral fluids to ensure adequate hydration  - Administer IV fluids as ordered to ensure adequate hydration  - Administer ordered medications as needed  - Encourage mobilization and activity  - Nutrition services referral to assist patient with appropriate food choices  Outcome: Progressing  Goal: Maintains adequate nutritional intake  Description  INTERVENTIONS:  - Monitor percentage of each meal consumed  - Identify factors contributing to decreased intake, treat as appropriate  - Assist with meals as needed  - Monitor I&O, WT and lab values  - Obtain nutrition services referral as needed  Outcome: Progressing     Problem: HEMATOLOGIC - ADULT  Goal: Maintains hematologic stability  Description  INTERVENTIONS  - Assess for signs and symptoms of bleeding or hemorrhage  - Monitor labs  - Administer supportive blood products/factors as ordered and appropriate  Outcome: Progressing

## 2019-05-31 ENCOUNTER — APPOINTMENT (OUTPATIENT)
Dept: PHYSICAL THERAPY | Facility: CLINIC | Age: 69
End: 2019-05-31
Payer: COMMERCIAL

## 2019-05-31 ENCOUNTER — TELEPHONE (OUTPATIENT)
Dept: GASTROENTEROLOGY | Facility: CLINIC | Age: 69
End: 2019-05-31

## 2019-05-31 VITALS
BODY MASS INDEX: 26.05 KG/M2 | HEART RATE: 63 BPM | DIASTOLIC BLOOD PRESSURE: 55 MMHG | SYSTOLIC BLOOD PRESSURE: 96 MMHG | OXYGEN SATURATION: 99 % | HEIGHT: 63 IN | TEMPERATURE: 98.2 F | RESPIRATION RATE: 18 BRPM | WEIGHT: 147 LBS

## 2019-05-31 PROBLEM — K81.9 ACALCULOUS CHOLECYSTITIS: Status: ACTIVE | Noted: 2019-05-31

## 2019-05-31 LAB
ANION GAP SERPL CALCULATED.3IONS-SCNC: 6 MMOL/L (ref 4–13)
BASOPHILS # BLD AUTO: 0.05 THOUSANDS/ΜL (ref 0–0.1)
BASOPHILS NFR BLD AUTO: 0 % (ref 0–1)
BUN SERPL-MCNC: 9 MG/DL (ref 5–25)
CALCIUM SERPL-MCNC: 8.3 MG/DL (ref 8.3–10.1)
CHLORIDE SERPL-SCNC: 97 MMOL/L (ref 100–108)
CO2 SERPL-SCNC: 24 MMOL/L (ref 21–32)
CREAT SERPL-MCNC: 0.96 MG/DL (ref 0.6–1.3)
EOSINOPHIL # BLD AUTO: 0.04 THOUSAND/ΜL (ref 0–0.61)
EOSINOPHIL NFR BLD AUTO: 0 % (ref 0–6)
ERYTHROCYTE [DISTWIDTH] IN BLOOD BY AUTOMATED COUNT: 17.7 % (ref 11.6–15.1)
GFR SERPL CREATININE-BSD FRML MDRD: 70 ML/MIN/1.73SQ M
GLUCOSE SERPL-MCNC: 158 MG/DL (ref 65–140)
HCT VFR BLD AUTO: 28.5 % (ref 34.8–46.1)
HGB BLD-MCNC: 9 G/DL (ref 11.5–15.4)
IMM GRANULOCYTES # BLD AUTO: 0.23 THOUSAND/UL (ref 0–0.2)
IMM GRANULOCYTES NFR BLD AUTO: 2 % (ref 0–2)
LYMPHOCYTES # BLD AUTO: 1.11 THOUSANDS/ΜL (ref 0.6–4.47)
LYMPHOCYTES NFR BLD AUTO: 8 % (ref 14–44)
MCH RBC QN AUTO: 27.8 PG (ref 26.8–34.3)
MCHC RBC AUTO-ENTMCNC: 31.6 G/DL (ref 31.4–37.4)
MCV RBC AUTO: 88 FL (ref 82–98)
MONOCYTES # BLD AUTO: 1.23 THOUSAND/ΜL (ref 0.17–1.22)
MONOCYTES NFR BLD AUTO: 9 % (ref 4–12)
NEUTROPHILS # BLD AUTO: 11.15 THOUSANDS/ΜL (ref 1.85–7.62)
NEUTS SEG NFR BLD AUTO: 81 % (ref 43–75)
NRBC BLD AUTO-RTO: 0 /100 WBCS
PLATELET # BLD AUTO: 375 THOUSANDS/UL (ref 149–390)
PMV BLD AUTO: 9.5 FL (ref 8.9–12.7)
POTASSIUM SERPL-SCNC: 4.8 MMOL/L (ref 3.5–5.3)
RBC # BLD AUTO: 3.24 MILLION/UL (ref 3.81–5.12)
SODIUM SERPL-SCNC: 127 MMOL/L (ref 136–145)
WBC # BLD AUTO: 13.81 THOUSAND/UL (ref 4.31–10.16)

## 2019-05-31 PROCEDURE — 80048 BASIC METABOLIC PNL TOTAL CA: CPT | Performed by: INTERNAL MEDICINE

## 2019-05-31 PROCEDURE — 85025 COMPLETE CBC W/AUTO DIFF WBC: CPT | Performed by: INTERNAL MEDICINE

## 2019-05-31 PROCEDURE — 99238 HOSP IP/OBS DSCHRG MGMT 30/<: CPT | Performed by: HOSPITALIST

## 2019-05-31 PROCEDURE — 99222 1ST HOSP IP/OBS MODERATE 55: CPT | Performed by: SURGERY

## 2019-05-31 RX ORDER — CEFDINIR 300 MG/1
300 CAPSULE ORAL EVERY 12 HOURS SCHEDULED
Qty: 6 CAPSULE | Refills: 0 | Status: SHIPPED | OUTPATIENT
Start: 2019-05-31 | End: 2019-06-03

## 2019-05-31 RX ORDER — PANTOPRAZOLE SODIUM 40 MG/1
40 TABLET, DELAYED RELEASE ORAL
Status: DISCONTINUED | OUTPATIENT
Start: 2019-06-01 | End: 2019-05-31 | Stop reason: HOSPADM

## 2019-05-31 RX ORDER — LACTULOSE 20 G/30ML
10 SOLUTION ORAL 2 TIMES DAILY
Qty: 900 ML | Refills: 0 | Status: SHIPPED | OUTPATIENT
Start: 2019-05-31 | End: 2019-05-31

## 2019-05-31 RX ORDER — METRONIDAZOLE 500 MG/1
500 TABLET ORAL EVERY 8 HOURS SCHEDULED
Qty: 9 TABLET | Refills: 0 | Status: SHIPPED | OUTPATIENT
Start: 2019-05-31 | End: 2019-06-03

## 2019-05-31 RX ORDER — LACTULOSE 20 G/30ML
10 SOLUTION ORAL 2 TIMES DAILY
Qty: 900 ML | Refills: 0 | Status: SHIPPED | OUTPATIENT
Start: 2019-05-31 | End: 2019-07-11 | Stop reason: SDUPTHER

## 2019-05-31 RX ADMIN — PANTOPRAZOLE SODIUM 40 MG: 40 TABLET, DELAYED RELEASE ORAL at 06:31

## 2019-05-31 RX ADMIN — CIPROFLOXACIN HYDROCHLORIDE 500 MG: 500 TABLET, FILM COATED ORAL at 08:40

## 2019-05-31 RX ADMIN — NADOLOL 20 MG: 20 TABLET ORAL at 08:40

## 2019-05-31 RX ADMIN — OXYCODONE HYDROCHLORIDE 5 MG: 5 TABLET ORAL at 09:26

## 2019-05-31 RX ADMIN — POTASSIUM CHLORIDE 20 MEQ: 1500 TABLET, EXTENDED RELEASE ORAL at 16:19

## 2019-05-31 RX ADMIN — SPIRONOLACTONE 75 MG: 50 TABLET ORAL at 16:18

## 2019-05-31 RX ADMIN — RIFAXIMIN 550 MG: 550 TABLET ORAL at 08:40

## 2019-05-31 RX ADMIN — SIMETHICONE CHEW TAB 80 MG 80 MG: 80 TABLET ORAL at 06:31

## 2019-05-31 RX ADMIN — OXYCODONE HYDROCHLORIDE 5 MG: 5 TABLET ORAL at 02:49

## 2019-05-31 RX ADMIN — METRONIDAZOLE 500 MG: 500 TABLET, FILM COATED ORAL at 06:31

## 2019-05-31 RX ADMIN — ENOXAPARIN SODIUM 40 MG: 40 INJECTION SUBCUTANEOUS at 08:40

## 2019-05-31 RX ADMIN — GABAPENTIN 300 MG: 300 CAPSULE ORAL at 08:40

## 2019-05-31 RX ADMIN — SIMETHICONE CHEW TAB 80 MG 80 MG: 80 TABLET ORAL at 16:19

## 2019-05-31 RX ADMIN — FUROSEMIDE 20 MG: 20 TABLET ORAL at 08:40

## 2019-05-31 RX ADMIN — SPIRONOLACTONE 75 MG: 50 TABLET ORAL at 08:39

## 2019-05-31 RX ADMIN — METRONIDAZOLE 500 MG: 500 TABLET, FILM COATED ORAL at 13:18

## 2019-05-31 RX ADMIN — POTASSIUM CHLORIDE 20 MEQ: 1500 TABLET, EXTENDED RELEASE ORAL at 08:39

## 2019-05-31 RX ADMIN — SIMETHICONE CHEW TAB 80 MG 80 MG: 80 TABLET ORAL at 13:18

## 2019-05-31 NOTE — DISCHARGE INSTRUCTIONS
Please follow-up at the [facility] within 2 weeks of your discharge for a checkup appointment  We have prescribed you two antibiotics to take for your cholecystitis for a 3 day course  Please take these as directed to completion     Cholecystitis   WHAT YOU NEED TO KNOW:   Cholecystitis is inflammation of your gallbladder  Your gallbladder stores bile, which helps break down the fat that you eat  Your gallbladder also helps remove certain chemicals from your body  You may have a sudden, severe attack (acute cholecystitis) or several mild attacks (chronic cholecystitis)  DISCHARGE INSTRUCTIONS:   Call 911 for any of the following:   · You have chest pain or trouble breathing  Return to the emergency department if:   · You have severe pain in your abdomen  · You urinate less than usual   Contact your healthcare provider if:   · You have a fever or chills  · You have pain when you urinate  · Your skin or eyes turn yellow  · You have questions or concerns about your condition or care  Medicines: You may need any of the following:  · Antibiotics  treat a bacterial infection  · Prescription pain medicine  may be given  Ask your healthcare provider how to take this medicine safely  Some prescription pain medicines contain acetaminophen  Do not take other medicines that contain acetaminophen without talking to your healthcare provider  Too much acetaminophen may cause liver damage  Prescription pain medicine may cause constipation  Ask your healthcare provider how to prevent or treat constipation  · NSAIDs , such as ibuprofen, help decrease swelling, pain, and fever  This medicine is available with or without a doctor's order  NSAIDs can cause stomach bleeding or kidney problems in certain people  If you take blood thinner medicine, always ask your healthcare provider if NSAIDs are safe for you  Always read the medicine label and follow directions      · Take your medicine as directed  Contact your healthcare provider if you think your medicine is not helping or if you have side effects  Tell him of her if you are allergic to any medicine  Keep a list of the medicines, vitamins, and herbs you take  Include the amounts, and when and why you take them  Bring the list or the pill bottles to follow-up visits  Carry your medicine list with you in case of an emergency  Eat a variety of healthy foods: This may decrease your symptoms  Healthy foods include fruit, vegetables, whole-grain breads, low-fat dairy products, beans, lean meat, and fish  Ask if you need to be on a special diet  Follow up with your healthcare provider as directed:  Write down your questions so you remember to ask them during your visits  © 2017 [facility] Information is for End User's use only and may not be sold, redistributed or otherwise used for commercial purposes  All illustrations and images included in CareNotes® are the copyrighted property of A D A M [employer]  or [employer]  The above information is an  only  It is not intended as medical advice for individual conditions or treatments  Talk to your doctor, nurse or pharmacist before following any medical regimen to see if it is safe and effective for you

## 2019-05-31 NOTE — QUICK NOTE
I attempted to see this patient in consultation for the acute care surgery service at the request of the as the [facility] A Medicine service  The patient was reluctant to talk to me and explained that she is frustrated having to repeatedly explained herself to multiple providers  After proceeding to provide me some of her current and past medical history, she did not want a see a PA any longer and asked why a physician need to recommendations from a PA  She was unwilling to let me examine her at this time  As I was not able to complete the history or perform a physical exam, no recommendations can be made at this time  The [facility] A Medicine service and Dr Orquidea Jaime were notified      Tim Urbina PA-C  5/31/2019 11:01 AM

## 2019-05-31 NOTE — PROGRESS NOTES
INTERNAL MEDICINE RESIDENCY PROGRESS NOTE     Name: Miguel Schwarz   Age & Sex: 76 y o  female   MRN: [id number]  Unit/Bed#: -01   Encounter: [id number]  Team: SOD Team A    PATIENT INFORMATION     Name: Miguel Schwarz   Age & Sex: 76 y o  female   MRN: [id number]  Hospital Stay Days: 6    ASSESSMENT/PLAN     Principal Problem:    Acalculous cholecystitis  Active Problems:    Abdominal pain    Chronic lumbar radiculopathy    Diabetes mellitus, type II ([place] [address] )    Diabetic neuropathy ([place] [address] )    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 ([place] [address] )    Hepatocellular carcinoma (HCC)    Nicotine dependence    Opioid dependence ([place] [address] )    Pain syndrome, chronic    Hyponatremia    Decompensated hepatic cirrhosis (HCC)    Portal vein thrombosis    Flatulence      * Acalculous cholecystitis  Assessment & Plan  -patient had prior right upper quadrant ultrasound which showed gallbladder sludge, with trace pericholecystic fluid and positive sonographic Ruff's sign without gallstones suggestive of acute acalculous cholecystitis  -HIDA scan completed 05/30/2019 - gallbladder ejection fraction 11 4%  Delayed filling with decreased contractile response to CCK associated with chronic gallbladder dysfunction  Findings discussed with GI, deferred to surgery for possible intervention  -will consult surgery service to determine if patient would benefit from percutaneous cholecystostomy for gallbladder decompression versus cholecystectomy    Abdominal pain  Assessment & Plan  -pain improved this morning  Multiple etiologies that could explain this pain such as gallbladder distention and cholestasis seen on her more recent imaging of the abdomen  Additionally could be stretching of Cristi's capsule of the liver given her cirrhosis and hepatic inflammation versus extensive and increased portal venous thrombus/thrombi    There has been concern for possible SBP versus acalculous cholecystitis given prior imaging findings  -continue with antibiotics for 10-14 day treatment course  Continue cipro/flagyl  Antibiotics day 7 today  Will discuss transitioning from ciprofloxacin to cefdinir  -continue oxycodone p r n  for pain control    -Zofran p r n  For nausea            Flatulence  Assessment & Plan  Scheduled simethicone with meals  Portal vein thrombosis  Assessment & Plan  Previously on anticoagulation however patient developed serious life-threatening varices bleed with a hemoglobin down to 4 0 therefore anticoagulation at this point was contraindicated based on her clear bleeding risk  Will manage conservatively  Recent imaging does suggest some extension of portal vein thrombi, questionable whether this is related to her abdominal pain  Decompens[facility] ([facility] )  Assessment & Plan  Minimal to mild ascites on recent imaging  No indication for paracentesis at this time  Does have varices with history of varices bleed this year contraindicating anticoagulation  Started on lactulose and rifaximin due to mild confusion and concern for hepatic encephalopathy during this admission         Hyponatremia  Assessment & Plan  Chronic, Sodium of 129 on admission  -continue to monitor      Pain syndrome, chronic  Assessment & Plan  Stable in patient with terminal condition  Continue outpatient oxycodone  Opioid dependence ([facility] )  Assessment & Plan  Continue oxycodone p r n  Follows with pain management and palliative care  Nicotine dependence  Assessment & Plan  Nicotine patch daily p r n  Hepatocellular carcinoma [facility])  Assessment & Plan  Outpatient follow-up with Dr Ricky Morrison of Surgical Oncology  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 ([facility] )  Assessment & Plan  Minimal to mild ascites on recent imaging  Following with GI  Outpatient EGD for monitoring of varices and esophageal candidiasis      Diabetic neuropathy [facility])  Assessment & Plan  Lab Results   Component Value Date HGBA1C 5 4 2019     Continue gabapentin 300 mg q a m , 900 mg total q h s         Diabetes mellitus, type II [facility])  Assessment & Plan  Lab Results   Component Value Date    HGBA1C 5 4 2019       Hyperglycemic with a blood glucose in the 140s on admission  Continue to monitor      Chronic lumbar radiculopathy  Assessment & Plan  Follows with pain management outpatient  On stable regimen of oxycodone and gabapentin which will be continued  The patient takes percocet as outpatient, wary of tylenol secondary to elevated liver enzymes, will administer oxycodone       Disposition: Continue inpt management  HIDA scan to be done today     SUBJECTIVE     Patient seen and examined  No acute events overnight  States that her abdominal pain is much improved today down to a 2/10  She does endorse lumbar back pain which she states is secondary to lying on the table during her HIDA scan yesterday  She states that she is tolerating p o  Well and has no other acute complaints  OBJECTIVE     Vitals:    19 1424 19 1522 19 2204 19 0707   BP:  111/69 108/69 101/66   Pulse: 66  68 69   Resp: 18 16 16 18   Temp:  (!) 97 3 °F (36 3 °C) 97 8 °F (36 6 °C) 98 °F (36 7 °C)   TempSrc:    Oral   SpO2: 100%  98% 100%   Weight:       Height:          Temperature:   Temp (24hrs), Av 7 °F (36 5 °C), Min:97 3 °F (36 3 °C), Max:98 °F (36 7 °C)    Temperature: 98 °F (36 7 °C)  Intake & Output:  I/O        07 -  0700  07 -  0700  07 -  0700    P  O  720 458     IV Piggyback  50     Total Intake(mL/kg) 720 (10 8) 508 (7 6)     Urine (mL/kg/hr)       Total Output       Net +720 +508            Unmeasured Urine Occurrence 2 x      Unmeasured Stool Occurrence 1 x          Weights:   IBW: 52 4 kg    Body mass index is 26 04 kg/m²  Weight (last 2 days)     None        Physical Exam   Constitutional: She is oriented to person, place, and time   She appears well-developed and well-nourished  No distress  HENT:   Head: Normocephalic and atraumatic  Mouth/Throat: No oropharyngeal exudate  Eyes: Right eye exhibits no discharge  Left eye exhibits no discharge  No scleral icterus  Cardiovascular: Normal rate, regular rhythm and normal heart sounds  Exam reveals no friction rub  No murmur heard  Pulmonary/Chest: Effort normal and breath sounds normal  She has no wheezes  She has no rales  Abdominal: Soft  Bowel sounds are normal  She exhibits no distension  Mild epigastric tenderness to palpation   Musculoskeletal: She exhibits no edema or tenderness  Neurological: She is alert and oriented to person, place, and time  No sensory deficit  Skin: Skin is warm and dry  No rash noted  No erythema  Psychiatric: She has a normal mood and affect  Her behavior is normal      LABORATORY DATA     Labs: I have personally reviewed pertinent reports  Results from last 7 days   Lab Units 05/30/19  0637 05/29/19  0454 05/28/19  0516   WBC Thousand/uL 12 37* 10 52* 11 45*   HEMOGLOBIN g/dL 8 6* 8 4* 8 6*   HEMATOCRIT % 26 8* 26 6* 26 9*   PLATELETS Thousands/uL 406* 357 368   NEUTROS PCT % 77* 74 77*   MONOS PCT % 10 13* 13*      Results from last 7 days   Lab Units 05/30/19  0637 05/29/19  0751 05/28/19  0516 05/27/19  0756   POTASSIUM mmol/L 4 2 3 8 4 2 4 4   CHLORIDE mmol/L 96* 97* 98* 94*   CO2 mmol/L 26 24 25 25   BUN mg/dL 7 8 9 9   CREATININE mg/dL 0 87 0 81 0 80 0 86   CALCIUM mg/dL 8 4 8 3 8 3 9 1   ALK PHOS U/L  --  306* 336* 337*   ALT U/L  --  12 14 19   AST U/L  --  43 54* 80*     Results from last 7 days   Lab Units 05/24/19  1135   MAGNESIUM mg/dL 2 0                        IMAGING & DIAGNOSTIC TESTING     Radiology Results: I have personally reviewed pertinent reports  Us Abdomen Limited    Result Date: 5/25/2019  Impression: Mild ascites as above   Workstation performed: [id number]     Us Right Upper Quadrant    Result Date: 5/26/2019  Impression: Gallbladder sludge, with trace pericholecystic fluid and positive sonographic Ruff's sign suggestive of acute cholecystitis  Nonvisualized common bile duct  Cirrhotic liver again seen, as well as thrombosed main portal vein  Workstation performed: [id number]     Other Diagnostic Testing: I have personally reviewed pertinent reports  ACTIVE MEDICATIONS     Current Facility-Administered Medications   Medication Dose Route Frequency    ciprofloxacin (CIPRO) tablet 500 mg  500 mg Oral Q12H [facility]    enoxaparin (LOVENOX) subcutaneous injection 40 mg  40 mg Subcutaneous Q24H [facility]    furosemide (LASIX) tablet 20 mg  20 mg Oral TID    gabapentin (NEURONTIN) capsule 300 mg  300 mg Oral QAM    gabapentin (NEURONTIN) capsule 900 mg  900 mg Oral HS    lactulose 20 g/30 mL oral solution 10 g  10 g Oral BID    metroNIDAZOLE (FLAGYL) tablet 500 mg  500 mg Oral Q8H [facility]    nadolol (CORGARD) tablet 20 mg  20 mg Oral Daily    nicotine (NICODERM CQ) 21 mg/24 hr TD 24 hr patch 1 patch  1 patch Transdermal Q24H    ondansetron (ZOFRAN) injection 4 mg  4 mg Intravenous Q6H PRN    oxyCODONE (ROXICODONE) IR tablet 5 mg  5 mg Oral Q6H PRN    pantoprazole (PROTONIX) EC tablet 40 mg  40 mg Oral BID AC    polyethylene glycol (MIRALAX) packet 17 g  17 g Oral Daily PRN    potassium chloride (K-DUR,KLOR-CON) CR tablet 20 mEq  20 mEq Oral BID    rifaximin (XIFAXAN) tablet 550 mg  550 mg Oral Q12H [facility]    simethicone (MYLICON) chewable tablet 80 mg  80 mg Oral 4x Daily (with meals and at bedtime)    spironolactone (ALDACTONE) tablet 75 mg  75 mg Oral BID       VTE Pharmacologic Prophylaxis: Enoxaparin (Lovenox)  VTE Mechanical Prophylaxis: sequential compression device    Portions of the record may have been created with voice recognition software  Occasional wrong word or "sound a like" substitutions may have occurred due to the inherent limitations of voice recognition software    Read the chart carefully and recognize, using context, where substitutions have occurred   ==  Eric Alicea, [address] [facility]  Internal Medicine Residency PGY-1

## 2019-05-31 NOTE — DISCHARGE SUMMARY
[facility] CENTRAL Discharge Summary - Libia Schwarz 76 y o  female MRN: [id number]    [place] [address] Room / Bed: /-44 Encounter: [id number]    BRIEF OVERVIEW    Admitting Provider: Aguilar Laboy DO  Discharge Provider: Park Iniguez MD  Primary Care Physician at Discharge: Ignacia Dyer, [address] [place] [address]  Admission Date: 5/23/2019     Discharge Date: 5/31/19    Hospital Course    77 y/o female PMHx of hepatitis C, cirrhosis, HCC s/p ablation, wedge resection, and SIR spheres treatment, portal vein thrombosis not on AC due to recent hx of bleeding esophageal varices, presented with worsening abdominal pain      HPI per Dr Glen Ramos  "Minor Lisa a 76 y  o  female with a past medical history of hepatitis-C with cirrhosis, hepatocellular carcinoma status post SIRS treatment, portal vein thrombosis not on anticoagulation secondary to recent variceal bleed, presenting with recurrent diffuse crampy abdominal pain waxing and waning in nature   Patient was recently seen for similar symptoms after multiple episodes of vomiting, vomiting resolved and symptoms were attributed to gastroenteritis and she was feeling better however today pain and mild nausea recurred and has been on and off since  Angelita Dan is feeling better now status post Dilaudid given in the ED  Angelita Solomon denies any recent bleeding of any nature, denies vomiting, denies diarrhea or constipation   Last bowel movement was today  Angelita Solomon does endorse chronic anorexia but has been drinking fluids without issue   She reports medication compliance   She denies any other issues such as chest pain, shortness of breath, fever or chills, new weakness or fatigue"      Of note, During the patient's prior visit a CT of her abdomen showed extension of chronic portal vein thrombosis, however she is still not a candidate for anti coagulation due to recent episode of esophageal variceal bleeding       The patient continued to have diffuse abdominal pain the morning after admission  She was noted to have leukocytosis above 15,000, with a febrile episode up to 101 5  She did have abdominal distension concerning for worsening ascites, however imaging showed only minimal ascites, a diagnostic paracentesis could not be performed  Due to abdominal pain, tenderness to palpation, fever, and white count, she was started on ceftriaxone for presumed SBP  The following day, she noted a sharp pain in her RUQ, worsening with food, along with the diffuse cramping abdominal pain  An ultrasound of her RUQ showed findings concerning for acalculous cholecystitis  Flagyl was added on to ceftriaxone to cover for cholecystitis  A HIDA scan was done which showed delayed filling of the gallbladder with decreased contractile response to CCK, possibly associated with chronic gallbladder dysfunction  Patient was evaluated by the general surgery who did not recommend any acute surgical intervention and recommended continuing non-operative approach with antibiotics  Low-fat diet low in dairy, fatty/fried foods, and more frequent smaller meals, was discussed with the patient  Patient was discharged on oral Cefdinir urine Flagyl to complete a 10 day course of antibiotics for cholecystitis  Patient was also started on lactulose in light of her history of hepatic cirrhosis    Presenting Problem/History of Present Illness  Principal Problem:    Acalculous cholecystitis  Active Problems:    Abdominal pain    Chronic lumbar radiculopathy    Diabetes mellitus, type II (HCC)    Diabetic neuropathy (HCC)    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 ([facility] )    Hepatocellular carcinoma (HCC)    Nicotine dependence    Opioid dependence (HCC)    Pain syndrome, chronic    Hyponatremia    Decompensated hepatic cirrhosis (HCC)    Portal vein thrombosis    Flatulence  Resolved Problems:    * No resolved hospital problems   *    Physical Exam Constitutional: She is oriented to person, place, and time  She appears well-developed and well-nourished  No distress  HENT:   Head: Normocephalic and atraumatic  Mouth/Throat: No oropharyngeal exudate  Eyes: Right eye exhibits no discharge  Left eye exhibits no discharge  No scleral icterus  Cardiovascular: Normal rate, regular rhythm and normal heart sounds  Exam reveals no friction rub  No murmur heard  Pulmonary/Chest: Effort normal and breath sounds normal  She has no wheezes  She has no rales  Abdominal: Soft  Bowel sounds are normal  She exhibits no distension  Right upper quadrant tender palpation  Positive Ruff sign   Musculoskeletal: She exhibits no edema or tenderness  Neurological: She is alert and oriented to person, place, and time  No sensory deficit  Skin: Skin is warm and dry  No rash noted  No erythema  Psychiatric: She has a normal mood and affect  Her behavior is normal        Diagnostic Procedures Performed  Imaging Studies:  NM Hepatobiliary w RX   Final Result by Bipin Gurrola MD (05/30 1761)      1  Delayed filling of the gallbladder and decreased contractile response of the gallbladder to cholecystokinin infusion, which may be associated with chronic gallbladder dysfunction  Workstation performed: [id number]         US right upper quadrant   Final Result by Jeff Sim MD (05/26 0919)      Gallbladder sludge, with trace pericholecystic fluid and positive sonographic Ruff's sign suggestive of acute cholecystitis  Nonvisualized common bile duct  Cirrhotic liver again seen, as well as thrombosed main portal vein  Workstation performed: [id number]         US abdomen limited   Final Result by Azam Kelley MD (05/25 1816)   Mild ascites as above              Workstation performed: [id number]           Test Results Pending at Discharge: None    Medications     Current Discharge Medication List      START taking these medications    Details cefdinir (OMNICEF) 300 mg capsule Take 1 capsule (300 mg total) by mouth every 12 (twelve) hours for 3 days  Qty: 6 capsule, Refills: 0    Associated Diagnoses: Acalculous cholecystitis      lactulose 20 g/30 mL Take 15 mL (10 g total) by mouth 2 (two) times a day for 30 days  Qty: 900 mL, Refills: 0    Associated Diagnoses: 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)      metroNIDAZOLE (FLAGYL) 500 mg tablet Take 1 tablet (500 mg total) by mouth every 8 (eight) hours for 3 days  Qty: 9 tablet, Refills: 0    Associated Diagnoses: Acalculous cholecystitis           Current Discharge Medication List      CONTINUE these medications which have NOT CHANGED    Details   furosemide (LASIX) 20 mg tablet Take 3 tablets (60 mg total) by mouth daily  Qty: 180 tablet, Refills: 2    Associated Diagnoses: 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)      gabapentin (NEURONTIN) 300 mg capsule 1 tab in the am and 3 tabs PO night time  Qty: 120 capsule, Refills: 5    Associated Diagnoses: Lumbar disc herniation      losartan (COZAAR) 100 MG tablet Take 100 mg by mouth daily  Refills: 1      nadolol (CORGARD) 20 mg tablet Take 1 tablet (20 mg total) by mouth daily  Qty: 30 tablet, Refills: 2    Associated Diagnoses: Decompensated hepatic cirrhosis (HCC)      nicotine (NICODERM CQ) 21 mg/24 hr TD 24 hr patch Place 1 patch on the skin daily  Qty: 28 patch, Refills: 0    Associated Diagnoses: Nicotine abuse      oxyCODONE-acetaminophen (PERCOCET)  mg per tablet Take 1 tab PO Q 6 hours prn pain   2nd month script Do not fill until 6/21/19  Qty: 120 tablet, Refills: 0    Associated Diagnoses: Chronic pain syndrome      pantoprazole (PROTONIX) 40 mg tablet Take 1 tablet (40 mg total) by mouth daily with breakfast  Qty: 30 tablet, Refills: 0    Associated Diagnoses: Generalized abdominal pain      polyethylene glycol (MIRALAX) 17 g packet Take 17 g by mouth daily as needed (Constipation)  Qty: 30 each, Refills: 3    Associated Diagnoses: Cirrhosis ([facility] )      potassium chloride (MICRO-K) 10 MEQ CR capsule Take 2 capsules (20 mEq total) by mouth 2 (two) times a day for 7 days  Qty: 28 capsule, Refills: 0    Associated Diagnoses: Hypokalemia      senna (SENOKOT) 8 6 MG tablet Take 2 tablets by mouth daily      spironolactone (ALDACTONE) 50 mg tablet Take 3 tablets (150 mg total) by mouth daily  Qty: 90 tablet, Refills: 2    Associated Diagnoses: 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 ([facility] )           Current Discharge Medication List          Allergies  No Known Allergies  Discharge Diet: regular diet  Activity restrictions: As tolerated  Discharge Condition: stable  Discharged With Lines: no    Discharge Disposition: Home/Self Care    Outpatient Follow-Up    PCP - [facility]      Code Status: Level 1 - Full Code  Advance Directive and Living Will: <no information>  Power of :    POLST:      Discharge  Statement   I spent 30 minutes minutes discharging the patient  This time was spent on the day of discharge  I had direct contact with the patient on the day of discharge  Additional documentation is required if more than 30 minutes were spent on discharge       Alfonzo Martinez  PGY-1 Internal Medicine

## 2019-05-31 NOTE — CONSULTS
Consultation - General Surgery   Rochelle Gutiérrez 76 y o  female MRN: [id number]  Unit/Bed#: -01 Encounter: [id number]    Assessment/Plan     Assessment:  68F with Hep C cirrhosis, hx HCC with prior hepatic wedge resection, microwave ablation  Now with several week history of intermittent epigastric/RUQ pain  Imaging suggestive of biliary dyskinesia with cholelithiasis/sludge  She may have a component of chronic cholecystitis but there is no evidence of acute infection, as the HIDA scan did eventually fill the gallbladder  Plan:  Recommend low fat diet; avoid dairy and fatty/fried foods  Frequent small meals may be beneficial as well  No acute surgical intervention indicated  I discussed with the patient that she is not a candidate for elective cholecystectomy for biliary dyskinesia  Any procedural intervention, if it became indicated (either percutaneous cholecystostomy tube or cholecystectomy), would be associated with high risks given her hepatic/surgical history, including risk of bleeding, injury to nearby structures, likely need for open approach (instead of laparoscopic) and high risk of hepatic decompensation that could lead to fulminant hepatic failure and death  Therefore, if she did develop acute cholecystitis, she would best be treated with a non-operative approach such as antibiotics  Regardless, there is no indication for antibiotics at this time for biliary dyskinesia or chronic cholecystitis  EGD as scheduled by GI to f/u with hx esophageal varices  Please call if any questions/concerns  I will update her son via phone as requested by the patient  History of Present Illness    Rochelle Gutiérrez is a 67F with hx Hepatitis C cirrhosis with hepatocellular carcinoma in 2015 treated with ex-lap, microwave ablation of segment 8 and 5 lesions, wedge resection of segment 5 on 10/15 by Dr Pilar Cardoza   She also has chronic portal vein thrombosis and is currently a Aurelia B, MELD 8 based on laboratory data today but does have a history of decompensated cirrhosis with ascites (on diuretics) and esophageal varices s/p banding  She was last seen by Dr Viraj Fuller on 4/19 who stated no further surgical intervention recommended, and by Dr Sherren Gu[place]ara in 4/19  She was scheduled for follow-up EGD next month by Dr Alana Graves  However, she was admitted on 5/23 with abdominal pain, N/V  She has had extensive work-up; most notable for CT A/P on 5/21 with cirrhotic liver, chronic PVT, small volume ascites; RUQ U/S on 5/26 with gallbladder sludge, trace pericholecystic fluid, cirrhotic liver, and HIDA on 5/24 with delayed filling of gallbladder and decreased contractile response with EF of 11 4%, suggestive of chronic gallbladder dysfunction  Surgery consulted for evaluation  Pt states pain has been intermittent for the last few weeks  Described as epigastric and RUQ pain (mainly in the epigastrium)  They occur acutely and have no discerning pattern; no specific association with food per patient  She has not had association with N/V until the day of hospital presentation  Denies fevers/chills  She also reports anorexia with no appetite but states food does not exacerbate the sx and she does not have early satiety  She states this has improved in the hospital  She is having bowel movements with lactulose  Inpatient consult to Acute Care Surgery  Consult performed by: Jean Rice MD  Consult ordered by: Eric Hinojosa DO          Review of Systems   Constitutional: Positive for appetite change and fatigue  Negative for activity change, chills, fever and unexpected weight change  HENT: Negative  Negative for congestion, sneezing and sore throat  Eyes: Negative  Negative for pain and redness  Respiratory: Negative  Negative for chest tightness, shortness of breath and wheezing  Cardiovascular: Negative  Negative for chest pain, palpitations and leg swelling     Gastrointestinal: Positive for abdominal distention and abdominal pain  Negative for blood in stool, constipation, diarrhea, nausea and vomiting  Endocrine: Negative  Negative for cold intolerance and heat intolerance  Genitourinary: Negative  Negative for dysuria, hematuria and urgency  Musculoskeletal: Negative  Negative for arthralgias and back pain  Skin: Negative for color change and rash  Allergic/Immunologic: Negative  Negative for environmental allergies and food allergies  Neurological: Negative  Negative for dizziness and light-headedness  Hematological: Negative  Negative for adenopathy  Does not bruise/bleed easily  Psychiatric/Behavioral: Negative  Negative for agitation and sleep disturbance  The patient is not nervous/anxious  Historical Information   Past Medical History:   Diagnosis Date    Anemia     Anxiety     [facility] )     Chronic pain disorder     herniated disc    Cirrhosis ([place] [address] )     Constipation     Current use of long term anticoagulation     eliquis    Diabetes mellitus ([place] [address] )     blood sugar 113 at 1225 2/18/19    Drug dependence ([place] [address] )     GI bleed     Hepatitis C, chronic ([place] [address] ) 7/22/2014    Hepatocellular carcinoma ([place] [address] )     Hypertension     Hypokalemia 3/7/2019    Hyponatremia     Liver disease     h/o hepatitis c    Melena     Portal vein thrombosis      Past Surgical History:   Procedure Laterality Date    DENTAL SURGERY      ESOPHAGOGASTRODUODENOSCOPY N/A 2/18/2019    Procedure: ESOPHAGOGASTRODUODENOSCOPY (EGD); Surgeon: Sherrie Higuera MD;  Location: [facility]; Service: Gastroenterology    ESOPHAGOGASTRODUODENOSCOPY N/A 3/11/2019    Procedure: ESOPHAGOGASTRODUODENOSCOPY (EGD); Surgeon: Good Aguilar MD;  [facility];   Service: Gastroenterology    HYSTERECTOMY      IR PARACENTESIS  3/8/2019    LIVER BIOPSY      LIVER SURGERY      Ablation    TONSILLECTOMY       Social History   Social History     Substance and Sexual Activity   Alcohol Use Not Currently Social History     Substance and Sexual Activity   Drug Use No     Social History     Tobacco Use   Smoking Status Current Every Day Smoker    Packs/day: 1 00    Years: 50 00    Pack years: 50 00    Types: Cigarettes   Smokeless Tobacco Never Used     Family History: non-contributory    Meds/Allergies   PTA meds:   Prior to Admission Medications   Prescriptions Last Dose Informant Patient Reported? Taking?   furosemide (LASIX) 20 mg tablet Unknown at Unknown time  No No   Sig: Take 3 tablets (60 mg total) by mouth daily   gabapentin (NEURONTIN) 300 mg capsule Unknown at Unknown time Self No No   Si tab in the am and 3 tabs PO night time   losartan (COZAAR) 100 MG tablet   Yes No   Sig: Take 100 mg by mouth daily   nadolol (CORGARD) 20 mg tablet Unknown at Unknown time  No No   Sig: Take 1 tablet (20 mg total) by mouth daily   nicotine (NICODERM CQ) 21 mg/24 hr TD 24 hr patch Unknown at Unknown time Self No No   Sig: Place 1 patch on the skin daily   Patient not taking: Reported on 2019   oxyCODONE-acetaminophen (PERCOCET)  mg per tablet Unknown at Unknown time  No No   Sig: Take 1 tab PO Q 6 hours prn pain   2nd month script Do not fill until 19   pantoprazole (PROTONIX) 40 mg tablet Unknown at Unknown time Self No No   Sig: Take 1 tablet (40 mg total) by mouth daily with breakfast   polyethylene glycol (MIRALAX) 17 g packet Unknown at Unknown time Self No No   Sig: Take 17 g by mouth daily as needed (Constipation)   potassium chloride (MICRO-K) 10 MEQ CR capsule   No No   Sig: Take 2 capsules (20 mEq total) by mouth 2 (two) times a day for 7 days   senna (SENOKOT) 8 6 MG tablet Unknown at Unknown time Self Yes No   Sig: Take 2 tablets by mouth daily   spironolactone (ALDACTONE) 50 mg tablet Unknown at Unknown time  No No   Sig: Take 3 tablets (150 mg total) by mouth daily      Facility-Administered Medications: None     No Known Allergies    Objective   First Vitals:   Blood Pressure: 93/54 (05/23/19 1556)  Pulse: 78 (05/23/19 1556)  Temperature: 98 5 °F (36 9 °C) (05/23/19 1556)  Temp Source: Oral (05/23/19 1556)  Respirations: 18 (05/23/19 1556)  Height: 5' 3" (160 cm) (05/23/19 1910)  Weight - Scale: 66 7 kg (147 lb) (05/23/19 1556)  SpO2: 96 % (05/23/19 1556)    Current Vitals:   Blood Pressure: 101/66 (05/31/19 0707)  Pulse: 69 (05/31/19 0707)  Temperature: 98 °F (36 7 °C) (05/31/19 0707)  Temp Source: Oral (05/31/19 0707)  Respirations: 18 (05/31/19 0707)  Height: 5' 3" (160 cm) (05/23/19 1910)  Weight - Scale: 66 7 kg (147 lb) (05/23/19 1910)  SpO2: 100 % (05/31/19 0707)      Intake/Output Summary (Last 24 hours) at 5/31/2019 1242  Last data filed at 5/31/2019 1100  Gross per 24 hour   Intake 1500 ml   Output    Net 1500 ml       Invasive Devices     None                 Physical Exam   Constitutional: She is oriented to person, place, and time  She appears well-developed and well-nourished  No distress  HENT:   Head: Normocephalic and atraumatic  Eyes: Pupils are equal, round, and reactive to light  Neck: Normal range of motion  Neck supple  Cardiovascular: Normal rate, regular rhythm and normal heart sounds  Pulmonary/Chest: Effort normal and breath sounds normal    Abdominal: Soft  Protuberant abdomen but no fluid wave  Minimal epigastric TTP but non-tender elsewhere  No rebound/guarding  Well healed right subcostal incision  No hernias, masses appreciated  Musculoskeletal: Normal range of motion  She exhibits no edema or deformity  Neurological: She is alert and oriented to person, place, and time  Skin: Skin is warm and dry  Capillary refill takes less than 2 seconds  She is not diaphoretic  Psychiatric: She has a normal mood and affect  Her behavior is normal  Judgment and thought content normal        Lab Results:   I have personally reviewed pertinent lab results    , CBC:   Lab Results   Component Value Date    WBC 13 81 (H) 05/31/2019    HGB 9 0 (L) 05/31/2019 HCT 28 5 (L) 05/31/2019    MCV 88 05/31/2019     05/31/2019    MCH 27 8 05/31/2019    MCHC 31 6 05/31/2019    RDW 17 7 (H) 05/31/2019    MPV 9 5 05/31/2019    NRBC 0 05/31/2019   , CMP:   Lab Results   Component Value Date    SODIUM 127 (L) 05/31/2019    K 4 8 05/31/2019    CL 97 (L) 05/31/2019    CO2 24 05/31/2019    BUN 9 05/31/2019    CREATININE 0 96 05/31/2019    CALCIUM 8 3 05/31/2019    EGFR 70 05/31/2019   , Coagulation: No results found for: PT, INR, APTT  Imaging: I have personally reviewed pertinent reports  and I have personally reviewed pertinent films in PACS     5/21 CT A/P:  1  Cirrhotic liver with stable treated lesion within right posterior hepatic dome  Chronic portal venous thrombosis with cavernous transformation again seen  There are prominent branching hypoattenuating foci throughout the liver, most likely   representing extension of thrombus into segmental branches      2  Extensive valeriano hepatis and peripancreatic lymphadenopathy with increase in valeriano hepatis lymphadenopathy      3   Small volume ascites  5/25  Abdominal US: Mild ascites as above  5/26 RUQ U/S:   Gallbladder sludge, with trace pericholecystic fluid and positive sonographic Ruff's sign suggestive of acute cholecystitis      Nonvisualized common bile duct      Cirrhotic liver again seen, as well as thrombosed main portal vein  5/30 HIDA Scan:  Delayed filling of the gallbladder and decreased contractile response of the gallbladder to cholecystokinin infusion, which may be associated with chronic gallbladder dysfunction        EKG, Pathology, and Other Studies: I have personally reviewed pertinent reports  Counseling / Coordination of Care  Total floor / unit time spent today 40 minutes  Greater than 50% of total time was spent with the patient and / or family counseling and / or coordination of care  A description of the counseling / coordination of care: 39

## 2019-05-31 NOTE — ASSESSMENT & PLAN NOTE
-patient had prior right upper quadrant ultrasound which showed gallbladder sludge, with trace pericholecystic fluid and positive sonographic Ruff's sign without gallstones suggestive of acute acalculous cholecystitis  -HIDA scan completed 05/30/2019 - gallbladder ejection fraction 11 4%  Delayed filling with decreased contractile response to CCK associated with chronic gallbladder dysfunction    Findings discussed with GI, deferred to surgery for possible intervention  -will consult surgery service to determine if patient would benefit from percutaneous cholecystostomy for gallbladder decompression versus cholecystectomy

## 2019-06-03 ENCOUNTER — TRANSITIONAL CARE MANAGEMENT (OUTPATIENT)
Dept: INTERNAL MEDICINE CLINIC | Facility: CLINIC | Age: 69
End: 2019-06-03

## 2019-06-03 ENCOUNTER — PATIENT OUTREACH (OUTPATIENT)
Dept: INTERNAL MEDICINE CLINIC | Facility: CLINIC | Age: 69
End: 2019-06-03

## 2019-06-04 ENCOUNTER — PATIENT OUTREACH (OUTPATIENT)
Dept: INTERNAL MEDICINE CLINIC | Facility: CLINIC | Age: 69
End: 2019-06-04

## 2019-06-05 ENCOUNTER — PATIENT OUTREACH (OUTPATIENT)
Dept: INTERNAL MEDICINE CLINIC | Facility: CLINIC | Age: 69
End: 2019-06-05

## 2019-06-05 DIAGNOSIS — K81.9 ACALCULOUS CHOLECYSTITIS: Primary | ICD-10-CM

## 2019-06-05 DIAGNOSIS — R10.84 GENERALIZED ABDOMINAL PAIN: ICD-10-CM

## 2019-06-05 RX ORDER — PANTOPRAZOLE SODIUM 40 MG/1
40 TABLET, DELAYED RELEASE ORAL DAILY
Qty: 30 TABLET | Refills: 1 | Status: SHIPPED | OUTPATIENT
Start: 2019-06-05

## 2019-06-05 RX ORDER — CEFDINIR 300 MG/1
300 CAPSULE ORAL EVERY 12 HOURS SCHEDULED
Qty: 6 CAPSULE | Refills: 0 | Status: SHIPPED | OUTPATIENT
Start: 2019-06-05 | End: 2019-06-07 | Stop reason: ALTCHOICE

## 2019-06-05 RX ORDER — METRONIDAZOLE 500 MG/1
500 TABLET ORAL EVERY 8 HOURS SCHEDULED
Qty: 9 TABLET | Refills: 0 | Status: SHIPPED | OUTPATIENT
Start: 2019-06-05 | End: 2019-06-07 | Stop reason: ALTCHOICE

## 2019-06-06 ENCOUNTER — EVALUATION (OUTPATIENT)
Dept: PHYSICAL THERAPY | Age: 69
End: 2019-06-06
Payer: COMMERCIAL

## 2019-06-06 DIAGNOSIS — R53.1 GENERALIZED WEAKNESS: ICD-10-CM

## 2019-06-06 DIAGNOSIS — I89.0 LYMPHEDEMA: Primary | ICD-10-CM

## 2019-06-06 DIAGNOSIS — M79.89 SWELLING OF BOTH LOWER EXTREMITIES: ICD-10-CM

## 2019-06-06 PROCEDURE — G8979 MOBILITY GOAL STATUS: HCPCS

## 2019-06-06 PROCEDURE — 97530 THERAPEUTIC ACTIVITIES: CPT

## 2019-06-06 PROCEDURE — 97163 PT EVAL HIGH COMPLEX 45 MIN: CPT

## 2019-06-06 PROCEDURE — G8978 MOBILITY CURRENT STATUS: HCPCS

## 2019-06-07 ENCOUNTER — OFFICE VISIT (OUTPATIENT)
Dept: INTERNAL MEDICINE CLINIC | Facility: CLINIC | Age: 69
End: 2019-06-07

## 2019-06-07 ENCOUNTER — TELEPHONE (OUTPATIENT)
Dept: INTERNAL MEDICINE CLINIC | Facility: CLINIC | Age: 69
End: 2019-06-07

## 2019-06-07 ENCOUNTER — PATIENT OUTREACH (OUTPATIENT)
Dept: INTERNAL MEDICINE CLINIC | Facility: CLINIC | Age: 69
End: 2019-06-07

## 2019-06-07 VITALS
SYSTOLIC BLOOD PRESSURE: 82 MMHG | WEIGHT: 135.36 LBS | HEIGHT: 63 IN | DIASTOLIC BLOOD PRESSURE: 62 MMHG | BODY MASS INDEX: 23.98 KG/M2 | HEART RATE: 64 BPM | TEMPERATURE: 97.5 F

## 2019-06-07 DIAGNOSIS — B18.2 HEPATIC CIRRHOSIS DUE TO CHRONIC HEPATITIS C INFECTION (HCC): ICD-10-CM

## 2019-06-07 DIAGNOSIS — Z09 HOSPITAL DISCHARGE FOLLOW-UP: ICD-10-CM

## 2019-06-07 DIAGNOSIS — K74.60 HEPATIC CIRRHOSIS DUE TO CHRONIC HEPATITIS C INFECTION (HCC): ICD-10-CM

## 2019-06-07 DIAGNOSIS — E11.42 DIABETIC POLYNEUROPATHY ASSOCIATED WITH TYPE 2 DIABETES MELLITUS (HCC): ICD-10-CM

## 2019-06-07 DIAGNOSIS — R10.84 GENERALIZED ABDOMINAL PAIN: Primary | ICD-10-CM

## 2019-06-07 DIAGNOSIS — K81.9 ACALCULOUS CHOLECYSTITIS: ICD-10-CM

## 2019-06-07 PROBLEM — Z72.0 TOBACCO ABUSE: Status: RESOLVED | Noted: 2018-10-03 | Resolved: 2019-06-07

## 2019-06-07 PROBLEM — H61.20 EXCESS EAR WAX: Status: RESOLVED | Noted: 2019-05-01 | Resolved: 2019-06-07

## 2019-06-07 PROBLEM — Z79.01 LONG TERM (CURRENT) USE OF ANTICOAGULANTS: Chronic | Status: RESOLVED | Noted: 2019-02-16 | Resolved: 2019-06-07

## 2019-06-07 PROBLEM — I95.9 HYPOTENSION: Status: RESOLVED | Noted: 2019-02-16 | Resolved: 2019-06-07

## 2019-06-07 PROBLEM — E87.6 HYPOKALEMIA: Status: RESOLVED | Noted: 2019-03-07 | Resolved: 2019-06-07

## 2019-06-07 PROBLEM — H93.8X9 EAR POPPING: Status: RESOLVED | Noted: 2019-05-01 | Resolved: 2019-06-07

## 2019-06-07 PROCEDURE — 99496 TRANSJ CARE MGMT HIGH F2F 7D: CPT | Performed by: INTERNAL MEDICINE

## 2019-06-07 RX ORDER — SPIRONOLACTONE 50 MG/1
100 TABLET, FILM COATED ORAL DAILY
Qty: 90 TABLET | Refills: 2 | Status: ON HOLD
Start: 2019-06-07 | End: 2019-06-21 | Stop reason: SDUPTHER

## 2019-06-07 RX ORDER — FUROSEMIDE 20 MG/1
40 TABLET ORAL DAILY
Qty: 180 TABLET | Refills: 2 | Status: ON HOLD
Start: 2019-06-07 | End: 2019-06-21 | Stop reason: SDUPTHER

## 2019-06-11 ENCOUNTER — ANESTHESIA EVENT (OUTPATIENT)
Dept: GASTROENTEROLOGY | Facility: HOSPITAL | Age: 69
End: 2019-06-11

## 2019-06-11 ENCOUNTER — TELEPHONE (OUTPATIENT)
Dept: INTERNAL MEDICINE CLINIC | Facility: CLINIC | Age: 69
End: 2019-06-11

## 2019-06-11 RX ORDER — SODIUM CHLORIDE, SODIUM LACTATE, POTASSIUM CHLORIDE, CALCIUM CHLORIDE 600; 310; 30; 20 MG/100ML; MG/100ML; MG/100ML; MG/100ML
75 INJECTION, SOLUTION INTRAVENOUS CONTINUOUS
Status: CANCELLED | OUTPATIENT
Start: 2019-06-11

## 2019-06-12 ENCOUNTER — HOSPITAL ENCOUNTER (OUTPATIENT)
Dept: GASTROENTEROLOGY | Facility: HOSPITAL | Age: 69
Setting detail: OUTPATIENT SURGERY
Discharge: HOME | End: 2019-06-12
Attending: INTERNAL MEDICINE
Payer: COMMERCIAL

## 2019-06-12 ENCOUNTER — ANESTHESIA (OUTPATIENT)
Dept: GASTROENTEROLOGY | Facility: HOSPITAL | Age: 69
End: 2019-06-12

## 2019-06-12 VITALS
BODY MASS INDEX: 23.92 KG/M2 | RESPIRATION RATE: 18 BRPM | HEART RATE: 58 BPM | HEIGHT: 63 IN | DIASTOLIC BLOOD PRESSURE: 58 MMHG | SYSTOLIC BLOOD PRESSURE: 95 MMHG | WEIGHT: 135 LBS | TEMPERATURE: 97 F | OXYGEN SATURATION: 99 %

## 2019-06-12 DIAGNOSIS — B18.2 CHRONIC VIRAL HEPATITIS C (HCC): ICD-10-CM

## 2019-06-12 DIAGNOSIS — K74.60 CIRRHOSIS OF LIVER (HCC): ICD-10-CM

## 2019-06-12 DIAGNOSIS — K72.90 HEPATIC FAILURE, WITHOUT COMA (HCC): ICD-10-CM

## 2019-06-12 PROCEDURE — 43239 EGD BIOPSY SINGLE/MULTIPLE: CPT | Performed by: INTERNAL MEDICINE

## 2019-06-12 PROCEDURE — 88305 TISSUE EXAM BY PATHOLOGIST: CPT | Performed by: PATHOLOGY

## 2019-06-12 PROCEDURE — 88342 IMHCHEM/IMCYTCHM 1ST ANTB: CPT | Performed by: PATHOLOGY

## 2019-06-12 RX ORDER — PROPOFOL 10 MG/ML
INJECTION, EMULSION INTRAVENOUS CONTINUOUS PRN
Status: DISCONTINUED | OUTPATIENT
Start: 2019-06-12 | End: 2019-06-12 | Stop reason: SURG

## 2019-06-12 RX ORDER — SODIUM CHLORIDE 9 MG/ML
INJECTION, SOLUTION INTRAVENOUS CONTINUOUS PRN
Status: DISCONTINUED | OUTPATIENT
Start: 2019-06-12 | End: 2019-06-12 | Stop reason: SURG

## 2019-06-12 RX ORDER — SODIUM CHLORIDE 9 MG/ML
125 INJECTION, SOLUTION INTRAVENOUS CONTINUOUS
Status: DISCONTINUED | OUTPATIENT
Start: 2019-06-12 | End: 2019-06-16 | Stop reason: HOSPADM

## 2019-06-12 RX ORDER — EPHEDRINE SULFATE 50 MG/ML
INJECTION INTRAVENOUS AS NEEDED
Status: DISCONTINUED | OUTPATIENT
Start: 2019-06-12 | End: 2019-06-12 | Stop reason: SURG

## 2019-06-12 RX ORDER — PROPOFOL 10 MG/ML
INJECTION, EMULSION INTRAVENOUS AS NEEDED
Status: DISCONTINUED | OUTPATIENT
Start: 2019-06-12 | End: 2019-06-12 | Stop reason: SURG

## 2019-06-12 RX ORDER — GLYCOPYRROLATE 0.2 MG/ML
INJECTION INTRAMUSCULAR; INTRAVENOUS AS NEEDED
Status: DISCONTINUED | OUTPATIENT
Start: 2019-06-12 | End: 2019-06-12 | Stop reason: SURG

## 2019-06-12 RX ADMIN — EPHEDRINE SULFATE 5 MG: 50 INJECTION, SOLUTION INTRAVENOUS at 10:22

## 2019-06-12 RX ADMIN — LIDOCAINE HYDROCHLORIDE 50 MG: 20 INJECTION, SOLUTION INTRAVENOUS at 10:22

## 2019-06-12 RX ADMIN — SODIUM CHLORIDE: 9 INJECTION, SOLUTION INTRAVENOUS at 10:17

## 2019-06-12 RX ADMIN — PROPOFOL 130 MCG/KG/MIN: 10 INJECTION, EMULSION INTRAVENOUS at 10:22

## 2019-06-12 RX ADMIN — PHENYLEPHRINE HYDROCHLORIDE 100 MCG: 10 INJECTION INTRAVENOUS at 10:27

## 2019-06-12 RX ADMIN — EPHEDRINE SULFATE 5 MG: 50 INJECTION, SOLUTION INTRAVENOUS at 10:27

## 2019-06-12 RX ADMIN — PROPOFOL 100 MG: 10 INJECTION, EMULSION INTRAVENOUS at 10:22

## 2019-06-12 RX ADMIN — GLYCOPYRROLATE 0.1 MG: 0.2 INJECTION, SOLUTION INTRAMUSCULAR; INTRAVENOUS at 10:19

## 2019-06-12 RX ADMIN — EPHEDRINE SULFATE 10 MG: 50 INJECTION, SOLUTION INTRAVENOUS at 10:26

## 2019-06-12 RX ADMIN — SODIUM CHLORIDE 125 ML/HR: 0.9 INJECTION, SOLUTION INTRAVENOUS at 10:15

## 2019-06-12 RX ADMIN — LIDOCAINE HYDROCHLORIDE 50 MG: 20 INJECTION, SOLUTION INTRAVENOUS at 10:19

## 2019-06-14 ENCOUNTER — APPOINTMENT (EMERGENCY)
Dept: RADIOLOGY | Facility: HOSPITAL | Age: 69
DRG: 441 | End: 2019-06-14
Payer: COMMERCIAL

## 2019-06-14 ENCOUNTER — HOSPITAL ENCOUNTER (OUTPATIENT)
Facility: HOSPITAL | Age: 69
Setting detail: OBSERVATION
Discharge: HOME HEALTH | DRG: 441 | End: 2019-06-17
Attending: EMERGENCY MEDICINE | Admitting: INTERNAL MEDICINE
Payer: COMMERCIAL

## 2019-06-14 ENCOUNTER — TELEPHONE (OUTPATIENT)
Dept: GASTROENTEROLOGY | Facility: CLINIC | Age: 69
End: 2019-06-14

## 2019-06-14 DIAGNOSIS — B18.2 HEPATIC CIRRHOSIS DUE TO CHRONIC HEPATITIS C INFECTION (HCC): ICD-10-CM

## 2019-06-14 DIAGNOSIS — K74.60 HEPATIC CIRRHOSIS DUE TO CHRONIC HEPATITIS C INFECTION (HCC): ICD-10-CM

## 2019-06-14 DIAGNOSIS — Z91.14 NON COMPLIANCE W MEDICATION REGIMEN: ICD-10-CM

## 2019-06-14 DIAGNOSIS — E87.6 HYPOKALEMIA: Primary | ICD-10-CM

## 2019-06-14 DIAGNOSIS — R10.9 ABDOMINAL PAIN: ICD-10-CM

## 2019-06-14 LAB
ALBUMIN SERPL BCP-MCNC: 1.5 G/DL (ref 3.5–5)
ALP SERPL-CCNC: 324 U/L (ref 46–116)
ALT SERPL W P-5'-P-CCNC: 29 U/L (ref 12–78)
AMMONIA PLAS-SCNC: <10 UMOL/L (ref 11–35)
ANION GAP SERPL CALCULATED.3IONS-SCNC: 9 MMOL/L (ref 4–13)
APTT PPP: 35 SECONDS (ref 26–38)
AST SERPL W P-5'-P-CCNC: 200 U/L (ref 5–45)
BASOPHILS # BLD AUTO: 0.02 THOUSANDS/ΜL (ref 0–0.1)
BASOPHILS NFR BLD AUTO: 0 % (ref 0–1)
BILIRUB SERPL-MCNC: 0.85 MG/DL (ref 0.2–1)
BUN SERPL-MCNC: 7 MG/DL (ref 5–25)
CALCIUM SERPL-MCNC: 6.4 MG/DL (ref 8.3–10.1)
CHLORIDE SERPL-SCNC: 111 MMOL/L (ref 100–108)
CO2 SERPL-SCNC: 23 MMOL/L (ref 21–32)
CREAT SERPL-MCNC: 0.34 MG/DL (ref 0.6–1.3)
EOSINOPHIL # BLD AUTO: 0 THOUSAND/ΜL (ref 0–0.61)
EOSINOPHIL NFR BLD AUTO: 0 % (ref 0–6)
ERYTHROCYTE [DISTWIDTH] IN BLOOD BY AUTOMATED COUNT: 18.8 % (ref 11.6–15.1)
GFR SERPL CREATININE-BSD FRML MDRD: 131 ML/MIN/1.73SQ M
GLUCOSE SERPL-MCNC: 94 MG/DL (ref 65–140)
HCT VFR BLD AUTO: 28.3 % (ref 34.8–46.1)
HGB BLD-MCNC: 9.1 G/DL (ref 11.5–15.4)
IMM GRANULOCYTES # BLD AUTO: 0.1 THOUSAND/UL (ref 0–0.2)
IMM GRANULOCYTES NFR BLD AUTO: 1 % (ref 0–2)
INR PPP: 1.29 (ref 0.86–1.17)
LIPASE SERPL-CCNC: 102 U/L (ref 73–393)
LYMPHOCYTES # BLD AUTO: 1.26 THOUSANDS/ΜL (ref 0.6–4.47)
LYMPHOCYTES NFR BLD AUTO: 10 % (ref 14–44)
MCH RBC QN AUTO: 28.9 PG (ref 26.8–34.3)
MCHC RBC AUTO-ENTMCNC: 32.2 G/DL (ref 31.4–37.4)
MCV RBC AUTO: 90 FL (ref 82–98)
MONOCYTES # BLD AUTO: 1.42 THOUSAND/ΜL (ref 0.17–1.22)
MONOCYTES NFR BLD AUTO: 11 % (ref 4–12)
NEUTROPHILS # BLD AUTO: 10.45 THOUSANDS/ΜL (ref 1.85–7.62)
NEUTS SEG NFR BLD AUTO: 78 % (ref 43–75)
NRBC BLD AUTO-RTO: 0 /100 WBCS
PLATELET # BLD AUTO: 264 THOUSANDS/UL (ref 149–390)
PMV BLD AUTO: 9.5 FL (ref 8.9–12.7)
POTASSIUM SERPL-SCNC: 2.2 MMOL/L (ref 3.5–5.3)
PROT SERPL-MCNC: 5.2 G/DL (ref 6.4–8.2)
PROTHROMBIN TIME: 16.2 SECONDS (ref 11.8–14.2)
RBC # BLD AUTO: 3.15 MILLION/UL (ref 3.81–5.12)
SODIUM SERPL-SCNC: 143 MMOL/L (ref 136–145)
WBC # BLD AUTO: 13.25 THOUSAND/UL (ref 4.31–10.16)

## 2019-06-14 PROCEDURE — 85025 COMPLETE CBC W/AUTO DIFF WBC: CPT | Performed by: EMERGENCY MEDICINE

## 2019-06-14 PROCEDURE — 96374 THER/PROPH/DIAG INJ IV PUSH: CPT

## 2019-06-14 PROCEDURE — 74177 CT ABD & PELVIS W/CONTRAST: CPT

## 2019-06-14 PROCEDURE — 96375 TX/PRO/DX INJ NEW DRUG ADDON: CPT

## 2019-06-14 PROCEDURE — 96365 THER/PROPH/DIAG IV INF INIT: CPT

## 2019-06-14 PROCEDURE — 96361 HYDRATE IV INFUSION ADD-ON: CPT

## 2019-06-14 PROCEDURE — 99285 EMERGENCY DEPT VISIT HI MDM: CPT | Performed by: EMERGENCY MEDICINE

## 2019-06-14 PROCEDURE — 80053 COMPREHEN METABOLIC PANEL: CPT | Performed by: EMERGENCY MEDICINE

## 2019-06-14 PROCEDURE — 36415 COLL VENOUS BLD VENIPUNCTURE: CPT | Performed by: EMERGENCY MEDICINE

## 2019-06-14 PROCEDURE — 99285 EMERGENCY DEPT VISIT HI MDM: CPT

## 2019-06-14 PROCEDURE — 82140 ASSAY OF AMMONIA: CPT | Performed by: EMERGENCY MEDICINE

## 2019-06-14 PROCEDURE — 85730 THROMBOPLASTIN TIME PARTIAL: CPT | Performed by: EMERGENCY MEDICINE

## 2019-06-14 PROCEDURE — 85610 PROTHROMBIN TIME: CPT | Performed by: EMERGENCY MEDICINE

## 2019-06-14 PROCEDURE — 96376 TX/PRO/DX INJ SAME DRUG ADON: CPT

## 2019-06-14 PROCEDURE — 83690 ASSAY OF LIPASE: CPT | Performed by: EMERGENCY MEDICINE

## 2019-06-14 RX ORDER — POTASSIUM CHLORIDE 14.9 MG/ML
20 INJECTION INTRAVENOUS ONCE
Status: COMPLETED | OUTPATIENT
Start: 2019-06-14 | End: 2019-06-14

## 2019-06-14 RX ORDER — GABAPENTIN 100 MG/1
100 CAPSULE ORAL 3 TIMES DAILY
Status: DISCONTINUED | OUTPATIENT
Start: 2019-06-14 | End: 2019-06-14

## 2019-06-14 RX ORDER — ONDANSETRON 2 MG/ML
4 INJECTION INTRAMUSCULAR; INTRAVENOUS ONCE
Status: COMPLETED | OUTPATIENT
Start: 2019-06-14 | End: 2019-06-14

## 2019-06-14 RX ORDER — POTASSIUM CHLORIDE 20 MEQ/1
40 TABLET, EXTENDED RELEASE ORAL ONCE
Status: COMPLETED | OUTPATIENT
Start: 2019-06-14 | End: 2019-06-14

## 2019-06-14 RX ORDER — SENNOSIDES 8.6 MG
2 TABLET ORAL DAILY
Status: DISCONTINUED | OUTPATIENT
Start: 2019-06-15 | End: 2019-06-15

## 2019-06-14 RX ORDER — MORPHINE SULFATE 4 MG/ML
4 INJECTION, SOLUTION INTRAMUSCULAR; INTRAVENOUS ONCE
Status: COMPLETED | OUTPATIENT
Start: 2019-06-14 | End: 2019-06-14

## 2019-06-14 RX ORDER — GABAPENTIN 300 MG/1
300 CAPSULE ORAL EVERY EVENING
Status: DISCONTINUED | OUTPATIENT
Start: 2019-06-14 | End: 2019-06-17 | Stop reason: HOSPADM

## 2019-06-14 RX ORDER — OXYCODONE HYDROCHLORIDE AND ACETAMINOPHEN 5; 325 MG/1; MG/1
1 TABLET ORAL EVERY 6 HOURS PRN
Status: DISCONTINUED | OUTPATIENT
Start: 2019-06-14 | End: 2019-06-17 | Stop reason: HOSPADM

## 2019-06-14 RX ORDER — SPIRONOLACTONE 50 MG/1
100 TABLET, FILM COATED ORAL DAILY
Status: DISCONTINUED | OUTPATIENT
Start: 2019-06-15 | End: 2019-06-17 | Stop reason: HOSPADM

## 2019-06-14 RX ORDER — GABAPENTIN 100 MG/1
100 CAPSULE ORAL EVERY MORNING
Status: DISCONTINUED | OUTPATIENT
Start: 2019-06-15 | End: 2019-06-17 | Stop reason: HOSPADM

## 2019-06-14 RX ORDER — NADOLOL 20 MG/1
20 TABLET ORAL DAILY
Status: DISCONTINUED | OUTPATIENT
Start: 2019-06-15 | End: 2019-06-17 | Stop reason: HOSPADM

## 2019-06-14 RX ORDER — ONDANSETRON 2 MG/ML
4 INJECTION INTRAMUSCULAR; INTRAVENOUS EVERY 6 HOURS PRN
Status: DISCONTINUED | OUTPATIENT
Start: 2019-06-14 | End: 2019-06-17 | Stop reason: HOSPADM

## 2019-06-14 RX ORDER — HEPARIN SODIUM 5000 [USP'U]/ML
5000 INJECTION, SOLUTION INTRAVENOUS; SUBCUTANEOUS EVERY 8 HOURS SCHEDULED
Status: DISCONTINUED | OUTPATIENT
Start: 2019-06-14 | End: 2019-06-17 | Stop reason: HOSPADM

## 2019-06-14 RX ORDER — POLYETHYLENE GLYCOL 3350 17 G/17G
17 POWDER, FOR SOLUTION ORAL DAILY PRN
Status: DISCONTINUED | OUTPATIENT
Start: 2019-06-14 | End: 2019-06-15

## 2019-06-14 RX ORDER — MAGNESIUM HYDROXIDE/ALUMINUM HYDROXICE/SIMETHICONE 120; 1200; 1200 MG/30ML; MG/30ML; MG/30ML
30 SUSPENSION ORAL EVERY 6 HOURS PRN
Status: DISCONTINUED | OUTPATIENT
Start: 2019-06-14 | End: 2019-06-17 | Stop reason: HOSPADM

## 2019-06-14 RX ORDER — NICOTINE 21 MG/24HR
1 PATCH, TRANSDERMAL 24 HOURS TRANSDERMAL DAILY
Status: DISCONTINUED | OUTPATIENT
Start: 2019-06-15 | End: 2019-06-17 | Stop reason: HOSPADM

## 2019-06-14 RX ORDER — POTASSIUM CHLORIDE 29.8 MG/ML
40 INJECTION INTRAVENOUS ONCE
Status: DISCONTINUED | OUTPATIENT
Start: 2019-06-14 | End: 2019-06-15

## 2019-06-14 RX ORDER — LACTULOSE 20 G/30ML
10 SOLUTION ORAL 2 TIMES DAILY
Status: DISCONTINUED | OUTPATIENT
Start: 2019-06-15 | End: 2019-06-14

## 2019-06-14 RX ORDER — PANTOPRAZOLE SODIUM 40 MG/1
40 TABLET, DELAYED RELEASE ORAL
Status: DISCONTINUED | OUTPATIENT
Start: 2019-06-15 | End: 2019-06-17 | Stop reason: HOSPADM

## 2019-06-14 RX ADMIN — IOHEXOL 100 ML: 350 INJECTION, SOLUTION INTRAVENOUS at 21:42

## 2019-06-14 RX ADMIN — MORPHINE SULFATE 4 MG: 4 INJECTION INTRAVENOUS at 20:05

## 2019-06-14 RX ADMIN — ONDANSETRON 4 MG: 2 INJECTION INTRAMUSCULAR; INTRAVENOUS at 20:05

## 2019-06-14 RX ADMIN — POTASSIUM CHLORIDE 40 MEQ: 1500 TABLET, EXTENDED RELEASE ORAL at 23:00

## 2019-06-14 RX ADMIN — POTASSIUM CHLORIDE 20 MEQ: 200 INJECTION, SOLUTION INTRAVENOUS at 21:52

## 2019-06-14 RX ADMIN — MORPHINE SULFATE 4 MG: 4 INJECTION INTRAVENOUS at 22:36

## 2019-06-14 RX ADMIN — SODIUM CHLORIDE 500 ML: 0.9 INJECTION, SOLUTION INTRAVENOUS at 20:19

## 2019-06-15 LAB
ALBUMIN SERPL BCP-MCNC: 1.9 G/DL (ref 3.5–5)
ALP SERPL-CCNC: 372 U/L (ref 46–116)
ALT SERPL W P-5'-P-CCNC: 32 U/L (ref 12–78)
ANION GAP SERPL CALCULATED.3IONS-SCNC: 6 MMOL/L (ref 4–13)
AST SERPL W P-5'-P-CCNC: 182 U/L (ref 5–45)
BILIRUB SERPL-MCNC: 0.99 MG/DL (ref 0.2–1)
BUN SERPL-MCNC: 8 MG/DL (ref 5–25)
CALCIUM SERPL-MCNC: 8 MG/DL (ref 8.3–10.1)
CHLORIDE SERPL-SCNC: 105 MMOL/L (ref 100–108)
CK SERPL-CCNC: 56 U/L (ref 26–192)
CO2 SERPL-SCNC: 24 MMOL/L (ref 21–32)
CREAT SERPL-MCNC: 0.52 MG/DL (ref 0.6–1.3)
GFR SERPL CREATININE-BSD FRML MDRD: 114 ML/MIN/1.73SQ M
GLUCOSE SERPL-MCNC: 106 MG/DL (ref 65–140)
INR PPP: 1.3 (ref 0.86–1.17)
MAGNESIUM SERPL-MCNC: 1.7 MG/DL (ref 1.6–2.6)
PHOSPHATE SERPL-MCNC: 2.2 MG/DL (ref 2.3–4.1)
POTASSIUM SERPL-SCNC: 3.4 MMOL/L (ref 3.5–5.3)
POTASSIUM SERPL-SCNC: 4.2 MMOL/L (ref 3.5–5.3)
PROT SERPL-MCNC: 6.5 G/DL (ref 6.4–8.2)
PROTHROMBIN TIME: 16.3 SECONDS (ref 11.8–14.2)
SODIUM SERPL-SCNC: 135 MMOL/L (ref 136–145)

## 2019-06-15 PROCEDURE — 84132 ASSAY OF SERUM POTASSIUM: CPT | Performed by: INTERNAL MEDICINE

## 2019-06-15 PROCEDURE — 93005 ELECTROCARDIOGRAM TRACING: CPT

## 2019-06-15 PROCEDURE — 82550 ASSAY OF CK (CPK): CPT | Performed by: INTERNAL MEDICINE

## 2019-06-15 PROCEDURE — 80053 COMPREHEN METABOLIC PANEL: CPT | Performed by: INTERNAL MEDICINE

## 2019-06-15 PROCEDURE — 83735 ASSAY OF MAGNESIUM: CPT | Performed by: INTERNAL MEDICINE

## 2019-06-15 PROCEDURE — 84100 ASSAY OF PHOSPHORUS: CPT | Performed by: INTERNAL MEDICINE

## 2019-06-15 PROCEDURE — 85610 PROTHROMBIN TIME: CPT | Performed by: INTERNAL MEDICINE

## 2019-06-15 PROCEDURE — 36415 COLL VENOUS BLD VENIPUNCTURE: CPT | Performed by: INTERNAL MEDICINE

## 2019-06-15 RX ORDER — POTASSIUM CHLORIDE 20 MEQ/1
40 TABLET, EXTENDED RELEASE ORAL ONCE
Status: COMPLETED | OUTPATIENT
Start: 2019-06-15 | End: 2019-06-15

## 2019-06-15 RX ORDER — POTASSIUM CHLORIDE 14.9 MG/ML
20 INJECTION INTRAVENOUS ONCE
Status: COMPLETED | OUTPATIENT
Start: 2019-06-15 | End: 2019-06-15

## 2019-06-15 RX ORDER — POTASSIUM CHLORIDE 20 MEQ/1
20 TABLET, EXTENDED RELEASE ORAL DAILY
Status: DISCONTINUED | OUTPATIENT
Start: 2019-06-16 | End: 2019-06-17 | Stop reason: HOSPADM

## 2019-06-15 RX ORDER — POTASSIUM CHLORIDE 14.9 MG/ML
20 INJECTION INTRAVENOUS ONCE
Status: COMPLETED | OUTPATIENT
Start: 2019-06-15 | End: 2019-06-16

## 2019-06-15 RX ORDER — FUROSEMIDE 40 MG/1
40 TABLET ORAL DAILY
Status: DISCONTINUED | OUTPATIENT
Start: 2019-06-15 | End: 2019-06-17 | Stop reason: HOSPADM

## 2019-06-15 RX ORDER — LACTULOSE 20 G/30ML
10 SOLUTION ORAL 2 TIMES DAILY
Status: DISCONTINUED | OUTPATIENT
Start: 2019-06-15 | End: 2019-06-17 | Stop reason: HOSPADM

## 2019-06-15 RX ADMIN — OXYCODONE HYDROCHLORIDE AND ACETAMINOPHEN 1 TABLET: 5; 325 TABLET ORAL at 13:01

## 2019-06-15 RX ADMIN — HEPARIN SODIUM 5000 UNITS: 5000 INJECTION INTRAVENOUS; SUBCUTANEOUS at 21:11

## 2019-06-15 RX ADMIN — GABAPENTIN 100 MG: 100 CAPSULE ORAL at 08:11

## 2019-06-15 RX ADMIN — OXYCODONE HYDROCHLORIDE AND ACETAMINOPHEN 1 TABLET: 5; 325 TABLET ORAL at 07:14

## 2019-06-15 RX ADMIN — POTASSIUM CHLORIDE 40 MEQ: 1500 TABLET, EXTENDED RELEASE ORAL at 07:09

## 2019-06-15 RX ADMIN — HEPARIN SODIUM 5000 UNITS: 5000 INJECTION INTRAVENOUS; SUBCUTANEOUS at 05:23

## 2019-06-15 RX ADMIN — ONDANSETRON 4 MG: 2 INJECTION INTRAMUSCULAR; INTRAVENOUS at 13:14

## 2019-06-15 RX ADMIN — OXYCODONE HYDROCHLORIDE AND ACETAMINOPHEN 1 TABLET: 5; 325 TABLET ORAL at 00:57

## 2019-06-15 RX ADMIN — GABAPENTIN 300 MG: 300 CAPSULE ORAL at 18:28

## 2019-06-15 RX ADMIN — OXYCODONE HYDROCHLORIDE AND ACETAMINOPHEN 1 TABLET: 5; 325 TABLET ORAL at 19:57

## 2019-06-15 RX ADMIN — POTASSIUM CHLORIDE 20 MEQ: 200 INJECTION, SOLUTION INTRAVENOUS at 00:47

## 2019-06-15 RX ADMIN — POTASSIUM CHLORIDE 20 MEQ: 200 INJECTION, SOLUTION INTRAVENOUS at 02:25

## 2019-06-15 RX ADMIN — PANTOPRAZOLE SODIUM 40 MG: 40 TABLET, DELAYED RELEASE ORAL at 05:23

## 2019-06-15 RX ADMIN — HEPARIN SODIUM 5000 UNITS: 5000 INJECTION INTRAVENOUS; SUBCUTANEOUS at 13:59

## 2019-06-15 RX ADMIN — NICOTINE 1 PATCH: 21 PATCH, EXTENDED RELEASE TRANSDERMAL at 08:12

## 2019-06-16 ENCOUNTER — APPOINTMENT (OUTPATIENT)
Dept: RADIOLOGY | Facility: HOSPITAL | Age: 69
DRG: 441 | End: 2019-06-16
Payer: COMMERCIAL

## 2019-06-16 LAB
ANION GAP SERPL CALCULATED.3IONS-SCNC: 5 MMOL/L (ref 4–13)
BUN SERPL-MCNC: 9 MG/DL (ref 5–25)
CALCIUM SERPL-MCNC: 8.3 MG/DL (ref 8.3–10.1)
CHLORIDE SERPL-SCNC: 104 MMOL/L (ref 100–108)
CO2 SERPL-SCNC: 24 MMOL/L (ref 21–32)
CREAT SERPL-MCNC: 0.58 MG/DL (ref 0.6–1.3)
ERYTHROCYTE [DISTWIDTH] IN BLOOD BY AUTOMATED COUNT: 18.3 % (ref 11.6–15.1)
GFR SERPL CREATININE-BSD FRML MDRD: 110 ML/MIN/1.73SQ M
GLUCOSE SERPL-MCNC: 72 MG/DL (ref 65–140)
HCT VFR BLD AUTO: 24.9 % (ref 34.8–46.1)
HGB BLD-MCNC: 7.8 G/DL (ref 11.5–15.4)
MAGNESIUM SERPL-MCNC: 1.9 MG/DL (ref 1.6–2.6)
MCH RBC QN AUTO: 28.2 PG (ref 26.8–34.3)
MCHC RBC AUTO-ENTMCNC: 31.3 G/DL (ref 31.4–37.4)
MCV RBC AUTO: 90 FL (ref 82–98)
PLATELET # BLD AUTO: 228 THOUSANDS/UL (ref 149–390)
PMV BLD AUTO: 10 FL (ref 8.9–12.7)
POTASSIUM SERPL-SCNC: 4.3 MMOL/L (ref 3.5–5.3)
RBC # BLD AUTO: 2.77 MILLION/UL (ref 3.81–5.12)
SODIUM SERPL-SCNC: 133 MMOL/L (ref 136–145)
WBC # BLD AUTO: 9.28 THOUSAND/UL (ref 4.31–10.16)

## 2019-06-16 PROCEDURE — 85027 COMPLETE CBC AUTOMATED: CPT | Performed by: STUDENT IN AN ORGANIZED HEALTH CARE EDUCATION/TRAINING PROGRAM

## 2019-06-16 PROCEDURE — 83735 ASSAY OF MAGNESIUM: CPT | Performed by: STUDENT IN AN ORGANIZED HEALTH CARE EDUCATION/TRAINING PROGRAM

## 2019-06-16 PROCEDURE — 76705 ECHO EXAM OF ABDOMEN: CPT

## 2019-06-16 PROCEDURE — 80048 BASIC METABOLIC PNL TOTAL CA: CPT | Performed by: STUDENT IN AN ORGANIZED HEALTH CARE EDUCATION/TRAINING PROGRAM

## 2019-06-16 RX ORDER — ACETAMINOPHEN 325 MG/1
650 TABLET ORAL ONCE
Status: DISCONTINUED | OUTPATIENT
Start: 2019-06-16 | End: 2019-06-17 | Stop reason: HOSPADM

## 2019-06-16 RX ADMIN — OXYCODONE HYDROCHLORIDE AND ACETAMINOPHEN 1 TABLET: 5; 325 TABLET ORAL at 07:50

## 2019-06-16 RX ADMIN — LACTULOSE 10 G: 10 SOLUTION ORAL at 17:05

## 2019-06-16 RX ADMIN — HEPARIN SODIUM 5000 UNITS: 5000 INJECTION INTRAVENOUS; SUBCUTANEOUS at 05:56

## 2019-06-16 RX ADMIN — NICOTINE 1 PATCH: 21 PATCH, EXTENDED RELEASE TRANSDERMAL at 09:36

## 2019-06-16 RX ADMIN — PANTOPRAZOLE SODIUM 40 MG: 40 TABLET, DELAYED RELEASE ORAL at 05:56

## 2019-06-16 RX ADMIN — HEPARIN SODIUM 5000 UNITS: 5000 INJECTION INTRAVENOUS; SUBCUTANEOUS at 13:50

## 2019-06-16 RX ADMIN — OXYCODONE HYDROCHLORIDE AND ACETAMINOPHEN 1 TABLET: 5; 325 TABLET ORAL at 13:51

## 2019-06-16 RX ADMIN — GABAPENTIN 100 MG: 100 CAPSULE ORAL at 09:36

## 2019-06-16 RX ADMIN — OXYCODONE HYDROCHLORIDE AND ACETAMINOPHEN 1 TABLET: 5; 325 TABLET ORAL at 02:05

## 2019-06-16 RX ADMIN — SPIRONOLACTONE 100 MG: 50 TABLET ORAL at 09:37

## 2019-06-16 RX ADMIN — HEPARIN SODIUM 5000 UNITS: 5000 INJECTION INTRAVENOUS; SUBCUTANEOUS at 22:27

## 2019-06-16 RX ADMIN — GABAPENTIN 300 MG: 300 CAPSULE ORAL at 17:05

## 2019-06-16 RX ADMIN — NADOLOL 20 MG: 20 TABLET ORAL at 09:46

## 2019-06-16 RX ADMIN — POTASSIUM CHLORIDE 20 MEQ: 1500 TABLET, EXTENDED RELEASE ORAL at 09:51

## 2019-06-16 RX ADMIN — OXYCODONE HYDROCHLORIDE AND ACETAMINOPHEN 1 TABLET: 5; 325 TABLET ORAL at 22:27

## 2019-06-16 RX ADMIN — FUROSEMIDE 40 MG: 40 TABLET ORAL at 09:37

## 2019-06-17 VITALS
HEIGHT: 63 IN | TEMPERATURE: 98.2 F | HEART RATE: 66 BPM | RESPIRATION RATE: 18 BRPM | OXYGEN SATURATION: 99 % | BODY MASS INDEX: 25.12 KG/M2 | SYSTOLIC BLOOD PRESSURE: 101 MMHG | WEIGHT: 141.76 LBS | DIASTOLIC BLOOD PRESSURE: 66 MMHG

## 2019-06-17 LAB
ANION GAP SERPL CALCULATED.3IONS-SCNC: 7 MMOL/L (ref 4–13)
ATRIAL RATE: 64 BPM
BASOPHILS # BLD AUTO: 0.02 THOUSANDS/ΜL (ref 0–0.1)
BASOPHILS NFR BLD AUTO: 0 % (ref 0–1)
BUN SERPL-MCNC: 9 MG/DL (ref 5–25)
CALCIUM SERPL-MCNC: 8.9 MG/DL (ref 8.3–10.1)
CHLORIDE SERPL-SCNC: 103 MMOL/L (ref 100–108)
CO2 SERPL-SCNC: 24 MMOL/L (ref 21–32)
CREAT SERPL-MCNC: 0.67 MG/DL (ref 0.6–1.3)
EOSINOPHIL # BLD AUTO: 0.04 THOUSAND/ΜL (ref 0–0.61)
EOSINOPHIL NFR BLD AUTO: 0 % (ref 0–6)
ERYTHROCYTE [DISTWIDTH] IN BLOOD BY AUTOMATED COUNT: 18.5 % (ref 11.6–15.1)
GFR SERPL CREATININE-BSD FRML MDRD: 104 ML/MIN/1.73SQ M
GLUCOSE P FAST SERPL-MCNC: 84 MG/DL (ref 65–99)
GLUCOSE SERPL-MCNC: 84 MG/DL (ref 65–140)
HCT VFR BLD AUTO: 29.6 % (ref 34.8–46.1)
HGB BLD-MCNC: 9.1 G/DL (ref 11.5–15.4)
IMM GRANULOCYTES # BLD AUTO: 0.08 THOUSAND/UL (ref 0–0.2)
IMM GRANULOCYTES NFR BLD AUTO: 1 % (ref 0–2)
LYMPHOCYTES # BLD AUTO: 0.96 THOUSANDS/ΜL (ref 0.6–4.47)
LYMPHOCYTES NFR BLD AUTO: 9 % (ref 14–44)
MCH RBC QN AUTO: 28.2 PG (ref 26.8–34.3)
MCHC RBC AUTO-ENTMCNC: 30.7 G/DL (ref 31.4–37.4)
MCV RBC AUTO: 92 FL (ref 82–98)
MONOCYTES # BLD AUTO: 1.02 THOUSAND/ΜL (ref 0.17–1.22)
MONOCYTES NFR BLD AUTO: 10 % (ref 4–12)
NEUTROPHILS # BLD AUTO: 8.1 THOUSANDS/ΜL (ref 1.85–7.62)
NEUTS SEG NFR BLD AUTO: 80 % (ref 43–75)
NRBC BLD AUTO-RTO: 0 /100 WBCS
P AXIS: 102 DEGREES
PLATELET # BLD AUTO: 317 THOUSANDS/UL (ref 149–390)
PMV BLD AUTO: 9.9 FL (ref 8.9–12.7)
POTASSIUM SERPL-SCNC: 4.1 MMOL/L (ref 3.5–5.3)
PR INTERVAL: 140 MS
QRS AXIS: 28 DEGREES
QRSD INTERVAL: 70 MS
QT INTERVAL: 454 MS
QTC INTERVAL: 468 MS
RBC # BLD AUTO: 3.23 MILLION/UL (ref 3.81–5.12)
SODIUM SERPL-SCNC: 134 MMOL/L (ref 136–145)
T WAVE AXIS: 268 DEGREES
VENTRICULAR RATE: 64 BPM
WBC # BLD AUTO: 10.22 THOUSAND/UL (ref 4.31–10.16)

## 2019-06-17 PROCEDURE — 80048 BASIC METABOLIC PNL TOTAL CA: CPT | Performed by: INTERNAL MEDICINE

## 2019-06-17 PROCEDURE — 85025 COMPLETE CBC W/AUTO DIFF WBC: CPT | Performed by: INTERNAL MEDICINE

## 2019-06-17 PROCEDURE — 93010 ELECTROCARDIOGRAM REPORT: CPT | Performed by: INTERNAL MEDICINE

## 2019-06-17 RX ADMIN — GABAPENTIN 100 MG: 100 CAPSULE ORAL at 09:42

## 2019-06-17 RX ADMIN — OXYCODONE HYDROCHLORIDE AND ACETAMINOPHEN 1 TABLET: 5; 325 TABLET ORAL at 12:06

## 2019-06-17 RX ADMIN — OXYCODONE HYDROCHLORIDE AND ACETAMINOPHEN 1 TABLET: 5; 325 TABLET ORAL at 06:13

## 2019-06-17 RX ADMIN — NICOTINE 1 PATCH: 21 PATCH, EXTENDED RELEASE TRANSDERMAL at 09:47

## 2019-06-17 RX ADMIN — HEPARIN SODIUM 5000 UNITS: 5000 INJECTION INTRAVENOUS; SUBCUTANEOUS at 06:13

## 2019-06-17 RX ADMIN — POTASSIUM CHLORIDE 20 MEQ: 1500 TABLET, EXTENDED RELEASE ORAL at 09:41

## 2019-06-17 RX ADMIN — GABAPENTIN 300 MG: 300 CAPSULE ORAL at 17:12

## 2019-06-17 RX ADMIN — PANTOPRAZOLE SODIUM 40 MG: 40 TABLET, DELAYED RELEASE ORAL at 06:13

## 2019-06-18 ENCOUNTER — OFFICE VISIT (OUTPATIENT)
Dept: PHYSICAL THERAPY | Age: 69
End: 2019-06-18
Payer: COMMERCIAL

## 2019-06-18 ENCOUNTER — TRANSITIONAL CARE MANAGEMENT (OUTPATIENT)
Dept: INTERNAL MEDICINE CLINIC | Facility: CLINIC | Age: 69
End: 2019-06-18

## 2019-06-18 ENCOUNTER — PATIENT OUTREACH (OUTPATIENT)
Dept: INTERNAL MEDICINE CLINIC | Facility: CLINIC | Age: 69
End: 2019-06-18

## 2019-06-18 DIAGNOSIS — I89.0 LYMPHEDEMA: Primary | ICD-10-CM

## 2019-06-18 PROCEDURE — 97110 THERAPEUTIC EXERCISES: CPT

## 2019-06-18 PROCEDURE — 97140 MANUAL THERAPY 1/> REGIONS: CPT

## 2019-06-19 ENCOUNTER — APPOINTMENT (INPATIENT)
Dept: RADIOLOGY | Facility: HOSPITAL | Age: 69
DRG: 441 | End: 2019-06-19
Payer: COMMERCIAL

## 2019-06-19 ENCOUNTER — APPOINTMENT (EMERGENCY)
Dept: RADIOLOGY | Facility: HOSPITAL | Age: 69
DRG: 441 | End: 2019-06-19
Payer: COMMERCIAL

## 2019-06-19 ENCOUNTER — HOSPITAL ENCOUNTER (INPATIENT)
Facility: HOSPITAL | Age: 69
LOS: 2 days | Discharge: HOME HEALTH | DRG: 441 | End: 2019-06-21
Attending: EMERGENCY MEDICINE | Admitting: HOSPITALIST
Payer: COMMERCIAL

## 2019-06-19 DIAGNOSIS — R74.01 TRANSAMINITIS: ICD-10-CM

## 2019-06-19 DIAGNOSIS — G93.40 ENCEPHALOPATHY: ICD-10-CM

## 2019-06-19 DIAGNOSIS — D72.829 LEUKOCYTOSIS: ICD-10-CM

## 2019-06-19 DIAGNOSIS — B18.2 HEPATIC CIRRHOSIS DUE TO CHRONIC HEPATITIS C INFECTION (HCC): ICD-10-CM

## 2019-06-19 DIAGNOSIS — K74.60 DECOMPENSATED HEPATIC CIRRHOSIS (HCC): ICD-10-CM

## 2019-06-19 DIAGNOSIS — R40.4 TRANSIENT ALTERATION OF AWARENESS: ICD-10-CM

## 2019-06-19 DIAGNOSIS — C22.0 HEPATOCELLULAR CARCINOMA (HCC): ICD-10-CM

## 2019-06-19 DIAGNOSIS — K74.60 HEPATIC CIRRHOSIS DUE TO CHRONIC HEPATITIS C INFECTION (HCC): ICD-10-CM

## 2019-06-19 DIAGNOSIS — R10.84 GENERALIZED ABDOMINAL PAIN: Primary | ICD-10-CM

## 2019-06-19 DIAGNOSIS — K72.90 DECOMPENSATED HEPATIC CIRRHOSIS (HCC): ICD-10-CM

## 2019-06-19 DIAGNOSIS — M54.16 CHRONIC LUMBAR RADICULOPATHY: ICD-10-CM

## 2019-06-19 DIAGNOSIS — G89.4 PAIN SYNDROME, CHRONIC: ICD-10-CM

## 2019-06-19 PROBLEM — I95.9 HYPOTENSION: Status: RESOLVED | Noted: 2019-02-16 | Resolved: 2019-06-19

## 2019-06-19 PROBLEM — M79.89 SWELLING OF BOTH LOWER EXTREMITIES: Status: RESOLVED | Noted: 2019-05-14 | Resolved: 2019-06-19

## 2019-06-19 PROBLEM — K92.2 GI BLEED: Status: RESOLVED | Noted: 2019-02-16 | Resolved: 2019-06-19

## 2019-06-19 PROBLEM — R14.3 FLATULENCE: Status: RESOLVED | Noted: 2019-05-27 | Resolved: 2019-06-19

## 2019-06-19 PROBLEM — R79.89 ELEVATED SERUM CREATININE: Status: RESOLVED | Noted: 2018-03-14 | Resolved: 2019-06-19

## 2019-06-19 PROBLEM — K92.1 MELENA: Status: RESOLVED | Noted: 2019-02-16 | Resolved: 2019-06-19

## 2019-06-19 PROBLEM — D62 ACUTE ON CHRONIC BLOOD LOSS ANEMIA: Status: RESOLVED | Noted: 2018-09-18 | Resolved: 2019-06-19

## 2019-06-19 LAB
ALBUMIN SERPL BCP-MCNC: 2.3 G/DL (ref 3.5–5)
ALP SERPL-CCNC: 402 U/L (ref 46–116)
ALT SERPL W P-5'-P-CCNC: 65 U/L (ref 12–78)
AMMONIA PLAS-SCNC: 30 UMOL/L (ref 11–35)
ANION GAP SERPL CALCULATED.3IONS-SCNC: 6 MMOL/L (ref 4–13)
APAP SERPL-MCNC: <2 UG/ML (ref 10–20)
APTT PPP: 38 SECONDS (ref 26–38)
AST SERPL W P-5'-P-CCNC: 506 U/L (ref 5–45)
ATRIAL RATE: 69 BPM
BASE EX.OXY STD BLDV CALC-SCNC: 66.1 % (ref 60–80)
BASE EXCESS BLDV CALC-SCNC: -0.3 MMOL/L
BASOPHILS # BLD AUTO: 0.04 THOUSANDS/ΜL (ref 0–0.1)
BASOPHILS NFR BLD AUTO: 0 % (ref 0–1)
BILIRUB SERPL-MCNC: 1.01 MG/DL (ref 0.2–1)
BUN SERPL-MCNC: 7 MG/DL (ref 5–25)
CALCIUM SERPL-MCNC: 9 MG/DL (ref 8.3–10.1)
CHLORIDE SERPL-SCNC: 98 MMOL/L (ref 100–108)
CK SERPL-CCNC: 35 U/L (ref 26–192)
CO2 SERPL-SCNC: 26 MMOL/L (ref 21–32)
CREAT SERPL-MCNC: 0.78 MG/DL (ref 0.6–1.3)
EOSINOPHIL # BLD AUTO: 0.04 THOUSAND/ΜL (ref 0–0.61)
EOSINOPHIL NFR BLD AUTO: 0 % (ref 0–6)
ERYTHROCYTE [DISTWIDTH] IN BLOOD BY AUTOMATED COUNT: 17.9 % (ref 11.6–15.1)
ETHANOL SERPL-MCNC: <3 MG/DL (ref 0–3)
ETHANOL SERPL-MCNC: <3 MG/DL (ref 0–3)
GFR SERPL CREATININE-BSD FRML MDRD: 90 ML/MIN/1.73SQ M
GLUCOSE SERPL-MCNC: 110 MG/DL (ref 65–140)
GLUCOSE SERPL-MCNC: 87 MG/DL (ref 65–140)
GLUCOSE SERPL-MCNC: 91 MG/DL (ref 65–140)
GLUCOSE SERPL-MCNC: 95 MG/DL (ref 65–140)
HCO3 BLDV-SCNC: 23.6 MMOL/L (ref 24–30)
HCT VFR BLD AUTO: 31.3 % (ref 34.8–46.1)
HGB BLD-MCNC: 9.6 G/DL (ref 11.5–15.4)
IMM GRANULOCYTES # BLD AUTO: 0.14 THOUSAND/UL (ref 0–0.2)
IMM GRANULOCYTES NFR BLD AUTO: 1 % (ref 0–2)
INR PPP: 1.16 (ref 0.86–1.17)
LIPASE SERPL-CCNC: 380 U/L (ref 73–393)
LYMPHOCYTES # BLD AUTO: 0.87 THOUSANDS/ΜL (ref 0.6–4.47)
LYMPHOCYTES NFR BLD AUTO: 5 % (ref 14–44)
MCH RBC QN AUTO: 27.8 PG (ref 26.8–34.3)
MCHC RBC AUTO-ENTMCNC: 30.7 G/DL (ref 31.4–37.4)
MCV RBC AUTO: 91 FL (ref 82–98)
MONOCYTES # BLD AUTO: 1.39 THOUSAND/ΜL (ref 0.17–1.22)
MONOCYTES NFR BLD AUTO: 8 % (ref 4–12)
NEUTROPHILS # BLD AUTO: 14.34 THOUSANDS/ΜL (ref 1.85–7.62)
NEUTS SEG NFR BLD AUTO: 86 % (ref 43–75)
NRBC BLD AUTO-RTO: 0 /100 WBCS
O2 CT BLDV-SCNC: 10.3 ML/DL
P AXIS: 32 DEGREES
PCO2 BLDV: 35.6 MM HG (ref 42–50)
PH BLDV: 7.44 [PH] (ref 7.3–7.4)
PLATELET # BLD AUTO: 318 THOUSANDS/UL (ref 149–390)
PMV BLD AUTO: 9.7 FL (ref 8.9–12.7)
PO2 BLDV: 35.4 MM HG (ref 35–45)
POTASSIUM SERPL-SCNC: 4.3 MMOL/L (ref 3.5–5.3)
PR INTERVAL: 124 MS
PROT SERPL-MCNC: 7.6 G/DL (ref 6.4–8.2)
PROTHROMBIN TIME: 14.9 SECONDS (ref 11.8–14.2)
QRS AXIS: -31 DEGREES
QRSD INTERVAL: 68 MS
QT INTERVAL: 378 MS
QTC INTERVAL: 405 MS
RBC # BLD AUTO: 3.45 MILLION/UL (ref 3.81–5.12)
SALICYLATES SERPL-MCNC: <3 MG/DL (ref 3–20)
SODIUM SERPL-SCNC: 130 MMOL/L (ref 136–145)
T WAVE AXIS: -68 DEGREES
TROPONIN I SERPL-MCNC: <0.02 NG/ML
VENTRICULAR RATE: 69 BPM
WBC # BLD AUTO: 16.82 THOUSAND/UL (ref 4.31–10.16)

## 2019-06-19 PROCEDURE — 80329 ANALGESICS NON-OPIOID 1 OR 2: CPT | Performed by: INTERNAL MEDICINE

## 2019-06-19 PROCEDURE — 82140 ASSAY OF AMMONIA: CPT | Performed by: EMERGENCY MEDICINE

## 2019-06-19 PROCEDURE — 84484 ASSAY OF TROPONIN QUANT: CPT | Performed by: EMERGENCY MEDICINE

## 2019-06-19 PROCEDURE — 82550 ASSAY OF CK (CPK): CPT | Performed by: STUDENT IN AN ORGANIZED HEALTH CARE EDUCATION/TRAINING PROGRAM

## 2019-06-19 PROCEDURE — 85025 COMPLETE CBC W/AUTO DIFF WBC: CPT | Performed by: EMERGENCY MEDICINE

## 2019-06-19 PROCEDURE — 99284 EMERGENCY DEPT VISIT MOD MDM: CPT | Performed by: EMERGENCY MEDICINE

## 2019-06-19 PROCEDURE — 93010 ELECTROCARDIOGRAM REPORT: CPT | Performed by: INTERNAL MEDICINE

## 2019-06-19 PROCEDURE — 85730 THROMBOPLASTIN TIME PARTIAL: CPT | Performed by: EMERGENCY MEDICINE

## 2019-06-19 PROCEDURE — 80320 DRUG SCREEN QUANTALCOHOLS: CPT | Performed by: INTERNAL MEDICINE

## 2019-06-19 PROCEDURE — 82948 REAGENT STRIP/BLOOD GLUCOSE: CPT

## 2019-06-19 PROCEDURE — 74177 CT ABD & PELVIS W/CONTRAST: CPT

## 2019-06-19 PROCEDURE — 36415 COLL VENOUS BLD VENIPUNCTURE: CPT | Performed by: EMERGENCY MEDICINE

## 2019-06-19 PROCEDURE — 99223 1ST HOSP IP/OBS HIGH 75: CPT | Performed by: INTERNAL MEDICINE

## 2019-06-19 PROCEDURE — 83690 ASSAY OF LIPASE: CPT | Performed by: EMERGENCY MEDICINE

## 2019-06-19 PROCEDURE — 93005 ELECTROCARDIOGRAM TRACING: CPT

## 2019-06-19 PROCEDURE — 85610 PROTHROMBIN TIME: CPT | Performed by: EMERGENCY MEDICINE

## 2019-06-19 PROCEDURE — 82805 BLOOD GASES W/O2 SATURATION: CPT | Performed by: EMERGENCY MEDICINE

## 2019-06-19 PROCEDURE — 80053 COMPREHEN METABOLIC PANEL: CPT | Performed by: EMERGENCY MEDICINE

## 2019-06-19 PROCEDURE — 70450 CT HEAD/BRAIN W/O DYE: CPT

## 2019-06-19 PROCEDURE — 99285 EMERGENCY DEPT VISIT HI MDM: CPT

## 2019-06-19 RX ORDER — FUROSEMIDE 40 MG/1
40 TABLET ORAL DAILY
Status: DISCONTINUED | OUTPATIENT
Start: 2019-06-20 | End: 2019-06-21 | Stop reason: HOSPADM

## 2019-06-19 RX ORDER — GABAPENTIN 300 MG/1
600 CAPSULE ORAL
Status: DISCONTINUED | OUTPATIENT
Start: 2019-06-19 | End: 2019-06-21 | Stop reason: HOSPADM

## 2019-06-19 RX ORDER — ACETAMINOPHEN 325 MG/1
325 TABLET ORAL EVERY 6 HOURS PRN
Status: DISCONTINUED | OUTPATIENT
Start: 2019-06-19 | End: 2019-06-21 | Stop reason: HOSPADM

## 2019-06-19 RX ORDER — SPIRONOLACTONE 100 MG/1
100 TABLET, FILM COATED ORAL DAILY
Status: DISCONTINUED | OUTPATIENT
Start: 2019-06-20 | End: 2019-06-21 | Stop reason: HOSPADM

## 2019-06-19 RX ORDER — NICOTINE 21 MG/24HR
1 PATCH, TRANSDERMAL 24 HOURS TRANSDERMAL DAILY
Status: DISCONTINUED | OUTPATIENT
Start: 2019-06-19 | End: 2019-06-21 | Stop reason: HOSPADM

## 2019-06-19 RX ORDER — LACTULOSE 20 G/30ML
20 SOLUTION ORAL 3 TIMES DAILY
Status: DISCONTINUED | OUTPATIENT
Start: 2019-06-19 | End: 2019-06-21 | Stop reason: HOSPADM

## 2019-06-19 RX ORDER — PANTOPRAZOLE SODIUM 40 MG/1
40 TABLET, DELAYED RELEASE ORAL DAILY
Status: DISCONTINUED | OUTPATIENT
Start: 2019-06-19 | End: 2019-06-21 | Stop reason: HOSPADM

## 2019-06-19 RX ORDER — FUROSEMIDE 20 MG/1
20 TABLET ORAL ONCE
Status: DISCONTINUED | OUTPATIENT
Start: 2019-06-19 | End: 2019-06-19

## 2019-06-19 RX ORDER — GABAPENTIN 300 MG/1
300 CAPSULE ORAL DAILY
Status: DISCONTINUED | OUTPATIENT
Start: 2019-06-20 | End: 2019-06-21 | Stop reason: HOSPADM

## 2019-06-19 RX ORDER — SPIRONOLACTONE 50 MG/1
50 TABLET, FILM COATED ORAL ONCE
Status: DISCONTINUED | OUTPATIENT
Start: 2019-06-19 | End: 2019-06-19

## 2019-06-19 RX ORDER — ALBUMIN (HUMAN) 12.5 G/50ML
25 SOLUTION INTRAVENOUS ONCE
Status: COMPLETED | OUTPATIENT
Start: 2019-06-19 | End: 2019-06-19

## 2019-06-19 RX ORDER — NADOLOL 20 MG/1
20 TABLET ORAL DAILY
Status: DISCONTINUED | OUTPATIENT
Start: 2019-06-19 | End: 2019-06-21 | Stop reason: HOSPADM

## 2019-06-19 RX ORDER — SPIRONOLACTONE 100 MG/1
100 TABLET, FILM COATED ORAL DAILY
Status: DISCONTINUED | OUTPATIENT
Start: 2019-06-19 | End: 2019-06-19

## 2019-06-19 RX ORDER — FUROSEMIDE 40 MG/1
40 TABLET ORAL DAILY
Status: DISCONTINUED | OUTPATIENT
Start: 2019-06-19 | End: 2019-06-19

## 2019-06-19 RX ADMIN — PIPERACILLIN SODIUM AND TAZOBACTAM SODIUM 3.38 G: 36; 4.5 INJECTION, POWDER, FOR SOLUTION INTRAVENOUS at 18:45

## 2019-06-19 RX ADMIN — LACTULOSE 20 G: 10 SOLUTION ORAL at 18:44

## 2019-06-19 RX ADMIN — ALBUMIN (HUMAN) 25 G: 12.5 SOLUTION INTRAVENOUS at 14:29

## 2019-06-19 RX ADMIN — RIFAXIMIN 550 MG: 550 TABLET ORAL at 22:42

## 2019-06-19 RX ADMIN — LACTULOSE 20 G: 10 SOLUTION ORAL at 13:31

## 2019-06-19 RX ADMIN — IOHEXOL 85 ML: 350 INJECTION, SOLUTION INTRAVENOUS at 18:15

## 2019-06-19 RX ADMIN — GABAPENTIN 600 MG: 300 CAPSULE ORAL at 22:42

## 2019-06-19 RX ADMIN — PIPERACILLIN SODIUM AND TAZOBACTAM SODIUM 3.38 G: 36; 4.5 INJECTION, POWDER, FOR SOLUTION INTRAVENOUS at 22:42

## 2019-06-19 RX ADMIN — NICOTINE 1 PATCH: 21 PATCH, EXTENDED RELEASE TRANSDERMAL at 13:05

## 2019-06-20 ENCOUNTER — TELEPHONE (OUTPATIENT)
Dept: PALLIATIVE MEDICINE | Facility: CLINIC | Age: 69
End: 2019-06-20

## 2019-06-20 LAB
ALBUMIN SERPL BCP-MCNC: 2.1 G/DL (ref 3.5–5)
ALP SERPL-CCNC: 291 U/L (ref 46–116)
ALT SERPL W P-5'-P-CCNC: 32 U/L (ref 12–78)
ANION GAP SERPL CALCULATED.3IONS-SCNC: 7 MMOL/L (ref 4–13)
AST SERPL W P-5'-P-CCNC: 154 U/L (ref 5–45)
BASOPHILS # BLD AUTO: 0.02 THOUSANDS/ΜL (ref 0–0.1)
BASOPHILS NFR BLD AUTO: 0 % (ref 0–1)
BILIRUB SERPL-MCNC: 1.26 MG/DL (ref 0.2–1)
BUN SERPL-MCNC: 8 MG/DL (ref 5–25)
CALCIUM SERPL-MCNC: 8.7 MG/DL (ref 8.3–10.1)
CHLORIDE SERPL-SCNC: 103 MMOL/L (ref 100–108)
CO2 SERPL-SCNC: 24 MMOL/L (ref 21–32)
CREAT SERPL-MCNC: 0.7 MG/DL (ref 0.6–1.3)
EOSINOPHIL # BLD AUTO: 0 THOUSAND/ΜL (ref 0–0.61)
EOSINOPHIL NFR BLD AUTO: 0 % (ref 0–6)
ERYTHROCYTE [DISTWIDTH] IN BLOOD BY AUTOMATED COUNT: 18.1 % (ref 11.6–15.1)
GFR SERPL CREATININE-BSD FRML MDRD: 103 ML/MIN/1.73SQ M
GLUCOSE SERPL-MCNC: 100 MG/DL (ref 65–140)
HCT VFR BLD AUTO: 26.4 % (ref 34.8–46.1)
HGB BLD-MCNC: 8.3 G/DL (ref 11.5–15.4)
IMM GRANULOCYTES # BLD AUTO: 0.14 THOUSAND/UL (ref 0–0.2)
IMM GRANULOCYTES NFR BLD AUTO: 1 % (ref 0–2)
LYMPHOCYTES # BLD AUTO: 1.09 THOUSANDS/ΜL (ref 0.6–4.47)
LYMPHOCYTES NFR BLD AUTO: 6 % (ref 14–44)
MAGNESIUM SERPL-MCNC: 2.1 MG/DL (ref 1.6–2.6)
MCH RBC QN AUTO: 28.3 PG (ref 26.8–34.3)
MCHC RBC AUTO-ENTMCNC: 31.4 G/DL (ref 31.4–37.4)
MCV RBC AUTO: 90 FL (ref 82–98)
MONOCYTES # BLD AUTO: 1.53 THOUSAND/ΜL (ref 0.17–1.22)
MONOCYTES NFR BLD AUTO: 9 % (ref 4–12)
NEUTROPHILS # BLD AUTO: 14.67 THOUSANDS/ΜL (ref 1.85–7.62)
NEUTS SEG NFR BLD AUTO: 84 % (ref 43–75)
NRBC BLD AUTO-RTO: 0 /100 WBCS
PLATELET # BLD AUTO: 234 THOUSANDS/UL (ref 149–390)
PMV BLD AUTO: 9.9 FL (ref 8.9–12.7)
POTASSIUM SERPL-SCNC: 3.5 MMOL/L (ref 3.5–5.3)
PROCALCITONIN SERPL-MCNC: 0.47 NG/ML
PROCALCITONIN SERPL-MCNC: 0.56 NG/ML
PROT SERPL-MCNC: 6.7 G/DL (ref 6.4–8.2)
RBC # BLD AUTO: 2.93 MILLION/UL (ref 3.81–5.12)
SODIUM SERPL-SCNC: 134 MMOL/L (ref 136–145)
WBC # BLD AUTO: 17.45 THOUSAND/UL (ref 4.31–10.16)

## 2019-06-20 PROCEDURE — 84145 PROCALCITONIN (PCT): CPT | Performed by: INTERNAL MEDICINE

## 2019-06-20 PROCEDURE — 85025 COMPLETE CBC W/AUTO DIFF WBC: CPT | Performed by: STUDENT IN AN ORGANIZED HEALTH CARE EDUCATION/TRAINING PROGRAM

## 2019-06-20 PROCEDURE — 99232 SBSQ HOSP IP/OBS MODERATE 35: CPT | Performed by: INTERNAL MEDICINE

## 2019-06-20 PROCEDURE — 80053 COMPREHEN METABOLIC PANEL: CPT | Performed by: STUDENT IN AN ORGANIZED HEALTH CARE EDUCATION/TRAINING PROGRAM

## 2019-06-20 PROCEDURE — 87040 BLOOD CULTURE FOR BACTERIA: CPT | Performed by: STUDENT IN AN ORGANIZED HEALTH CARE EDUCATION/TRAINING PROGRAM

## 2019-06-20 PROCEDURE — 83735 ASSAY OF MAGNESIUM: CPT | Performed by: STUDENT IN AN ORGANIZED HEALTH CARE EDUCATION/TRAINING PROGRAM

## 2019-06-20 RX ORDER — OXYCODONE HYDROCHLORIDE AND ACETAMINOPHEN 5; 325 MG/1; MG/1
1 TABLET ORAL EVERY 6 HOURS PRN
Status: DISCONTINUED | OUTPATIENT
Start: 2019-06-20 | End: 2019-06-21 | Stop reason: HOSPADM

## 2019-06-20 RX ADMIN — PIPERACILLIN SODIUM AND TAZOBACTAM SODIUM 4.5 G: 36; 4.5 INJECTION, POWDER, FOR SOLUTION INTRAVENOUS at 11:38

## 2019-06-20 RX ADMIN — PIPERACILLIN SODIUM AND TAZOBACTAM SODIUM 3.38 G: 36; 4.5 INJECTION, POWDER, FOR SOLUTION INTRAVENOUS at 04:35

## 2019-06-20 RX ADMIN — PIPERACILLIN SODIUM AND TAZOBACTAM SODIUM 4.5 G: 36; 4.5 INJECTION, POWDER, FOR SOLUTION INTRAVENOUS at 22:18

## 2019-06-20 RX ADMIN — RIFAXIMIN 550 MG: 550 TABLET ORAL at 08:07

## 2019-06-20 RX ADMIN — GABAPENTIN 600 MG: 300 CAPSULE ORAL at 22:18

## 2019-06-20 RX ADMIN — ACETAMINOPHEN 325 MG: 325 TABLET ORAL at 00:22

## 2019-06-20 RX ADMIN — OXYCODONE HYDROCHLORIDE AND ACETAMINOPHEN 1 TABLET: 5; 325 TABLET ORAL at 07:45

## 2019-06-20 RX ADMIN — PIPERACILLIN SODIUM AND TAZOBACTAM SODIUM 4.5 G: 36; 4.5 INJECTION, POWDER, FOR SOLUTION INTRAVENOUS at 17:08

## 2019-06-20 RX ADMIN — GABAPENTIN 300 MG: 300 CAPSULE ORAL at 08:07

## 2019-06-20 RX ADMIN — RIFAXIMIN 550 MG: 550 TABLET ORAL at 22:18

## 2019-06-20 RX ADMIN — OXYCODONE HYDROCHLORIDE AND ACETAMINOPHEN 1 TABLET: 5; 325 TABLET ORAL at 13:46

## 2019-06-20 RX ADMIN — PANTOPRAZOLE SODIUM 40 MG: 40 TABLET, DELAYED RELEASE ORAL at 08:07

## 2019-06-20 RX ADMIN — NICOTINE 1 PATCH: 21 PATCH, EXTENDED RELEASE TRANSDERMAL at 08:06

## 2019-06-21 ENCOUNTER — APPOINTMENT (OUTPATIENT)
Dept: PHYSICAL THERAPY | Age: 69
End: 2019-06-21
Payer: COMMERCIAL

## 2019-06-21 VITALS
BODY MASS INDEX: 24.8 KG/M2 | HEART RATE: 65 BPM | RESPIRATION RATE: 18 BRPM | HEIGHT: 63 IN | OXYGEN SATURATION: 98 % | SYSTOLIC BLOOD PRESSURE: 88 MMHG | DIASTOLIC BLOOD PRESSURE: 60 MMHG | TEMPERATURE: 97.5 F | WEIGHT: 139.99 LBS

## 2019-06-21 LAB
ANION GAP SERPL CALCULATED.3IONS-SCNC: 7 MMOL/L (ref 4–13)
BASOPHILS # BLD AUTO: 0.04 THOUSANDS/ΜL (ref 0–0.1)
BASOPHILS NFR BLD AUTO: 0 % (ref 0–1)
BUN SERPL-MCNC: 8 MG/DL (ref 5–25)
CALCIUM SERPL-MCNC: 8.4 MG/DL (ref 8.3–10.1)
CHLORIDE SERPL-SCNC: 101 MMOL/L (ref 100–108)
CO2 SERPL-SCNC: 25 MMOL/L (ref 21–32)
CREAT SERPL-MCNC: 0.62 MG/DL (ref 0.6–1.3)
EOSINOPHIL # BLD AUTO: 0.07 THOUSAND/ΜL (ref 0–0.61)
EOSINOPHIL NFR BLD AUTO: 1 % (ref 0–6)
ERYTHROCYTE [DISTWIDTH] IN BLOOD BY AUTOMATED COUNT: 17.9 % (ref 11.6–15.1)
GFR SERPL CREATININE-BSD FRML MDRD: 107 ML/MIN/1.73SQ M
GLUCOSE SERPL-MCNC: 97 MG/DL (ref 65–140)
HCT VFR BLD AUTO: 26.7 % (ref 34.8–46.1)
HGB BLD-MCNC: 8.3 G/DL (ref 11.5–15.4)
IMM GRANULOCYTES # BLD AUTO: 0.15 THOUSAND/UL (ref 0–0.2)
IMM GRANULOCYTES NFR BLD AUTO: 1 % (ref 0–2)
LYMPHOCYTES # BLD AUTO: 0.91 THOUSANDS/ΜL (ref 0.6–4.47)
LYMPHOCYTES NFR BLD AUTO: 7 % (ref 14–44)
MCH RBC QN AUTO: 28 PG (ref 26.8–34.3)
MCHC RBC AUTO-ENTMCNC: 31.1 G/DL (ref 31.4–37.4)
MCV RBC AUTO: 90 FL (ref 82–98)
MONOCYTES # BLD AUTO: 1.31 THOUSAND/ΜL (ref 0.17–1.22)
MONOCYTES NFR BLD AUTO: 10 % (ref 4–12)
NEUTROPHILS # BLD AUTO: 10.67 THOUSANDS/ΜL (ref 1.85–7.62)
NEUTS SEG NFR BLD AUTO: 81 % (ref 43–75)
NRBC BLD AUTO-RTO: 0 /100 WBCS
PLATELET # BLD AUTO: 233 THOUSANDS/UL (ref 149–390)
PMV BLD AUTO: 9.9 FL (ref 8.9–12.7)
POTASSIUM SERPL-SCNC: 3 MMOL/L (ref 3.5–5.3)
RBC # BLD AUTO: 2.96 MILLION/UL (ref 3.81–5.12)
SODIUM SERPL-SCNC: 133 MMOL/L (ref 136–145)
WBC # BLD AUTO: 13.15 THOUSAND/UL (ref 4.31–10.16)

## 2019-06-21 PROCEDURE — 80048 BASIC METABOLIC PNL TOTAL CA: CPT | Performed by: INTERNAL MEDICINE

## 2019-06-21 PROCEDURE — 99232 SBSQ HOSP IP/OBS MODERATE 35: CPT | Performed by: INTERNAL MEDICINE

## 2019-06-21 PROCEDURE — 85025 COMPLETE CBC W/AUTO DIFF WBC: CPT | Performed by: INTERNAL MEDICINE

## 2019-06-21 RX ORDER — POTASSIUM CHLORIDE 20 MEQ/1
40 TABLET, EXTENDED RELEASE ORAL ONCE
Status: COMPLETED | OUTPATIENT
Start: 2019-06-21 | End: 2019-06-21

## 2019-06-21 RX ORDER — FUROSEMIDE 20 MG/1
60 TABLET ORAL DAILY
Qty: 180 TABLET | Refills: 0
Start: 2019-06-21

## 2019-06-21 RX ORDER — SPIRONOLACTONE 50 MG/1
150 TABLET, FILM COATED ORAL DAILY
Qty: 90 TABLET | Refills: 0
Start: 2019-06-21

## 2019-06-21 RX ADMIN — NICOTINE 1 PATCH: 21 PATCH, EXTENDED RELEASE TRANSDERMAL at 11:18

## 2019-06-21 RX ADMIN — OXYCODONE HYDROCHLORIDE AND ACETAMINOPHEN 1 TABLET: 5; 325 TABLET ORAL at 11:29

## 2019-06-21 RX ADMIN — PIPERACILLIN SODIUM AND TAZOBACTAM SODIUM 4.5 G: 36; 4.5 INJECTION, POWDER, FOR SOLUTION INTRAVENOUS at 04:10

## 2019-06-21 RX ADMIN — RIFAXIMIN 550 MG: 550 TABLET ORAL at 11:07

## 2019-06-21 RX ADMIN — PIPERACILLIN SODIUM AND TAZOBACTAM SODIUM 4.5 G: 36; 4.5 INJECTION, POWDER, FOR SOLUTION INTRAVENOUS at 11:37

## 2019-06-21 RX ADMIN — PANTOPRAZOLE SODIUM 40 MG: 40 TABLET, DELAYED RELEASE ORAL at 11:08

## 2019-06-21 RX ADMIN — GABAPENTIN 300 MG: 300 CAPSULE ORAL at 11:09

## 2019-06-21 RX ADMIN — LACTULOSE 20 G: 10 SOLUTION ORAL at 11:10

## 2019-06-21 RX ADMIN — POTASSIUM CHLORIDE 40 MEQ: 1500 TABLET, EXTENDED RELEASE ORAL at 11:08

## 2019-06-21 RX ADMIN — OXYCODONE HYDROCHLORIDE AND ACETAMINOPHEN 1 TABLET: 5; 325 TABLET ORAL at 06:19

## 2019-06-24 ENCOUNTER — PATIENT OUTREACH (OUTPATIENT)
Dept: INTERNAL MEDICINE CLINIC | Facility: CLINIC | Age: 69
End: 2019-06-24

## 2019-06-24 ENCOUNTER — APPOINTMENT (OUTPATIENT)
Dept: LAB | Facility: CLINIC | Age: 69
End: 2019-06-24
Payer: COMMERCIAL

## 2019-06-24 ENCOUNTER — APPOINTMENT (OUTPATIENT)
Dept: PHYSICAL THERAPY | Age: 69
End: 2019-06-24
Payer: COMMERCIAL

## 2019-06-24 DIAGNOSIS — B18.2 HEPATIC CIRRHOSIS DUE TO CHRONIC HEPATITIS C INFECTION (HCC): ICD-10-CM

## 2019-06-24 DIAGNOSIS — K74.60 HEPATIC CIRRHOSIS DUE TO CHRONIC HEPATITIS C INFECTION (HCC): ICD-10-CM

## 2019-06-24 LAB
ANION GAP SERPL CALCULATED.3IONS-SCNC: 10 MMOL/L (ref 4–13)
BUN SERPL-MCNC: 6 MG/DL (ref 5–25)
CALCIUM SERPL-MCNC: 8.6 MG/DL (ref 8.3–10.1)
CHLORIDE SERPL-SCNC: 100 MMOL/L (ref 100–108)
CO2 SERPL-SCNC: 25 MMOL/L (ref 21–32)
CREAT SERPL-MCNC: 0.84 MG/DL (ref 0.6–1.3)
GFR SERPL CREATININE-BSD FRML MDRD: 83 ML/MIN/1.73SQ M
GLUCOSE P FAST SERPL-MCNC: 114 MG/DL (ref 65–99)
POTASSIUM SERPL-SCNC: 3.2 MMOL/L (ref 3.5–5.3)
SODIUM SERPL-SCNC: 135 MMOL/L (ref 136–145)

## 2019-06-24 PROCEDURE — 36415 COLL VENOUS BLD VENIPUNCTURE: CPT

## 2019-06-24 PROCEDURE — 80048 BASIC METABOLIC PNL TOTAL CA: CPT

## 2019-06-25 ENCOUNTER — TELEPHONE (OUTPATIENT)
Dept: PAIN MEDICINE | Facility: CLINIC | Age: 69
End: 2019-06-25

## 2019-06-25 ENCOUNTER — PATIENT OUTREACH (OUTPATIENT)
Dept: INTERNAL MEDICINE CLINIC | Facility: CLINIC | Age: 69
End: 2019-06-25

## 2019-06-25 ENCOUNTER — TRANSITIONAL CARE MANAGEMENT (OUTPATIENT)
Dept: INTERNAL MEDICINE CLINIC | Facility: CLINIC | Age: 69
End: 2019-06-25

## 2019-06-25 LAB
BACTERIA BLD CULT: NORMAL
BACTERIA BLD CULT: NORMAL

## 2019-06-26 ENCOUNTER — TELEPHONE (OUTPATIENT)
Dept: PALLIATIVE MEDICINE | Facility: HOSPITAL | Age: 69
End: 2019-06-26

## 2019-06-27 ENCOUNTER — APPOINTMENT (OUTPATIENT)
Dept: PHYSICAL THERAPY | Age: 69
End: 2019-06-27
Payer: COMMERCIAL

## 2019-06-27 ENCOUNTER — PATIENT OUTREACH (OUTPATIENT)
Dept: INTERNAL MEDICINE CLINIC | Facility: CLINIC | Age: 69
End: 2019-06-27

## 2019-06-27 DIAGNOSIS — Z78.9 NEEDS ASSISTANCE WITH COMMUNITY RESOURCES: Primary | ICD-10-CM

## 2019-06-27 DIAGNOSIS — B18.2 HEPATIC CIRRHOSIS DUE TO CHRONIC HEPATITIS C INFECTION (HCC): ICD-10-CM

## 2019-06-27 DIAGNOSIS — K74.60 HEPATIC CIRRHOSIS DUE TO CHRONIC HEPATITIS C INFECTION (HCC): ICD-10-CM

## 2019-06-27 RX ORDER — LACTULOSE 10 G/15ML
SOLUTION ORAL
Qty: 900 ML | Refills: 0 | Status: SHIPPED | OUTPATIENT
Start: 2019-06-27 | End: 2019-07-06 | Stop reason: HOSPADM

## 2019-06-29 ENCOUNTER — APPOINTMENT (EMERGENCY)
Dept: RADIOLOGY | Facility: HOSPITAL | Age: 69
DRG: 432 | End: 2019-06-29
Payer: COMMERCIAL

## 2019-06-29 ENCOUNTER — HOSPITAL ENCOUNTER (INPATIENT)
Facility: HOSPITAL | Age: 69
LOS: 6 days | Discharge: NURSING, REHAB OR LONG-TERM CARE | DRG: 432 | End: 2019-07-06
Attending: EMERGENCY MEDICINE | Admitting: INTERNAL MEDICINE
Payer: COMMERCIAL

## 2019-06-29 DIAGNOSIS — R53.1 WEAKNESS: ICD-10-CM

## 2019-06-29 DIAGNOSIS — Z71.89 GOALS OF CARE, COUNSELING/DISCUSSION: ICD-10-CM

## 2019-06-29 DIAGNOSIS — C22.0 HEPATOCELLULAR CARCINOMA (HCC): Primary | ICD-10-CM

## 2019-06-29 DIAGNOSIS — R10.9 ABDOMINAL PAIN: ICD-10-CM

## 2019-06-29 DIAGNOSIS — R10.9 CHRONIC ABDOMINAL PAIN: ICD-10-CM

## 2019-06-29 DIAGNOSIS — G89.29 CHRONIC ABDOMINAL PAIN: ICD-10-CM

## 2019-06-29 LAB
ALBUMIN SERPL BCP-MCNC: 2.1 G/DL (ref 3.5–5)
ALP SERPL-CCNC: 424 U/L (ref 46–116)
ALT SERPL W P-5'-P-CCNC: 41 U/L (ref 12–78)
AMMONIA PLAS-SCNC: 12 UMOL/L (ref 11–35)
ANION GAP SERPL CALCULATED.3IONS-SCNC: 6 MMOL/L (ref 4–13)
ANION GAP SERPL CALCULATED.3IONS-SCNC: 9 MMOL/L (ref 4–13)
AST SERPL W P-5'-P-CCNC: 310 U/L (ref 5–45)
BASOPHILS # BLD AUTO: 0.01 THOUSANDS/ΜL (ref 0–0.1)
BASOPHILS NFR BLD AUTO: 0 % (ref 0–1)
BILIRUB SERPL-MCNC: 1.34 MG/DL (ref 0.2–1)
BUN SERPL-MCNC: 8 MG/DL (ref 5–25)
BUN SERPL-MCNC: 8 MG/DL (ref 5–25)
CALCIUM SERPL-MCNC: 8 MG/DL (ref 8.3–10.1)
CALCIUM SERPL-MCNC: 8.1 MG/DL (ref 8.3–10.1)
CHLORIDE SERPL-SCNC: 95 MMOL/L (ref 100–108)
CHLORIDE SERPL-SCNC: 96 MMOL/L (ref 100–108)
CO2 SERPL-SCNC: 29 MMOL/L (ref 21–32)
CO2 SERPL-SCNC: 30 MMOL/L (ref 21–32)
CREAT SERPL-MCNC: 0.77 MG/DL (ref 0.6–1.3)
CREAT SERPL-MCNC: 0.83 MG/DL (ref 0.6–1.3)
EOSINOPHIL # BLD AUTO: 0 THOUSAND/ΜL (ref 0–0.61)
EOSINOPHIL NFR BLD AUTO: 0 % (ref 0–6)
ERYTHROCYTE [DISTWIDTH] IN BLOOD BY AUTOMATED COUNT: 18.8 % (ref 11.6–15.1)
GFR SERPL CREATININE-BSD FRML MDRD: 84 ML/MIN/1.73SQ M
GFR SERPL CREATININE-BSD FRML MDRD: 92 ML/MIN/1.73SQ M
GLUCOSE SERPL-MCNC: 112 MG/DL (ref 65–140)
GLUCOSE SERPL-MCNC: 130 MG/DL (ref 65–140)
HCT VFR BLD AUTO: 29.6 % (ref 34.8–46.1)
HGB BLD-MCNC: 9.4 G/DL (ref 11.5–15.4)
IMM GRANULOCYTES # BLD AUTO: 0.09 THOUSAND/UL (ref 0–0.2)
IMM GRANULOCYTES NFR BLD AUTO: 1 % (ref 0–2)
LACTATE SERPL-SCNC: 1.9 MMOL/L (ref 0.5–2)
LIPASE SERPL-CCNC: 194 U/L (ref 73–393)
LYMPHOCYTES # BLD AUTO: 0.94 THOUSANDS/ΜL (ref 0.6–4.47)
LYMPHOCYTES NFR BLD AUTO: 6 % (ref 14–44)
MAGNESIUM SERPL-MCNC: 1.7 MG/DL (ref 1.6–2.6)
MCH RBC QN AUTO: 28.6 PG (ref 26.8–34.3)
MCHC RBC AUTO-ENTMCNC: 31.8 G/DL (ref 31.4–37.4)
MCV RBC AUTO: 90 FL (ref 82–98)
MONOCYTES # BLD AUTO: 1.05 THOUSAND/ΜL (ref 0.17–1.22)
MONOCYTES NFR BLD AUTO: 7 % (ref 4–12)
NEUTROPHILS # BLD AUTO: 14.09 THOUSANDS/ΜL (ref 1.85–7.62)
NEUTS SEG NFR BLD AUTO: 86 % (ref 43–75)
NRBC BLD AUTO-RTO: 0 /100 WBCS
PHOSPHATE SERPL-MCNC: 2 MG/DL (ref 2.3–4.1)
PLATELET # BLD AUTO: 239 THOUSANDS/UL (ref 149–390)
PMV BLD AUTO: 9.7 FL (ref 8.9–12.7)
POTASSIUM SERPL-SCNC: 2.6 MMOL/L (ref 3.5–5.3)
POTASSIUM SERPL-SCNC: 3.3 MMOL/L (ref 3.5–5.3)
PROT SERPL-MCNC: 6.6 G/DL (ref 6.4–8.2)
RBC # BLD AUTO: 3.29 MILLION/UL (ref 3.81–5.12)
SODIUM SERPL-SCNC: 132 MMOL/L (ref 136–145)
SODIUM SERPL-SCNC: 133 MMOL/L (ref 136–145)
WBC # BLD AUTO: 16.18 THOUSAND/UL (ref 4.31–10.16)

## 2019-06-29 PROCEDURE — 36415 COLL VENOUS BLD VENIPUNCTURE: CPT | Performed by: EMERGENCY MEDICINE

## 2019-06-29 PROCEDURE — 99285 EMERGENCY DEPT VISIT HI MDM: CPT

## 2019-06-29 PROCEDURE — 99285 EMERGENCY DEPT VISIT HI MDM: CPT | Performed by: EMERGENCY MEDICINE

## 2019-06-29 PROCEDURE — 82140 ASSAY OF AMMONIA: CPT | Performed by: EMERGENCY MEDICINE

## 2019-06-29 PROCEDURE — 85025 COMPLETE CBC W/AUTO DIFF WBC: CPT | Performed by: EMERGENCY MEDICINE

## 2019-06-29 PROCEDURE — NC001 PR NO CHARGE: Performed by: INTERNAL MEDICINE

## 2019-06-29 PROCEDURE — 74177 CT ABD & PELVIS W/CONTRAST: CPT

## 2019-06-29 PROCEDURE — 96376 TX/PRO/DX INJ SAME DRUG ADON: CPT

## 2019-06-29 PROCEDURE — 83735 ASSAY OF MAGNESIUM: CPT | Performed by: EMERGENCY MEDICINE

## 2019-06-29 PROCEDURE — 96374 THER/PROPH/DIAG INJ IV PUSH: CPT

## 2019-06-29 PROCEDURE — 80053 COMPREHEN METABOLIC PANEL: CPT | Performed by: EMERGENCY MEDICINE

## 2019-06-29 PROCEDURE — 83690 ASSAY OF LIPASE: CPT | Performed by: EMERGENCY MEDICINE

## 2019-06-29 PROCEDURE — 83605 ASSAY OF LACTIC ACID: CPT | Performed by: EMERGENCY MEDICINE

## 2019-06-29 PROCEDURE — 96375 TX/PRO/DX INJ NEW DRUG ADDON: CPT

## 2019-06-29 PROCEDURE — 84100 ASSAY OF PHOSPHORUS: CPT | Performed by: EMERGENCY MEDICINE

## 2019-06-29 PROCEDURE — 80048 BASIC METABOLIC PNL TOTAL CA: CPT | Performed by: INTERNAL MEDICINE

## 2019-06-29 PROCEDURE — 71046 X-RAY EXAM CHEST 2 VIEWS: CPT

## 2019-06-29 RX ORDER — OXYCODONE HYDROCHLORIDE AND ACETAMINOPHEN 5; 325 MG/1; MG/1
1 TABLET ORAL EVERY 8 HOURS
Status: DISCONTINUED | OUTPATIENT
Start: 2019-06-29 | End: 2019-06-30

## 2019-06-29 RX ORDER — POTASSIUM CHLORIDE 14.9 MG/ML
20 INJECTION INTRAVENOUS ONCE
Status: COMPLETED | OUTPATIENT
Start: 2019-06-29 | End: 2019-06-29

## 2019-06-29 RX ORDER — LACTULOSE 20 G/30ML
10 SOLUTION ORAL 2 TIMES DAILY
Status: DISCONTINUED | OUTPATIENT
Start: 2019-06-29 | End: 2019-06-30

## 2019-06-29 RX ORDER — PANTOPRAZOLE SODIUM 40 MG/1
40 TABLET, DELAYED RELEASE ORAL DAILY
Status: DISCONTINUED | OUTPATIENT
Start: 2019-06-30 | End: 2019-07-06 | Stop reason: HOSPADM

## 2019-06-29 RX ORDER — HYDROMORPHONE HCL/PF 1 MG/ML
1 SYRINGE (ML) INJECTION ONCE
Status: COMPLETED | OUTPATIENT
Start: 2019-06-29 | End: 2019-06-29

## 2019-06-29 RX ORDER — NADOLOL 20 MG/1
20 TABLET ORAL DAILY
Status: DISCONTINUED | OUTPATIENT
Start: 2019-06-30 | End: 2019-06-29

## 2019-06-29 RX ORDER — GABAPENTIN 300 MG/1
300 CAPSULE ORAL DAILY
Status: DISCONTINUED | OUTPATIENT
Start: 2019-06-30 | End: 2019-07-01

## 2019-06-29 RX ORDER — NICOTINE 21 MG/24HR
1 PATCH, TRANSDERMAL 24 HOURS TRANSDERMAL DAILY
Status: DISCONTINUED | OUTPATIENT
Start: 2019-06-30 | End: 2019-07-06 | Stop reason: HOSPADM

## 2019-06-29 RX ORDER — POTASSIUM CHLORIDE 20 MEQ/1
40 TABLET, EXTENDED RELEASE ORAL ONCE
Status: COMPLETED | OUTPATIENT
Start: 2019-06-29 | End: 2019-06-29

## 2019-06-29 RX ORDER — ONDANSETRON 2 MG/ML
4 INJECTION INTRAMUSCULAR; INTRAVENOUS ONCE
Status: COMPLETED | OUTPATIENT
Start: 2019-06-29 | End: 2019-06-29

## 2019-06-29 RX ORDER — SPIRONOLACTONE 50 MG/1
150 TABLET, FILM COATED ORAL DAILY
Status: DISCONTINUED | OUTPATIENT
Start: 2019-06-30 | End: 2019-07-06 | Stop reason: HOSPADM

## 2019-06-29 RX ORDER — GABAPENTIN 300 MG/1
900 CAPSULE ORAL
Status: DISCONTINUED | OUTPATIENT
Start: 2019-06-29 | End: 2019-07-05

## 2019-06-29 RX ADMIN — POTASSIUM CHLORIDE 40 MEQ: 1500 TABLET, EXTENDED RELEASE ORAL at 15:52

## 2019-06-29 RX ADMIN — ONDANSETRON 4 MG: 2 INJECTION INTRAMUSCULAR; INTRAVENOUS at 13:59

## 2019-06-29 RX ADMIN — GABAPENTIN 900 MG: 300 CAPSULE ORAL at 21:34

## 2019-06-29 RX ADMIN — LACTULOSE 10 G: 10 SOLUTION ORAL at 19:54

## 2019-06-29 RX ADMIN — SODIUM CHLORIDE 500 ML: 0.9 INJECTION, SOLUTION INTRAVENOUS at 22:37

## 2019-06-29 RX ADMIN — POTASSIUM CHLORIDE 20 MEQ: 200 INJECTION, SOLUTION INTRAVENOUS at 15:21

## 2019-06-29 RX ADMIN — OXYCODONE HYDROCHLORIDE AND ACETAMINOPHEN 1 TABLET: 5; 325 TABLET ORAL at 19:54

## 2019-06-29 RX ADMIN — RIFAXIMIN 400 MG: 200 TABLET ORAL at 21:34

## 2019-06-29 RX ADMIN — POTASSIUM CHLORIDE 40 MEQ: 1500 TABLET, EXTENDED RELEASE ORAL at 15:23

## 2019-06-29 RX ADMIN — HYDROMORPHONE HYDROCHLORIDE 1 MG: 1 INJECTION, SOLUTION INTRAMUSCULAR; INTRAVENOUS; SUBCUTANEOUS at 15:53

## 2019-06-29 RX ADMIN — HYDROMORPHONE HYDROCHLORIDE 1 MG: 1 INJECTION, SOLUTION INTRAMUSCULAR; INTRAVENOUS; SUBCUTANEOUS at 13:59

## 2019-06-29 RX ADMIN — IOHEXOL 100 ML: 350 INJECTION, SOLUTION INTRAVENOUS at 15:10

## 2019-06-29 NOTE — ED PROVIDER NOTES
History  Chief Complaint   Patient presents with    Weakness - Generalized     Pt reports with two days of loss of appetite, generalized weakness, trouble urinating, and nausea/vomiting  Patient has a h/o of liver cancer  24-year-old female with a past medical history of hepatocellular carcinoma status post resection, hepatitis-C with cirrhosis and a history of hyperammonemia presents to the emergency department for abdominal pain over the past 7 days  Patient states that this abdominal pain is the same abdominal pain that she had when she was admitted to the hospital on 2019 in that it has never resolved  Patient states that now she is continued to be nauseous with the abdominal pain  Patient is currently on lactulose for the ammonia  Her last admission she was on Zosyn for possible enteric/GI infection  Patient had 3 days of antibiotics before being discharged  Patient's abdominal pain was attributed to chronic biliary dysfunction and surgery recommended observation  Patient states the pain is the same in quality, severity, and location and has not changed  Patient also complains of generalized weakness over the past 7 days that she attributes to poor oral intake secondary to the abdominal pain  Patient does have associated nausea       Denies fever/chills,  vomiting, lightheadedness/dizziness, numbness, headache, change in vision, URI symptoms, neck pain, chest pain, palpitations, shortness of breath, cough, back pain, flank pain,  , diarrhea, hematochezia, melena, dysuria, hematuria, abnormal genital d/c    Patient CT with contrast from last admission showed a portal vein thrombosis, chronic                 Prior to Admission Medications   Prescriptions Last Dose Informant Patient Reported?  Taking?   furosemide (LASIX) 20 mg tablet   No No   Sig: Take 3 tablets (60 mg total) by mouth daily   gabapentin (NEURONTIN) 300 mg capsule  Self No No   Si tab in the am and 3 tabs PO night time   lactulose (CHRONULAC) 10 g/15 mL solution   No No   Sig: TAKE 15 ML (10 G TOTAL) BY MOUTH 2 (TWO) TIMES A DAY FOR 30 DAYS   lactulose 20 g/30 mL   No No   Sig: Take 15 mL (10 g total) by mouth 2 (two) times a day for 30 days   losartan (COZAAR) 50 mg tablet   Yes No   nadolol (CORGARD) 20 mg tablet   No No   Sig: Take 1 tablet (20 mg total) by mouth daily   nicotine (NICODERM CQ) 21 mg/24 hr TD 24 hr patch  Self No No   Sig: Place 1 patch on the skin daily   oxyCODONE-acetaminophen (PERCOCET)  mg per tablet   No No   Sig: Take 1 tab PO Q 6 hours prn pain  2nd month script Do not fill until 6/21/19   Patient not taking: Reported on 6/19/2019   pantoprazole (PROTONIX) 40 mg tablet   No No   Sig: Take 1 tablet (40 mg total) by mouth daily   polyethylene glycol (MIRALAX) 17 g packet  Self No No   Sig: Take 17 g by mouth daily as needed (Constipation)   Patient not taking: Reported on 6/19/2019   senna (SENOKOT) 8 6 MG tablet  Self Yes No   Sig: Take 2 tablets by mouth daily   spironolactone (ALDACTONE) 50 mg tablet   No No   Sig: Take 3 tablets (150 mg total) by mouth daily      Facility-Administered Medications: None       Past Medical History:   Diagnosis Date    Anemia     Anxiety     Cancer ([facility] )     Chronic pain disorder     herniated disc    Cirrhosis ([facility] )     Constipation     [facility]     blood sugar 113 at 1225 2/18/19    GI bleed     Hep[facility], [facility] ) 7/22/2014    Hepatocellular carcinoma ([facility] )     Hypertension     Hypokalemia 3/7/2019    Hyponatremia     Melena     Portal vein thrombosis        Past Surgical History:   Procedure Laterality Date    DENTAL SURGERY      ESOPHAGOGASTRODUODENOSCOPY N/A 2/18/2019    Procedure: ESOPHAGOGASTRODUODENOSCOPY (EGD); Surgeon: Elisa Giordano MD;  Location:  GI LAB; Service: Gastroenterology    ESOPHAGOGASTRODUODENOSCOPY N/A 3/11/2019    Procedure: ESOPHAGOGASTRODUODENOSCOPY (EGD);   Surgeon: Nataliia Bah MD; [facility]; Service: Gastroenterology    HYSTERECTOMY      IR PARACENTESIS  3/8/2019    LIVER BIOPSY      LIVER SURGERY      Ablation    TONSILLECTOMY         Family History   Problem Relation Age of Onset    Prostate cancer Father      I have reviewed and agree with the history as documented  Social History     Tobacco Use    Smoking status: Current Every Day Smoker     Packs/day: 1 00     Years: 50 00     Pack years: 50 00     Types: Cigarettes    Smokeless tobacco: Never Used   Substance Use Topics    Alcohol use: Not Currently    Drug use: No        Review of Systems   Constitutional: Negative for chills, diaphoresis, fatigue and fever  HENT: Negative for congestion, ear discharge, facial swelling, hearing loss, rhinorrhea, sinus pressure, sinus pain, sneezing, sore throat, tinnitus and trouble swallowing  Eyes: Negative for pain, discharge and redness  Respiratory: Negative for cough, choking, chest tightness, shortness of breath, wheezing and stridor  Cardiovascular: Negative for chest pain, palpitations and leg swelling  Gastrointestinal: Positive for abdominal pain and nausea  Negative for abdominal distention, blood in stool, constipation, diarrhea and vomiting  Endocrine: Negative for cold intolerance, polydipsia and polyuria  Genitourinary: Negative for difficulty urinating, dysuria, enuresis, flank pain, frequency and hematuria  Musculoskeletal: Negative for arthralgias, back pain, gait problem and neck stiffness  Skin: Negative for rash and wound  Neurological: Negative for dizziness, seizures, syncope, weakness, numbness and headaches  Hematological: Negative for adenopathy  Psychiatric/Behavioral: Negative for agitation, confusion, hallucinations, sleep disturbance and suicidal ideas  All other systems reviewed and are negative        Physical Exam  ED Triage Vitals   Temperature Pulse Respirations Blood Pressure SpO2   06/29/19 1314 06/29/19 1312 06/29/19 1312 06/29/19 1312 06/29/19 1312   99 °F (37 2 °C) 69 18 93/53 99 %      Temp Source Heart Rate Source Patient Position - Orthostatic VS BP Location FiO2 (%)   06/29/19 1314 06/29/19 1312 06/29/19 1312 06/29/19 1312 --   Oral Monitor Sitting Right arm       Pain Score       06/29/19 1312       Worst Possible Pain             Orthostatic Vital Signs  Vitals:    06/29/19 1312 06/29/19 1415 06/29/19 1521   BP: 93/53 92/56 97/61   Pulse: 69 66 70   Patient Position - Orthostatic VS: Sitting Lying Lying       Physical Exam   Constitutional: She is oriented to person, place, and time  She appears well-developed and well-nourished  HENT:   Head: Normocephalic and atraumatic  Right Ear: External ear normal    Left Ear: External ear normal    Nose: No sinus tenderness  No epistaxis  Mouth/Throat: No oropharyngeal exudate  Eyes: Pupils are equal, round, and reactive to light  Conjunctivae and EOM are normal  Right eye exhibits no discharge  Left eye exhibits no discharge  Neck: No JVD present  Cardiovascular: Normal rate, regular rhythm, normal heart sounds and intact distal pulses  Exam reveals no gallop and no friction rub  No murmur heard  Pulmonary/Chest: Effort normal and breath sounds normal  No stridor  No respiratory distress  She has no wheezes  She has no rales  Abdominal: Soft  Bowel sounds are normal  She exhibits no distension and no mass  There is generalized tenderness  There is no rebound and no guarding  Musculoskeletal: Normal range of motion  She exhibits no edema, tenderness or deformity  Lymphadenopathy:     She has no cervical adenopathy  Neurological: She is alert and oriented to person, place, and time  She has normal strength  No cranial nerve deficit or sensory deficit  GCS eye subscore is 4  GCS verbal subscore is 5  GCS motor subscore is 6  Reflex Scores:       Patellar reflexes are 2+ on the right side and 2+ on the left side    UE and LE 5/5 strength, No focal neuro deficits  Skin: Skin is warm, dry and intact  Capillary refill takes less than 2 seconds  Psychiatric: She has a normal mood and affect  Her speech is normal and behavior is normal  Judgment and thought content normal    Nursing note and vitals reviewed        ED Medications  Medications   ondansetron (ZOFRAN) injection 4 mg (4 mg Intravenous Given 6/29/19 1359)   HYDROmorphone (DILAUDID) injection 1 mg (1 mg Intravenous Given 6/29/19 1359)   potassium chloride (K-DUR,KLOR-CON) CR tablet 40 mEq (40 mEq Oral Given 6/29/19 1523)   potassium chloride 20 mEq IVPB (premix) (0 mEq Intravenous Stopped 6/29/19 1532)   iohexol (OMNIPAQUE) 350 MG/ML injection (MULTI-DOSE) 100 mL (100 mL Intravenous Given 6/29/19 1510)   potassium chloride (K-DUR,KLOR-CON) CR tablet 40 mEq (40 mEq Oral Given 6/29/19 1552)   HYDROmorphone (DILAUDID) injection 1 mg (1 mg Intravenous Given 6/29/19 1553)       Diagnostic Studies  Results Reviewed     Procedure Component Value Units Date/Time    Comprehensive metabolic panel [[id number]]  (Abnormal) Collected:  06/29/19 1359    Lab Status:  Final result Specimen:  Blood from Arm, Left Updated:  06/29/19 1454     Sodium 133 mmol/L      Potassium 2 6 mmol/L      Chloride 95 mmol/L      CO2 29 mmol/L      ANION GAP 9 mmol/L      BUN 8 mg/dL      Creatinine 0 83 mg/dL      Glucose 130 mg/dL      Calcium 8 1 mg/dL       U/L      ALT 41 U/L      Alkaline Phosphatase 424 U/L      Total Protein 6 6 g/dL      Albumin 2 1 g/dL      Total Bilirubin 1 34 mg/dL      eGFR 84 ml/min/1 73sq m     Narrative:       [place] guidelines for Chronic Kidney Disease (CKD):     Stage 1 with normal or high GFR (GFR > 90 mL/min/1 73 square meters)    Stage 2 Mild CKD (GFR = 60-89 mL/min/1 73 square meters)    Stage 3A Moderate CKD (GFR = 45-59 mL/min/1 73 square meters)    Stage 3B Moderate CKD (GFR = 30-44 mL/min/1 73 square meters)    Stage 4 Severe CKD (GFR = 15-29 mL/min/1 73 square meters)    Stage 5 End Stage CKD (GFR <15 mL/min/1 73 square meters)  Note: GFR calculation is accurate only with a steady state creatinine    Lipase [[id number]]  (Normal) Collected:  06/29/19 1359    Lab Status:  Final result Specimen:  Blood from Arm, Left Updated:  06/29/19 1452     Lipase 194 u/L     Magnesium [[id number]]  (Normal) Collected:  06/29/19 1359    Lab Status:  Final result Specimen:  Blood from Arm, Left Updated:  06/29/19 1452     Magnesium 1 7 mg/dL     Phosphorus [[id number]]  (Abnormal) Collected:  06/29/19 1359    Lab Status:  Final result Specimen:  Blood from Arm, Left Updated:  06/29/19 1452     Phosphorus 2 0 mg/dL     Lactic acid, plasma [[id number]]  (Normal) Collected:  06/29/19 1359    Lab Status:  Final result Specimen:  Blood from Arm, Left Updated:  06/29/19 1437     LACTIC ACID 1 9 mmol/L     Narrative:       Result may be elevated if tourniquet was used during collection      Ammonia [[id number]]  (Normal) Collected:  06/29/19 1359    Lab Status:  Final result Specimen:  Blood from Arm, Left Updated:  06/29/19 1432     Ammonia 12 umol/L     CBC and differential [[id number]]  (Abnormal) Collected:  06/29/19 1359    Lab Status:  Final result Specimen:  Blood from Arm, Left Updated:  06/29/19 1424     WBC 16 18 Thousand/uL      RBC 3 29 Million/uL      Hemoglobin 9 4 g/dL      Hematocrit 29 6 %      MCV 90 fL      MCH 28 6 pg      MCHC 31 8 g/dL      RDW 18 8 %      MPV 9 7 fL      Platelets 345 Thousands/uL      nRBC 0 /100 WBCs      Neutrophils Relative 86 %      Immat GRANS % 1 %      Lymphocytes Relative 6 %      Monocytes Relative 7 %      Eosinophils Relative 0 %      Basophils Relative 0 %      Neutrophils Absolute 14 09 Thousands/µL      Immature Grans Absolute 0 09 Thousand/uL      Lymphocytes Absolute 0 94 Thousands/µL      Monocytes Absolute 1 05 Thousand/µL      Eosinophils Absolute 0 00 Thousand/µL      Basophils Absolute 0 01 Thousands/µL     POCT urinalysis dipstick [[id number]]     Lab Status:  No result Specimen:  Urine                  CT abdomen pelvis with contrast   ED Interpretation by Nicole Sanchez DO (06/29 1535)      Diffuse replacement or infiltration of the valeriano hepatis with no identifiable portal vein, common bile duct, head of pancreas, duodenum or inferior vena cava  Any or all of these could be severely diseased  Diffuse replacement of most of the liver with tumor  No identifiable change since 10 days ago            Workstation performed: [id number]         Final Result by Hermelinda Malik MD (06/29 1531)      Diffuse replacement or infiltration of the valeriano hepatis with no identifiable portal vein, common bile duct, head of pancreas, duodenum or inferior vena cava  Any or all of these could be severely diseased  Diffuse replacement of most of the liver with tumor  No identifiable change since 10 days ago            Workstation performed: [id number]         XR chest 2 views    (Results Pending)         Procedures  Procedures        ED Course  ED Course as of Jun 29 1633   Sat Jun 29, 2019   1525 Chloride(!): 95                               MDM  Number of Diagnoses or Management Options  Abdominal pain: new and requires workup  Hepatocellular carcinoma [facility]): new and requires workup  Weakness: new and requires workup  Diagnosis management comments: CT abdomen pelvis with contrast throughout any kind of surgical etiology, ammonia, CBC, CMP, lipase, lactic acid, urine    CT of the abdomen shows significant tumor burden within the liver  1  Abd pain, 2/2 likely liver capsular pain from tumor claudetteon  -Dilaudid  -Admit to SLIM  -Palliative as inpatient   -patient states she is unaware of her cancer remaining in the liver  Patient states that she was under the impression that it was surgically removed    Patient was informed that she has a significant tumor burden throughout her liver likely causing these abdominal symptoms  Patient will need to have palliative for a detailed discussion  -patient will likely need a high utilizer action care plan established    2  Hypokalemia  -20mEq IV, 40mEq PO    3  Hyponatremia, 133  IV fluids    4  Elevated Alk phos, 424  -secondary to chronic biliary dysfunction    5  Leukocytosis, 16,000      Disposition  Final diagnoses:   Hepatocellular carcinoma ([facility] )   Weakness   Abdominal pain     Time reflects when diagnosis was documented in both MDM as applicable and the Disposition within this note     Time User Action Codes Description Comment    6/29/2019  4:01 PM Fei Mehta Add [C22 0] Hepatocellular carcinoma ([facility] )     6/29/2019  4:01 PM Billy Mehta Add [R53 1] Weakness     6/29/2019  4:01 PM Billy Mehta Add [R10 9] Abdominal pain       ED Disposition     ED Disposition Condition Date/Time Comment    Admit Stable Sat Jun 29, 2019  4:01 PM Case was discussed with SOD and the patient's admission status was agreed to be Admission Status: Outpatient status to the service of Dr Yanet Johnson   Follow-up Information    None         Patient's Medications   Discharge Prescriptions    No medications on file     No discharge procedures on file  ED Provider  Attending physically available and evaluated Nancy Stephens I managed the patient along with the ED Attending      Electronically Signed by         Milton Emmanuel DO  06/29/19 0615

## 2019-06-29 NOTE — PROGRESS NOTES
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)    Hepatocellular carcinoma (HCC)    Hyponatremia    Portal vein thrombosis      Patient is a 78-year-old female with past medical history of hep C cirrhosis and hepatocellular carcinoma and portal vein thrombosis presenting again for chronic abdominal pain  Patient has had multiple visits and admissions for similar complaints  Patient is noncompliant with multiple medications as an outpatient and she often presents with confusion and lethargy  She specifically stated she does not like taking lactulose because it causes her to have too much diarrhea and she does not like the taste  Patient has been evaluated by General surgery and GI multiple times both recommend medical management and she is not a liver transplant/surgical candidate  Chronic abdominal pain and lethargy with confusion  -continue home medications especially lactulose    Will add rifaximin while inpatient   -palliative consult for pain management and discussion regarding goals of care  -patient requires a high utilizer plan    Code Status: Level 1 - Full Code  Admission Status: OBSERVATION  Disposition: Patient requires Med/Surg  Expected Length of Stay:  Less than 2 midnight

## 2019-06-29 NOTE — ED ATTENDING ATTESTATION
Darline Villeda MD, saw and evaluated the patient  I have discussed the patient with the resident/non-physician practitioner and agree with the resident's/non-physician practitioner's findings, Plan of Care, and MDM as documented in the resident's/non-physician practitioner's note, except where noted  All available labs and Radiology studies were reviewed  I was present for key portions of any procedure(s) performed by the resident/non-physician practitioner and I was immediately available to provide assistance  At this point I agree with the current assessment done in the Emergency Department    I have conducted an independent evaluation of this patient a history and physical is as follows:   Pt has history of hep c and hepatocellular carcinoma has  abd pain similar to previous for which she has been admitted in the past  PT has mary less alert unclear if she is takign lactulose PE: alert heart reg lungs clear abd soft with large mass in upper abd tender mdm: will ct abd check labs likely admit     Critical Care Time  Procedures

## 2019-06-29 NOTE — H&P
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 ([address] )    Hepatocellular carcinoma (HCC)    Hyponatremia    Portal vein thrombosis        1) Generalized chronic abdominal pain and weakness    Multiple etiologies that could explain this pain such as gallbladder distention and cholestasis seen on  recent imaging of the abdomen and redemonstrated on this admission     Additionally could be stretching of Cristi's capsule of the liver given her cirrhosis and hepatic inflammation versus extensive and increased portal venous thrombus/thrombi and HCC  -no significant ascites noted on CT imaging     -continue low does oxycodone at reduced outpatient dosing due hepatic encephalopathy    2) Portal vein thrombosis  -Previously on anticoagulation however patient developed serious life-threatening varices bleed with a hemoglobin down to 4 0 therefore anticoagulation at this point was contraindicated based on her clear bleeding risk  Will manage conservatively  Recent imaging does suggest some extension of portal vein thrombi, questionable whether this is related to her abdominal pain  3) hepatic encephalopathy  -patient with mild confusion, without asterixis, patient reports non compliance with lactulose home regiment   -ammonia level in ED 12  Plan: lactulose and rifaximin  4) Hepatocellular carcinoma ([address] )  S/p resection and ablation in 2015 for [address]   S/p Sirs sphere radio embolization in December 2017 on eliquis until recent hospitalization for bleeding esophageal varices        5) Hypokalemia and hypophoshatemia  -on admission level of 2 6 and phosphorus of 2 0, will replete   -will check magnesium in AM      6) Alcoholic cirrhosis secondary to [address]   -follows with GI as outpatient  -whole continue his spironolactone and furosemide at current outpatient dosing and will monitor blood pressure  7) chronic anemia  -hemoglobin of 9 4 on admission, normal baseline hemoglobin between 8-9   -trend hemoglobin  VTE Pharmacologic Prophylaxis: Enoxaparin (Lovenox)  VTE Mechanical Prophylaxis: sequential compression device    CHIEF COMPLAINT     Chief Complaint   Patient presents with    Weakness - Generalized     Pt reports with two days of loss of appetite, generalized weakness, trouble urinating, and nausea/vomiting  Patient has a h/o of liver cancer  HISTORY OF PRESENT ILLNESS     Carlos Taylor is a 77 y/o female with PMHx of Hepatocellular carcinoma s/p resection and ablation in 2015,  chronic portal vein thrombosis s/p SIRS sphere radioembolization procedure in December 2017,  Hep C-Cirrhosis , and HTN presents for evaluation of abdominal pain  Patient was recently admitted this month for similar presentation with lethargy, altered mental status and generalized abdominal pain  CT of the abdomen showed mildly distended gallbladder with wall enhancement which were stable from prior CT imaging  At that time she was determined not to be a surgical candidate per surgery for cholecystectomy  Gastroenterology recommended outpatient follow-up  She was continued on lactulose and rifaximin on discharge  During that admission the patient admitted she had been noncompliant with lactulose due to bad taste and episodes of diarrhea on the lactulose  Today the patient is presenting with unchanged abdominal pain since her last discharge    She appears to be more lethargic than usual, and could not tell me the date and incorrectly thought she was at [facility]   She reports mild nausea associated with the abdominal pain but denies any vomiting  She reports that she has not been compliant with her lactulose due to not liking the taste and it due to diarrhea  She denies fever, chills, diaphoresis, lightheadedness, dizziness, blurry vision, chest pain, shortness of breath, palpitations, cough, back pain, dysuria, urgency, frequency, or hematuria, hematochezia, hemoptysis  When she presented to the ED her vitals are within normal limits she was afebrile, heart rate of 69, blood pressure 93/53, which is normal for her as she has a cirrhotic and has chronically low blood pressures, she was saturating 99% on room air  She was given IV Dilaudid for pain relief and potassium for a potassium of 2 6  She was found to be hyponatremic at 1:33 a m  Which is her baseline  Her AST was elevated at 310, ALT was 41, alk-phos elevated to 424, total bilirubin of 1 34  Her white count was elevated to 16 18, her hemoglobin was 9 4  CT of her abdomen pelvis with contrast showed diffuse replacement of most of her liver with tumor without any identified changes from prior imaging 10 days ago  The gallbladder is noted to be enlarged and thickened as consistent with prior imaging  OBJECTIVE     Vitals:    19 1314 19 1415 19 1521 19 1645   BP:  92/56 97/61 106/59   BP Location:  Right arm Right arm Right arm   Pulse:  66 70 70   Resp:  18 18 18   Temp: 99 °F (37 2 °C)      TempSrc: Oral      SpO2:  94% 94% 98%   Weight:          Temperature:   Temp (24hrs), Av °F (37 2 °C), Min:99 °F (37 2 °C), Max:99 °F (37 2 °C)    Temperature: 99 °F (37 2 °C)  Intake & Output:  I/O       701 -  0700 701 -  07 -  07    I V  (mL/kg)   200 (3 1)    Total Intake(mL/kg)   200 (3 1)    Net   +200               Weights:   IBW: -92 5 kg    Body mass index is 24 8 kg/m²    Weight (last 2 days)     Date/Time   Weight    06/29/19 1312   63 5 (139 99)            Physical Exam Constitutional: She appears well-developed and well-nourished  No distress  HENT:   Head: Normocephalic and atraumatic  Mouth/Throat: No oropharyngeal exudate  Eyes: No scleral icterus  Neck: Normal range of motion  Neck supple  No JVD present  No tracheal deviation present  No thyromegaly present  Cardiovascular: Normal rate, regular rhythm, normal heart sounds and intact distal pulses  Exam reveals no gallop and no friction rub  No murmur heard  Pulmonary/Chest: Effort normal and breath sounds normal  No stridor  No respiratory distress  She has no wheezes  She has no rales  She exhibits no tenderness  Abdominal: Soft  Bowel sounds are normal  She exhibits distension  She exhibits no mass  There is tenderness  There is no rebound and no guarding  No hernia  Tenderness to palpation in ruq   Musculoskeletal: She exhibits edema  She exhibits no tenderness or deformity  Lymphadenopathy:     She has no cervical adenopathy  Neurological: She is alert  Alert to self and general time only  Skin: Skin is warm and dry  No rash noted  She is not diaphoretic  No erythema  No pallor  Nursing note and vitals reviewed  PAST MEDICAL HISTORY     Past Medical History:   Diagnosis Date    Anemia     Anxiety     [facility]     Chronic pain disorder     herniated disc    Cirrhosis ([facility] )     Constipation     Diabetes mellitus [facility] )     blood sugar 113 at 1225 2/18/19    GI bleed     Hepatitis C, chronic ([facility] ) 7/22/2014    Hepat[facility] carcinoma ([facility] )     Hypertension     Hypokalemia 3/7/2019    Hyponatremia     Melena     Portal vein thrombosis      PAST SURGICAL HISTORY     Past Surgical History:   Procedure Laterality Date    DENTAL SURGERY      ESOPHAGOGASTRODUODENOSCOPY N/A 2/18/2019    Procedure: ESOPHAGOGASTRODUODENOSCOPY (EGD); Surgeon: Abi Stallings MD;  [facility];   Service: Gastroenterology    ESOPHAGOGASTRODUODENOSCOPY N/A 3/11/2019    Procedure: ESOPHAGOGASTRODUODENOSCOPY (EGD); Surgeon: Bryan Mortensen MD;  [facility]; Service: Gastroenterology    HYSTERECTOMY      IR PARACENTESIS  3/8/2019    LIVER BIOPSY      LIVER SURGERY      Ablation    TONSILLECTOMY       SOCIAL & FAMILY HISTORY     Social History     Substance and Sexual Activity   Alcohol Use Not Currently     Substance and Sexual Activity   Alcohol Use Not Currently        Substance and Sexual Activity   Drug Use No     Social History     Tobacco Use   Smoking Status Current Every Day Smoker    Packs/day: 1 00    Years: 50 00    Pack years: 50 00    Types: Cigarettes   Smokeless Tobacco Never Used     Family History   Problem Relation Age of Onset    Prostate cancer Father      LABORATORY DATA     Labs: I have personally reviewed pertinent reports  Results from last 7 days   Lab Units 06/29/19  1359   WBC Thousand/uL 16 18*   HEMOGLOBIN g/dL 9 4*   HEMATOCRIT % 29 6*   PLATELETS Thousands/uL 239   NEUTROS PCT % 86*   MONOS PCT % 7      Results from last 7 days   Lab Units 06/29/19  1359 06/24/19  1159   POTASSIUM mmol/L 2 6* 3 2*   CHLORIDE mmol/L 95* 100   CO2 mmol/L 29 25   BUN mg/dL 8 6   CREATININE mg/dL 0 83 0 84   CALCIUM mg/dL 8 1* 8 6   ALK PHOS U/L 424*  --    ALT U/L 41  --    AST U/L 310*  --      Results from last 7 days   Lab Units 06/29/19  1359   MAGNESIUM mg/dL 1 7     Results from last 7 days   Lab Units 06/29/19  1359   PHOSPHORUS mg/dL 2 0*          Results from last 7 days   Lab Units 06/29/19  1359   LACTIC ACID mmol/L 1 9         Micro:  Lab Results   Component Value Date    BLOODCX No Growth After 5 Days  06/20/2019    BLOODCX No Growth After 5 Days  06/20/2019    BLOODCX No Growth After 5 Days  05/25/2019    URINECX 60,000-69,000 cfu/ml  09/16/2018     IMAGING & DIAGNOSTIC TESTS     Imaging: I have personally reviewed pertinent reports      Ct Abdomen Pelvis With Contrast    Result Date: 6/29/2019  Impression: Diffuse replacement or infiltration of the valeriano hepatis with no identifiable portal vein, common bile duct, head of pancreas, duodenum or inferior vena cava  Any or all of these could be severely diseased  Diffuse replacement of most of the liver with tumor  No identifiable change since 10 days ago Workstation performed: [id number]     EKG, Pathology, and Other Studies: I have personally reviewed pertinent reports  ALLERGIES   No Known Allergies  MEDICATIONS PRIOR TO ARRIVAL     Prior to Admission medications    Medication Sig Start Date End Date Taking? Authorizing Provider   furosemide (LASIX) 20 mg tablet Take 3 tablets (60 mg total) by mouth daily 6/21/19   Max Rachel Sa, DO   gabapentin (NEURONTIN) 300 mg capsule 1 tab in the am and 3 tabs PO night time 3/14/19   BRYANNA Hager   lactulose (CHRONULAC) 10 g/15 mL solution TAKE 15 ML (10 G TOTAL) BY MOUTH 2 (TWO) TIMES A DAY FOR 30 DAYS 6/27/19   Max Rachel Sa, DO   lactulose 20 g/30 mL Take 15 mL (10 g total) by mouth 2 (two) times a day for 30 days 5/31/19 6/30/19  Max Rachel Sa, DO   losartan (COZAAR) 50 mg tablet  6/18/19   Historical Provider, MD   nadolol (CORGARD) 20 mg tablet Take 1 tablet (20 mg total) by mouth daily 4/22/19   Constanza Tripp PA-C   nicotine (NICODERM CQ) 21 mg/24 hr TD 24 hr patch Place 1 patch on the skin daily 3/12/19   Tristan Nicholas MD   oxyCODONE-acetaminophen (PERCOCET)  mg per tablet Take 1 tab PO Q 6 hours prn pain   2nd month script Do not fill until 6/21/19  Patient not taking: Reported on 6/19/2019 5/13/19   BRYANNA Zimmer   pantoprazole (PROTONIX) 40 mg tablet Take 1 tablet (40 mg total) by mouth daily 6/5/19   Denis Clemente MD   polyethylene glycol (MIRALAX) 17 g packet Take 17 g by mouth daily as needed (Constipation)  Patient not taking: Reported on 6/19/2019 3/21/19   Subhash Harris MD   senna (SENOKOT) 8 6 MG tablet Take 2 tablets by mouth daily    Historical Provider, MD   spironolactone (ALDACTONE) 50 mg tablet Take 3 tablets (150 mg total) by mouth daily 6/21/19   Eric Leon DO     MEDICATIONS ADMINISTERED IN LAST 24 HOURS     Medication Administration - last 24 hours from 06/28/2019 1723 to 06/29/2019 1723       Date/Time Order Dose Route Action Action by     06/29/2019 1359 ondansetron (ZOFRAN) injection 4 mg 4 mg Intravenous Given Paola Cramer RN     06/29/2019 1359 HYDROmorphone (DILAUDID) injection 1 mg 1 mg Intravenous Given Paola Cramer RN     06/29/2019 1523 potassium chloride (K-DUR,KLOR-CON) CR tablet 40 mEq 40 mEq Oral Given Travis Fischer RN     06/29/2019 1532 potassium chloride 20 mEq IVPB (premix) 0 mEq Intravenous Stopped Travis Fischer RN     06/29/2019 1521 potassium chloride 20 mEq IVPB (premix) 20 mEq Intravenous [address] Travis Fischer RN     06/29/2019 1510 iohexol (OMNIPAQUE) 350 MG/ML injection (MULTI-DOSE) 100 mL 100 mL Intravenous Given Brian Campoverde     06/29/2019 1552 potassium chloride (K-DUR,KLOR-CON) CR tablet 40 mEq 40 mEq Oral Given Paola Cramer RN     06/29/2019 1553 HYDROmorphone (DILAUDID) injection 1 mg 1 mg Intravenous Given Paola Cramer RN        CURRENT MEDICATIONS       Current Facility-Administered Medications:  [START ON 6/30/2019] enoxaparin 40 mg Subcutaneous Daily Rob Light DO             Admission Time  I spent 30 minutes admitting the patient  This involved direct patient contact where I performed a full history and physical, reviewing previous records, and reviewing laboratory and other diagnostic studies  Portions of the record may have been created with voice recognition software  Occasional wrong word or "sound a like" substitutions may have occurred due to the inherent limitations of voice recognition software    Read the chart carefully and recognize, using context, where substitutions have occurred     ==  Yadira Roberts, [facility]  Internal Medicine Residency PGY-1

## 2019-06-29 NOTE — ED NOTES
Patient screaming in pain and unable to tolerate IV potassium even when potassium was slowed to 5meq/hour  Patient asking for medication to be stopped  MD to be made aware        Clifford Avila RN  06/29/19 9021

## 2019-06-30 LAB
ALBUMIN SERPL BCP-MCNC: 2.2 G/DL (ref 3.5–5)
ALP SERPL-CCNC: 351 U/L (ref 46–116)
ALT SERPL W P-5'-P-CCNC: 49 U/L (ref 12–78)
ANION GAP SERPL CALCULATED.3IONS-SCNC: 7 MMOL/L (ref 4–13)
AST SERPL W P-5'-P-CCNC: 329 U/L (ref 5–45)
BASOPHILS # BLD AUTO: 0.03 THOUSANDS/ΜL (ref 0–0.1)
BASOPHILS NFR BLD AUTO: 0 % (ref 0–1)
BILIRUB SERPL-MCNC: 1.74 MG/DL (ref 0.2–1)
BUN SERPL-MCNC: 8 MG/DL (ref 5–25)
CALCIUM SERPL-MCNC: 8.2 MG/DL (ref 8.3–10.1)
CHLORIDE SERPL-SCNC: 98 MMOL/L (ref 100–108)
CO2 SERPL-SCNC: 28 MMOL/L (ref 21–32)
CREAT SERPL-MCNC: 0.63 MG/DL (ref 0.6–1.3)
EOSINOPHIL # BLD AUTO: 0.01 THOUSAND/ΜL (ref 0–0.61)
EOSINOPHIL NFR BLD AUTO: 0 % (ref 0–6)
ERYTHROCYTE [DISTWIDTH] IN BLOOD BY AUTOMATED COUNT: 18.8 % (ref 11.6–15.1)
GFR SERPL CREATININE-BSD FRML MDRD: 107 ML/MIN/1.73SQ M
GLUCOSE SERPL-MCNC: 85 MG/DL (ref 65–140)
HCT VFR BLD AUTO: 29.9 % (ref 34.8–46.1)
HGB BLD-MCNC: 9.4 G/DL (ref 11.5–15.4)
IMM GRANULOCYTES # BLD AUTO: 0.11 THOUSAND/UL (ref 0–0.2)
IMM GRANULOCYTES NFR BLD AUTO: 1 % (ref 0–2)
LYMPHOCYTES # BLD AUTO: 0.89 THOUSANDS/ΜL (ref 0.6–4.47)
LYMPHOCYTES NFR BLD AUTO: 6 % (ref 14–44)
MCH RBC QN AUTO: 28.6 PG (ref 26.8–34.3)
MCHC RBC AUTO-ENTMCNC: 31.4 G/DL (ref 31.4–37.4)
MCV RBC AUTO: 91 FL (ref 82–98)
MONOCYTES # BLD AUTO: 1.34 THOUSAND/ΜL (ref 0.17–1.22)
MONOCYTES NFR BLD AUTO: 8 % (ref 4–12)
NEUTROPHILS # BLD AUTO: 13.7 THOUSANDS/ΜL (ref 1.85–7.62)
NEUTS SEG NFR BLD AUTO: 85 % (ref 43–75)
NRBC BLD AUTO-RTO: 0 /100 WBCS
PLATELET # BLD AUTO: 246 THOUSANDS/UL (ref 149–390)
PMV BLD AUTO: 10.1 FL (ref 8.9–12.7)
POTASSIUM SERPL-SCNC: 3.9 MMOL/L (ref 3.5–5.3)
PROT SERPL-MCNC: 6.5 G/DL (ref 6.4–8.2)
RBC # BLD AUTO: 3.29 MILLION/UL (ref 3.81–5.12)
SODIUM SERPL-SCNC: 133 MMOL/L (ref 136–145)
WBC # BLD AUTO: 16.08 THOUSAND/UL (ref 4.31–10.16)

## 2019-06-30 PROCEDURE — NC001 PR NO CHARGE: Performed by: INTERNAL MEDICINE

## 2019-06-30 PROCEDURE — 80053 COMPREHEN METABOLIC PANEL: CPT | Performed by: INTERNAL MEDICINE

## 2019-06-30 PROCEDURE — 85025 COMPLETE CBC W/AUTO DIFF WBC: CPT | Performed by: INTERNAL MEDICINE

## 2019-06-30 PROCEDURE — 99232 SBSQ HOSP IP/OBS MODERATE 35: CPT | Performed by: INTERNAL MEDICINE

## 2019-06-30 PROCEDURE — 99204 OFFICE O/P NEW MOD 45 MIN: CPT | Performed by: INTERNAL MEDICINE

## 2019-06-30 RX ORDER — OXYCODONE HYDROCHLORIDE 10 MG/1
10 TABLET ORAL EVERY 6 HOURS PRN
Status: DISCONTINUED | OUTPATIENT
Start: 2019-06-30 | End: 2019-07-06 | Stop reason: HOSPADM

## 2019-06-30 RX ORDER — POTASSIUM CHLORIDE 14.9 MG/ML
20 INJECTION INTRAVENOUS
Status: COMPLETED | OUTPATIENT
Start: 2019-06-30 | End: 2019-06-30

## 2019-06-30 RX ORDER — ALBUMIN, HUMAN INJ 5% 5 %
12.5 SOLUTION INTRAVENOUS ONCE
Status: COMPLETED | OUTPATIENT
Start: 2019-06-30 | End: 2019-06-30

## 2019-06-30 RX ORDER — CHOLESTYRAMINE LIGHT 4 G/5.7G
4 POWDER, FOR SUSPENSION ORAL
Status: DISCONTINUED | OUTPATIENT
Start: 2019-06-30 | End: 2019-07-06 | Stop reason: HOSPADM

## 2019-06-30 RX ORDER — LACTULOSE 20 G/30ML
20 SOLUTION ORAL DAILY
Status: DISCONTINUED | OUTPATIENT
Start: 2019-06-30 | End: 2019-07-06 | Stop reason: HOSPADM

## 2019-06-30 RX ADMIN — RIFAXIMIN 400 MG: 200 TABLET ORAL at 17:01

## 2019-06-30 RX ADMIN — RIFAXIMIN 400 MG: 200 TABLET ORAL at 05:08

## 2019-06-30 RX ADMIN — NICOTINE 1 PATCH: 21 PATCH, EXTENDED RELEASE TRANSDERMAL at 10:12

## 2019-06-30 RX ADMIN — POTASSIUM CHLORIDE 20 MEQ: 200 INJECTION, SOLUTION INTRAVENOUS at 05:09

## 2019-06-30 RX ADMIN — GABAPENTIN 900 MG: 300 CAPSULE ORAL at 21:33

## 2019-06-30 RX ADMIN — ALBUMIN (HUMAN) 12.5 G: 12.5 SOLUTION INTRAVENOUS at 01:26

## 2019-06-30 RX ADMIN — CHOLESTYRAMINE 4 G: 4 POWDER, FOR SUSPENSION ORAL at 17:01

## 2019-06-30 RX ADMIN — LACTULOSE 20 G: 10 SOLUTION ORAL at 10:11

## 2019-06-30 RX ADMIN — ENOXAPARIN SODIUM 40 MG: 40 INJECTION SUBCUTANEOUS at 10:11

## 2019-06-30 RX ADMIN — OXYCODONE HYDROCHLORIDE 10 MG: 10 TABLET ORAL at 23:01

## 2019-06-30 RX ADMIN — CHOLESTYRAMINE 4 G: 4 POWDER, FOR SUSPENSION ORAL at 12:57

## 2019-06-30 RX ADMIN — OXYCODONE HYDROCHLORIDE AND ACETAMINOPHEN 1 TABLET: 5; 325 TABLET ORAL at 03:39

## 2019-06-30 RX ADMIN — GABAPENTIN 300 MG: 300 CAPSULE ORAL at 10:12

## 2019-06-30 RX ADMIN — PANTOPRAZOLE SODIUM 40 MG: 40 TABLET, DELAYED RELEASE ORAL at 10:12

## 2019-06-30 RX ADMIN — POTASSIUM CHLORIDE 20 MEQ: 200 INJECTION, SOLUTION INTRAVENOUS at 02:36

## 2019-06-30 RX ADMIN — OXYCODONE HYDROCHLORIDE 10 MG: 10 TABLET ORAL at 12:56

## 2019-06-30 NOTE — CONSULTS
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 ([facility] )    Hepatocellular carcinoma ([facility] )    Herniated lumbar disc without myelopathy    Insomnia    Myofascial pain    Nicotine dependence    Opioid dependence ([facility] )    Osteoporosis    Pain syndrome, chronic    Sacroiliitis (HCC)    Seasonal allergies    Trochanteric bursitis    Lumbar disc herniation    Goals of care, counseling/discussion    Hyponatremia    Chronic abdominal pain    Ascites    Decompensated hepatic cirrhosis (HCC)    Portal vein thrombosis    Depression    Anemia    Secondary esophageal varices without bleeding (HCC)    Lumbar spondylosis    Spinal stenosis of lumbar region without neurogenic claudication    Acalculous cholecystitis    Encephalopathy    Leukocytosis    Transaminitis         Plan:  1  Symptom management -    - discontinued Percocet, hold Tylenol component as patient has active transaminitis  - ordered oxycodone 10 mg p o  Q 6 hours as needed, consistent with her home regimen  Would hold for sedation or respiratory depression    - lactulose for encephalopathy    Recommend global delirium precautions including:      - Establishment of day/night cycle via lights during the day and blinds open  Please limit interruptions at night as medically appropriate  - Provide glasses/hearing aids as apprioriate  - Minimize deliriogenic meds as able  - Provide reorientation including date on board and visible clock  - Avoid restraints as able, frequent verbal reorientations or patient care sitter as appropriate  2  Goals -patient does not appear to be competent today to have goals of care conversations    Will likely need to discuss placement on discharge given her inability to maintain her health and well-being at home  Code Status: level 1   Decisional apparatus:  Patient is not competent on my exam today  If competence is lost, patient's substitute decision maker would default to patient's adult sons by [place]  Advance Directive / Living Will / POLST:  None on file     I have reviewed the patient's controlled substance dispensing history in the Prescription Drug Monitoring Program in compliance with the [facility] regulations before prescribing any controlled substances  We appreciate the invitation to be involved in this patient's care  We will continue to follow  Please do not hesitate to reach our on call provider through our clinic answering service at  should you have acute symptom control concerns  IDENTIFICATION:  Inpatient consult to Palliative Care  Consult performed by: Doe Landry MD  Consult ordered by: Jimmy Bishop MD        Physician Requesting Consult: Stephanie Cool MD  Reason for Consult / Principal Problem:  Symptom management  Hx and PE limited by:  Patient encephalopathy    HISTORY OF PRESENT ILLNESS:       Valerie Rob is a 76 y o  female with known cirrhosis and hepatocellular carcinoma who presents with abdominal pain  Patient has had multiple hospitalizations in the past few weeks for similar complaint  She has extensive tumor invasion and destruction of her liver and has had issues with constipation and ascites in the past   She lives at home  She reports noncompliance with medications at home, however I am not entirely sure she is a reliable narrator  This is specially worrisome, and she follows with pain management, and is prescribed Percocet  at home  She does not remember her most recent hospitalization  She is aware that she is here for abdominal pain    She stated she is not moving her bowels, but then proceeded to have a bowel movement during my exam   Otherwise no complaints today  Review of Systems   Unable to perform ROS: mental status change       Past Medical History:   Diagnosis Date    Anemia     Anxiety     Cancer ([facility] )     Chronic pain disorder     herniated disc    Cirrhosis ([place] [address] )     Constipation     Diabetes mellitus ([facility] )     blood sugar 113 at 1225 2/18/19    GI bleed     Hepatitis C, chronic ([facility] ) 7/22/2014    Hepatocellular carcinoma ([place] [address] )     Hypertension     Hypokalemia 3/7/2019    Hyponatremia     Melena     Portal vein thrombosis      Past Surgical History:   Procedure Laterality Date    DENTAL SURGERY      ESOPHAGOGASTRODUODENOSCOPY N/A 2/18/2019    Procedure: ESOPHAGOGASTRODUODENOSCOPY (EGD); Surgeon: Garo Winchester MD;  [facility]; Service: Gastroenterology    ESOPHAGOGASTRODUODENOSCOPY N/A 3/11/2019    Procedure: ESOPHAGOGASTRODUODENOSCOPY (EGD); Surgeon: Denice Good MD;  [facility];   Service: Gastroenterology    HYSTERECTOMY      IR PARACENTESIS  3/8/2019    LIVER BIOPSY      LIVER SURGERY      Ablation    TONSILLECTOMY       Social History     Socioeconomic History    Marital status: Single     Spouse name: Not on file    Number of children: 3    Years of education: Not on file    Highest education level: Not on file   Occupational History    Not on file   Social Needs    Financial resource strain: Not on file    Food insecurity:     Worry: Not on file     Inability: Not on file    Transportation needs:     Medical: Not on file     Non-medical: Not on file   Tobacco Use    Smoking status: Current Every Day Smoker     Packs/day: 1 00     Years: 50 00     Pack years: 50 00     Types: Cigarettes    Smokeless tobacco: Never Used   Substance and Sexual Activity    Alcohol use: Not Currently    Drug use: No    Sexual activity: Not Currently     Partners: Male   Lifestyle    Physical activity:     Days per week: Not on file     Minutes per session: Not on file  Stress: Not on file   Relationships    Social connections:     Talks on phone: Not on file     Gets together: Not on file     Attends [employer] service: Not on file     Active member of club or organization: Not on file     Attends meetings of clubs or organizations: Not on file     Relationship status: Not on file    Intimate partner violence:     Fear of current or ex partner: Not on file     Emotionally abused: Not on file     Physically abused: Not on file     Forced sexual activity: Not on file   Other Topics Concern    Not on file   Social History Narrative    Not on file     Family History   Problem Relation Age of Onset    Prostate cancer Father        MEDICATIONS / ALLERGIES:    all current active meds have been reviewed and current meds:   Current Facility-Administered Medications   Medication Dose Route Frequency    cholestyramine sugar free (QUESTRAN LIGHT) packet 4 g  4 g Oral BID before lunch/dinner    enoxaparin (LOVENOX) subcutaneous injection 40 mg  40 mg Subcutaneous Daily    furosemide (LASIX) tablet 60 mg  60 mg Oral Daily    gabapentin (NEURONTIN) capsule 300 mg  300 mg Oral Daily    gabapentin (NEURONTIN) capsule 900 mg  900 mg Oral HS    lactulose 20 g/30 mL oral solution 20 g  20 g Oral Daily    nicotine (NICODERM CQ) 21 mg/24 hr TD 24 hr patch 1 patch  1 patch Transdermal Daily    oxyCODONE-acetaminophen (PERCOCET) 5-325 mg per tablet 1 tablet  1 tablet Oral Q8H    pantoprazole (PROTONIX) EC tablet 40 mg  40 mg Oral Daily    rifaximin (XIFAXAN) tablet 400 mg  400 mg Oral Q12H [facility]    spironolactone (ALDACTONE) tablet 150 mg  150 mg Oral Daily       No Known Allergies    OBJECTIVE:    Physical Exam  Physical Exam   Constitutional: No distress  HENT:   Head: Atraumatic  Eyes: Right eye exhibits no discharge  Left eye exhibits no discharge  Pulmonary/Chest: Effort normal  No respiratory distress  Abdominal: Soft  She exhibits distension  There is tenderness  Musculoskeletal: She exhibits no edema  Neurological: She is alert  Oriented to person and place, but not time   Skin: Skin is warm and dry  She is not diaphoretic  Psychiatric: She has a normal mood and affect  Nursing note and vitals reviewed  Lab Results:   I have personally reviewed pertinent labs  , CBC:   Lab Results   Component Value Date    WBC 16 08 (H) 06/30/2019    HGB 9 4 (L) 06/30/2019    HCT 29 9 (L) 06/30/2019    MCV 91 06/30/2019     06/30/2019    MCH 28 6 06/30/2019    MCHC 31 4 06/30/2019    RDW 18 8 (H) 06/30/2019    MPV 10 1 06/30/2019    NRBC 0 06/30/2019   , CMP:   Lab Results   Component Value Date    SODIUM 133 (L) 06/30/2019    K 3 9 06/30/2019    CL 98 (L) 06/30/2019    CO2 28 06/30/2019    BUN 8 06/30/2019    CREATININE 0 63 06/30/2019    CALCIUM 8 2 (L) 06/30/2019     (H) 06/30/2019    ALT 49 06/30/2019    ALKPHOS 351 (H) 06/30/2019    EGFR 107 06/30/2019     Imaging Studies:  I personally reviewed relevant reports  EKG, Pathology, and Other Studies:  I personally reviewed relevant reports  Counseling / Coordination of Care  Total floor / unit time spent today 35+ minutes  Greater than 50% of total time was spent with the patient and / or family counseling and / or coordination of care  A description of the counseling / coordination of care:  Symptom assessment, medication review, opioid management

## 2019-06-30 NOTE — SOCIAL WORK
CM met with patient this date  Role of CM explained  Patient reports she resides in a 1st floor apartment with 3 ARTIS  Patient lives alone  Patient reports she owns both a walker and cane but she does not use assistive devices  Patient reports she is open to  VNA  Referral made this date via [facility] and  has accepted patient  [facility] uses [facility] on [facility]  Patients emergency contact is her daughter Rosita Muse   CM reviewed d/c planning process including the following: identifying help at home, patient preference for d/c planning needs, Discharge Lounge, [facility] Meds to Bed program, availability of treatment team to discuss questions or concerns patient and/or family may have regarding understanding medications and recognizing signs and symptoms once discharged  CM also encouraged patient to follow up with all recommended appointments after discharge  Patient advised of importance for patient and family to participate in managing patients medical well being  CM will continue to follow for all discharge planning needs

## 2019-06-30 NOTE — PROGRESS NOTES
IM Residency Progress Note   Unit/Bed#: [facility] 915-01 Encounter: [id number]  SOD Team A      Jaskaran Lubin 76 y o  female [id number]    Hospital Stay Days: 0      Assessment/Plan:    Principal Problem:    Chronic abdominal pain  Active Problems:    Weakness    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)    Hepatocellular carcinoma (HCC)    Hyponatremia    Portal vein thrombosis     1) Generalized chronic abdominal pain and weakness  Multifactorial in the setting of hepatic inflammation was cirrhosis and HCC  Extensive and increased portal venous thrombus/thrombi and HCC  No significant ascites noted on CT imaging or clinical exam  Added lactulose daily in the morning  Added cholestyramine before lunch and dinner  Continue Percocet 1 tab q 8 hours for pain  Palliative care consult for pain management     2) Portal vein thrombosis  Previously on anticoagulation however patient developed serious life-threatening varices bleed with a hemoglobin down to 4 0 therefore anticoagulation at this point was contraindicated based on her clear bleeding risk  Will manage conservatively       3) hepatitis C with liver cirrhosis and HCC  Continue approximately 400 q  12 hours  Lasix 60 mg daily, spironolactone on 50 mg hold for blood pressure less than 110 continue lactulose 20 g daily    4) Hypokalemia  Resolve with repleted potassium    Disposition: Continue inpatient care      Subjective:   Patient seen examined at bedside  Patient states that she still has abdominal pain  Patient states the diarrhea bothers her with the lactulose, however patient agreeable to continue taking lactulose  The rest of ROS was negative          Vitals: Temp (24hrs), Av 9 °F (37 2 °C), Min:98 2 °F (36 8 °C), Max:99 2 °F (37 3 °C)  Current: Temperature: 98 2 °F (36 8 °C)  Vitals:    19 0104 19 0350 19 0355 19 0651   BP: (!) 80/60 90/62  97/59   BP Location: Left arm Right arm  Right arm   Pulse: 64 66 65 67   Resp:    18   Temp:    98 2 °F (36 8 °C)   TempSrc:    Oral   SpO2:    100%   Weight:       Height:        Body mass index is 24 03 kg/m²  I/O last 24 hours: In: 2200 [P O :600;  I V :250; IV Piggyback:1350]  Out: 200 [Urine:200]      Physical Exam: BP 97/59 (BP Location: Right arm)   Pulse 67   Temp 98 2 °F (36 8 °C) (Oral)   Resp 18   Ht 5' 4" (1 626 m)   Wt 63 5 kg (139 lb 15 9 oz)   SpO2 100%   BMI 24 03 kg/m²   General appearance: alert and oriented, in no acute distress  Head: Normocephalic, without obvious abnormality, atraumatic  Lungs: clear to auscultation bilaterally  Heart: regular rate and rhythm, S1, S2 normal, no murmur, click, rub or gallop  Abdomen: Obese mildly tender on palpation  Extremities: extremities normal, warm and well-perfused; no cyanosis, clubbing, or edema  Pulses: 2+ and symmetric     Invasive Devices     Peripheral Intravenous Line            Peripheral IV 06/29/19 Left Antecubital less than 1 day                          Labs:   Recent Results (from the past 24 hour(s))   CBC and differential    Collection Time: 06/29/19  1:59 PM   Result Value Ref Range    WBC 16 18 (H) 4 31 - 10 16 Thousand/uL    RBC 3 29 (L) 3 81 - 5 12 Million/uL    Hemoglobin 9 4 (L) 11 5 - 15 4 g/dL    Hematocrit 29 6 (L) 34 8 - 46 1 %    MCV 90 82 - 98 fL    MCH 28 6 26 8 - 34 3 pg    MCHC 31 8 31 4 - 37 4 g/dL    RDW 18 8 (H) 11 6 - 15 1 %    MPV 9 7 8 9 - 12 7 fL    Platelets 734 986 - 431 Thousands/uL    nRBC 0 /100 WBCs    Neutrophils Relative 86 (H) 43 - 75 %    Immat GRANS % 1 0 - 2 %    Lymphocytes Relative 6 (L) 14 - 44 %    Monocytes Relative 7 4 - 12 %    Eosinophils Relative 0 0 - 6 %    Basophils Relative 0 0 - 1 %    Neutrophils Absolute 14 09 (H) 1 85 - 7 62 Thousands/µL    Immature Grans Absolute 0 09 0 00 - 0 20 Thousand/uL    Lymphocytes Absolute 0 94 0 60 - 4 47 Thousands/µL    Monocytes Absolute 1 05 0 17 - 1 22 Thousand/µL    Eosinophils Absolute 0 00 0 00 - 0 61 Thousand/µL    Basophils Absolute 0 01 0 00 - 0 10 Thousands/µL   Comprehensive metabolic panel    Collection Time: 06/29/19  1:59 PM   Result Value Ref Range    Sodium 133 (L) 136 - 145 mmol/L    Potassium 2 6 (LL) 3 5 - 5 3 mmol/L    Chloride 95 (L) 100 - 108 mmol/L    CO2 29 21 - 32 mmol/L    ANION GAP 9 4 - 13 mmol/L    BUN 8 5 - 25 mg/dL    Creatinine 0 83 0 60 - 1 30 mg/dL    Glucose 130 65 - 140 mg/dL    Calcium 8 1 (L) 8 3 - 10 1 mg/dL     (H) 5 - 45 U/L    ALT 41 12 - 78 U/L    Alkaline Phosphatase 424 (H) 46 - 116 U/L    Total Protein 6 6 6 4 - 8 2 g/dL    Albumin 2 1 (L) 3 5 - 5 0 g/dL    Total Bilirubin 1 34 (H) 0 20 - 1 00 mg/dL    eGFR 84 ml/min/1 73sq m   Lipase    Collection Time: 06/29/19  1:59 PM   Result Value Ref Range    Lipase 194 73 - 393 u/L   Magnesium    Collection Time: 06/29/19  1:59 PM   Result Value Ref Range    Magnesium 1 7 1 6 - 2 6 mg/dL   Phosphorus    Collection Time: 06/29/19  1:59 PM   Result Value Ref Range    Phosphorus 2 0 (L) 2 3 - 4 1 mg/dL   Ammonia    Collection Time: 06/29/19  1:59 PM   Result Value Ref Range    Ammonia 12 11 - 35 umol/L   Lactic acid, plasma    Collection Time: 06/29/19  1:59 PM   Result Value Ref Range    LACTIC ACID 1 9 0 5 - 2 0 mmol/L   Basic metabolic panel    Collection Time: 06/29/19 10:53 PM   Result Value Ref Range    Sodium 132 (L) 136 - 145 mmol/L    Potassium 3 3 (L) 3 5 - 5 3 mmol/L    Chloride 96 (L) 100 - 108 mmol/L    CO2 30 21 - 32 mmol/L    ANION GAP 6 4 - 13 mmol/L    BUN 8 5 - 25 mg/dL    Creatinine 0 77 0 60 - 1 30 mg/dL    Glucose 112 65 - 140 mg/dL    Calcium 8 0 (L) 8 3 - 10 1 mg/dL    eGFR 92 ml/min/1 73sq m   CBC and differential    Collection Time: 06/30/19  5:12 AM   Result Value Ref Range    WBC 16 08 (H) 4 31 - 10 16 Thousand/uL    RBC 3 29 (L) 3 81 - 5 12 Million/uL    Hemoglobin 9 4 (L) 11 5 - 15 4 g/dL    Hematocrit 29 9 (L) 34 8 - 46 1 %    MCV 91 82 - 98 fL    MCH 28 6 26 8 - 34 3 pg    MCHC 31 4 31 4 - 37 4 g/dL    RDW 18 8 (H) 11 6 - 15 1 %    MPV 10 1 8 9 - 12 7 fL    Platelets 595 883 - 801 Thousands/uL    nRBC 0 /100 WBCs    Neutrophils Relative 85 (H) 43 - 75 %    Immat GRANS % 1 0 - 2 %    Lymphocytes Relative 6 (L) 14 - 44 %    Monocytes Relative 8 4 - 12 %    Eosinophils Relative 0 0 - 6 %    Basophils Relative 0 0 - 1 %    Neutrophils Absolute 13 70 (H) 1 85 - 7 62 Thousands/µL    Immature Grans Absolute 0 11 0 00 - 0 20 Thousand/uL    Lymphocytes Absolute 0 89 0 60 - 4 47 Thousands/µL    Monocytes Absolute 1 34 (H) 0 17 - 1 22 Thousand/µL    Eosinophils Absolute 0 01 0 00 - 0 61 Thousand/µL    Basophils Absolute 0 03 0 00 - 0 10 Thousands/µL       Radiology Results: I have personally reviewed pertinent reports  Other Diagnostic Testing:   I have personally reviewed pertinent reports          Active Meds:   Current Facility-Administered Medications   Medication Dose Route Frequency    enoxaparin (LOVENOX) subcutaneous injection 40 mg  40 mg Subcutaneous Daily    furosemide (LASIX) tablet 60 mg  60 mg Oral Daily    gabapentin (NEURONTIN) capsule 300 mg  300 mg Oral Daily    gabapentin (NEURONTIN) capsule 900 mg  900 mg Oral HS    lactulose 20 g/30 mL oral solution 10 g  10 g Oral BID    nicotine (NICODERM CQ) 21 mg/24 hr TD 24 hr patch 1 patch  1 patch Transdermal Daily    oxyCODONE-acetaminophen (PERCOCET) 5-325 mg per tablet 1 tablet  1 tablet Oral Q8H    pantoprazole (PROTONIX) EC tablet 40 mg  40 mg Oral Daily    rifaximin (XIFAXAN) tablet 400 mg  400 mg Oral Q8H [facility]    spironolactone (ALDACTONE) tablet 150 mg  150 mg Oral Daily         VTE Pharmacologic Prophylaxis: Fondaparinux (Arixtra)  VTE Mechanical Prophylaxis: sequential compression device    Namita North MD

## 2019-06-30 NOTE — NURSING NOTE
Notified SOD of manual BP 68/40 and pt feeling tired and weak  K+ was 2 6 on admission  Pt was supplemented w/ oral and IV K+ in ED  BMP redrawn, K+ 3 3  Administered 500 cc bolus NSS  BP recheck 80/60  Notified SOD  Will administer Albumin and IV K+

## 2019-06-30 NOTE — UTILIZATION REVIEW
Initial Clinical Review    Admission: Date/Time/Statement: 6/29/9 @ 1633 UPGRADED TO INPATIENT 6/30 @ [address] OF HEPATIC ENCEPHALOPATHY    06/30/19 1641  Inpatient Admission Once     Transfer Service: General Medicine       Question Answer Comment   Admitting Physician Nidia Dozier    Level of Care Med Surg        06/30/19 1640     ED Arrival Information     Expected Arrival [facility] Means of Arrival Escorted By Service Admission Type    - 6/29/2019 13:03 Emergent Ambulance [facility] 994 Emergency    Arrival Complaint    -        Chief Complaint   Patient presents with    Weakness - Generalized     Pt reports with two days of loss of appetite, generalized weakness, trouble urinating, and nausea/vomiting  Patient has a h/o of liver cancer  Assessment/Plan:  77 y/o female with PMHx of Hepatocellular carcinoma s/p resection and ablation in 2015, chronic portal vein thrombosis s/p SIRS sphere radioembolization procedure in December 2017 and on eliquis,  Hep C-Cirrhosis , and HTN presents to ED with abdominal pain  Recently admitted with similar presentation with lethargy, AMS & generalized abdominal pain  CT of the abdomen showed mildly distended GB with wall enhancement which were stable from prior CT imaging  At that time she was determined not to be a surgical candidate per surgery for cholecystectomy  Gi recommend to continue lactulose & rifaximin on d/c but pt states she has been noncompliant with the lactulose  Today abd pain unchanged, increased lethargy and some confusion  In ED given IV Dilaudid for pain relief and potassium of 2 6  Hhyponatremic which is her baseline  AST elevated at 310, ALT 41, alk-phos elevated to 424, total bilirubin 1 34  WBC 16 18, Hgb 9 4   CT a/p showed diffuse replacement of most of her liver with tumor without any identified changes from prior imaging 10 days ago    The GB is noted to be enlarged and thickened as consistent with prior imaging  Dx:  Generalized chronic abdominal pain and weakness / hepatic encephalopathy  Plan: M/S unit under OBSERVATION, tele monitoring, oxy prn for pain, continue lactulose and rifaxamin, monitor labs and replace electrolytes as needed    Palliative care consult 6/30 --    Plan:  1  Symptom management -               - discontinued Percocet, hold Tylenol component as patient has active transaminitis  - ordered oxycodone 10 mg p o  Q 6 hours as needed, consistent with her home regimen  Would hold for sedation or respiratory depression               - lactulose for encephalopathy     Recommend global delirium precautions including:      - Establishment of day/night cycle via lights during the day and blinds open   Please limit interruptions at night as medically appropriate  - Provide glasses/hearing aids as apprioriate     - Minimize deliriogenic meds as able  - Provide reorientation including date on board and visible clock  - Avoid restraints as able, frequent verbal reorientations or patient care sitter as appropriate  2  Goals -patient does not appear to be competent today to have goals of care conversations  Will likely need to discuss placement on discharge given her inability to maintain her health and well-being at home                  Code Status: level 1              Decisional apparatus:  Patient is not competent on my exam today  If competence is lost, patient's substitute decision maker would default to patient's adult sons by [place]                Advance Directive / Living Will / POLST:  None on file       ED Triage Vitals   Temperature Pulse Respirations Blood Pressure SpO2   06/29/19 1314 06/29/19 1312 06/29/19 1312 06/29/19 1312 06/29/19 1312   99 °F (37 2 °C) 69 18 93/53 99 %      Temp Source Heart Rate Source Patient Position - Orthostatic VS BP Location FiO2 (%)   06/29/19 1314 06/29/19 1312 06/29/19 1312 06/29/19 1312 --   Oral Monitor Sitting Right arm       Pain Score       06/29/19 1312       Worst Possible Pain        Wt Readings from Last 1 Encounters:   06/29/19 63 5 kg (139 lb 15 9 oz)     Additional Vital Signs:   Patient Position - Orthostatic VS               06/30/19 0651  98 2 °F (36 8 °C)  67  18  97/59  100 %  None (Room air)   06/30/19 0355    65           06/30/19 0350    66    90/62       06/30/19 0104    64    80/60Abnormal        06/29/19 2230  99 2 °F (37 3 °C)  64  14  68/40Abnormal    96 %  None (Room air)   06/29/19 1936  99 1 °F (37 3 °C)  69  18  93/62  95 %     06/29/19 1645    70  18  106/59  98 %  None (Room air)   06/29/19 1521    70  18  97/61  94 %  None (Room air)   06/29/19 1415    66  18  92/56  94 %  None (Room air)   06/29/19 1330            None (Room air)   06/29/19 1314  99 °F (37 2 °C)             06/29/19 1312    69  18  93/53  99 %  None (Room air)       Pertinent Labs/Diagnostic Test Results:   Results from last 7 days   Lab Units 06/30/19  0512 06/29/19  1359   WBC Thousand/uL 16 08* 16 18*   HEMOGLOBIN g/dL 9 4* 9 4*   HEMATOCRIT % 29 9* 29 6*   PLATELETS Thousands/uL 246 239   NEUTROS ABS Thousands/µL 13 70* 14 09*     Results from last 7 days   Lab Units 06/30/19  0621 06/29/19  2253 06/29/19  1359 06/24/19  1159   SODIUM mmol/L 133* 132* 133* 135*   POTASSIUM mmol/L 3 9 3 3* 2 6* 3 2*   CHLORIDE mmol/L 98* 96* 95* 100   CO2 mmol/L 28 30 29 25   ANION GAP mmol/L 7 6 9 10   BUN mg/dL 8 8 8 6   CREATININE mg/dL 0 63 0 77 0 83 0 84   EGFR ml/min/1 73sq m 107 92 84 83   CALCIUM mg/dL 8 2* 8 0* 8 1* 8 6   MAGNESIUM mg/dL  --   --  1 7  --    PHOSPHORUS mg/dL  --   --  2 0*  --      Results from last 7 days   Lab Units 06/30/19  0621 06/29/19  1359   AST U/L 329* 310*   ALT U/L 49 41   ALK PHOS U/L 351* 424*   TOTAL PROTEIN g/dL 6 5 6 6   ALBUMIN g/dL 2 2* 2 1*   TOTAL BILIRUBIN mg/dL 1 74* 1 34*     Results from last 7 days   Lab Units 06/30/19  0621 06/29/19  2253 06/29/19  1359   GLUCOSE RANDOM mg/dL 85 112 130     Results from last 7 days   Lab Units 06/29/19  1359   LACTIC ACID mmol/L 1 9     Results from last 7 days   Lab Units 06/29/19  1359   LIPASE u/L 194     CT a/p 6/29 -- Diffuse replacement or infiltration of the valeriano hepatis with no identifiable portal vein, common bile duct, head of pancreas, duodenum or inferior vena cava   Any or all of these could be severely diseased  Diffuse replacement of most of the liver with tumor  No identifiable change since 10 days ago      ED Treatment:   Medication Administration from 06/29/2019 1303 to 06/29/2019 1921       Date/Time Order Dose Route Action     06/29/2019 1359 ondansetron (ZOFRAN) injection 4 mg 4 mg Intravenous Given     06/29/2019 1359 HYDROmorphone (DILAUDID) injection 1 mg 1 mg Intravenous Given     06/29/2019 1523 potassium chloride (K-DUR,KLOR-CON) CR tablet 40 mEq 40 mEq Oral Given     06/29/2019 1521 potassium chloride 20 mEq IVPB (premix) 20 mEq Intravenous New Bag     06/29/2019 1510 iohexol (OMNIPAQUE) 350 MG/ML injection (MULTI-DOSE) 100 mL 100 mL Intravenous Given     06/29/2019 1552 potassium chloride (K-DUR,KLOR-CON) CR tablet 40 mEq 40 mEq Oral Given     06/29/2019 1553 HYDROmorphone (DILAUDID) injection 1 mg 1 mg Intravenous Given     Past Medical History:   Diagnosis Date    Anemia     Anxiety     Cancer [facility]     Chronic pain disorder     herniated disc    Cirrhosis [facility] [facility]     Constipation     Diabetes mellitus ([facility] [facility]     blood sugar 113 at 1225 2/18/19    GI bleed     Hepatitis C, chronic ([facility] [address] ) 7/22/2014    Hepatocellular carcinoma (HCC)     Hypertension     Hypokalemia 3/7/2019    Hyponatremia     Melena     Portal vein thrombosis      Present on Admission:   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 ([address] )   Hepatocellular carcinoma ([facility] [address] [facility]   Hyponatremia   Portal vein thrombosis      Admitting Diagnosis: Hepatocellular carcinoma (HCC) [C22 0]  Weakness [R53 1]  Abdominal pain [R10 9]  Age/Sex: 76 y o  female  Admission Orders:  I/O  Ulcerogenic diet  2000 ml fluid restriction  Up as darcie  SCD's    Current Facility-Administered Medications:  enoxaparin 40 mg Subcutaneous Daily   furosemide 60 mg Oral Daily   gabapentin 300 mg Oral Daily   gabapentin 900 mg Oral HS   lactulose 10 g Oral BID   nicotine 1 patch Transdermal Daily   oxyCODONE-acetaminophen 1 tablet Oral Q8H   pantoprazole 40 mg Oral Daily   rifaximin 400 mg Oral Q8H [address]   spironolactone 150 mg Oral Daily       Network Utilization Review Department  Phone: [phone]; Fax [phone]  [email or web address]  ATTENTION: Please call with any questions or concerns to [phone]  and carefully listen to the prompts so that you are directed to the right person  Send all requests for admission clinical reviews, approved or denied determinations and any other requests to fax [phone]   All voicemails are confidential

## 2019-07-01 ENCOUNTER — PATIENT OUTREACH (OUTPATIENT)
Dept: INTERNAL MEDICINE CLINIC | Facility: CLINIC | Age: 69
End: 2019-07-01

## 2019-07-01 ENCOUNTER — TELEPHONE (OUTPATIENT)
Dept: PALLIATIVE MEDICINE | Facility: CLINIC | Age: 69
End: 2019-07-01

## 2019-07-01 LAB
ANION GAP SERPL CALCULATED.3IONS-SCNC: 7 MMOL/L (ref 4–13)
BUN SERPL-MCNC: 9 MG/DL (ref 5–25)
CALCIUM SERPL-MCNC: 8.4 MG/DL (ref 8.3–10.1)
CHLORIDE SERPL-SCNC: 97 MMOL/L (ref 100–108)
CO2 SERPL-SCNC: 26 MMOL/L (ref 21–32)
CREAT SERPL-MCNC: 0.56 MG/DL (ref 0.6–1.3)
ERYTHROCYTE [DISTWIDTH] IN BLOOD BY AUTOMATED COUNT: 18.5 % (ref 11.6–15.1)
GFR SERPL CREATININE-BSD FRML MDRD: 111 ML/MIN/1.73SQ M
GLUCOSE SERPL-MCNC: 86 MG/DL (ref 65–140)
HCT VFR BLD AUTO: 25.6 % (ref 34.8–46.1)
HGB BLD-MCNC: 8.1 G/DL (ref 11.5–15.4)
MCH RBC QN AUTO: 28.6 PG (ref 26.8–34.3)
MCHC RBC AUTO-ENTMCNC: 31.6 G/DL (ref 31.4–37.4)
MCV RBC AUTO: 91 FL (ref 82–98)
PLATELET # BLD AUTO: 226 THOUSANDS/UL (ref 149–390)
PMV BLD AUTO: 10.3 FL (ref 8.9–12.7)
POTASSIUM SERPL-SCNC: 3.9 MMOL/L (ref 3.5–5.3)
RBC # BLD AUTO: 2.83 MILLION/UL (ref 3.81–5.12)
SODIUM SERPL-SCNC: 130 MMOL/L (ref 136–145)
WBC # BLD AUTO: 17.64 THOUSAND/UL (ref 4.31–10.16)

## 2019-07-01 PROCEDURE — 85027 COMPLETE CBC AUTOMATED: CPT | Performed by: INTERNAL MEDICINE

## 2019-07-01 PROCEDURE — 80048 BASIC METABOLIC PNL TOTAL CA: CPT | Performed by: INTERNAL MEDICINE

## 2019-07-01 PROCEDURE — 99233 SBSQ HOSP IP/OBS HIGH 50: CPT | Performed by: SURGERY

## 2019-07-01 PROCEDURE — 99232 SBSQ HOSP IP/OBS MODERATE 35: CPT | Performed by: INTERNAL MEDICINE

## 2019-07-01 RX ORDER — ACETAMINOPHEN 325 MG/1
650 TABLET ORAL 2 TIMES DAILY PRN
Status: DISCONTINUED | OUTPATIENT
Start: 2019-07-01 | End: 2019-07-06 | Stop reason: HOSPADM

## 2019-07-01 RX ORDER — GABAPENTIN 300 MG/1
600 CAPSULE ORAL DAILY
Status: DISCONTINUED | OUTPATIENT
Start: 2019-07-02 | End: 2019-07-06 | Stop reason: HOSPADM

## 2019-07-01 RX ORDER — GABAPENTIN 300 MG/1
300 CAPSULE ORAL ONCE
Status: COMPLETED | OUTPATIENT
Start: 2019-07-01 | End: 2019-07-01

## 2019-07-01 RX ORDER — LIDOCAINE 50 MG/G
1 PATCH TOPICAL DAILY
Status: DISCONTINUED | OUTPATIENT
Start: 2019-07-01 | End: 2019-07-06 | Stop reason: HOSPADM

## 2019-07-01 RX ADMIN — GABAPENTIN 300 MG: 300 CAPSULE ORAL at 08:31

## 2019-07-01 RX ADMIN — CHOLESTYRAMINE 4 G: 4 POWDER, FOR SUSPENSION ORAL at 12:00

## 2019-07-01 RX ADMIN — OXYCODONE HYDROCHLORIDE 10 MG: 10 TABLET ORAL at 11:55

## 2019-07-01 RX ADMIN — PANTOPRAZOLE SODIUM 40 MG: 40 TABLET, DELAYED RELEASE ORAL at 08:32

## 2019-07-01 RX ADMIN — RIFAXIMIN 400 MG: 200 TABLET ORAL at 17:24

## 2019-07-01 RX ADMIN — OXYCODONE HYDROCHLORIDE 10 MG: 10 TABLET ORAL at 22:23

## 2019-07-01 RX ADMIN — OXYCODONE HYDROCHLORIDE 10 MG: 10 TABLET ORAL at 06:33

## 2019-07-01 RX ADMIN — LIDOCAINE 1 PATCH: 50 PATCH CUTANEOUS at 15:14

## 2019-07-01 RX ADMIN — CHOLESTYRAMINE 4 G: 4 POWDER, FOR SUSPENSION ORAL at 17:24

## 2019-07-01 RX ADMIN — ENOXAPARIN SODIUM 40 MG: 40 INJECTION SUBCUTANEOUS at 08:31

## 2019-07-01 RX ADMIN — GABAPENTIN 300 MG: 300 CAPSULE ORAL at 12:38

## 2019-07-01 RX ADMIN — LACTULOSE 20 G: 10 SOLUTION ORAL at 08:31

## 2019-07-01 RX ADMIN — RIFAXIMIN 400 MG: 200 TABLET ORAL at 06:30

## 2019-07-01 RX ADMIN — NICOTINE 1 PATCH: 21 PATCH, EXTENDED RELEASE TRANSDERMAL at 08:33

## 2019-07-01 RX ADMIN — GABAPENTIN 900 MG: 300 CAPSULE ORAL at 21:10

## 2019-07-01 NOTE — TELEPHONE ENCOUNTER
Radha Block from [facility] would like a CB from you at [phone]  She states its about a waiver agreement for this pt

## 2019-07-01 NOTE — PROGRESS NOTES
Progress Note - Palliative & Supportive Care  Nancy Stephens  76 y o   female  PPHP 915/PPHP 915-01   MRN: [id number]  Encounter: [id number]     Assessment/Plan:  1  Hepatocellular carcinoma  2  Decompensated hepatic cirrhosis  3  Hepatic encephalopathy  4  Extensive liver infiltration with tumor, unable to identify structures per CT report  5  Debility, falls at home  6  Chronic back pain  7  Goals of Care:   Called granddaughter/Qcjhhh-[phone] as she is listed as emergency contact  Unaware that patient was admitted to hospital   States she saw patient on Friday night  She feels patient has been having increasing difficulty at home, patient's son/Marcos tried to get visiting nurses and additional help at home for patient and her ADLs after discussing with Dr Trung Caraballo  Patient was seen as outpt palliative care on 5/14/19 by Dr Trung Caraballo at [place], son called on 6/20/19 for assistance in getting additional home services  Patient has 4 sons total, Otf Ramaneloy Rosario attempted to call her father/Marcos/patient's son for 3-way conversation and update on patient, however there was no answer  Granddaughter does not feel patient can reside safely at home alone  Granddaughter and son/Marcos will come in tomorrow for family meeting at 2pm     -cont oxycodone 10mg q6h prn  -on gabapentin 600mg, 300mg, 900mg  -add lidocaine patch to back  -continue lactulose daily    Code status: Level 1    Subjective:  Patient seen and examined  Awakened patient, immediately became tearful stating that she is having back pain  Attempted goc discussion, however patient does NOT have capacity at this time to make medical decisions or participate in goc discussion  She was unable to state how many children she has, and names of those children  She did state that she lives alone and needs help at home as she has been falling recently  Patient states she does not have a completed living will      HPI:  Patient admitted 6/29/19 with abdominal pain and increased weakness  CT imaging revealed diffuse replacement or infiltration of the valeriano hepatis with no identifiable portal vein, CBD, head of pancreas, duodenum or IVC, diffuse replacement of most of liver with tumor  Per Dr Eliot Murguia last outpatient note on 5/14/19 states 76 y o  female with decompensated hepatic cirrhosis  She has chronic Hep C  She was treated for hepatocellular carcinoma in 10/2015 and in 12/2017  She is under the care of Dr Alanna Millard in medical oncology, Dr Jonny Bhakta in surgical oncology, Dr Bj Londono in GI, and Dr Jennifer Sharif in rad/onc  Patient was also seen in consultation as inpatient on 2/19/19 for goals of care discussion  Patient's capacity to make medical decisions waxed and waned at that time  We were unable to make progress with goc discussions during that hospitalization as she did not want to participate in discussions when had the capacity to do so        Medications    Current Facility-Administered Medications:     acetaminophen (TYLENOL) tablet 650 mg, 650 mg, Oral, BID PRN, Jassi Bethea    cholestyramine sugar free (QUESTRAN LIGHT) packet 4 g, 4 g, Oral, BID before lunch/dinner, Mel Valdez MD, 4 g at 07/01/19 1200    enoxaparin (LOVENOX) subcutaneous injection 40 mg, 40 mg, Subcutaneous, Daily, Rob Light DO, 40 mg at 07/01/19 0831    furosemide (LASIX) tablet 60 mg, 60 mg, Oral, Daily, Rob Light DO    [START ON 7/2/2019] gabapentin (NEURONTIN) capsule 600 mg, 600 mg, Oral, Daily, Derick Apley, DO    gabapentin (NEURONTIN) capsule 900 mg, 900 mg, Oral, HS, Rob Light DO, 900 mg at 06/30/19 2133    lactulose 20 g/30 mL oral solution 20 g, 20 g, Oral, Daily, Mel Valdez MD, 20 g at 07/01/19 0831    nicotine (NICODERM CQ) 21 mg/24 hr TD 24 hr patch 1 patch, 1 patch, Transdermal, Daily, Rob Light DO, 1 patch at 07/01/19 0833    oxyCODONE (ROXICODONE) immediate release tablet 10 mg, 10 mg, Oral, Q6H PRN, Lisbeth Mack MD, 10 mg at 07/01/19 1155    pantoprazole (PROTONIX) EC tablet 40 mg, 40 mg, Oral, Daily, Rob Light DO, 40 mg at 07/01/19 9506    rifaximin (XIFAXAN) tablet 400 mg, 400 mg, Oral, Q12H [address], Patrick Gomez MD, 400 mg at 07/01/19 0630    spironolactone (ALDACTONE) tablet 150 mg, 150 mg, Oral, Daily, Rob Light DO    Objective:  BP 92/58 (BP Location: Right arm)   Pulse 69   Temp 97 7 °F (36 5 °C)   Resp 18   Ht 5' 4" (1 626 m)   Wt 63 5 kg (139 lb 15 9 oz)   SpO2 98%   BMI 24 03 kg/m²   Physical Exam:  General: nad, resting comfortable, in pain when awakened  Neurological: not oriented  Cardiovascular: reg  Respiratory: coarse BS b/l  Gastrointestinal:  Soft, nt  Musculoskeletal: +edema  Skin: warm, dry  Psychiatric: confused, tearful    Lab Results:   I have personally reviewed pertinent labs  , CBC:   Lab Results   Component Value Date    WBC 17 64 (H) 07/01/2019    HGB 8 1 (L) 07/01/2019    HCT 25 6 (L) 07/01/2019    MCV 91 07/01/2019     07/01/2019    MCH 28 6 07/01/2019    MCHC 31 6 07/01/2019    RDW 18 5 (H) 07/01/2019    MPV 10 3 07/01/2019   , CMP:   Lab Results   Component Value Date    SODIUM 130 (L) 07/01/2019    K 3 9 07/01/2019    CL 97 (L) 07/01/2019    CO2 26 07/01/2019    BUN 9 07/01/2019    CREATININE 0 56 (L) 07/01/2019    CALCIUM 8 4 07/01/2019    EGFR 111 07/01/2019       Counseling / Coordination of Care  Total floor / unit time spent today 35 minutes  Greater than 50% of total time was spent with the patient and / or family counseling and / or coordinating of care  A description of the counseling / coordination of care: symptom management, goc, coordination of care, family discussion       Edward Moyer DO  Palliative & Supportive Care

## 2019-07-01 NOTE — PROGRESS NOTES
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 ([address] )    Hepatocellular carcinoma (HCC)    Hyponatremia    Portal vein thrombosis    1) Generalized chronic abdominal pain and weakness  Multifactorial in the setting of hepatic inflammation was cirrhosis and HCC  Extensive and increased portal venous thrombus/thrombi and HCC  No significant ascites noted on CT imaging or clinical exam  Plan:  Continue lactulose, cholestyramine  Palliative Care following for symptom management  -recommended oxycodone 10 mg q 6 hours p r n  Hold for sedation or respiratory depression, will call family this afternoon     2) Portal vein thrombosis  Previously on anticoagulation however patient developed serious life-threatening varices bleed with a hemoglobin down to 4 0 therefore anticoagulation at this point was contraindicated based on her clear bleeding risk  Plan: Will manage conservatively off anticoagulation     3) Hep C liver cirrhosis and [address]   Status post resection ablation in 2015 for [address] , SIRS sphere embolization in December of 1309  Cirrhosis complicated by hepatic encephalopathy  MELD 15  Plan:  Continue lactulose, rifaximin  Continue Lasix 60 mg daily and spironolactone 50 mg       Disposition:  Continue inpatient care    SUBJECTIVE     Patient seen and examined  No acute events overnight    Feeling much better than yesterday, still having this dull chronic pain diffuse throughout abdominal region, sitting comfortably in chair at time of visit, denies any fever chills nausea vomiting diarrhea, shortness of breath, chest pain  Will talk with patient's family later in the afternoon  OBJECTIVE     Vitals:    19 1646 19 2144 19 0710 19 0723   BP:  100/69  92/58   BP Location:    Right arm   Pulse: 77 72 69    Resp: 16 16 18    Temp:  98 2 °F (36 8 °C) 97 7 °F (36 5 °C)    TempSrc:       SpO2: 97% 99% 98%    Weight:       Height:          Temperature:   Temp (24hrs), Av 3 °F (36 8 °C), Min:97 7 °F (36 5 °C), Max:99 °F (37 2 °C)    Temperature: 97 7 °F (36 5 °C)  Intake & Output:  I/O        07 -  0700  07 -  0700    P  O  600 660    I V  (mL/kg) 250 (3 9)     IV Piggyback 1350     Total Intake(mL/kg) 2200 (34 6) 660 (10 4)    Urine (mL/kg/hr) 200 800 (0 5)    Stool 0 0    Total Output 200 800    Net +2000 -140          Unmeasured Urine Occurrence 1 x     Unmeasured Stool Occurrence 2 x 1 x        Weights:   IBW: 54 7 kg    Body mass index is 24 03 kg/m²  Weight (last 2 days)     Date/Time   Weight    19 1936   63 5 (139 99)    19 1312   63 5 (139 99)            Physical Exam   Constitutional: She is oriented to person, place, and time  She appears well-developed and well-nourished  HENT:   Head: Normocephalic and atraumatic  Eyes: Conjunctivae and EOM are normal    Neck: Normal range of motion  Neck supple  Cardiovascular: Normal rate and regular rhythm  Pulmonary/Chest: Effort normal and breath sounds normal    Abdominal: Soft  Bowel sounds are normal    Musculoskeletal: Normal range of motion  Neurological: She is alert and oriented to person, place, and time  Skin: Skin is warm and dry  Psychiatric: Her behavior is normal    Abdomen is distended but soft  LABORATORY DATA     Labs: I have personally reviewed pertinent reports    Results from last 7 days   Lab Units 19  0437 19  0512 19  1359   WBC Thousand/uL 17 64* 16 08* 16 18*   HEMOGLOBIN g/dL 8 1* 9 4* 9 4*   HEMATOCRIT % 25 6* 29 9* 29 6*   PLATELETS Thousands/uL 226 246 239   NEUTROS PCT %  --  85* 86*   MONOS PCT %  --  8 7      Results from last 7 days   Lab Units 07/01/19  0437 06/30/19  0621 06/29/19  2253 06/29/19  1359   POTASSIUM mmol/L 3 9 3 9 3 3* 2 6*   CHLORIDE mmol/L 97* 98* 96* 95*   CO2 mmol/L 26 28 30 29   BUN mg/dL 9 8 8 8   CREATININE mg/dL 0 56* 0 63 0 77 0 83   CALCIUM mg/dL 8 4 8 2* 8 0* 8 1*   ALK PHOS U/L  --  351*  --  424*   ALT U/L  --  49  --  41   AST U/L  --  329*  --  310*     Results from last 7 days   Lab Units 06/29/19  1359   MAGNESIUM mg/dL 1 7     Results from last 7 days   Lab Units 06/29/19  1359   PHOSPHORUS mg/dL 2 0*          Results from last 7 days   Lab Units 06/29/19  1359   LACTIC ACID mmol/L 1 9           IMAGING & DIAGNOSTIC TESTING     Radiology Results: I have personally reviewed pertinent reports  Xr Chest 2 Views    Result Date: 6/30/2019  Impression: No focal consolidation, pleural effusion, or pneumothorax  Workstation performed: [id number]     Ct Abdomen Pelvis With Contrast    Result Date: 6/29/2019  Impression: Diffuse replacement or infiltration of the valeriano hepatis with no identifiable portal vein, common bile duct, head of pancreas, duodenum or inferior vena cava  Any or all of these could be severely diseased  Diffuse replacement of most of the liver with tumor  No identifiable change since 10 days ago Workstation performed: [id number]     Other Diagnostic Testing: I have personally reviewed pertinent reports      ACTIVE MEDICATIONS     Current Facility-Administered Medications   Medication Dose Route Frequency    cholestyramine sugar free (QUESTRAN LIGHT) packet 4 g  4 g Oral BID before lunch/dinner    enoxaparin (LOVENOX) subcutaneous injection 40 mg  40 mg Subcutaneous Daily    furosemide (LASIX) tablet 60 mg  60 mg Oral Daily    gabapentin (NEURONTIN) capsule 300 mg  300 mg Oral Daily    gabapentin (NEURONTIN) capsule 900 mg  900 mg Oral HS    lactulose 20 g/30 mL oral solution 20 g  20 g Oral Daily    nicotine (NICODERM CQ) 21 mg/24 hr TD 24 hr patch 1 patch  1 patch Transdermal Daily    oxyCODONE (ROXICODONE) immediate release tablet 10 mg  10 mg Oral Q6H PRN    pantoprazole (PROTONIX) EC tablet 40 mg  40 mg Oral Daily    rifaximin (XIFAXAN) tablet 400 mg  400 mg Oral Q12H [address]    spironolactone (ALDACTONE) tablet 150 mg  150 mg Oral Daily       VTE Pharmacologic Prophylaxis: Enoxaparin (Lovenox)  VTE Mechanical Prophylaxis: sequential compression device    Portions of the record may have been created with voice recognition software  Occasional wrong word or "sound a like" substitutions may have occurred due to the inherent limitations of voice recognition software    Read the chart carefully and recognize, using context, where substitutions have occurred   ==  [facility]  Internal Medicine Residency PGY-2

## 2019-07-01 NOTE — PROGRESS NOTES
SAL attempted to call Dannar Ann Dacosta [phone] re the medical Certification Form which our MD at her PCP Office has completed It has not been faxed to [facility] as pt was undecided at our last conversation if she wanted to pursue this program  Form will be scanned to chart incase she decides she would like to pursue same  Sal attempted to notify Ann Person in Palliative care but SAL had to leave a message  SAL also left a message jon montero IN PT RN /CM [phone] re same

## 2019-07-01 NOTE — RESTORATIVE TECHNICIAN NOTE
Restorative Specialist Mobility Note       Activity: Other (Comment)(Educated/encouraged pt to ambulate with assistance 3-4 x's/day, pt refused 2* to wanting to rest nursing aware )    Alexadnra FLORES, Restorative Technician, [employer]

## 2019-07-02 ENCOUNTER — PATIENT OUTREACH (OUTPATIENT)
Dept: INTERNAL MEDICINE CLINIC | Facility: CLINIC | Age: 69
End: 2019-07-02

## 2019-07-02 LAB
ALBUMIN SERPL BCP-MCNC: 1.8 G/DL (ref 3.5–5)
ALP SERPL-CCNC: 291 U/L (ref 46–116)
ALT SERPL W P-5'-P-CCNC: 26 U/L (ref 12–78)
ANION GAP SERPL CALCULATED.3IONS-SCNC: 7 MMOL/L (ref 4–13)
AST SERPL W P-5'-P-CCNC: 88 U/L (ref 5–45)
BASOPHILS # BLD AUTO: 0.02 THOUSANDS/ΜL (ref 0–0.1)
BASOPHILS NFR BLD AUTO: 0 % (ref 0–1)
BILIRUB SERPL-MCNC: 1.05 MG/DL (ref 0.2–1)
BUN SERPL-MCNC: 7 MG/DL (ref 5–25)
CALCIUM SERPL-MCNC: 8.1 MG/DL (ref 8.3–10.1)
CHLORIDE SERPL-SCNC: 95 MMOL/L (ref 100–108)
CO2 SERPL-SCNC: 26 MMOL/L (ref 21–32)
CREAT SERPL-MCNC: 0.55 MG/DL (ref 0.6–1.3)
EOSINOPHIL # BLD AUTO: 0.03 THOUSAND/ΜL (ref 0–0.61)
EOSINOPHIL NFR BLD AUTO: 0 % (ref 0–6)
ERYTHROCYTE [DISTWIDTH] IN BLOOD BY AUTOMATED COUNT: 18.4 % (ref 11.6–15.1)
GFR SERPL CREATININE-BSD FRML MDRD: 111 ML/MIN/1.73SQ M
GLUCOSE SERPL-MCNC: 94 MG/DL (ref 65–140)
HCT VFR BLD AUTO: 24.1 % (ref 34.8–46.1)
HGB BLD-MCNC: 7.5 G/DL (ref 11.5–15.4)
IMM GRANULOCYTES # BLD AUTO: 0.08 THOUSAND/UL (ref 0–0.2)
IMM GRANULOCYTES NFR BLD AUTO: 1 % (ref 0–2)
INR PPP: 1.26 (ref 0.84–1.19)
LYMPHOCYTES # BLD AUTO: 0.9 THOUSANDS/ΜL (ref 0.6–4.47)
LYMPHOCYTES NFR BLD AUTO: 8 % (ref 14–44)
MCH RBC QN AUTO: 28.3 PG (ref 26.8–34.3)
MCHC RBC AUTO-ENTMCNC: 31.1 G/DL (ref 31.4–37.4)
MCV RBC AUTO: 91 FL (ref 82–98)
MONOCYTES # BLD AUTO: 1.26 THOUSAND/ΜL (ref 0.17–1.22)
MONOCYTES NFR BLD AUTO: 11 % (ref 4–12)
NEUTROPHILS # BLD AUTO: 9.39 THOUSANDS/ΜL (ref 1.85–7.62)
NEUTS SEG NFR BLD AUTO: 80 % (ref 43–75)
NRBC BLD AUTO-RTO: 0 /100 WBCS
PLATELET # BLD AUTO: 197 THOUSANDS/UL (ref 149–390)
PMV BLD AUTO: 9.9 FL (ref 8.9–12.7)
POTASSIUM SERPL-SCNC: 3.5 MMOL/L (ref 3.5–5.3)
PROT SERPL-MCNC: 6.1 G/DL (ref 6.4–8.2)
PROTHROMBIN TIME: 15.4 SECONDS (ref 11.6–14.5)
RBC # BLD AUTO: 2.65 MILLION/UL (ref 3.81–5.12)
SODIUM SERPL-SCNC: 128 MMOL/L (ref 136–145)
WBC # BLD AUTO: 11.68 THOUSAND/UL (ref 4.31–10.16)

## 2019-07-02 PROCEDURE — 99356 PR PROLONGED SVC I/P OR OBS SETTING 1ST HOUR: CPT | Performed by: INTERNAL MEDICINE

## 2019-07-02 PROCEDURE — 85610 PROTHROMBIN TIME: CPT | Performed by: STUDENT IN AN ORGANIZED HEALTH CARE EDUCATION/TRAINING PROGRAM

## 2019-07-02 PROCEDURE — 80053 COMPREHEN METABOLIC PANEL: CPT | Performed by: INTERNAL MEDICINE

## 2019-07-02 PROCEDURE — 85025 COMPLETE CBC W/AUTO DIFF WBC: CPT | Performed by: INTERNAL MEDICINE

## 2019-07-02 PROCEDURE — 99232 SBSQ HOSP IP/OBS MODERATE 35: CPT | Performed by: INTERNAL MEDICINE

## 2019-07-02 RX ADMIN — LACTULOSE 20 G: 10 SOLUTION ORAL at 08:25

## 2019-07-02 RX ADMIN — ENOXAPARIN SODIUM 40 MG: 40 INJECTION SUBCUTANEOUS at 08:25

## 2019-07-02 RX ADMIN — OXYCODONE HYDROCHLORIDE 10 MG: 10 TABLET ORAL at 11:08

## 2019-07-02 RX ADMIN — PANTOPRAZOLE SODIUM 40 MG: 40 TABLET, DELAYED RELEASE ORAL at 08:24

## 2019-07-02 RX ADMIN — GABAPENTIN 900 MG: 300 CAPSULE ORAL at 22:52

## 2019-07-02 RX ADMIN — GABAPENTIN 600 MG: 300 CAPSULE ORAL at 08:26

## 2019-07-02 RX ADMIN — LIDOCAINE 1 PATCH: 50 PATCH CUTANEOUS at 08:25

## 2019-07-02 RX ADMIN — OXYCODONE HYDROCHLORIDE 10 MG: 10 TABLET ORAL at 05:32

## 2019-07-02 RX ADMIN — RIFAXIMIN 400 MG: 200 TABLET ORAL at 17:44

## 2019-07-02 RX ADMIN — CHOLESTYRAMINE 4 G: 4 POWDER, FOR SUSPENSION ORAL at 17:44

## 2019-07-02 RX ADMIN — ACETAMINOPHEN 650 MG: 325 TABLET ORAL at 03:47

## 2019-07-02 RX ADMIN — NICOTINE 1 PATCH: 21 PATCH, EXTENDED RELEASE TRANSDERMAL at 08:24

## 2019-07-02 RX ADMIN — CHOLESTYRAMINE 4 G: 4 POWDER, FOR SUSPENSION ORAL at 12:29

## 2019-07-02 RX ADMIN — NYSTATIN 500000 UNITS: 100000 SUSPENSION ORAL at 22:52

## 2019-07-02 RX ADMIN — RIFAXIMIN 400 MG: 200 TABLET ORAL at 05:32

## 2019-07-02 RX ADMIN — NYSTATIN 500000 UNITS: 100000 SUSPENSION ORAL at 17:44

## 2019-07-02 RX ADMIN — OXYCODONE HYDROCHLORIDE 10 MG: 10 TABLET ORAL at 17:48

## 2019-07-02 RX ADMIN — ACETAMINOPHEN 650 MG: 325 TABLET ORAL at 11:11

## 2019-07-02 NOTE — SOCIAL WORK
TIM spoke with patient and family members to review the 5 wishes document and waiver services  The family intends to contact [facility] verify that they received the physician's statement  The family will also work with the patient to complete the 5 wishes       YUNIELW reviewed wish 2 at length so that the patient can make an informed decision regarding "life saving interventions"

## 2019-07-02 NOTE — PROGRESS NOTES
SOD - Internal Medicine Progress Note  Unit/Bed#: [facility] 915-01 Encounter: [id number]  SOD Team A      Rochelle Gutiérrez 76 y o  female [id number]  Hospital Stay Days: 2    Assessment and Plan      Current Problem List   Principal Problem:    Chronic abdominal pain  Active Problems:    Weakness    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)    Hepatocellular carcinoma (HCC)    Hyponatremia    Portal vein thrombosis    Assessment/Plan:  1) Generalized chronic abdominal pain and weakness  Multifactorial in the setting of hepatic inflammation was cirrhosis and HCC  Extensive and increased portal venous thrombus/thrombi and HCC     No significant ascites noted on CT imaging or clinical exam  Plan:  Continue lactulose, cholestyramine  Palliative Care following for symptom management  -will have family meeting with patient and palliative Care today at 2:00 p m   -recommended oxycodone 10 mg q 6 hours p r n  Tylenol given as well, gabapentin increased, lighted derm patch given by palliative care  Hold for sedation or respiratory depression, will call family this afternoon     2) Portal vein thrombosis  Previously on anticoagulation however patient developed serious life-threatening varices bleed with a hemoglobin down to 4 0 therefore anticoagulation at this point was contraindicated based on her clear bleeding risk  Plan: Will manage conservatively off anticoagulation     3) Hep C liver cirrhosis and [address]   Status post resection ablation in 2015 for [address] , SIRS sphere embolization in December of 2200  Cirrhosis complicated by hepatic encephalopathy  MELD 15  Plan:  Continue lactulose, rifaximin  Continue Lasix 60 mg daily and spironolactone 50 mg       Disposition:  Will discuss treatment goals today with palliative care   Subjective     Patient seen and examined at bedside  Patient had no overnight events, except for chronic lower back pain  Patient is a alert and oriented this morning    Patient patient understands that palliative care will discuss goal treatments with her and family this afternoon  Denies shortness of breath, chest pain, nausea, vomiting  Some mild relief from diffuse chronic abdominal pain  Objective     Vitals: Temp (24hrs), Av 9 °F (37 2 °C), Min:98 2 °F (36 8 °C), Max:99 5 °F (37 5 °C)  Current: Temperature: 98 2 °F (36 8 °C)  Patient Vitals for the past 24 hrs:   BP Temp Pulse Resp SpO2   19 0651 98/54       19 0650 91/61 98 2 °F (36 8 °C) 65 17 95 %   19 2235 (!) 89/60 99 5 °F (37 5 °C) 69 18 99 %   19 1520 99/67 99 1 °F (37 3 °C) 74 18 98 %    Body mass index is 24 03 kg/m²  Physical Exam:  GENERAL: NAD  HEENT:  NC/AT, PERRL, EOMI, no scleral icterus  CARDIAC:  RRR, +S1/S2, no S3/S4 heard, no m/g/r  PULMONARY:  CTA B/L, no wheezing/rales/rhonci, non-labored breathing  ABDOMEN:  Soft but largely distended, no rebound/guarding/rigidity  Extremities:  No edema, cyanosis, or clubbing  NEUROLOGIC: Grossly intact  SKIN:  No rashes or erythema noted on exposed skin     Psych: Normal affect  Invasive Devices     Peripheral Intravenous Line            Peripheral IV 19 Left Antecubital 2 days                    Labs:   Results from last 7 days   Lab Units 19  0454 19  0437 19  0512 19  1359   WBC Thousand/uL 11 68* 17 64* 16 08* 16 18*   HEMOGLOBIN g/dL 7 5* 8 1* 9 4* 9 4*   HEMATOCRIT % 24 1* 25 6* 29 9* 29 6*   PLATELETS Thousands/uL 197 226 246 239   NEUTROS PCT % 80*  --  85* 86*   MONOS PCT % 11  --  8 7    Results from last 7 days   Lab Units 19  0454 19  0437 19  0621 19  2253 19  1359   SODIUM mmol/L 128* 130* 133* 132* 133*   POTASSIUM mmol/L 3 5 3 9 3 9 3 3* 2 6*   CHLORIDE mmol/L 95* 97* 98* 96* 95*   CO2 mmol/L 26 26 28 30 29   BUN mg/dL 7 9 8 8 8   CREATININE mg/dL 0 55* 0 56* 0 63 0 77 0 83   CALCIUM mg/dL 8 1* 8 4 8 2* 8 0* 8 1*   ALK PHOS U/L 291*  --  351*  --  424*   ALT U/L 26 --  49  --  41   AST U/L 88*  --  329*  --  310*   MAGNESIUM mg/dL  --   --   --   --  1 7   PHOSPHORUS mg/dL  --   --   --   --  2 0*   INR  1 26*  --   --   --   --    EGFR ml/min/1 73sq m 111 111 107 92 84     Results from last 7 days   Lab Units 07/02/19  0454   INR  1 26*     Results from last 7 days   Lab Units 06/29/19  1359   LACTIC ACID mmol/L 1 9         No results found for: PHART, SPS1CBB, PO2ART, DBM6WUK, W5PQDOVD, BEART, SOURCE  No components found for: HIV1X2  No results found for: HAV, HEPAIGM, HEPBIGM, HEPBCAB, HBEAG, HEPCAB  No results found for: SPEP, UPEP Lab Results   Component Value Date    HGBA1C 5 4 03/09/2019    HGBA1C 5 3 03/01/2019    HGBA1C 5 5 09/17/2018     Lab Results   Component Value Date    CHOL 148 07/23/2014    Lab Results   Component Value Date    HDL 44 07/23/2014    Lab Results   Component Value Date    LDLCALC 89 07/23/2014    Lab Results   Component Value Date    TRIG 74 07/23/2014     No components found for: PROCAL      Micro:      Urinalysis:  Lab Results   Component Value Date    ETOH <3 06/19/2019    ETOH <3 06/19/2019    ACTMNPHEN <2 (L) 81/56/6769    SALICYLATE <3 (L) 79/95/6434          Invalid input(s): URIBILINOGEN        Intake and Outputs:  I/O       06/30 0701 - 07/01 0700 07/01 0701 - 07/02 0700 07/02 0701 - 07/03 0700    P  O  660 720     I V  (mL/kg)       IV Piggyback       Total Intake(mL/kg) 660 (10 4) 720 (11 3)     Urine (mL/kg/hr) 800 (0 5) 550 (0 4)     Stool 0 0     Total Output 800 550     Net -140 +170            Unmeasured Stool Occurrence 1 x 1 x         Nutrition:  Diet GI; Non Ulcerogenic; Fluid Restriction 2000 ML, Sodium 2 GM  Radiology Results:   XR chest 2 views   Final Result by Tereza Chaidez MD (06/30 0791)      No focal consolidation, pleural effusion, or pneumothorax              Workstation performed: [id number]         CT abdomen pelvis with contrast   ED Interpretation by Kristen Stephens DO (06/29 8558)      Diffuse replacement or infiltration of the valeriano hepatis with no identifiable portal vein, common bile duct, head of pancreas, duodenum or inferior vena cava  Any or all of these could be severely diseased  Diffuse replacement of most of the liver with tumor  No identifiable change since 10 days ago            Workstation performed: [id number]         Final Result by Jackie Chan MD (06/29 1531)      Diffuse replacement or infiltration of the valeriano hepatis with no identifiable portal vein, common bile duct, head of pancreas, duodenum or inferior vena cava  Any or all of these could be severely diseased  Diffuse replacement of most of the liver with tumor        No identifiable change since 10 days ago            Workstation performed: [id number]           Scheduled Medications:    cholestyramine sugar free 4 g BID before lunch/dinner   enoxaparin 40 mg Daily   furosemide 60 mg Daily   gabapentin 600 mg Daily   gabapentin 900 mg HS   lactulose 20 g Daily   lidocaine 1 patch Daily   nicotine 1 patch Daily   pantoprazole 40 mg Daily   rifaximin 400 mg Q12H [address]   spironolactone 150 mg Daily     PRN MEDS:    acetaminophen 650 mg BID PRN   oxyCODONE 10 mg Q6H PRN     Last 24 Hour Meds: :   Medication Administration - last 24 hours from 07/01/2019 0837 to 07/02/2019 8839       Date/Time Order Dose Route Action Action by     07/02/2019 0825 enoxaparin (LOVENOX) subcutaneous injection 40 mg 40 mg Subcutaneous Given Ninoska Prabhakar RN     07/02/2019 0823 furosemide (LASIX) tablet 60 mg 40 mg Oral Not Given Myra Tucker RN     07/01/2019 2110 gabapentin (NEURONTIN) capsule 900 mg 900 mg Oral Given Morena Robert RN     07/02/2019 0824 nicotine (NICODERM CQ) 21 mg/24 hr TD 24 hr patch 1 patch 1 patch Transdermal Medication Applied Ninoska Prabhakar RN     07/02/2019 0823 nicotine (NICODERM CQ) 21 mg/24 hr TD 24 hr patch 1 patch 1 patch Transdermal Patch Removed Myra Tucker RN     07/02/2019 0824 pantoprazole (PROTONIX) EC tablet 40 mg 40 mg Oral Given [employer], RN     07/02/2019 0824 spironolactone (ALDACTONE) tablet 150 mg 50 mg Oral Not Given [employer], RN     07/02/2019 0825 lactulose 20 g/30 mL oral solution 20 g 20 g Oral Given [employer], RN     07/01/2019 1724 cholestyramine sugar free (QUESTRAN LIGHT) packet 4 g 4 g Oral Given [employer], RN     07/01/2019 1200 cholestyramine sugar free (QUESTRAN LIGHT) packet 4 g 4 g Oral Given [employer], RN     07/02/2019 0532 rifaximin (XIFAXAN) tablet 400 mg 400 mg Oral Given Guilherme Oseguera RN     07/01/2019 1724 rifaximin (XIFAXAN) tablet 400 mg 400 mg Oral Given Ninoska Prabhakar, RN     07/02/2019 0532 oxyCODONE (ROXICODONE) immediate release tablet 10 mg 10 mg Oral Given Guilherme Oseguera RN     07/01/2019 2223 oxyCODONE (ROXICODONE) immediate release tablet 10 mg 10 mg Oral Given Guilherme Oseguera RN     07/01/2019 1155 oxyCODONE (ROXICODONE) immediate release tablet 10 mg 10 mg Oral Given Ninoska Prabhakar RN     07/02/2019 0347 acetaminophen (TYLENOL) tablet 650 mg 650 mg Oral Given Guilherme Oseguera RN     07/02/2019 0826 gabapentin (NEURONTIN) capsule 600 mg 600 mg Oral Given Ninoska Prabhakar RN     07/01/2019 1238 gabapentin (NEURONTIN) capsule 300 mg 300 mg Oral Given [employer], RN     07/02/2019 0825 lidocaine (LIDODERM) 5 % patch 1 patch 1 patch Topical Medication Applied Ninoska Prabhakar RN     07/02/2019 0249 lidocaine (LIDODERM) 5 % patch 1 patch 1 patch Topical Patch Removed Guilherme Oseguera RN     07/01/2019 1514 lidocaine (LIDODERM) 5 % patch 1 patch 1 patch Topical Medication Applied [employer], ANA        VTE Pharmacologic Prophylaxis: Lovenox  VTE Mechanical Prophylaxis: SCDs  Jassi Vollaro    PLEASE NOTE:  This encounter was completed utilizing the [employer] Direct Speech Voice Recognition Software   Grammatical errors, random word insertions, pronoun errors and incomplete sentences are occasional consequences of the system due to software limitations, ambient noise and hardware issues  These may be missed by proof reading prior to affixing electronic signature  Any questions or concerns about the content, text or information contained within the body of this dictation should be directly addressed to the physician for clarification  Please do not hesitate to call me directly if you have any any questions or concerns

## 2019-07-02 NOTE — PROGRESS NOTES
Progress note - Palliative and Supportive Care   Jade Downs 76 y o  female [id number]    Assessment:   -   Patient Active Problem List    Diagnosis Date Noted    Encephalopathy 2019    Leukocytosis 2019    Transaminitis 2019    Acalculous cholecystitis 2019    Lumbar spondylosis 2019    Spinal stenosis of lumbar region without neurogenic claudication 2019    Anemia 2019    Secondary esophageal varices without bleeding ([facility] ) 2019    Depression 10/03/2018    Portal vein thrombosis 2018    Ascites 2018    Decompensated hepatic cirrhosis ([facility] ) 2018    Hyponatremia 2018    Chronic abdominal pain 2018    Lumbar disc herniation 2018    Goals of care, counseling/discussion 2018    Myofascial pain 10/19/2017    Sacroiliitis ([facility] ) 2017    [facility] 2016    Hepatocellular carcinoma ([facility] ) 2015    Insomnia 10/14/2015    Trochanteric bursitis 2015    Diabetic neuropathy ([facility] ) 2015    Seasonal allergies 2015    Pain syndrome, chronic 2015    Herniated lumbar disc without myelopathy 2014    Weakness 2014    Nicotine dependence 2014    Chronic lumbar radiculopathy 2014    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 ([facility] ) 2014    Osteoporosis 10/24/2013    Claustrophobia 2013    Anxiety 2012    Diabetes mellitus, type II ([facility] ) 2012         Plan:  1  Symptom management -    - Pain:    Continue oxyIR 10 mg q6H PRN    Continue Gabapentin 600/900   - Oral candidiasis resulting in poor oral intake    Start Nystatin   - Poor oral intake    Good oral hygiene    Encouraged small meals    Recommend nutrition evaluation    2   PALLIATIVE AND SUPPORTIVE CARE FAMILY CONFERENCE:    Time of Meetin:30 PM    Participants: Myself, Dr Elvis Castillo and Dr Joni Adams from  A  [facility], patient's son- Laurie Alvarez, brother- Pina Medina, granddaughter Antonia Arredondo and other grandchildren were present as well  Patient Participation: yes     Meeting Location: patient bedside    Advanced Directive of POLST available: no    A family meeting was held for Ms Kate  This meeting was necessary for determine the appropriate course of treatment  Topics of Discussion:  - update on clinical condition with specific attention pain to her advanced liver disease  - review of CT images  - concerns about ongoing independent living and how she cares for herself  - discussion about Waiver services and SNF placement  - discussion about limits on care and 5 wishes    Other Content of Meeting:  - time spent providing psychosocial support   - time spent addressing questions/concerns to their satisfaction    PLAN:  - continue current treatment  - will provide with 5 wishes to review and help to complete  - CM to assist with SNF placement  - follow up on waiver application    Time Involved in Meetin minutes, beginning at approximately  230 pm and ending at approximately 330 pm      3  Patient is competent on my exam today  However, her competence does wax and wane and should be evaluated prior to any medical decision in regards to her care  Interval history:       Patient complains of poor appetite both because she does not have the urge to eat and also because food tastes poorly  Family care conference held today, see above       MEDICATIONS / ALLERGIES:     all current active meds have been reviewed and current meds:   Current Facility-Administered Medications   Medication Dose Route Frequency    acetaminophen (TYLENOL) tablet 650 mg  650 mg Oral BID PRN    cholestyramine sugar free (QUESTRAN LIGHT) packet 4 g  4 g Oral BID before lunch/dinner    enoxaparin (LOVENOX) subcutaneous injection 40 mg  40 mg Subcutaneous Daily    furosemide (LASIX) tablet 60 mg  60 mg Oral Daily    gabapentin (NEURONTIN) capsule 600 mg  600 mg Oral Daily    gabapentin (NEURONTIN) capsule 900 mg  900 mg Oral HS    lactulose 20 g/30 mL oral solution 20 g  20 g Oral Daily    lidocaine (LIDODERM) 5 % patch 1 patch  1 patch Topical Daily    nicotine (NICODERM CQ) 21 mg/24 hr TD 24 hr patch 1 patch  1 patch Transdermal Daily    nystatin (MYCOSTATIN) oral suspension 500,000 Units  500,000 Units Swish & Swallow 4x Daily    oxyCODONE (ROXICODONE) immediate release tablet 10 mg  10 mg Oral Q6H PRN    pantoprazole (PROTONIX) EC tablet 40 mg  40 mg Oral Daily    rifaximin (XIFAXAN) tablet 400 mg  400 mg Oral Q12H [facility] & longterm    spironolactone (ALDACTONE) tablet 150 mg  150 mg Oral Daily       No Known Allergies    OBJECTIVE:    Physical Exam  Physical Exam   Constitutional: She is oriented to person, place, and time  No distress  Frail female    HENT:   Head: Normocephalic and atraumatic  Right Ear: External ear normal    Left Ear: External ear normal    Eyes: Right eye exhibits no discharge  Left eye exhibits no discharge  Pulmonary/Chest: Effort normal  No respiratory distress  Abdominal: Soft  She exhibits distension  Musculoskeletal: She exhibits edema  Neurological: She is alert and oriented to person, place, and time  Skin: There is pallor  Psychiatric: She has a normal mood and affect  Nursing note and vitals reviewed  Lab Results:   I have personally reviewed pertinent labs  , CBC:   Lab Results   Component Value Date    WBC 11 68 (H) 07/02/2019    HGB 7 5 (L) 07/02/2019    HCT 24 1 (L) 07/02/2019    MCV 91 07/02/2019     07/02/2019    MCH 28 3 07/02/2019    MCHC 31 1 (L) 07/02/2019    RDW 18 4 (H) 07/02/2019    MPV 9 9 07/02/2019    NRBC 0 07/02/2019   , CMP:   Lab Results   Component Value Date    SODIUM 128 (L) 07/02/2019    K 3 5 07/02/2019    CL 95 (L) 07/02/2019    CO2 26 07/02/2019    BUN 7 07/02/2019    CREATININE 0 55 (L) 07/02/2019    CALCIUM 8 1 (L) 07/02/2019    AST 88 (H) 07/02/2019    ALT 26 07/02/2019    ALKPHOS 291 (H) 07/02/2019    EGFR 111 07/02/2019     Imaging Studies: reviewed  EKG, Pathology, and Other Studies: reviewed    Counseling / Coordination of Care  Total floor / unit time spent today 65+ minutes  Greater than 50% of total time was spent with the patient and / or family counseling and / or coordination of care  A description of the counseling / coordination of care: symptom assessment, family care conference  In addition, I provided prolonged care to patient regarding family care conference which started at approximately 230 and lasted until 330

## 2019-07-02 NOTE — SOCIAL WORK
A post acute care recommendation was made by your care team for STR  Discussed Freedom of Choice with pt and family  List of STR facilities given to pt and family via hardcopy  Pt and family aware the list is custom filtered for them by contracted providers for insurance plan      Pt and family reviewed and selected [facility],  Referral entered in [facility]

## 2019-07-02 NOTE — PROGRESS NOTES
Phone call received from pt's brother Mishel Blackburn [phone] who inquired about the [place] waiver Program   Sw has explained the program and the referral process  He shard that pt is in the hospital at this time  I referred him to [address] [place] [address] X [phone] for further assistance  SAL also spoke with Dois Running PT CM re same  SW attempted to call [address] yesterday as well but had to leave a message  SAL will remain available to assist as indicted

## 2019-07-03 LAB
ALBUMIN SERPL BCP-MCNC: 2.2 G/DL (ref 3.5–5)
ALP SERPL-CCNC: 360 U/L (ref 46–116)
ALT SERPL W P-5'-P-CCNC: 28 U/L (ref 12–78)
ANION GAP SERPL CALCULATED.3IONS-SCNC: 8 MMOL/L (ref 4–13)
AST SERPL W P-5'-P-CCNC: 68 U/L (ref 5–45)
BASOPHILS # BLD AUTO: 0.02 THOUSANDS/ΜL (ref 0–0.1)
BASOPHILS NFR BLD AUTO: 0 % (ref 0–1)
BILIRUB SERPL-MCNC: 0.78 MG/DL (ref 0.2–1)
BUN SERPL-MCNC: 5 MG/DL (ref 5–25)
CALCIUM SERPL-MCNC: 8.9 MG/DL (ref 8.3–10.1)
CHLORIDE SERPL-SCNC: 96 MMOL/L (ref 100–108)
CO2 SERPL-SCNC: 24 MMOL/L (ref 21–32)
CREAT SERPL-MCNC: 0.58 MG/DL (ref 0.6–1.3)
EOSINOPHIL # BLD AUTO: 0.05 THOUSAND/ΜL (ref 0–0.61)
EOSINOPHIL NFR BLD AUTO: 1 % (ref 0–6)
ERYTHROCYTE [DISTWIDTH] IN BLOOD BY AUTOMATED COUNT: 18.2 % (ref 11.6–15.1)
GFR SERPL CREATININE-BSD FRML MDRD: 110 ML/MIN/1.73SQ M
GLUCOSE SERPL-MCNC: 104 MG/DL (ref 65–140)
HCT VFR BLD AUTO: 27.7 % (ref 34.8–46.1)
HGB BLD-MCNC: 8.4 G/DL (ref 11.5–15.4)
IMM GRANULOCYTES # BLD AUTO: 0.05 THOUSAND/UL (ref 0–0.2)
IMM GRANULOCYTES NFR BLD AUTO: 1 % (ref 0–2)
INR PPP: 1.21 (ref 0.84–1.19)
LYMPHOCYTES # BLD AUTO: 0.71 THOUSANDS/ΜL (ref 0.6–4.47)
LYMPHOCYTES NFR BLD AUTO: 7 % (ref 14–44)
MCH RBC QN AUTO: 28.4 PG (ref 26.8–34.3)
MCHC RBC AUTO-ENTMCNC: 30.3 G/DL (ref 31.4–37.4)
MCV RBC AUTO: 94 FL (ref 82–98)
MONOCYTES # BLD AUTO: 0.86 THOUSAND/ΜL (ref 0.17–1.22)
MONOCYTES NFR BLD AUTO: 9 % (ref 4–12)
NEUTROPHILS # BLD AUTO: 8.17 THOUSANDS/ΜL (ref 1.85–7.62)
NEUTS SEG NFR BLD AUTO: 82 % (ref 43–75)
NRBC BLD AUTO-RTO: 0 /100 WBCS
PLATELET # BLD AUTO: 209 THOUSANDS/UL (ref 149–390)
PMV BLD AUTO: 10.5 FL (ref 8.9–12.7)
POTASSIUM SERPL-SCNC: 3.7 MMOL/L (ref 3.5–5.3)
PROT SERPL-MCNC: 7.2 G/DL (ref 6.4–8.2)
PROTHROMBIN TIME: 14.9 SECONDS (ref 11.6–14.5)
RBC # BLD AUTO: 2.96 MILLION/UL (ref 3.81–5.12)
SODIUM SERPL-SCNC: 128 MMOL/L (ref 136–145)
WBC # BLD AUTO: 9.86 THOUSAND/UL (ref 4.31–10.16)

## 2019-07-03 PROCEDURE — G8979 MOBILITY GOAL STATUS: HCPCS

## 2019-07-03 PROCEDURE — G8978 MOBILITY CURRENT STATUS: HCPCS

## 2019-07-03 PROCEDURE — 97163 PT EVAL HIGH COMPLEX 45 MIN: CPT

## 2019-07-03 PROCEDURE — G8987 SELF CARE CURRENT STATUS: HCPCS

## 2019-07-03 PROCEDURE — 99232 SBSQ HOSP IP/OBS MODERATE 35: CPT | Performed by: INTERNAL MEDICINE

## 2019-07-03 PROCEDURE — 80053 COMPREHEN METABOLIC PANEL: CPT | Performed by: STUDENT IN AN ORGANIZED HEALTH CARE EDUCATION/TRAINING PROGRAM

## 2019-07-03 PROCEDURE — G8988 SELF CARE GOAL STATUS: HCPCS

## 2019-07-03 PROCEDURE — 85610 PROTHROMBIN TIME: CPT | Performed by: STUDENT IN AN ORGANIZED HEALTH CARE EDUCATION/TRAINING PROGRAM

## 2019-07-03 PROCEDURE — 97167 OT EVAL HIGH COMPLEX 60 MIN: CPT

## 2019-07-03 PROCEDURE — 85025 COMPLETE CBC W/AUTO DIFF WBC: CPT | Performed by: STUDENT IN AN ORGANIZED HEALTH CARE EDUCATION/TRAINING PROGRAM

## 2019-07-03 RX ADMIN — NYSTATIN 500000 UNITS: 100000 SUSPENSION ORAL at 17:23

## 2019-07-03 RX ADMIN — CHOLESTYRAMINE 4 G: 4 POWDER, FOR SUSPENSION ORAL at 11:44

## 2019-07-03 RX ADMIN — CHOLESTYRAMINE 4 G: 4 POWDER, FOR SUSPENSION ORAL at 17:23

## 2019-07-03 RX ADMIN — RIFAXIMIN 400 MG: 200 TABLET ORAL at 04:43

## 2019-07-03 RX ADMIN — LACTULOSE 20 G: 10 SOLUTION ORAL at 09:10

## 2019-07-03 RX ADMIN — RIFAXIMIN 400 MG: 200 TABLET ORAL at 17:23

## 2019-07-03 RX ADMIN — PANTOPRAZOLE SODIUM 40 MG: 40 TABLET, DELAYED RELEASE ORAL at 09:12

## 2019-07-03 RX ADMIN — LIDOCAINE 1 PATCH: 50 PATCH CUTANEOUS at 09:13

## 2019-07-03 RX ADMIN — OXYCODONE HYDROCHLORIDE 10 MG: 10 TABLET ORAL at 22:27

## 2019-07-03 RX ADMIN — OXYCODONE HYDROCHLORIDE 10 MG: 10 TABLET ORAL at 04:47

## 2019-07-03 RX ADMIN — GABAPENTIN 900 MG: 300 CAPSULE ORAL at 21:16

## 2019-07-03 RX ADMIN — ENOXAPARIN SODIUM 40 MG: 40 INJECTION SUBCUTANEOUS at 09:10

## 2019-07-03 RX ADMIN — NICOTINE 1 PATCH: 21 PATCH, EXTENDED RELEASE TRANSDERMAL at 09:13

## 2019-07-03 RX ADMIN — NYSTATIN 500000 UNITS: 100000 SUSPENSION ORAL at 11:44

## 2019-07-03 RX ADMIN — OXYCODONE HYDROCHLORIDE 10 MG: 10 TABLET ORAL at 16:12

## 2019-07-03 RX ADMIN — NYSTATIN 500000 UNITS: 100000 SUSPENSION ORAL at 09:10

## 2019-07-03 RX ADMIN — GABAPENTIN 600 MG: 300 CAPSULE ORAL at 09:13

## 2019-07-03 NOTE — PLAN OF CARE
Problem: OCCUPATIONAL THERAPY ADULT  Goal: Performs self-care activities at highest level of function for planned discharge setting  See evaluation for individualized goals  Description  Treatment Interventions: ADL retraining, Functional transfer training, UE strengthening/ROM, Endurance training, Cognitive reorientation, Patient/family training, Equipment evaluation/education, Activityengagement, Energy conservation  Equipment Recommended: Bedside commode       See flowsheet documentation for full assessment, interventions and recommendations  Note:   Limitation: Decreased ADL status, Decreased UE strength, Decreased Safe judgement during ADL, Decreased cognition, Decreased endurance, Decreased self-care trans, Decreased high-level ADLs  Prognosis: Good  Assessment: Pt is a 76 y o  female who was admitted to [facility] on 6/29/2019 with Chronic abdominal pain - per EMR multiple etiologies possible including gallbladder distention and cholestasis, stretching of Cristi's capsule of liver, and portal vein trhombus  Pt also with hepatic encephalopathy  Pt's problem list also includes PMH of hepatocellular carcinoma, alcoholic cirrhosis, chronic anemia, anxiety, chornic lumbar radiculopathy, claustrophobia, DM type II, myofascial pain, osteoporosis, sacroilitis, trochanteric bursitis, depression  At baseline pt was completing all ADLs/IADLs IND, ambulating IND w/o an AD, (-) driving  Pt lives alone in an apartment with 3 ARTIS  Pt reports she has support from local brother & friends  Currently pt requires MIN-MOD A for overall ADLS and MIN A for functional mobility/transfers with RW  Pt with LE weakness - required use of B/L UE to bring LE to crossed leg position seated EOB  Pt educated on safe toilet transfer with use of grab bars with Fair understanding (pt initially attempting unsafe transfer flexing trunk too far forward & required MIN A to maintain balance)   Pt also with decreased recall of activity directions & home set up information; in addition, pt presents with decreased safety awareness - attempting to ambulate independently in room despite education & bed alarm use  Pt currently presents with impairments in the following categories -steps to enter environment, limited home support, difficulty performing ADLS, difficulty performing IADLS , limited insight into deficits and health management  activity tolerance, endurance, standing balance/tolerance, sitting balance/tolerance, UE strength, memory, insight, safety , judgement  and attention   These impairments, as well as pt's fatigue, decreased caregiver support and risk for falls  limit pt's ability to safely engage in all baseline areas of occupation, includinggrooming, bathing, dressing, toileting, functional mobility/transfers, community mobility, meal prep and cleaning From OT standpoint, recommend [address] upon D/C  OT will continue to follow to address the below stated goals        OT Discharge Recommendation: Short Term Rehab  OT - OK to Discharge: Yes(when medically appropriate)

## 2019-07-03 NOTE — RESTORATIVE TECHNICIAN NOTE
Restorative Specialist Mobility Note       Activity: Other (Comment)(Educated/encouraged pt to ambulate with assistance 3-4 x's/day, pt refused 2* feeling too tired nursing aware   Pt callbell, phone/tray within reach )       [employer] BS, Restorative Technician, [employer]

## 2019-07-03 NOTE — PHYSICAL THERAPY NOTE
Physical Therapy Evaluation     Patient's Name: Lisa Menendez    Admitting Diagnosis  Hepatocellular carcinoma ([facility] ) [C22 0]  Weakness [R53 1]  Abdominal pain [R10 9]    Problem List  Patient Active Problem List   Diagnosis    Anxiety    Weakness    Chronic lumbar radiculopathy    Claustrophobia    Diabetes mellitus, type II ([facility] )    Diabetic neuropathy ([facility] )    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 ([facility] )    Hepatocellular carcinoma ([facility] )    Herniated lumbar disc without myelopathy    Insomnia    Myofascial pain    Nicotine dependence    Opioid dependence ([facility] )    Osteoporosis    Pain syndrome, chronic    Sacroiliitis (HCC)    Seasonal allergies    Trochanteric bursitis    Lumbar disc herniation    Goals of care, counseling/discussion    Hyponatremia    Chronic abdominal pain    Ascites    Decompensated hepatic cirrhosis (HCC)    Portal vein thrombosis    Depression    Anemia    Secondary esophageal varices without bleeding (HCC)    Lumbar spondylosis    Spinal stenosis of lumbar region without neurogenic claudication    Acalculous cholecystitis    Encephalopathy    Leukocytosis    Transaminitis       Past Medical History  Past Medical History:   Diagnosis Date    Anemia     Anxiety     [facility]     Chronic pain disorder     herniated disc    Cirrhosis ([facility] )     Constipation     Diabetes mellitus ([facility] )     blood sugar 113 at 1225 2/18/19    GI bleed     Hepatitis C, chronic ([facility] ) 7/22/2014    Hepatocellular carcinoma ([facility] )     Hypertension     Hypokalemia 3/7/2019    Hyponatremia     Melena     Portal vein thrombosis        Past Surgical History  Past Surgical History:   Procedure Laterality Date    DENTAL SURGERY      ESOPHAGOGASTRODUODENOSCOPY N/A 2/18/2019    Procedure: ESOPHAGOGASTRODUODENOSCOPY (EGD); Surgeon: Ko Garcia MD;  [facility];   Service: Gastroenterology    ESOPHAGOGASTRODUODENOSCOPY N/A 3/11/2019    Procedure: ESOPHAGOGASTRODUODENOSCOPY (EGD); Surgeon: Nancy Kern MD;  [facility]; Service: Gastroenterology    HYSTERECTOMY      IR PARACENTESIS  3/8/2019    LIVER BIOPSY      LIVER SURGERY      Ablation    TONSILLECTOMY          07/03/19 1600   Note Type   Note type Eval only   Pain Assessment   Pain Assessment No/denies pain   Pain Score No Pain   Pain Type Chronic pain   Pain Location Back   Hospital Pain Intervention(s) Ambulation/increased activity   Home Living   Type of [address] One level;Stairs to enter with rails  (3 ARTIS)   Bathroom Equipment Grab bars around toilet   [address] [facility]   Prior Function   Level of [address] with ADLs and functional mobility   Lives With Alone   Receives Help From Family;Friend(s)   ADL Assistance Independent   IADLs Independent   Falls in the last 6 months 1 to 4   Restrictions/Precautions   Weight Bearing Precautions Per Order No   Other Precautions Cognitive; Bed Alarm; Chair Alarm; Fall Risk   General   Family/Caregiver Present Yes   Cognition   Orientation Level Oriented X4   RLE Assessment   RLE Assessment WFL   LLE Assessment   LLE Assessment WFL   Coordination   Movements are Fluid and Coordinated 0   Coordination and Movement Description slow and guarded   Bed Mobility   Supine to Sit Unable to assess  (pt found in bathroom with PCA)   Sit to Supine Unable to assess  (pt left resting in chair as requested)   Transfers   Sit to Stand 4  Minimal assistance   Additional items Assist x 1; Increased time required   Stand to Sit 4  Minimal assistance   Additional items Assist x 1   Ambulation/Elevation   Gait pattern Decreased foot clearance;Shuffling;Excessively slow   Gait Assistance 4  Minimal assist   Additional items Assist x 1   Assistive Device Rolling walker   Distance 100+100+100   Balance   Static Sitting Fair +   Static Standing Poor +   Ambulatory Poor -   Endurance Deficit   Endurance Deficit Yes   Endurance Deficit Description limited by fatigue complared to baseline mobility   Activity Tolerance   Activity Tolerance Patient limited by fatigue   Medical Staff Made Aware Spoke to CM for D/C planning   Nurse Made Aware yes, nsg gave clearance to work with pt   Assessment   Prognosis Good   Problem List Decreased strength;Decreased endurance; Impaired balance;Decreased mobility; Decreased coordination;Decreased safety awareness;Pain;Orthopedic restrictions   Assessment Pt is 76 y o  female seen for PT evaluation s/p admit to [facility] on 6/29/2019 w/ Chronic abdominal pain  PT consulted to assess pt's functional mobility and d/c needs  Order placed for PT eval and tx  Comorbidities affecting pt's physical performance at time of assessment include: fatigue  PTA, pt was ambulates community distances and elevations and has 3 ARTIS  Personal factors affecting pt at time of IE include: ambulating w/ assistive device, stairs to enter home, limited home support, unable to perform physical activity, inability to perform IADLs and inability to perform ADLs  Please find objective findings from PT assessment regarding body systems outlined above with impairments and limitations including weakness, impaired balance, decreased endurance, gait deviations, pain, decreased activity tolerance, decreased functional mobility tolerance, decreased safety awareness and fall risk  Pt required single step instruction with increased distractibility  Required multiple instructions for hand placement during transfers with poor carryover  Ambulated with slow gait  Mild instability  Noted decreased pelvic stability with fatigue  Educated in appropriate chair approach to improve safety  Pt reports progressive difficulty with steps to enter home  Limited home support, above noted deficits, and decreased safety place pt at high risk for falls  Pt motivated to complete therapy to regain strength   The following objective measures performed on IE also reveal limitations: Barthel Index: 60/100  Pt's clinical presentation is currently unstable/unpredictable  Pt to benefit from continued PT tx to address deficits as defined above and maximize level of functional independent mobility and consistency  From PT/mobility standpoint, recommendation at time of d/c would be IP rehab pending progress in order to facilitate return to PLOF  Barriers to Discharge Decreased caregiver support; Inaccessible home environment  (pt reports progressive difficulty with steps)   Goals   Patient Goals To go to rehab   STG Expiration Date 07/15/19   Short Term Goal #1 1  Complete bed mobility and transfers I to decrease need for caregiver in home  2  Ambulate 300' I to complete household and community mobility without A  3  Improve dynamic balance to good to decrease need for UE support during ambulation  4  Be educated & demonstate 3 steps to be able to enter home without A  Plan   Treatment/Interventions OT; Spoke to case management;Spoke to nursing; Patient/family training; Endurance training;LE strengthening/ROM; Functional transfer training   PT Frequency Other (Comment)  (3-5x/wk)   Recommendation   Recommendation Other (Comment)  (inpt rehab)   Equipment Recommended Walker   PT - OK to Discharge Yes  (to rehab when medically stable )   Barthel Index   Feeding 10   Bathing 0   Grooming Score 0   Dressing Score 5   Bladder Score 10   Bowels Score 10   Toilet Use Score 5   Transfers (Bed/Chair) Score 10   Mobility (Level Surface) Score 10   Stairs Score 0   Barthel Index Score 60           Vania Galvan, PT

## 2019-07-03 NOTE — PROGRESS NOTES
SOD - Internal Medicine Progress Note  Unit/Bed#: [facility] 915-01 Encounter: [id number]  SOD Team A      Idania Artpe 76 y o  female [id number]  Hospital Stay Days: 3    Assessment and Plan      Current Problem List   Principal Problem:    Chronic abdominal pain  Active Problems:    Weakness    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)    Hepatocellular carcinoma (HCC)    Hyponatremia    Portal vein thrombosis    Assessment/Plan:  1) Generalized chronic abdominal pain and weakness  Multifactorial in the setting of hepatic inflammation was cirrhosis and HCC  Extensive and increased portal venous thrombus/thrombi and HCC     No significant ascites noted on CT imaging or clinical exam    Plan:  Continue lactulose, cholestyramine  -Palliative Care following for symptom management  -family meeting with patient and palliative care yesterday at 2:30 p m  Patient understands disease along with family present, patient is understanding that she cannot take care of herself at home, and is looking at long-term care options, appreciate palliative care's help  -recommended oxycodone 10 mg q 6 hours p r n , Tylenol given p r n , gabapentin increased, lighted derm patch given by palliative care    Hold for sedation or respiratory depression, will call family this afternoon     2) Portal vein thrombosis  Previously on anticoagulation however patient developed serious life-threatening varices bleed with a hemoglobin down to 4 0 therefore anticoagulation at this point was contraindicated based on her clear bleeding risk  Plan:   Will manage conservatively off anticoagulation     3) Hep C liver cirrhosis and [address]   Status post resection ablation in 2015 for [address] , SIRS sphere embolization in December of 1247  Cirrhosis complicated by hepatic encephalopathy  MELD 15  Alkaline phosphatase 360, AST 68, ALT 28, watching sodium 128    Plan:  Continue lactulose, rifaximin  Continue Lasix 60 mg daily and spironolactone 50 mg        Disposition:   treatment goals with palliative care following    Subjective     Patient seen and examined at bedside this morning  The patient was very excited to see family yesterday  Was given instructions to follow up by palliative Care today  Patient still has complaints of lower back pain, same chronic abdominal pain is improving, would like to see if she can get more pain medication at this time  Patient denies any chest pain, shortness of breath, vomiting, nausea, diarrhea, fever, chills  Objective     Vitals: Temp (24hrs), Av 6 °F (37 °C), Min:98 6 °F (37 °C), Max:98 6 °F (37 °C)  Current: Temperature: 98 6 °F (37 °C)  Patient Vitals for the past 24 hrs:   BP Temp Pulse Resp SpO2   19 1725 107/74 98 6 °F (37 °C) 74 14 98 %    Body mass index is 24 03 kg/m²  Physical Exam:  GENERAL: NAD  HEENT:  NC/AT, PERRL, EOMI, no scleral icterus  CARDIAC:  RRR, +S1/S2, no S3/S4 heard, no m/g/r  PULMONARY:  CTA B/L, no wheezing/rales/rhonci, non-labored breathing  ABDOMEN:  Soft, distended,no rebound/guarding/rigidity, back pain tenderness  Extremities:  No edema, cyanosis, or clubbing  NEUROLOGIC: Grossly intact  SKIN:  No rashes or erythema noted on exposed skin     Psych: Normal affect    Invasive Devices     None                     Labs:   Results from last 7 days   Lab Units 19  0457 19  0454 19  0437 19  0512 19  1359   WBC Thousand/uL 9 86 11 68* 17 64* 16 08* 16 18*   HEMOGLOBIN g/dL 8 4* 7 5* 8 1* 9 4* 9 4*   HEMATOCRIT % 27 7* 24 1* 25 6* 29 9* 29 6*   PLATELETS Thousands/uL 209 197 226 246 239   NEUTROS PCT % 82* 80*  --  85* 86*   MONOS PCT % 9 11  --  8 7    Results from last 7 days   Lab Units 19  0457 19  0454 19  0437 19  0621 19  2253 19  1359   SODIUM mmol/L 128* 128* 130* 133* 132* 133*   POTASSIUM mmol/L 3 7 3 5 3 9 3 9 3 3* 2 6*   CHLORIDE mmol/L 96* 95* 97* 98* 96* 95*   CO2 mmol/L 24 26 26 28 30 29   BUN mg/dL 5 7 9 8 8 8   CREATININE mg/dL 0 58* 0 55* 0 56* 0 63 0 77 0 83   CALCIUM mg/dL 8 9 8 1* 8 4 8 2* 8 0* 8 1*   ALK PHOS U/L 360* 291*  --  351*  --  424*   ALT U/L 28 26  --  49  --  41   AST U/L 68* 88*  --  329*  --  310*   MAGNESIUM mg/dL  --   --   --   --   --  1 7   PHOSPHORUS mg/dL  --   --   --   --   --  2 0*   INR   --  1 26*  --   --   --   --    EGFR ml/min/1 73sq m 110 111 111 107 92 84     Results from last 7 days   Lab Units 07/02/19  0454   INR  1 26*     Results from last 7 days   Lab Units 06/29/19  1359   LACTIC ACID mmol/L 1 9         No results found for: PHART, MXS7GOY, PO2ART, NDG1CNR, T3LPXMYL, BEART, SOURCE  No components found for: HIV1X2  No results found for: HAV, HEPAIGM, HEPBIGM, HEPBCAB, HBEAG, HEPCAB  No results found for: SPEP, UPEP Lab Results   Component Value Date    HGBA1C 5 4 03/09/2019    HGBA1C 5 3 03/01/2019    HGBA1C 5 5 09/17/2018     Lab Results   Component Value Date    CHOL 148 07/23/2014    Lab Results   Component Value Date    HDL 44 07/23/2014    Lab Results   Component Value Date    LDLCALC 89 07/23/2014    Lab Results   Component Value Date    TRIG 74 07/23/2014     No components found for: PROCAL      Micro:      Urinalysis:  Lab Results   Component Value Date    ETOH <3 06/19/2019    ETOH <3 06/19/2019    ACTMNPHEN <2 (L) 66/63/9232    SALICYLATE <3 (L) 29/35/5940          Invalid input(s): URIBILINOGEN        Intake and Outputs:  I/O       07/01 0701 - 07/02 0700 07/02 0701 - 07/03 0700 07/03 0701 - 07/04 0700    P  O  720 720     Total Intake(mL/kg) 720 (11 3) 720 (11 3)     Urine (mL/kg/hr) 550 (0 4) 875 (0 6)     Stool 0 0     Total Output 550 875     Net +170 -155            Unmeasured Urine Occurrence  1 x     Unmeasured Stool Occurrence 1 x 3 x         Nutrition:  Diet GI; Non Ulcerogenic; Fluid Restriction 2000 ML, Sodium 2 GM  Radiology Results:   XR chest 2 views   Final Result by Tereza Chaidez MD (06/30 3872)      No focal consolidation, pleural effusion, or pneumothorax  Workstation performed: [id number]         CT abdomen pelvis with contrast   ED Interpretation by Kaykay Galvez DO (06/29 1535)      Diffuse replacement or infiltration of the valeriaon hepatis with no identifiable portal vein, common bile duct, head of pancreas, duodenum or inferior vena cava  Any or all of these could be severely diseased  Diffuse replacement of most of the liver with tumor  No identifiable change since 10 days ago            Workstation performed: [id number]         Final Result by Mendel Hutching, MD (06/29 1531)      Diffuse replacement or infiltration of the valeriano hepatis with no identifiable portal vein, common bile duct, head of pancreas, duodenum or inferior vena cava  Any or all of these could be severely diseased  Diffuse replacement of most of the liver with tumor        No identifiable change since 10 days ago            Workstation performed: [id number]           Scheduled Medications:    cholestyramine sugar free 4 g BID before lunch/dinner   enoxaparin 40 mg Daily   furosemide 60 mg Daily   gabapentin 600 mg Daily   gabapentin 900 mg HS   lactulose 20 g Daily   lidocaine 1 patch Daily   nicotine 1 patch Daily   nystatin 500,000 Units 4x Daily   pantoprazole 40 mg Daily   rifaximin 400 mg Q12H [address]   spironolactone 150 mg Daily     PRN MEDS:    acetaminophen 650 mg BID PRN   oxyCODONE 10 mg Q6H PRN     Last 24 Hour Meds: :   Medication Administration - last 24 hours from 07/02/2019 0725 to 07/03/2019 0725       Date/Time Order Dose Route Action Action by     07/02/2019 0825 enoxaparin (LOVENOX) subcutaneous injection 40 mg 40 mg Subcutaneous Given Ninoska Prabhakar RN     07/02/2019 0823 furosemide (LASIX) tablet 60 mg 40 mg Oral Not Given Chaka Gentile RN     07/02/2019 2252 gabapentin (NEURONTIN) capsule 900 mg 900 mg Oral Given Jane Peña RN     07/02/2019 0824 nicotine (NICODERM CQ) 21 mg/24 hr TD 24 hr patch 1 patch 1 patch Transdermal Medication Applied Ninoska Prabhakar RN     07/02/2019 0823 nicotine (NICODERM CQ) 21 mg/24 hr TD 24 hr patch 1 patch 1 patch Transdermal Patch Removed Jay Nevarez RN     07/02/2019 0824 pantoprazole (PROTONIX) EC tablet 40 mg 40 mg Oral Given Jay Nevarez RN     07/02/2019 0824 spironolactone (ALDACTONE) tablet 150 mg 50 mg Oral Not Given Jay Nevarez RN     07/02/2019 0825 lactulose 20 g/30 mL oral solution 20 g 20 g Oral Given Jay Nevarez RN     07/02/2019 1744 cholestyramine sugar free (QUESTRAN LIGHT) packet 4 g 4 g Oral Given Jay Nevarez RN     07/02/2019 1229 cholestyramine sugar free (QUESTRAN LIGHT) packet 4 g 4 g Oral Given Jay Nevarez RN     07/03/2019 0443 rifaximin (XIFAXAN) tablet 400 mg 400 mg Oral Given Kee Lyle RN     07/02/2019 1744 rifaximin (XIFAXAN) tablet 400 mg 400 mg Oral Given Jay Nevarez RN     07/03/2019 0447 oxyCODONE (ROXICODONE) immediate release tablet 10 mg 10 mg Oral Given Kee Lyle RN     07/02/2019 1748 oxyCODONE (ROXICODONE) immediate release tablet 10 mg 10 mg Oral Given Jay Nevarez RN     07/02/2019 1108 oxyCODONE (ROXICODONE) immediate release tablet 10 mg 10 mg Oral Given Ninoska Prabhakar RN     07/02/2019 1111 acetaminophen (TYLENOL) tablet 650 mg 650 mg Oral Given Jay Nevarez RN     07/02/2019 0826 gabapentin (NEURONTIN) capsule 600 mg 600 mg Oral Given Jay Nevarez RN     07/02/2019 2252 lidocaine (LIDODERM) 5 % patch 1 patch 1 patch Topical Patch Removed Lukasz Bobo RN     07/02/2019 0825 lidocaine (LIDODERM) 5 % patch 1 patch 1 patch Topical Medication Applied Ninoska Prabhakar RN     07/02/2019 2252 nystatin (MYCOSTATIN) oral suspension 500,000 Units 500,000 Units Swish & Swallow Given Lukasz Bobo RN     07/02/2019 1744 nystatin (MYCOSTATIN) oral suspension 500,000 Units 500,000 Units Swish & Swallow Given Jay Nevarez RN        VTE Pharmacologic Prophylaxis:  Lovenox  VTE Mechanical Prophylaxis:  SCDs  [employer]    PLEASE NOTE:  This encounter was completed utilizing the [employer] Direct Speech Voice Recognition Software  Grammatical errors, random word insertions, pronoun errors and incomplete sentences are occasional consequences of the system due to software limitations, ambient noise and hardware issues  These may be missed by proof reading prior to affixing electronic signature  Any questions or concerns about the content, text or information contained within the body of this dictation should be directly addressed to the physician for clarification  Please do not hesitate to call me directly if you have any any questions or concerns

## 2019-07-03 NOTE — OCCUPATIONAL THERAPY NOTE
[address]  Evaluation     Patient Name: Bethany HANKS'S Date: 7/3/2019  Problem List  Patient Active Problem List   Diagnosis    Anxiety    Weakness    Chronic lumbar radiculopathy    Claustrophobia    [facility] mellitus[facility] II [facility]    Diabetic neuropathy [facility]    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 ([facility] )    Hepatocellular carcinoma ([facility]    Herniated lumbar disc without myelopathy    Insomnia    Myofascial pain    Nicotine dependence    Opioid dependence ([facility]    Osteoporosis    Pain syndrome, chronic    Sacroiliitis (HCC)    Seasonal allergies    Trochanteric bursitis    Lumbar disc herniation    Goals of care, counseling/discussion    Hyponatremia    Chronic abdominal pain    Ascites    Decompensated hepatic cirrhosis (HCC)    Portal vein thrombosis    Depression    Anemia    Secondary esophageal varices without bleeding (HCC)    Lumbar spondylosis    Spinal stenosis of lumbar region without neurogenic claudication    Acalculous cholecystitis    Encephalopathy    Leukocytosis    Transaminitis     Past Medical History  Past Medical History:   Diagnosis Date    Anemia     Anxiety     Cancer ([facility]     Chronic pain disorder     herniated disc    Cirrhosis ([facility]     Constipation     Diabetes mellitus ([facility] )     blood sugar 113 at 1225 2/18/19    GI bleed     Hepatitis C, chronic ([facility] ) 7/22/2014    Hepatocellular carcinoma ([facility] )     Hypertension     Hypokalemia 3/7/2019    Hyponatremia     Melena     Portal vein thrombosis      Past Surgical History  Past Surgical History:   Procedure Laterality Date    DENTAL SURGERY      ESOPHAGOGASTRODUODENOSCOPY N/A 2/18/2019    Procedure: ESOPHAGOGASTRODUODENOSCOPY (EGD); Surgeon: Garo Winchester MD;  [facility]; Service: Gastroenterology    ESOPHAGOGASTRODUODENOSCOPY N/A 3/11/2019    Procedure: ESOPHAGOGASTRODUODENOSCOPY (EGD);   Surgeon: Denice Good MD;  [facility]; Service: Gastroenterology    HYSTERECTOMY      IR PARACENTESIS  3/8/2019    LIVER BIOPSY      LIVER SURGERY      Ablation    TONSILLECTOMY             07/03/19 0840   Note Type   Note type Eval only   Restrictions/Precautions   Weight Bearing Precautions Per Order No   Other Precautions Cognitive; Bed Alarm; Fall Risk   Pain Assessment   Pain Assessment No/denies pain   Pain Score No Pain   Home Living   Type of Home Apartment   Home Layout One level;Performs ADLs on one level  (3 ARTIS)   Bathroom Shower/Tub Tub/shower unit   Bathroom Toilet Standard   Bathroom Equipment Grab bars in [facility]  (denies use PTA)   Prior Function   Level of [place] Independent with ADLs and functional mobility   Lives With Alone   Receives Help From Family;Friend(s)   ADL Assistance Independent   IADLs Independent   Falls in the last 6 months 1 to 4  (2 - reports unable to "lift leg" high enough to clear curb, also reports tripping in house)   Lifestyle   Autonomy At baseline pt was completing all ADLs/IADLs IND, ambulating IND w/o an AD, (-) driving  Reciprocal Relationships supportive brother and friends   Service to Others does not work   Intrinsic Gratification pt enjoys watching TV   Psychosocial   Psychosocial (WDL) X   Patient Behaviors/Mood Flat affect   Subjective   Subjective "I can't lift my legs"   ADL   Eating Assistance 5  Supervision/Setup   Eating Deficit Supervision/safety; Increased time to complete  (seated EOB)   Grooming Assistance 5  Supervision/Setup   UB Bathing Assistance 4  Minimal Assistance   LB Bathing Assistance 3  Moderate Assistance   UB Dressing Assistance 4  Minimal Assistance   LB Dressing Assistance 3  Moderate Assistance   LB Dressing Deficit Don/doff R sock; Don/doff L sock; Supervision/safety;Steadying; Increased time to complete  (able to doff socks, assist to don)   Toileting Assistance  4  Minimal Assistance   Toileting Deficit Steadying;Verbal cueing;Supervison/safety; Increased time to complete;Grab bar use  (standard toilet)   Bed Mobility   Sit to Supine 5  Supervision   Additional items Increased time required;HOB elevated   Additional Comments Pt seated EOB upon arrival; pt left in bed w/ bed alarm on and all needs w/in reach at end of session   Transfers   Sit to Stand 4  Minimal assistance   Additional items Assist x 1; Increased time required;Verbal cues   Stand to Sit 4  Minimal assistance   Additional items Assist x 1; Increased time required;Verbal cues   Toilet transfer 4  Minimal assistance   Additional items Assist x 1; Increased time required;Verbal cues;Standard toilet  (grab bar use)   Additional Comments Pt educated on safe toilet transfer with use of grab bars with Fair understanding (pt initially attempting unsafe transfer flexing trunk too far forward & required MIN A to maintain balance)  Functional Mobility   Functional Mobility 4  Minimal assistance   Additional Comments w/in room and bathroom   Additional items Rolling walker   Balance   Static Sitting Fair +   Dynamic Sitting Fair   Static Standing Poor +   Dynamic Standing Poor +   Activity Tolerance   Activity Tolerance Patient limited by fatigue   Nurse Made Aware Spoke to RN - pt appropriate to be seen   RUE Assessment   RUE Assessment WFL  (shoulder flexion/biceps/triceps 4-/5;  5/5)   LUE Assessment   LUE Assessment WFL  (shoulder flexion/biceps/triceps 4-/5;  5/5)   Hand Function   Gross Motor Coordination Functional   Fine Motor Coordination Functional   Sensation   Light Touch No apparent deficits   Vision-Basic Assessment   Current Vision Wears glasses only for reading   Cognition   Overall Cognitive Status Impaired   Arousal/Participation Alert; Cooperative   Attention Attends with cues to redirect   Orientation Level Oriented X4   Memory Decreased short term memory;Decreased recall of recent events;Decreased recall of precautions   Following Commands Follows one step commands with increased time or repetition   Comments pt also with decreased safety awareness & judgment (see assessment)   Assessment   Limitation Decreased ADL status; Decreased UE strength;Decreased Safe judgement during ADL;Decreased cognition;Decreased endurance;Decreased self-care trans;Decreased high-level ADLs   Prognosis Good   Assessment Pt is a 76 y o  female who was [facility] to [facility] on 6/29/2019 with Chronic abdominal pain - per EMR multiple etiologies possible including gallbladder distention and cholestasis, stretching of Cristi's capsule of liver, and portal vein trhombus  Pt also with hepatic encephalopathy  Pt's problem list also includes PMH of hepatocellular carcinoma, alcoholic cirrhosis, chronic anemia, anxiety, chornic lumbar radiculopathy, claustrophobia, DM type II, myofascial pain, osteoporosis, sacroilitis, trochanteric bursitis, depression  At baseline pt was completing all ADLs/IADLs IND, ambulating IND w/o an AD, (-) driving  Pt lives alone in an apartment with 3 ARTIS  Pt reports she has support from local brother & friends  Currently pt requires MIN-MOD A for overall ADLS and MIN A for functional mobility/transfers with RW  Pt with LE weakness - required use of B/L UE to bring LE to crossed leg position seated EOB  Pt educated on safe toilet transfer with use of grab bars with Fair understanding (pt initially attempting unsafe transfer flexing trunk too far forward & required MIN A to maintain balance)  Pt also with decreased recall of activity directions & home set up information; in addition, pt presents with decreased safety awareness - attempting to ambulate independently in room despite education & bed alarm use   Pt currently presents with impairments in the following categories -steps to enter environment, limited home support, difficulty performing ADLS, difficulty performing IADLS , limited insight into deficits and health management  activity tolerance, endurance, standing balance/tolerance, sitting balance/tolerance, UE strength, memory, insight, safety , judgement  and attention   These impairments, as well as pt's fatigue, decreased caregiver support and risk for falls  limit pt's ability to safely engage in all baseline areas of occupation, includinggrooming, bathing, dressing, toileting, functional mobility/transfers, community mobility, meal prep and cleaning From OT standpoint, recommend [address] upon D/C  OT will continue to follow to address the below stated goals  Goals   Patient Goals to go to rehab   LTG Time Frame 10-14   Long Term Goal #1 see goals below   Plan   Treatment Interventions ADL retraining;Functional transfer training;UE strengthening/ROM; Endurance training;Cognitive reorientation;Patient/family training;Equipment evaluation/education; Activityengagement; Energy conservation   Goal Expiration Date 07/17/19   OT Frequency 3-5x/wk   Recommendation   OT Discharge Recommendation Short Term Rehab   Equipment Recommended Bedside commode   OT - OK to Discharge Yes  (when medically appropriate)   Barthel Index   Feeding 10   Bathing 0   Grooming Score 0   Dressing Score 5   Bladder Score 10   Bowels Score 10   Toilet Use Score 5   Transfers (Bed/Chair) Score 10   Mobility (Level Surface) Score 0   Stairs Score 0   Barthel Index Score 50   Modified [place] Scale   Modified [place] Scale 4       Goals:    MOD IND toileting & clothing management with use of AD/DME as needed  MOD IND UB/LB ADLs with use of AE as needed  MOD IND functional transfers/mobility to all surfaces with Fair+ to Good balance/safety to participate in dynamic ADLs/IADLs  Participate in ongoing functional cognitive assessment with Good attention/participation to assist with safe D/C planning  Participate in IADL simulation with MOD IND with good endurance/safety to increased IND and facilitate return to PLOF      Increase activity tolerance to Good for 40-45 minutes of participation in functional tasks  Increase B/L UE strength by 1/2 MMT grade to increase IND in UB ADLs and functional transfers  Demo good carryover with safe use of RW during functional tasks  Increase standing balance to Fair+ to Good for 8-10 minutes of participation in functional tasks        Azucena Felton, OT

## 2019-07-03 NOTE — PLAN OF CARE
Problem: PHYSICAL THERAPY ADULT  Goal: Performs mobility at highest level of function for planned discharge setting  See evaluation for individualized goals  Description  Treatment/Interventions: OT, Spoke to case management, Spoke to nursing, Patient/family training, Endurance training, LE strengthening/ROM, Functional transfer training  Equipment Recommended: Rhea Jurado       See flowsheet documentation for full assessment, interventions and recommendations  Note:   Prognosis: Good  Problem List: Decreased strength, Decreased endurance, Impaired balance, Decreased mobility, Decreased coordination, Decreased safety awareness, Pain, Orthopedic restrictions  Assessment: Pt is 76 y o  female seen for PT evaluation s/p admit to [facility] on 6/29/2019 w/ Chronic abdominal pain  PT consulted to assess pt's functional mobility and d/c needs  Order placed for PT eval and tx  Comorbidities affecting pt's physical performance at time of assessment include: fatigue  PTA, pt was ambulates community distances and elevations and has 3 ARTIS  Personal factors affecting pt at time of IE include: ambulating w/ assistive device, stairs to enter home, limited home support, unable to perform physical activity, inability to perform IADLs and inability to perform ADLs  Please find objective findings from PT assessment regarding body systems outlined above with impairments and limitations including weakness, impaired balance, decreased endurance, gait deviations, pain, decreased activity tolerance, decreased functional mobility tolerance, decreased safety awareness and fall risk  Pt required single step instruction with increased distractibility  Required multiple instructions for hand placement during transfers with poor carryover  Ambulated with slow gait  Mild instability  Noted decreased pelvic stability with fatigue  Educated in appropriate chair approach to improve safety   Pt reports progressive difficulty with steps to enter home  Limited home support, above noted deficits, and decreased safety place pt at high risk for falls  Pt motivated to complete therapy to regain strength  The following objective measures performed on IE also reveal limitations: Barthel Index: 60/100  Pt's clinical presentation is currently unstable/unpredictable  Pt to benefit from continued PT tx to address deficits as defined above and maximize level of functional independent mobility and consistency  From PT/mobility standpoint, recommendation at time of d/c would be IP rehab pending progress in order to facilitate return to PLOF  Barriers to Discharge: Decreased caregiver support, Inaccessible home environment(pt reports progressive difficulty with steps)     Recommendation: Other (Comment)(inpt rehab)     PT - OK to Discharge: Yes(to rehab when medically stable )    See flowsheet documentation for full assessment

## 2019-07-03 NOTE — SOCIAL WORK
Met with pt and telephonically spokje with son  Advised RYLAN Acosta is not contracted with [address] is a 1969 [address] [facility] facility and is contracted with insurance  They are agreeable to referral to [facility]  Referral entered in [facility] Мария 73 son, he would like referral to [facility] as first choice    Referral entered in [facility]

## 2019-07-03 NOTE — PLAN OF CARE
Problem: Potential for Falls  Goal: Patient will remain free of falls  Description  INTERVENTIONS:  - Assess patient frequently for physical needs  -  Identify cognitive and physical deficits and behaviors that affect risk of falls    -  [place] fall precautions as indicated by assessment   - Educate patient/family on patient safety including physical limitations  - Instruct patient to call for assistance with activity based on assessment  - Modify environment to reduce risk of injury  - Consider OT/PT consult to assist with strengthening/mobility  Outcome: Progressing     Problem: Prexisting or High Potential for Compromised Skin Integrity  Goal: Skin integrity is maintained or improved  Description  INTERVENTIONS:  - Identify patients at risk for skin breakdown  - Assess and monitor skin integrity  - Assess and monitor nutrition and hydration status  - Monitor labs (i e  albumin)  - Assess for incontinence   - Turn and reposition patient  - Assist with mobility/ambulation  - Relieve pressure over bony prominences  - Avoid friction and shearing  - Provide appropriate hygiene as needed including keeping skin clean and dry  - Evaluate need for skin moisturizer/barrier cream  - Collaborate with interdisciplinary team (i e  Nutrition, Rehabilitation, etc )   - Patient/family teaching  Outcome: Progressing     Problem: PAIN - ADULT  Goal: Verbalizes/displays adequate comfort level or baseline comfort level  Description  Interventions:  - Encourage patient to monitor pain and request assistance  - Assess pain using appropriate pain scale  - Administer analgesics based on type and severity of pain and evaluate response  - Implement non-pharmacological measures as appropriate and evaluate response  - Consider cultural and social influences on pain and pain management  - Notify physician/advanced practitioner if interventions unsuccessful or patient reports new pain  Outcome: Progressing     Problem: INFECTION - ADULT  Goal: Absence or prevention of progression during hospitalization  Description  INTERVENTIONS:  - Assess and monitor for signs and symptoms of infection  - Monitor lab/diagnostic results  - Monitor all insertion sites, i e  indwelling lines, tubes, and drains  - Monitor endotracheal (as able) and nasal secretions for changes in amount and color  - [place] appropriate cooling/warming therapies per order  - Administer medications as ordered  - Instruct and encourage patient and family to use good hand hygiene technique  - Identify and instruct in appropriate isolation precautions for identified infection/condition  Outcome: Progressing  Goal: Absence of fever/infection during neutropenic period  Description  INTERVENTIONS:  - Monitor WBC  - Implement neutropenic guidelines  Outcome: Progressing     Problem: SAFETY ADULT  Goal: Maintain or return to baseline ADL function  Description  INTERVENTIONS:  -  Assess patient's ability to carry out ADLs; assess patient's baseline for ADL function and identify physical deficits which impact ability to perform ADLs (bathing, care of mouth/teeth, toileting, grooming, dressing, etc )  - Assess/evaluate cause of self-care deficits   - Assess range of motion  - Assess patient's mobility; develop plan if impaired  - Assess patient's need for assistive devices and provide as appropriate  - Encourage maximum independence but intervene and supervise when necessary  ¯ Involve family in performance of ADLs  ¯ Assess for home care needs following discharge   ¯ Request OT consult to assist with ADL evaluation and planning for discharge  ¯ Provide patient education as appropriate  Outcome: Progressing  Goal: Maintain or return mobility status to optimal level  Description  INTERVENTIONS:  - Assess patient's baseline mobility status (ambulation, transfers, stairs, etc )    - Identify cognitive and physical deficits and behaviors that affect mobility  - Identify mobility aids required to assist with transfers and/or ambulation (gait belt, sit-to-stand, lift, walker, cane, etc )  - [place] fall precautions as indicated by assessment  - Record patient progress and toleration of activity level on Mobility SBAR; progress patient to next Phase/Stage  - Instruct patient to call for assistance with activity based on assessment  - Request Rehabilitation consult to assist with strengthening/weightbearing, etc   Outcome: Progressing     Problem: DISCHARGE PLANNING  Goal: Discharge to home or other facility with appropriate resources  Description  INTERVENTIONS:  - Identify barriers to discharge w/patient and caregiver  - Arrange for needed discharge resources and transportation as appropriate  - Identify discharge learning needs (meds, wound care, etc )  - Arrange for interpretive services to assist at discharge as needed  - Refer to Case Management Department for coordinating discharge planning if the patient needs post-hospital services based on physician/advanced practitioner order or complex needs related to functional status, cognitive ability, or social support system  Outcome: Progressing     Problem: Knowledge Deficit  Goal: Patient/family/caregiver demonstrates understanding of disease process, treatment plan, medications, and discharge instructions  Description  Complete learning assessment and assess knowledge base  Interventions:  - Provide teaching at level of understanding  - Provide teaching via preferred learning methods  Outcome: Progressing     Problem: Nutrition/Hydration-ADULT  Goal: Nutrient/Hydration intake appropriate for improving, restoring or maintaining nutritional needs  Description  Monitor and assess patient's nutrition/hydration status for malnutrition (ex- brittle hair, bruises, dry skin, pale skin and conjunctiva, muscle wasting, smooth red tongue, and disorientation)  Collaborate with interdisciplinary team and initiate plan and interventions as ordered    Monitor patient's weight and dietary intake as ordered or per policy  Utilize nutrition screening tool and intervene per policy  Determine patient's food preferences and provide high-protein, high-caloric foods as appropriate       INTERVENTIONS:  - Monitor oral intake, urinary output, labs, and treatment plans  - Assess nutrition and hydration status and recommend course of action  - Evaluate amount of meals eaten  - Assist patient with eating if necessary   - Allow adequate time for meals  - Recommend/ encourage appropriate diets, oral nutritional supplements, and vitamin/mineral supplements  - Order, calculate, and assess calorie counts as needed  - Recommend, monitor, and adjust tube feedings and TPN/PPN based on assessed needs  - Assess need for intravenous fluids  - Provide specific nutrition/hydration education as appropriate  - Include patient/family/caregiver in decisions related to nutrition  Outcome: Progressing

## 2019-07-04 LAB
ANION GAP SERPL CALCULATED.3IONS-SCNC: 6 MMOL/L (ref 4–13)
BASOPHILS # BLD AUTO: 0.02 THOUSANDS/ΜL (ref 0–0.1)
BASOPHILS NFR BLD AUTO: 0 % (ref 0–1)
BUN SERPL-MCNC: 4 MG/DL (ref 5–25)
CALCIUM SERPL-MCNC: 8.7 MG/DL (ref 8.3–10.1)
CHLORIDE SERPL-SCNC: 98 MMOL/L (ref 100–108)
CO2 SERPL-SCNC: 27 MMOL/L (ref 21–32)
CREAT SERPL-MCNC: 0.59 MG/DL (ref 0.6–1.3)
EOSINOPHIL # BLD AUTO: 0.06 THOUSAND/ΜL (ref 0–0.61)
EOSINOPHIL NFR BLD AUTO: 1 % (ref 0–6)
ERYTHROCYTE [DISTWIDTH] IN BLOOD BY AUTOMATED COUNT: 18.1 % (ref 11.6–15.1)
GFR SERPL CREATININE-BSD FRML MDRD: 109 ML/MIN/1.73SQ M
GLUCOSE SERPL-MCNC: 102 MG/DL (ref 65–140)
HCT VFR BLD AUTO: 27.1 % (ref 34.8–46.1)
HGB BLD-MCNC: 8.3 G/DL (ref 11.5–15.4)
IMM GRANULOCYTES # BLD AUTO: 0.03 THOUSAND/UL (ref 0–0.2)
IMM GRANULOCYTES NFR BLD AUTO: 0 % (ref 0–2)
LYMPHOCYTES # BLD AUTO: 0.91 THOUSANDS/ΜL (ref 0.6–4.47)
LYMPHOCYTES NFR BLD AUTO: 12 % (ref 14–44)
MCH RBC QN AUTO: 27.9 PG (ref 26.8–34.3)
MCHC RBC AUTO-ENTMCNC: 30.6 G/DL (ref 31.4–37.4)
MCV RBC AUTO: 91 FL (ref 82–98)
MONOCYTES # BLD AUTO: 0.82 THOUSAND/ΜL (ref 0.17–1.22)
MONOCYTES NFR BLD AUTO: 11 % (ref 4–12)
NEUTROPHILS # BLD AUTO: 5.51 THOUSANDS/ΜL (ref 1.85–7.62)
NEUTS SEG NFR BLD AUTO: 76 % (ref 43–75)
NRBC BLD AUTO-RTO: 0 /100 WBCS
PLATELET # BLD AUTO: 222 THOUSANDS/UL (ref 149–390)
PMV BLD AUTO: 10.2 FL (ref 8.9–12.7)
POTASSIUM SERPL-SCNC: 4 MMOL/L (ref 3.5–5.3)
RBC # BLD AUTO: 2.97 MILLION/UL (ref 3.81–5.12)
SODIUM SERPL-SCNC: 131 MMOL/L (ref 136–145)
WBC # BLD AUTO: 7.35 THOUSAND/UL (ref 4.31–10.16)

## 2019-07-04 PROCEDURE — 99232 SBSQ HOSP IP/OBS MODERATE 35: CPT | Performed by: INTERNAL MEDICINE

## 2019-07-04 PROCEDURE — 85025 COMPLETE CBC W/AUTO DIFF WBC: CPT | Performed by: STUDENT IN AN ORGANIZED HEALTH CARE EDUCATION/TRAINING PROGRAM

## 2019-07-04 PROCEDURE — 80048 BASIC METABOLIC PNL TOTAL CA: CPT | Performed by: STUDENT IN AN ORGANIZED HEALTH CARE EDUCATION/TRAINING PROGRAM

## 2019-07-04 RX ADMIN — RIFAXIMIN 400 MG: 200 TABLET ORAL at 17:32

## 2019-07-04 RX ADMIN — CHOLESTYRAMINE 4 G: 4 POWDER, FOR SUSPENSION ORAL at 14:20

## 2019-07-04 RX ADMIN — OXYCODONE HYDROCHLORIDE 10 MG: 10 TABLET ORAL at 17:33

## 2019-07-04 RX ADMIN — NICOTINE 1 PATCH: 21 PATCH, EXTENDED RELEASE TRANSDERMAL at 08:27

## 2019-07-04 RX ADMIN — PANTOPRAZOLE SODIUM 40 MG: 40 TABLET, DELAYED RELEASE ORAL at 08:26

## 2019-07-04 RX ADMIN — GABAPENTIN 600 MG: 300 CAPSULE ORAL at 08:26

## 2019-07-04 RX ADMIN — LACTULOSE 20 G: 10 SOLUTION ORAL at 08:25

## 2019-07-04 RX ADMIN — OXYCODONE HYDROCHLORIDE 10 MG: 10 TABLET ORAL at 05:23

## 2019-07-04 RX ADMIN — GABAPENTIN 900 MG: 300 CAPSULE ORAL at 21:22

## 2019-07-04 RX ADMIN — ENOXAPARIN SODIUM 40 MG: 40 INJECTION SUBCUTANEOUS at 08:27

## 2019-07-04 RX ADMIN — RIFAXIMIN 400 MG: 200 TABLET ORAL at 05:23

## 2019-07-04 RX ADMIN — NYSTATIN 500000 UNITS: 100000 SUSPENSION ORAL at 08:25

## 2019-07-04 RX ADMIN — CHOLESTYRAMINE 4 G: 4 POWDER, FOR SUSPENSION ORAL at 17:34

## 2019-07-04 RX ADMIN — LIDOCAINE 1 PATCH: 50 PATCH CUTANEOUS at 08:27

## 2019-07-04 NOTE — PLAN OF CARE
Problem: Potential for Falls  Goal: Patient will remain free of falls  Description  INTERVENTIONS:  - Assess patient frequently for physical needs  -  Identify cognitive and physical deficits and behaviors that affect risk of falls    -  [place] fall precautions as indicated by assessment   - Educate patient/family on patient safety including physical limitations  - Instruct patient to call for assistance with activity based on assessment  - Modify environment to reduce risk of injury  - Consider OT/PT consult to assist with strengthening/mobility  Outcome: Progressing     Problem: Prexisting or High Potential for Compromised Skin Integrity  Goal: Skin integrity is maintained or improved  Description  INTERVENTIONS:  - Identify patients at risk for skin breakdown  - Assess and monitor skin integrity  - Assess and monitor nutrition and hydration status  - Monitor labs (i e  albumin)  - Assess for incontinence   - Turn and reposition patient  - Assist with mobility/ambulation  - Relieve pressure over bony prominences  - Avoid friction and shearing  - Provide appropriate hygiene as needed including keeping skin clean and dry  - Evaluate need for skin moisturizer/barrier cream  - Collaborate with interdisciplinary team (i e  Nutrition, Rehabilitation, etc )   - Patient/family teaching  Outcome: Progressing     Problem: PAIN - ADULT  Goal: Verbalizes/displays adequate comfort level or baseline comfort level  Description  Interventions:  - Encourage patient to monitor pain and request assistance  - Assess pain using appropriate pain scale  - Administer analgesics based on type and severity of pain and evaluate response  - Implement non-pharmacological measures as appropriate and evaluate response  - Consider cultural and social influences on pain and pain management  - Notify physician/advanced practitioner if interventions unsuccessful or patient reports new pain  Outcome: Progressing     Problem: INFECTION - ADULT  Goal: Absence or prevention of progression during hospitalization  Description  INTERVENTIONS:  - Assess and monitor for signs and symptoms of infection  - Monitor lab/diagnostic results  - Monitor all insertion sites, i e  indwelling lines, tubes, and drains  - Monitor endotracheal (as able) and nasal secretions for changes in amount and color  - [place] appropriate cooling/warming therapies per order  - Administer medications as ordered  - Instruct and encourage patient and family to use good hand hygiene technique  - Identify and instruct in appropriate isolation precautions for identified infection/condition  Outcome: Progressing  Goal: Absence of fever/infection during neutropenic period  Description  INTERVENTIONS:  - Monitor WBC  - Implement neutropenic guidelines  Outcome: Progressing     Problem: SAFETY ADULT  Goal: Maintain or return to baseline ADL function  Description  INTERVENTIONS:  -  Assess patient's ability to carry out ADLs; assess patient's baseline for ADL function and identify physical deficits which impact ability to perform ADLs (bathing, care of mouth/teeth, toileting, grooming, dressing, etc )  - Assess/evaluate cause of self-care deficits   - Assess range of motion  - Assess patient's mobility; develop plan if impaired  - Assess patient's need for assistive devices and provide as appropriate  - Encourage maximum independence but intervene and supervise when necessary  ¯ Involve family in performance of ADLs  ¯ Assess for home care needs following discharge   ¯ Request OT consult to assist with ADL evaluation and planning for discharge  ¯ Provide patient education as appropriate  Outcome: Progressing  Goal: Maintain or return mobility status to optimal level  Description  INTERVENTIONS:  - Assess patient's baseline mobility status (ambulation, transfers, stairs, etc )    - Identify cognitive and physical deficits and behaviors that affect mobility  - Identify mobility aids required to assist with transfers and/or ambulation (gait belt, sit-to-stand, lift, walker, cane, etc )  - [place] fall precautions as indicated by assessment  - Record patient progress and toleration of activity level on Mobility SBAR; progress patient to next Phase/Stage  - Instruct patient to call for assistance with activity based on assessment  - Request Rehabilitation consult to assist with strengthening/weightbearing, etc   Outcome: Progressing     Problem: DISCHARGE PLANNING  Goal: Discharge to home or other facility with appropriate resources  Description  INTERVENTIONS:  - Identify barriers to discharge w/patient and caregiver  - Arrange for needed discharge resources and transportation as appropriate  - Identify discharge learning needs (meds, wound care, etc )  - Arrange for interpretive services to assist at discharge as needed  - Refer to Case Management Department for coordinating discharge planning if the patient needs post-hospital services based on physician/advanced practitioner order or complex needs related to functional status, cognitive ability, or social support system  Outcome: Progressing     Problem: Knowledge Deficit  Goal: Patient/family/caregiver demonstrates understanding of disease process, treatment plan, medications, and discharge instructions  Description  Complete learning assessment and assess knowledge base  Interventions:  - Provide teaching at level of understanding  - Provide teaching via preferred learning methods  Outcome: Progressing     Problem: Nutrition/Hydration-ADULT  Goal: Nutrient/Hydration intake appropriate for improving, restoring or maintaining nutritional needs  Description  Monitor and assess patient's nutrition/hydration status for malnutrition (ex- brittle hair, bruises, dry skin, pale skin and conjunctiva, muscle wasting, smooth red tongue, and disorientation)  Collaborate with interdisciplinary team and initiate plan and interventions as ordered    Monitor patient's weight and dietary intake as ordered or per policy  Utilize nutrition screening tool and intervene per policy  Determine patient's food preferences and provide high-protein, high-caloric foods as appropriate       INTERVENTIONS:  - Monitor oral intake, urinary output, labs, and treatment plans  - Assess nutrition and hydration status and recommend course of action  - Evaluate amount of meals eaten  - Assist patient with eating if necessary   - Allow adequate time for meals  - Recommend/ encourage appropriate diets, oral nutritional supplements, and vitamin/mineral supplements  - Order, calculate, and assess calorie counts as needed  - Recommend, monitor, and adjust tube feedings and TPN/PPN based on assessed needs  - Assess need for intravenous fluids  - Provide specific nutrition/hydration education as appropriate  - Include patient/family/caregiver in decisions related to nutrition  Outcome: Progressing

## 2019-07-04 NOTE — PROGRESS NOTES
SOD - Internal Medicine Progress Note  Unit/Bed#: [facility] 915-01 Encounter: [id number]  SOD Team A      Willy Monday 76 y o  female [id number]  Hospital Stay Days: 4    Assessment and Plan      Current Problem List   Principal Problem:    Chronic abdominal pain  Active Problems:    Weakness    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)    Hepatocellular carcinoma (HCC)    Hyponatremia    Portal vein thrombosis    Assessment/Plan:    1) Generalized chronic abdominal pain and weakness  Multifactorial in the setting of hepatic inflammation was cirrhosis and HCC  Extensive and increased portal venous thrombus/thrombi and HCC     No significant ascites noted on CT imaging or clinical exam     Plan:  Continue lactulose, cholestyramine  -Palliative Care following for symptom management  -family meeting with patient and palliative care 2 days ago at 2:30 p m  Patient understands disease along with family present, patient is understanding that she cannot take care of herself at home, and is looking at long term nursing facility, appreciate palliative care's help on the case  -on oxycodone 10 mg q 6 hours p r n , Tylenol 650 mg given p r n , gabapentin, lighted derm patch given by palliative care    Hold for sedation or respiratory depression, will call family this afternoon     2) Portal vein thrombosis  Previously on anticoagulation however patient developed serious life-threatening varices bleed with a hemoglobin down to 4 0 therefore anticoagulation at this point was contraindicated based on her clear bleeding risk      Plan:   Will manage conservatively off anticoagulation     3) Hep C liver cirrhosis and [address]   Status post resection ablation in 2015 for [address] , SIRS sphere embolization in December of 9423  Cirrhosis complicated by hepatic encephalopathy  MELD 15  Alkaline phosphatase 360, AST 68, ALT 28, watching sodium 128     Plan:  Continue lactulose, rifaximin  Continue Lasix 60 mg daily and spironolactone 50 mg    PT/OT recommendation:  Cleared  Disposition:  Patient awaiting nursing facility placement  Subjective     Patient seen and examined at bedside this morning  Patient says that her pain in her back is improved today but still there  Patient denies any chest pain, shortness of breath, fever, chills, nausea, vomiting  Understands that she is awaiting nursing facility placement  Objective     Vitals: Temp (24hrs), Av °F (37 2 °C), Min:98 6 °F (37 °C), Max:99 3 °F (37 4 °C)  Current: Temperature: 98 6 °F (37 °C)  Patient Vitals for the past 24 hrs:   BP Temp Temp src Pulse Resp SpO2   19 2226 103/66 98 6 °F (37 °C) Oral 73 20 98 %   19 1545 99/66   81     19 1530 99/66 99 3 °F (37 4 °C)  73 20 98 %    Body mass index is 24 03 kg/m²  Physical Exam:  GENERAL: NAD  HEENT:  NC/AT, PERRL, EOMI, no scleral icterus  CARDIAC:  RRR, +S1/S2, no S3/S4 heard, no m/g/r  PULMONARY:  CTA B/L, no wheezing/rales/rhonci, non-labored breathing  ABDOMEN:  Distended but soft, mild tenderness in epigastric region,no rebound/guarding/rigidity  Extremities:  No edema, cyanosis, or clubbing  NEUROLOGIC: Grossly intact  SKIN:  No rashes or erythema noted on exposed skin     Psych: Normal affect  Invasive Devices     None                     Labs:   Results from last 7 days   Lab Units 19  0500 19  0457 19  0454 19  0437 19  0512 19  1359   WBC Thousand/uL 7 35 9 86 11 68* 17 64* 16 08* 16 18*   HEMOGLOBIN g/dL 8 3* 8 4* 7 5* 8 1* 9 4* 9 4*   HEMATOCRIT % 27 1* 27 7* 24 1* 25 6* 29 9* 29 6*   PLATELETS Thousands/uL 222 209 197 226 246 239   NEUTROS PCT % 76* 82* 80*  --  85* 86*   MONOS PCT % 11 9 11  --  8 7      Results from last 7 days   Lab Units 19  0500 19  0955 19  0457 19  0454 19  0437 19  0621 19  2253 19  1359   SODIUM mmol/L 131*  --  128* 128* 130* 133* 132* 133*   POTASSIUM mmol/L 4 0  --  3 7 3 5 3 9 3 9 3 3* 2 6*   CHLORIDE mmol/L 98*  --  96* 95* 97* 98* 96* 95*   CO2 mmol/L 27  --  24 26 26 28 30 29   BUN mg/dL 4*  --  5 7 9 8 8 8   CREATININE mg/dL 0 59*  --  0 58* 0 55* 0 56* 0 63 0 77 0 83   CALCIUM mg/dL 8 7  --  8 9 8 1* 8 4 8 2* 8 0* 8 1*   ALK PHOS U/L  --   --  360* 291*  --  351*  --  424*   ALT U/L  --   --  28 26  --  49  --  41   AST U/L  --   --  68* 88*  --  329*  --  310*   MAGNESIUM mg/dL  --   --   --   --   --   --   --  1 7   PHOSPHORUS mg/dL  --   --   --   --   --   --   --  2 0*   INR   --  1 21*  --  1 26*  --   --   --   --    EGFR ml/min/1 73sq m 109  --  110 111 111 107 92 84     Results from last 7 days   Lab Units 07/03/19  0955 07/02/19  0454   INR  1 21* 1 26*     Results from last 7 days   Lab Units 06/29/19  1359   LACTIC ACID mmol/L 1 9         No results found for: PHART, ZXQ4NQA, PO2ART, PVI1DNS, J2FQYBXF, BEART, SOURCE  No components found for: HIV1X2  No results found for: HAV, HEPAIGM, HEPBIGM, HEPBCAB, HBEAG, HEPCAB  No results found for: SPEP, UPEP   Lab Results   Component Value Date    HGBA1C 5 4 03/09/2019    HGBA1C 5 3 03/01/2019    HGBA1C 5 5 09/17/2018     Lab Results   Component Value Date    CHOL 148 07/23/2014      Lab Results   Component Value Date    HDL 44 07/23/2014      Lab Results   Component Value Date    LDLCALC 89 07/23/2014      Lab Results   Component Value Date    TRIG 74 07/23/2014     No components found for: PROCAL      Micro:      Urinalysis:  Lab Results   Component Value Date    ETOH <3 06/19/2019    ETOH <3 06/19/2019    ACTMNPHEN <2 (L) 43/46/7824    SALICYLATE <3 (L) 33/87/2533          Invalid input(s): URIBILINOGEN        Intake and Outputs:  I/O       07/02 0701 - 07/03 0700 07/03 0701 - 07/04 0700    P  O  720 570    Total Intake(mL/kg) 720 (11 3) 570 (9)    Urine (mL/kg/hr) 875 (0 6) 250 (0 2)    Stool 0 0    Total Output 875 250    Net -155 +320          Unmeasured Urine Occurrence 1 x 3 x    Unmeasured Stool Occurrence 3 x 2 x Nutrition:  Diet GI; Non Ulcerogenic; Fluid Restriction 2000 ML, Sodium 2 GM  Radiology Results:   XR chest 2 views   Final Result by Vinayak Ayala MD (06/30 7635)      No focal consolidation, pleural effusion, or pneumothorax  Workstation performed: [id number]         CT abdomen pelvis with contrast   ED Interpretation by Chuck Jauregui DO (06/29 5540)      Diffuse replacement or infiltration of the valeriano hepatis with no identifiable portal vein, common bile duct, head of pancreas, duodenum or inferior vena cava  Any or all of these could be severely diseased  Diffuse replacement of most of the liver with tumor  No identifiable change since 10 days ago            Workstation performed: [id number]         Final Result by Dejah De La Vega MD (06/29 1535)      Diffuse replacement or infiltration of the valeriano hepatis with no identifiable portal vein, common bile duct, head of pancreas, duodenum or inferior vena cava  Any or all of these could be severely diseased  Diffuse replacement of most of the liver with tumor        No identifiable change since 10 days ago            Workstation performed: [id number]           Scheduled Medications:    cholestyramine sugar free 4 g BID before lunch/dinner   enoxaparin 40 mg Daily   furosemide 60 mg Daily   gabapentin 600 mg Daily   gabapentin 900 mg HS   lactulose 20 g Daily   lidocaine 1 patch Daily   nicotine 1 patch Daily   nystatin 500,000 Units 4x Daily   pantoprazole 40 mg Daily   rifaximin 400 mg Q12H [facility]   spironolactone 150 mg Daily     PRN MEDS:    acetaminophen 650 mg BID PRN   oxyCODONE 10 mg Q6H PRN     Last 24 Hour Meds: :   Medication Administration - last 24 hours from 07/03/2019 0727 to 07/04/2019 9216       Date/Time Order Dose Route Action Action by     07/03/2019 0910 enoxaparin (LOVENOX) subcutaneous injection 40 mg 40 mg Subcutaneous Given Ninoska Prabhakar RN     07/03/2019 0912 furosemide (LASIX) tablet 60 mg 0 mg Oral Not Given Guera Push, RN     07/03/2019 2116 gabapentin (NEURONTIN) capsule 900 mg 900 mg Oral Given [facility], RN     07/03/2019 0913 nicotine (NICODERM CQ) 21 mg/24 hr TD 24 hr patch 1 patch 1 patch Transdermal Medication Applied Ninoska Prabhakar, RN     07/03/2019 0912 nicotine (NICODERM CQ) 21 mg/24 hr TD 24 hr patch 1 patch 1 patch Transdermal Patch Removed Guera Push, RN     07/03/2019 0912 pantoprazole (PROTONIX) EC tablet 40 mg 40 mg Oral Given Guera Push, RN     07/03/2019 0911 spironolactone (ALDACTONE) tablet 150 mg 0 mg Oral Not Given Guera Push, RN     07/03/2019 0910 lactulose 20 g/30 mL oral solution 20 g 20 g Oral Given Guera Push, RN     07/03/2019 1723 cholestyramine sugar free (QUESTRAN LIGHT) packet 4 g 4 g Oral Given Guera Push, RN     07/03/2019 1144 cholestyramine sugar free (QUESTRAN LIGHT) packet 4 g 4 g Oral Given Guera Push, RN     07/04/2019 0523 rifaximin (XIFAXAN) tablet 400 mg 400 mg Oral Given [facility], RN     07/03/2019 1723 rifaximin (XIFAXAN) tablet 400 mg 400 mg Oral Given Ninoska Aki, RN     07/04/2019 0523 oxyCODONE (ROXICODONE) immediate release tablet 10 mg 10 mg Oral Given [facility], RN     07/03/2019 2227 oxyCODONE (ROXICODONE) immediate release tablet 10 mg 10 mg Oral Given [facility], RN     07/03/2019 1612 oxyCODONE (ROXICODONE) immediate release tablet 10 mg 10 mg Oral Given Ninoska Prabhakar, RN     07/03/2019 0913 gabapentin (NEURONTIN) capsule 600 mg 600 mg Oral Given Guera Push, RN     07/03/2019 2116 lidocaine (LIDODERM) 5 % patch 1 patch 1 patch Topical Patch Removed [facility], RN     07/03/2019 0913 lidocaine (LIDODERM) 5 % patch 1 patch 1 patch Topical Medication Applied Ninoska Prabhakar, RN     07/03/2019 2116 nystatin (MYCOSTATIN) oral suspension 500,000 Units 500,000 Units Swish & Swallow Refused [facility], RN     07/03/2019 9334 nystatin (MYCOSTATIN) oral suspension 500,000 Units 500,000 Units Swish & Swallow Given Shayy Bernal RN     07/03/2019 1144 nystatin (MYCOSTATIN) oral suspension 500,000 Units 500,000 Units Swish & Swallow Given Shayy Bernal RN     07/03/2019 0910 nystatin (MYCOSTATIN) oral suspension 500,000 Units 500,000 Units Swish & Swallow Given Shayy Bernal RN        VTE Pharmacologic Prophylaxis:  Lovenox  VTE Mechanical Prophylaxis:  SCDs  Sachin Belinda D O  PGY-1    PLEASE NOTE:  This encounter was completed utilizing the [employer] Direct Speech Voice Recognition Software  Grammatical errors, random word insertions, pronoun errors and incomplete sentences are occasional consequences of the system due to software limitations, ambient noise and hardware issues  These may be missed by proof reading prior to affixing electronic signature  Any questions or concerns about the content, text or information contained within the body of this dictation should be directly addressed to the physician for clarification  Please do not hesitate to call me directly if you have any any questions or concerns

## 2019-07-05 ENCOUNTER — OFFICE VISIT (OUTPATIENT)
Dept: PHYSICAL THERAPY | Age: 69
End: 2019-07-05
Payer: COMMERCIAL

## 2019-07-05 PROBLEM — E87.1 HYPONATREMIA: Status: RESOLVED | Noted: 2018-09-16 | Resolved: 2019-07-05

## 2019-07-05 LAB
ANION GAP SERPL CALCULATED.3IONS-SCNC: 8 MMOL/L (ref 4–13)
BASOPHILS # BLD AUTO: 0.03 THOUSANDS/ΜL (ref 0–0.1)
BASOPHILS NFR BLD AUTO: 0 % (ref 0–1)
BUN SERPL-MCNC: 5 MG/DL (ref 5–25)
CALCIUM SERPL-MCNC: 8.5 MG/DL (ref 8.3–10.1)
CHLORIDE SERPL-SCNC: 101 MMOL/L (ref 100–108)
CO2 SERPL-SCNC: 26 MMOL/L (ref 21–32)
CREAT SERPL-MCNC: 0.71 MG/DL (ref 0.6–1.3)
EOSINOPHIL # BLD AUTO: 0.04 THOUSAND/ΜL (ref 0–0.61)
EOSINOPHIL NFR BLD AUTO: 1 % (ref 0–6)
ERYTHROCYTE [DISTWIDTH] IN BLOOD BY AUTOMATED COUNT: 17.8 % (ref 11.6–15.1)
GFR SERPL CREATININE-BSD FRML MDRD: 101 ML/MIN/1.73SQ M
GLUCOSE SERPL-MCNC: 98 MG/DL (ref 65–140)
HCT VFR BLD AUTO: 27.1 % (ref 34.8–46.1)
HGB BLD-MCNC: 8.6 G/DL (ref 11.5–15.4)
IMM GRANULOCYTES # BLD AUTO: 0.05 THOUSAND/UL (ref 0–0.2)
IMM GRANULOCYTES NFR BLD AUTO: 1 % (ref 0–2)
LYMPHOCYTES # BLD AUTO: 0.88 THOUSANDS/ΜL (ref 0.6–4.47)
LYMPHOCYTES NFR BLD AUTO: 12 % (ref 14–44)
MCH RBC QN AUTO: 28.1 PG (ref 26.8–34.3)
MCHC RBC AUTO-ENTMCNC: 31.7 G/DL (ref 31.4–37.4)
MCV RBC AUTO: 89 FL (ref 82–98)
MONOCYTES # BLD AUTO: 0.78 THOUSAND/ΜL (ref 0.17–1.22)
MONOCYTES NFR BLD AUTO: 10 % (ref 4–12)
NEUTROPHILS # BLD AUTO: 5.76 THOUSANDS/ΜL (ref 1.85–7.62)
NEUTS SEG NFR BLD AUTO: 76 % (ref 43–75)
NRBC BLD AUTO-RTO: 0 /100 WBCS
PLATELET # BLD AUTO: 254 THOUSANDS/UL (ref 149–390)
PMV BLD AUTO: 9.7 FL (ref 8.9–12.7)
POTASSIUM SERPL-SCNC: 3.8 MMOL/L (ref 3.5–5.3)
RBC # BLD AUTO: 3.06 MILLION/UL (ref 3.81–5.12)
SODIUM SERPL-SCNC: 135 MMOL/L (ref 136–145)
WBC # BLD AUTO: 7.54 THOUSAND/UL (ref 4.31–10.16)

## 2019-07-05 PROCEDURE — 99232 SBSQ HOSP IP/OBS MODERATE 35: CPT | Performed by: INTERNAL MEDICINE

## 2019-07-05 PROCEDURE — 85025 COMPLETE CBC W/AUTO DIFF WBC: CPT | Performed by: STUDENT IN AN ORGANIZED HEALTH CARE EDUCATION/TRAINING PROGRAM

## 2019-07-05 PROCEDURE — 80048 BASIC METABOLIC PNL TOTAL CA: CPT | Performed by: STUDENT IN AN ORGANIZED HEALTH CARE EDUCATION/TRAINING PROGRAM

## 2019-07-05 RX ORDER — ACETAMINOPHEN 325 MG/1
650 TABLET ORAL 2 TIMES DAILY PRN
Qty: 30 TABLET | Refills: 0 | OUTPATIENT
Start: 2019-07-05

## 2019-07-05 RX ORDER — GABAPENTIN 400 MG/1
1200 CAPSULE ORAL
Qty: 30 CAPSULE | Refills: 0 | OUTPATIENT
Start: 2019-07-05

## 2019-07-05 RX ORDER — NICOTINE 21 MG/24HR
1 PATCH, TRANSDERMAL 24 HOURS TRANSDERMAL DAILY
Qty: 28 PATCH | Refills: 0 | OUTPATIENT
Start: 2019-07-06

## 2019-07-05 RX ORDER — OXYCODONE HYDROCHLORIDE 10 MG/1
10 TABLET ORAL EVERY 6 HOURS PRN
Refills: 0 | OUTPATIENT
Start: 2019-07-05 | End: 2019-07-15

## 2019-07-05 RX ORDER — GABAPENTIN 300 MG/1
600 CAPSULE ORAL DAILY
Qty: 30 CAPSULE | Refills: 0 | OUTPATIENT
Start: 2019-07-06

## 2019-07-05 RX ORDER — LACTULOSE 20 G/30ML
20 SOLUTION ORAL DAILY
OUTPATIENT
Start: 2019-07-06

## 2019-07-05 RX ORDER — CHOLESTYRAMINE LIGHT 4 G/5.7G
4 POWDER, FOR SUSPENSION ORAL
Qty: 30 TABLET | Refills: 0 | OUTPATIENT
Start: 2019-07-05

## 2019-07-05 RX ORDER — GABAPENTIN 400 MG/1
1200 CAPSULE ORAL
Status: DISCONTINUED | OUTPATIENT
Start: 2019-07-05 | End: 2019-07-06 | Stop reason: HOSPADM

## 2019-07-05 RX ADMIN — NICOTINE 1 PATCH: 21 PATCH, EXTENDED RELEASE TRANSDERMAL at 09:29

## 2019-07-05 RX ADMIN — RIFAXIMIN 550 MG: 550 TABLET ORAL at 17:50

## 2019-07-05 RX ADMIN — NYSTATIN 500000 UNITS: 100000 SUSPENSION ORAL at 17:50

## 2019-07-05 RX ADMIN — GABAPENTIN 1200 MG: 400 CAPSULE ORAL at 21:01

## 2019-07-05 RX ADMIN — PANTOPRAZOLE SODIUM 40 MG: 40 TABLET, DELAYED RELEASE ORAL at 09:23

## 2019-07-05 RX ADMIN — LACTULOSE 20 G: 10 SOLUTION ORAL at 09:23

## 2019-07-05 RX ADMIN — OXYCODONE HYDROCHLORIDE 10 MG: 10 TABLET ORAL at 04:47

## 2019-07-05 RX ADMIN — ENOXAPARIN SODIUM 40 MG: 40 INJECTION SUBCUTANEOUS at 09:24

## 2019-07-05 RX ADMIN — LIDOCAINE 1 PATCH: 50 PATCH CUTANEOUS at 09:24

## 2019-07-05 RX ADMIN — RIFAXIMIN 400 MG: 200 TABLET ORAL at 05:46

## 2019-07-05 RX ADMIN — GABAPENTIN 600 MG: 300 CAPSULE ORAL at 09:23

## 2019-07-05 RX ADMIN — OXYCODONE HYDROCHLORIDE 10 MG: 10 TABLET ORAL at 17:50

## 2019-07-05 NOTE — DISCHARGE SUMMARY
IMR Discharge Summary - Medical Iftikhar Or 76 y o  female MRN: [id number]    [address]  Room / Bed: [facility] 915/[facility] 362-34 Encounter: [id number]    BRIEF OVERVIEW    Admitting Provider: Janae Preston MD  Discharge Provider: No att  providers found    [facility] To: [facility]     Admission Date: 6/29/2019     Discharge Date: 7/6/2019  1:35 PM    Primary Discharge Diagnosis  Principal Problem:    Chronic abdominal pain  Active Problems:    Weakness    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 ([facility] )    Hepatocellular carcinoma ([facility] )    Portal vein thrombosis  Resolved Problems:    Hyponatremia      Other Problems Addressed:  Stated above    Discharge Disposition:  [address]  Will be going to SNF     Active Issues Requiring Follow-up   Hep C liver cirrhosis and HCC, portal vein thrombosis, chronic abdominal pain    Code Status: Prior  Advance Directive and Living Will: discussed with family/patient   Power of :    POLST:      Medications   acetaminophen 325 mg tablet   Commonly known as:  TYLENOL   For:  Pain   Take 2 tablets (650 mg total) by mouth 2 (two) times a day as needed for mild pain   Refills:  0            cholestyramine sugar free 4 g packet   Commonly known as:  QUESTRAN LIGHT   Take 1 packet (4 g total) by mouth 2 (two) times a day before lunch and dinner   Refills:  0            furosemide 20 mg tablet   Commonly known as:  LASIX   Take 3 tablets (60 mg total) by mouth daily   Refills:  0           * gabapentin 300 mg capsule   Commonly known as:  NEURONTIN   Take 2 capsules (600 mg total) by mouth daily   Refills:  0   What changed:     0 how much to take   0 how to take this   0 when to take this   0 additional instructions           * gabapentin 400 mg capsule   Commonly known as:  NEURONTIN   Take 3 capsules (1,200 mg total) by mouth daily at bedtime   Refills:  0   What changed: You were already taking a medication with the same name, and this prescription was added  Make sure you understand how and when to take each  lactulose 20 g/30 mL   Take 15 mL (10 g total) by mouth 2 (two) times a day for 30 days   Refills:  0   What changed:  Another medication with the same name was removed  Continue taking this medication, and follow the directions you see here  lidocaine 5 %   Commonly known as:  LIDODERM   For:  shooting back pain   Apply 1 patch topically daily Remove & Discard patch within 12 hours or as directed by MD   Refills:  0           losartan 50 mg tablet   Commonly known as:  COZAAR   Refills:  0           nadolol 20 mg tablet   Commonly known as:  CORGARD   Take 1 tablet (20 mg total) by mouth daily   Refills:  2           nicotine 21 mg/24 hr TD 24 hr patch   Commonly known as:  NICODERM CQ   Place 1 patch on the skin daily   Refills:  0           oxyCODONE 10 MG Tabs   Commonly known as:  ROXICODONE   Take 1 tablet (10 mg total) by mouth every 6 (six) hours as needed for moderate pain or severe pain for up to 10 daysMax Daily Amount: 40 mg   Refills:  0           pantoprazole 40 mg tablet   Commonly known as:  PROTONIX   Take 1 tablet (40 mg total) by mouth daily   Refills:  1           polyethylene glycol 17 g packet   Commonly known as:  MIRALAX   Take 17 g by mouth daily as needed (Constipation)   Refills:  3           rifaximin 550 mg tablet   Commonly known as:  XIFAXAN   Take 1 tablet (550 mg total) by mouth every 12 (twelve) hours for 30 days   Refills:  0            spironolactone 50 mg tablet   Commonly known as:  ALDACTONE   For:  Hardening of the Liver   Take 3 tablets (150 mg total) by mouth daily   Refills:  0              Allergies  No Known Allergies  Discharge Diet: regular diet  Activity restrictions:   Activity as tolerated    [address] [facility]  60-year-old female with a past medical history of hepatic 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 C infection, hepatocellular carcinoma, hyponatremia, portal vein thrombosis presented to the hospital with chronic abdominal pain  Patient has had generalized chronic abdominal pain and weakness for some time now, most likely multifactorial in the setting of hepatic inflammation due to past history of cirrhosis and HCC  CT and clinical exam showed no signs of ascites  Patient continued on lactulose and cholestyramine  Palliative Care was consulted and family meeting was set up to go over long-term goals for patient  Family as well as patient understands disease and she could not take care of herself at home, and she will be going to a long-term care facility  Patient will continue pain medication of oxycodone 10 mg Q 6 hours p r n , Tylenol 650 mg p r n , gabapentin 1200 mg HS, 600 mg morning, holding for any sedation or respiratory depression  Will have to monitor patient closely because of opioid dependence/addiction  We will manage conservatively off anticoagulation due to portal vein thrombosis, with past history of threatening varices bleed with hemoglobin down to 4 0  Patient is also status post resection of lesion in 2015 for [address] , and embolization in December 2017 with meld score of 15  Hemoglobin stable at 8 6 on discharge, sodium 135 with hyponatremia resolved, alk-phos 360, AST 68, ALT 28  Will continue lactulose and rifaximin, Lasix, spironolactone  Patient is hemodynamically stable for discharge to SNF today  Presenting Problem/History of Present Illness  Principal Problem:    Chronic abdominal pain  Active Problems:    Weakness    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)    Hepatocellular carcinoma (HCC)    Portal vein thrombosis  Resolved Problems:    Hyponatremia        Discharge Condition: stable      Discharge  Statement   I spent 30 minutes minutes discharging the patient  This time was spent on the day of discharge   I had direct contact with the patient on the day of discharge  Additional documentation is required if more than 30 minutes were spent on discharge     Vandana CASILLAS  PGY 1

## 2019-07-05 NOTE — PROGRESS NOTES
Progress note - Palliative and Supportive Care   Christian Chavis 76 y o  female [id number]    Assessment:   -   Patient Active Problem List   Diagnosis    Anxiety    Weakness    Chronic lumbar radiculopathy    Claustrophobia    Diabetes mellitus, type II ([facility] )    [facility]    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 ([facility] )    Hepatocellular carcinoma ([facility] )    Herniated lumbar disc without myelopathy    Insomnia    Myofascial pain    Nicotine dependence    Opioid dependence ([facility] )    Osteoporosis    Pain syndrome, chronic    Sacroiliitis (HCC)    Seasonal allergies    Trochanteric bursitis    Lumbar disc herniation    Goals of care, counseling/discussion    Hyponatremia    Chronic abdominal pain    Ascites    Decompensated hepatic cirrhosis (HCC)    Portal vein thrombosis    Depression    Anemia    Secondary esophageal varices without bleeding (HCC)    Lumbar spondylosis    Spinal stenosis of lumbar region without neurogenic claudication    Acalculous cholecystitis    Encephalopathy    Leukocytosis    Transaminitis         Plan:  1  Symptom management - no changes   - Continue oxyIR 10 mg q6H PRN    2  Goals - Discussed 5 Wishes with patient today  She states that she reads through it but then gets overwhelmed and has not done any completion of this document  Plan for discharge to facility once accepting facility found  -    Interval history:       Patient doing well  In good spirits  Currently denies pain to me  5 Wishes on bedside table and discussed with patient at length       MEDICATIONS / ALLERGIES:     all current active meds have been reviewed and current meds:   Current Facility-Administered Medications   Medication Dose Route Frequency    acetaminophen (TYLENOL) tablet 650 mg  650 mg Oral BID PRN    cholestyramine sugar free (QUESTRAN LIGHT) packet 4 g  4 g Oral BID before lunch/dinner    enoxaparin (LOVENOX) subcutaneous injection 40 mg  40 mg Subcutaneous Daily    furosemide (LASIX) tablet 60 mg  60 mg Oral Daily    gabapentin (NEURONTIN) capsule 600 mg  600 mg Oral Daily    gabapentin (NEURONTIN) capsule 900 mg  900 mg Oral HS    lactulose 20 g/30 mL oral solution 20 g  20 g Oral Daily    lidocaine (LIDODERM) 5 % patch 1 patch  1 patch Topical Daily    nicotine (NICODERM CQ) 21 mg/24 hr TD 24 hr patch 1 patch  1 patch Transdermal Daily    nystatin (MYCOSTATIN) oral suspension 500,000 Units  500,000 Units Swish & Swallow 4x Daily    oxyCODONE (ROXICODONE) immediate release tablet 10 mg  10 mg Oral Q6H PRN    pantoprazole (PROTONIX) EC tablet 40 mg  40 mg Oral Daily    rifaximin (XIFAXAN) tablet 550 mg  550 mg Oral Q12H [facility]    spironolactone (ALDACTONE) tablet 150 mg  150 mg Oral Daily       No Known Allergies    OBJECTIVE:    Physical Exam  Physical Exam   Constitutional: She is oriented to person, place, and time  No distress  HENT:   Head: Normocephalic and atraumatic  Right Ear: External ear normal    Left Ear: External ear normal    Eyes: Right eye exhibits no discharge  Left eye exhibits no discharge  Cardiovascular: Normal rate  Pulmonary/Chest: Effort normal  No respiratory distress  Abdominal: Soft  She exhibits distension  Musculoskeletal: She exhibits no edema  Neurological: She is alert and oriented to person, place, and time  Skin: Skin is warm and dry  There is pallor  Psychiatric: She has a normal mood and affect  Nursing note and vitals reviewed  Lab Results:   I have personally reviewed pertinent labs  , CBC:   Lab Results   Component Value Date    WBC 7 54 07/05/2019    HGB 8 6 (L) 07/05/2019    HCT 27 1 (L) 07/05/2019    MCV 89 07/05/2019     07/05/2019    MCH 28 1 07/05/2019    MCHC 31 7 07/05/2019    RDW 17 8 (H) 07/05/2019    MPV 9 7 07/05/2019    NRBC 0 07/05/2019   , CMP:   Lab Results   Component Value Date    SODIUM 135 (L) 07/05/2019    K 3 8 07/05/2019     07/05/2019    CO2 26 07/05/2019    BUN 5 07/05/2019    CREATININE 0 71 07/05/2019    CALCIUM 8 5 07/05/2019    EGFR 101 07/05/2019     Imaging Studies: reviewed  EKG, Pathology, and Other Studies: reviewed    Counseling / Coordination of Care  Total floor / unit time spent today 25+ minutes  Greater than 50% of total time was spent with the patient and / or family counseling and / or coordination of care  A description of the counseling / coordination of care: chart review, medication review, supportive listening

## 2019-07-05 NOTE — DISCHARGE INSTRUCTIONS

## 2019-07-05 NOTE — RESTORATIVE TECHNICIAN NOTE
Restorative Specialist Mobility Note       Activity: Ambulate in becerra, Ambulate in room, Bathroom privileges, Chair, Dangle, Stand at bedside(Educated/encouraged pt to ambulate with assistance 3-4 x's/day  Chair alarm on   Pt callbell, phone/tray within reach )     Assistive Device: Front wheel walker          [employer] BS, Restorative Technician, [employer]

## 2019-07-05 NOTE — SOCIAL WORK
TCT Leslie Queen, [facility] for update on admission status, vm left    TCF [facility], they do not have a bed available    TCT son unable to leave message  TCT alta, mess left to return call    TCF son Manjula Lunch, advised [facility] does not have a bed, [facility] does, pt is ready for disch  He will look at list again to see if any other facilities they would want a referral and call back    TCF son would like referral to [address], same entered in [facility], [address] [place] [address], message left to identify if bed avail    TCF [address] liaison, they have a bed at [facility], notified son of same, would like to see if [address] [place] [address] and PM have a bed    TCT [address] [place] [address]- admissions office closed today  TCT [facility], Amber Ferrer, they thought pt was going to [facility], advised no and we need decision for PM   She will review and call back    [address], [facility], they can accept pt  Armin Anthony notified and wcb to advise if family will transport or if we should arrange,  Clinical submitted to [facility] for auth    Pt notified

## 2019-07-05 NOTE — PROGRESS NOTES
Per SOD A request one green prescription was removed from the [facility] 1 prior to pt s discharge

## 2019-07-05 NOTE — PLAN OF CARE
Problem: Potential for Falls  Goal: Patient will remain free of falls  Description  INTERVENTIONS:  - Assess patient frequently for physical needs  -  Identify cognitive and physical deficits and behaviors that affect risk of falls    -  [place] fall precautions as indicated by assessment   - Educate patient/family on patient safety including physical limitations  - Instruct patient to call for assistance with activity based on assessment  - Modify environment to reduce risk of injury  - Consider OT/PT consult to assist with strengthening/mobility  Outcome: Progressing     Problem: Prexisting or High Potential for Compromised Skin Integrity  Goal: Skin integrity is maintained or improved  Description  INTERVENTIONS:  - Identify patients at risk for skin breakdown  - Assess and monitor skin integrity  - Assess and monitor nutrition and hydration status  - Monitor labs (i e  albumin)  - Assess for incontinence   - Turn and reposition patient  - Assist with mobility/ambulation  - Relieve pressure over bony prominences  - Avoid friction and shearing  - Provide appropriate hygiene as needed including keeping skin clean and dry  - Evaluate need for skin moisturizer/barrier cream  - Collaborate with interdisciplinary team (i e  Nutrition, Rehabilitation, etc )   - Patient/family teaching  Outcome: Progressing     Problem: PAIN - ADULT  Goal: Verbalizes/displays adequate comfort level or baseline comfort level  Description  Interventions:  - Encourage patient to monitor pain and request assistance  - Assess pain using appropriate pain scale  - Administer analgesics based on type and severity of pain and evaluate response  - Implement non-pharmacological measures as appropriate and evaluate response  - Consider cultural and social influences on pain and pain management  - Notify physician/advanced practitioner if interventions unsuccessful or patient reports new pain  Outcome: Progressing     Problem: INFECTION - ADULT  Goal: Absence or prevention of progression during hospitalization  Description  INTERVENTIONS:  - Assess and monitor for signs and symptoms of infection  - Monitor lab/diagnostic results  - Monitor all insertion sites, i e  indwelling lines, tubes, and drains  - Monitor endotracheal (as able) and nasal secretions for changes in amount and color  - [place] appropriate cooling/warming therapies per order  - Administer medications as ordered  - Instruct and encourage patient and family to use good hand hygiene technique  - Identify and instruct in appropriate isolation precautions for identified infection/condition  Outcome: Progressing  Goal: Absence of fever/infection during neutropenic period  Description  INTERVENTIONS:  - Monitor WBC  - Implement neutropenic guidelines  Outcome: Progressing     Problem: SAFETY ADULT  Goal: Maintain or return to baseline ADL function  Description  INTERVENTIONS:  -  Assess patient's ability to carry out ADLs; assess patient's baseline for ADL function and identify physical deficits which impact ability to perform ADLs (bathing, care of mouth/teeth, toileting, grooming, dressing, etc )  - Assess/evaluate cause of self-care deficits   - Assess range of motion  - Assess patient's mobility; develop plan if impaired  - Assess patient's need for assistive devices and provide as appropriate  - Encourage maximum independence but intervene and supervise when necessary  ¯ Involve family in performance of ADLs  ¯ Assess for home care needs following discharge   ¯ Request OT consult to assist with ADL evaluation and planning for discharge  ¯ Provide patient education as appropriate  Outcome: Progressing  Goal: Maintain or return mobility status to optimal level  Description  INTERVENTIONS:  - Assess patient's baseline mobility status (ambulation, transfers, stairs, etc )    - Identify cognitive and physical deficits and behaviors that affect mobility  - Identify mobility aids required to assist with transfers and/or ambulation (gait belt, sit-to-stand, lift, walker, cane, etc )  - [place] fall precautions as indicated by assessment  - Record patient progress and toleration of activity level on Mobility SBAR; progress patient to next Phase/Stage  - Instruct patient to call for assistance with activity based on assessment  - Request Rehabilitation consult to assist with strengthening/weightbearing, etc   Outcome: Progressing     Problem: DISCHARGE PLANNING  Goal: Discharge to home or other facility with appropriate resources  Description  INTERVENTIONS:  - Identify barriers to discharge w/patient and caregiver  - Arrange for needed discharge resources and transportation as appropriate  - Identify discharge learning needs (meds, wound care, etc )  - Arrange for interpretive services to assist at discharge as needed  - Refer to Case Management Department for coordinating discharge planning if the patient needs post-hospital services based on physician/advanced practitioner order or complex needs related to functional status, cognitive ability, or social support system  Outcome: Progressing     Problem: Knowledge Deficit  Goal: Patient/family/caregiver demonstrates understanding of disease process, treatment plan, medications, and discharge instructions  Description  Complete learning assessment and assess knowledge base  Interventions:  - Provide teaching at level of understanding  - Provide teaching via preferred learning methods  Outcome: Progressing     Problem: Nutrition/Hydration-ADULT  Goal: Nutrient/Hydration intake appropriate for improving, restoring or maintaining nutritional needs  Description  Monitor and assess patient's nutrition/hydration status for malnutrition (ex- brittle hair, bruises, dry skin, pale skin and conjunctiva, muscle wasting, smooth red tongue, and disorientation)  Collaborate with interdisciplinary team and initiate plan and interventions as ordered    Monitor patient's weight and dietary intake as ordered or per policy  Utilize nutrition screening tool and intervene per policy  Determine patient's food preferences and provide high-protein, high-caloric foods as appropriate       INTERVENTIONS:  - Monitor oral intake, urinary output, labs, and treatment plans  - Assess nutrition and hydration status and recommend course of action  - Evaluate amount of meals eaten  - Assist patient with eating if necessary   - Allow adequate time for meals  - Recommend/ encourage appropriate diets, oral nutritional supplements, and vitamin/mineral supplements  - Order, calculate, and assess calorie counts as needed  - Recommend, monitor, and adjust tube feedings and TPN/PPN based on assessed needs  - Assess need for intravenous fluids  - Provide specific nutrition/hydration education as appropriate  - Include patient/family/caregiver in decisions related to nutrition  Outcome: Progressing

## 2019-07-05 NOTE — PROGRESS NOTES
Pt  C/o 10/10 pain and requesting oxy for pain relief  Upon assessment at this time pt  In no distress and sleeping in bedside chair  Will hold off on oxy at this time

## 2019-07-05 NOTE — PLAN OF CARE
Problem: Nutrition/Hydration-ADULT  Goal: Nutrient/Hydration intake appropriate for improving, restoring or maintaining nutritional needs  Description  Monitor and assess patient's nutrition/hydration status for malnutrition (ex- brittle hair, bruises, dry skin, pale skin and conjunctiva, muscle wasting, smooth red tongue, and disorientation)  Collaborate with interdisciplinary team and initiate plan and interventions as ordered  Monitor patient's weight and dietary intake as ordered or per policy  Utilize nutrition screening tool and intervene per policy  Determine patient's food preferences and provide high-protein, high-caloric foods as appropriate       INTERVENTIONS:  - Monitor oral intake, urinary output, labs, and treatment plans  - Assess nutrition and hydration status and recommend course of action  - Evaluate amount of meals eaten  - Assist patient with eating if necessary   - Allow adequate time for meals  - Recommend/ encourage appropriate diets, oral nutritional supplements, and vitamin/mineral supplements  - Order, calculate, and assess calorie counts as needed  - Recommend, monitor, and adjust tube feedings and TPN/PPN based on assessed needs  - Assess need for intravenous fluids  - Provide specific nutrition/hydration education as appropriate  - Include patient/family/caregiver in decisions related to nutrition  7/5/2019 1435 by Chela Garg RD  Outcome: Progressing  7/5/2019 1434 by Chela Garg RD  Outcome: Progressing

## 2019-07-05 NOTE — PROGRESS NOTES
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)    Hepatocellular carcinoma (HCC)    Hyponatremia    Portal vein thrombosis    Assessment/Plan:  1) Generalized chronic abdominal pain and weakness  Multifactorial in the setting of hepatic inflammation was cirrhosis and HCC  Extensive and increased portal venous thrombus/thrombi and HCC     No significant ascites noted on CT imaging or clinical exam, patient keeps asking every morning for more pain medication      Plan:  Continue lactulose, cholestyramine  -Palliative Care following for symptom management  -family meeting with patient and palliative care decided that we will go to long long-term nursing facility  Patient understands disease along with family present, patient is understanding that she cannot take care of herself at home, and appreciate palliative care's help on the case    -on oxycodone 10 mg q 6 hours p r n , Tylenol 650 mg p r n , gabapentin, lighted derm patch given by palliative care    Hold for sedation or respiratory depression, will call family this afternoon     2) Portal vein thrombosis  Previously on anticoagulation however patient developed serious life-threatening varices bleed with a hemoglobin down to 4 0 therefore anticoagulation at this point was contraindicated based on her clear bleeding risk      Plan:   Will manage conservatively off anticoagulation     3) Hep C liver cirrhosis and [address]   Status post resection ablation in 2015 for [address] , SIRS sphere embolization in December of 7368  Cirrhosis complicated by hepatic encephalopathy  MELD 15  Alkaline phosphatase 360, AST 68, ALT 28, sodium improved at 135, hemoglobin stable at 8 6     Plan:  Continue lactulose, rifaximin  Continue Lasix 60 mg daily and spironolactone 50 mg    PT/OT recommendation:  Cleared  Disposition:  Awaiting nursing facility placement  Subjective     Patient seen examined at bedside this morning  This patient is still having some lower back pain, patient keeps asking for more pain medications  After looking at the chart patient is getting pain medications at the correct times  Patient denies any shortness of breath, chest pain, nausea, vomiting, fever, chills  Objective     Vitals: Temp (24hrs), Av °F (37 2 °C), Min:99 °F (37 2 °C), Max:99 °F (37 2 °C)  Current: Temperature: 99 °F (37 2 °C)  Patient Vitals for the past 24 hrs:   BP Temp Pulse Resp SpO2   19 2218 103/69 99 °F (37 2 °C) 71  100 %   19 1947     98 %   19 1445 104/70  78 20 100 %    Body mass index is 24 03 kg/m²  Physical Exam:  GENERAL: NAD  HEENT:  NC/AT, PERRL, EOMI, no scleral icterus  CARDIAC:  RRR, +S1/S2, no S3/S4 heard, no m/g/r  PULMONARY:  CTA B/L, no wheezing/rales/rhonci, non-labored breathing  ABDOMEN:  Soft but distended, tender and epi gastric region,no rebound/guarding/rigidity  Extremities:  No edema, cyanosis, or clubbing  NEUROLOGIC: Grossly intact  SKIN:  No rashes or erythema noted on exposed skin     Psych: Normal affect  Invasive Devices     None                     Labs:   Results from last 7 days   Lab Units 19  0515 19  0500 19  0457 19  0454 19  0437 19  0512 19  1359   WBC Thousand/uL 7 54 7 35 9 86 11 68* 17 64* 16 08* 16 18*   HEMOGLOBIN g/dL 8 6* 8 3* 8 4* 7 5* 8 1* 9 4* 9 4*   HEMATOCRIT % 27 1* 27 1* 27 7* 24 1* 25 6* 29 9* 29 6*   PLATELETS Thousands/uL 254 222 209 197 226 246 239   NEUTROS PCT % 76* 76* 82* 80*  --  85* 86*   MONOS PCT % 10 11 9 11  --  8 7    Results from last 7 days   Lab Units 19  0515 19  0500 19  0035 19  0457 19  0454 19  5588 06/30/19  0621 06/29/19  2253 06/29/19  1359   SODIUM mmol/L 135* 131*  --  128* 128* 130* 133* 132* 133*   POTASSIUM mmol/L 3 8 4 0  --  3 7 3 5 3 9 3 9 3 3* 2 6*   CHLORIDE mmol/L 101 98*  --  96* 95* 97* 98* 96* 95*   CO2 mmol/L 26 27  --  24 26 26 28 30 29   BUN mg/dL 5 4*  --  5 7 9 8 8 8   CREATININE mg/dL 0 71 0 59*  --  0 58* 0 55* 0 56* 0 63 0 77 0 83   CALCIUM mg/dL 8 5 8 7  --  8 9 8 1* 8 4 8 2* 8 0* 8 1*   ALK PHOS U/L  --   --   --  360* 291*  --  351*  --  424*   ALT U/L  --   --   --  28 26  --  49  --  41   AST U/L  --   --   --  68* 88*  --  329*  --  310*   MAGNESIUM mg/dL  --   --   --   --   --   --   --   --  1 7   PHOSPHORUS mg/dL  --   --   --   --   --   --   --   --  2 0*   INR   --   --  1 21*  --  1 26*  --   --   --   --    EGFR ml/min/1 73sq m 101 109  --  110 111 111 107 92 84     Results from last 7 days   Lab Units 07/03/19  0955 07/02/19  0454   INR  1 21* 1 26*     Results from last 7 days   Lab Units 06/29/19  1359   LACTIC ACID mmol/L 1 9         No results found for: PHART, HIB9PGH, PO2ART, ZCC3KJX, Q4DZFUBK, BEART, SOURCE  No components found for: HIV1X2  No results found for: HAV, HEPAIGM, HEPBIGM, HEPBCAB, HBEAG, HEPCAB  No results found for: SPEP, UPEP Lab Results   Component Value Date    HGBA1C 5 4 03/09/2019    HGBA1C 5 3 03/01/2019    HGBA1C 5 5 09/17/2018     Lab Results   Component Value Date    CHOL 148 07/23/2014    Lab Results   Component Value Date    HDL 44 07/23/2014    Lab Results   Component Value Date    LDLCALC 89 07/23/2014    Lab Results   Component Value Date    TRIG 74 07/23/2014     No components found for: PROCAL      Micro:      Urinalysis:  Lab Results   Component Value Date    ETOH <3 06/19/2019    ETOH <3 06/19/2019    ACTMNPHEN <2 (L) 65/82/6954    SALICYLATE <3 (L) 20/56/6953          Invalid input(s): URIBILINOGEN        Intake and Outputs:  I/O       07/03 0701 - 07/04 0700 07/04 0701 - 07/05 0700 07/05 0701 - 07/06 0700    P  O  650 240     Total Intake(mL/kg) 650 (10 2) 240 (3 8)     Urine (mL/kg/hr) 450 (0 3) 600 (0 4)     Stool 0 0     Total Output 450 600     Net +200 -360            Unmeasured Urine Occurrence 3 x 1 x     Unmeasured Stool Occurrence 2 x 1 x         Nutrition:  Diet GI; Non Ulcerogenic; Fluid Restriction 2000 ML, Sodium 2 GM  Radiology Results:   XR chest 2 views   Final Result by Ernesto Hemphill MD (06/30 4291)      No focal consolidation, pleural effusion, or pneumothorax  Workstation performed: [id number]         CT abdomen pelvis with contrast   ED Interpretation by Brayden Hui DO (06/29 1533)      Diffuse replacement or infiltration of the [place]er[place] hepatis with no identifiable portal vein, common bile duct, head of pancreas, duodenum or inferior vena cava  Any or all of these could be severely diseased  Diffuse replacement of most of the liver with tumor  No identifiable change since 10 days ago            Workstation performed: [id number]         Final Result by Flower Adams MD (06/29 5325)      Diffuse replacement or infiltration of the [place] hepatis with no identifiable portal vein, common bile duct, head of pancreas, duodenum or inferior vena cava  Any or all of these could be severely diseased  Diffuse replacement of most of the liver with tumor        No identifiable change since 10 days ago            Workstation performed: [id number]           Scheduled Medications:    cholestyramine sugar free 4 g BID before lunch/dinner   enoxaparin 40 mg Daily   furosemide 60 mg Daily   gabapentin 600 mg Daily   gabapentin 900 mg HS   lactulose 20 g Daily   lidocaine 1 patch Daily   nicotine 1 patch Daily   nystatin 500,000 Units 4x Daily   pantoprazole 40 mg Daily   rifaximin 400 mg Q12H [address]   spironolactone 150 mg Daily     PRN MEDS:    acetaminophen 650 mg BID PRN   oxyCODONE 10 mg Q6H PRN     Last 24 Hour Meds: :   Medication Administration - last 24 hours from 07/04/2019 0716 to 07/05/2019 1177 Date/Time Order Dose Route Action Action by     07/04/2019 0827 enoxaparin (LOVENOX) subcutaneous injection 40 mg 40 mg Subcutaneous Given Maritza Tyson, RN     07/04/2019 0826 furosemide (LASIX) tablet 60 mg 60 mg Oral Not Given Maritza Tyson, RN     07/04/2019 2122 gabapentin (NEURONTIN) capsule 900 mg 900 mg Oral Given Verneice Salines, RN     07/04/2019 0827 nicotine (NICODERM CQ) 21 mg/24 hr TD 24 hr patch 1 patch 1 patch Transdermal Medication Applied Maritza Tyson, RN     07/04/2019 0826 nicotine (NICODERM CQ) 21 mg/24 hr TD 24 hr patch 1 patch 1 patch Transdermal Patch Removed Maritza Tyson, RN     07/04/2019 0826 pantoprazole (PROTONIX) EC tablet 40 mg 40 mg Oral Given Maritza Tyson, RN     07/04/2019 0825 spironolactone (ALDACTONE) tablet 150 mg 150 mg Oral Not Given Maritza Tyson, RN     07/04/2019 0825 lactulose 20 g/30 mL oral solution 20 g 20 g Oral Given Maritza Tyson, RN     07/04/2019 1734 cholestyramine sugar free (QUESTRAN LIGHT) packet 4 g 4 g Oral Given Jaqui Garrett, RN     07/04/2019 1420 cholestyramine sugar free (QUESTRAN LIGHT) packet 4 g 4 g Oral Given Maritza Tyson, RN     07/05/2019 0546 rifaximin [place] Cease) tablet 400 mg 400 mg Oral Given Verneice Salines, RN     07/04/2019 1732 rifaximin [place] Cease) tablet 400 mg 400 mg Oral Given Tishaa Gerry, RN     07/05/2019 0447 oxyCODONE (ROXICODONE) immediate release tablet 10 mg 10 mg Oral Given Verneice Salines, RN     07/04/2019 1733 oxyCODONE (ROXICODONE) immediate release tablet 10 mg 10 mg Oral Given Jaqui Garrett, RN     07/04/2019 0826 gabapentin (NEURONTIN) capsule 600 mg 600 mg Oral Given Maritza Tyson, RN     07/04/2019 2119 lidocaine (LIDODERM) 5 % patch 1 patch 1 patch Topical Patch Removed [facility], RN     07/04/2019 0827 lidocaine (LIDODERM) 5 % patch 1 patch 1 patch Topical Medication Applied Maritza Tyson RN     07/04/2019 2121 nystatin (MYCOSTATIN) oral suspension 500,000 Units 500,000 Units Swish & Swallow Refused Aguilar Bardales, ANA     07/04/2019 1735 nystatin (MYCOSTATIN) oral suspension 500,000 Units 500,000 Units Swish & Swallow Refused Clifford Linton, ANA     07/04/2019 1420 nystatin (MYCOSTATIN) oral suspension 500,000 Units 500,000 Units Swish & Swallow Refused Arun Camargo, ANA     07/04/2019 0825 nystatin (MYCOSTATIN) oral suspension 500,000 Units 500,000 Units Swish & Swallow Given Arun Camargo RN        VTE Pharmacologic Prophylaxis: Enoxaparin (Lovenox)  VTE Mechanical Prophylaxis: sequential compression device  Aretta  D O  PGY 1    PLEASE NOTE:  This encounter was completed utilizing the [email or web address] Direct Speech Voice Recognition Software  Grammatical errors, random word insertions, pronoun errors and incomplete sentences are occasional consequences of the system due to software limitations, ambient noise and hardware issues  These may be missed by proof reading prior to affixing electronic signature  Any questions or concerns about the content, text or information contained within the body of this dictation should be directly addressed to the physician for clarification  Please do not hesitate to call me directly if you have any any questions or concerns

## 2019-07-06 VITALS
RESPIRATION RATE: 18 BRPM | WEIGHT: 139.99 LBS | HEART RATE: 71 BPM | OXYGEN SATURATION: 95 % | SYSTOLIC BLOOD PRESSURE: 108 MMHG | BODY MASS INDEX: 23.9 KG/M2 | DIASTOLIC BLOOD PRESSURE: 70 MMHG | TEMPERATURE: 98.6 F | HEIGHT: 64 IN

## 2019-07-06 PROCEDURE — NC001 PR NO CHARGE: Performed by: INTERNAL MEDICINE

## 2019-07-06 PROCEDURE — 99238 HOSP IP/OBS DSCHRG MGMT 30/<: CPT | Performed by: INTERNAL MEDICINE

## 2019-07-06 RX ORDER — LIDOCAINE 50 MG/G
1 PATCH TOPICAL DAILY
Qty: 30 PATCH | Refills: 0
Start: 2019-07-06

## 2019-07-06 RX ORDER — GABAPENTIN 300 MG/1
600 CAPSULE ORAL DAILY
Refills: 0
Start: 2019-07-06

## 2019-07-06 RX ORDER — CHOLESTYRAMINE LIGHT 4 G/5.7G
4 POWDER, FOR SUSPENSION ORAL
Qty: 30 PACKET | Refills: 0
Start: 2019-07-06

## 2019-07-06 RX ORDER — OXYCODONE HYDROCHLORIDE 10 MG/1
10 TABLET ORAL EVERY 6 HOURS PRN
Refills: 0
Start: 2019-07-06 | End: 2019-07-16

## 2019-07-06 RX ORDER — GABAPENTIN 400 MG/1
1200 CAPSULE ORAL
Refills: 0
Start: 2019-07-06

## 2019-07-06 RX ORDER — ACETAMINOPHEN 325 MG/1
650 TABLET ORAL 2 TIMES DAILY PRN
Qty: 30 TABLET | Refills: 0 | Status: SHIPPED | OUTPATIENT
Start: 2019-07-06

## 2019-07-06 RX ADMIN — NICOTINE 1 PATCH: 21 PATCH, EXTENDED RELEASE TRANSDERMAL at 08:53

## 2019-07-06 RX ADMIN — ENOXAPARIN SODIUM 40 MG: 40 INJECTION SUBCUTANEOUS at 08:57

## 2019-07-06 RX ADMIN — SPIRONOLACTONE 150 MG: 50 TABLET ORAL at 09:00

## 2019-07-06 RX ADMIN — OXYCODONE HYDROCHLORIDE 10 MG: 10 TABLET ORAL at 03:38

## 2019-07-06 RX ADMIN — LACTULOSE 20 G: 10 SOLUTION ORAL at 08:58

## 2019-07-06 RX ADMIN — GABAPENTIN 600 MG: 300 CAPSULE ORAL at 08:59

## 2019-07-06 RX ADMIN — NYSTATIN 500000 UNITS: 100000 SUSPENSION ORAL at 08:59

## 2019-07-06 RX ADMIN — RIFAXIMIN 550 MG: 550 TABLET ORAL at 05:35

## 2019-07-06 RX ADMIN — ACETAMINOPHEN 650 MG: 325 TABLET ORAL at 05:07

## 2019-07-06 RX ADMIN — FUROSEMIDE 60 MG: 20 TABLET ORAL at 09:02

## 2019-07-06 RX ADMIN — LIDOCAINE 1 PATCH: 50 PATCH CUTANEOUS at 08:54

## 2019-07-06 RX ADMIN — OXYCODONE HYDROCHLORIDE 10 MG: 10 TABLET ORAL at 09:00

## 2019-07-06 RX ADMIN — PANTOPRAZOLE SODIUM 40 MG: 40 TABLET, DELAYED RELEASE ORAL at 08:59

## 2019-07-06 NOTE — PROGRESS NOTES
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)    Hepatocellular carcinoma (HCC)    Portal vein thrombosis    Assessment/Plan:    1) Generalized chronic abdominal pain and weakness- Extensive and increased portal venous thrombus/thrombi and HCC      Plan:  Continue lactulose 20 g per 30 mL oral solution, cholestyramine 4 g  -continue oxycodone 10 mg q 6 hours p r n , Tylenol 650 mg p r n , gabapentin 1200 mg     2) Portal vein thrombosis  Plan: Will manage conservatively off anticoagulation due to serious life-threatening varices bleed with a hemoglobin down to 4 0 on prior use of anticoagulant     3) Hep C liver cirrhosis and [address]   Status post resection ablation in  for [address] , SIRS sphere embolization in 3853  Cirrhosis complicated by hepatic encephalopathy  MELD 15  Alkaline phosphatase 360, AST 68, ALT 28, sodium improved at 135, hemoglobin stable at 8 6   Plan:  Continue lactulose 20 g per 30 mL p o  Solution, rifaximin 550 mg p o  Every 12 hours  Continue Lasix 60 mg p o  daily and spironolactone 150 mg p o        Disposition:  Patient D/C to SNF  Subjective     Patient was seen and examined at bedside this morning   Overnight patient was found with low p o  Bottle of Percocet which was removed from the room  Atablet of Percocet was found on her bed this morning during physical examination  She  admits feeling well overall today but denies headaches, dizziness, chest pain, shortness of breath, palpitations, abdominal pain, nausea, vomiting     Objective     Vitals: Temp (24hrs), Av 5 °F (36 9 °C), Min:98 4 °F (36 9 °C), Max:98 6 °F (37 °C)  Current: Temperature: 98 6 °F (37 °C)  Patient Vitals for the past 24 hrs:   BP Temp Pulse Resp SpO2   07/06/19 0800 108/70  71  97 %   07/05/19 2255  98 6 °F (37 °C)      07/05/19 1515 105/69 98 4 °F (36 9 °C) 81 18 98 %   07/05/19 1145   77      Body mass index is 24 03 kg/m²  Physical Exam:  GENERAL: NAD  HEENT:  NC/AT, PERRL, EOMI, no scleral icterus  CARDIAC:  RRR, +S1/S2, no S3/S4 heard, no m/g/r  PULMONARY:  CTA B/L, no wheezing/rales/rhonci, non-labored breathing  ABDOMEN:  Soft, NT/ND,no rebound/guarding/rigidity  Extremities:  No edema, cyanosis, or clubbing  NEUROLOGIC: Grossly intact  SKIN:  No rashes or erythema noted on exposed skin     Psych: Normal affect    Invasive Devices     None                     Labs:   Results from last 7 days   Lab Units 07/05/19  0515 07/04/19  0500 07/03/19  0457 07/02/19  0454 07/01/19  0437 06/30/19  0512 06/29/19  1359   WBC Thousand/uL 7 54 7 35 9 86 11 68* 17 64* 16 08* 16 18*   HEMOGLOBIN g/dL 8 6* 8 3* 8 4* 7 5* 8 1* 9 4* 9 4*   HEMATOCRIT % 27 1* 27 1* 27 7* 24 1* 25 6* 29 9* 29 6*   PLATELETS Thousands/uL 254 222 209 197 226 246 239   NEUTROS PCT % 76* 76* 82* 80*  --  85* 86*   MONOS PCT % 10 11 9 11  --  8 7      Results from last 7 days   Lab Units 07/05/19  0515 07/04/19  0500 07/03/19  0955 07/03/19  0457 07/02/19  0454 07/01/19  0437 06/30/19  0621 06/29/19  2253 06/29/19  1359   SODIUM mmol/L 135* 131*  --  128* 128* 130* 133* 132* 133*   POTASSIUM mmol/L 3 8 4 0  --  3 7 3 5 3 9 3 9 3 3* 2 6*   CHLORIDE mmol/L 101 98*  --  96* 95* 97* 98* 96* 95*   CO2 mmol/L 26 27  --  24 26 26 28 30 29   BUN mg/dL 5 4*  --  5 7 9 8 8 8   CREATININE mg/dL 0 71 0 59*  --  0 58* 0 55* 0 56* 0 63 0 77 0 83   CALCIUM mg/dL 8 5 8 7  --  8 9 8 1* 8 4 8 2* 8 0* 8 1*   ALK PHOS U/L  --   --   --  360* 291*  --  351*  --  424*   ALT U/L  --   --   --  28 26  --  49  --  41   AST U/L  --   --   --  68* 88*  --  329*  --  310*   MAGNESIUM mg/dL  --   --   --   --   --   --   --   --  1 7   PHOSPHORUS mg/dL  --   --   --   --   --   --   --   --  2 0*   INR   --   --  1 21*  --  1 26*  --   --   --   --    EGFR ml/min/1 73sq m 101 109  --  110 111 111 107 92 84     Results from last 7 days   Lab Units 07/03/19  0955 07/02/19  0454   INR  1 21* 1 26*     Results from last 7 days   Lab Units 06/29/19  1359   LACTIC ACID mmol/L 1 9         No results found for: PHART, HUX9FYI, PO2ART, GHG6WMY, S4MWGVIP, BEART, SOURCE  No components found for: HIV1X2  No results found for: HAV, HEPAIGM, HEPBIGM, HEPBCAB, HBEAG, HEPCAB  No results found for: SPEP, UPEP   Lab Results   Component Value Date    HGBA1C 5 4 03/09/2019    HGBA1C 5 3 03/01/2019    HGBA1C 5 5 09/17/2018     Lab Results   Component Value Date    CHOL 148 07/23/2014      Lab Results   Component Value Date    HDL 44 07/23/2014      Lab Results   Component Value Date    LDLCALC 89 07/23/2014      Lab Results   Component Value Date    TRIG 74 07/23/2014     No components found for: PROCAL      Micro:      Urinalysis:  Lab Results   Component Value Date    ETOH <3 06/19/2019    ETOH <3 06/19/2019    ACTMNPHEN <2 (L) 46/48/0404    SALICYLATE <3 (L) 53/54/4962          Invalid input(s): URIBILINOGEN        Intake and Outputs:  I/O       07/04 0701 - 07/05 0700 07/05 0701 - 07/06 0700 07/06 0701 - 07/07 0700    P  O  240 720     Total Intake(mL/kg) 240 (3 8) 720 (11 3)     Urine (mL/kg/hr) 600 (0 4) 0 (0)     Stool 0      Total Output 600 0     Net -360 +720            Unmeasured Urine Occurrence 1 x 3 x     Unmeasured Stool Occurrence 1 x          Nutrition:  Diet GI; Non Ulcerogenic; Fluid Restriction 2000 ML, Sodium 2 GM  Radiology Results:   XR chest 2 views   Final Result by Janee Love MD (06/30 5007)      No focal consolidation, pleural effusion, or pneumothorax              Workstation performed: [id number]         CT abdomen pelvis with contrast   ED Interpretation by Roxanne Wills DO (06/29 5596)      Diffuse replacement or infiltration of the valeriano hepatis with no identifiable portal vein, common bile duct, head of pancreas, duodenum or inferior vena cava  Any or all of these could be severely diseased  Diffuse replacement of most of the liver with tumor  No identifiable change since 10 days ago            Workstation performed: [id number]         Final Result by Tim Chávez MD (06/29 1531)      Diffuse replacement or infiltration of the valeriano hepatis with no identifiable portal vein, common bile duct, head of pancreas, duodenum or inferior vena cava  Any or all of these could be severely diseased  Diffuse replacement of most of the liver with tumor        No identifiable change since 10 days ago            Workstation performed: [id number]           Scheduled Medications:    cholestyramine sugar free 4 g BID before lunch/dinner   enoxaparin 40 mg Daily   furosemide 60 mg Daily   gabapentin 1,200 mg HS   gabapentin 600 mg Daily   lactulose 20 g Daily   lidocaine 1 patch Daily   nicotine 1 patch Daily   nystatin 500,000 Units 4x Daily   pantoprazole 40 mg Daily   rifaximin 550 mg Q12H [facility]   spironolactone 150 mg Daily     PRN MEDS:    acetaminophen 650 mg BID PRN   oxyCODONE 10 mg Q6H PRN     Last 24 Hour Meds: :   Medication Administration - last 24 hours from 07/05/2019 1023 to 07/06/2019 1023       Date/Time Order Dose Route Action Action by     07/06/2019 0857 enoxaparin (LOVENOX) subcutaneous injection 40 mg 40 mg Subcutaneous Given Rafaela Galvez RN     07/06/2019 0902 furosemide (LASIX) tablet 60 mg 60 mg Oral Given Rafaela Galvez RN     07/06/2019 0853 nicotine (NICODERM CQ) 21 mg/24 hr TD 24 hr patch 1 patch 1 patch Transdermal Medication Applied Rafaela Galvez RN     07/06/2019 0852 nicotine (NICODERM CQ) 21 mg/24 hr TD 24 hr patch 1 patch 1 patch Transdermal Patch Removed Rafaela Galvez RN     07/06/2019 0859 pantoprazole (PROTONIX) EC tablet 40 mg 40 mg Oral Given Rafaela Galvez RN     07/06/2019 0900 spironolactone (ALDACTONE) tablet 150 mg 150 mg Oral Given Radha Posadas, RN     2019 0858 lactulose 20 g/30 mL oral solution 20 g 20 g Oral Given Radha Posadas, RN     2019 1748 cholestyramine sugar free (QUESTRAN LIGHT) packet 4 g 4 g Oral Refused [facility], RN     2019 1317 cholestyramine sugar free (QUESTRAN LIGHT) packet 4 g 4 g Oral [facility]used [facility]     2019 0900 oxyCODONE (ROXICODONE) immediate release tablet 10 mg 10 mg Oral Given Radha Posadas, RN     2019 5188 oxyCODONE (ROXICODONE) immediate release tablet 10 mg 10 mg Oral Given Corine Willard RN     2019 1750 oxyCODONE (ROXICODONE) immediate release tablet 10 mg 10 mg Oral Given [facility]     2019 0507 acetaminophen (TYLENOL) tablet 650 mg 650 mg Oral Given Corine Willard RN     2019 0859 gabapentin (NEURONTIN) capsule 600 mg 600 mg Oral Given Radha Posadas, RN     2019 0854 lidocaine (LIDODERM) 5 % patch 1 patch 1 patch Topical Medication Applied Radha Posadas, ANA     2019 2100 lidocaine (LIDODERM) 5 % patch 1 patch 1 patch Topical Patch Removed Corine Willard RN     2019 0859 nystatin (MYCOSTATIN) oral suspension 500,000 Units 500,000 Units Swish & Swallow Given Radha Posadas RN     2019 2100 nystatin (MYCOSTATIN) oral suspension 500,000 Units 500,000 Units Swish & Swallow Refused Corine Willard RN     2019 1750 nystatin (MYCOSTATIN) oral suspension 500,000 Units 500,000 Units Swish & Swallow Given Theyamileth Llanos, RN     2019 1313 nystatin (MYCOSTATIN) oral suspension 500,000 Units 500,000 Units Swish & Swallow Refused Maeve Montana RN     2019 0535 rifaximin Hollace ) tablet 550 mg 550 mg Oral Given Corine Willard RN     2019 1750 rifaximin (XIFAXAN) tablet 550 mg 550 mg Oral Given Ad Llanos RN     2019 gabapentin (NEURONTIN) capsule 1,200 mg 1,200 mg Oral Given Angelica Rowley RN        VTE Pharmacologic Prophylaxis: Enoxaparin (Lovenox)  VTE Mechanical Prophylaxis: sequential compression device  Jose F Moreland DO    PLEASE NOTE:  This encounter was completed utilizing the [employer] Direct Speech Voice Recognition Software  Grammatical errors, random word insertions, pronoun errors and incomplete sentences are occasional consequences of the system due to software limitations, ambient noise and hardware issues  These may be missed by proof reading prior to affixing electronic signature  Any questions or concerns about the content, text or information contained within the body of this dictation should be directly addressed to the physician for clarification  Please do not hesitate to call me directly if you have any any questions or concerns

## 2019-07-06 NOTE — NURSING NOTE
Upon helping patient back from bathroom, RN noticed patient's purse on the floor  When RN picked up purse to put it on the night stand, RN saw large prescription pill bottle with 20-30 white round pills  RN asked patient if she had brought medication with her  Patient states "No, because I know they would take it " Patient then removed the purse from the RN's hands and lay flat on top of it  RN made charge nurse aware  SOD paged  Awaiting orders  Concerned that pills are narcotics after review of patient's home meds

## 2019-07-06 NOTE — NURSING NOTE
SOD Resident found additional Percocet in patient's bed  1 pill found at this time  Rn will walk down to pharmacy  Passed on to day shift to strip patient's bed  Day RN aware

## 2019-07-06 NOTE — NURSING NOTE
LALI arrived on floor to speak with patient about unidentified medications in her purse  Patient initially denied having any pills in her purse  Senior LALI Resident spoke with patient at length about the risks of taking her home medication without us knowing  Patient agreed to have medication sent to pharmacy  RN and Fidencio Simms RN counted 79 Percocet, sealed them in a patient belonging bag and the primary RN delivered the medication to pharmacy  RN and pharmacist did a second count, confirming 67 Percocet  Medication left with pharmacist for storing  Unsure if patient took additional pain medication from her purse during this admission  Patient is often very sleepy, falling asleep while on the toilet and during conversation  Current pain regimen is Oxycodone 10mg every 6 hours  Patient's vitals stable at this time  Will continue to monitor patient

## 2019-07-06 NOTE — SOCIAL WORK
Pt is cleared for d/c by SOD-A resident Dr Derick Apley  RN Ale Harris was notified of d/c order  Pt is accepted for short-term skilled rehab placement at [facility] located in [facility]  CM obtained auth# [id number] for SNF placement [facility]  The pt and her family were informed of d/c  Family will transport pt to SNF later this day, pickup time TBD  IMM signed by pt on 7/5/19  No CMN required  Chart copy requested for SNF  CM to follow

## 2019-07-08 ENCOUNTER — TRANSITIONAL CARE MANAGEMENT (OUTPATIENT)
Dept: INTERNAL MEDICINE CLINIC | Facility: CLINIC | Age: 69
End: 2019-07-08

## 2019-07-08 ENCOUNTER — PATIENT OUTREACH (OUTPATIENT)
Dept: INTERNAL MEDICINE CLINIC | Facility: CLINIC | Age: 69
End: 2019-07-08

## 2019-07-08 PROCEDURE — TCMNV: Performed by: INTERNAL MEDICINE

## 2019-07-08 NOTE — PROGRESS NOTES
Outpatient Care Management Note:    Re: Chart review    Patient was inpatient at [facility] from 6/29 - 7/6/19 for chronic abdominal pain and weakness  Family meeting was had while patient was in the hospital and palliative care also saw patient  Patient was discharged to [facility]  I called [facility] and spoke with Gustavo Brown that   I explained who I was and reason for call  She reports patient is there under short term rehab  I will call back in about 2 weeks for a status update  I did make her aware her family was interested in obtaining PA waiver services in the home

## 2019-07-11 DIAGNOSIS — K74.60 HEPATIC CIRRHOSIS DUE TO CHRONIC HEPATITIS C INFECTION (HCC): ICD-10-CM

## 2019-07-11 DIAGNOSIS — B18.2 HEPATIC CIRRHOSIS DUE TO CHRONIC HEPATITIS C INFECTION (HCC): ICD-10-CM

## 2019-07-12 RX ORDER — LACTULOSE 20 G/30ML
10 SOLUTION ORAL 2 TIMES DAILY
Qty: 900 ML | Refills: 2 | Status: SHIPPED | OUTPATIENT
Start: 2019-07-12 | End: 2019-08-21

## 2019-07-23 ENCOUNTER — PATIENT OUTREACH (OUTPATIENT)
Dept: INTERNAL MEDICINE CLINIC | Facility: CLINIC | Age: 69
End: 2019-07-23

## 2019-07-23 NOTE — PROGRESS NOTES
Outpatient Care Management Note:    I called [facility] and spoke with Tee one of the nurses on patient's floor  She reports patient is still at their facility  Patient is receiving physical therapy and occupational therapy  No plans or notes at this time of when patient would be discharged  I will call back in about 2 weeks for a status update

## 2019-08-06 ENCOUNTER — PATIENT OUTREACH (OUTPATIENT)
Dept: INTERNAL MEDICINE CLINIC | Facility: CLINIC | Age: 69
End: 2019-08-06

## 2019-08-06 NOTE — PROGRESS NOTES
Outpatient Care Management Note:      Chart review completed  Sindy Alejo shows that patient is still at [facility] for short term rehab

## 2019-08-09 ENCOUNTER — HOSPITAL ENCOUNTER (EMERGENCY)
Facility: HOSPITAL | Age: 69
Discharge: NURSING, REHAB OR LONG-TERM CARE | End: 2019-08-09
Attending: EMERGENCY MEDICINE | Admitting: EMERGENCY MEDICINE
Payer: MEDICARE

## 2019-08-09 ENCOUNTER — APPOINTMENT (EMERGENCY)
Dept: CT IMAGING | Facility: HOSPITAL | Age: 69
End: 2019-08-09
Payer: MEDICARE

## 2019-08-09 ENCOUNTER — APPOINTMENT (EMERGENCY)
Dept: RADIOLOGY | Facility: HOSPITAL | Age: 69
End: 2019-08-09
Payer: MEDICARE

## 2019-08-09 VITALS
HEART RATE: 66 BPM | WEIGHT: 139.99 LBS | OXYGEN SATURATION: 97 % | RESPIRATION RATE: 16 BRPM | BODY MASS INDEX: 24.03 KG/M2 | SYSTOLIC BLOOD PRESSURE: 96 MMHG | DIASTOLIC BLOOD PRESSURE: 69 MMHG | TEMPERATURE: 97.9 F

## 2019-08-09 DIAGNOSIS — R10.9 CHRONIC ABDOMINAL PAIN: ICD-10-CM

## 2019-08-09 DIAGNOSIS — R18.0 MALIGNANT ASCITES: Primary | ICD-10-CM

## 2019-08-09 DIAGNOSIS — C22.0 HEPATOCELLULAR CARCINOMA (HCC): ICD-10-CM

## 2019-08-09 DIAGNOSIS — G89.29 CHRONIC ABDOMINAL PAIN: ICD-10-CM

## 2019-08-09 LAB
ALBUMIN FLD-MCNC: 0.6 G/DL
ALBUMIN SERPL BCP-MCNC: 2 G/DL (ref 3.5–5)
ALP SERPL-CCNC: 257 U/L (ref 46–116)
ALT SERPL W P-5'-P-CCNC: 14 U/L (ref 12–78)
AMMONIA PLAS-SCNC: 17 UMOL/L (ref 11–35)
ANION GAP SERPL CALCULATED.3IONS-SCNC: 7 MMOL/L (ref 4–13)
APPEARANCE FLD: CLEAR
APTT PPP: 34 SECONDS (ref 23–37)
AST SERPL W P-5'-P-CCNC: 95 U/L (ref 5–45)
BASOPHILS # BLD AUTO: 0.03 THOUSANDS/ΜL (ref 0–0.1)
BASOPHILS NFR BLD AUTO: 0 % (ref 0–1)
BILIRUB SERPL-MCNC: 1.4 MG/DL (ref 0.2–1)
BUN SERPL-MCNC: 7 MG/DL (ref 5–25)
CALCIUM SERPL-MCNC: 8.5 MG/DL (ref 8.3–10.1)
CHLORIDE SERPL-SCNC: 96 MMOL/L (ref 100–108)
CO2 SERPL-SCNC: 29 MMOL/L (ref 21–32)
COLOR FLD: NORMAL
CREAT SERPL-MCNC: 0.86 MG/DL (ref 0.6–1.3)
EOSINOPHIL # BLD AUTO: 0.02 THOUSAND/ΜL (ref 0–0.61)
EOSINOPHIL NFR BLD AUTO: 0 % (ref 0–6)
ERYTHROCYTE [DISTWIDTH] IN BLOOD BY AUTOMATED COUNT: 16.2 % (ref 11.6–15.1)
GFR SERPL CREATININE-BSD FRML MDRD: 80 ML/MIN/1.73SQ M
GLUCOSE SERPL-MCNC: 166 MG/DL (ref 65–140)
HCT VFR BLD AUTO: 29.3 % (ref 34.8–46.1)
HGB BLD-MCNC: 9.3 G/DL (ref 11.5–15.4)
IMM GRANULOCYTES # BLD AUTO: 0.07 THOUSAND/UL (ref 0–0.2)
IMM GRANULOCYTES NFR BLD AUTO: 1 % (ref 0–2)
INR PPP: 1.29 (ref 0.84–1.19)
LYMPHOCYTES # BLD AUTO: 0.9 THOUSANDS/ΜL (ref 0.6–4.47)
LYMPHOCYTES NFR BLD AUTO: 25 %
LYMPHOCYTES NFR BLD AUTO: 7 % (ref 14–44)
MCH RBC QN AUTO: 27.6 PG (ref 26.8–34.3)
MCHC RBC AUTO-ENTMCNC: 31.7 G/DL (ref 31.4–37.4)
MCV RBC AUTO: 87 FL (ref 82–98)
MONO+MESO NFR FLD MANUAL: 1 %
MONOCYTES # BLD AUTO: 0.86 THOUSAND/ΜL (ref 0.17–1.22)
MONOCYTES NFR BLD AUTO: 51 %
MONOCYTES NFR BLD AUTO: 7 % (ref 4–12)
NEUTROPHILS # BLD AUTO: 11.23 THOUSANDS/ΜL (ref 1.85–7.62)
NEUTS SEG NFR BLD AUTO: 23 %
NEUTS SEG NFR BLD AUTO: 85 % (ref 43–75)
NRBC BLD AUTO-RTO: 0 /100 WBCS
PLATELET # BLD AUTO: 324 THOUSANDS/UL (ref 149–390)
PMV BLD AUTO: 9.8 FL (ref 8.9–12.7)
POTASSIUM SERPL-SCNC: 3.1 MMOL/L (ref 3.5–5.3)
PROT FLD-MCNC: <2 G/DL
PROT SERPL-MCNC: 7 G/DL (ref 6.4–8.2)
PROTHROMBIN TIME: 15.8 SECONDS (ref 11.6–14.5)
RBC # BLD AUTO: 3.37 MILLION/UL (ref 3.81–5.12)
SITE: NORMAL
SODIUM SERPL-SCNC: 132 MMOL/L (ref 136–145)
TOTAL CELLS COUNTED SPEC: 100
WBC # BLD AUTO: 13.11 THOUSAND/UL (ref 4.31–10.16)
WBC # FLD MANUAL: 209 /UL

## 2019-08-09 PROCEDURE — 99284 EMERGENCY DEPT VISIT MOD MDM: CPT | Performed by: PHYSICIAN ASSISTANT

## 2019-08-09 PROCEDURE — 87205 SMEAR GRAM STAIN: CPT | Performed by: PHYSICIAN ASSISTANT

## 2019-08-09 PROCEDURE — 80053 COMPREHEN METABOLIC PANEL: CPT | Performed by: PHYSICIAN ASSISTANT

## 2019-08-09 PROCEDURE — 88305 TISSUE EXAM BY PATHOLOGIST: CPT | Performed by: PATHOLOGY

## 2019-08-09 PROCEDURE — 88112 CYTOPATH CELL ENHANCE TECH: CPT | Performed by: PATHOLOGY

## 2019-08-09 PROCEDURE — 87070 CULTURE OTHR SPECIMN AEROBIC: CPT | Performed by: PHYSICIAN ASSISTANT

## 2019-08-09 PROCEDURE — 49083 ABD PARACENTESIS W/IMAGING: CPT

## 2019-08-09 PROCEDURE — 85610 PROTHROMBIN TIME: CPT | Performed by: PHYSICIAN ASSISTANT

## 2019-08-09 PROCEDURE — 82042 OTHER SOURCE ALBUMIN QUAN EA: CPT | Performed by: PHYSICIAN ASSISTANT

## 2019-08-09 PROCEDURE — 84157 ASSAY OF PROTEIN OTHER: CPT | Performed by: PHYSICIAN ASSISTANT

## 2019-08-09 PROCEDURE — 82140 ASSAY OF AMMONIA: CPT | Performed by: PHYSICIAN ASSISTANT

## 2019-08-09 PROCEDURE — 99285 EMERGENCY DEPT VISIT HI MDM: CPT

## 2019-08-09 PROCEDURE — 89051 BODY FLUID CELL COUNT: CPT | Performed by: PHYSICIAN ASSISTANT

## 2019-08-09 PROCEDURE — 36415 COLL VENOUS BLD VENIPUNCTURE: CPT | Performed by: PHYSICIAN ASSISTANT

## 2019-08-09 PROCEDURE — 85025 COMPLETE CBC W/AUTO DIFF WBC: CPT | Performed by: PHYSICIAN ASSISTANT

## 2019-08-09 PROCEDURE — 85730 THROMBOPLASTIN TIME PARTIAL: CPT | Performed by: PHYSICIAN ASSISTANT

## 2019-08-09 PROCEDURE — 74177 CT ABD & PELVIS W/CONTRAST: CPT

## 2019-08-09 RX ORDER — OXYCODONE HYDROCHLORIDE 5 MG/1
10 CAPSULE ORAL EVERY 6 HOURS PRN
COMMUNITY

## 2019-08-09 RX ORDER — OXYCODONE HCL 10 MG/1
10 TABLET, FILM COATED, EXTENDED RELEASE ORAL EVERY 12 HOURS SCHEDULED
COMMUNITY

## 2019-08-09 RX ORDER — POTASSIUM CHLORIDE 20 MEQ/1
20 TABLET, EXTENDED RELEASE ORAL ONCE
Status: COMPLETED | OUTPATIENT
Start: 2019-08-09 | End: 2019-08-09

## 2019-08-09 RX ADMIN — IOHEXOL 85 ML: 350 INJECTION, SOLUTION INTRAVENOUS at 14:46

## 2019-08-09 RX ADMIN — POTASSIUM CHLORIDE 20 MEQ: 20 TABLET, EXTENDED RELEASE ORAL at 16:21

## 2019-08-09 NOTE — ED NOTES
Family assisted with getting pt ready for D/C, caregiver changed pt's Attends       Nia Queen RN  08/09/19 0144

## 2019-08-09 NOTE — ED NOTES
Patient family left bedside:    [facility] cell phone: [phone]     [place] [address], RN  08/09/19 9859

## 2019-08-09 NOTE — ED PROVIDER NOTES
History  Chief Complaint   Patient presents with    Abdominal Pain     To ED with c/o worsening abd pain and discomfort as well as increase in girth for several days  77 yo female w/ hx of hepatocellular carcinoma d/t chronic hep C, hypokalemia, HTN, and portal vein thrombosis presents to the Emergency Department for evaluation of worsening abdominal pain  She is a poor historian and can only tell me that her pain has been there "a long time"  Frequently closes her eyes and does not respond to questions during my exam  States that pain is severe and she "just don't know what to do anymore"  Denies N/V  Resident of [address]          Prior to Admission Medications   Prescriptions Last Dose Informant Patient Reported?  Taking?   acetaminophen (TYLENOL) 325 mg tablet   No No   Sig: Take 2 tablets (650 mg total) by mouth 2 (two) times a day as needed for mild pain   cholestyramine sugar free (QUESTRAN LIGHT) 4 g packet   No No   Sig: Take 1 packet (4 g total) by mouth 2 (two) times a day before lunch and dinner   furosemide (LASIX) 20 mg tablet  Self No No   Sig: Take 3 tablets (60 mg total) by mouth daily   Patient taking differently: Take 30 mg by mouth 2 (two) times a day    gabapentin (NEURONTIN) 300 mg capsule   No No   Sig: Take 2 capsules (600 mg total) by mouth daily   gabapentin (NEURONTIN) 400 mg capsule   No No   Sig: Take 3 capsules (1,200 mg total) by mouth daily at bedtime   lactulose 20 g/30 mL   No No   Sig: Take 15 mL (10 g total) by mouth 2 (two) times a day for 30 days   lidocaine (LIDODERM) 5 %   No No   Sig: Apply 1 patch topically daily Remove & Discard patch within 12 hours or as directed by MD   nadolol (CORGARD) 20 mg tablet   No No   Sig: Take 1 tablet (20 mg total) by mouth daily   nicotine (NICODERM CQ) 21 mg/24 hr TD 24 hr patch  Self No No   Sig: Place 1 patch on the skin daily   oxyCODONE (OXY-IR) 5 MG capsule  Self Yes Yes   Sig: Take 10 mg by mouth every 6 (six) hours as needed for moderate pain   oxyCODONE (OxyCONTIN) 10 mg 12 hr tablet  Self Yes Yes   Sig: Take 10 mg by mouth every 12 (twelve) hours   pantoprazole (PROTONIX) 40 mg tablet   No No   Sig: Take 1 tablet (40 mg total) by mouth daily   polyethylene glycol (MIRALAX) 17 g packet  Self No No   Sig: Take 17 g by mouth daily as needed (Constipation)   Patient not taking: Reported on 6/19/2019   rifaximin (XIFAXAN) 550 mg tablet  Self Yes Yes   Sig: Take 550 mg by mouth every 12 (twelve) hours   spironolactone (ALDACTONE) 50 mg tablet   No No   Sig: Take 3 tablets (150 mg total) by mouth daily      Facility-Administered Medications: None       Past Medical History:   Diagnosis Date    Anemia     Anxiety     Cancer (HCC)     Chronic pain disorder     herniated disc    [facility]     Constipation     Diabetes mellitus ([facility] )     blood sugar 113 at 1225 2/18/19    GI bleed     [facility], [facility] 7/22/2014    [facility] carcinoma ([facility] )     Hypertension     Hypokalemia 3/7/2019    Hyponatremia     Melena     Portal vein thrombosis        Past Surgical History:   Procedure Laterality Date    DENTAL SURGERY      ESOPHAGOGASTRODUODENOSCOPY N/A 2/18/2019    Procedure: ESOPHAGOGASTRODUODENOSCOPY (EGD); Surgeon: Hu Lacey MD;  Location: [facility]; Service: Gastroenterology    ESOPHAGOGASTRODUODENOSCOPY N/A 3/11/2019    Procedure: ESOPHAGOGASTRODUODENOSCOPY (EGD); Surgeon: Piyush Yañez MD;  [facility]; Service: Gastroenterology    HYSTERECTOMY      IR PARACENTESIS  3/8/2019    IR PARACENTESIS  8/9/2019    LIVER BIOPSY      LIVER SURGERY      Ablation    TONSILLECTOMY         Family History   Problem Relation Age of Onset    Prostate cancer Father      I have reviewed and agree with the history as documented      Social History     Tobacco Use    Smoking status: Current Every Day Smoker     Packs/day: 1 00     Years: 50 00     Pack years: 50 00     Types: Cigarettes    Smokeless tobacco: Never Used   Substance Use Topics    Alcohol use: Not Currently    Drug use: No        Review of Systems   Constitutional: Negative for chills, diaphoresis and fever  Eyes: Negative for visual disturbance  Respiratory: Negative for cough and shortness of breath  Cardiovascular: Negative for chest pain and palpitations  Gastrointestinal: Positive for abdominal distention and abdominal pain  Negative for diarrhea, nausea and vomiting  Genitourinary: Negative for dysuria, flank pain and frequency  Musculoskeletal: Negative for arthralgias and myalgias  Skin: Negative for color change, rash and wound  Allergic/Immunologic: Negative for immunocompromised state  Neurological: Negative for dizziness and light-headedness  Hematological: Does not bruise/bleed easily  Psychiatric/Behavioral: Negative for confusion  The patient is not nervous/anxious  Physical Exam  Physical Exam   Constitutional: She is oriented to person, place, and time  She appears well-developed and well-nourished  No distress  HENT:   Head: Normocephalic and atraumatic  Mouth/Throat: Oropharynx is clear and moist    Eyes: Pupils are equal, round, and reactive to light  No scleral icterus  Neck: No JVD present  Cardiovascular: Normal rate and regular rhythm  Exam reveals no gallop and no friction rub  No murmur heard  Pulmonary/Chest: No respiratory distress  She has no wheezes  She has no rales  Abdominal: Soft  She exhibits distension and ascites  She exhibits no mass  Bowel sounds are decreased  There is generalized tenderness  There is no rebound and no guarding  Musculoskeletal: She exhibits no edema  Neurological: She is alert and oriented to person, place, and time  Skin: Skin is warm and dry  Capillary refill takes less than 2 seconds  She is not diaphoretic  No pallor  Psychiatric: She has a normal mood and affect  Her behavior is normal    Vitals reviewed        Vital Signs  ED Triage Vitals [08/09/19 1307]   Temperature Pulse Respirations Blood Pressure SpO2   97 9 °F (36 6 °C) 72 20 104/64 95 %      Temp Source Heart Rate Source Patient Position - Orthostatic VS BP Location FiO2 (%)   Tympanic Monitor Sitting Right arm --      Pain Score       9           Vitals:    08/09/19 1630 08/09/19 1645 08/09/19 1700 08/09/19 1715   BP: 106/69 127/82 104/74 96/69   Pulse: 64 61 75 66   Patient Position - Orthostatic VS: Lying  Lying          Visual Acuity  Visual Acuity      Most Recent Value   L Pupil Size (mm)  3   R Pupil Size (mm)  3          ED Medications  Medications   iohexol (OMNIPAQUE) 350 MG/ML injection (MULTI-DOSE) 85 mL (85 mL Intravenous Given 8/9/19 1446)   potassium chloride (K-DUR,KLOR-CON) CR tablet 20 mEq (20 mEq Oral Given 8/9/19 1621)       Diagnostic Studies  Results Reviewed     Procedure Component Value Units Date/Time    Body fluid culture and Gram stain [[id number]] Collected:  08/09/19 1602    Lab Status:  Preliminary result Specimen: Body Fluid from Paracentesis Updated:  08/10/19 1107     Body Fluid Culture, Sterile No growth     Gram Stain Result 1+ Polys      No organisms seen    Total Protein, Fluid [[id number]] Collected:  08/09/19 1602    Lab Status:  Final result Specimen: Body Fluid from Paracentesis Updated:  08/09/19 2059     Protein, Fluid <2 0 g/dL     Albumin, fluid [[id number]] Collected:  08/09/19 1602    Lab Status:  Final result Specimen: Body Fluid from Paracentesis Updated:  08/09/19 2059     Albumin, Fluid 0 6 g/dL     Body Fluid Diff [[id number]] Collected:  08/09/19 1602    Lab Status:  Final result Specimen: Body Fluid from Paracentesis Updated:  08/09/19 1943     Total Counted 100     Neutrophils % (Fluid) 23 %      Lymphs % (Fluid) 25 %      Mesothelial % (Fluid) 1 %      Monocytes % (Fluid) 51 %     Body fluid white cell count with differential [[id number]] Collected:  08/09/19 1602    Lab Status:  Final result Specimen:   Body Fluid from Paracentesis Updated:  08/09/19 1730     Site Paracentesis     Color, Fluid Pale Yellow     Clarity, Fluid Clear     WBC, Fluid 209 /ul     Protime-INR [[id number]]  (Abnormal) Collected:  08/09/19 1329    Lab Status:  Final result Specimen:  Blood from Arm, Right Updated:  08/09/19 1359     Protime 15 8 seconds      INR 1 29    APTT [[id number]]  (Normal) Collected:  08/09/19 1329    Lab Status:  Final result Specimen:  Blood from Arm, Right Updated:  08/09/19 1359     PTT 34 seconds     Comprehensive metabolic panel [[id number]]  (Abnormal) Collected:  08/09/19 1329    Lab Status:  Final result Specimen:  Blood from Arm, Right Updated:  08/09/19 1355     Sodium 132 mmol/L      Potassium 3 1 mmol/L      Chloride 96 mmol/L      CO2 29 mmol/L      ANION GAP 7 mmol/L      BUN 7 mg/dL      Creatinine 0 86 mg/dL      Glucose 166 mg/dL      Calcium 8 5 mg/dL      AST 95 U/L      ALT 14 U/L      Alkaline Phosphatase 257 U/L      Total Protein 7 0 g/dL      Albumin 2 0 g/dL      Total Bilirubin 1 40 mg/dL      eGFR 80 ml/min/1 73sq m     Narrative:       [place] guidelines for Chronic Kidney Disease (CKD):     Stage 1 with normal or high GFR (GFR > 90 mL/min/1 73 square meters)    Stage 2 Mild CKD (GFR = 60-89 mL/min/1 73 square meters)    Stage 3A Moderate CKD (GFR = 45-59 mL/min/1 73 square meters)    Stage 3B Moderate CKD (GFR = 30-44 mL/min/1 73 square meters)    Stage 4 Severe CKD (GFR = 15-29 mL/min/1 73 square meters)    Stage 5 End Stage CKD (GFR <15 mL/min/1 73 square meters)  Note: GFR calculation is accurate only with a steady state creatinine    Ammonia [[id number]]  (Normal) Collected:  08/09/19 1329    Lab Status:  Final result Specimen:  Blood from Arm, Right Updated:  08/09/19 1351     Ammonia 17 umol/L     CBC and differential [[id number]]  (Abnormal) Collected:  08/09/19 1329    Lab Status:  Final result Specimen:  Blood from Arm, Right Updated:  08/09/19 1338     WBC 13 11 Thousand/uL RBC 3 37 Million/uL      Hemoglobin 9 3 g/dL      Hematocrit 29 3 %      MCV 87 fL      MCH 27 6 pg      MCHC 31 7 g/dL      RDW 16 2 %      MPV 9 8 fL      Platelets 597 Thousands/uL      nRBC 0 /100 WBCs      Neutrophils Relative 85 %      Immat GRANS % 1 %      Lymphocytes Relative 7 %      Monocytes Relative 7 %      Eosinophils Relative 0 %      Basophils Relative 0 %      Neutrophils Absolute 11 23 Thousands/µL      Immature Grans Absolute 0 07 Thousand/uL      Lymphocytes Absolute 0 90 Thousands/µL      Monocytes Absolute 0 86 Thousand/µL      Eosinophils Absolute 0 02 Thousand/µL      Basophils Absolute 0 03 Thousands/µL                  IR paracentesis   Final Result by Sarah Cruz MD (08/09 1613)   Successful ultrasound-guided paracentesis with the removal of 1800 mL of clear yellow fluid  Appropriate laboratory samples were sent  Workstation performed: [id number]         CT abdomen pelvis with contrast   Final Result by Sarah Cruz MD (08/09 1544)      Stable cirrhosis with innumerable hypodensities scattered throughout the liver in keeping with either diffuse hepatocellular carcinoma or metastatic disease  Extensive valeriano hepatis adenopathy with lack of visualization of the portal vein consistent with thrombosis  Increasing abdominopelvic ascites  Stable anasarca  Gallbladder appears distended with a new sludge ball measuring 3 1 cm  Workstation performed: [id number]                    Procedures  Procedures       ED Course  ED Course as of Aug 10 1317   Ilah Spikes Aug 09, 2019   1553 Pt returned from paracentesis      1601 Pt feeling improved after paracentesis   Awaiting fluid gram stain to r/o SBP given leukocytosis                                  MDM  Number of Diagnoses or Management Options  Chronic abdominal pain: new and requires workup  Hepatocellular carcinoma [facility]): new and requires workup  Malignant ascites: new and requires workup  Diagnosis management comments: 75 yo female presents w/ increased abdominopelvic ascites secondary to hepatocellular carcinoma  Labs appear baseline for her  Paracentesis performed by IR with 1800mL removed, sent for cytology but low WBC count reassuring  Pt markedly improved symptomatically after procedure  CT otherwise unremarkable for acute pathology       Amount and/or Complexity of Data Reviewed  Clinical lab tests: ordered and reviewed  Tests in the radiology section of CPT®: ordered and reviewed  Tests in the medicine section of CPT®: ordered and reviewed  Review and summarize past medical records: yes  Discuss the patient with other providers: yes  Independent visualization of images, tracings, or specimens: yes        Disposition  Final diagnoses:   Malignant ascites   Chronic abdominal pain   Hepatocellular carcinoma ([facility] )     Time reflects when diagnosis was documented in both MDM as applicable and the Disposition within this note     Time User Action Codes Description Comment    8/9/2019  5:30 PM Yancy Helena Add [R18 0] Malignant ascites     8/9/2019  5:31 PM Yancy Helena Add [R10 9,  G89 29] Chronic abdominal pain     8/9/2019  5:31 PM Yancy Helena Add [C22 0] Hepatocellular carcinoma [facility])       ED Disposition     ED Disposition Condition Date/Time Comment    Discharge Stable Fri Aug 9, 2019  5:30 PM Claudean Buddy discharge to home/self care              Follow-up Information     Follow up With Specialties Details Why Contact Info Additional Information    [facility] Emergency Department Emergency Medicine  If symptoms worsen [address] [place] [address]  [id number]  [phone] [address] ED, [place] [address], [place], [place], [address]          Discharge Medication List as of 8/9/2019  5:31 PM      CONTINUE these medications which have NOT CHANGED    Details   oxyCODONE (OXY-IR) 5 MG capsule Take 10 mg by mouth every 6 (six) hours as needed for moderate pain, Historical Med      oxyCODONE (OxyCONTIN) 10 mg 12 hr tablet Take 10 mg by mouth every 12 (twelve) hours, Historical Med      rifaximin (XIFAXAN) 550 mg tablet Take 550 mg by mouth every 12 (twelve) hours, Historical Med      acetaminophen (TYLENOL) 325 mg tablet Take 2 tablets (650 mg total) by mouth 2 (two) times a day as needed for mild pain, Starting Sat 7/6/2019, Normal      cholestyramine sugar free (QUESTRAN LIGHT) 4 g packet Take 1 packet (4 g total) by mouth 2 (two) times a day before lunch and dinner, Starting Sat 7/6/2019, No Print      furosemide (LASIX) 20 mg tablet Take 3 tablets (60 mg total) by mouth daily, Starting Fri 6/21/2019, No Print      !! gabapentin (NEURONTIN) 300 mg capsule Take 2 capsules (600 mg total) by mouth daily, Starting Sat 7/6/2019, No Print      !! gabapentin (NEURONTIN) 400 mg capsule Take 3 capsules (1,200 mg total) by mouth daily at bedtime, Starting Sat 7/6/2019, No Print      lactulose 20 g/30 mL Take 15 mL (10 g total) by mouth 2 (two) times a day for 30 days, Starting Fri 7/12/2019, Until Sun 8/11/2019, Normal      lidocaine (LIDODERM) 5 % Apply 1 patch topically daily Remove & Discard patch within 12 hours or as directed by MD, Starting Sat 7/6/2019, No Print      nadolol (CORGARD) 20 mg tablet Take 1 tablet (20 mg total) by mouth daily, Starting Mon 4/22/2019, Normal      nicotine (NICODERM CQ) 21 mg/24 hr TD 24 hr patch Place 1 patch on the skin daily, Starting Tue 3/12/2019, Print      pantoprazole (PROTONIX) 40 mg tablet Take 1 tablet (40 mg total) by mouth daily, Starting Wed 6/5/2019, Normal      polyethylene glycol (MIRALAX) 17 g packet Take 17 g by mouth daily as needed (Constipation), Starting Thu 3/21/2019, Normal      spironolactone (ALDACTONE) 50 mg tablet Take 3 tablets (150 mg total) by mouth daily, Starting Fri 6/21/2019, No Print       !! - Potential duplicate medications found  Please discuss with provider  No discharge procedures on file      ED Provider  Electronically Signed by           Raulito High PA-C  08/10/19 8980

## 2019-08-09 NOTE — ED NOTES
Patient returned from CT , approx 1800mL of yellow fluid drained   Patient reports feeling "much better"     John Harris RN  08/09/19 7052

## 2019-08-09 NOTE — ED NOTES
Spoke to patient son Fouzia Forest- he states he will be back to  patient within the hour       Yasmine Caldera RN  08/09/19 4589

## 2019-08-12 LAB
BACTERIA SPEC BFLD CULT: NO GROWTH
GRAM STN SPEC: NORMAL
GRAM STN SPEC: NORMAL

## 2019-08-21 ENCOUNTER — HOSPITAL ENCOUNTER (EMERGENCY)
Facility: HOSPITAL | Age: 69
Discharge: HOME | End: 2019-08-21
Attending: EMERGENCY MEDICINE
Payer: MEDICARE

## 2019-08-21 ENCOUNTER — APPOINTMENT (EMERGENCY)
Dept: RADIOLOGY | Facility: HOSPITAL | Age: 69
End: 2019-08-21
Payer: MEDICARE

## 2019-08-21 VITALS
RESPIRATION RATE: 19 BRPM | WEIGHT: 140 LBS | DIASTOLIC BLOOD PRESSURE: 54 MMHG | BODY MASS INDEX: 21.97 KG/M2 | HEART RATE: 72 BPM | TEMPERATURE: 97.1 F | SYSTOLIC BLOOD PRESSURE: 90 MMHG | HEIGHT: 67 IN | OXYGEN SATURATION: 95 %

## 2019-08-21 DIAGNOSIS — C22.0 HEPATOCELLULAR CARCINOMA (HCC): Primary | ICD-10-CM

## 2019-08-21 DIAGNOSIS — R18.0 MALIGNANT ASCITES: ICD-10-CM

## 2019-08-21 LAB
ALBUMIN SERPL BCP-MCNC: 1.6 G/DL (ref 3.5–5)
ALP SERPL-CCNC: 255 U/L (ref 46–116)
ALT SERPL W P-5'-P-CCNC: 21 U/L (ref 12–78)
AMMONIA PLAS-SCNC: <10 UMOL/L (ref 11–35)
ANION GAP SERPL CALCULATED.3IONS-SCNC: 8 MMOL/L (ref 4–13)
APTT PPP: 40 SECONDS (ref 23–37)
AST SERPL W P-5'-P-CCNC: 118 U/L (ref 5–45)
BASOPHILS # BLD AUTO: 0.03 THOUSANDS/ΜL (ref 0–0.1)
BASOPHILS NFR BLD AUTO: 0 % (ref 0–1)
BILIRUB SERPL-MCNC: 1.6 MG/DL (ref 0.2–1)
BUN SERPL-MCNC: 10 MG/DL (ref 5–25)
CALCIUM SERPL-MCNC: 8.4 MG/DL (ref 8.3–10.1)
CHLORIDE SERPL-SCNC: 96 MMOL/L (ref 100–108)
CO2 SERPL-SCNC: 29 MMOL/L (ref 21–32)
CREAT SERPL-MCNC: 0.83 MG/DL (ref 0.6–1.3)
EOSINOPHIL # BLD AUTO: 0.03 THOUSAND/ΜL (ref 0–0.61)
EOSINOPHIL NFR BLD AUTO: 0 % (ref 0–6)
ERYTHROCYTE [DISTWIDTH] IN BLOOD BY AUTOMATED COUNT: 16 % (ref 11.6–15.1)
ETHANOL SERPL-MCNC: <3 MG/DL (ref 0–3)
GFR SERPL CREATININE-BSD FRML MDRD: 84 ML/MIN/1.73SQ M
GLUCOSE SERPL-MCNC: 83 MG/DL (ref 65–140)
HCT VFR BLD AUTO: 30.1 % (ref 34.8–46.1)
HGB BLD-MCNC: 9.5 G/DL (ref 11.5–15.4)
IMM GRANULOCYTES # BLD AUTO: 0.12 THOUSAND/UL (ref 0–0.2)
IMM GRANULOCYTES NFR BLD AUTO: 1 % (ref 0–2)
INR PPP: 1.43 (ref 0.84–1.19)
LYMPHOCYTES # BLD AUTO: 1.09 THOUSANDS/ΜL (ref 0.6–4.47)
LYMPHOCYTES NFR BLD AUTO: 8 % (ref 14–44)
MCH RBC QN AUTO: 27.4 PG (ref 26.8–34.3)
MCHC RBC AUTO-ENTMCNC: 31.6 G/DL (ref 31.4–37.4)
MCV RBC AUTO: 87 FL (ref 82–98)
MONOCYTES # BLD AUTO: 1.13 THOUSAND/ΜL (ref 0.17–1.22)
MONOCYTES NFR BLD AUTO: 9 % (ref 4–12)
NEUTROPHILS # BLD AUTO: 10.92 THOUSANDS/ΜL (ref 1.85–7.62)
NEUTS SEG NFR BLD AUTO: 82 % (ref 43–75)
NRBC BLD AUTO-RTO: 0 /100 WBCS
PLATELET # BLD AUTO: 256 THOUSANDS/UL (ref 149–390)
PMV BLD AUTO: 10.2 FL (ref 8.9–12.7)
POTASSIUM SERPL-SCNC: 3.6 MMOL/L (ref 3.5–5.3)
PROT SERPL-MCNC: 5.7 G/DL (ref 6.4–8.2)
PROTHROMBIN TIME: 17.1 SECONDS (ref 11.6–14.5)
RBC # BLD AUTO: 3.47 MILLION/UL (ref 3.81–5.12)
SALICYLATES SERPL-MCNC: <3 MG/DL (ref 3–20)
SODIUM SERPL-SCNC: 133 MMOL/L (ref 136–145)
WBC # BLD AUTO: 13.32 THOUSAND/UL (ref 4.31–10.16)

## 2019-08-21 PROCEDURE — 36415 COLL VENOUS BLD VENIPUNCTURE: CPT | Performed by: PHYSICIAN ASSISTANT

## 2019-08-21 PROCEDURE — 85025 COMPLETE CBC W/AUTO DIFF WBC: CPT | Performed by: PHYSICIAN ASSISTANT

## 2019-08-21 PROCEDURE — 93005 ELECTROCARDIOGRAM TRACING: CPT

## 2019-08-21 PROCEDURE — 99284 EMERGENCY DEPT VISIT MOD MDM: CPT | Performed by: PHYSICIAN ASSISTANT

## 2019-08-21 PROCEDURE — 85610 PROTHROMBIN TIME: CPT | Performed by: PHYSICIAN ASSISTANT

## 2019-08-21 PROCEDURE — 99285 EMERGENCY DEPT VISIT HI MDM: CPT

## 2019-08-21 PROCEDURE — 80320 DRUG SCREEN QUANTALCOHOLS: CPT | Performed by: PHYSICIAN ASSISTANT

## 2019-08-21 PROCEDURE — 80329 ANALGESICS NON-OPIOID 1 OR 2: CPT | Performed by: PHYSICIAN ASSISTANT

## 2019-08-21 PROCEDURE — 85730 THROMBOPLASTIN TIME PARTIAL: CPT | Performed by: PHYSICIAN ASSISTANT

## 2019-08-21 PROCEDURE — 82140 ASSAY OF AMMONIA: CPT | Performed by: PHYSICIAN ASSISTANT

## 2019-08-21 PROCEDURE — 80053 COMPREHEN METABOLIC PANEL: CPT | Performed by: PHYSICIAN ASSISTANT

## 2019-08-21 RX ORDER — OXYCODONE HCL 10 MG/1
10 TABLET, FILM COATED, EXTENDED RELEASE ORAL ONCE
Status: COMPLETED | OUTPATIENT
Start: 2019-08-21 | End: 2019-08-21

## 2019-08-21 RX ADMIN — OXYCODONE HYDROCHLORIDE 10 MG: 10 TABLET, FILM COATED, EXTENDED RELEASE ORAL at 21:11

## 2019-08-21 NOTE — ED PROVIDER NOTES
History  Chief Complaint   Patient presents with    Altered Mental Status     Patient sent here from [facility] with altered mental status decreased appetite, and distended abd     59-year-old female with history of hepatocellular carcinoma, chronic hepatitis-C, diabetes, and chronic pain presents emergency department for evaluation of altered mental status  Patient is a resident of [facility], according to her son over the past 2-3 days she has been refusing medications has been increasingly confused  She has also been complaining of increased abdominal pain, and abdomen is notably distended today  Patient was evaluated by me in this emergency department 2 weeks ago, which she underwent a diagnostic paracentesis, that was unremarkable  Patient is a difficult historian at baseline, she does follow commands, and occasionally answers questions appropriately, however frequently declines to interact and exam   When discussing the possible need for a repeat paracentesis with her family, patient began to cry  Prior to Admission Medications   Prescriptions Last Dose Informant Patient Reported?  Taking?   acetaminophen (TYLENOL) 325 mg tablet   No Yes   Sig: Take 2 tablets (650 mg total) by mouth 2 (two) times a day as needed for mild pain   bisacodyl (DULCOLAX) 5 mg EC tablet   Yes Yes   Sig: Take 10 mg by mouth daily as needed for constipation   cholestyramine sugar free (QUESTRAN LIGHT) 4 g packet Not Taking at Unknown time  No No   Sig: Take 1 packet (4 g total) by mouth 2 (two) times a day before lunch and dinner   Patient not taking: Reported on 8/21/2019   furosemide (LASIX) 20 mg tablet  Self No Yes   Sig: Take 3 tablets (60 mg total) by mouth daily   Patient taking differently: Take 30 mg by mouth 2 (two) times a day    gabapentin (NEURONTIN) 300 mg capsule   No Yes   Sig: Take 2 capsules (600 mg total) by mouth daily   gabapentin (NEURONTIN) 400 mg capsule   No Yes   Sig: Take 3 capsules (1,200 mg total) by mouth daily at bedtime   lactulose 20 g/30 mL   No Yes   Sig: Take 15 mL (10 g total) by mouth 2 (two) times a day for 30 days   lidocaine (LIDODERM) 5 %   No Yes   Sig: Apply 1 patch topically daily Remove & Discard patch within 12 hours or as directed by MD   nadolol (CORGARD) 20 mg tablet   No Yes   Sig: Take 1 tablet (20 mg total) by mouth daily   nicotine (NICODERM CQ) 21 mg/24 hr TD 24 hr patch Not Taking at Unknown time Self No No   Sig: Place 1 patch on the skin daily   Patient not taking: Reported on 8/21/2019   oxyCODONE (OXY-IR) 5 MG capsule  Self Yes Yes   Sig: Take 10 mg by mouth every 6 (six) hours as needed for moderate pain   oxyCODONE (OxyCONTIN) 10 mg 12 hr tablet  Self Yes Yes   Sig: Take 10 mg by mouth every 12 (twelve) hours   pantoprazole (PROTONIX) 40 mg tablet   No Yes   Sig: Take 1 tablet (40 mg total) by mouth daily   polyethylene glycol (MIRALAX) 17 g packet  Self No Yes   Sig: Take 17 g by mouth daily as needed (Constipation)   rifaximin (XIFAXAN) 550 mg tablet  Self Yes Yes   Sig: Take 550 mg by mouth every 12 (twelve) hours   spironolactone (ALDACTONE) 50 mg tablet   No Yes   Sig: Take 3 tablets (150 mg total) by mouth daily      Facility-Administered Medications: None       Past Medical History:   Diagnosis Date    Anemia     Anxiety     Cancer (HCC)     Chronic pain disorder     herniated disc    Cirrhosis (HCC)     Constipation     Diabetes mellitus (HCC)     blood sugar 113 at 1225 2/18/19    GI bleed     Hepatitis C, chronic (HCC) 7/22/2014    Hepatocellular carcinoma ([facility] [address] )     Hypertension     Hypokalemia 3/7/2019    Hyponatremia     Melena     Portal vein thrombosis        Past Surgical History:   Procedure Laterality Date    DENTAL SURGERY      ESOPHAGOGASTRODUODENOSCOPY N/A 2/18/2019    Procedure: ESOPHAGOGASTRODUODENOSCOPY (EGD); Surgeon: Mohini Reyez MD;  Location: [facility];   Service: Gastroenterology    ESOPHAGOGASTRODUODENOSCOPY N/A 3/11/2019    Procedure: ESOPHAGOGASTRODUODENOSCOPY (EGD); Surgeon: Kyra Talley MD;  [facility]: [facility]; Service: Gastroenterology    HYSTERECTOMY      IR PARACENTESIS  3/8/2019    IR PARACENTESIS  8/9/2019    LIVER BIOPSY      LIVER SURGERY      Ablation    TONSILLECTOMY         Family History   Problem Relation Age of Onset    Prostate cancer Father      I have reviewed and agree with the history as documented  Social History     Tobacco Use    Smoking status: Current Every Day Smoker     Packs/day: 1 00     Years: 50 00     Pack years: 50 00     Types: Cigarettes    Smokeless tobacco: Never Used   Substance Use Topics    Alcohol use: Not Currently    Drug use: No        Review of Systems   Unable to perform ROS: Mental status change       Physical Exam  Physical Exam   Constitutional: She is oriented to person, place, and time  She appears well-developed and well-nourished  No distress  HENT:   Head: Normocephalic and atraumatic  Mouth/Throat: Oropharynx is clear and moist    Eyes: Pupils are equal, round, and reactive to light  No scleral icterus  Neck: No JVD present  Cardiovascular: Normal rate and regular rhythm  Exam reveals no gallop and no friction rub  No murmur heard  Pulmonary/Chest: No respiratory distress  She has no wheezes  She has no rales  Abdominal: Soft  Bowel sounds are normal  She exhibits distension and ascites  She exhibits no mass  There is no tenderness  There is no rebound and no guarding  Musculoskeletal: She exhibits no edema  Neurological: She is alert and oriented to person, place, and time  Skin: Skin is warm and dry  Capillary refill takes less than 2 seconds  She is not diaphoretic  No pallor  Psychiatric: She has a normal mood and affect  Her behavior is normal    Vitals reviewed        Vital Signs  ED Triage Vitals   Temperature Pulse Respirations Blood Pressure SpO2   08/21/19 1648 08/21/19 1648 08/21/19 1648 08/21/19 1648 08/21/19 1730   (!) 97 1 °F (36 2 °C) 72 20 122/61 92 %      Temp src Heart Rate Source Patient Position - Orthostatic VS BP Location FiO2 (%)   -- -- -- -- --             Pain Score       08/21/19 1648       No Pain           Vitals:    08/21/19 1845 08/21/19 1900 08/21/19 1915 08/21/19 1930   BP:  (!) 88/56  (!) 85/70   Pulse: 73 74 76 75         Visual Acuity  Visual Acuity      Most Recent Value   L Pupil Size (mm)  3   R Pupil Size (mm)  3          ED Medications  Medications   oxyCODONE (OxyCONTIN) 12 hr tablet 10 mg (has no administration in time range)       Diagnostic Studies  Results Reviewed     Procedure Component Value Units Date/Time    Ammonia [[id number]]  (Abnormal) Collected:  08/21/19 1719    Lab Status:  Final result Specimen:  Blood from Arm, Left Updated:  08/21/19 1756     Ammonia <10 umol/L     Ethanol [[id number]]  (Normal) Collected:  08/21/19 1719    Lab Status:  Final result Specimen:  Blood from Arm, Left Updated:  08/21/19 1755     Ethanol Lvl <3 mg/dL     Comprehensive metabolic panel [[id number]]  (Abnormal) Collected:  08/21/19 1719    Lab Status:  Final result Specimen:  Blood from Arm, Left Updated:  08/21/19 1750     Sodium 133 mmol/L      Potassium 3 6 mmol/L      Chloride 96 mmol/L      CO2 29 mmol/L      ANION GAP 8 mmol/L      BUN 10 mg/dL      Creatinine 0 83 mg/dL      Glucose 83 mg/dL      Calcium 8 4 mg/dL       U/L      ALT 21 U/L      Alkaline Phosphatase 255 U/L      Total Protein 5 7 g/dL      Albumin 1 6 g/dL      Total Bilirubin 1 60 mg/dL      eGFR 84 ml/min/1 73sq m     Narrative:       Zen guidelines for Chronic Kidney Disease (CKD):     Stage 1 with normal or high GFR (GFR > 90 mL/min/1 73 square meters)    Stage 2 Mild CKD (GFR = 60-89 mL/min/1 73 square meters)    Stage 3A Moderate CKD (GFR = 45-59 mL/min/1 73 square meters)    Stage 3B Moderate CKD (GFR = 30-44 mL/min/1 73 square meters)    Stage 4 Severe CKD (GFR = 15-29 mL/min/1 73 square meters)    Stage 5 End Stage CKD (GFR <15 mL/min/1 73 square meters)  Note: GFR calculation is accurate only with a steady state creatinine    Salicylate level [[id number]]  (Abnormal) Collected:  08/21/19 1719    Lab Status:  Final result Specimen:  Blood from Arm, Left Updated:  62/64/44 4910     Salicylate Lvl <5 0 mg/dL     Protime-INR [[id number]]  (Abnormal) Collected:  08/21/19 1719    Lab Status:  Final result Specimen:  Blood from Arm, Left Updated:  08/21/19 1743     Protime 17 1 seconds      INR 1 43    APTT [[id number]]  (Abnormal) Collected:  08/21/19 1719    Lab Status:  Final result Specimen:  Blood from Arm, Left Updated:  08/21/19 1743     PTT 40 seconds     CBC and differential [[id number]]  (Abnormal) Collected:  08/21/19 1719    Lab Status:  Final result Specimen:  Blood from Arm, Left Updated:  08/21/19 1729     WBC 13 32 Thousand/uL      RBC 3 47 Million/uL      Hemoglobin 9 5 g/dL      Hematocrit 30 1 %      MCV 87 fL      MCH 27 4 pg      MCHC 31 6 g/dL      RDW 16 0 %      MPV 10 2 fL      Platelets 099 Thousands/uL      nRBC 0 /100 WBCs      Neutrophils Relative 82 %      Immat GRANS % 1 %      Lymphocytes Relative 8 %      Monocytes Relative 9 %      Eosinophils Relative 0 %      Basophils Relative 0 %      Neutrophils Absolute 10 92 Thousands/µL      Immature Grans Absolute 0 12 Thousand/uL      Lymphocytes Absolute 1 09 Thousands/µL      Monocytes Absolute 1 13 Thousand/µL      Eosinophils Absolute 0 03 Thousand/µL      Basophils Absolute 0 03 Thousands/µL                  No orders to display              Procedures  Procedures       ED Course  ED Course as of Aug 21 2054   Wed Aug 21, 2019   [address] Discussed care with her son at bedside  At this time, son would prefer that no more invasive procedures be performed, and they will discuss appropriate placement for hospice with [place] [id number] First available pickup [facility] 2300                                  [facility]  Number of Diagnoses or Management Options  Hepatocellular carcinoma [facility]):   Malignant ascites: established and worsening  Diagnosis management comments: After discussing with the patient's son at bedside, it appears that she is not confused or altered whatsoever, rather she is simply tired  She does have recurrent malignant ascites  I discussed with the son that there is no indication for emergent paracentesis at this time, as symptoms are not suggestive of spontaneous bacterial peritonitis  Patient's son declined any further evaluation or interventions, states that they will be looking into hospice care at her facility  Patient will be discharged back to skilled nursing facility       Amount and/or Complexity of Data Reviewed  Clinical lab tests: ordered and reviewed  Tests in the medicine section of CPT®: ordered and reviewed  Review and summarize past medical records: yes        Disposition  Final diagnoses:   Hepatocellular carcinoma ([facility] [address] )   Malignant ascites     Time reflects when diagnosis was documented in both MDM as applicable and the Disposition within this note     Time User Action Codes Description Comment    8/21/2019  6:08 PM Vanessa Spencer Add [C22 0] Hepatocellular carcinoma ([facility] [address] )     8/21/2019  6:08 PM Vanessa Spencer Add [R18 0] Malignant ascites       ED Disposition     ED Disposition Condition Date/Time Comment    Discharge Stable Wed Aug 21, 2019  6:07 PM Jaydon Hough discharge to home/self care  Follow-up Information    None         Patient's Medications   Discharge Prescriptions    No medications on file     No discharge procedures on file      ED Provider  Electronically Signed by           Ange Haley PA-C  08/21/19 3082

## 2019-08-21 NOTE — ED NOTES
Calls placed for transport  [facility] to transport at 2300  Provider aware       Santhosh Owen RN  08/21/19 1629

## 2019-08-21 NOTE — ED NOTES
Family at bedside  States that she was referred to ED for paracentesis "again"  Patient was evaluated 2 weeks ago for same  Has a hx of chronic acuities       Pamela Day RN  08/21/19 4040

## 2019-08-21 NOTE — ED NOTES
All results reviewed with family by MD  Patient requesting "nothing to be done anymore"     Merlinda Mort, RN  08/21/19 7177

## 2019-08-22 LAB
ATRIAL RATE: 71 BPM
P AXIS: 43 DEGREES
PR INTERVAL: 150 MS
QRS AXIS: 24 DEGREES
QRSD INTERVAL: 74 MS
QT INTERVAL: 464 MS
QTC INTERVAL: 504 MS
T WAVE AXIS: 122 DEGREES
VENTRICULAR RATE: 71 BPM

## 2019-08-22 PROCEDURE — 93010 ELECTROCARDIOGRAM REPORT: CPT | Performed by: INTERNAL MEDICINE

## 2019-08-27 ENCOUNTER — PATIENT OUTREACH (OUTPATIENT)
Dept: INTERNAL MEDICINE CLINIC | Facility: CLINIC | Age: 69
End: 2019-08-27

## 2019-08-27 NOTE — PROGRESS NOTES
Outpatient Care Management Note:    Chart review completed  Evelia Denver shows that patient is still at [facility] for short term rehab

## 2019-08-30 DIAGNOSIS — M51.26 LUMBAR DISC HERNIATION: ICD-10-CM

## 2019-09-04 RX ORDER — GABAPENTIN 300 MG/1
CAPSULE ORAL
Qty: 360 CAPSULE | Refills: 1 | OUTPATIENT
Start: 2019-09-04

## 2019-09-19 ENCOUNTER — PATIENT OUTREACH (OUTPATIENT)
Dept: INTERNAL MEDICINE CLINIC | Facility: CLINIC | Age: 69
End: 2019-09-19

## 2019-09-19 NOTE — PROGRESS NOTES
Outpatient Care Management Note:    Re: Chart review completed    Patient was at [facility] for rehab and possible long term care  Per Stephon patient is   [facility] on 9/15/19  Will close from outpatient nurse care management

## 2022-06-03 NOTE — TELEPHONE ENCOUNTER
Patient called in again requesting refills on these medications  She is completely out , can you please handle ? You are doc of the day   Sukhwinder Schulz Jr. is a 70 year old male here for  Chief Complaint   Patient presents with   • Cancer     multiple myeloma     Denies latex allergy or sensitivity.    Medication verified, no changes.  PCP and Pharmacy verified.    Social History     Tobacco Use   Smoking Status Former Smoker   • Packs/day: 0.00   • Years: 14.00   • Pack years: 0.00   • Types: Pipe   • Quit date: 1986   • Years since quittin.7   Smokeless Tobacco Former User     Advance Directives Filed: Yes    ECOG:   ECOG [22 1021]   ECOG Performance Status 3       Vitals:    Visit Vitals  Wt 132 kg (291 lb 0.1 oz) Comment: per pt   BMI 36.37 kg/m²       These vital signs are:  Within defined parameters (Per Reference \"Defined Limits Hospital Outpatient Department (HOD)\")    Height: No.  Ht Readings from Last 1 Encounters:   22 6' 3\" (1.905 m)     Weight:No.  Wt Readings from Last 3 Encounters:   22 132 kg (291 lb 0.1 oz)   22 130.2 kg (287 lb 0.6 oz)   22 (!) 136.2 kg (300 lb 4.3 oz)       BMI: Body mass index is 36.37 kg/m².    REVIEW OF SYSTEMS  GENERAL:  Patient denies headache, fevers, chills, night sweats, excessive fatigue, change in appetite, weight loss, dizziness  ALLERGIC/IMMUNOLOGIC: Verified allergies: Yes  EYES:  Patient denies significant visual difficulties, double vision, blurred vision  ENT/MOUTH: Patient denies problems with hearing, sore throat, sinus drainage, mouth sores  ENDOCRINE:  Patient denies diabetes, thyroid disease, hormone replacement, hot flashes  HEMATOLOGIC/LYMPHATIC: Patient denies easy bruising, bleeding, tender lymph nodes, swollen lymph nodes  BREASTS: Patient denies abnormal masses of breast, nipple discharge, pain  RESPIRATORY:  Patient denies lung pain with breathing, cough, coughing up blood, shortness of breath  CARDIOVASCULAR:  Patient denies anginal chest pain, palpitations, shortness of breath when lying flat, peripheral edema  GASTROINTESTINAL: Patient denies  abdominal pain , nausea, vomiting, diarrhea, GI bleeding, constipation, change in bowel habits, heartburn, sensation of feeling full, difficulty swallowing  : Patient denies blood in the urine, burning with urination, frequency, urgency, hesitancy, incontinence  MUSCULOSKELETAL:  Patient denies bone pain, joint swelling, redness, decreased range of motion, but complains of: joint pain, pain ratin, location: rightside  SKIN:  Patient denies chronic rashes, inflammation, ulcerations, skin changes, itching  NEUROLOGIC:  Patient denies loss of balance, areas of focal weakness, abnormal gait, sensory problems, numbness, tingling  PSYCHIATRIC: Patient denies insomnia, depression, anxiety    This patient reported abnormal symptoms that needed immediate verbal communication: Yes, these symptoms included See MA NOTE and were reported to MD.

## 2023-02-08 NOTE — PROGRESS NOTES
How Many Skin Cancers Have You Had?: more than one
Outpatient Care Management Note:    Called and spoke with patient  I again explained who I was, my role and reason for call  Pt took my name and contact # down again  Patient was seen on 3/1 at PCP office and her depression was addressed and started on Lexapro  Pt called office to reports it caused stomach upset and her to feel nauseous and was told to stop the Lexapro  Pt reports she did stop it  Pt reports stomach feels "sore" and had some nausea this morning and denies any vomiting  Pt reports appetite has been poor and not sleeping well at night  Pt has follow up in beginning of April but advised she come in sooner to address her depression and what else could be prescribed to her  Pt is still not agreeable to seeing a mental health provider  Pt does deny any thoughts of hurting herself at this time  Pt is scheduled for Wednesday 3/13 @ 10 am with Dr Vale Urban  I have asked patient to bring her medication with her that she is taking  When reviewing medications with patient she reports she is not taking any medication at this time  I made her aware she has stopped her Lexapro as instructed and is aware to stop her Metformin as her A1C was 5 3 at her visit and instructed ok to stop by the provider seeing her  Pt confirms she has Nadolol 40 mg and will take once a day, Losartan 100 mg and will take once a day  Pt also has spironolactone 100 mg to take daily and Furosemide 40 mg daily  Pt is aware I will verify with Dr Josette Robledo to make sure she should not be taking anything else  Patient does not have any further questions, concerns, or other needs at this time  Pt has my contact # and PCP office # if needed  Pt is agreeable for further outreach  Patient is aware of oncology and pain management appt's for next week too 
Those medications listed are correct  She is also taking gabapentin 300mg in AM and 900mg at bedtime  She takes senna as needed for constipation and she follows with pain management who prescribes her percocet  I will recommend trying mirtazapine if she could not tolerate the escitalopram, but perhaps there is something else behind the stomach upset and nausea,it appears that she is admitted now at [facility]  I will follow what comes from the admission  However, if she truly cannot tolerate escitalopram, mirtazapine will be a good substitute as it will help with her appetite and insomnia  Thanks for following up Carmen Finn 
What Is The Reason For Today's Visit?: History of Non-Melanoma Skin Cancer

## 2024-02-26 NOTE — PROGRESS NOTES
IM Residency Progress Note   Unit/Bed#: [facility] 629-01 Encounter: [id number]  SOD Team B       Misti Riley 79 y o  female [id number]    Hospital Stay Days: 1      Assessment/Plan:    Principal Problem:    Decompensated hepatic cirrhosis ([address] )  Active Problems:    Anxiety    Constipation    Diabetes mellitus, type II (HCC)    Diabetic neuropathy (HCC)    Hepatocellular carcinoma (HCC)    Hypertension    Myofascial pain    Nicotine dependence    Opioid dependence (HCC)    Lumbar disc herniation    Hyponatremia    Abdominal pain    1  Decompensated hepatic cirrhosis  · Likely in the setting of noncompliance with spironolactone and Lasix  AST is elevated at 48  And ALT 22  T bili is increased from prior at 1 03 in April was 0 5  Creatinine elevated at 1 54, with baseline closer to 1 3 patient appears mildly jaundiced with some scleral icterus  · Will restart spironolactone 100 mg daily and Lasix 40 mg daily  Fluid restriction of 1500 mL  The patient did have CT abdomen which did show hepatomegaly and cirrhosis and persistent findings of portal venous thrombosis with cavernous transformation  Patient was also noted to have persistent hypodense lesion at the lateral right dome of the liver with hypodense appearance 3 anterior segment similar in appearance to prior MRI in setting of known HCC  There was no evidence of ascites  IR consult was obtained, though there is no ascites to tap to rule out SBP  Patient is currently afebrile with mild leukocytosis and has some mild abdominal pain  · Unable to calculate MELD score  Will obtain PT/INR with labs to calculate MELD for prognostication purposes    2  History of hepatocellular carcinoma complicated by portal vein thrombosis  · Recent MRI consistent with new a CT imaging obtained on admission showing increased size of tumor thrombus  Patient was subsequently started anticoagulation therapy with Eliquis    Patient states she missed follow-up appoint with   Jorge Held on May 8th  · Patient will need to schedule follow-up appointment with Dr Jorge Sherman regarding further treatment options for her [address]   · Patient is to follow up in the outpatient setting with  · Continue Eliquis  · Check repeat AFP  · Patient was also following with Dr Yoan Herrera with Medical Oncology  She was last seen in March 2018  At that time she was considering starting next of are versus watching and wait approach  At that time patient wanted to go with the latter  She was was to follow up in 6 weeks  Patient needs follow-up appointment with Medical Oncology to discuss further treatment options    3  Chronic abdominal pain  · In setting of #1 and #2  Patient is also opiate dependent and on CT abdomen showed to have moderate amount of stool in her colon  Will start aggressive bowel regimen  Will give additional x1 dose lactulose  · Low suspicion for SBP at this time as patient does not have ascites  R was consulted though no fluid available to evaluate for SBP  · Continue to monitor    4  Hyponatremia  · In setting of #1  Diuretics and fluid restriction resumed  · Monitor BMP q8  Continue to monitor closely    5  Constipation  · Likely contributing to #3  · Bowel regimen started  Lactulose given  May consider Relistor    6  Type 2 diabetes with diabetic neuropathy  · Most recent hemoglobin A1c well controlled at 5 3  · Holding home metformin  Continue correction scale  Diabetic diet  Fingersticks t i d  with meals and q h s  7   Nicotine dependence  · Nicotine patch provided  Patient counseled on tobacco cessation    8  Chronic pain, abdominal and myofascial  · Opiate dependence  Continue Percocet 5-325 mg 1 tab q 6 p r n     9   Hypertension  · Continue diuretics, hydrochlorothiazide, and losartan    10  Anxiety  · Stable  Continue Atarax      Disposition:  Continue medical management as outlined above  Continue diuresis with spironolactone and Lasix  Monitor Na    Continue aggressive bowel regimen      Subjective:   Patient seen examined this morning  No acute events overnight  Patient states she has some mild tenderness in her epigastric and left upper quadrant region  Her abdominal feels distended though is soft  CT abdomen and pelvis on admission did not show any evidence of ascites  Patient was planned for IR paracentesis to rule out SBP though she does not have any ascites for tapping  Patient has not had a bowel movement in a few days  CT abdomen noted moderate amount of stool in colon  Patient currently denies any fevers chills nausea or vomiting  Patient is fatigued       Vitals: Temp (24hrs), Av 4 °F (36 9 °C), Min:97 5 °F (36 4 °C), Max:99 3 °F (37 4 °C)  Current: Temperature: 97 5 °F (36 4 °C)  Vitals:    18 2130 18 2241 18 0730 18 1210   BP: 112/57 111/64 132/64 103/62   BP Location: Right arm Right arm Right arm Left arm   Pulse: 72 71 64 66   Resp:  16   Temp:  98 6 °F (37 °C) 98 8 °F (37 1 °C) 97 5 °F (36 4 °C)   TempSrc:  Oral Oral Oral   SpO2: 96% 95% 96%    Weight:  81 kg (178 lb 9 2 oz)     Height:  5' 3" (1 6 m)      Body mass index is 31 63 kg/m²  I/O last 24 hours: In: 200 [P O :240; IV Piggyback:1000]  Out: 100 [Urine:100]      Physical Exam:   Physical Exam   Constitutional: She is oriented to person, place, and time  She appears well-developed and well-nourished  HENT:   Head: Normocephalic and atraumatic  Eyes: Pupils are equal, round, and reactive to light  Scleral icterus (Mild) is present  Neck: Normal range of motion  No JVD present  Cardiovascular: Normal rate and regular rhythm  Exam reveals no gallop and no friction rub  No murmur heard  Pulmonary/Chest: Effort normal  No respiratory distress  She has no wheezes  She has no rales  Abdominal: Soft  She exhibits distension and mass  There is tenderness (Epigastric and left upper quadrant region with palpation)     Musculoskeletal: Normal range of motion  She exhibits no edema  Neurological: She is alert and oriented to person, place, and time  No cranial nerve deficit  Skin: Skin is warm and dry  Vitals reviewed        Invasive Devices     Peripheral Intravenous Line            Peripheral IV 09/16/18 Left Antecubital less than 1 day                Labs:   Recent Results (from the past 24 hour(s))   CBC and differential    Collection Time: 09/16/18  8:23 PM   Result Value Ref Range    WBC 14 23 (H) 4 31 - 10 16 Thousand/uL    RBC 3 91 3 81 - 5 12 Million/uL    Hemoglobin 11 0 (L) 11 5 - 15 4 g/dL    Hematocrit 33 2 (L) 34 8 - 46 1 %    MCV 85 82 - 98 fL    MCH 28 1 26 8 - 34 3 pg    MCHC 33 1 31 4 - 37 4 g/dL    RDW 14 0 11 6 - 15 1 %    MPV 10 5 8 9 - 12 7 fL    Platelets 043 714 - 353 Thousands/uL    nRBC 0 /100 WBCs    Neutrophils Relative 82 (H) 43 - 75 %    Immat GRANS % 1 0 - 2 %    Lymphocytes Relative 8 (L) 14 - 44 %    Monocytes Relative 9 4 - 12 %    Eosinophils Relative 0 0 - 6 %    Basophils Relative 0 0 - 1 %    Neutrophils Absolute 11 67 (H) 1 85 - 7 62 Thousands/µL    Immature Grans Absolute 0 07 0 00 - 0 20 Thousand/uL    Lymphocytes Absolute 1 15 0 60 - 4 47 Thousands/µL    Monocytes Absolute 1 27 (H) 0 17 - 1 22 Thousand/µL    Eosinophils Absolute 0 04 0 00 - 0 61 Thousand/µL    Basophils Absolute 0 03 0 00 - 0 10 Thousands/µL   Comprehensive metabolic panel    Collection Time: 09/16/18  8:23 PM   Result Value Ref Range    Sodium 121 (L) 136 - 145 mmol/L    Potassium 3 9 3 5 - 5 3 mmol/L    Chloride 89 (L) 100 - 108 mmol/L    CO2 23 21 - 32 mmol/L    ANION GAP 9 4 - 13 mmol/L    BUN 28 (H) 5 - 25 mg/dL    Creatinine 1 54 (H) 0 60 - 1 30 mg/dL    Glucose 123 65 - 140 mg/dL    Calcium 9 8 8 3 - 10 1 mg/dL    AST 48 (H) 5 - 45 U/L    ALT 22 12 - 78 U/L    Alkaline Phosphatase 108 46 - 116 U/L    Total Protein 9 3 (H) 6 4 - 8 2 g/dL    Albumin 3 7 3 5 - 5 0 g/dL    Total Bilirubin 1 03 (H) 0 20 - 1 00 mg/dL    eGFR 40 ml/min/1 73sq m Lipase    Collection Time: 09/16/18  8:23 PM   Result Value Ref Range    Lipase 170 73 - 393 u/L   Lactic acid, plasma    Collection Time: 09/16/18  8:23 PM   Result Value Ref Range    LACTIC ACID 1 3 0 5 - 2 0 mmol/L   ED Urine Macroscopic    Collection Time: 09/16/18  8:30 PM   Result Value Ref Range    Color, UA      Clarity, UA Cloudy     pH, UA 5 5 4 5 - 8 0    Leukocytes, UA Small (A) Negative    Nitrite, UA Negative Negative    Protein, UA 30 (1+) (A) Negative mg/dl    Glucose, UA Negative Negative mg/dl    Ketones, UA Negative Negative mg/dl    Urobilinogen, UA 1 0 0 2, 1 0 E U /dl E U /dl    Bilirubin, UA Interference- unable to analyze (A) Negative    Blood, UA Negative Negative    Specific Gravity, UA 1 015 1 003 - 1 030   Urine Microscopic    Collection Time: 09/16/18  8:30 PM   Result Value Ref Range    RBC, UA None Seen None Seen, 0-5 /hpf    WBC, UA 10-20 (A) None Seen, 0-5, 5-55, 5-65 /hpf    Epithelial Cells Moderate (A) None Seen, Occasional /hpf    Bacteria, UA Moderate (A) None Seen, Occasional /hpf   Sodium, urine, random    Collection Time: 09/16/18  8:30 PM   Result Value Ref Range    Sodium, Ur 17    Osmolality, urine    Collection Time: 09/16/18  8:30 PM   Result Value Ref Range    Osmolality, Ur 389 250 - 900 mmol/KG   Fingerstick Glucose (POCT)    Collection Time: 09/16/18 11:40 PM   Result Value Ref Range    POC Glucose 147 (H) 65 - 140 mg/dl   Osmolality    Collection Time: 09/16/18 11:48 PM   Result Value Ref Range    Osmolality Serum 269 (L) 282 - 298 mmol/KG   Basic metabolic panel    Collection Time: 09/17/18 12:00 AM   Result Value Ref Range    Sodium 127 (L) 136 - 145 mmol/L    Potassium 3 4 (L) 3 5 - 5 3 mmol/L    Chloride 97 (L) 100 - 108 mmol/L    CO2 22 21 - 32 mmol/L    ANION GAP 8 4 - 13 mmol/L    BUN 27 (H) 5 - 25 mg/dL    Creatinine 1 19 0 60 - 1 30 mg/dL    Glucose 92 65 - 140 mg/dL    Calcium 8 9 8 3 - 10 1 mg/dL    eGFR 55 ml/min/1 73sq m   Magnesium    Collection Time: 09/17/18 12:00 AM   Result Value Ref Range    Magnesium 2 5 1 6 - 2 6 mg/dL   Phosphorus    Collection Time: 09/17/18 12:00 AM   Result Value Ref Range    Phosphorus 3 4 2 3 - 4 1 mg/dL   CBC (With Platelets)    Collection Time: 09/17/18  7:02 AM   Result Value Ref Range    WBC 9 77 4 31 - 10 16 Thousand/uL    RBC 3 62 (L) 3 81 - 5 12 Million/uL    Hemoglobin 10 2 (L) 11 5 - 15 4 g/dL    Hematocrit 30 5 (L) 34 8 - 46 1 %    MCV 84 82 - 98 fL    MCH 28 2 26 8 - 34 3 pg    MCHC 33 4 31 4 - 37 4 g/dL    RDW 14 2 11 6 - 15 1 %    Platelets 170 281 - 962 Thousands/uL    MPV 11 3 8 9 - 12 7 fL   Hemoglobin A1C    Collection Time: 09/17/18  7:02 AM   Result Value Ref Range    Hemoglobin A1C 5 5 4 2 - 6 3 %     mg/dl   Fingerstick Glucose (POCT)    Collection Time: 09/17/18  7:58 AM   Result Value Ref Range    POC Glucose 141 (H) 65 - 140 mg/dl   Protime-INR    Collection Time: 09/17/18 10:21 AM   Result Value Ref Range    Protime 17 9 (H) 11 8 - 14 2 seconds    INR 1 47 (H) 0 86 - 1 17   Fingerstick Glucose (POCT)    Collection Time: 09/17/18 12:15 PM   Result Value Ref Range    POC Glucose 164 (H) 65 - 140 mg/dl       Radiology Results: I have personally reviewed pertinent reports  Other Diagnostic Testing:   I have personally reviewed pertinent reports          Active Meds:   Current Facility-Administered Medications   Medication Dose Route Frequency    acetaminophen (TYLENOL) tablet 650 mg  650 mg Oral Q6H PRN    aluminum-magnesium hydroxide-simethicone (MYLANTA) 200-200-20 mg/5 mL oral suspension 15 mL  15 mL Oral Q4H PRN    apixaban (ELIQUIS) tablet 5 mg  5 mg Oral BID    diphenhydrAMINE (BENADRYL) tablet 25 mg  25 mg Oral HS PRN    furosemide (LASIX) tablet 40 mg  40 mg Oral Daily    gabapentin (NEURONTIN) capsule 300 mg  300 mg Oral Daily    gabapentin (NEURONTIN) capsule 900 mg  900 mg Oral HS    hydrochlorothiazide (HYDRODIURIL) tablet 25 mg  25 mg Oral Daily    hydrOXYzine HCL (ATARAX) tablet 10 mg  10 mg Oral HS    losartan (COZAAR) tablet 100 mg  100 mg Oral Daily    nicotine (NICODERM CQ) 21 mg/24 hr TD 24 hr patch 1 patch  1 patch Transdermal Daily    oxyCODONE-acetaminophen (PERCOCET) 5-325 mg per tablet 1 tablet  1 tablet Oral Q6H PRN    [START ON 9/18/2018] pantoprazole (PROTONIX) EC tablet 40 mg  40 mg Oral Early Morning    polyethylene glycol (MIRALAX) packet 17 g  17 g Oral Daily    polyethylene glycol (MIRALAX) packet 17 g  17 g Oral Daily PRN    senna (SENOKOT) tablet 17 2 mg  17 2 mg Oral Daily    senna-docusate sodium (SENOKOT S) 8 6-50 mg per tablet 1 tablet  1 tablet Oral BID    spironolactone (ALDACTONE) tablet 100 mg  100 mg Oral Daily         VTE Pharmacologic Prophylaxis: Reason for no pharmacologic prophylaxis Patient is on Eliquis  VTE Mechanical Prophylaxis: sequential compression device    Aliya Lopez MD 4 = No assist / stand by assistance

## 2024-02-28 NOTE — ASSESSMENT & PLAN NOTE
Marialuisa Matamoros (:  1962) is a 61 y.o. female,Established patient, here for evaluation of the following chief complaint(s):  Snoring (6 month follow up )         ASSESSMENT/PLAN:  1. Class 1 obesity due to excess calories with serious comorbidity and body mass index (BMI) of 32.0 to 32.9 in adult  2. Psoriatic arthritis (HCC)  3. Type 2 diabetes mellitus without complication, without long-term current use of insulin (HCC)  4. Gastroesophageal reflux disease, unspecified whether esophagitis present  5. Primary hypertension  6. Family history of factor V deficiency  -     [facility] (Genetics)  7. Primary insomnia  -     BELSOMRA 20 MG TABS; Take 1 tablet by mouth nightly. Max Daily Amount: 20 mg, Disp-90 tablet, R-1, DAWNormal  -     AMB POC DRUG SCREEN, URINE  8. Psychophysiological insomnia  -     AMB POC DRUG SCREEN, URINE      Return in about 6 months (around 2024) for w/lab.       Reviewed lab  Work on diet for weight loss  A1c great continue jardiance and metformin   Lipids controlled, continue lipitor  Bp controled, continue losartan  Refill belsomra, controlled contract completed and urine drug screen neg  Decrease trazodone since adding back belsomra  GERD controlled, decrease dexilant   F/u with rheumatology for arthritis but refill gabapentin for pain, discussed med risks/side effects    Subjective   SUBJECTIVE/OBJECTIVE:  Patient is here for follow up. Her belsomra is available again and she would like to restart, trazodone alone isn't as helpful   Her GERD Is controlled with dexilant         Review of Systems       Objective   Physical Exam  Constitutional:       Appearance: Normal appearance. She is obese. She is not ill-appearing.   HENT:      Head: Normocephalic.   Eyes:      Extraocular Movements: Extraocular movements intact.      Pupils: Pupils are equal, round, and reactive to light.   Cardiovascular:      Rate and Rhythm: Normal rate and regular  · S/p ablation and wedge resection 2015  S/p SIRS spheres radioembolization December 2017  · Previously followed with Dr Isidra Cook as outpatient, but has not followed up since March 2018  · Encourage outpatient follow up

## 2024-05-08 NOTE — ASSESSMENT & PLAN NOTE
Creatinine 1 82  Baseline at 1 2  Plan: Order BMP   Further recommendations based on result 4 = No assist / stand by assistance

## 2024-12-12 NOTE — PROGRESS NOTES
Add prednisone 40 mg daily x 3 days. Continue levaquin and inhaler w/out change.     If any worsening, he will need to go back to the ER.     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 ([facility] )  -patient with a past medical history of hepatic cirrhosis secondary to chronic hepatitis C infection  -patient does appear to have some sequela of cirrhosis including abdominal distention (which the pt states is stable at this time) and coagulopathy with an INR of 1 47  She has no other acute abdominal complaints at today's visit  -continue outpatient follow-up with Dr Maureen Temple of Surgical Oncology  -continue Lasix 40 mg daily, spironolactone 100 mg daily    2  Hepatocellular carcinoma ([facility] )  -patient with a history of hepatocellular carcinoma diagnosed in 2015  Status post ablation and resection with Dr Maureen Temple  The patient had a SIRS spheres radioembolization procedure in December of 2017 for portal venous thrombosis, likely secondary to a tumor thrombus  -most recent MRI on April 23, 2018 showed worsening of portal vein thrombus  -patient has been on Eliquis for the past 6 months for portal vein thrombus  -continue Eliquis 5 mg b i d   -continue follow-up with Surgical Oncology and Radiation Oncology    3  Portal vein thrombosis  -continue management with Eliquis as stated above    4  Hyponatremia  -patient had a serum sodium of 121 at presentation her last hospital admission  She was discharged with a serum sodium of 129  -hyponatremia was possibly related to fluid overload with medication noncompliance  -repeat BMP ordered  - Basic metabolic panel; Future    5   Anemia, unspecified type  -patient's hemoglobin on 09/19/2018 after her most recent discharge was 9 7  -baseline hemoglobin appears to be between 10-11  -patient recently had iron studies done on 09/18/2018 - iron of 28, ferritin of 428, iron saturation of 9, TIBC of 322  -anemia possibly multifactorial and related to related to iron deficiency in the setting of chronic liver disease  -will repeat H&H  - Hemoglobin and hematocrit, blood; Future    6  Essential hypertension  -patient's blood pressure in the office today was initially measured at 80/50  Blood pressure was remeasured at 100/60  Patient denies any recent dizziness or lightheadedness at rest or with position changes  -continue current antihypertensive regimen of hydrochlorothiazide 25 mg daily along with Lasix and spironolactone  May consider making some changes to the patient's regimen if she remains borderline hypotensive or continues to develop electrolyte abnormalities    7  Type 2 diabetes mellitus with neuropathy   -most recent A1c was 5 5 on 09/17/2018  -diabetic eye exam and foot exam performed today in the office  -continue metformin 500 mg daily  -continue gabapentin 300 mg, 1 tablet in the a m  and 3 tablets at nighttime  - POCT diabetic eye exam    8  Need for pneumococcal vaccination  -PPSV23 given during today's visit  The patient tolerated the shot well  - Pneumococcal polysaccharide vaccine 23-valent greater than or equal to 3yo subcutaneous/IM    9  Need for influenza vaccination  Flu shot given during today's visit  - influenza vaccine, [id number], high-dose, PF 0 5 mL, for patients 65 yr+ (FLUZONE HIGH-DOSE)    10  Tobacco abuse  -patient is a current smoker and smokes 1 pack a day  -discussed smoking cessation at length with the patient    She states that she is not ready to quit at this time but may consider smoking cessation with a nicotine patch at a future visit      Orders Placed This Encounter   Procedures    influenza vaccine, [id number], high-dose, PF 0 5 mL, for patients 65 yr+ (FLUZONE HIGH-DOSE)    Pneumococcal polysaccharide vaccine 23-valent greater than or equal to 3yo subcutaneous/IM    POCT diabetic eye exam       - Counseling Documentation: patient was counseled regarding: prognosis    Chief Complaint     Chief Complaint   Patient presents with    Transition of Care Management       History of Present Illness     Patient is a 49-year-old female with a past medical history of type 2 diabetes mellitus, hypertension, lumbar disc herniation at the L4-L5 level, hepatocellular carcinoma diagnosed in 2015 status post ablation and wedge resection, portal vein thrombosis on anticoagulation with Eliquis who presents to the office for a transitional care management visit  Patient was recently hospitalized from September 16, 2018 to September 19, 2018 for abdominal pain  The pt admitted to being noncompliant with her medications several days prior to admission  On presentation the patient was afebrile with no leukocytosis  She had a CT of the abdomen done which showed no evidence of acute pathology and showed confirmation of the patient's known portal sinus thrombosis with increased stool burden  It was felt that the patient's symptoms were secondary to constipation and she was given lactulose, which improved her symptoms  The patient had a hyponatremia on presentation with a serum sodium of 121 which improved to 129 at discharge  The patient's clinical status improved and she was discharged on the 19th with a 1 month refill of her medications and encouraged to follow up as an outpatient with her PCP and specialists  Today in the office the patient has no acute complaints  She states that she has felt well since her discharge from the hospital and has not had any issues with constipation  She has been taking Senokot to help with bowel movements  She further denies fever, chills, anorexia, abdominal pain, worsening abdominal distention, and peripheral edema  The patient does state that for roughly the past 2 years she has had issues with lack of motivation and laziness and that she recently left her job  Her PHQ-9 score in the office today was 8     She endorses some anhedonia but denies suicidal ideation homicidal ideation  She states that she is not interested in taking anything pharmacologically for depression and does not wish to speak to a mental health professional     In regards to her cirrhosis and hepatocellular carcinoma the patient states that she has been following Dr Jareth Harvey of Surgical Oncology and Dr Lynnette Díaz of radiation oncology  Her last appointment with Surgical Oncology appears to have been in March 2018  Patient continues to take Lasix and spironolactone for edema associated with her liver disease  She states that she has been on Eliquis for roughly the past 6 months in light of her portal sinus thrombosis  The following portions of the patient's history were reviewed and updated as appropriate: allergies, current medications, past family history, past medical history, past social history, past surgical history and problem list     Review of Systems     Review of Systems   Constitutional: Positive for fatigue  Negative for chills and fever  HENT: Negative for rhinorrhea and trouble swallowing  Eyes: Negative for photophobia and visual disturbance  Respiratory: Negative for cough, shortness of breath and wheezing  Cardiovascular: Negative for chest pain, palpitations and leg swelling  Gastrointestinal: Negative for abdominal distention, abdominal pain, constipation, diarrhea, nausea and vomiting  Genitourinary: Negative for dysuria, frequency and hematuria  Musculoskeletal: Positive for back pain  Negative for arthralgias and myalgias  Skin: Negative for pallor and rash  Neurological: Negative for dizziness, light-headedness and headaches  Psychiatric/Behavioral: Positive for dysphoric mood and sleep disturbance  Negative for confusion and decreased concentration         Active Problem List     Patient Active Problem List   Diagnosis    Anxiety    Asthenia    Chronic lumbar radiculopathy    Claustrophobia    Constipation    Diabetes mellitus, type II ([facility] )    Diabetic neuropathy (HCC)    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 ([facility] )    Hepatitis C, chronic (HCC)    Hepatocellular carcinoma (HCC)    Herniated lumbar disc without myelopathy    Hypertension    Insomnia    Left foot pain    Myofascial pain    Nicotine dependence    [facility]    Osteoporosis    Other cirrhosis of liver (HCC)    Pain syndrome, chronic    Sacroiliitis (HCC)    Seasonal allergies    Trochanteric bursitis    Elevated serum creatinine    Lumbar disc herniation    Health care maintenance    Hyponatremia    Abdominal pain    Ascites    Decompensated hepatic cirrhosis (HCC)    Anemia    Portal vein thrombosis    Depression       Objective     BP 90/58 (BP Location: Right arm, Patient Position: Sitting, Cuff Size: Adult)   Pulse 80   Temp 98 3 °F (36 8 °C) (Oral)   Ht 5' 3" (1 6 m)   Wt 80 6 kg (177 lb 11 1 oz)   BMI 31 48 kg/m²     Physical Exam   Constitutional: She is oriented to person, place, and time  She appears well-developed and well-nourished  No distress  HENT:   Head: Normocephalic and atraumatic  Mouth/Throat: No oropharyngeal exudate  Eyes: Pupils are equal, round, and reactive to light  No scleral icterus  Neck: Normal range of motion  No JVD present  No thyromegaly present  Cardiovascular: Normal rate, regular rhythm and normal heart sounds  No murmur heard  Pulses:       Dorsalis pedis pulses are 2+ on the right side, and 2+ on the left side  Posterior tibial pulses are 2+ on the right side, and 2+ on the left side  Pulmonary/Chest: Effort normal and breath sounds normal  No respiratory distress  She has no wheezes  She has no rales  Abdominal: Soft  She exhibits distension  There is no tenderness  There is no rebound and no guarding  Musculoskeletal: Normal range of motion  She exhibits no edema or tenderness     Feet:   Right Foot:   Skin Integrity: Negative for ulcer, skin breakdown, erythema, warmth, callus or dry skin  Left Foot:   Skin Integrity: Negative for ulcer, skin breakdown, erythema, warmth, callus or dry skin  Neurological: She is alert and oriented to person, place, and time  No cranial nerve deficit  Skin: Skin is warm and dry  No rash noted  No erythema  Psychiatric: She has a normal mood and affect  Her behavior is normal      Patient's shoes and socks removed  Right Foot/Ankle   Right Foot Inspection  Skin Exam: skin normal and skin intact no dry skin, no warmth, no callus, no erythema, no maceration, no abnormal color, no pre-ulcer, no ulcer and no callus                          Toe Exam: ROM and strength within normal limitsno swelling, no tenderness and  no right toe deformity  Sensory   Vibration: intact  Proprioception: intact   Monofilament testing: intact  Vascular  Capillary refills: < 3 seconds  The right DP pulse is 2+  The right PT pulse is 2+  Right Toe  - Comprehensive Exam  Ecchymosis: none  Arch: normal  Hammertoes: absent  Claw Toes: absent  Swelling: none   Tenderness: none         Left Foot/Ankle  Left Foot Inspection  Skin Exam: skin normal and skin intactno dry skin, no warmth, no erythema, no maceration, normal color, no pre-ulcer, no ulcer and no callus                         Toe Exam: ROM and strength within normal limitsno swelling, no tenderness and no left toe deformity                   Sensory   Vibration: intact  Proprioception: intact  Monofilament: intact  Vascular  Capillary refills: < 3 seconds  The left DP pulse is 2+  The left PT pulse is 2+     Left Toe  - Comprehensive Exam  Ecchymosis: none  Arch: normal  Hammertoes: absent  Claw toes: absent  Swelling: none   Tenderness: none       Assign Risk Category:  No deformity present; ;            Pertinent Laboratory/Diagnostic Studies:  Xr Chest 2 Views    Result Date: 9/17/2018  Impression: Mild subsegmental atelectasis in the right middle lobe and lingula  Workstation performed: [id number]     Ct Abdomen Pelvis With Contrast    Result Date: 9/16/2018  Impression: 1  No evidence of bowel obstruction  2   Hepatomegaly and cirrhosis  Persistent findings of portal venous thrombosis with cavernous transformation  Persistent hypodense lesion at the lateral right dome of the liver with hypodense appearance of the right anterior segment the liver similar in appearance to prior MRI  3   Colonic diverticulosis without evidence of acute diverticulitis  4   Moderate stool retention throughout the colon  Workstation performed: [id number]       Images and diagnostics reviewed     Current Medications     Current Outpatient Prescriptions:     apixaban (ELIQUIS) 5 mg, Take 1 tablet (5 mg total) by mouth 2 (two) times a day, Disp: 180 tablet, Rfl: 0    furosemide (LASIX) 40 mg tablet, Take 1 tablet (40 mg total) by mouth daily, Disp: 30 tablet, Rfl: 0    gabapentin (NEURONTIN) 300 mg capsule, 1 tab in the am and 3 tabs PO night time, Disp: 120 capsule, Rfl: 5    hydrochlorothiazide (HYDRODIURIL) 25 mg tablet, Take 1 tablet (25 mg total) by mouth daily, Disp: 30 tablet, Rfl: 0    losartan (COZAAR) 100 MG tablet, Take 1 tablet (100 mg total) by mouth daily, Disp: 30 tablet, Rfl: 0    metFORMIN (GLUCOPHAGE) 500 mg tablet, Take 1 tablet (500 mg total) by mouth daily with breakfast, Disp: 30 tablet, Rfl: 0    oxyCODONE-acetaminophen (PERCOCET)  mg per tablet, Take 1 tab PO Q 6 hours prn pain   Do not fill until 10/27/18, Disp: 120 tablet, Rfl: 0    senna (SENOKOT) 8 6 MG tablet, Take 2 tablets by mouth daily, Disp: , Rfl:     spironolactone (ALDACTONE) 100 mg tablet, Take 1 tablet (100 mg total) by mouth daily, Disp: 90 tablet, Rfl: 0    diclofenac (VOLTAREN) 75 mg EC tablet, Take 50 mg by mouth 2 (two) times a day, Disp: , Rfl:     diphenhydrAMINE (NIGHT TIME SLEEP AID) 25 MG tablet, Take 50 mg by mouth daily at bedtime as needed for sleep, Disp: , Rfl:     hydrOXYzine HCL (ATARAX) 10 mg tablet, Take 1 tablet (10 mg total) by mouth daily at bedtime, Disp: 15 tablet, Rfl: 0    lactulose 20 g/30 mL, Take 30 mL (20 g total) by mouth 2 (two) times a day, Disp: 1892 mL, Rfl: 0    methocarbamol (ROBAXIN) 500 mg tablet, Take 1/2 tablet in am, 1/2 tablet in afternoon and 1 tablet at bedtime  No driving or operating heavy machinery with this medication  , Disp: 60 tablet, Rfl: 0    omeprazole (PriLOSEC) 20 mg delayed release capsule, Take 20 mg by mouth daily, Disp: , Rfl:     Health Maintenance     Health Maintenance   Topic Date Due    CRC Screening: Colonoscopy  1950    Diabetic Foot Exam  10/20/1960    URINE MICROALBUMIN  07/23/2015    Fall Risk  10/20/2015    Urinary Incontinence Screening  10/20/2015    Pneumococcal PPSV23/PCV13 65+ Years / High and Highest Risk (2 of 2 - PPSV23) 10/22/2017    INFLUENZA VACCINE  09/01/2018    DM Eye Exam  09/27/2018    HEMOGLOBIN A1C  03/17/2019    Depression Screening PHQ  09/16/2019    DTaP,Tdap,and Td Vaccines (2 - Td) 03/14/2028     Immunization History   Administered Date(s) Administered    Influenza 08/27/2017    Influenza, high dose seasonal 0 5 mL 10/03/2018    Pneumococcal Conjugate 13-Valent 08/27/2017    Pneumococcal Conjugate PCV 7 06/04/2014, 06/04/2014    Pneumococcal Polysaccharide PPV23 10/03/2018    Tdap 03/14/2018    Tuberculin Skin Test-PPD Intradermal 05/07/2014       Zuleyka Shell  Internal Medicine PGY-1  [facility]  [address]  [facility] , [address] [facility], [address] [place] [address]  Office: [phone]  Fax: [phone]

## (undated) DEVICE — LIGATOR MULTI 7 BANDING KIT